# Patient Record
Sex: FEMALE | Race: WHITE | Employment: UNEMPLOYED | URBAN - METROPOLITAN AREA
[De-identification: names, ages, dates, MRNs, and addresses within clinical notes are randomized per-mention and may not be internally consistent; named-entity substitution may affect disease eponyms.]

---

## 2017-01-06 DIAGNOSIS — M54.50 MIDLINE LOW BACK PAIN WITHOUT SCIATICA, UNSPECIFIED CHRONICITY: ICD-10-CM

## 2017-01-06 DIAGNOSIS — I10 ESSENTIAL HYPERTENSION: ICD-10-CM

## 2017-01-06 DIAGNOSIS — M81.0 OSTEOPOROSIS: ICD-10-CM

## 2017-01-06 DIAGNOSIS — L30.9 ECZEMA, UNSPECIFIED TYPE: ICD-10-CM

## 2017-01-06 DIAGNOSIS — F32.89 OTHER DEPRESSION: ICD-10-CM

## 2017-01-06 DIAGNOSIS — M10.09 IDIOPATHIC GOUT OF MULTIPLE SITES, UNSPECIFIED CHRONICITY: ICD-10-CM

## 2017-01-06 DIAGNOSIS — K21.9 GASTROESOPHAGEAL REFLUX DISEASE WITHOUT ESOPHAGITIS: ICD-10-CM

## 2017-01-06 DIAGNOSIS — I25.10 CORONARY ARTERY DISEASE INVOLVING NATIVE CORONARY ARTERY OF NATIVE HEART WITHOUT ANGINA PECTORIS: ICD-10-CM

## 2017-01-06 DIAGNOSIS — E03.4 HYPOTHYROIDISM DUE TO ACQUIRED ATROPHY OF THYROID: ICD-10-CM

## 2017-01-06 RX ORDER — BETAMETHASONE DIPROPIONATE 0.05 %
OINTMENT (GRAM) TOPICAL
Qty: 15 G | Refills: 2 | Status: SHIPPED | OUTPATIENT
Start: 2017-01-06 | End: 2017-05-12 | Stop reason: SDUPTHER

## 2017-01-06 RX ORDER — ISOSORBIDE MONONITRATE 60 MG/1
TABLET, EXTENDED RELEASE ORAL
Qty: 90 TABLET | Refills: 1 | Status: SHIPPED | OUTPATIENT
Start: 2017-01-06 | End: 2017-05-12 | Stop reason: SDUPTHER

## 2017-01-06 RX ORDER — LEVOTHYROXINE SODIUM 0.07 MG/1
TABLET ORAL
Qty: 90 TABLET | Refills: 1 | Status: SHIPPED | OUTPATIENT
Start: 2017-01-06 | End: 2017-05-12 | Stop reason: SDUPTHER

## 2017-01-06 RX ORDER — FUROSEMIDE 40 MG/1
TABLET ORAL
Qty: 90 TABLET | Refills: 1 | Status: SHIPPED | OUTPATIENT
Start: 2017-01-06 | End: 2017-05-12 | Stop reason: SDUPTHER

## 2017-01-06 RX ORDER — ATENOLOL 50 MG/1
TABLET ORAL
Qty: 90 TABLET | Refills: 1 | Status: SHIPPED | OUTPATIENT
Start: 2017-01-06 | End: 2017-05-12 | Stop reason: SDUPTHER

## 2017-01-06 RX ORDER — AMLODIPINE BESYLATE 10 MG/1
TABLET ORAL
Qty: 90 TABLET | Refills: 1 | Status: SHIPPED | OUTPATIENT
Start: 2017-01-06 | End: 2017-05-12 | Stop reason: SDUPTHER

## 2017-01-06 RX ORDER — ALENDRONATE SODIUM 70 MG/1
TABLET ORAL
Qty: 12 TABLET | Refills: 1 | Status: SHIPPED | OUTPATIENT
Start: 2017-01-06 | End: 2017-05-12 | Stop reason: SDUPTHER

## 2017-01-06 RX ORDER — GABAPENTIN 300 MG/1
CAPSULE ORAL
Qty: 30 CAPSULE | Refills: 2 | Status: SHIPPED | OUTPATIENT
Start: 2017-01-06 | End: 2017-01-23 | Stop reason: SDUPTHER

## 2017-01-06 RX ORDER — ALLOPURINOL 100 MG/1
TABLET ORAL
Qty: 30 TABLET | Refills: 2 | Status: SHIPPED | OUTPATIENT
Start: 2017-01-06 | End: 2017-01-23 | Stop reason: SDUPTHER

## 2017-01-06 RX ORDER — DEXLANSOPRAZOLE 60 MG/1
CAPSULE, DELAYED RELEASE ORAL
Qty: 30 CAPSULE | Refills: 4 | Status: SHIPPED | OUTPATIENT
Start: 2017-01-06 | End: 2017-01-23 | Stop reason: SDUPTHER

## 2017-01-19 DIAGNOSIS — I25.10 CORONARY ARTERY DISEASE INVOLVING NATIVE CORONARY ARTERY OF NATIVE HEART WITHOUT ANGINA PECTORIS: ICD-10-CM

## 2017-01-23 DIAGNOSIS — M10.09 IDIOPATHIC GOUT OF MULTIPLE SITES, UNSPECIFIED CHRONICITY: ICD-10-CM

## 2017-01-23 DIAGNOSIS — I25.10 CORONARY ARTERY DISEASE INVOLVING NATIVE CORONARY ARTERY OF NATIVE HEART WITHOUT ANGINA PECTORIS: ICD-10-CM

## 2017-01-23 DIAGNOSIS — K21.9 GASTROESOPHAGEAL REFLUX DISEASE WITHOUT ESOPHAGITIS: ICD-10-CM

## 2017-01-23 DIAGNOSIS — M54.50 MIDLINE LOW BACK PAIN WITHOUT SCIATICA, UNSPECIFIED CHRONICITY: ICD-10-CM

## 2017-01-23 RX ORDER — ALLOPURINOL 100 MG/1
TABLET ORAL
Qty: 90 TABLET | Refills: 1 | Status: SHIPPED | OUTPATIENT
Start: 2017-01-23 | End: 2017-05-12 | Stop reason: SDUPTHER

## 2017-01-23 RX ORDER — DEXLANSOPRAZOLE 60 MG/1
CAPSULE, DELAYED RELEASE ORAL
Qty: 90 CAPSULE | Refills: 1 | Status: SHIPPED | OUTPATIENT
Start: 2017-01-23 | End: 2017-05-12 | Stop reason: SDUPTHER

## 2017-01-23 RX ORDER — GABAPENTIN 300 MG/1
CAPSULE ORAL
Qty: 90 CAPSULE | Refills: 1 | Status: SHIPPED | OUTPATIENT
Start: 2017-01-23 | End: 2017-05-12 | Stop reason: SDUPTHER

## 2017-02-10 ENCOUNTER — OFFICE VISIT (OUTPATIENT)
Dept: INTERNAL MEDICINE CLINIC | Age: 82
End: 2017-02-10

## 2017-02-10 VITALS
RESPIRATION RATE: 12 BRPM | WEIGHT: 138 LBS | BODY MASS INDEX: 26.95 KG/M2 | HEART RATE: 56 BPM | OXYGEN SATURATION: 97 % | SYSTOLIC BLOOD PRESSURE: 118 MMHG | DIASTOLIC BLOOD PRESSURE: 80 MMHG

## 2017-02-10 DIAGNOSIS — E03.4 HYPOTHYROIDISM DUE TO ACQUIRED ATROPHY OF THYROID: ICD-10-CM

## 2017-02-10 DIAGNOSIS — I25.10 CORONARY ARTERY DISEASE INVOLVING NATIVE CORONARY ARTERY OF NATIVE HEART WITHOUT ANGINA PECTORIS: Primary | ICD-10-CM

## 2017-02-10 DIAGNOSIS — I10 ESSENTIAL HYPERTENSION: ICD-10-CM

## 2017-02-10 DIAGNOSIS — E78.2 MIXED HYPERLIPIDEMIA: ICD-10-CM

## 2017-02-10 DIAGNOSIS — N18.30 CHRONIC RENAL INSUFFICIENCY, STAGE 3 (MODERATE) (HCC): ICD-10-CM

## 2017-02-10 PROCEDURE — 99214 OFFICE O/P EST MOD 30 MIN: CPT | Performed by: INTERNAL MEDICINE

## 2017-05-12 ENCOUNTER — OFFICE VISIT (OUTPATIENT)
Dept: INTERNAL MEDICINE CLINIC | Age: 82
End: 2017-05-12

## 2017-05-12 VITALS
HEART RATE: 56 BPM | SYSTOLIC BLOOD PRESSURE: 122 MMHG | WEIGHT: 138 LBS | RESPIRATION RATE: 16 BRPM | DIASTOLIC BLOOD PRESSURE: 64 MMHG | BODY MASS INDEX: 27.09 KG/M2 | HEIGHT: 60 IN | OXYGEN SATURATION: 96 %

## 2017-05-12 DIAGNOSIS — N18.30 CHRONIC RENAL INSUFFICIENCY, STAGE 3 (MODERATE) (HCC): ICD-10-CM

## 2017-05-12 DIAGNOSIS — I25.10 CORONARY ARTERY DISEASE INVOLVING NATIVE CORONARY ARTERY OF NATIVE HEART WITHOUT ANGINA PECTORIS: ICD-10-CM

## 2017-05-12 DIAGNOSIS — E78.2 MIXED HYPERLIPIDEMIA: ICD-10-CM

## 2017-05-12 DIAGNOSIS — L30.9 ECZEMA, UNSPECIFIED TYPE: ICD-10-CM

## 2017-05-12 DIAGNOSIS — M81.0 OSTEOPOROSIS: ICD-10-CM

## 2017-05-12 DIAGNOSIS — K21.9 GASTROESOPHAGEAL REFLUX DISEASE WITHOUT ESOPHAGITIS: ICD-10-CM

## 2017-05-12 DIAGNOSIS — R42 VERTIGO: ICD-10-CM

## 2017-05-12 DIAGNOSIS — E03.4 HYPOTHYROIDISM DUE TO ACQUIRED ATROPHY OF THYROID: ICD-10-CM

## 2017-05-12 DIAGNOSIS — I10 ESSENTIAL HYPERTENSION: ICD-10-CM

## 2017-05-12 DIAGNOSIS — F32.89 OTHER DEPRESSION: ICD-10-CM

## 2017-05-12 DIAGNOSIS — I25.10 CORONARY ARTERY DISEASE INVOLVING NATIVE CORONARY ARTERY OF NATIVE HEART WITHOUT ANGINA PECTORIS: Primary | ICD-10-CM

## 2017-05-12 DIAGNOSIS — M54.50 MIDLINE LOW BACK PAIN WITHOUT SCIATICA, UNSPECIFIED CHRONICITY: ICD-10-CM

## 2017-05-12 DIAGNOSIS — M10.09 IDIOPATHIC GOUT OF MULTIPLE SITES, UNSPECIFIED CHRONICITY: ICD-10-CM

## 2017-05-12 LAB
A/G RATIO: 1.8 (ref 1.1–2.2)
ALBUMIN SERPL-MCNC: 4.4 G/DL (ref 3.4–5)
ALP BLD-CCNC: 74 U/L (ref 40–129)
ALT SERPL-CCNC: 17 U/L (ref 10–40)
ANION GAP SERPL CALCULATED.3IONS-SCNC: 17 MMOL/L (ref 3–16)
AST SERPL-CCNC: 20 U/L (ref 15–37)
BASOPHILS ABSOLUTE: 0.1 K/UL (ref 0–0.2)
BASOPHILS RELATIVE PERCENT: 1 %
BILIRUB SERPL-MCNC: 0.4 MG/DL (ref 0–1)
BUN BLDV-MCNC: 31 MG/DL (ref 7–20)
CALCIUM SERPL-MCNC: 9.5 MG/DL (ref 8.3–10.6)
CHLORIDE BLD-SCNC: 102 MMOL/L (ref 99–110)
CHOLESTEROL, TOTAL: 154 MG/DL (ref 0–199)
CO2: 23 MMOL/L (ref 21–32)
CREAT SERPL-MCNC: 1.4 MG/DL (ref 0.6–1.2)
EOSINOPHILS ABSOLUTE: 0.9 K/UL (ref 0–0.6)
EOSINOPHILS RELATIVE PERCENT: 12 %
GFR AFRICAN AMERICAN: 43
GFR NON-AFRICAN AMERICAN: 35
GLOBULIN: 2.5 G/DL
GLUCOSE BLD-MCNC: 101 MG/DL (ref 70–99)
HCT VFR BLD CALC: 36.6 % (ref 36–48)
HDLC SERPL-MCNC: 49 MG/DL (ref 40–60)
HEMOGLOBIN: 12.1 G/DL (ref 12–16)
LDL CHOLESTEROL CALCULATED: 78 MG/DL
LYMPHOCYTES ABSOLUTE: 1.9 K/UL (ref 1–5.1)
LYMPHOCYTES RELATIVE PERCENT: 26.2 %
MCH RBC QN AUTO: 30.9 PG (ref 26–34)
MCHC RBC AUTO-ENTMCNC: 33 G/DL (ref 31–36)
MCV RBC AUTO: 93.6 FL (ref 80–100)
MONOCYTES ABSOLUTE: 0.8 K/UL (ref 0–1.3)
MONOCYTES RELATIVE PERCENT: 10.7 %
NEUTROPHILS ABSOLUTE: 3.6 K/UL (ref 1.7–7.7)
NEUTROPHILS RELATIVE PERCENT: 50.1 %
PDW BLD-RTO: 13.8 % (ref 12.4–15.4)
PLATELET # BLD: 229 K/UL (ref 135–450)
PMV BLD AUTO: 8.1 FL (ref 5–10.5)
POTASSIUM SERPL-SCNC: 4.5 MMOL/L (ref 3.5–5.1)
RBC # BLD: 3.91 M/UL (ref 4–5.2)
SODIUM BLD-SCNC: 142 MMOL/L (ref 136–145)
T4 FREE: 1.2 NG/DL (ref 0.9–1.8)
TOTAL PROTEIN: 6.9 G/DL (ref 6.4–8.2)
TRIGL SERPL-MCNC: 136 MG/DL (ref 0–150)
TSH SERPL DL<=0.05 MIU/L-ACNC: 0.42 UIU/ML (ref 0.27–4.2)
VLDLC SERPL CALC-MCNC: 27 MG/DL
WBC # BLD: 7.1 K/UL (ref 4–11)

## 2017-05-12 PROCEDURE — 99214 OFFICE O/P EST MOD 30 MIN: CPT | Performed by: INTERNAL MEDICINE

## 2017-05-12 RX ORDER — GABAPENTIN 300 MG/1
CAPSULE ORAL
Qty: 90 CAPSULE | Refills: 1 | Status: SHIPPED | OUTPATIENT
Start: 2017-05-12 | End: 2017-08-14 | Stop reason: SDUPTHER

## 2017-05-12 RX ORDER — BETAMETHASONE DIPROPIONATE 0.05 %
OINTMENT (GRAM) TOPICAL
Qty: 45 G | Refills: 2 | Status: SHIPPED | OUTPATIENT
Start: 2017-05-12 | End: 2017-08-14 | Stop reason: SDUPTHER

## 2017-05-12 RX ORDER — DEXLANSOPRAZOLE 60 MG/1
CAPSULE, DELAYED RELEASE ORAL
Qty: 90 CAPSULE | Refills: 1 | Status: SHIPPED | OUTPATIENT
Start: 2017-05-12 | End: 2017-08-14 | Stop reason: SDUPTHER

## 2017-05-12 RX ORDER — ALENDRONATE SODIUM 70 MG/1
TABLET ORAL
Qty: 12 TABLET | Refills: 1 | Status: SHIPPED | OUTPATIENT
Start: 2017-05-12 | End: 2017-08-14 | Stop reason: SDUPTHER

## 2017-05-12 RX ORDER — ISOSORBIDE MONONITRATE 60 MG/1
TABLET, EXTENDED RELEASE ORAL
Qty: 90 TABLET | Refills: 1 | Status: SHIPPED | OUTPATIENT
Start: 2017-05-12 | End: 2017-08-14 | Stop reason: SDUPTHER

## 2017-05-12 RX ORDER — ALLOPURINOL 100 MG/1
TABLET ORAL
Qty: 90 TABLET | Refills: 1 | Status: SHIPPED | OUTPATIENT
Start: 2017-05-12 | End: 2017-08-14 | Stop reason: SDUPTHER

## 2017-05-12 RX ORDER — ATENOLOL 50 MG/1
TABLET ORAL
Qty: 90 TABLET | Refills: 1 | Status: SHIPPED | OUTPATIENT
Start: 2017-05-12 | End: 2017-08-14 | Stop reason: SDUPTHER

## 2017-05-12 RX ORDER — AMLODIPINE BESYLATE 10 MG/1
TABLET ORAL
Qty: 90 TABLET | Refills: 1 | Status: SHIPPED | OUTPATIENT
Start: 2017-05-12 | End: 2017-08-14 | Stop reason: SDUPTHER

## 2017-05-12 RX ORDER — MECLIZINE HCL 12.5 MG/1
12.5 TABLET ORAL 3 TIMES DAILY PRN
Qty: 30 TABLET | Refills: 1 | Status: SHIPPED | OUTPATIENT
Start: 2017-05-12 | End: 2017-08-14 | Stop reason: SDUPTHER

## 2017-05-12 RX ORDER — LEVOTHYROXINE SODIUM 0.07 MG/1
TABLET ORAL
Qty: 90 TABLET | Refills: 1 | Status: SHIPPED | OUTPATIENT
Start: 2017-05-12 | End: 2017-08-14 | Stop reason: SDUPTHER

## 2017-05-12 RX ORDER — FUROSEMIDE 40 MG/1
TABLET ORAL
Qty: 90 TABLET | Refills: 1 | Status: SHIPPED | OUTPATIENT
Start: 2017-05-12 | End: 2017-08-14 | Stop reason: SDUPTHER

## 2017-05-12 ASSESSMENT — PATIENT HEALTH QUESTIONNAIRE - PHQ9
1. LITTLE INTEREST OR PLEASURE IN DOING THINGS: 0
SUM OF ALL RESPONSES TO PHQ9 QUESTIONS 1 & 2: 0
2. FEELING DOWN, DEPRESSED OR HOPELESS: 0
SUM OF ALL RESPONSES TO PHQ QUESTIONS 1-9: 0

## 2017-05-23 DIAGNOSIS — I25.10 CORONARY ARTERY DISEASE INVOLVING NATIVE CORONARY ARTERY OF NATIVE HEART WITHOUT ANGINA PECTORIS: ICD-10-CM

## 2017-05-23 DIAGNOSIS — E78.2 MIXED HYPERLIPIDEMIA: ICD-10-CM

## 2017-05-23 RX ORDER — SIMVASTATIN 20 MG
TABLET ORAL
Qty: 90 TABLET | Refills: 0 | Status: SHIPPED | OUTPATIENT
Start: 2017-05-23 | End: 2017-08-14 | Stop reason: SDUPTHER

## 2017-08-14 ENCOUNTER — OFFICE VISIT (OUTPATIENT)
Dept: INTERNAL MEDICINE CLINIC | Age: 82
End: 2017-08-14

## 2017-08-14 VITALS — SYSTOLIC BLOOD PRESSURE: 148 MMHG | HEART RATE: 56 BPM | OXYGEN SATURATION: 96 % | DIASTOLIC BLOOD PRESSURE: 62 MMHG

## 2017-08-14 DIAGNOSIS — E03.4 HYPOTHYROIDISM DUE TO ACQUIRED ATROPHY OF THYROID: ICD-10-CM

## 2017-08-14 DIAGNOSIS — M10.09 IDIOPATHIC GOUT OF MULTIPLE SITES, UNSPECIFIED CHRONICITY: ICD-10-CM

## 2017-08-14 DIAGNOSIS — I10 ESSENTIAL HYPERTENSION: Primary | ICD-10-CM

## 2017-08-14 DIAGNOSIS — M54.50 MIDLINE LOW BACK PAIN WITHOUT SCIATICA, UNSPECIFIED CHRONICITY: ICD-10-CM

## 2017-08-14 DIAGNOSIS — L30.9 ECZEMA, UNSPECIFIED TYPE: ICD-10-CM

## 2017-08-14 DIAGNOSIS — Z23 NEED FOR INFLUENZA VACCINATION: ICD-10-CM

## 2017-08-14 DIAGNOSIS — F32.A DEPRESSION, UNSPECIFIED DEPRESSION TYPE: ICD-10-CM

## 2017-08-14 DIAGNOSIS — R42 VERTIGO: ICD-10-CM

## 2017-08-14 DIAGNOSIS — E78.2 MIXED HYPERLIPIDEMIA: ICD-10-CM

## 2017-08-14 DIAGNOSIS — I25.10 CORONARY ARTERY DISEASE INVOLVING NATIVE CORONARY ARTERY OF NATIVE HEART WITHOUT ANGINA PECTORIS: ICD-10-CM

## 2017-08-14 DIAGNOSIS — I10 ESSENTIAL HYPERTENSION: ICD-10-CM

## 2017-08-14 DIAGNOSIS — K21.9 GASTROESOPHAGEAL REFLUX DISEASE WITHOUT ESOPHAGITIS: ICD-10-CM

## 2017-08-14 DIAGNOSIS — M80.00XA AGE-RELATED OSTEOPOROSIS WITH CURRENT PATHOLOGICAL FRACTURE, INITIAL ENCOUNTER: ICD-10-CM

## 2017-08-14 LAB
A/G RATIO: 1.8 (ref 1.1–2.2)
ALBUMIN SERPL-MCNC: 4.8 G/DL (ref 3.4–5)
ALP BLD-CCNC: 68 U/L (ref 40–129)
ALT SERPL-CCNC: 17 U/L (ref 10–40)
ANION GAP SERPL CALCULATED.3IONS-SCNC: 17 MMOL/L (ref 3–16)
AST SERPL-CCNC: 22 U/L (ref 15–37)
BILIRUB SERPL-MCNC: 0.4 MG/DL (ref 0–1)
BUN BLDV-MCNC: 24 MG/DL (ref 7–20)
CALCIUM SERPL-MCNC: 10 MG/DL (ref 8.3–10.6)
CHLORIDE BLD-SCNC: 95 MMOL/L (ref 99–110)
CHOLESTEROL, TOTAL: 152 MG/DL (ref 0–199)
CO2: 25 MMOL/L (ref 21–32)
CREAT SERPL-MCNC: 1.4 MG/DL (ref 0.6–1.2)
GFR AFRICAN AMERICAN: 43
GFR NON-AFRICAN AMERICAN: 35
GLOBULIN: 2.6 G/DL
GLUCOSE BLD-MCNC: 106 MG/DL (ref 70–99)
HCT VFR BLD CALC: 37.6 % (ref 36–48)
HDLC SERPL-MCNC: 54 MG/DL (ref 40–60)
HEMOGLOBIN: 12.8 G/DL (ref 12–16)
LDL CHOLESTEROL CALCULATED: 71 MG/DL
MCH RBC QN AUTO: 31.7 PG (ref 26–34)
MCHC RBC AUTO-ENTMCNC: 34 G/DL (ref 31–36)
MCV RBC AUTO: 93.3 FL (ref 80–100)
PDW BLD-RTO: 13.3 % (ref 12.4–15.4)
PLATELET # BLD: 272 K/UL (ref 135–450)
PMV BLD AUTO: 8.1 FL (ref 5–10.5)
POTASSIUM SERPL-SCNC: 4.8 MMOL/L (ref 3.5–5.1)
RBC # BLD: 4.03 M/UL (ref 4–5.2)
SODIUM BLD-SCNC: 137 MMOL/L (ref 136–145)
T4 FREE: 1.5 NG/DL (ref 0.9–1.8)
TOTAL PROTEIN: 7.4 G/DL (ref 6.4–8.2)
TRIGL SERPL-MCNC: 136 MG/DL (ref 0–150)
TSH SERPL DL<=0.05 MIU/L-ACNC: 0.61 UIU/ML (ref 0.27–4.2)
URIC ACID, SERUM: 5.3 MG/DL (ref 2.6–6)
VLDLC SERPL CALC-MCNC: 27 MG/DL
WBC # BLD: 6.8 K/UL (ref 4–11)

## 2017-08-14 PROCEDURE — G0008 ADMIN INFLUENZA VIRUS VAC: HCPCS | Performed by: INTERNAL MEDICINE

## 2017-08-14 PROCEDURE — 99214 OFFICE O/P EST MOD 30 MIN: CPT | Performed by: INTERNAL MEDICINE

## 2017-08-14 PROCEDURE — 90662 IIV NO PRSV INCREASED AG IM: CPT | Performed by: INTERNAL MEDICINE

## 2017-08-14 RX ORDER — SIMVASTATIN 20 MG
TABLET ORAL
Qty: 90 TABLET | Refills: 0 | Status: SHIPPED | OUTPATIENT
Start: 2017-08-14 | End: 2017-11-16 | Stop reason: SDUPTHER

## 2017-08-14 RX ORDER — ATENOLOL 50 MG/1
TABLET ORAL
Qty: 90 TABLET | Refills: 1 | Status: SHIPPED | OUTPATIENT
Start: 2017-08-14 | End: 2017-11-16 | Stop reason: SDUPTHER

## 2017-08-14 RX ORDER — BETAMETHASONE DIPROPIONATE 0.05 %
OINTMENT (GRAM) TOPICAL
Qty: 45 G | Refills: 2 | Status: ON HOLD | OUTPATIENT
Start: 2017-08-14 | End: 2018-08-23 | Stop reason: HOSPADM

## 2017-08-14 RX ORDER — ALENDRONATE SODIUM 70 MG/1
TABLET ORAL
Qty: 12 TABLET | Refills: 1 | Status: SHIPPED | OUTPATIENT
Start: 2017-08-14 | End: 2017-11-16 | Stop reason: SDUPTHER

## 2017-08-14 RX ORDER — MELATONIN
Qty: 30 TABLET | Refills: 0 | Status: SHIPPED | OUTPATIENT
Start: 2017-08-14 | End: 2017-08-15 | Stop reason: ALTCHOICE

## 2017-08-14 RX ORDER — LEVOTHYROXINE SODIUM 0.07 MG/1
TABLET ORAL
Qty: 90 TABLET | Refills: 1 | Status: SHIPPED | OUTPATIENT
Start: 2017-08-14 | End: 2017-11-16 | Stop reason: SDUPTHER

## 2017-08-14 RX ORDER — ALLOPURINOL 100 MG/1
TABLET ORAL
Qty: 90 TABLET | Refills: 1 | Status: SHIPPED | OUTPATIENT
Start: 2017-08-14 | End: 2017-11-16 | Stop reason: SDUPTHER

## 2017-08-14 RX ORDER — DEXLANSOPRAZOLE 60 MG/1
CAPSULE, DELAYED RELEASE ORAL
Qty: 90 CAPSULE | Refills: 1 | Status: SHIPPED | OUTPATIENT
Start: 2017-08-14 | End: 2017-11-16 | Stop reason: SDUPTHER

## 2017-08-14 RX ORDER — MECLIZINE HCL 12.5 MG/1
12.5 TABLET ORAL 3 TIMES DAILY PRN
Qty: 30 TABLET | Refills: 1 | Status: SHIPPED | OUTPATIENT
Start: 2017-08-14 | End: 2017-08-24

## 2017-08-14 RX ORDER — GABAPENTIN 300 MG/1
CAPSULE ORAL
Qty: 90 CAPSULE | Refills: 1 | Status: SHIPPED | OUTPATIENT
Start: 2017-08-14 | End: 2017-11-16 | Stop reason: SDUPTHER

## 2017-08-14 RX ORDER — TRAMADOL HYDROCHLORIDE 50 MG/1
50 TABLET ORAL EVERY 4 HOURS PRN
Qty: 30 TABLET | Refills: 1 | Status: SHIPPED | OUTPATIENT
Start: 2017-08-14 | End: 2017-11-16 | Stop reason: SDUPTHER

## 2017-08-14 RX ORDER — ISOSORBIDE MONONITRATE 60 MG/1
TABLET, EXTENDED RELEASE ORAL
Qty: 90 TABLET | Refills: 1 | Status: SHIPPED | OUTPATIENT
Start: 2017-08-14 | End: 2017-11-16 | Stop reason: SDUPTHER

## 2017-08-14 RX ORDER — FUROSEMIDE 40 MG/1
TABLET ORAL
Qty: 90 TABLET | Refills: 1 | Status: SHIPPED | OUTPATIENT
Start: 2017-08-14 | End: 2017-11-16 | Stop reason: SDUPTHER

## 2017-08-14 RX ORDER — AMLODIPINE BESYLATE 10 MG/1
TABLET ORAL
Qty: 90 TABLET | Refills: 1 | Status: SHIPPED | OUTPATIENT
Start: 2017-08-14 | End: 2017-11-16 | Stop reason: SDUPTHER

## 2017-08-15 DIAGNOSIS — M80.00XA AGE-RELATED OSTEOPOROSIS WITH CURRENT PATHOLOGICAL FRACTURE, INITIAL ENCOUNTER: ICD-10-CM

## 2017-08-15 RX ORDER — CHOLECALCIFEROL (VITAMIN D3) 125 MCG
2000 CAPSULE ORAL DAILY
Qty: 30 TABLET | Refills: 1
Start: 2017-08-15 | End: 2017-11-16 | Stop reason: SDUPTHER

## 2017-11-16 ENCOUNTER — OFFICE VISIT (OUTPATIENT)
Dept: INTERNAL MEDICINE CLINIC | Age: 82
End: 2017-11-16

## 2017-11-16 VITALS
DIASTOLIC BLOOD PRESSURE: 62 MMHG | OXYGEN SATURATION: 97 % | SYSTOLIC BLOOD PRESSURE: 148 MMHG | WEIGHT: 138 LBS | RESPIRATION RATE: 16 BRPM | HEART RATE: 64 BPM | HEIGHT: 61 IN | BODY MASS INDEX: 26.06 KG/M2

## 2017-11-16 DIAGNOSIS — F32.A DEPRESSION, UNSPECIFIED DEPRESSION TYPE: ICD-10-CM

## 2017-11-16 DIAGNOSIS — M80.00XA AGE-RELATED OSTEOPOROSIS WITH CURRENT PATHOLOGICAL FRACTURE, INITIAL ENCOUNTER: ICD-10-CM

## 2017-11-16 DIAGNOSIS — E78.2 MIXED HYPERLIPIDEMIA: ICD-10-CM

## 2017-11-16 DIAGNOSIS — M10.09 IDIOPATHIC GOUT OF MULTIPLE SITES, UNSPECIFIED CHRONICITY: ICD-10-CM

## 2017-11-16 DIAGNOSIS — E03.4 HYPOTHYROIDISM DUE TO ACQUIRED ATROPHY OF THYROID: ICD-10-CM

## 2017-11-16 DIAGNOSIS — K21.9 GASTROESOPHAGEAL REFLUX DISEASE WITHOUT ESOPHAGITIS: ICD-10-CM

## 2017-11-16 DIAGNOSIS — M54.50 MIDLINE LOW BACK PAIN WITHOUT SCIATICA, UNSPECIFIED CHRONICITY: ICD-10-CM

## 2017-11-16 DIAGNOSIS — I10 ESSENTIAL HYPERTENSION: ICD-10-CM

## 2017-11-16 DIAGNOSIS — I25.10 CORONARY ARTERY DISEASE INVOLVING NATIVE CORONARY ARTERY OF NATIVE HEART WITHOUT ANGINA PECTORIS: ICD-10-CM

## 2017-11-16 DIAGNOSIS — Z00.00 ROUTINE GENERAL MEDICAL EXAMINATION AT A HEALTH CARE FACILITY: Primary | ICD-10-CM

## 2017-11-16 LAB
A/G RATIO: 1.6 (CALC) (ref 0.8–2.6)
ALBUMIN SERPL-MCNC: 4.3 GM/DL (ref 3.5–5.2)
ALP BLD-CCNC: 69 U/L (ref 23–144)
ALT SERPL-CCNC: 21 U/L (ref 0–60)
AST SERPL-CCNC: 20 U/L (ref 0–55)
BASOPHILS ABSOLUTE: 0.1 K/MM3 (ref 0–0.3)
BASOPHILS RELATIVE PERCENT: 0.6 % (ref 0–2)
BILIRUB SERPL-MCNC: 0.4 MG/DL (ref 0–1.2)
BUN / CREAT RATIO: 19 (CALC) (ref 7–25)
BUN BLDV-MCNC: 28 MG/DL (ref 3–29)
CALCIUM SERPL-MCNC: 9.6 MG/DL (ref 8.5–10.5)
CHLORIDE BLD-SCNC: 99 MEQ/L (ref 96–110)
CHOLESTEROL, TOTAL: 190 MG/DL
CO2: 28 MEQ/L (ref 19–32)
COPY(IES) SENT TO:: NORMAL
CREAT SERPL-MCNC: 1.5 MG/DL
EOSINOPHILS ABSOLUTE: 0.4 K/MM3 (ref 0–0.6)
EOSINOPHILS RELATIVE PERCENT: 4.2 % (ref 0–7)
GFR SERPL CREATININE-BSD FRML MDRD: 30 ML/MIN/1.73M2
GLOBULIN: 2.7 GM/DL (CALC) (ref 1.9–3.6)
GLUCOSE BLD-MCNC: 93 MG/DL
HCT VFR BLD CALC: 38.4 % (ref 35–46)
HDLC SERPL-MCNC: 51 MG/DL
HEMOGLOBIN: 13 G/DL (ref 12–15.6)
LDL CHOLESTEROL: 91 MG/DL (CALC)
LEUKOCYTES, UA: 9.1 K/MM3 (ref 3.8–10.8)
LYMPHOCYTES ABSOLUTE: 2.1 K/MM3 (ref 0.9–4.1)
LYMPHOCYTES RELATIVE PERCENT: 23.4 % (ref 14–51)
MCH RBC QN AUTO: 31.6 PG (ref 27–33)
MCHC RBC AUTO-ENTMCNC: 33.9 G/DL (ref 32–36)
MCV RBC AUTO: 93.3 FL (ref 80–100)
MONOCYTES ABSOLUTE: 0.8 K/MM3 (ref 0.2–1.1)
MONOCYTES RELATIVE PERCENT: 8.5 % (ref 0–14)
NEUTROPHILS ABSOLUTE: 5.8 K/MM3 (ref 1.5–7.8)
PDW BLD-RTO: 13.6 % (ref 9–15)
PLATELET # BLD: 310 K/MM3 (ref 130–400)
POTASSIUM SERPL-SCNC: 4.1 MEQ/L (ref 3.4–5.3)
RBC # BLD: 4.12 M/MM3 (ref 3.9–5.2)
SEGMENTED NEUTROPHILS RELATIVE PERCENT: 63.3 % (ref 40–76)
SODIUM BLD-SCNC: 140 MEQ/L (ref 135–148)
T4 FREE: 1.06 NG/DL (ref 0.8–1.8)
TOTAL PROTEIN: 7 GM/DL (ref 6–8.3)
TRIGL SERPL-MCNC: 242 MG/DL
TSH SERPL DL<=0.05 MIU/L-ACNC: 1.88 MICRO IU/ML (ref 0.4–4)
VLDLC SERPL CALC-MCNC: 48 MG/DL (CALC) (ref 4–38)

## 2017-11-16 PROCEDURE — G0439 PPPS, SUBSEQ VISIT: HCPCS | Performed by: INTERNAL MEDICINE

## 2017-11-16 PROCEDURE — G8598 ASA/ANTIPLAT THER USED: HCPCS | Performed by: INTERNAL MEDICINE

## 2017-11-16 RX ORDER — DEXLANSOPRAZOLE 60 MG/1
CAPSULE, DELAYED RELEASE ORAL
Qty: 90 CAPSULE | Refills: 1 | Status: SHIPPED | OUTPATIENT
Start: 2017-11-16 | End: 2018-03-16 | Stop reason: SDUPTHER

## 2017-11-16 RX ORDER — FUROSEMIDE 40 MG/1
TABLET ORAL
Qty: 90 TABLET | Refills: 1 | Status: ON HOLD | OUTPATIENT
Start: 2017-11-16 | End: 2018-01-10 | Stop reason: HOSPADM

## 2017-11-16 RX ORDER — AMLODIPINE BESYLATE 10 MG/1
TABLET ORAL
Qty: 90 TABLET | Refills: 1 | Status: ON HOLD | OUTPATIENT
Start: 2017-11-16 | End: 2018-01-10 | Stop reason: HOSPADM

## 2017-11-16 RX ORDER — ALLOPURINOL 100 MG/1
TABLET ORAL
Qty: 90 TABLET | Refills: 1 | Status: SHIPPED | OUTPATIENT
Start: 2017-11-16 | End: 2018-03-16 | Stop reason: SDUPTHER

## 2017-11-16 RX ORDER — ALENDRONATE SODIUM 70 MG/1
TABLET ORAL
Qty: 12 TABLET | Refills: 1 | Status: SHIPPED | OUTPATIENT
Start: 2017-11-16 | End: 2018-01-10 | Stop reason: SDUPTHER

## 2017-11-16 RX ORDER — ATENOLOL 50 MG/1
TABLET ORAL
Qty: 90 TABLET | Refills: 1 | Status: ON HOLD | OUTPATIENT
Start: 2017-11-16 | End: 2018-01-10 | Stop reason: HOSPADM

## 2017-11-16 RX ORDER — CHOLECALCIFEROL (VITAMIN D3) 125 MCG
2000 CAPSULE ORAL DAILY
Qty: 30 TABLET | Refills: 1 | Status: SHIPPED | OUTPATIENT
Start: 2017-11-16 | End: 2018-01-17 | Stop reason: SDUPTHER

## 2017-11-16 RX ORDER — GABAPENTIN 300 MG/1
CAPSULE ORAL
Qty: 90 CAPSULE | Refills: 1 | Status: ON HOLD | OUTPATIENT
Start: 2017-11-16 | End: 2018-01-10 | Stop reason: HOSPADM

## 2017-11-16 RX ORDER — TRAMADOL HYDROCHLORIDE 50 MG/1
50 TABLET ORAL EVERY 4 HOURS PRN
Qty: 30 TABLET | Refills: 1 | Status: ON HOLD | OUTPATIENT
Start: 2017-11-16 | End: 2018-08-23 | Stop reason: HOSPADM

## 2017-11-16 RX ORDER — LEVOTHYROXINE SODIUM 0.07 MG/1
TABLET ORAL
Qty: 90 TABLET | Refills: 1 | Status: SHIPPED | OUTPATIENT
Start: 2017-11-16 | End: 2018-03-16 | Stop reason: SDUPTHER

## 2017-11-16 RX ORDER — SIMVASTATIN 20 MG
TABLET ORAL
Qty: 90 TABLET | Refills: 0 | Status: SHIPPED | OUTPATIENT
Start: 2017-11-16 | End: 2018-02-24 | Stop reason: SDUPTHER

## 2017-11-16 RX ORDER — ISOSORBIDE MONONITRATE 60 MG/1
TABLET, EXTENDED RELEASE ORAL
Qty: 90 TABLET | Refills: 1 | Status: SHIPPED | OUTPATIENT
Start: 2017-11-16 | End: 2018-03-16

## 2017-11-16 ASSESSMENT — PATIENT HEALTH QUESTIONNAIRE - PHQ9: SUM OF ALL RESPONSES TO PHQ QUESTIONS 1-9: 0

## 2017-11-16 ASSESSMENT — ANXIETY QUESTIONNAIRES: GAD7 TOTAL SCORE: 0

## 2017-11-16 NOTE — PROGRESS NOTES
Internal Medicine  History and Physical      Paulo Jain  YOB: 1927    Date of Service:  11/16/2017        Chief Complaint:   Paulo Jain is a 80 y.o. female who presents for a comprehensive physical exam    HPI:   Medicare questions reviewed. Hypertension: Stable. Denies SOB, cough, visual changes, dizziness, palpitations or HA. CAD- Dr Taqueria Mock following annually. HAS HAD THREE SHORT EPISODES OF CP IN THE PAST WEEK, RELIEVED W NTG. Hypothyroid has been asymptomatic w/o sx of fatigue, constipation, cold intolerance, depression. CRI- seeing Dr Brown Kinds Dec 5 and last creat stable 1.4    Gout, no recent attacks.     gerd- is on PPI    Patient Active Problem List   Diagnosis    Hypertension    Chronic renal insufficiency    GERD (gastroesophageal reflux disease)    Depression    Hyperlipidemia    Generalized osteoarthritis    Osteoporosis    Hypothyroid    CAD (coronary artery disease)    Eczema    Low back pain    Hyperuricemia    Gout    Pruritic condition       Preventive Care:  Health Maintenance   Topic Date Due    DTaP/Tdap/Td vaccine (1 - Tdap) 01/23/1946    Zostavax vaccine  Completed    Flu vaccine  Completed    Pneumococcal low/med risk  Completed        Lipid panel:   Lab Results   Component Value Date    TRIG 136 08/14/2017    HDL 54 08/14/2017    HDL 46 02/03/2012    LDLCALC 71 08/14/2017    LDLDIRECT 78 02/11/2014        Immunization History   Administered Date(s) Administered    Influenza Virus Vaccine 09/19/2012, 09/27/2013    Influenza, High Dose 09/12/2014, 09/28/2015, 10/21/2016, 08/14/2017    Pneumococcal 13-valent Conjugate ([id number]) 10/22/2015    Pneumococcal Polysaccharide ([id number]) 06/07/2011    Zoster 02/12/2014       Allergies   Allergen Reactions    Cephalexin Rash     Current Outpatient Prescriptions   Medication Sig Dispense Refill    Cholecalciferol (VITAMIN D3) 2000 units TABS Take 2,000 Units by mouth daily 30 tablet 1    alendronate (FOSAMAX) 70 MG tablet Weekly on empty stomach 30min before meal 12 tablet 1    allopurinol (ZYLOPRIM) 100 MG tablet TAKE 1 TABLET BY MOUTH ONCE DAILY 90 tablet 1    amLODIPine (NORVASC) 10 MG tablet TAKE ONE (1) TABLET BY MOUTH ONCE DAILY 90 tablet 1    atenolol (TENORMIN) 50 MG tablet TAKE ONE (1) TABLET BY MOUTH ONCE DAILY 90 tablet 1    dexlansoprazole (DEXILANT) 60 MG CPDR delayed release capsule TAKE ONE (1) CAPSULE BY MOUTH ONCE DAILY 90 capsule 1    folic acid-pyridoxine-cyancobalamin (VIRT-RISHABH FORTE) 2.5-25-2 MG TABS TAKE ONE (1) TABLET BY MOUTH ONCE DAILY 30 tablet 3    furosemide (LASIX) 40 MG tablet TAKE ONE (1) TABLET BY MOUTH ONCE DAILY 90 tablet 1    gabapentin (NEURONTIN) 300 MG capsule TAKE 1 CAPSULE BY MOUTH ONCE DAILY 90 capsule 1    isosorbide mononitrate (IMDUR) 60 MG extended release tablet TAKE ONE (1) TABLET BY MOUTH ONCE DAILY 90 tablet 1    levothyroxine (SYNTHROID) 75 MCG tablet TAKE ONE (1) TABLET BY MOUTH ONCE DAILY 90 tablet 1    sertraline (ZOLOFT) 50 MG tablet TAKE ONE (1) TABLET BY MOUTH ONCE DAILY 90 tablet 1    simvastatin (ZOCOR) 20 MG tablet TAKE 1 TABLET BY MOUTH ONCE DAILY WITH SUPPER 90 tablet 0    traMADol (ULTRAM) 50 MG tablet Take 1 tablet by mouth every 4 hours as needed for Pain 30 tablet 1    nitroGLYCERIN (NITROSTAT) 0.4 MG SL tablet Place 1 tablet under the tongue every 5 minutes as needed 30 tablet 3    aspirin EC 81 MG EC tablet Take 81 mg by mouth daily.  betamethasone dipropionate (DIPROLENE) 0.05 % ointment Apply topically daily. 45 g 2     No current facility-administered medications for this visit.         Past Medical History:   Diagnosis Date    CAD (coronary artery disease)     Dr Sky Figueredo/Jose Figueredo    Chronic renal insufficiency     Dr Rin Cabral Depression     Eczema     Elevated LFTs 5/11/2012    Generalized osteoarthritis     GERD (gastroesophageal reflux disease)     Esophagitis    Gout     Hyperlipidemia  Hypertension     Hyperuricemia     Hypothyroid     Hypothyroidism    Low back pain     Osteoporosis     Generalized     Past Surgical History:   Procedure Laterality Date    CATARACT REMOVAL      R Aug. 2006 and L March 2007 Dr. Sarah Espinoza  2004    right foot realignment [place]    HYSTERECTOMY      TONSILLECTOMY       Family History   Problem Relation Age of Onset    Other Mother      CAD    Other Father      CAD     Social History     Social History    Marital status:      Spouse name: N/A    Number of children: N/A    Years of education: N/A     Occupational History    Retired from [employer]      as noted     Social History Main Topics    Smoking status: Former Smoker    Smokeless tobacco: Never Used    Alcohol use No    Drug use: No    Sexual activity: Not on file     Other Topics Concern    Not on file     Social History Narrative    Marital Status:   she lives in an [facility] and  has been in ECF since 2004        Diet:  Low salt        Seat Belts:  Always       Review of Systems:  A comprehensive review of systems was negative except for what was noted in the HPI. Wt Readings from Last 3 Encounters:   11/16/17 138 lb (62.6 kg)   05/12/17 138 lb (62.6 kg)   02/10/17 138 lb (62.6 kg)     BP Readings from Last 3 Encounters:   11/16/17 (!) 148/62   08/14/17 (!) 148/62   05/12/17 122/64       Physical Exam:   Vitals:    11/16/17 1002   BP: (!) 148/62   Pulse: 64   Resp: 16   SpO2: 97%   Weight: 138 lb (62.6 kg)   Height: 5' 1\" (1.549 m)     Body mass index is 26.07 kg/m². Constitutional: She is oriented to person, place, and time. She appears well-developed and well-nourished. No distress. HEENT:  Head: Normocephalic and atraumatic.    Right Ear: Tympanic membrane, external ear and ear canal normal.   Left Ear: Tympanic membrane, external ear and ear canal normal.   Nose: Nose normal.   Mouth/Throat: Oropharynx is clear and pressure stable and will continue current regimen. Will plan periodic monitoring of renal function, electrolytes, lipid profile. -     amLODIPine (NORVASC) 10 MG tablet; TAKE ONE (1) TABLET BY MOUTH ONCE DAILY  -     atenolol (TENORMIN) 50 MG tablet; TAKE ONE (1) TABLET BY MOUTH ONCE DAILY  -     furosemide (LASIX) 40 MG tablet; TAKE ONE (1) TABLET BY MOUTH ONCE DAILY  -     [facility]- Avni Underwood MD ([facility])  -     Lipid Panel  -     Comprehensive Metabolic Panel  -     CBC Auto Differential    Idiopathic gout of multiple sites, unspecified chronicity -  REMAINS STABLE IN REMISSION W CURRENTSUPPRESSIVE THERAPY. -     allopurinol (ZYLOPRIM) 100 MG tablet; TAKE 1 TABLET BY MOUTH ONCE DAILY  -     traMADol (ULTRAM) 50 MG tablet; Take 1 tablet by mouth every 4 hours as needed for Pain . Gastroesophageal reflux disease without esophagitis - GERD controlled on meds and will refill, monitor for any recurrent or worsening sx. SINCE ON PPI REFLUX SX CAUSING HER CP IS UNLIKELY  -     dexlansoprazole (DEXILANT) 60 MG CPDR delayed release capsule; TAKE ONE (1) CAPSULE BY MOUTH ONCE DAILY    Midline low back pain without sciatica, unspecified chronicity - LBP stable on pain regimen, RFs given as noted and will monitor.    -     gabapentin (NEURONTIN) 300 MG capsule; TAKE 1 CAPSULE BY MOUTH ONCE DAILY  -     traMADol (ULTRAM) 50 MG tablet; Take 1 tablet by mouth every 4 hours as needed for Pain . Hypothyroidism due to acquired atrophy of thyroid - Clinically hypothyroidism appears stable and will continue current dosing, also periodic monitoring of TFTs. -     levothyroxine (SYNTHROID) 75 MCG tablet; TAKE ONE (1) TABLET BY MOUTH ONCE DAILY  -     TSH with Reflex  -     T4, Free    Age-related osteoporosis with current pathological fracture, initial encounter - .  CONT RX  -     Cholecalciferol (VITAMIN D3) 2000 units TABS; Take 2,000 Units by mouth daily  -     alendronate (FOSAMAX) 70 MG tablet; Weekly on empty stomach 30min before meal    Depression, unspecified depression type - Mood stable on current regimen w/o any significant manifestations of severe depression or anxiety noted at this time. Cont current meds. -     sertraline (ZOLOFT) 50 MG tablet; TAKE ONE (1) TABLET BY MOUTH ONCE DAILY    Mixed hyperlipidemia - Pt will continue to work on a low fat diet and also exercise, wt loss as appropriate. Will continue periodic monitoring of fasting lipid profile, glucose, liver function.     -     simvastatin (ZOCOR) 20 MG tablet; TAKE 1 TABLET BY MOUTH ONCE DAILY WITH SUPPER      req also lift chair,   durable medical equipment  Script written

## 2017-11-17 NOTE — PROGRESS NOTES
Call pt, labs ok/chol ok, thyroid fxn nl. Sending copy to Dr Nael Tatum too. Give patient the specific chol #s please.

## 2017-12-08 RX ORDER — LOSARTAN POTASSIUM 25 MG/1
25 TABLET ORAL DAILY
Qty: 30 TABLET | Refills: 3 | Status: ON HOLD
Start: 2017-12-08 | End: 2017-12-28 | Stop reason: HOSPADM

## 2017-12-26 PROBLEM — N17.9 AKI (ACUTE KIDNEY INJURY) (HCC): Status: ACTIVE | Noted: 2017-12-26

## 2017-12-26 PROBLEM — N39.0 COMPLICATED UTI (URINARY TRACT INFECTION): Status: ACTIVE | Noted: 2017-12-26

## 2017-12-26 PROBLEM — R53.1 GENERAL WEAKNESS: Status: ACTIVE | Noted: 2017-12-26

## 2017-12-29 ENCOUNTER — TELEPHONE (OUTPATIENT)
Dept: INTERNAL MEDICINE CLINIC | Age: 82
End: 2017-12-29

## 2017-12-29 RX ORDER — SULFAMETHOXAZOLE AND TRIMETHOPRIM 800; 160 MG/1; MG/1
1 TABLET ORAL 2 TIMES DAILY
Qty: 20 TABLET | Refills: 0 | Status: ON HOLD | OUTPATIENT
Start: 2017-12-29 | End: 2018-01-10

## 2018-01-01 PROBLEM — E87.1 HYPONATREMIA: Status: ACTIVE | Noted: 2018-01-01

## 2018-01-03 PROBLEM — G93.41 ACUTE METABOLIC ENCEPHALOPATHY: Status: ACTIVE | Noted: 2018-01-03

## 2018-01-10 ENCOUNTER — TELEPHONE (OUTPATIENT)
Dept: INTERNAL MEDICINE CLINIC | Age: 83
End: 2018-01-10

## 2018-01-10 DIAGNOSIS — M80.00XA AGE-RELATED OSTEOPOROSIS WITH CURRENT PATHOLOGICAL FRACTURE, INITIAL ENCOUNTER: ICD-10-CM

## 2018-01-10 DIAGNOSIS — R53.81 PHYSICAL DECONDITIONING: Primary | ICD-10-CM

## 2018-01-10 RX ORDER — ALENDRONATE SODIUM 70 MG/1
TABLET ORAL
Qty: 12 TABLET | Refills: 1 | Status: SHIPPED | OUTPATIENT
Start: 2018-01-10 | End: 2018-08-29 | Stop reason: SDUPTHER

## 2018-01-10 RX ORDER — RANOLAZINE 500 MG/1
500 TABLET, EXTENDED RELEASE ORAL 2 TIMES DAILY
Qty: 60 TABLET | Refills: 5 | Status: SHIPPED | OUTPATIENT
Start: 2018-01-10 | End: 2018-06-19 | Stop reason: SDUPTHER

## 2018-01-12 ENCOUNTER — TELEPHONE (OUTPATIENT)
Dept: INTERNAL MEDICINE CLINIC | Age: 83
End: 2018-01-12

## 2018-01-12 NOTE — TELEPHONE ENCOUNTER
Check if she can either cut bactrim to 1/2 tab twice daily or full tab just once a day. If still nauseated on med in the next 24 hrs then stop --- we will recheck her urine also at appt in office.

## 2018-01-13 ENCOUNTER — HOSPITAL ENCOUNTER (OUTPATIENT)
Dept: OTHER | Age: 83
Discharge: HOME | End: 2018-01-13
Attending: INTERNAL MEDICINE | Admitting: INTERNAL MEDICINE

## 2018-01-15 ENCOUNTER — TELEPHONE (OUTPATIENT)
Dept: INTERNAL MEDICINE CLINIC | Age: 83
End: 2018-01-15

## 2018-01-15 LAB
BACTERIA: ABNORMAL /HPF
BILIRUBIN URINE: NEGATIVE MG/DL
BLOOD, URINE: NEGATIVE
CLARITY: ABNORMAL
COLOR: YELLOW
CULTURE: NORMAL
GLUCOSE, URINE: NEGATIVE MG/DL
KETONES, URINE: NEGATIVE MG/DL
LEUKOCYTE ESTERASE, URINE: ABNORMAL
NITRITE URINE, QUANTITATIVE: NEGATIVE
PH, URINE: 7 (ref 5–8)
PROTEIN UA: 100 MG/DL
RBC URINE: 6 /HPF (ref 0–6)
REPORT STATUS: NORMAL
REQUEST PROBLEM: NORMAL
SPECIFIC GRAVITY UA: 1.01 (ref 1–1.03)
SPECIMEN: NORMAL
SQUAMOUS EPITHELIAL: 15 /HPF
TOTAL COLONY COUNT: NORMAL
TRICHOMONAS: ABNORMAL /HPF
UROBILINOGEN, URINE: NORMAL MG/DL (ref 0.2–1)
WBC UA: 17 /HPF (ref 0–5)

## 2018-01-17 ENCOUNTER — OFFICE VISIT (OUTPATIENT)
Dept: INTERNAL MEDICINE CLINIC | Age: 83
End: 2018-01-17

## 2018-01-17 VITALS
WEIGHT: 132 LBS | OXYGEN SATURATION: 94 % | BODY MASS INDEX: 25.78 KG/M2 | SYSTOLIC BLOOD PRESSURE: 140 MMHG | HEART RATE: 72 BPM | DIASTOLIC BLOOD PRESSURE: 70 MMHG

## 2018-01-17 DIAGNOSIS — N17.9 AKI (ACUTE KIDNEY INJURY) (HCC): ICD-10-CM

## 2018-01-17 DIAGNOSIS — M80.00XA AGE-RELATED OSTEOPOROSIS WITH CURRENT PATHOLOGICAL FRACTURE, INITIAL ENCOUNTER: ICD-10-CM

## 2018-01-17 DIAGNOSIS — R53.1 GENERAL WEAKNESS: ICD-10-CM

## 2018-01-17 DIAGNOSIS — E87.1 HYPONATREMIA: ICD-10-CM

## 2018-01-17 DIAGNOSIS — G93.41 ACUTE METABOLIC ENCEPHALOPATHY: ICD-10-CM

## 2018-01-17 DIAGNOSIS — N39.0 COMPLICATED UTI (URINARY TRACT INFECTION): Primary | ICD-10-CM

## 2018-01-17 LAB
ANION GAP SERPL CALCULATED.3IONS-SCNC: 13 MMOL/L (ref 3–16)
BACTERIA: ABNORMAL /HPF
BILIRUBIN URINE: NEGATIVE
BLOOD, URINE: NEGATIVE
BUN BLDV-MCNC: 41 MG/DL (ref 7–20)
CALCIUM SERPL-MCNC: 10.3 MG/DL (ref 8.3–10.6)
CHLORIDE BLD-SCNC: 103 MMOL/L (ref 99–110)
CLARITY: ABNORMAL
CO2: 25 MMOL/L (ref 21–32)
COLOR: YELLOW
CREAT SERPL-MCNC: 2 MG/DL (ref 0.6–1.2)
EPITHELIAL CELLS, UA: 9 /HPF (ref 0–5)
GFR AFRICAN AMERICAN: 28
GFR NON-AFRICAN AMERICAN: 23
GLUCOSE BLD-MCNC: 100 MG/DL (ref 70–99)
GLUCOSE URINE: NEGATIVE MG/DL
HYALINE CASTS: 2 /LPF (ref 0–8)
KETONES, URINE: NEGATIVE MG/DL
LEUKOCYTE ESTERASE, URINE: ABNORMAL
MICROSCOPIC EXAMINATION: YES
NITRITE, URINE: NEGATIVE
PH UA: 6
POTASSIUM SERPL-SCNC: 5.2 MMOL/L (ref 3.5–5.1)
PROTEIN UA: 30 MG/DL
RBC UA: 3 /HPF (ref 0–4)
SODIUM BLD-SCNC: 141 MMOL/L (ref 136–145)
SPECIFIC GRAVITY UA: 1.02
URINE TYPE: ABNORMAL
UROBILINOGEN, URINE: 0.2 E.U./DL
WBC UA: 67 /HPF (ref 0–5)

## 2018-01-17 PROCEDURE — 99214 OFFICE O/P EST MOD 30 MIN: CPT | Performed by: INTERNAL MEDICINE

## 2018-01-17 RX ORDER — CHOLECALCIFEROL (VITAMIN D3) 125 MCG
2000 CAPSULE ORAL DAILY
Qty: 90 TABLET | Refills: 1 | Status: SHIPPED | OUTPATIENT
Start: 2018-01-17 | End: 2018-06-19 | Stop reason: SDUPTHER

## 2018-01-17 NOTE — PROGRESS NOTES
Logan Lyle  1/23/1927 01/17/18    SUBJECTIVE:    Pt seen for transitional care visit. Was admitted 1/1/ and discharged 3/89 for complicated UTI, ac metabol encephalopathy,. PAUL/weakness. .  Records reviewed including tests, notes and medications. Care coordinator call was 1/10 and visit was of moderate medical decision complexity, see below. Discharge summary as follows. 79yo WF w hx of HTN and renal insufficiency presented to ED w weakness and confusion. Had recent admit the past week for UTI and metabolic encephalopathy. At home had problems w constipation and decr oral intake, also was pushing water. In ED found to have significant hyponatremia, head CT neg. Admitted for eval.  For her metabol encephalopathy fr hyponatremia, nephrology consulted, was treated w fluid restriction, diuretic held. Had IV fluid hydration as well and sodium later corrected. Confusion also resolved. She had recurring UTI, GYN and urology consulted, outpt f/u recommended and also she will need her pessary changed outpt w Dr Alyssa Almazan. Was treated w IV unasyn but according to cultures and f/u UA, bactrim appears to be the most effective agent so on discharge will have a one week course of this, needing close f/u w GYN and urology as noted. Cardiology consulted as well as PUl as she had episode of CP w ambulation and a rapid response was called. Stress testing benign as well as VQ scan and CP did not recur, thus thought to be fr musculoskeletal strain. For constipation, bowel regimen was ordered and had good movement by discharge, Dr Beaulieu/GI also consulted. Was deconditioned, had PT and OT eval, ARU was denied by insurance. Thus [facility], family refused SNF. She will need durable medical equipment at home for her tub and toilet which have been ordered.     LIFT CHAIR ALSO NEEDED.  - SHE HAD OA GENERALIZED, INCL KNEE. HAD NEUROMUSCULAR CONDITION OF GENERALIZED WEAKNESS.   IS UNABLE TO STAND FR

## 2018-01-19 DIAGNOSIS — N39.0 URINARY TRACT INFECTION WITHOUT HEMATURIA, SITE UNSPECIFIED: Primary | ICD-10-CM

## 2018-01-19 LAB — URINE CULTURE, ROUTINE: NORMAL

## 2018-01-19 RX ORDER — AMOXICILLIN AND CLAVULANATE POTASSIUM 500; 125 MG/1; MG/1
1 TABLET, FILM COATED ORAL 2 TIMES DAILY
Qty: 20 TABLET | Refills: 0 | Status: SHIPPED | OUTPATIENT
Start: 2018-01-19 | End: 2018-01-29

## 2018-01-25 ENCOUNTER — HOSPITAL ENCOUNTER (OUTPATIENT)
Dept: OTHER | Age: 83
Discharge: HOME | End: 2018-01-25
Attending: INTERNAL MEDICINE | Admitting: INTERNAL MEDICINE

## 2018-01-25 DIAGNOSIS — I25.10 CORONARY ARTERY DISEASE INVOLVING NATIVE CORONARY ARTERY OF NATIVE HEART WITHOUT ANGINA PECTORIS: ICD-10-CM

## 2018-01-25 LAB
BACTERIA: NEGATIVE /HPF
BILIRUBIN URINE: NEGATIVE MG/DL
BLOOD, URINE: NEGATIVE
CLARITY: ABNORMAL
COLOR: YELLOW
GLUCOSE, URINE: NEGATIVE MG/DL
HYALINE CASTS: 0 /LPF
KETONES, URINE: NEGATIVE MG/DL
LEUKOCYTE ESTERASE, URINE: ABNORMAL
MUCUS: ABNORMAL HPF
NITRITE URINE, QUANTITATIVE: NEGATIVE
PH, URINE: 5 (ref 5–8)
PROTEIN UA: 30 MG/DL
RBC URINE: 2 /HPF (ref 0–6)
SPECIFIC GRAVITY UA: 1.01 (ref 1–1.03)
SQUAMOUS EPITHELIAL: 17 /HPF
TRICHOMONAS: ABNORMAL /HPF
UROBILINOGEN, URINE: NORMAL MG/DL (ref 0.2–1)
WBC UA: 2 /HPF (ref 0–5)

## 2018-01-26 DIAGNOSIS — I25.10 CORONARY ARTERY DISEASE INVOLVING NATIVE CORONARY ARTERY OF NATIVE HEART WITHOUT ANGINA PECTORIS: ICD-10-CM

## 2018-01-26 LAB
CULTURE: NORMAL
REPORT STATUS: NORMAL
REQUEST PROBLEM: NORMAL
SPECIMEN: NORMAL

## 2018-01-26 RX ORDER — ISOSORBIDE MONONITRATE 60 MG/1
TABLET, EXTENDED RELEASE ORAL
Qty: 90 TABLET | Refills: 0 | Status: SHIPPED | OUTPATIENT
Start: 2018-01-26 | End: 2018-03-16 | Stop reason: SDUPTHER

## 2018-01-26 NOTE — PROGRESS NOTES
Call pt, follow up UA appears clear, the prior bladder infection looks to now be cleared. CHECK W FAMILY IF SHE'S HAD F/U ARRANGED W UROLOGY DR HOOKS?

## 2018-02-02 ENCOUNTER — HOSPITAL ENCOUNTER (OUTPATIENT)
Dept: OTHER | Age: 83
Discharge: HOME | End: 2018-02-02
Attending: INTERNAL MEDICINE | Admitting: INTERNAL MEDICINE

## 2018-02-02 LAB
ALBUMIN SERPL-MCNC: 4.1 GM/DL (ref 3.4–5)
ANION GAP SERPL CALCULATED.3IONS-SCNC: 15 MMOL/L (ref 4–16)
BUN BLDV-MCNC: 35 MG/DL (ref 6–23)
CALCIUM SERPL-MCNC: 9.6 MG/DL (ref 8.3–10.6)
CHLORIDE BLD-SCNC: 103 MMOL/L (ref 99–110)
CO2: 25 MMOL/L (ref 21–32)
CREAT SERPL-MCNC: 1.7 MG/DL (ref 0.6–1.1)
GFR AFRICAN AMERICAN: 34 ML/MIN/1.73M2
GFR NON-AFRICAN AMERICAN: 28 ML/MIN/1.73M2
GLUCOSE BLD-MCNC: 83 MG/DL (ref 70–99)
PHOSPHORUS: 3.6 MG/DL (ref 2.5–4.9)
POTASSIUM SERPL-SCNC: 4.5 MMOL/L (ref 3.5–5.1)
SODIUM BLD-SCNC: 143 MMOL/L (ref 135–145)

## 2018-02-07 ENCOUNTER — TELEPHONE (OUTPATIENT)
Dept: INTERNAL MEDICINE CLINIC | Age: 83
End: 2018-02-07

## 2018-02-07 NOTE — TELEPHONE ENCOUNTER
Call back, we need therapy to send their notes that this is recommended, then can scan to document--- CRITICAL THEN THAT THERAPY SEND TO US ASAP BEFORE I CAN ADD TO CHART.

## 2018-02-07 NOTE — TELEPHONE ENCOUNTER
Requesting a grab bar, more stable for the patient. Spoke with [facility], order and most recent chart note are needed. Fax [phone]. Recommended at therapy.

## 2018-02-08 ENCOUNTER — TELEPHONE (OUTPATIENT)
Dept: INTERNAL MEDICINE CLINIC | Age: 83
End: 2018-02-08

## 2018-02-24 DIAGNOSIS — E78.2 MIXED HYPERLIPIDEMIA: ICD-10-CM

## 2018-02-24 DIAGNOSIS — I25.10 CORONARY ARTERY DISEASE INVOLVING NATIVE CORONARY ARTERY OF NATIVE HEART WITHOUT ANGINA PECTORIS: ICD-10-CM

## 2018-02-26 RX ORDER — SIMVASTATIN 20 MG
TABLET ORAL
Qty: 90 TABLET | Refills: 1 | Status: SHIPPED | OUTPATIENT
Start: 2018-02-26 | End: 2018-08-23 | Stop reason: SDUPTHER

## 2018-03-16 ENCOUNTER — OFFICE VISIT (OUTPATIENT)
Dept: INTERNAL MEDICINE CLINIC | Age: 83
End: 2018-03-16

## 2018-03-16 VITALS
SYSTOLIC BLOOD PRESSURE: 110 MMHG | WEIGHT: 132 LBS | OXYGEN SATURATION: 97 % | RESPIRATION RATE: 18 BRPM | HEART RATE: 65 BPM | DIASTOLIC BLOOD PRESSURE: 70 MMHG | BODY MASS INDEX: 25.78 KG/M2

## 2018-03-16 DIAGNOSIS — I25.10 CORONARY ARTERY DISEASE INVOLVING NATIVE CORONARY ARTERY OF NATIVE HEART WITHOUT ANGINA PECTORIS: ICD-10-CM

## 2018-03-16 DIAGNOSIS — E03.4 HYPOTHYROIDISM DUE TO ACQUIRED ATROPHY OF THYROID: ICD-10-CM

## 2018-03-16 DIAGNOSIS — M10.09 IDIOPATHIC GOUT OF MULTIPLE SITES, UNSPECIFIED CHRONICITY: ICD-10-CM

## 2018-03-16 DIAGNOSIS — K21.9 GASTROESOPHAGEAL REFLUX DISEASE WITHOUT ESOPHAGITIS: ICD-10-CM

## 2018-03-16 DIAGNOSIS — N17.9 AKI (ACUTE KIDNEY INJURY) (HCC): Primary | ICD-10-CM

## 2018-03-16 DIAGNOSIS — I10 ESSENTIAL HYPERTENSION: ICD-10-CM

## 2018-03-16 DIAGNOSIS — N39.0 COMPLICATED UTI (URINARY TRACT INFECTION): ICD-10-CM

## 2018-03-16 DIAGNOSIS — F32.A DEPRESSION, UNSPECIFIED DEPRESSION TYPE: ICD-10-CM

## 2018-03-16 PROBLEM — E87.1 HYPONATREMIA: Status: RESOLVED | Noted: 2018-01-01 | Resolved: 2018-03-16

## 2018-03-16 PROBLEM — R53.1 GENERAL WEAKNESS: Status: RESOLVED | Noted: 2017-12-26 | Resolved: 2018-03-16

## 2018-03-16 PROBLEM — G93.41 ACUTE METABOLIC ENCEPHALOPATHY: Status: RESOLVED | Noted: 2018-01-03 | Resolved: 2018-03-16

## 2018-03-16 PROCEDURE — 99214 OFFICE O/P EST MOD 30 MIN: CPT | Performed by: INTERNAL MEDICINE

## 2018-03-16 PROCEDURE — 4040F PNEUMOC VAC/ADMIN/RCVD: CPT | Performed by: INTERNAL MEDICINE

## 2018-03-16 PROCEDURE — G8482 FLU IMMUNIZE ORDER/ADMIN: HCPCS | Performed by: INTERNAL MEDICINE

## 2018-03-16 PROCEDURE — G8427 DOCREV CUR MEDS BY ELIG CLIN: HCPCS | Performed by: INTERNAL MEDICINE

## 2018-03-16 PROCEDURE — 1036F TOBACCO NON-USER: CPT | Performed by: INTERNAL MEDICINE

## 2018-03-16 PROCEDURE — 1123F ACP DISCUSS/DSCN MKR DOCD: CPT | Performed by: INTERNAL MEDICINE

## 2018-03-16 PROCEDURE — G8419 CALC BMI OUT NRM PARAM NOF/U: HCPCS | Performed by: INTERNAL MEDICINE

## 2018-03-16 PROCEDURE — G8598 ASA/ANTIPLAT THER USED: HCPCS | Performed by: INTERNAL MEDICINE

## 2018-03-16 PROCEDURE — 1090F PRES/ABSN URINE INCON ASSESS: CPT | Performed by: INTERNAL MEDICINE

## 2018-03-16 RX ORDER — ISOSORBIDE MONONITRATE 60 MG/1
TABLET, EXTENDED RELEASE ORAL
Qty: 90 TABLET | Refills: 0 | Status: SHIPPED | OUTPATIENT
Start: 2018-03-16 | End: 2018-08-29 | Stop reason: SDUPTHER

## 2018-03-16 RX ORDER — DEXLANSOPRAZOLE 60 MG/1
CAPSULE, DELAYED RELEASE ORAL
Qty: 90 CAPSULE | Refills: 1 | Status: SHIPPED | OUTPATIENT
Start: 2018-03-16 | End: 2018-08-29 | Stop reason: SDUPTHER

## 2018-03-16 RX ORDER — ALLOPURINOL 100 MG/1
TABLET ORAL
Qty: 90 TABLET | Refills: 1 | Status: SHIPPED | OUTPATIENT
Start: 2018-03-16 | End: 2018-08-29 | Stop reason: SDUPTHER

## 2018-03-16 RX ORDER — ATENOLOL 25 MG/1
25 TABLET ORAL DAILY
Qty: 90 TABLET | Refills: 1 | Status: SHIPPED | OUTPATIENT
Start: 2018-03-16 | End: 2018-08-23 | Stop reason: SDUPTHER

## 2018-03-16 RX ORDER — LEVOTHYROXINE SODIUM 0.07 MG/1
TABLET ORAL
Qty: 90 TABLET | Refills: 1 | Status: SHIPPED | OUTPATIENT
Start: 2018-03-16 | End: 2018-08-29 | Stop reason: SDUPTHER

## 2018-03-28 ENCOUNTER — HOSPITAL ENCOUNTER (OUTPATIENT)
Dept: OTHER | Age: 83
Discharge: HOME | End: 2018-03-28
Attending: INTERNAL MEDICINE | Admitting: INTERNAL MEDICINE

## 2018-03-28 LAB
ALBUMIN SERPL-MCNC: 4.3 GM/DL (ref 3.4–5)
ALP BLD-CCNC: 65 IU/L (ref 40–128)
ALT SERPL-CCNC: 25 U/L (ref 10–40)
ANION GAP SERPL CALCULATED.3IONS-SCNC: 14 MMOL/L (ref 4–16)
AST SERPL-CCNC: 26 IU/L (ref 15–37)
BASOPHILS ABSOLUTE: 0 K/CU MM
BASOPHILS RELATIVE PERCENT: 0.1 % (ref 0–1)
BILIRUB SERPL-MCNC: 0.3 MG/DL (ref 0–1)
BUN BLDV-MCNC: 31 MG/DL (ref 6–23)
CALCIUM SERPL-MCNC: 9.9 MG/DL (ref 8.3–10.6)
CHLORIDE BLD-SCNC: 102 MMOL/L (ref 99–110)
CHOLESTEROL: 179 MG/DL
CO2: 25 MMOL/L (ref 21–32)
CREAT SERPL-MCNC: 1.8 MG/DL (ref 0.6–1.1)
DIFFERENTIAL TYPE: ABNORMAL
EOSINOPHILS ABSOLUTE: 0 K/CU MM
EOSINOPHILS RELATIVE PERCENT: 0.2 % (ref 0–3)
GFR AFRICAN AMERICAN: 32 ML/MIN/1.73M2
GFR NON-AFRICAN AMERICAN: 26 ML/MIN/1.73M2
GLUCOSE BLD-MCNC: 82 MG/DL (ref 70–99)
HCT VFR BLD CALC: 36.6 % (ref 37–47)
HDLC SERPL-MCNC: 46 MG/DL
HEMOGLOBIN: 12.2 GM/DL (ref 12.5–16)
IMMATURE NEUTROPHIL %: 0.4 % (ref 0–0.43)
LDL CHOLESTEROL CALCULATED: 88 MG/DL
LYMPHOCYTES ABSOLUTE: 1.9 K/CU MM
LYMPHOCYTES RELATIVE PERCENT: 21.3 % (ref 24–44)
MCH RBC QN AUTO: 33.2 PG (ref 27–31)
MCHC RBC AUTO-ENTMCNC: 33.3 % (ref 32–36)
MCV RBC AUTO: 99.5 FL (ref 78–100)
MONOCYTES ABSOLUTE: 0.7 K/CU MM
MONOCYTES RELATIVE PERCENT: 7.7 % (ref 0–4)
NUCLEATED RBC %: 0 %
PDW BLD-RTO: 13 % (ref 11.7–14.9)
PLATELET # BLD: 279 K/CU MM (ref 140–440)
PMV BLD AUTO: 9.8 FL (ref 7.5–11.1)
POTASSIUM SERPL-SCNC: 4.8 MMOL/L (ref 3.5–5.1)
RBC # BLD: 3.68 M/CU MM (ref 4.2–5.4)
SEGMENTED NEUTROPHILS ABSOLUTE COUNT: 6.3 K/CU MM
SEGMENTED NEUTROPHILS RELATIVE PERCENT: 70.3 % (ref 36–66)
SODIUM BLD-SCNC: 141 MMOL/L (ref 135–145)
TOTAL IMMATURE NEUTOROPHIL: 0.04 K/CU MM
TOTAL NUCLEATED RBC: 0 K/CU MM
TOTAL PROTEIN: 7.1 GM/DL (ref 6.4–8.2)
TRIGL SERPL-MCNC: 223 MG/DL
TSH HIGH SENSITIVITY: 0.81 UIU/ML (ref 0.27–4.2)
URIC ACID: 4.8 MG/DL (ref 2.6–6)
WBC # BLD: 9 K/CU MM (ref 4–10.5)

## 2018-04-04 ENCOUNTER — HOSPITAL ENCOUNTER (OUTPATIENT)
Dept: OTHER | Age: 83
Discharge: HOME | End: 2018-04-04
Attending: INTERNAL MEDICINE | Admitting: INTERNAL MEDICINE

## 2018-04-04 LAB
BACTERIA: ABNORMAL /HPF
BILIRUBIN URINE: NEGATIVE MG/DL
BLOOD, URINE: NEGATIVE
CLARITY: ABNORMAL
COLOR: YELLOW
GLUCOSE, URINE: NEGATIVE MG/DL
KETONES, URINE: NEGATIVE MG/DL
LEUKOCYTE ESTERASE, URINE: ABNORMAL
NITRITE URINE, QUANTITATIVE: NEGATIVE
PH, URINE: 7 (ref 5–8)
PROTEIN UA: NEGATIVE MG/DL
RBC URINE: 3 /HPF (ref 0–6)
SPECIFIC GRAVITY UA: 1.01 (ref 1–1.03)
SQUAMOUS EPITHELIAL: 28 /HPF
TRANSITIONAL EPITHELIAL: <1 /HPF
TRICHOMONAS: ABNORMAL /HPF
UROBILINOGEN, URINE: NORMAL MG/DL (ref 0.2–1)
WBC CLUMP: ABNORMAL /HPF
WBC UA: 12 /HPF (ref 0–5)

## 2018-04-06 DIAGNOSIS — N39.0 COMPLICATED UTI (URINARY TRACT INFECTION): Primary | ICD-10-CM

## 2018-04-06 LAB
CULTURE: NORMAL
ORGANISM: NORMAL
REPORT STATUS: NORMAL
REQUEST PROBLEM: NORMAL
SPECIMEN: NORMAL
TOTAL COLONY COUNT: NORMAL

## 2018-04-06 RX ORDER — AMOXICILLIN AND CLAVULANATE POTASSIUM 500; 125 MG/1; MG/1
1 TABLET, FILM COATED ORAL 2 TIMES DAILY
Qty: 20 TABLET | Refills: 0 | Status: SHIPPED | OUTPATIENT
Start: 2018-04-06 | End: 2018-04-16

## 2018-04-09 RX ORDER — NITROFURANTOIN 25; 75 MG/1; MG/1
100 CAPSULE ORAL 2 TIMES DAILY
Qty: 20 CAPSULE | Refills: 0 | Status: SHIPPED | OUTPATIENT
Start: 2018-04-09 | End: 2018-04-19

## 2018-04-16 ENCOUNTER — TELEPHONE (OUTPATIENT)
Dept: INTERNAL MEDICINE CLINIC | Age: 83
End: 2018-04-16

## 2018-04-17 RX ORDER — CRANBERRY FRUIT EXTRACT 200 MG
1 CAPSULE ORAL DAILY
Qty: 30 CAPSULE | Refills: 0
Start: 2018-04-17 | End: 2018-10-02

## 2018-04-20 DIAGNOSIS — N39.0 COMPLICATED UTI (URINARY TRACT INFECTION): Primary | ICD-10-CM

## 2018-04-24 ENCOUNTER — TELEPHONE (OUTPATIENT)
Dept: INTERNAL MEDICINE CLINIC | Age: 83
End: 2018-04-24

## 2018-05-08 RX ORDER — AMLODIPINE BESYLATE 2.5 MG/1
2.5 TABLET ORAL 2 TIMES DAILY
Qty: 60 TABLET | Refills: 3 | Status: ON HOLD | OUTPATIENT
Start: 2018-05-08 | End: 2018-08-23 | Stop reason: HOSPADM

## 2018-06-08 RX ORDER — POLYETHYLENE GLYCOL 3350 17 G/17G
17 POWDER, FOR SOLUTION ORAL DAILY
Qty: 527 G | Refills: 1 | Status: SHIPPED | OUTPATIENT
Start: 2018-06-08 | End: 2018-08-29 | Stop reason: SDUPTHER

## 2018-06-19 ENCOUNTER — OFFICE VISIT (OUTPATIENT)
Dept: INTERNAL MEDICINE CLINIC | Age: 83
End: 2018-06-19

## 2018-06-19 VITALS
RESPIRATION RATE: 17 BRPM | WEIGHT: 128 LBS | SYSTOLIC BLOOD PRESSURE: 138 MMHG | DIASTOLIC BLOOD PRESSURE: 76 MMHG | BODY MASS INDEX: 25 KG/M2 | OXYGEN SATURATION: 94 % | HEART RATE: 64 BPM

## 2018-06-19 DIAGNOSIS — K59.01 CONSTIPATION, SLOW TRANSIT: ICD-10-CM

## 2018-06-19 DIAGNOSIS — M54.50 CHRONIC MIDLINE LOW BACK PAIN WITHOUT SCIATICA: ICD-10-CM

## 2018-06-19 DIAGNOSIS — I10 ESSENTIAL HYPERTENSION: Primary | ICD-10-CM

## 2018-06-19 DIAGNOSIS — G89.29 CHRONIC MIDLINE LOW BACK PAIN WITHOUT SCIATICA: ICD-10-CM

## 2018-06-19 DIAGNOSIS — M80.00XA AGE-RELATED OSTEOPOROSIS WITH CURRENT PATHOLOGICAL FRACTURE, INITIAL ENCOUNTER: ICD-10-CM

## 2018-06-19 DIAGNOSIS — I25.10 CORONARY ARTERY DISEASE INVOLVING NATIVE CORONARY ARTERY OF NATIVE HEART WITHOUT ANGINA PECTORIS: ICD-10-CM

## 2018-06-19 DIAGNOSIS — I10 ESSENTIAL HYPERTENSION: ICD-10-CM

## 2018-06-19 LAB
BASOPHILS ABSOLUTE: 0.1 K/UL (ref 0–0.2)
BASOPHILS RELATIVE PERCENT: 0.9 %
EOSINOPHILS ABSOLUTE: 0.2 K/UL (ref 0–0.6)
EOSINOPHILS RELATIVE PERCENT: 3.7 %
HCT VFR BLD CALC: 33.6 % (ref 36–48)
HEMOGLOBIN: 11.5 G/DL (ref 12–16)
LYMPHOCYTES ABSOLUTE: 2 K/UL (ref 1–5.1)
LYMPHOCYTES RELATIVE PERCENT: 31.2 %
MCH RBC QN AUTO: 34.1 PG (ref 26–34)
MCHC RBC AUTO-ENTMCNC: 34.1 G/DL (ref 31–36)
MCV RBC AUTO: 99.9 FL (ref 80–100)
MONOCYTES ABSOLUTE: 0.7 K/UL (ref 0–1.3)
MONOCYTES RELATIVE PERCENT: 11 %
NEUTROPHILS ABSOLUTE: 3.4 K/UL (ref 1.7–7.7)
NEUTROPHILS RELATIVE PERCENT: 53.2 %
PDW BLD-RTO: 13.7 % (ref 12.4–15.4)
PLATELET # BLD: 288 K/UL (ref 135–450)
PMV BLD AUTO: 7.9 FL (ref 5–10.5)
RBC # BLD: 3.37 M/UL (ref 4–5.2)
WBC # BLD: 6.3 K/UL (ref 4–11)

## 2018-06-19 PROCEDURE — 4040F PNEUMOC VAC/ADMIN/RCVD: CPT | Performed by: INTERNAL MEDICINE

## 2018-06-19 PROCEDURE — 1123F ACP DISCUSS/DSCN MKR DOCD: CPT | Performed by: INTERNAL MEDICINE

## 2018-06-19 PROCEDURE — G8427 DOCREV CUR MEDS BY ELIG CLIN: HCPCS | Performed by: INTERNAL MEDICINE

## 2018-06-19 PROCEDURE — 1090F PRES/ABSN URINE INCON ASSESS: CPT | Performed by: INTERNAL MEDICINE

## 2018-06-19 PROCEDURE — G8419 CALC BMI OUT NRM PARAM NOF/U: HCPCS | Performed by: INTERNAL MEDICINE

## 2018-06-19 PROCEDURE — 99214 OFFICE O/P EST MOD 30 MIN: CPT | Performed by: INTERNAL MEDICINE

## 2018-06-19 PROCEDURE — G8598 ASA/ANTIPLAT THER USED: HCPCS | Performed by: INTERNAL MEDICINE

## 2018-06-19 PROCEDURE — 1036F TOBACCO NON-USER: CPT | Performed by: INTERNAL MEDICINE

## 2018-06-19 RX ORDER — CHOLECALCIFEROL (VITAMIN D3) 125 MCG
2000 CAPSULE ORAL DAILY
Qty: 90 TABLET | Refills: 1 | Status: SHIPPED | OUTPATIENT
Start: 2018-06-19 | End: 2019-03-19 | Stop reason: SDUPTHER

## 2018-06-19 RX ORDER — SENNA PLUS 8.6 MG/1
1 TABLET ORAL 2 TIMES DAILY
Qty: 180 TABLET | Refills: 11 | Status: SHIPPED | OUTPATIENT
Start: 2018-06-19 | End: 2019-06-21 | Stop reason: SDUPTHER

## 2018-06-19 RX ORDER — GABAPENTIN 100 MG/1
100 CAPSULE ORAL DAILY
Qty: 30 CAPSULE | Refills: 2 | Status: ON HOLD | OUTPATIENT
Start: 2018-06-19 | End: 2018-08-23 | Stop reason: HOSPADM

## 2018-06-19 RX ORDER — RANOLAZINE 500 MG/1
TABLET, FILM COATED, EXTENDED RELEASE ORAL
Qty: 60 TABLET | Refills: 4 | Status: SHIPPED | OUTPATIENT
Start: 2018-06-19 | End: 2018-11-23 | Stop reason: SDUPTHER

## 2018-06-20 LAB
A/G RATIO: 1.9 (ref 1.1–2.2)
ALBUMIN SERPL-MCNC: 4.4 G/DL (ref 3.4–5)
ALP BLD-CCNC: 73 U/L (ref 40–129)
ALT SERPL-CCNC: 18 U/L (ref 10–40)
ANION GAP SERPL CALCULATED.3IONS-SCNC: 13 MMOL/L (ref 3–16)
AST SERPL-CCNC: 23 U/L (ref 15–37)
BILIRUB SERPL-MCNC: 0.3 MG/DL (ref 0–1)
BUN BLDV-MCNC: 40 MG/DL (ref 7–20)
CALCIUM SERPL-MCNC: 9.8 MG/DL (ref 8.3–10.6)
CHLORIDE BLD-SCNC: 102 MMOL/L (ref 99–110)
CHOLESTEROL, TOTAL: 168 MG/DL (ref 0–199)
CO2: 24 MMOL/L (ref 21–32)
CREAT SERPL-MCNC: 2 MG/DL (ref 0.6–1.2)
GFR AFRICAN AMERICAN: 28
GFR NON-AFRICAN AMERICAN: 23
GLOBULIN: 2.3 G/DL
GLUCOSE BLD-MCNC: 93 MG/DL (ref 70–99)
HDLC SERPL-MCNC: 49 MG/DL (ref 40–60)
LDL CHOLESTEROL CALCULATED: 84 MG/DL
POTASSIUM SERPL-SCNC: 5.1 MMOL/L (ref 3.5–5.1)
SODIUM BLD-SCNC: 139 MMOL/L (ref 136–145)
TOTAL PROTEIN: 6.7 G/DL (ref 6.4–8.2)
TRIGL SERPL-MCNC: 177 MG/DL (ref 0–150)
VLDLC SERPL CALC-MCNC: 35 MG/DL

## 2018-08-18 PROBLEM — S72.001A CLOSED RIGHT HIP FRACTURE, INITIAL ENCOUNTER (HCC): Status: ACTIVE | Noted: 2018-08-18

## 2018-08-18 PROBLEM — S72.001K CLOSED FRACTURE OF RIGHT HIP WITH NONUNION: Status: ACTIVE | Noted: 2018-08-18

## 2018-08-23 PROBLEM — D62 ACUTE BLOOD LOSS ANEMIA: Status: ACTIVE | Noted: 2018-08-23

## 2018-08-27 ENCOUNTER — TELEPHONE (OUTPATIENT)
Dept: INTERNAL MEDICINE CLINIC | Age: 83
End: 2018-08-27

## 2018-08-29 ENCOUNTER — OFFICE VISIT (OUTPATIENT)
Dept: INTERNAL MEDICINE CLINIC | Age: 83
End: 2018-08-29

## 2018-08-29 VITALS
OXYGEN SATURATION: 93 % | BODY MASS INDEX: 24.8 KG/M2 | SYSTOLIC BLOOD PRESSURE: 131 MMHG | DIASTOLIC BLOOD PRESSURE: 60 MMHG | HEART RATE: 68 BPM | RESPIRATION RATE: 17 BRPM | WEIGHT: 127 LBS

## 2018-08-29 DIAGNOSIS — I10 ESSENTIAL HYPERTENSION: ICD-10-CM

## 2018-08-29 DIAGNOSIS — M80.00XA AGE-RELATED OSTEOPOROSIS WITH CURRENT PATHOLOGICAL FRACTURE, INITIAL ENCOUNTER: ICD-10-CM

## 2018-08-29 DIAGNOSIS — M10.09 IDIOPATHIC GOUT OF MULTIPLE SITES, UNSPECIFIED CHRONICITY: ICD-10-CM

## 2018-08-29 DIAGNOSIS — K59.09 CHRONIC CONSTIPATION: ICD-10-CM

## 2018-08-29 DIAGNOSIS — S72.001K CLOSED FRACTURE OF RIGHT HIP WITH NONUNION: Primary | ICD-10-CM

## 2018-08-29 DIAGNOSIS — I25.10 CORONARY ARTERY DISEASE INVOLVING NATIVE CORONARY ARTERY OF NATIVE HEART WITHOUT ANGINA PECTORIS: ICD-10-CM

## 2018-08-29 DIAGNOSIS — H92.01 EARACHE ON RIGHT: ICD-10-CM

## 2018-08-29 DIAGNOSIS — K21.9 GASTROESOPHAGEAL REFLUX DISEASE WITHOUT ESOPHAGITIS: ICD-10-CM

## 2018-08-29 DIAGNOSIS — F32.A DEPRESSION, UNSPECIFIED DEPRESSION TYPE: ICD-10-CM

## 2018-08-29 DIAGNOSIS — E03.4 HYPOTHYROIDISM DUE TO ACQUIRED ATROPHY OF THYROID: ICD-10-CM

## 2018-08-29 LAB
A/G RATIO: 1.4 (ref 1.1–2.2)
ALBUMIN SERPL-MCNC: 3.7 G/DL (ref 3.4–5)
ALP BLD-CCNC: 74 U/L (ref 40–129)
ALT SERPL-CCNC: 20 U/L (ref 10–40)
ANION GAP SERPL CALCULATED.3IONS-SCNC: 13 MMOL/L (ref 3–16)
AST SERPL-CCNC: 27 U/L (ref 15–37)
BASOPHILS ABSOLUTE: 0.1 K/UL (ref 0–0.2)
BASOPHILS RELATIVE PERCENT: 1 %
BILIRUB SERPL-MCNC: 0.3 MG/DL (ref 0–1)
BUN BLDV-MCNC: 27 MG/DL (ref 7–20)
CALCIUM SERPL-MCNC: 9.1 MG/DL (ref 8.3–10.6)
CHLORIDE BLD-SCNC: 102 MMOL/L (ref 99–110)
CO2: 22 MMOL/L (ref 21–32)
CREAT SERPL-MCNC: 1.9 MG/DL (ref 0.6–1.2)
EOSINOPHILS ABSOLUTE: 0.4 K/UL (ref 0–0.6)
EOSINOPHILS RELATIVE PERCENT: 4.5 %
GFR AFRICAN AMERICAN: 30
GFR NON-AFRICAN AMERICAN: 25
GLOBULIN: 2.6 G/DL
GLUCOSE BLD-MCNC: 123 MG/DL (ref 70–99)
HCT VFR BLD CALC: 28.9 % (ref 36–48)
HEMOGLOBIN: 9.8 G/DL (ref 12–16)
LYMPHOCYTES ABSOLUTE: 1.6 K/UL (ref 1–5.1)
LYMPHOCYTES RELATIVE PERCENT: 17.4 %
MCH RBC QN AUTO: 33.7 PG (ref 26–34)
MCHC RBC AUTO-ENTMCNC: 33.8 G/DL (ref 31–36)
MCV RBC AUTO: 99.6 FL (ref 80–100)
MONOCYTES ABSOLUTE: 0.6 K/UL (ref 0–1.3)
MONOCYTES RELATIVE PERCENT: 6.8 %
NEUTROPHILS ABSOLUTE: 6.6 K/UL (ref 1.7–7.7)
NEUTROPHILS RELATIVE PERCENT: 70.3 %
PDW BLD-RTO: 14.1 % (ref 12.4–15.4)
PLATELET # BLD: 544 K/UL (ref 135–450)
PMV BLD AUTO: 7.3 FL (ref 5–10.5)
POTASSIUM SERPL-SCNC: 4.7 MMOL/L (ref 3.5–5.1)
RBC # BLD: 2.9 M/UL (ref 4–5.2)
SODIUM BLD-SCNC: 137 MMOL/L (ref 136–145)
TOTAL PROTEIN: 6.3 G/DL (ref 6.4–8.2)
WBC # BLD: 9.5 K/UL (ref 4–11)

## 2018-08-29 PROCEDURE — 99495 TRANSJ CARE MGMT MOD F2F 14D: CPT | Performed by: INTERNAL MEDICINE

## 2018-08-29 PROCEDURE — 1111F DSCHRG MED/CURRENT MED MERGE: CPT | Performed by: INTERNAL MEDICINE

## 2018-08-29 RX ORDER — PSEUDOEPHEDRINE HCL 30 MG
100 TABLET ORAL DAILY
Qty: 90 CAPSULE | Refills: 1 | Status: SHIPPED | OUTPATIENT
Start: 2018-08-29 | End: 2018-12-11 | Stop reason: SDUPTHER

## 2018-08-29 RX ORDER — LEVOTHYROXINE SODIUM 0.07 MG/1
TABLET ORAL
Qty: 90 TABLET | Refills: 1 | Status: SHIPPED | OUTPATIENT
Start: 2018-08-29 | End: 2019-11-18 | Stop reason: SDUPTHER

## 2018-08-29 RX ORDER — ONDANSETRON 4 MG/1
4 TABLET, FILM COATED ORAL EVERY 8 HOURS PRN
Qty: 20 TABLET | Refills: 0 | Status: SHIPPED | OUTPATIENT
Start: 2018-08-29 | End: 2018-10-09 | Stop reason: SDUPTHER

## 2018-08-29 RX ORDER — NITROGLYCERIN 0.4 MG/1
0.4 TABLET SUBLINGUAL EVERY 5 MIN PRN
Qty: 30 TABLET | Refills: 3 | Status: SHIPPED | OUTPATIENT
Start: 2018-08-29 | End: 2019-11-18 | Stop reason: SDUPTHER

## 2018-08-29 RX ORDER — ALENDRONATE SODIUM 70 MG/1
TABLET ORAL
Qty: 12 TABLET | Refills: 1 | Status: SHIPPED | OUTPATIENT
Start: 2018-08-29 | End: 2019-02-19 | Stop reason: SDUPTHER

## 2018-08-29 RX ORDER — DEXLANSOPRAZOLE 60 MG/1
CAPSULE, DELAYED RELEASE ORAL
Qty: 90 CAPSULE | Refills: 1 | Status: SHIPPED | OUTPATIENT
Start: 2018-08-29 | End: 2019-03-12 | Stop reason: SDUPTHER

## 2018-08-29 RX ORDER — ISOSORBIDE MONONITRATE 60 MG/1
TABLET, EXTENDED RELEASE ORAL
Qty: 90 TABLET | Refills: 1 | Status: SHIPPED | OUTPATIENT
Start: 2018-08-29 | End: 2019-02-19 | Stop reason: SDUPTHER

## 2018-08-29 RX ORDER — POLYETHYLENE GLYCOL 3350 17 G/17G
17 POWDER, FOR SOLUTION ORAL DAILY
Qty: 527 G | Refills: 1 | Status: SHIPPED | OUTPATIENT
Start: 2018-08-29 | End: 2018-09-28

## 2018-08-29 RX ORDER — AMLODIPINE BESYLATE 2.5 MG/1
2.5 TABLET ORAL DAILY
Qty: 90 TABLET | Refills: 1 | Status: SHIPPED | OUTPATIENT
Start: 2018-08-29 | End: 2018-10-02 | Stop reason: SDUPTHER

## 2018-08-29 RX ORDER — ALLOPURINOL 100 MG/1
TABLET ORAL
Qty: 90 TABLET | Refills: 1 | Status: SHIPPED | OUTPATIENT
Start: 2018-08-29 | End: 2019-11-18 | Stop reason: SDUPTHER

## 2018-08-29 NOTE — PROGRESS NOTES
amLODIPine (NORVASC) 5 MG tablet  Take 1 tablet by mouth 2 times daily             aspirin EC 81 MG EC tablet  Take 81 mg by mouth daily. atenolol (TENORMIN) 25 MG tablet  TAKE 1 TABLET BY MOUTH ONCE DAILY             Cholecalciferol (VITAMIN D3) 2000 units TABS  Take 2,000 Units by mouth daily             Cranberry 200 MG CAPS  Take 1 each by mouth daily             cycloSPORINE (RESTASIS) 0.05 % ophthalmic emulsion  Place 1 drop into both eyes 2 times daily             dexlansoprazole (DEXILANT) 60 MG CPDR delayed release capsule  TAKE ONE (1) CAPSULE BY MOUTH ONCE DAILY             diphenhydrAMINE (BENADRYL) 25 MG tablet  Take 50 mg by mouth nightly             docusate sodium (COLACE, DULCOLAX) 100 MG CAPS  Take 100 mg by mouth daily             enoxaparin (LOVENOX) 30 MG/0.3ML injection  Inject 0.3 mLs into the skin daily             folic acid-pyridoxine-cyancobalamin (VIRT-RISHABH FORTE) 2.5-25-2 MG TABS  TAKE ONE (1) TABLET BY MOUTH ONCE DAILY             HYDROcodone-acetaminophen (NORCO) 5-325 MG per tablet  Take 1 tablet by mouth every 4 hours as needed for Pain for up to 7 days. .             isosorbide mononitrate (IMDUR) 60 MG extended release tablet  TAKE 1 TABLET BY MOUTH ONCE DAILY             levothyroxine (SYNTHROID) 75 MCG tablet  TAKE ONE (1) TABLET BY MOUTH ONCE DAILY             magnesium hydroxide (MILK OF MAGNESIA) 400 MG/5ML suspension  Take 30 mLs by mouth daily as needed for Constipation             nitroGLYCERIN (NITROSTAT) 0.4 MG SL tablet  Place 1 tablet under the tongue every 5 minutes as needed             ondansetron (ZOFRAN) 4 MG tablet  Take 1 tablet by mouth every 8 hours as needed for Nausea or Vomiting             RANEXA 500 MG extended release tablet  TAKE ONE (1) TABLET BY MOUTH TWO TIMES DAILY             senna (SENOKOT) 8.6 MG tablet  Take 1 tablet by mouth 2 times daily             sertraline (ZOLOFT) 50 MG tablet  TAKE ONE (1) TABLET BY MOUTH ONCE DAILY 1    docusate sodium (COLACE, DULCOLAX) 100 MG CAPS Take 100 mg by mouth daily 30 capsule 3    magnesium hydroxide (MILK OF MAGNESIA) 400 MG/5ML suspension Take 30 mLs by mouth daily as needed for Constipation      ondansetron (ZOFRAN) 4 MG tablet Take 1 tablet by mouth every 8 hours as needed for Nausea or Vomiting 20 tablet 0    alendronate (FOSAMAX) 70 MG tablet Weekly on empty stomach 30min before meal 12 tablet 1    cycloSPORINE (RESTASIS) 0.05 % ophthalmic emulsion Place 1 drop into both eyes 2 times daily      nitroGLYCERIN (NITROSTAT) 0.4 MG SL tablet Place 1 tablet under the tongue every 5 minutes as needed 30 tablet 3    aspirin EC 81 MG EC tablet Take 81 mg by mouth daily. Medications patient taking as of now reconciled against medications ordered at time of hospital discharge: Yes    Chief Complaint   Patient presents with    Hypertension     3 month    Otalgia     R ear       HPI    Inpatient course: Discharge summary reviewed- see chart. 79yo WF presented to ED s/p fall, transferred to ED and found to have R femoral neck fx. Cleared preop by nephrology and cardiology, later underwent R hip arthroplasty w/o complications. Did have mild anemia postop but overall stable as well as renal fxn, BP sl high but improved w incr norvasc fr 2.5--5mg daily. Family and pt, along w recs fr pt and ot, had preferred ARU but ins denied, thus was otherwise determined to be safe for transition to home w [facility] as pt and family did not wish for transition to SNF. See orders, transitional care f/u my office one week. Interval history/Current status: NOW LIVING W [place] [facility]OT[place]. HAS [facility]. R HIP PAIN IS CONTROLLED AND ESSENCE THERAPY WELL. BP WAS HIGH IN HOSP BUT IMPROVED W NORVASC 5MG, NOW BP LOWER AT HOME SO BACK ON 2.5MG NORVASC. RENAL FXN AND CARDIOVASC STATUS STABLE AT HOME MAINTAINING URINATION AND W/O SX CP OR SOB. HAS F/U W DR You Woody 9/5. IS ALSO ON LOVENOX DAILY FOR 25D.   ON NORCO PRN, USED ONLY 3 SO FAR. Controlled substances monitoring: possible medication side effects, risk of tolerance and/or dependence, and alternative treatments discussed, no signs of potential drug abuse or diversion identified and [place]AR[place] report reviewed today- activity consistent with treatment plan. DOES C/O SOME R EARACHE SINCE HOSP, EXAM TODAY IS NL.  NO DRAINAGE, HEARING OK. Review of Systems    Vitals:    08/29/18 1407   BP: 131/60   Pulse: 68   Resp: 17   SpO2: 93%   Weight: 127 lb (57.6 kg)     Body mass index is 24.8 kg/m². Wt Readings from Last 3 Encounters:   08/29/18 127 lb (57.6 kg)   08/23/18 143 lb 9.6 oz (65.1 kg)   06/19/18 128 lb (58.1 kg)     BP Readings from Last 3 Encounters:   08/29/18 131/60   08/23/18 (!) 113/56   06/19/18 138/76       Physical Exam   Constitutional: She appears well-developed and well-nourished. No distress. HENT:   Head: Normocephalic and atraumatic. Right Ear: External ear normal.   Nose: Nose normal.   Mouth/Throat: Oropharynx is clear and moist. No oropharyngeal exudate. r canal and TM are clear   Eyes: Pupils are equal, round, and reactive to light. Conjunctivae and EOM are normal. Right eye exhibits no discharge. Left eye exhibits no discharge. No scleral icterus. Neck: Neck supple. No tracheal deviation present. Cardiovascular: Normal rate, regular rhythm, normal heart sounds and intact distal pulses. Exam reveals no gallop and no friction rub. No murmur heard. Pulmonary/Chest: Effort normal and breath sounds normal. No respiratory distress. She has no wheezes. She has no rales. Abdominal: Soft. Bowel sounds are normal. She exhibits no mass. There is no tenderness. There is no rebound and no guarding. Musculoskeletal: She exhibits tenderness (mild pain w ROM R hip). She exhibits no edema. Lymphadenopathy:     She has no cervical adenopathy. Neurological: She is alert. Skin: Skin is warm and dry.    Psychiatric: She has a normal mood and affect. Vitals reviewed. Assessment/Plan:  1. 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 Pain controlled, keep f/u w ortho next week    2. Coronary artery disease involving native coronary artery of native heart without angina pectoris  Pt clinically w/o evidence of cardiovascular instability, cont regimen. - nitroGLYCERIN (NITROSTAT) 0.4 MG SL tablet; Place 1 tablet under the tongue every 5 minutes as needed for Chest pain  Dispense: 30 tablet; Refill: 3  - isosorbide mononitrate (IMDUR) 60 MG extended release tablet; TAKE 1 TABLET BY MOUTH ONCE DAILY  Dispense: 90 tablet; Refill: 1  - TN DISCHARGE MEDS RECONCILED W/ CURRENT OUTPATIENT MED LIST  - CBC Auto Differential; Future  - COMPREHENSIVE METABOLIC PANEL; Future    3. Hypothyroidism due to acquired atrophy of thyroid  The current medical regimen is effective;  continue present plan and medications. - levothyroxine (SYNTHROID) 75 MCG tablet; TAKE ONE (1) TABLET BY MOUTH ONCE DAILY  Dispense: 90 tablet; Refill: 1  - TN DISCHARGE MEDS RECONCILED W/ CURRENT OUTPATIENT MED LIST    4. Depression, unspecified depression type  Mood stable on current regimen w/o any significant manifestations of severe depression or anxiety noted at this time. Cont current meds. - sertraline (ZOLOFT) 50 MG tablet; TAKE ONE (1) TABLET BY MOUTH ONCE DAILY  Dispense: 90 tablet; Refill: 1  - TN DISCHARGE MEDS RECONCILED W/ CURRENT OUTPATIENT MED LIST    5. Gastroesophageal reflux disease without esophagitis  GERD controlled on meds and will refill, monitor for any recurrent or worsening sx.    - dexlansoprazole (DEXILANT) 60 MG CPDR delayed release capsule; TAKE ONE (1) CAPSULE BY MOUTH ONCE DAILY  Dispense: 90 capsule; Refill: 1  - ondansetron (ZOFRAN) 4 MG tablet; Take 1 tablet by mouth every 8 hours as needed for Nausea or Vomiting  Dispense: 20 tablet;  Refill: 0  - TN DISCHARGE MEDS RECONCILED W/ CURRENT OUTPATIENT MED LIST    6. Idiopathic gout of multiple sites, unspecified chronicity  No recurring gout at this time and cont allopurinol  - allopurinol (ZYLOPRIM) 100 MG tablet; TAKE 1 TABLET BY MOUTH ONCE DAILY  Dispense: 90 tablet; Refill: 1  - PA DISCHARGE MEDS RECONCILED W/ CURRENT OUTPATIENT MED LIST    7. Age-related osteoporosis with current pathological fracture, initial encounter  rf and cont fosamax  - alendronate (FOSAMAX) 70 MG tablet; Weekly on empty stomach 30min before meal  Dispense: 12 tablet; Refill: 1  - PA DISCHARGE MEDS RECONCILED W/ CURRENT OUTPATIENT MED LIST    8. Chronic constipation  Cont miralax, bms regular  - polyethylene glycol (GLYCOLAX) packet; Take 17 g by mouth daily  Dispense: 527 g; Refill: 1    9. Essential hypertension  bp stable and cont meds  - amLODIPine (NORVASC) 2.5 MG tablet; Take 1 tablet by mouth daily  Dispense: 90 tablet; Refill: 1    10. Earache on right  No findings on exam, empiric rx w cortisporin but if no better in a week consider refer ENT  - neomycin-polymyxin-hydrocortisone (CORTISPORIN) 3.5-96202-0 otic solution; Place 3 drops in ear(s) 3 times daily for 10 days  Dispense: 1 each;  Refill: 0        Medical Decision Making: moderate complexity

## 2018-09-25 ENCOUNTER — OFFICE VISIT (OUTPATIENT)
Dept: INTERNAL MEDICINE CLINIC | Age: 83
End: 2018-09-25
Payer: COMMERCIAL

## 2018-09-25 VITALS
DIASTOLIC BLOOD PRESSURE: 68 MMHG | RESPIRATION RATE: 14 BRPM | OXYGEN SATURATION: 97 % | SYSTOLIC BLOOD PRESSURE: 124 MMHG | HEART RATE: 64 BPM

## 2018-09-25 DIAGNOSIS — E03.4 HYPOTHYROIDISM DUE TO ACQUIRED ATROPHY OF THYROID: ICD-10-CM

## 2018-09-25 DIAGNOSIS — N89.8 VAGINAL ITCHING: ICD-10-CM

## 2018-09-25 DIAGNOSIS — I25.10 CORONARY ARTERY DISEASE INVOLVING NATIVE CORONARY ARTERY OF NATIVE HEART WITHOUT ANGINA PECTORIS: Primary | ICD-10-CM

## 2018-09-25 DIAGNOSIS — Z23 NEED FOR INFLUENZA VACCINATION: ICD-10-CM

## 2018-09-25 PROBLEM — N17.9 AKI (ACUTE KIDNEY INJURY) (HCC): Status: RESOLVED | Noted: 2017-12-26 | Resolved: 2018-09-25

## 2018-09-25 PROBLEM — N39.0 COMPLICATED UTI (URINARY TRACT INFECTION): Status: RESOLVED | Noted: 2017-12-26 | Resolved: 2018-09-25

## 2018-09-25 PROBLEM — S72.001A CLOSED FRACTURE OF RIGHT HIP (HCC): Status: RESOLVED | Noted: 2018-08-18 | Resolved: 2018-09-25

## 2018-09-25 PROCEDURE — 1101F PT FALLS ASSESS-DOCD LE1/YR: CPT | Performed by: INTERNAL MEDICINE

## 2018-09-25 PROCEDURE — 99213 OFFICE O/P EST LOW 20 MIN: CPT | Performed by: INTERNAL MEDICINE

## 2018-09-25 PROCEDURE — G8427 DOCREV CUR MEDS BY ELIG CLIN: HCPCS | Performed by: INTERNAL MEDICINE

## 2018-09-25 PROCEDURE — 4040F PNEUMOC VAC/ADMIN/RCVD: CPT | Performed by: INTERNAL MEDICINE

## 2018-09-25 PROCEDURE — 1123F ACP DISCUSS/DSCN MKR DOCD: CPT | Performed by: INTERNAL MEDICINE

## 2018-09-25 PROCEDURE — G8598 ASA/ANTIPLAT THER USED: HCPCS | Performed by: INTERNAL MEDICINE

## 2018-09-25 PROCEDURE — 1090F PRES/ABSN URINE INCON ASSESS: CPT | Performed by: INTERNAL MEDICINE

## 2018-09-25 PROCEDURE — 90662 IIV NO PRSV INCREASED AG IM: CPT | Performed by: INTERNAL MEDICINE

## 2018-09-25 PROCEDURE — G8420 CALC BMI NORM PARAMETERS: HCPCS | Performed by: INTERNAL MEDICINE

## 2018-09-25 PROCEDURE — G0008 ADMIN INFLUENZA VIRUS VAC: HCPCS | Performed by: INTERNAL MEDICINE

## 2018-09-25 PROCEDURE — 1036F TOBACCO NON-USER: CPT | Performed by: INTERNAL MEDICINE

## 2018-09-25 RX ORDER — NYSTATIN 100000 [USP'U]/G
POWDER TOPICAL
Qty: 60 G | Refills: 1 | Status: SHIPPED | OUTPATIENT
Start: 2018-09-25 | End: 2021-09-20 | Stop reason: SDUPTHER

## 2018-09-30 DIAGNOSIS — R42 VERTIGO: ICD-10-CM

## 2018-09-30 RX ORDER — MECLIZINE HCL 12.5 MG/1
12.5 TABLET ORAL 3 TIMES DAILY PRN
Qty: 30 TABLET | Refills: 1 | Status: SHIPPED | OUTPATIENT
Start: 2018-09-30 | End: 2018-10-10 | Stop reason: SDUPTHER

## 2018-10-02 ENCOUNTER — HOSPITAL ENCOUNTER (OUTPATIENT)
Age: 83
Setting detail: OBSERVATION
Discharge: HOME HEALTH | End: 2018-10-05
Attending: EMERGENCY MEDICINE | Admitting: INTERNAL MEDICINE
Payer: COMMERCIAL

## 2018-10-02 DIAGNOSIS — R42 VERTIGO: ICD-10-CM

## 2018-10-02 DIAGNOSIS — R42 DIZZINESS: ICD-10-CM

## 2018-10-02 DIAGNOSIS — I10 HYPERTENSION, UNSPECIFIED TYPE: ICD-10-CM

## 2018-10-02 DIAGNOSIS — R11.2 NAUSEA AND VOMITING, INTRACTABILITY OF VOMITING NOT SPECIFIED, UNSPECIFIED VOMITING TYPE: Primary | ICD-10-CM

## 2018-10-02 DIAGNOSIS — I10 ESSENTIAL HYPERTENSION: ICD-10-CM

## 2018-10-02 PROCEDURE — 99284 EMERGENCY DEPT VISIT MOD MDM: CPT

## 2018-10-02 RX ORDER — AMLODIPINE BESYLATE 2.5 MG/1
TABLET ORAL
Qty: 90 TABLET | Refills: 1 | Status: ON HOLD
Start: 2018-10-02 | End: 2018-10-05 | Stop reason: HOSPADM

## 2018-10-03 ENCOUNTER — APPOINTMENT (OUTPATIENT)
Dept: MRI IMAGING | Age: 83
End: 2018-10-03
Payer: COMMERCIAL

## 2018-10-03 ENCOUNTER — APPOINTMENT (OUTPATIENT)
Dept: ULTRASOUND IMAGING | Age: 83
End: 2018-10-03
Payer: COMMERCIAL

## 2018-10-03 ENCOUNTER — APPOINTMENT (OUTPATIENT)
Dept: CT IMAGING | Age: 83
End: 2018-10-03
Payer: COMMERCIAL

## 2018-10-03 PROBLEM — I63.00 CEREBROVASCULAR ACCIDENT (CVA) DUE TO THROMBOSIS OF PRECEREBRAL ARTERY (HCC): Status: ACTIVE | Noted: 2018-10-03

## 2018-10-03 PROBLEM — I63.9 CEREBROVASCULAR ACCIDENT (CVA) INVOLVING CEREBELLUM (HCC): Status: ACTIVE | Noted: 2018-10-03

## 2018-10-03 LAB
ALBUMIN SERPL-MCNC: 3.7 GM/DL (ref 3.4–5)
ALP BLD-CCNC: 84 IU/L (ref 40–128)
ALT SERPL-CCNC: 9 U/L (ref 10–40)
ANION GAP SERPL CALCULATED.3IONS-SCNC: 11 MMOL/L (ref 4–16)
AST SERPL-CCNC: 17 IU/L (ref 15–37)
BACTERIA: NEGATIVE /HPF
BACTERIA: NEGATIVE /HPF
BASOPHILS ABSOLUTE: 0.1 K/CU MM
BASOPHILS RELATIVE PERCENT: 0.9 % (ref 0–1)
BILIRUB SERPL-MCNC: 0.2 MG/DL (ref 0–1)
BILIRUBIN URINE: NEGATIVE MG/DL
BILIRUBIN URINE: NEGATIVE MG/DL
BLOOD, URINE: NEGATIVE
BLOOD, URINE: NEGATIVE
BUN BLDV-MCNC: 26 MG/DL (ref 6–23)
CALCIUM SERPL-MCNC: 9.6 MG/DL (ref 8.3–10.6)
CHLORIDE BLD-SCNC: 98 MMOL/L (ref 99–110)
CLARITY: CLEAR
CLARITY: CLEAR
CO2: 25 MMOL/L (ref 21–32)
COLOR: ABNORMAL
COLOR: ABNORMAL
CREAT SERPL-MCNC: 1.6 MG/DL (ref 0.6–1.1)
DIFFERENTIAL TYPE: ABNORMAL
EOSINOPHILS ABSOLUTE: 1 K/CU MM
EOSINOPHILS RELATIVE PERCENT: 13.1 % (ref 0–3)
GFR AFRICAN AMERICAN: 37 ML/MIN/1.73M2
GFR NON-AFRICAN AMERICAN: 30 ML/MIN/1.73M2
GLUCOSE BLD-MCNC: 114 MG/DL (ref 70–99)
GLUCOSE, URINE: NEGATIVE MG/DL
GLUCOSE, URINE: NEGATIVE MG/DL
HCT VFR BLD CALC: 33.1 % (ref 37–47)
HEMOGLOBIN: 10.8 GM/DL (ref 12.5–16)
IMMATURE NEUTROPHIL %: 0.5 % (ref 0–0.43)
KETONES, URINE: NEGATIVE MG/DL
KETONES, URINE: NEGATIVE MG/DL
LEUKOCYTE ESTERASE, URINE: ABNORMAL
LEUKOCYTE ESTERASE, URINE: ABNORMAL
LIPASE: 60 IU/L (ref 13–60)
LV EF: 48 %
LVEF MODALITY: NORMAL
LYMPHOCYTES ABSOLUTE: 1.6 K/CU MM
LYMPHOCYTES RELATIVE PERCENT: 20.2 % (ref 24–44)
MAGNESIUM: 2.3 MG/DL (ref 1.8–2.4)
MCH RBC QN AUTO: 32.1 PG (ref 27–31)
MCHC RBC AUTO-ENTMCNC: 32.6 % (ref 32–36)
MCV RBC AUTO: 98.5 FL (ref 78–100)
MONOCYTES ABSOLUTE: 0.9 K/CU MM
MONOCYTES RELATIVE PERCENT: 11.4 % (ref 0–4)
NITRITE URINE, QUANTITATIVE: NEGATIVE
NITRITE URINE, QUANTITATIVE: NEGATIVE
NUCLEATED RBC %: 0 %
PDW BLD-RTO: 13 % (ref 11.7–14.9)
PH, URINE: 7 (ref 5–8)
PH, URINE: 7 (ref 5–8)
PLATELET # BLD: 347 K/CU MM (ref 140–440)
PMV BLD AUTO: 8.5 FL (ref 7.5–11.1)
POTASSIUM SERPL-SCNC: 4.9 MMOL/L (ref 3.5–5.1)
PROTEIN UA: NEGATIVE MG/DL
PROTEIN UA: NEGATIVE MG/DL
RBC # BLD: 3.36 M/CU MM (ref 4.2–5.4)
RBC URINE: 1 /HPF (ref 0–6)
RBC URINE: 1 /HPF (ref 0–6)
SEGMENTED NEUTROPHILS ABSOLUTE COUNT: 4.2 K/CU MM
SEGMENTED NEUTROPHILS RELATIVE PERCENT: 53.9 % (ref 36–66)
SODIUM BLD-SCNC: 134 MMOL/L (ref 135–145)
SPECIFIC GRAVITY UA: 1 (ref 1–1.03)
SPECIFIC GRAVITY UA: 1.01 (ref 1–1.03)
SQUAMOUS EPITHELIAL: 2 /HPF
SQUAMOUS EPITHELIAL: 2 /HPF
TOTAL IMMATURE NEUTOROPHIL: 0.04 K/CU MM
TOTAL NUCLEATED RBC: 0 K/CU MM
TOTAL PROTEIN: 6.8 GM/DL (ref 6.4–8.2)
TRANSITIONAL EPITHELIAL: <1 /HPF
TRICHOMONAS: ABNORMAL /HPF
TRICHOMONAS: ABNORMAL /HPF
TROPONIN T: <0.01 NG/ML
UROBILINOGEN, URINE: NORMAL MG/DL (ref 0.2–1)
UROBILINOGEN, URINE: NORMAL MG/DL (ref 0.2–1)
WBC # BLD: 7.8 K/CU MM (ref 4–10.5)
WBC UA: 4 /HPF (ref 0–5)
WBC UA: 8 /HPF (ref 0–5)

## 2018-10-03 PROCEDURE — G8979 MOBILITY GOAL STATUS: HCPCS

## 2018-10-03 PROCEDURE — G0378 HOSPITAL OBSERVATION PER HR: HCPCS

## 2018-10-03 PROCEDURE — 6360000002 HC RX W HCPCS: Performed by: INTERNAL MEDICINE

## 2018-10-03 PROCEDURE — 85025 COMPLETE CBC W/AUTO DIFF WBC: CPT

## 2018-10-03 PROCEDURE — 96372 THER/PROPH/DIAG INJ SC/IM: CPT

## 2018-10-03 PROCEDURE — 96374 THER/PROPH/DIAG INJ IV PUSH: CPT

## 2018-10-03 PROCEDURE — 6370000000 HC RX 637 (ALT 250 FOR IP): Performed by: INTERNAL MEDICINE

## 2018-10-03 PROCEDURE — 96376 TX/PRO/DX INJ SAME DRUG ADON: CPT

## 2018-10-03 PROCEDURE — 2580000003 HC RX 258: Performed by: INTERNAL MEDICINE

## 2018-10-03 PROCEDURE — 84484 ASSAY OF TROPONIN QUANT: CPT

## 2018-10-03 PROCEDURE — 80053 COMPREHEN METABOLIC PANEL: CPT

## 2018-10-03 PROCEDURE — 97530 THERAPEUTIC ACTIVITIES: CPT

## 2018-10-03 PROCEDURE — 97162 PT EVAL MOD COMPLEX 30 MIN: CPT

## 2018-10-03 PROCEDURE — 93010 ELECTROCARDIOGRAM REPORT: CPT | Performed by: INTERNAL MEDICINE

## 2018-10-03 PROCEDURE — 70450 CT HEAD/BRAIN W/O DYE: CPT

## 2018-10-03 PROCEDURE — G8989 SELF CARE D/C STATUS: HCPCS

## 2018-10-03 PROCEDURE — 93005 ELECTROCARDIOGRAM TRACING: CPT | Performed by: EMERGENCY MEDICINE

## 2018-10-03 PROCEDURE — 70551 MRI BRAIN STEM W/O DYE: CPT

## 2018-10-03 PROCEDURE — G8987 SELF CARE CURRENT STATUS: HCPCS

## 2018-10-03 PROCEDURE — G8978 MOBILITY CURRENT STATUS: HCPCS

## 2018-10-03 PROCEDURE — 81001 URINALYSIS AUTO W/SCOPE: CPT

## 2018-10-03 PROCEDURE — 97166 OT EVAL MOD COMPLEX 45 MIN: CPT

## 2018-10-03 PROCEDURE — 99220 PR INITIAL OBSERVATION CARE/DAY 70 MINUTES: CPT | Performed by: INTERNAL MEDICINE

## 2018-10-03 PROCEDURE — 83735 ASSAY OF MAGNESIUM: CPT

## 2018-10-03 PROCEDURE — 6360000002 HC RX W HCPCS: Performed by: EMERGENCY MEDICINE

## 2018-10-03 PROCEDURE — 83690 ASSAY OF LIPASE: CPT

## 2018-10-03 PROCEDURE — 93880 EXTRACRANIAL BILAT STUDY: CPT

## 2018-10-03 PROCEDURE — 87086 URINE CULTURE/COLONY COUNT: CPT

## 2018-10-03 PROCEDURE — 96375 TX/PRO/DX INJ NEW DRUG ADDON: CPT

## 2018-10-03 PROCEDURE — G8988 SELF CARE GOAL STATUS: HCPCS

## 2018-10-03 PROCEDURE — 93306 TTE W/DOPPLER COMPLETE: CPT

## 2018-10-03 RX ORDER — MECLIZINE HCL 12.5 MG/1
12.5 TABLET ORAL 3 TIMES DAILY PRN
Status: DISCONTINUED | OUTPATIENT
Start: 2018-10-03 | End: 2018-10-04

## 2018-10-03 RX ORDER — DIPHENHYDRAMINE HCL 25 MG
50 TABLET ORAL NIGHTLY
Status: DISCONTINUED | OUTPATIENT
Start: 2018-10-03 | End: 2018-10-05 | Stop reason: HOSPADM

## 2018-10-03 RX ORDER — ATENOLOL 25 MG/1
25 TABLET ORAL DAILY
Status: DISCONTINUED | OUTPATIENT
Start: 2018-10-03 | End: 2018-10-05 | Stop reason: HOSPADM

## 2018-10-03 RX ORDER — SENNA PLUS 8.6 MG/1
1 TABLET ORAL 2 TIMES DAILY
Status: DISCONTINUED | OUTPATIENT
Start: 2018-10-03 | End: 2018-10-05 | Stop reason: HOSPADM

## 2018-10-03 RX ORDER — ONDANSETRON 4 MG/1
4 TABLET, ORALLY DISINTEGRATING ORAL EVERY 8 HOURS PRN
Status: DISCONTINUED | OUTPATIENT
Start: 2018-10-03 | End: 2018-10-05 | Stop reason: HOSPADM

## 2018-10-03 RX ORDER — ACETAMINOPHEN 325 MG/1
650 TABLET ORAL EVERY 6 HOURS PRN
Status: DISCONTINUED | OUTPATIENT
Start: 2018-10-03 | End: 2018-10-05 | Stop reason: HOSPADM

## 2018-10-03 RX ORDER — LORAZEPAM 1 MG/1
1 TABLET ORAL ONCE
Status: COMPLETED | OUTPATIENT
Start: 2018-10-03 | End: 2018-10-03

## 2018-10-03 RX ORDER — AMLODIPINE BESYLATE 5 MG/1
5 TABLET ORAL 2 TIMES DAILY
Status: DISCONTINUED | OUTPATIENT
Start: 2018-10-03 | End: 2018-10-05 | Stop reason: HOSPADM

## 2018-10-03 RX ORDER — ONDANSETRON 2 MG/ML
4 INJECTION INTRAMUSCULAR; INTRAVENOUS EVERY 6 HOURS PRN
Status: DISCONTINUED | OUTPATIENT
Start: 2018-10-03 | End: 2018-10-05 | Stop reason: HOSPADM

## 2018-10-03 RX ORDER — ASPIRIN 81 MG/1
81 TABLET ORAL DAILY
Status: DISCONTINUED | OUTPATIENT
Start: 2018-10-03 | End: 2018-10-05 | Stop reason: HOSPADM

## 2018-10-03 RX ORDER — RANOLAZINE 500 MG/1
500 TABLET, EXTENDED RELEASE ORAL 2 TIMES DAILY
Status: DISCONTINUED | OUTPATIENT
Start: 2018-10-03 | End: 2018-10-05 | Stop reason: HOSPADM

## 2018-10-03 RX ORDER — NITROGLYCERIN 0.4 MG/1
0.4 TABLET SUBLINGUAL EVERY 5 MIN PRN
Status: DISCONTINUED | OUTPATIENT
Start: 2018-10-03 | End: 2018-10-05 | Stop reason: HOSPADM

## 2018-10-03 RX ORDER — B12/LEVOMEFOLATE CALCIUM/B-6 2-1.13-25
1 TABLET ORAL DAILY
Status: DISCONTINUED | OUTPATIENT
Start: 2018-10-03 | End: 2018-10-05 | Stop reason: HOSPADM

## 2018-10-03 RX ORDER — HYDRALAZINE HYDROCHLORIDE 20 MG/ML
5 INJECTION INTRAMUSCULAR; INTRAVENOUS EVERY 6 HOURS PRN
Status: DISCONTINUED | OUTPATIENT
Start: 2018-10-03 | End: 2018-10-05 | Stop reason: HOSPADM

## 2018-10-03 RX ORDER — ALLOPURINOL 100 MG/1
100 TABLET ORAL DAILY
Status: DISCONTINUED | OUTPATIENT
Start: 2018-10-03 | End: 2018-10-05 | Stop reason: HOSPADM

## 2018-10-03 RX ORDER — DOCUSATE SODIUM 100 MG/1
100 CAPSULE, LIQUID FILLED ORAL DAILY
Status: DISCONTINUED | OUTPATIENT
Start: 2018-10-03 | End: 2018-10-05 | Stop reason: HOSPADM

## 2018-10-03 RX ORDER — AMLODIPINE BESYLATE 5 MG/1
5 TABLET ORAL DAILY
Status: DISCONTINUED | OUTPATIENT
Start: 2018-10-03 | End: 2018-10-03

## 2018-10-03 RX ORDER — SIMVASTATIN 20 MG
20 TABLET ORAL NIGHTLY
Status: DISCONTINUED | OUTPATIENT
Start: 2018-10-03 | End: 2018-10-05 | Stop reason: HOSPADM

## 2018-10-03 RX ORDER — ISOSORBIDE MONONITRATE 60 MG/1
60 TABLET, EXTENDED RELEASE ORAL DAILY
Status: DISCONTINUED | OUTPATIENT
Start: 2018-10-03 | End: 2018-10-05 | Stop reason: HOSPADM

## 2018-10-03 RX ORDER — SODIUM CHLORIDE 0.9 % (FLUSH) 0.9 %
10 SYRINGE (ML) INJECTION PRN
Status: DISCONTINUED | OUTPATIENT
Start: 2018-10-03 | End: 2018-10-05 | Stop reason: HOSPADM

## 2018-10-03 RX ORDER — LEVOTHYROXINE SODIUM 0.07 MG/1
75 TABLET ORAL DAILY
Status: DISCONTINUED | OUTPATIENT
Start: 2018-10-03 | End: 2018-10-05 | Stop reason: HOSPADM

## 2018-10-03 RX ORDER — PROMETHAZINE HYDROCHLORIDE 25 MG/ML
12.5 INJECTION, SOLUTION INTRAMUSCULAR; INTRAVENOUS EVERY 6 HOURS PRN
Status: DISCONTINUED | OUTPATIENT
Start: 2018-10-03 | End: 2018-10-05 | Stop reason: HOSPADM

## 2018-10-03 RX ORDER — CLOPIDOGREL BISULFATE 75 MG/1
75 TABLET ORAL DAILY
Status: DISCONTINUED | OUTPATIENT
Start: 2018-10-03 | End: 2018-10-05 | Stop reason: HOSPADM

## 2018-10-03 RX ORDER — SODIUM CHLORIDE 0.9 % (FLUSH) 0.9 %
10 SYRINGE (ML) INJECTION EVERY 12 HOURS SCHEDULED
Status: DISCONTINUED | OUTPATIENT
Start: 2018-10-03 | End: 2018-10-05 | Stop reason: HOSPADM

## 2018-10-03 RX ORDER — SODIUM CHLORIDE 9 MG/ML
INJECTION, SOLUTION INTRAVENOUS CONTINUOUS
Status: DISCONTINUED | OUTPATIENT
Start: 2018-10-03 | End: 2018-10-04

## 2018-10-03 RX ORDER — PANTOPRAZOLE SODIUM 40 MG/1
40 TABLET, DELAYED RELEASE ORAL
Status: DISCONTINUED | OUTPATIENT
Start: 2018-10-03 | End: 2018-10-05 | Stop reason: HOSPADM

## 2018-10-03 RX ADMIN — ISOSORBIDE MONONITRATE 60 MG: 60 TABLET, EXTENDED RELEASE ORAL at 08:39

## 2018-10-03 RX ADMIN — SERTRALINE HYDROCHLORIDE 50 MG: 50 TABLET ORAL at 08:39

## 2018-10-03 RX ADMIN — ONDANSETRON 4 MG: 2 INJECTION INTRAMUSCULAR; INTRAVENOUS at 03:26

## 2018-10-03 RX ADMIN — ATENOLOL 25 MG: 25 TABLET ORAL at 08:39

## 2018-10-03 RX ADMIN — CLOPIDOGREL BISULFATE 75 MG: 75 TABLET ORAL at 08:38

## 2018-10-03 RX ADMIN — Medication 2000 UNITS: at 08:38

## 2018-10-03 RX ADMIN — SODIUM CHLORIDE, PRESERVATIVE FREE 10 ML: 5 INJECTION INTRAVENOUS at 20:42

## 2018-10-03 RX ADMIN — Medication 1 TABLET: at 08:38

## 2018-10-03 RX ADMIN — PROCHLORPERAZINE EDISYLATE 10 MG: 5 INJECTION INTRAMUSCULAR; INTRAVENOUS at 02:20

## 2018-10-03 RX ADMIN — AMLODIPINE BESYLATE 5 MG: 5 TABLET ORAL at 20:41

## 2018-10-03 RX ADMIN — ASPIRIN 81 MG: 81 TABLET, COATED ORAL at 08:38

## 2018-10-03 RX ADMIN — RANOLAZINE 500 MG: 500 TABLET, FILM COATED, EXTENDED RELEASE ORAL at 08:38

## 2018-10-03 RX ADMIN — DOCUSATE SODIUM 100 MG: 100 CAPSULE, LIQUID FILLED ORAL at 08:39

## 2018-10-03 RX ADMIN — SIMVASTATIN 20 MG: 20 TABLET, FILM COATED ORAL at 20:42

## 2018-10-03 RX ADMIN — SODIUM CHLORIDE: 9 INJECTION, SOLUTION INTRAVENOUS at 03:22

## 2018-10-03 RX ADMIN — RANOLAZINE 500 MG: 500 TABLET, FILM COATED, EXTENDED RELEASE ORAL at 20:42

## 2018-10-03 RX ADMIN — DIPHENHYDRAMINE HCL 50 MG: 25 TABLET ORAL at 20:41

## 2018-10-03 RX ADMIN — SENNOSIDES 8.6 MG: 8.6 TABLET, FILM COATED ORAL at 20:42

## 2018-10-03 RX ADMIN — LORAZEPAM 1 MG: 1 TABLET ORAL at 17:49

## 2018-10-03 RX ADMIN — MECLIZINE 12.5 MG: 12.5 TABLET ORAL at 03:52

## 2018-10-03 RX ADMIN — LEVOTHYROXINE SODIUM 75 MCG: 75 TABLET ORAL at 06:04

## 2018-10-03 RX ADMIN — AMLODIPINE BESYLATE 5 MG: 5 TABLET ORAL at 08:38

## 2018-10-03 RX ADMIN — ENOXAPARIN SODIUM 30 MG: 100 INJECTION SUBCUTANEOUS at 08:40

## 2018-10-03 RX ADMIN — PANTOPRAZOLE SODIUM 40 MG: 40 TABLET, DELAYED RELEASE ORAL at 06:04

## 2018-10-03 RX ADMIN — ALLOPURINOL 100 MG: 100 TABLET ORAL at 08:38

## 2018-10-03 RX ADMIN — SODIUM CHLORIDE, PRESERVATIVE FREE 10 ML: 5 INJECTION INTRAVENOUS at 08:39

## 2018-10-03 RX ADMIN — SENNOSIDES 8.6 MG: 8.6 TABLET, FILM COATED ORAL at 08:39

## 2018-10-03 NOTE — PLAN OF CARE
Problem: Falls - Risk of:  Goal: Will remain free from falls  Will remain free from falls   Outcome: Ongoing    Goal: Absence of physical injury  Absence of physical injury   Outcome: Ongoing      Problem: Risk for Impaired Skin Integrity  Goal: Tissue integrity - skin and mucous membranes  Structural intactness and normal physiological function of skin and  mucous membranes.    Outcome: Ongoing      Problem: Infection:  Goal: Will remain free from infection  Will remain free from infection  Outcome: Ongoing      Problem: Safety:  Goal: Free from accidental physical injury  Free from accidental physical injury  Outcome: Ongoing    Goal: Free from intentional harm  Free from intentional harm  Outcome: Ongoing      Problem: Daily Care:  Goal: Daily care needs are met  Daily care needs are met  Outcome: Ongoing      Problem: Discharge Planning:  Goal: Patients continuum of care needs are met  Patients continuum of care needs are met  Outcome: Ongoing

## 2018-10-03 NOTE — ED PROVIDER NOTES
EMERGENCY DEPARTMENT H&P     Patient Name:  Alina Ivey  :  1927  MRN:  [id number]  Date of Visit:  10/2/2018    Triage Chief Complaint:   Hypertension and Emesis    HPI:  Alina Ivey is a 80 y.o. female who presents  With her daughter who assists in providing history for complaint of today history of dizziness, hypertension and emesis. They sure reports that she has been experiencing an intermittent vertigo sensation. She has been diagnosed with vertigo over the last few years and was recently started on meclizine by her PCP. Patient reports that meclizine helps slightly however dizziness has still occurred. She is noted that occasionally it is positional, worse when laying flat  But also occurs when sitting up. She is been nauseated and vomited earlier today prior to arrival. Emesis is reported as nonbloody and non-bilious. Patient denies any abdominal pain. Daughter notes that her blood pressure has been elevated recently and they have been in contact with patients cardiologist for outpatient management however blood pressure has remained high in the 180s  190s. At the time of history taking and exam, patient denied active vertigo. She complains of only nausea at this time. Denies any pain anywhere. ROS:  ROS  Ten point ROS was performed and is negative except as stated in HPI above.      Past Medical History:   Diagnosis Date    CAD (coronary artery disease)     Dr Rosalie Figueredo/Jose Figueredo    Chronic renal insufficiency     Dr Sridhar Mariscal Elevated LFTs 2012    Generalized osteoarthritis     GERD (gastroesophageal reflux disease)     Esophagitis    Gout     Hyperlipidemia     Hypertension     Hyperuricemia     Hypothyroid     Hypothyroidism    Low back pain     Osteoporosis     Generalized     Past Surgical History:   Procedure Laterality Date    CATARACT REMOVAL      R Aug. 2006 and L 2007 Dr. Gris Verde CARDIOVASCULAR: RRR. Normal s1/s2. No murmur. Peripheral pulses and perfusion intact. No LE edema noted. RESPIRATORY: Lungs clear to auscultation bilaterally. No respiratory distress or accessory muscle usage. ABDOMEN: Soft, no tenderness to palpation. Non-distended, no guarding / rebound. SKIN: Warm. Dry. Intact. No obvious skin abnormalities noted. MUSCULOSKELETAL: No swelling or deformities noted. No visible overt ROM restriction noted. BACK: no paraspinal / mid-line vertebral / flank tenderness noted. NEURO: The patient is awake, alert, interactive. Follows commands and answers questions appropriately. Speech is fluent with intact cognition. Cranial Nerve Exam: SUSI, EOMI without nystagmus. No visual field deficits noted. No facial muscle weakness, asymmetry or sensory deficits bilaterally. Hearing appears intact. SCM, Trapezius and masseter strength are normal. Tongue and uvula are midline. Motor: Strength and tone are normal. Upper extremities:  MS 5/5 with good ROM bilaterally in hand/wrist/elbow/shoulder. Lower extremities:  MS 5/5 with good ROM bilaterally with plantar/dorsiflexion, ankle, knee/hip flexion/extension. No abnormal movements or muscular atrophy. Sensation: Grossly intact sensation to light touch centrally and in all extremities. Coordination/Tone:  Finger/nose and heel/shin testing is normal. No tremor. Normal muscle tone.  No pronator drift,    I have reviewed and interpreted all of the currently available lab results from this visit:  Results for orders placed or performed during the hospital encounter of 10/02/18   CBC Auto Differential   Result Value Ref Range    WBC 7.8 4.0 - 10.5 K/CU MM    RBC 3.36 (L) 4.2 - 5.4 M/CU MM    Hemoglobin 10.8 (L) 12.5 - 16.0 GM/DL    Hematocrit 33.1 (L) 37 - 47 %    MCV 98.5 78 - 100 FL    MCH 32.1 (H) 27 - 31 PG    MCHC 32.6 32.0 - 36.0 %    RDW 13.0 11.7 - 14.9 %    Platelets 740 322 - 689 K/CU MM    MPV 8.5 7.5 - 11.1 FL    Differential Type AUTOMATED DIFFERENTIAL     Segs Relative 53.9 36 - 66 %    Lymphocytes % 20.2 (L) 24 - 44 %    Monocytes % 11.4 (H) 0 - 4 %    Eosinophils % 13.1 (H) 0 - 3 %    Basophils % 0.9 0 - 1 %    Segs Absolute 4.2 K/CU MM    Lymphocytes # 1.6 K/CU MM    Monocytes # 0.9 K/CU MM    Eosinophils # 1.0 K/CU MM    Basophils # 0.1 K/CU MM    Nucleated RBC % 0.0 %    Total Nucleated RBC 0.0 K/CU MM    Total Immature Neutrophil 0.04 K/CU MM    Immature Neutrophil % 0.5 (H) 0 - 0.43 %   Comprehensive Metabolic Panel   Result Value Ref Range    Sodium 134 (L) 135 - 145 MMOL/L    Potassium 4.9 3.5 - 5.1 MMOL/L    Chloride 98 (L) 99 - 110 mMol/L    CO2 25 21 - 32 MMOL/L    BUN 26 (H) 6 - 23 MG/DL    CREATININE 1.6 (H) 0.6 - 1.1 MG/DL    Glucose 114 (H) 70 - 99 MG/DL    Calcium 9.6 8.3 - 10.6 MG/DL    Alb 3.7 3.4 - 5.0 GM/DL    Total Protein 6.8 6.4 - 8.2 GM/DL    Total Bilirubin 0.2 0.0 - 1.0 MG/DL    ALT 9 (L) 10 - 40 U/L    AST 17 15 - 37 IU/L    Alkaline Phosphatase 84 40 - 128 IU/L    GFR Non-African American 30 (L) >60 mL/min/1.73m2    GFR  37 (L) >60 mL/min/1.73m2    Anion Gap 11 4 - 16   Magnesium   Result Value Ref Range    Magnesium 2.3 1.8 - 2.4 mg/dl   Troponin   Result Value Ref Range    Troponin T <0.010 <0.01 NG/ML   Lipase   Result Value Ref Range    Lipase 60 13 - 60 IU/L   Urinalysis Reflex to Culture   Result Value Ref Range    Color, UA STRAW (A) UYELL    Clarity, UA CLEAR CLEAR    Glucose, Urine NEGATIVE NEG MG/DL    Bilirubin Urine NEGATIVE NEG MG/DL    Ketones, Urine NEGATIVE NEG MG/DL    Specific Gravity, UA 1.004 1.001 - 1.035    Blood, Urine NEGATIVE NEG    pH, Urine 7.0 5.0 - 8.0    Protein, UA NEGATIVE NEG MG/DL    Urobilinogen, Urine NORMAL 0.2 - 1.0 MG/DL    Nitrite Urine, Quantitative NEGATIVE NEG    Leukocyte Esterase, Urine MODERATE (A) NEG    RBC, UA 1 0 - 6 /HPF    WBC, UA 8 (H) 0 - 5 /HPF    Bacteria, UA NEGATIVE NEG /HPF    Squam Epithel, UA 2 /HPF    Trans Epithel, UA <1 /HPF Trichomonas, UA NONE SEEN NOSEE /HPF     Radiographs:  Radiologist's Report Reviewed:  CT Head WO Contrast   Final Result   No acute intracranial abnormality. Stable severe chronic microangiopathic ischemic changes of cerebral white   matter. EKG: (All EKG's are interpreted by myself in the absence of a cardiologist)    Rhythm: NSR  Rate: 65  Axis: LAD  Ectopy: None  Conduction: old LBBB  ST Segments: No acute elevations or depressions noted. T Waves: Old TWI in I and aVL. Medications administered:  Medications   prochlorperazine (COMPAZINE) injection 10 mg (10 mg Intravenous Given 10/3/18 0220)       ED COURSE & MDM:  Nursing notes and vital signs were reviewed. The patient's medical record and available pertinent information was also reviewed if available. Pt presented with HTN noted on vital signs. Please see H&P above. Pt denies active vertigo at this time but reports feeling nauseated. No neuro deficits appreciated on exam. Due to age and intermittent nature of dizziness, I am concerned for possible central cause of vertigo. Symptoms have been present for 2 days however, pt would not be a TPA candidate. Workup  Initiated with noncontrast CT of the head, EKG, lab work. Compazine given for nausea and Dizziness sensation. Will monitor blood pressure and it intervene if needed. Patient denies need for pain medication at this time. CT imaging of the head shows chronic  Microvascular ischemic changes. No acute abnormalities noted. Labwork reviewed, no acute abnormalities at this time. Renal function at baseline for troponin negative. Lipase stable. CBC unremarkable for acute abnormalities. Patient did report some improvement of her symptoms with Compazine. Her blood pressure improved significantly without any antihypertensive medications.  I still feel patient will need admission for further workup of possible central vertigo including  possible advanced imaging such as MRI and neurology consultation. Her case was discussed with her PCP Dr. Alejandro Porter who is aware of all details of the patient's case and will admit the patient for further management. Patient left the ED to the floor in stable condition. Clinical Impression:  1. Nausea and vomiting, intractability of vomiting not specified, unspecified vomiting type    2. Hypertension, unspecified type    3. Dizziness      Comment: Please note this report has been produced using speech recognition software and may contain errors related to that system including errors in grammar, punctuation, and spelling, as well as words and phrases that may be inappropriate. If there are any questions or concerns please feel free to contact the dictating provider for clarification.     Electronically Signed:        Carmen Pang DO  10/03/18 5541

## 2018-10-03 NOTE — PROGRESS NOTES
of CAD (coronary artery disease); Chronic renal insufficiency; Depression; Eczema; Elevated LFTs; Generalized osteoarthritis; GERD (gastroesophageal reflux disease); Gout; Hyperlipidemia; Hypertension; Hyperuricemia; Hypothyroid; Low back pain; and Osteoporosis. Pt admitted with dizziness and hypertension and undergoing neuro workup. Pt with recent Rt hip hemiarthroplasty on 8-19. Pt lives at home with her granddaughter and had [place] aides 12 hours/day prior to admission. Pt was requiring assistance with bathing/dressing, but ambulating HH distances mod I with a 4ww and transferring independently. Pt appears to be at recent baseline, and is safe to return to home with continued 24 hour supervision [facility]. Treatment Diagnosis: Dizziness, Hypertension; s/p recent Rt hip hemiarthroplasty  Prognosis: Good  Decision Making: Medium Complexity  Barriers to Learning: None noted  No Skilled OT: At baseline function; Safe to return home  REQUIRES OT FOLLOW UP: No  Activity Tolerance  Activity Tolerance: Patient Tolerated treatment well  Safety Devices  Safety Devices in place: Yes  Type of devices: All fall risk precautions in place; Patient at risk for falls;Nurse notified;Gait belt;Call light within reach; Left in chair          Time In: 1050  Time Out: 1110  Timed Treatment Minutes: 0  Total Treatment Time: 20       Plan   Plan  Times per week: Eval and d/c; no change from recent baseline      G-Code  OT G-codes  Functional Limitation: Self care  Self Care Current Status (): At least 40 percent but less than 60 percent impaired, limited or restricted  Self Care Goal Status (): At least 40 percent but less than 60 percent impaired, limited or restricted  Self Care Discharge Status ():  At least 40 percent but less than 60 percent impaired, limited or restricted            PANKAJ Taylor/L, [place], [id number]

## 2018-10-03 NOTE — CONSULTS
40 mg Oral QAM AC    diphenhydrAMINE  50 mg Oral Nightly    docusate sodium  100 mg Oral Daily    folic acid-pyridoxine-cyancobalamin  1 tablet Oral Daily    isosorbide mononitrate  60 mg Oral Daily    levothyroxine  75 mcg Oral Daily    ranolazine  500 mg Oral BID    senna  1 tablet Oral BID    sertraline  50 mg Oral Daily    simvastatin  20 mg Oral Nightly    sodium chloride flush  10 mL Intravenous 2 times per day    clopidogrel  75 mg Oral Daily    enoxaparin  30 mg Subcutaneous Daily     Continuous Infusions:   sodium chloride 30 mL/hr at 10/03/18 0322     PRN Meds:.acetaminophen, carboxymethylcellulose, meclizine, nitroGLYCERIN, ondansetron, sodium chloride flush, magnesium hydroxide, ondansetron, promethazine, hydrALAZINE    Allergies:  Bactrim [sulfamethoxazole-trimethoprim]; Cephalexin; and Tape [adhesive tape]    Social History:   TOBACCO:   reports that she has quit smoking. She has never used smokeless tobacco.  ETOH:   reports that she does not drink alcohol.   OCCUPATION:      Family History:   Family History   Problem Relation Age of Onset    Other Mother         CAD    Other Father         CAD           Physical Exam:    Vitals: BP (!) 149/78   Pulse 63   Temp 98.2 °F (36.8 °C) (Oral)   Resp 20   Ht 5' (1.524 m)   Wt 121 lb 11.1 oz (55.2 kg)   LMP  (LMP Unknown)   SpO2 96%   BMI 23.77 kg/m²   General appearance: in no acute distress, appears stated age  Skin: Skin color, texture, turgor normal. No rashes or lesions  HEENT: normocephalic, atraumatic  Neck: supple, trachea midline  Lungs: clear to auscultation bilaterally, breathing comfortably  Heart[de-identified] regular rate and rhythm, S1, S2 normal,  Abdomen: soft, non-tender; bowel sounds normal; no masses,   Extremities: extremities normal, atraumatic, no cyanosis or edema  Neurologic: Mental status: alert, oriented, interactive, following commands  Psychiatric: mood and affect appropriate    CBC:   Recent Labs      10/03/18   0012 WBC  7.8   HGB  10.8*   PLT  347     BMP:    Recent Labs      10/03/18   0012   NA  134*   K  4.9   CL  98*   CO2  25   BUN  26*   CREATININE  1.6*   GLUCOSE  114*     Hepatic:   Recent Labs      10/03/18   0012   AST  17   ALT  9*   BILITOT  0.2   ALKPHOS  84     Troponin: No results for input(s): TROPONINI in the last 72 hours. BNP: No results for input(s): BNP in the last 72 hours. Lipids: No results for input(s): CHOL, HDL in the last 72 hours. Invalid input(s): LDLCALCU  ABGs: No results found for: PHART, PO2ART, FFL8UIQ  INR: No results for input(s): INR in the last 72 hours.   -----------------------------------------------------------------      Assessment and Recommendations   - CKD3  - Hyponatremia  - Hypertensive urgency  - nausea    Plan;   Renal function at baseline  Agree with ns at 30ml/hr  Continue amlodipine, atenolol, imdur, uptitrate as needed  Monitor BP  Will follow  Covering for Dr Gabriel Stevenson    Thank you for this consult          Electronically signed by Araceli Goldman DO on 10/3/2018 at 10:56 AM    [facility]  MD Laxmi Rivers DO [address],  [address]  PHONE: [phone]  FAX: [phone]

## 2018-10-03 NOTE — CONSULTS
[address] [place] [address], [address] [facility]                                   CONSULTATION    PATIENT NAME: Rena Cantrell                    :        1927  MED REC NO:   [id number]                          ROOM:       2119  ACCOUNT NO:   [de-identified]                           ADMIT DATE: 10/02/2018  PROVIDER:     Iliana Mendiola MD    CONSULT DATE:  10/03/2018    INDICATION:  Coronary artery disease and hypertension. HISTORY OF PRESENT ILLNESS:  This is a 57-year-old female patient who came  to the hospital last night with having elevated blood pressure and  dizziness present. She denies any chest pain. Her blood pressure is  elevated in the 180s. She has significant dizziness noted. She feels  dizzy lying in the bed right now. No other  or GI complaints present. The patient was in the hospital not too long ago. She had a stress test  done back in 2018. Her stress test in 2018 showed negative ischemia  and LV function was preserved 76%. She had a Holter done back in 2018  also, found to have sinus rhythm with underlying left bundle branch block  present. Minimum heart rate is 57 beats per minute and maximum heart rate  is 85 beats per minute. Elevated PACs and PVCs noted. She had an echo  done in 2018. Echo shows LV function was preserved. The patient has a history of having hypertension and history of hip  fracture done back in 2018. She has coronary artery disease. She had a  heart catheterization in . LAD was 70% stenosis distally, otherwise  she was treated medically. She has hypertension, renal insufficiency. She  sees Dr. Shirley Antunez. Hyperlipidemia, anemia, arthritis, and pulmonary  hypertension present. PAST SURGICAL HISTORY:  Foot surgery, cataract surgery, _____ surgery and  hip surgery. SOCIAL HISTORY:  She does not smoke, does not drink.     ALLERGIES:  BACTRIM, SULFA, and

## 2018-10-03 NOTE — PROGRESS NOTES
Physical Therapy    Facility/Department: [facility] ICU  Initial Assessment    NAME: Ping Webber  : 1927  MRN: [id number]    Date of Service: 10/3/2018    Discharge Recommendations:  24 hour supervision or assist   PT Equipment Recommendations  Equipment Needed: No    Patient Diagnosis(es): The primary encounter diagnosis was Nausea and vomiting, intractability of vomiting not specified, unspecified vomiting type. Diagnoses of Hypertension, unspecified type and Dizziness were also pertinent to this visit. has a past medical history of CAD (coronary artery disease); Chronic renal insufficiency; Depression; Eczema; Elevated LFTs; Generalized osteoarthritis; GERD (gastroesophageal reflux disease); Gout; Hyperlipidemia; Hypertension; Hyperuricemia; Hypothyroid; Low back pain; and Osteoporosis. has a past surgical history that includes Foot surgery (); Cataract removal; Tonsillectomy; Colonoscopy; Hysterectomy; eye surgery; and Hip fracture surgery (Right, 2018).     Restrictions  Restrictions/Precautions  Restrictions/Precautions: General Precautions, Fall Risk, Weight Bearing  Lower Extremity Weight Bearing Restrictions  Right Lower Extremity Weight Bearing: Weight Bearing As Tolerated  Position Activity Restriction  Hip Precautions: Posterior hip precautions  Other position/activity restrictions: Telemetry, Pulse Ox, BP cuff, IV  Vision/Hearing        Subjective  General  Chart Reviewed: Yes  Patient assessed for rehabilitation services?: Yes  Family / Caregiver Present: No  Follows Commands: Within Functional Limits  Pain Screening  Patient Currently in Pain: Denies  Vital Signs  Patient Currently in Pain: Denies       Orientation  Orientation  Overall Orientation Status: Within Normal Limits    Social/Functional History  Social/Functional History  Lives With: Family (Granddaughter and great granddaughter)  Type of Home: House  Home Layout: One level  Home Access: [facility] Shower/Tub: Walk-in shower  Bathroom Toilet: Bedside commode  Bathroom Equipment: Shower chair, Grab bars in shower  Bathroom Accessibility: Accessible  Home Equipment: 4 wheeled walker, Reacher, Sock aid, Long-handled shoehorn, Leg , Alert [employer] Help From: Home health (Pt reports HH care daily for 12 hours/day)  ADL Assistance: Needs assistance (has had assistance with bathing/dressing since hip surgery)  Homemaking Assistance: Needs assistance  Homemaking Responsibilities: No  Ambulation Assistance: Independent (mod I with 4ww)  Transfer Assistance: Independent  Active : No  Occupation: Retired  Objective          AROM RLE (degrees)  RLE AROM: WFL  AROM LLE (degrees)  LLE AROM : WFL  Strength RLE  Strength RLE: WFL  Strength LLE  Strength LLE: WFL     Sensation  Overall Sensation Status: WFL  Bed mobility  Supine to Sit: Contact guard assistance  Comment: Performed slowly and transfer effortful but no physical assist required, patient reporting lightheadedness throughout. Transfers  Sit to Stand: Contact guard assistance  Stand to sit: Contact guard assistance  Comment: Performed with RW, min cues for safety with AD. Ambulation  Ambulation?: Yes  Ambulation 1  Device: Rolling Walker  Assistance: Contact guard assistance  Comments: Patient took 5 steps from bed to chair, moving well but limited by lightheadedness. Balance  Sitting - Static: Good  Sitting - Dynamic: Good  Standing - Static: Fair;+ (with [id number])  Standing - Dynamic: Fair;+ (with [id number])        Assessment   Body structures, Functions, Activity limitations: Decreased functional mobility ; Decreased balance;Decreased endurance  Assessment: Patient is a 81 yo female who presents with HTN, dizziness, and nausea. Patient describes symptoms similar to vertigo upon initial onset but during eval only reporting lightheadedness.   Patient would benefit from skilled PT services and d/c home d/t extensive family and and home health care

## 2018-10-04 LAB
ANION GAP SERPL CALCULATED.3IONS-SCNC: 12 MMOL/L (ref 4–16)
BASOPHILS ABSOLUTE: 0.1 K/CU MM
BASOPHILS RELATIVE PERCENT: 0.8 % (ref 0–1)
BUN BLDV-MCNC: 22 MG/DL (ref 6–23)
CALCIUM SERPL-MCNC: 8.9 MG/DL (ref 8.3–10.6)
CHLORIDE BLD-SCNC: 97 MMOL/L (ref 99–110)
CHOLESTEROL: 143 MG/DL
CO2: 23 MMOL/L (ref 21–32)
CREAT SERPL-MCNC: 1.7 MG/DL (ref 0.6–1.1)
CULTURE: NORMAL
DIFFERENTIAL TYPE: ABNORMAL
EOSINOPHILS ABSOLUTE: 1 K/CU MM
EOSINOPHILS RELATIVE PERCENT: 11.9 % (ref 0–3)
GFR AFRICAN AMERICAN: 34 ML/MIN/1.73M2
GFR NON-AFRICAN AMERICAN: 28 ML/MIN/1.73M2
GLUCOSE BLD-MCNC: 102 MG/DL (ref 70–99)
HCT VFR BLD CALC: 34 % (ref 37–47)
HDLC SERPL-MCNC: 39 MG/DL
HEMOGLOBIN: 11.1 GM/DL (ref 12.5–16)
IMMATURE NEUTROPHIL %: 0.6 % (ref 0–0.43)
LDL CHOLESTEROL DIRECT: 78 MG/DL
LYMPHOCYTES ABSOLUTE: 2 K/CU MM
LYMPHOCYTES RELATIVE PERCENT: 24.5 % (ref 24–44)
Lab: NORMAL
MCH RBC QN AUTO: 32.3 PG (ref 27–31)
MCHC RBC AUTO-ENTMCNC: 32.6 % (ref 32–36)
MCV RBC AUTO: 98.8 FL (ref 78–100)
MONOCYTES ABSOLUTE: 0.8 K/CU MM
MONOCYTES RELATIVE PERCENT: 9.7 % (ref 0–4)
NUCLEATED RBC %: 0 %
PDW BLD-RTO: 13.1 % (ref 11.7–14.9)
PLATELET # BLD: 390 K/CU MM (ref 140–440)
PMV BLD AUTO: 8.8 FL (ref 7.5–11.1)
POTASSIUM SERPL-SCNC: 5.2 MMOL/L (ref 3.5–5.1)
RBC # BLD: 3.44 M/CU MM (ref 4.2–5.4)
REPORT STATUS: NORMAL
SEGMENTED NEUTROPHILS ABSOLUTE COUNT: 4.4 K/CU MM
SEGMENTED NEUTROPHILS RELATIVE PERCENT: 52.5 % (ref 36–66)
SODIUM BLD-SCNC: 132 MMOL/L (ref 135–145)
SPECIMEN: NORMAL
TOTAL COLONY COUNT: NORMAL
TOTAL IMMATURE NEUTOROPHIL: 0.05 K/CU MM
TOTAL NUCLEATED RBC: 0 K/CU MM
TRIGL SERPL-MCNC: 191 MG/DL
WBC # BLD: 8.3 K/CU MM (ref 4–10.5)

## 2018-10-04 PROCEDURE — 6370000000 HC RX 637 (ALT 250 FOR IP): Performed by: INTERNAL MEDICINE

## 2018-10-04 PROCEDURE — G0378 HOSPITAL OBSERVATION PER HR: HCPCS

## 2018-10-04 PROCEDURE — 94761 N-INVAS EAR/PLS OXIMETRY MLT: CPT

## 2018-10-04 PROCEDURE — 36415 COLL VENOUS BLD VENIPUNCTURE: CPT

## 2018-10-04 PROCEDURE — 99225 PR SBSQ OBSERVATION CARE/DAY 25 MINUTES: CPT | Performed by: INTERNAL MEDICINE

## 2018-10-04 PROCEDURE — 6360000002 HC RX W HCPCS: Performed by: INTERNAL MEDICINE

## 2018-10-04 PROCEDURE — 80048 BASIC METABOLIC PNL TOTAL CA: CPT

## 2018-10-04 PROCEDURE — 97530 THERAPEUTIC ACTIVITIES: CPT

## 2018-10-04 PROCEDURE — 96372 THER/PROPH/DIAG INJ SC/IM: CPT

## 2018-10-04 PROCEDURE — 97116 GAIT TRAINING THERAPY: CPT

## 2018-10-04 PROCEDURE — 2580000003 HC RX 258: Performed by: INTERNAL MEDICINE

## 2018-10-04 PROCEDURE — 83721 ASSAY OF BLOOD LIPOPROTEIN: CPT

## 2018-10-04 PROCEDURE — 85025 COMPLETE CBC W/AUTO DIFF WBC: CPT

## 2018-10-04 PROCEDURE — 80061 LIPID PANEL: CPT

## 2018-10-04 RX ORDER — MECLIZINE HCL 12.5 MG/1
12.5 TABLET ORAL EVERY 6 HOURS SCHEDULED
Status: DISCONTINUED | OUTPATIENT
Start: 2018-10-04 | End: 2018-10-05 | Stop reason: HOSPADM

## 2018-10-04 RX ADMIN — PANTOPRAZOLE SODIUM 40 MG: 40 TABLET, DELAYED RELEASE ORAL at 06:25

## 2018-10-04 RX ADMIN — LEVOTHYROXINE SODIUM 75 MCG: 75 TABLET ORAL at 06:25

## 2018-10-04 RX ADMIN — MECLIZINE 12.5 MG: 12.5 TABLET ORAL at 17:14

## 2018-10-04 RX ADMIN — MECLIZINE 12.5 MG: 12.5 TABLET ORAL at 11:41

## 2018-10-04 RX ADMIN — SIMVASTATIN 20 MG: 20 TABLET, FILM COATED ORAL at 20:03

## 2018-10-04 RX ADMIN — Medication 2000 UNITS: at 09:40

## 2018-10-04 RX ADMIN — RANOLAZINE 500 MG: 500 TABLET, FILM COATED, EXTENDED RELEASE ORAL at 20:03

## 2018-10-04 RX ADMIN — ALLOPURINOL 100 MG: 100 TABLET ORAL at 09:41

## 2018-10-04 RX ADMIN — ASPIRIN 81 MG: 81 TABLET, COATED ORAL at 09:41

## 2018-10-04 RX ADMIN — CLOPIDOGREL BISULFATE 75 MG: 75 TABLET ORAL at 09:40

## 2018-10-04 RX ADMIN — ENOXAPARIN SODIUM 30 MG: 100 INJECTION SUBCUTANEOUS at 09:41

## 2018-10-04 RX ADMIN — SODIUM CHLORIDE, PRESERVATIVE FREE 10 ML: 5 INJECTION INTRAVENOUS at 20:03

## 2018-10-04 RX ADMIN — ISOSORBIDE MONONITRATE 60 MG: 60 TABLET, EXTENDED RELEASE ORAL at 09:40

## 2018-10-04 RX ADMIN — RANOLAZINE 500 MG: 500 TABLET, FILM COATED, EXTENDED RELEASE ORAL at 09:39

## 2018-10-04 RX ADMIN — DOCUSATE SODIUM 100 MG: 100 CAPSULE, LIQUID FILLED ORAL at 09:39

## 2018-10-04 RX ADMIN — AMLODIPINE BESYLATE 5 MG: 5 TABLET ORAL at 20:03

## 2018-10-04 RX ADMIN — SERTRALINE HYDROCHLORIDE 50 MG: 50 TABLET ORAL at 09:41

## 2018-10-04 RX ADMIN — SENNOSIDES 8.6 MG: 8.6 TABLET, FILM COATED ORAL at 20:04

## 2018-10-04 RX ADMIN — ATENOLOL 25 MG: 25 TABLET ORAL at 09:40

## 2018-10-04 RX ADMIN — DIPHENHYDRAMINE HCL 50 MG: 25 TABLET ORAL at 20:03

## 2018-10-04 RX ADMIN — Medication 1 TABLET: at 09:39

## 2018-10-04 RX ADMIN — AMLODIPINE BESYLATE 5 MG: 5 TABLET ORAL at 09:40

## 2018-10-04 ASSESSMENT — PAIN SCALES - GENERAL: PAINLEVEL_OUTOF10: 0

## 2018-10-04 NOTE — PROGRESS NOTES
I am a [employer] student taking care of this patient today. Patient is in bed call light in reach, no complaints at this time will continue to monitor.     Electronically signed by Jettie Lombard on 10/4/2018 at 10:25 AM

## 2018-10-04 NOTE — PROGRESS NOTES
24.63 kg/m²   General appearance: alert and cooperative with exam  Lungs: clear to auscultation bilaterally  Heart: regular rate and rhythm, S1, S2 normal, no murmur, click, rub or gallop  Abdomen: soft, non-tender; bowel sounds normal; no masses,  no organomegaly  Extremities: extremities normal, atraumatic, no cyanosis or edema  Neurologic: Mental status: Alert, oriented, thought content appropriate    Assessment and Plan:   1. cva- pending MRI to r/o, if neg then likely peripheral, labyrinthitis vs BPPV. incr activity w PT, schedule antivert. If MRI pos then inpatient and consult neuro. If neg then if mili activity, home in am  2. HTN/renal- Dr Reece Tello monitoring, bp sl high and monitoring  3.  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

## 2018-10-04 NOTE — PROGRESS NOTES
Nephrology Progress Note        [address], [address] [facility], [place] [address]  Phone: [phone]  Office Hours: 8:30AM  4:30PM  Monday - Friday       [facility]  MD Theresa Poseymoise Barbara, [facility] [address],  [place]  [address], [place] [address]  PHONE: [phone]  FAX: [phone]    10/4/2018 8:06 AM  Subjective:   Admit Date: 10/2/2018  PCP: Zuly Saeed MD  Interval History: on room air  bp trend has improved      Diet: DIET CLEAR LIQUID;      Data:   Scheduled Meds:   allopurinol  100 mg Oral Daily    aspirin EC  81 mg Oral Daily    atenolol  25 mg Oral Daily    vitamin D  2,000 Units Oral Daily    pantoprazole  40 mg Oral QAM AC    diphenhydrAMINE  50 mg Oral Nightly    docusate sodium  100 mg Oral Daily    folic acid-pyridoxine-cyancobalamin  1 tablet Oral Daily    isosorbide mononitrate  60 mg Oral Daily    levothyroxine  75 mcg Oral Daily    ranolazine  500 mg Oral BID    senna  1 tablet Oral BID    sertraline  50 mg Oral Daily    simvastatin  20 mg Oral Nightly    sodium chloride flush  10 mL Intravenous 2 times per day    clopidogrel  75 mg Oral Daily    enoxaparin  30 mg Subcutaneous Daily    amLODIPine  5 mg Oral BID     Continuous Infusions:  PRN Meds:acetaminophen, carboxymethylcellulose, meclizine, nitroGLYCERIN, ondansetron, sodium chloride flush, magnesium hydroxide, ondansetron, promethazine, hydrALAZINE  No intake/output data recorded. No intake/output data recorded. No intake or output data in the 24 hours ending 10/04/18 0806    CBC:   Recent Labs      10/03/18   0012   WBC  7.8   HGB  10.8*   PLT  347       BMP:    Recent Labs      10/03/18   0012   NA  134*   K  4.9   CL  98*   CO2  25   BUN  26*   CREATININE  1.6*   GLUCOSE  114*     Hepatic:   Recent Labs      10/03/18   0012   AST  17   ALT  9*   BILITOT  0.2   ALKPHOS  84     Troponin: No results for input(s): TROPONINI in the last 72 hours.   BNP: No

## 2018-10-04 NOTE — PROGRESS NOTES
Daily Progress Note     patient is awake alert feeling better  Still dizzy  Heart rate and BP stable  Increase activity   Check ortho BP      The patient was in the hospital not too long ago. She had a stress test  done back in 01/2018. Her stress test in 01/2018 showed negative ischemia and LV function was preserved 76%. She had a Holter done back in 01/2018 also, found to have sinus rhythm with underlying left bundle branch block present. Minimum heart rate is 57 beats per minute and maximum heart rate is 85 beats per minute. Elevated PACs and PVCs noted. She had an echo done in 01/2018. Echo shows LV function was preserved.     The patient has a history of having hypertension and history of hip  fracture done back in 08/2018. She has coronary artery disease. She had a heart catheterization in 2010. LAD was 70% stenosis distally, otherwise she was treated medically. She has hypertension, renal insufficiency. She sees Dr. Anders Madison. Hyperlipidemia, anemia, arthritis, and pulmonary hypertension present. MRI of head   Impression:        No acute infarct, intracranial hemorrhage, or significant mass effect. Chronic small vessel ischemic white matter disease and diffuse cerebral   volume loss. Carotid US     Impression:        The right internal carotid artery demonstrates 0-50% stenosis. The left internal carotid artery demonstrates 0-50% stenosis. Bilateral vertebral arteries are patent with flow in the normal direction.          Objective:   BP (!) 166/65   Pulse 69   Temp 97.8 °F (36.6 °C) (Oral)   Resp 19   Ht 5' (1.524 m)   Wt 126 lb 1.6 oz (57.2 kg)   LMP  (LMP Unknown)   SpO2 99%   BMI 24.63 kg/m²   No intake or output data in the 24 hours ending 10/04/18 1104    Medications:   Scheduled Meds:   meclizine  12.5 mg Oral 4 times per day    allopurinol  100 mg Oral Daily    aspirin EC  81 mg Oral Daily    atenolol  25 mg Oral Daily    vitamin D  2,000 Units Oral Daily    branch block)      Priority: High    Acute blood loss anemia 08/23/2018     Priority: Medium    Acute on chronic renal insufficiency      Priority: Medium    GERD (gastroesophageal reflux disease)      Priority: Medium    Chronic renal impairment, stage 3 (moderate) (HCC)      Priority: Low    Constipation, slow transit 06/19/2018     Priority: Low    Cystitis      Priority: Low    Physical deconditioning      Priority: Low    Pruritic condition 01/22/2016     Priority: Low    Gout      Priority: Low    Hyperuricemia      Priority: Low    Low back pain      Priority: Low    Eczema      Priority: Low    Coronary artery disease involving native heart without angina pectoris      Priority: Low    Hypertension      Priority: Low    Depression      Priority: Low    Hyperlipidemia      Priority: Low    Generalized osteoarthritis      Priority: Low    Osteoporosis      Priority: Low    Hypothyroid      Priority: Low    Cere[facility]vascular accident (CVA) involving [facility] 10/03/2018       Giovana Mckeon MD 10/4/2018 11:04 AM

## 2018-10-05 ENCOUNTER — TELEPHONE (OUTPATIENT)
Dept: INTERNAL MEDICINE CLINIC | Age: 83
End: 2018-10-05

## 2018-10-05 VITALS
WEIGHT: 122.3 LBS | RESPIRATION RATE: 22 BRPM | OXYGEN SATURATION: 99 % | DIASTOLIC BLOOD PRESSURE: 103 MMHG | HEIGHT: 60 IN | SYSTOLIC BLOOD PRESSURE: 147 MMHG | BODY MASS INDEX: 24.01 KG/M2 | TEMPERATURE: 97.9 F | HEART RATE: 72 BPM

## 2018-10-05 LAB
ANION GAP SERPL CALCULATED.3IONS-SCNC: 10 MMOL/L (ref 4–16)
BUN BLDV-MCNC: 29 MG/DL (ref 6–23)
CALCIUM SERPL-MCNC: 9.4 MG/DL (ref 8.3–10.6)
CHLORIDE BLD-SCNC: 104 MMOL/L (ref 99–110)
CO2: 25 MMOL/L (ref 21–32)
CREAT SERPL-MCNC: 1.8 MG/DL (ref 0.6–1.1)
GFR AFRICAN AMERICAN: 32 ML/MIN/1.73M2
GFR NON-AFRICAN AMERICAN: 26 ML/MIN/1.73M2
GLUCOSE BLD-MCNC: 95 MG/DL (ref 70–99)
POTASSIUM SERPL-SCNC: 4.9 MMOL/L (ref 3.5–5.1)
SODIUM BLD-SCNC: 139 MMOL/L (ref 135–145)

## 2018-10-05 PROCEDURE — 99217 PR OBSERVATION CARE DISCHARGE MANAGEMENT: CPT | Performed by: INTERNAL MEDICINE

## 2018-10-05 PROCEDURE — 94761 N-INVAS EAR/PLS OXIMETRY MLT: CPT

## 2018-10-05 PROCEDURE — 96372 THER/PROPH/DIAG INJ SC/IM: CPT

## 2018-10-05 PROCEDURE — 80048 BASIC METABOLIC PNL TOTAL CA: CPT

## 2018-10-05 PROCEDURE — 6370000000 HC RX 637 (ALT 250 FOR IP): Performed by: INTERNAL MEDICINE

## 2018-10-05 PROCEDURE — 6360000002 HC RX W HCPCS: Performed by: INTERNAL MEDICINE

## 2018-10-05 PROCEDURE — G0378 HOSPITAL OBSERVATION PER HR: HCPCS

## 2018-10-05 PROCEDURE — 36415 COLL VENOUS BLD VENIPUNCTURE: CPT

## 2018-10-05 RX ORDER — AMLODIPINE BESYLATE 5 MG/1
5 TABLET ORAL 2 TIMES DAILY
Qty: 60 TABLET | Refills: 3 | Status: SHIPPED | OUTPATIENT
Start: 2018-10-05 | End: 2019-02-18 | Stop reason: SDUPTHER

## 2018-10-05 RX ADMIN — PANTOPRAZOLE SODIUM 40 MG: 40 TABLET, DELAYED RELEASE ORAL at 06:03

## 2018-10-05 RX ADMIN — ALLOPURINOL 100 MG: 100 TABLET ORAL at 08:15

## 2018-10-05 RX ADMIN — Medication 2000 UNITS: at 08:14

## 2018-10-05 RX ADMIN — SENNOSIDES 8.6 MG: 8.6 TABLET, FILM COATED ORAL at 08:14

## 2018-10-05 RX ADMIN — ASPIRIN 81 MG: 81 TABLET, COATED ORAL at 08:14

## 2018-10-05 RX ADMIN — MECLIZINE 12.5 MG: 12.5 TABLET ORAL at 06:03

## 2018-10-05 RX ADMIN — ATENOLOL 25 MG: 25 TABLET ORAL at 08:15

## 2018-10-05 RX ADMIN — AMLODIPINE BESYLATE 5 MG: 5 TABLET ORAL at 08:15

## 2018-10-05 RX ADMIN — SERTRALINE HYDROCHLORIDE 50 MG: 50 TABLET ORAL at 08:14

## 2018-10-05 RX ADMIN — Medication 1 TABLET: at 08:13

## 2018-10-05 RX ADMIN — LEVOTHYROXINE SODIUM 75 MCG: 75 TABLET ORAL at 06:03

## 2018-10-05 RX ADMIN — MECLIZINE 12.5 MG: 12.5 TABLET ORAL at 00:02

## 2018-10-05 RX ADMIN — ISOSORBIDE MONONITRATE 60 MG: 60 TABLET, EXTENDED RELEASE ORAL at 08:15

## 2018-10-05 RX ADMIN — RANOLAZINE 500 MG: 500 TABLET, FILM COATED, EXTENDED RELEASE ORAL at 08:14

## 2018-10-05 RX ADMIN — DOCUSATE SODIUM 100 MG: 100 CAPSULE, LIQUID FILLED ORAL at 08:14

## 2018-10-05 RX ADMIN — ENOXAPARIN SODIUM 30 MG: 100 INJECTION SUBCUTANEOUS at 08:13

## 2018-10-05 RX ADMIN — CLOPIDOGREL BISULFATE 75 MG: 75 TABLET ORAL at 08:14

## 2018-10-05 ASSESSMENT — PAIN SCALES - GENERAL
PAINLEVEL_OUTOF10: 0
PAINLEVEL_OUTOF10: 0

## 2018-10-05 NOTE — TELEPHONE ENCOUNTER
[facility] Transitions Initial Follow Up Call    Outreach made within 2 business days of discharge: yes    Patient: Naya Palma Patient : 1927   MRN: [id number]  Reason for nausea and vomiting Admission:Discharge Date: 10/5/18       Spoke with:Maggie Rico     Discharge department/facility: [facility]    TCM Interactive Patient Contact:  Was patient able to fill all prescriptions: yes  Was patient instructed to bring all medications to the follow-up visit: yes  Is patient taking all medications as directed in the discharge summary?  yes  Does patient understand their discharge instructions: yes  Does patient have questions or concerns that need addressed prior to 7-14 day follow up office visit: no    Scheduled appointment with PCP within 7-14 days    Follow Up  Future Appointments  Date Time Provider Manjeet Teresa   10/10/2018 2:30 PM Olivia Feliciano MD [facility]   2018 11:30 AM Olivia Feliciano MD [facility]       Obdulia Montemayor

## 2018-10-05 NOTE — PROGRESS NOTES
Daily Progress Note     patient is awake alert feeling ok  Going home today  Still has some dizziness  Work up negative for CVA  HTN improved    The patient was in the hospital not too long ago. Rico Ross had a stress test  done back in 01/2018. Rodger Abdullahi stress test in 01/2018 showed negative ischemia and LV function was preserved 76%.  She had a Holter done back in 01/2018 also, found to have sinus rhythm with underlying left bundle branch block present.  Minimum heart rate is 57 beats per minute and maximum heart rate is 85 beats per minute.  Elevated PACs and PVCs noted.  She had an echo done in 01/2018. Carri Semen shows LV function was preserved.     The patient has a history of having hypertension and history of hip  fracture done back in 08/2018.  She has coronary artery disease.  She had a heart catheterization in 2010.  LAD was 70% stenosis distally, otherwise she was treated medically.  She has hypertension, renal insufficiency.  She sees Dr. Bishop Code, anemia, arthritis, and pulmonary hypertension present.     MRI of head        Impression:         No acute infarct, intracranial hemorrhage, or significant mass effect. Chronic small vessel ischemic white matter disease and diffuse cerebral   volume loss.      Carotid US          Impression:         The right internal carotid artery demonstrates 0-50% stenosis. The left internal carotid artery demonstrates 0-50% stenosis.     Bilateral vertebral arteries are patent with flow in the normal direction.       Objective:   BP (!) 147/103   Pulse 72   Temp 97.9 °F (36.6 °C) (Oral)   Resp 24   Ht 5' (1.524 m)   Wt 122 lb 4.8 oz (55.5 kg)   LMP  (LMP Unknown)   SpO2 98%   BMI 23.89 kg/m²     Intake/Output Summary (Last 24 hours) at 10/05/18 0902  Last data filed at 10/04/18 2003   Gross per 24 hour   Intake              910 ml   Output                0 ml   Net              910 ml       Medications:   Scheduled Meds:   meclizine  12.5 mg Oral 4 times per day    allopurinol  100 mg Oral Daily    aspirin EC  81 mg Oral Daily    atenolol  25 mg Oral Daily    vitamin D  2,000 Units Oral Daily    pantoprazole  40 mg Oral QAM AC    diphenhydrAMINE  50 mg Oral Nightly    docusate sodium  100 mg Oral Daily    folic acid-pyridoxine-cyancobalamin  1 tablet Oral Daily    isosorbide mononitrate  60 mg Oral Daily    levothyroxine  75 mcg Oral Daily    ranolazine  500 mg Oral BID    senna  1 tablet Oral BID    sertraline  50 mg Oral Daily    simvastatin  20 mg Oral Nightly    sodium chloride flush  10 mL Intravenous 2 times per day    clopidogrel  75 mg Oral Daily    enoxaparin  30 mg Subcutaneous Daily    amLODIPine  5 mg Oral BID      Infusions:     PRN Meds:  acetaminophen, carboxymethylcellulose, nitroGLYCERIN, ondansetron, sodium chloride flush, magnesium hydroxide, ondansetron, promethazine, hydrALAZINE       Physical Exam:  Vitals:    10/05/18 0759   BP:    Pulse: 72   Resp: 24   Temp:    SpO2:         General: AAO, NAD  Chest: Nontender  Cardiac: First and Second Heart Sounds are Normal, No Murmurs or Gallops noted  Lungs:Clear to auscultation and percussion. Abdomen: Soft, NT, ND, +BS  Extremities: No clubbing, no edema  Vascular:  Equal 2+ peripheral pulses. Lab Data:  CBC: Recent Labs      10/03/18   0012  10/04/18   1544   WBC  7.8  8.3   HGB  10.8*  11.1*   HCT  33.1*  34.0*   MCV  98.5  98.8   PLT  347  390     BMP: Recent Labs      10/03/18   0012  10/04/18   1544  10/05/18   0437   NA  134*  132*  139   K  4.9  5.2*  4.9   CL  98*  97*  104   CO2  25  23  25   BUN  26*  22  29*   CREATININE  1.6*  1.7*  1.8*     LIVER PROFILE:   Recent Labs      10/03/18   0012   AST  17   ALT  9*   LIPASE  60   BILITOT  0.2   ALKPHOS  84     PT/INR: No results for input(s): PROTIME, INR in the last 72 hours. APTT: No results for input(s): APTT in the last 72 hours.   BNP:  No results for input(s): BNP in the last 72 hours.      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 08/18/2018     Priority: High    Bradycardia      Priority: High    LBBB (left bundle branch block)      Priority: High    Acute blood loss anemia 08/23/2018     Priority: Medium    Acute on chronic renal insufficiency      Priority: Medium    GERD (gastroesophageal reflux disease)      Priority: Medium    Chronic renal impairment, stage 3 (moderate) (HCC)      Priority: Low    Constipation, slow transit 06/19/2018     Priority: Low    Cystitis      Priority: Low    Physical deconditioning      Priority: Low    Pruritic condition 01/22/2016     Priority: Low    Gout      Priority: Low    Hyperuricemia      Priority: Low    Low back pain      Priority: Low    Eczema      Priority: Low    Coronary artery disease involving native heart without angina pectoris      Priority: Low    Hypertension      Priority: Low    Depression      Priority: Low    Hyperlipidemia      Priority: Low    Generalized osteoarthritis      Priority: Low    Osteoporosis      Priority: Low    Hypothyroid      Priority: Low    Dizziness     Labyrinthitis     Cerebrovascular accident (CVA) involving cerebellum ([facility] 10/03/2018       Daniel Bundy MD 10/5/2018 9:02 AM

## 2018-10-05 NOTE — CARE COORDINATION
CM noted dc. Contacted [facility] and spoke with Dolly/stacie to update and faxed dc clinicals at this time. Also called [facility] and updated Trinidad/intake of pt return home today. Granddaughter/Maggie updated, pt is ready for dc from CM standpoint.

## 2018-10-08 LAB
EKG ATRIAL RATE: 65 BPM
EKG DIAGNOSIS: NORMAL
EKG P AXIS: 22 DEGREES
EKG P-R INTERVAL: 206 MS
EKG Q-T INTERVAL: 464 MS
EKG QRS DURATION: 150 MS
EKG QTC CALCULATION (BAZETT): 482 MS
EKG R AXIS: -63 DEGREES
EKG T AXIS: 87 DEGREES
EKG VENTRICULAR RATE: 65 BPM

## 2018-10-09 DIAGNOSIS — K21.9 GASTROESOPHAGEAL REFLUX DISEASE WITHOUT ESOPHAGITIS: ICD-10-CM

## 2018-10-09 RX ORDER — ONDANSETRON 4 MG/1
4 TABLET, FILM COATED ORAL EVERY 8 HOURS PRN
Qty: 20 TABLET | Refills: 0 | Status: SHIPPED | OUTPATIENT
Start: 2018-10-09 | End: 2019-11-18

## 2018-10-10 ENCOUNTER — OFFICE VISIT (OUTPATIENT)
Dept: INTERNAL MEDICINE CLINIC | Age: 83
End: 2018-10-10
Payer: COMMERCIAL

## 2018-10-10 VITALS
SYSTOLIC BLOOD PRESSURE: 146 MMHG | WEIGHT: 124 LBS | OXYGEN SATURATION: 94 % | HEART RATE: 71 BPM | DIASTOLIC BLOOD PRESSURE: 65 MMHG | BODY MASS INDEX: 24.22 KG/M2 | RESPIRATION RATE: 15 BRPM

## 2018-10-10 DIAGNOSIS — M54.50 CHRONIC MIDLINE LOW BACK PAIN WITHOUT SCIATICA: ICD-10-CM

## 2018-10-10 DIAGNOSIS — R42 VERTIGO: Primary | ICD-10-CM

## 2018-10-10 DIAGNOSIS — G89.29 CHRONIC MIDLINE LOW BACK PAIN WITHOUT SCIATICA: ICD-10-CM

## 2018-10-10 DIAGNOSIS — R53.81 PHYSICAL DECONDITIONING: ICD-10-CM

## 2018-10-10 DIAGNOSIS — I63.9 CEREBROVASCULAR ACCIDENT (CVA) INVOLVING CEREBELLUM (HCC): ICD-10-CM

## 2018-10-10 DIAGNOSIS — I10 ESSENTIAL HYPERTENSION: ICD-10-CM

## 2018-10-10 DIAGNOSIS — M15.9 GENERALIZED OSTEOARTHRITIS: ICD-10-CM

## 2018-10-10 PROCEDURE — 99495 TRANSJ CARE MGMT MOD F2F 14D: CPT | Performed by: INTERNAL MEDICINE

## 2018-10-10 PROCEDURE — 1111F DSCHRG MED/CURRENT MED MERGE: CPT | Performed by: INTERNAL MEDICINE

## 2018-10-10 RX ORDER — ATENOLOL 25 MG/1
TABLET ORAL
Qty: 180 TABLET | Refills: 1 | Status: SHIPPED | OUTPATIENT
Start: 2018-10-10 | End: 2019-04-16 | Stop reason: SDUPTHER

## 2018-10-10 RX ORDER — MECLIZINE HCL 12.5 MG/1
12.5 TABLET ORAL 4 TIMES DAILY
Qty: 120 TABLET | Refills: 1 | Status: SHIPPED | OUTPATIENT
Start: 2018-10-10 | End: 2018-11-09

## 2018-10-10 NOTE — PROGRESS NOTES
Post-Discharge Transitional Care Management Services or Hospital Follow Up      Stefania Wild   YOB: 1927    Date of Office Visit:  10/10/2018  Date of Hospital Admission: 10/2/18  Date of Hospital Discharge: 10/5/18  Readmission Risk Score(high >=14%.  Medium >=10%):Readmission Risk Score: 0    Care management risk score Rising risk (score 2-5) and Complex Care (Scores >=6): 5     Non face to face  following discharge, date last encounter closed (first attempt may have been earlier): CALL WAS 10/5  Call initiated 2 business days of discharge:CALLED 10/5    Patient Active Problem List   Diagnosis    Hypertension    Acute on chronic renal insufficiency    GERD (gastroesophageal reflux disease)    Depression    Hyperlipidemia    Generalized osteoarthritis    Osteoporosis    Hypothyroid    Coronary artery disease involving native heart without angina pectoris    Eczema    Low back pain    Hyperuricemia    Gout    Pruritic condition    Cystitis    Physical deconditioning    Bradycardia    LBBB (left bundle branch block)    Constipation, slow transit    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    [facility] stage [facility] ([facility]) [facility]    Acute blood loss anemia    Cerebrovascular accident (CVA) due to thrombosis of precerebral artery ([facility])    Cerebrovascular accident (CVA) involving cerebellum ([facility])    Dizziness    Labyrinthitis       Allergies   Allergen Reactions    Bactrim [Sulfamethoxazole-Trimethoprim] Nausea And Vomiting    Cephalexin Rash    Tape [Adhesive Tape] Itching       Medications listed as ordered at the time of discharge from [facility] Medication Instructions NIRU:    Printed on:10/10/18 1500   Medication Information                      acetaminophen (TYLENOL) 325 MG tablet  Take 2 tablets by mouth every 6 hours as needed for Pain             alendronate (FOSAMAX) 70 MG tablet  Weekly on empty stomach 30min before meal allopurinol (ZYLOPRIM) 100 MG tablet  TAKE 1 TABLET BY MOUTH ONCE DAILY             amLODIPine (NORVASC) 5 MG tablet  Take 1 tablet by mouth 2 times daily             aspirin EC 81 MG EC tablet  Take 81 mg by mouth daily. atenolol (TENORMIN) 25 MG tablet  TAKE 1 TABLET BY MOUTH ONCE DAILY             Cholecalciferol (VITAMIN D3) 2000 units TABS  Take 2,000 Units by mouth daily             cycloSPORINE (RESTASIS) 0.05 % ophthalmic emulsion  Place 1 drop into both eyes 2 times daily             dexlansoprazole (DEXILANT) 60 MG CPDR delayed release capsule  TAKE ONE (1) CAPSULE BY MOUTH ONCE DAILY             diphenhydrAMINE (BENADRYL) 25 MG tablet  Take 50 mg by mouth nightly             docusate (COLACE, DULCOLAX) 100 MG CAPS  Take 100 mg by mouth daily             folic acid-pyridoxine-cyancobalamin (VIRT-RISHABH FORTE) 2.5-25-2 MG TABS  TAKE ONE (1) TABLET BY MOUTH ONCE DAILY             isosorbide mononitrate (IMDUR) 60 MG extended release tablet  TAKE 1 TABLET BY MOUTH ONCE DAILY             levothyroxine (SYNTHROID) 75 MCG tablet  TAKE ONE (1) TABLET BY MOUTH ONCE DAILY             magnesium hydroxide (MILK OF MAGNESIA) 400 MG/5ML suspension  Take 30 mLs by mouth daily as needed for Constipation             meclizine (ANTIVERT) 12.5 MG tablet  Take 1 tablet by mouth 3 times daily as needed for Dizziness             nitroGLYCERIN (NITROSTAT) 0.4 MG SL tablet  Place 1 tablet under the tongue every 5 minutes as needed for Chest pain             nystatin (MYCOSTATIN) 011828 UNIT/GM powder  Apply 3 times daily.              ondansetron (ZOFRAN) 4 MG tablet  Take 1 tablet by mouth every 8 hours as needed for Nausea or Vomiting             RANEXA 500 MG extended release tablet  TAKE ONE (1) TABLET BY MOUTH TWO TIMES DAILY             senna (SENOKOT) 8.6 MG tablet  Take 1 tablet by mouth 2 times daily             sertraline (ZOLOFT) 50 MG tablet  TAKE ONE (1) TABLET BY MOUTH ONCE DAILY

## 2018-10-17 ENCOUNTER — TELEPHONE (OUTPATIENT)
Dept: INTERNAL MEDICINE CLINIC | Age: 83
End: 2018-10-17

## 2018-11-06 ENCOUNTER — TELEPHONE (OUTPATIENT)
Dept: INTERNAL MEDICINE CLINIC | Age: 83
End: 2018-11-06

## 2018-11-16 ENCOUNTER — TELEPHONE (OUTPATIENT)
Dept: INTERNAL MEDICINE CLINIC | Age: 83
End: 2018-11-16

## 2018-12-07 DIAGNOSIS — R42 VERTIGO: Primary | ICD-10-CM

## 2018-12-18 ENCOUNTER — OFFICE VISIT (OUTPATIENT)
Dept: INTERNAL MEDICINE CLINIC | Age: 83
End: 2018-12-18
Payer: COMMERCIAL

## 2018-12-18 VITALS
RESPIRATION RATE: 14 BRPM | DIASTOLIC BLOOD PRESSURE: 64 MMHG | SYSTOLIC BLOOD PRESSURE: 118 MMHG | WEIGHT: 122 LBS | BODY MASS INDEX: 23.83 KG/M2 | OXYGEN SATURATION: 96 % | HEART RATE: 68 BPM

## 2018-12-18 DIAGNOSIS — N18.9 ACUTE ON CHRONIC RENAL INSUFFICIENCY: ICD-10-CM

## 2018-12-18 DIAGNOSIS — I10 ESSENTIAL HYPERTENSION: ICD-10-CM

## 2018-12-18 DIAGNOSIS — N18.30 CHRONIC RENAL IMPAIRMENT, STAGE 3 (MODERATE) (HCC): ICD-10-CM

## 2018-12-18 DIAGNOSIS — N28.9 ACUTE ON CHRONIC RENAL INSUFFICIENCY: ICD-10-CM

## 2018-12-18 DIAGNOSIS — E03.4 HYPOTHYROIDISM DUE TO ACQUIRED ATROPHY OF THYROID: ICD-10-CM

## 2018-12-18 DIAGNOSIS — I10 ESSENTIAL HYPERTENSION: Primary | ICD-10-CM

## 2018-12-18 LAB
ANION GAP SERPL CALCULATED.3IONS-SCNC: 15 MMOL/L (ref 3–16)
BUN BLDV-MCNC: 21 MG/DL (ref 7–20)
CALCIUM SERPL-MCNC: 9.5 MG/DL (ref 8.3–10.6)
CHLORIDE BLD-SCNC: 104 MMOL/L (ref 99–110)
CO2: 24 MMOL/L (ref 21–32)
CREAT SERPL-MCNC: 1.7 MG/DL (ref 0.6–1.2)
GFR AFRICAN AMERICAN: 34
GFR NON-AFRICAN AMERICAN: 28
GLUCOSE BLD-MCNC: 99 MG/DL (ref 70–99)
POTASSIUM SERPL-SCNC: 4.7 MMOL/L (ref 3.5–5.1)
SODIUM BLD-SCNC: 143 MMOL/L (ref 136–145)
T4 FREE: 1.2 NG/DL (ref 0.9–1.8)
TSH REFLEX: 2.32 UIU/ML (ref 0.27–4.2)

## 2018-12-18 PROCEDURE — G8420 CALC BMI NORM PARAMETERS: HCPCS | Performed by: INTERNAL MEDICINE

## 2018-12-18 PROCEDURE — 1036F TOBACCO NON-USER: CPT | Performed by: INTERNAL MEDICINE

## 2018-12-18 PROCEDURE — 99214 OFFICE O/P EST MOD 30 MIN: CPT | Performed by: INTERNAL MEDICINE

## 2018-12-18 PROCEDURE — 4040F PNEUMOC VAC/ADMIN/RCVD: CPT | Performed by: INTERNAL MEDICINE

## 2018-12-18 PROCEDURE — 1101F PT FALLS ASSESS-DOCD LE1/YR: CPT | Performed by: INTERNAL MEDICINE

## 2018-12-18 PROCEDURE — 1123F ACP DISCUSS/DSCN MKR DOCD: CPT | Performed by: INTERNAL MEDICINE

## 2018-12-18 PROCEDURE — G8482 FLU IMMUNIZE ORDER/ADMIN: HCPCS | Performed by: INTERNAL MEDICINE

## 2018-12-18 PROCEDURE — G8598 ASA/ANTIPLAT THER USED: HCPCS | Performed by: INTERNAL MEDICINE

## 2018-12-18 PROCEDURE — G8427 DOCREV CUR MEDS BY ELIG CLIN: HCPCS | Performed by: INTERNAL MEDICINE

## 2018-12-18 PROCEDURE — 1090F PRES/ABSN URINE INCON ASSESS: CPT | Performed by: INTERNAL MEDICINE

## 2018-12-18 NOTE — PATIENT INSTRUCTIONS
Patient Education        Diabetic Renal Diet: Care Instructions  Your Care Instructions    You may already be spreading carbohydrate throughout your daily meals. When you also have kidney disease, you need to avoid foods that make your kidneys worse. Keep your blood sugar and blood pressure as near normal as you can to reduce your chance of kidney failure. Your doctor and dietitian will help you make an eating plan. It will be based on your body weight, size, and medical condition. You may need to limit salt, fluids, and protein. You also may need to limit minerals such as potassium and phosphorus. It takes planning, but there are plenty of tasty, healthy foods you can eat. Always talk with your doctor or dietitian before you make changes in your diet. Follow-up care is a key part of your treatment and safety. Be sure to make and go to all appointments, and call your doctor if you are having problems. It's also a good idea to know your test results and keep a list of the medicines you take. How can you care for yourself at home? · Work with your doctor or dietitian to create a food plan that guides your daily food choices. · Eat regular meals. Do not skip meals or go for many hours without eating. To help control your blood sugar, try to eat several small meals during the day, rather than three large ones. · You can use margarine, mayonnaise, and oil to add calories to your diet for energy. The healthiest oils are olive, canola, and safflower oils. · Talk to your dietitian about eating sweets, including honey and sugar. · Be safe with medicines. Take your medicines exactly as prescribed. Call your doctor if you think you are having a problem with your medicine. You will get more details on the specific medicines your doctor prescribes. · Do not take any other medicine without talking to your doctor first. This includes over-the-counter medicines, vitamins, and herbal products.   · Limit alcohol to no more

## 2019-01-07 RX ORDER — POLYETHYLENE GLYCOL 3350 17 G/17G
POWDER, FOR SOLUTION ORAL
Qty: 119 G | Refills: 0 | Status: SHIPPED | OUTPATIENT
Start: 2019-01-07 | End: 2019-01-29 | Stop reason: SDUPTHER

## 2019-02-18 RX ORDER — AMLODIPINE BESYLATE 5 MG/1
TABLET ORAL
Qty: 60 TABLET | Refills: 2 | Status: SHIPPED | OUTPATIENT
Start: 2019-02-18 | End: 2019-05-16 | Stop reason: SDUPTHER

## 2019-02-19 ENCOUNTER — OFFICE VISIT (OUTPATIENT)
Dept: INTERNAL MEDICINE CLINIC | Age: 84
End: 2019-02-19
Payer: COMMERCIAL

## 2019-02-19 VITALS
RESPIRATION RATE: 15 BRPM | SYSTOLIC BLOOD PRESSURE: 140 MMHG | WEIGHT: 122 LBS | OXYGEN SATURATION: 98 % | DIASTOLIC BLOOD PRESSURE: 70 MMHG | BODY MASS INDEX: 23.83 KG/M2 | HEART RATE: 60 BPM

## 2019-02-19 DIAGNOSIS — N18.30 CHRONIC RENAL IMPAIRMENT, STAGE 3 (MODERATE) (HCC): ICD-10-CM

## 2019-02-19 DIAGNOSIS — R42 VERTIGO: ICD-10-CM

## 2019-02-19 DIAGNOSIS — M81.0 AGE-RELATED OSTEOPOROSIS WITHOUT CURRENT PATHOLOGICAL FRACTURE: ICD-10-CM

## 2019-02-19 DIAGNOSIS — I25.10 CORONARY ARTERY DISEASE INVOLVING NATIVE CORONARY ARTERY OF NATIVE HEART WITHOUT ANGINA PECTORIS: ICD-10-CM

## 2019-02-19 DIAGNOSIS — L29.9 PRURITIC CONDITION: ICD-10-CM

## 2019-02-19 DIAGNOSIS — I25.10 CORONARY ARTERY DISEASE INVOLVING NATIVE CORONARY ARTERY OF NATIVE HEART WITHOUT ANGINA PECTORIS: Primary | ICD-10-CM

## 2019-02-19 LAB
A/G RATIO: 1.8 (ref 1.1–2.2)
ALBUMIN SERPL-MCNC: 4.7 G/DL (ref 3.4–5)
ALP BLD-CCNC: 72 U/L (ref 40–129)
ALT SERPL-CCNC: 9 U/L (ref 10–40)
ANION GAP SERPL CALCULATED.3IONS-SCNC: 13 MMOL/L (ref 3–16)
AST SERPL-CCNC: 16 U/L (ref 15–37)
BASOPHILS ABSOLUTE: 0 K/UL (ref 0–0.2)
BASOPHILS RELATIVE PERCENT: 0.6 %
BILIRUB SERPL-MCNC: 0.3 MG/DL (ref 0–1)
BUN BLDV-MCNC: 27 MG/DL (ref 7–20)
CALCIUM SERPL-MCNC: 10 MG/DL (ref 8.3–10.6)
CHLORIDE BLD-SCNC: 100 MMOL/L (ref 99–110)
CHOLESTEROL, TOTAL: 168 MG/DL (ref 0–199)
CO2: 25 MMOL/L (ref 21–32)
CREAT SERPL-MCNC: 2 MG/DL (ref 0.6–1.2)
EOSINOPHILS ABSOLUTE: 0.5 K/UL (ref 0–0.6)
EOSINOPHILS RELATIVE PERCENT: 8.1 %
GFR AFRICAN AMERICAN: 28
GFR NON-AFRICAN AMERICAN: 23
GLOBULIN: 2.6 G/DL
GLUCOSE BLD-MCNC: 94 MG/DL (ref 70–99)
HCT VFR BLD CALC: 37.1 % (ref 36–48)
HDLC SERPL-MCNC: 43 MG/DL (ref 40–60)
HEMOGLOBIN: 12.4 G/DL (ref 12–16)
LDL CHOLESTEROL CALCULATED: 89 MG/DL
LYMPHOCYTES ABSOLUTE: 1.6 K/UL (ref 1–5.1)
LYMPHOCYTES RELATIVE PERCENT: 28.6 %
MAGNESIUM: 2.7 MG/DL (ref 1.8–2.4)
MCH RBC QN AUTO: 32.3 PG (ref 26–34)
MCHC RBC AUTO-ENTMCNC: 33.3 G/DL (ref 31–36)
MCV RBC AUTO: 97.1 FL (ref 80–100)
MONOCYTES ABSOLUTE: 0.5 K/UL (ref 0–1.3)
MONOCYTES RELATIVE PERCENT: 8.8 %
NEUTROPHILS ABSOLUTE: 3.1 K/UL (ref 1.7–7.7)
NEUTROPHILS RELATIVE PERCENT: 53.9 %
PDW BLD-RTO: 13.9 % (ref 12.4–15.4)
PHOSPHORUS: 4.4 MG/DL (ref 2.5–4.9)
PLATELET # BLD: 302 K/UL (ref 135–450)
PMV BLD AUTO: 8.3 FL (ref 5–10.5)
POTASSIUM SERPL-SCNC: 4.8 MMOL/L (ref 3.5–5.1)
RBC # BLD: 3.83 M/UL (ref 4–5.2)
SODIUM BLD-SCNC: 138 MMOL/L (ref 136–145)
TOTAL PROTEIN: 7.3 G/DL (ref 6.4–8.2)
TRIGL SERPL-MCNC: 181 MG/DL (ref 0–150)
VITAMIN D 25-HYDROXY: 51.8 NG/ML
VLDLC SERPL CALC-MCNC: 36 MG/DL
WBC # BLD: 5.7 K/UL (ref 4–11)

## 2019-02-19 PROCEDURE — G8420 CALC BMI NORM PARAMETERS: HCPCS | Performed by: INTERNAL MEDICINE

## 2019-02-19 PROCEDURE — 1036F TOBACCO NON-USER: CPT | Performed by: INTERNAL MEDICINE

## 2019-02-19 PROCEDURE — 1101F PT FALLS ASSESS-DOCD LE1/YR: CPT | Performed by: INTERNAL MEDICINE

## 2019-02-19 PROCEDURE — 4040F PNEUMOC VAC/ADMIN/RCVD: CPT | Performed by: INTERNAL MEDICINE

## 2019-02-19 PROCEDURE — 1123F ACP DISCUSS/DSCN MKR DOCD: CPT | Performed by: INTERNAL MEDICINE

## 2019-02-19 PROCEDURE — 99214 OFFICE O/P EST MOD 30 MIN: CPT | Performed by: INTERNAL MEDICINE

## 2019-02-19 PROCEDURE — G8598 ASA/ANTIPLAT THER USED: HCPCS | Performed by: INTERNAL MEDICINE

## 2019-02-19 PROCEDURE — 1090F PRES/ABSN URINE INCON ASSESS: CPT | Performed by: INTERNAL MEDICINE

## 2019-02-19 PROCEDURE — G8427 DOCREV CUR MEDS BY ELIG CLIN: HCPCS | Performed by: INTERNAL MEDICINE

## 2019-02-19 PROCEDURE — G8482 FLU IMMUNIZE ORDER/ADMIN: HCPCS | Performed by: INTERNAL MEDICINE

## 2019-02-22 ENCOUNTER — TELEPHONE (OUTPATIENT)
Dept: INTERNAL MEDICINE CLINIC | Age: 84
End: 2019-02-22

## 2019-03-05 ENCOUNTER — TELEPHONE (OUTPATIENT)
Dept: INTERNAL MEDICINE CLINIC | Age: 84
End: 2019-03-05

## 2019-03-05 NOTE — TELEPHONE ENCOUNTER
Patient's POA, Maggie, called stating that Mane Lucero has tried to call our office and request something in writing from Dr. Jesse Fraga stating that Emily Castelan would benefit from having home care in her home everyday/9 hours a day. Gloria and I neither have taken a phone call from Mane Lucero with this request. Per Selina Barrios is only covering for Emily Castelan to get 6 hours of care a day instead of 9 hours. Maggie feels that this would deteriorate Doreen's health. I informed Maggie that, per Dr. Jesse Fraga, he has to have some type of clinical documentation from [facility], stating that doreen would benefit from 9 hours of HC a day instead of 6 hours. Maggie was a little upset at this and stated that she would send over what she has but Emily Castelan cannot decrease her time with [facility]. due to her kidney disease.

## 2019-03-19 DIAGNOSIS — M80.00XA AGE-RELATED OSTEOPOROSIS WITH CURRENT PATHOLOGICAL FRACTURE, INITIAL ENCOUNTER: ICD-10-CM

## 2019-03-19 RX ORDER — CHOLECALCIFEROL (VITAMIN D3) 125 MCG
2000 CAPSULE ORAL DAILY
Qty: 90 TABLET | Refills: 1 | Status: SHIPPED | OUTPATIENT
Start: 2019-03-19 | End: 2019-09-10 | Stop reason: SDUPTHER

## 2019-04-03 RX ORDER — POLYETHYLENE GLYCOL 3350 17 G/17G
POWDER, FOR SOLUTION ORAL
Qty: 238 G | Refills: 2 | Status: SHIPPED | OUTPATIENT
Start: 2019-04-03 | End: 2019-04-03 | Stop reason: SDUPTHER

## 2019-04-03 RX ORDER — POLYETHYLENE GLYCOL 3350 17 G/17G
POWDER, FOR SOLUTION ORAL
Qty: 578 G | Refills: 2 | Status: SHIPPED | OUTPATIENT
Start: 2019-04-03 | End: 2019-11-18 | Stop reason: SDUPTHER

## 2019-04-16 DIAGNOSIS — I10 ESSENTIAL HYPERTENSION: ICD-10-CM

## 2019-04-16 RX ORDER — ATENOLOL 25 MG/1
TABLET ORAL
Qty: 180 TABLET | Refills: 0 | Status: SHIPPED | OUTPATIENT
Start: 2019-04-16 | End: 2019-07-16 | Stop reason: SDUPTHER

## 2019-05-16 DIAGNOSIS — E03.4 HYPOTHYROIDISM DUE TO ACQUIRED ATROPHY OF THYROID: ICD-10-CM

## 2019-05-16 DIAGNOSIS — I25.10 CORONARY ARTERY DISEASE INVOLVING NATIVE CORONARY ARTERY OF NATIVE HEART WITHOUT ANGINA PECTORIS: ICD-10-CM

## 2019-05-16 DIAGNOSIS — F32.A DEPRESSION, UNSPECIFIED DEPRESSION TYPE: ICD-10-CM

## 2019-05-16 DIAGNOSIS — M10.09 IDIOPATHIC GOUT OF MULTIPLE SITES, UNSPECIFIED CHRONICITY: ICD-10-CM

## 2019-05-16 DIAGNOSIS — E78.2 MIXED HYPERLIPIDEMIA: ICD-10-CM

## 2019-05-17 RX ORDER — AMLODIPINE BESYLATE 5 MG/1
TABLET ORAL
Qty: 60 TABLET | Refills: 1 | Status: SHIPPED | OUTPATIENT
Start: 2019-05-17 | End: 2019-06-10 | Stop reason: SDUPTHER

## 2019-05-17 RX ORDER — ALLOPURINOL 100 MG/1
TABLET ORAL
Qty: 90 TABLET | Refills: 0 | Status: SHIPPED | OUTPATIENT
Start: 2019-05-17 | End: 2019-05-21 | Stop reason: CLARIF

## 2019-05-17 RX ORDER — LEVOTHYROXINE SODIUM 0.07 MG/1
TABLET ORAL
Qty: 90 TABLET | Refills: 0 | Status: SHIPPED | OUTPATIENT
Start: 2019-05-17 | End: 2019-09-10 | Stop reason: SDUPTHER

## 2019-05-17 RX ORDER — SIMVASTATIN 20 MG
TABLET ORAL
Qty: 90 TABLET | Refills: 0 | Status: SHIPPED | OUTPATIENT
Start: 2019-05-17 | End: 2019-08-16 | Stop reason: SDUPTHER

## 2019-05-21 ENCOUNTER — OFFICE VISIT (OUTPATIENT)
Dept: INTERNAL MEDICINE CLINIC | Age: 84
End: 2019-05-21
Payer: COMMERCIAL

## 2019-05-21 VITALS
BODY MASS INDEX: 24.03 KG/M2 | DIASTOLIC BLOOD PRESSURE: 62 MMHG | SYSTOLIC BLOOD PRESSURE: 129 MMHG | WEIGHT: 122.4 LBS | OXYGEN SATURATION: 95 % | HEIGHT: 60 IN | HEART RATE: 67 BPM | RESPIRATION RATE: 20 BRPM

## 2019-05-21 DIAGNOSIS — E03.4 HYPOTHYROIDISM DUE TO ACQUIRED ATROPHY OF THYROID: ICD-10-CM

## 2019-05-21 DIAGNOSIS — E78.2 MIXED HYPERLIPIDEMIA: ICD-10-CM

## 2019-05-21 DIAGNOSIS — I10 ESSENTIAL HYPERTENSION: Primary | ICD-10-CM

## 2019-05-21 DIAGNOSIS — N18.30 CHRONIC RENAL IMPAIRMENT, STAGE 3 (MODERATE) (HCC): ICD-10-CM

## 2019-05-21 DIAGNOSIS — I10 ESSENTIAL HYPERTENSION: ICD-10-CM

## 2019-05-21 LAB
A/G RATIO: 1.6 (ref 1.1–2.2)
ALBUMIN SERPL-MCNC: 4.7 G/DL (ref 3.4–5)
ALP BLD-CCNC: 74 U/L (ref 40–129)
ALT SERPL-CCNC: 10 U/L (ref 10–40)
ANION GAP SERPL CALCULATED.3IONS-SCNC: 14 MMOL/L (ref 3–16)
AST SERPL-CCNC: 14 U/L (ref 15–37)
BASOPHILS ABSOLUTE: 0.1 K/UL (ref 0–0.2)
BASOPHILS RELATIVE PERCENT: 0.8 %
BILIRUB SERPL-MCNC: 0.4 MG/DL (ref 0–1)
BUN BLDV-MCNC: 23 MG/DL (ref 7–20)
CALCIUM SERPL-MCNC: 10.1 MG/DL (ref 8.3–10.6)
CHLORIDE BLD-SCNC: 103 MMOL/L (ref 99–110)
CHOLESTEROL, TOTAL: 184 MG/DL (ref 0–199)
CO2: 22 MMOL/L (ref 21–32)
CREAT SERPL-MCNC: 1.5 MG/DL (ref 0.6–1.2)
EOSINOPHILS ABSOLUTE: 0.5 K/UL (ref 0–0.6)
EOSINOPHILS RELATIVE PERCENT: 7.1 %
GFR AFRICAN AMERICAN: 39
GFR NON-AFRICAN AMERICAN: 32
GLOBULIN: 2.9 G/DL
GLUCOSE BLD-MCNC: 99 MG/DL (ref 70–99)
HCT VFR BLD CALC: 36.9 % (ref 36–48)
HDLC SERPL-MCNC: 47 MG/DL (ref 40–60)
HEMOGLOBIN: 12.3 G/DL (ref 12–16)
LDL CHOLESTEROL CALCULATED: 96 MG/DL
LYMPHOCYTES ABSOLUTE: 1.9 K/UL (ref 1–5.1)
LYMPHOCYTES RELATIVE PERCENT: 25.2 %
MCH RBC QN AUTO: 33.3 PG (ref 26–34)
MCHC RBC AUTO-ENTMCNC: 33.5 G/DL (ref 31–36)
MCV RBC AUTO: 99.5 FL (ref 80–100)
MONOCYTES ABSOLUTE: 0.7 K/UL (ref 0–1.3)
MONOCYTES RELATIVE PERCENT: 9.1 %
NEUTROPHILS ABSOLUTE: 4.3 K/UL (ref 1.7–7.7)
NEUTROPHILS RELATIVE PERCENT: 57.8 %
PDW BLD-RTO: 14.2 % (ref 12.4–15.4)
PLATELET # BLD: 304 K/UL (ref 135–450)
PMV BLD AUTO: 7.8 FL (ref 5–10.5)
POTASSIUM SERPL-SCNC: 4.7 MMOL/L (ref 3.5–5.1)
RBC # BLD: 3.71 M/UL (ref 4–5.2)
SODIUM BLD-SCNC: 139 MMOL/L (ref 136–145)
T4 FREE: 1.2 NG/DL (ref 0.9–1.8)
TOTAL PROTEIN: 7.6 G/DL (ref 6.4–8.2)
TRIGL SERPL-MCNC: 206 MG/DL (ref 0–150)
TSH SERPL DL<=0.05 MIU/L-ACNC: 2.98 UIU/ML (ref 0.27–4.2)
VLDLC SERPL CALC-MCNC: 41 MG/DL
WBC # BLD: 7.4 K/UL (ref 4–11)

## 2019-05-21 PROCEDURE — 1036F TOBACCO NON-USER: CPT | Performed by: INTERNAL MEDICINE

## 2019-05-21 PROCEDURE — 1123F ACP DISCUSS/DSCN MKR DOCD: CPT | Performed by: INTERNAL MEDICINE

## 2019-05-21 PROCEDURE — G8420 CALC BMI NORM PARAMETERS: HCPCS | Performed by: INTERNAL MEDICINE

## 2019-05-21 PROCEDURE — 4040F PNEUMOC VAC/ADMIN/RCVD: CPT | Performed by: INTERNAL MEDICINE

## 2019-05-21 PROCEDURE — 99214 OFFICE O/P EST MOD 30 MIN: CPT | Performed by: INTERNAL MEDICINE

## 2019-05-21 PROCEDURE — G8598 ASA/ANTIPLAT THER USED: HCPCS | Performed by: INTERNAL MEDICINE

## 2019-05-21 PROCEDURE — G8427 DOCREV CUR MEDS BY ELIG CLIN: HCPCS | Performed by: INTERNAL MEDICINE

## 2019-05-21 PROCEDURE — 1090F PRES/ABSN URINE INCON ASSESS: CPT | Performed by: INTERNAL MEDICINE

## 2019-05-21 NOTE — PROGRESS NOTES
Luis Aldana  1/23/1927 05/21/19    SUBJECTIVE:    Hypertension: Stable. Denies CP, SOB, cough, visual changes, dizziness, palpitations or SUN. Also seeing Dr Penny Post. Lipids:  Is continuing statin therapy and low fat diet. Tolerating medications w/o myalgias or GI upset. Does c/o some fatigue. Due for f/u TFTs, last time ok. CKD- last creat 2.0. Due for recheck today. OBJECTIVE:    /62   Pulse 67   Resp 20   Ht 5' (1.524 m)   Wt 122 lb 6.4 oz (55.5 kg)   LMP  (LMP Unknown)   SpO2 95%   BMI 23.90 kg/m²     Physical Exam   Constitutional: She appears well-developed and well-nourished. No distress. HENT:   Head: Normocephalic and atraumatic. Nose: Nose normal.   Mouth/Throat: Oropharynx is clear and moist. No oropharyngeal exudate. Eyes: Pupils are equal, round, and reactive to light. Conjunctivae and EOM are normal. Right eye exhibits no discharge. Left eye exhibits no discharge. No scleral icterus. Neck: Neck supple. No tracheal deviation present. Cardiovascular: Normal rate, regular rhythm, normal heart sounds and intact distal pulses. Exam reveals no gallop and no friction rub. No murmur heard. Pulmonary/Chest: Effort normal and breath sounds normal. No respiratory distress. She has no wheezes. She has no rales. Abdominal: Soft. Bowel sounds are normal. She exhibits no mass. There is no tenderness. There is no rebound and no guarding. Musculoskeletal: She exhibits no edema. Lymphadenopathy:     She has no cervical adenopathy. Neurological: She is alert. She has normal reflexes. Skin: Skin is warm and dry. Psychiatric: She has a normal mood and affect. Vitals reviewed. ASSESSMENT:    1. Essential hypertension    2. Hypothyroidism due to acquired atrophy of thyroid    3. Chronic renal impairment, stage 3 (moderate) (HCC)    4. Mixed hyperlipidemia        PLAN:    Lina Davidson was seen today for 3 month follow-up and constipation.     Diagnoses and all orders for this visit:    Essential hypertension - Blood pressure stable and will continue current regimen. Will plan periodic monitoring of renal function, electrolytes, lipid profile. -     Lipid Panel; Future  -     Comprehensive Metabolic Panel; Future  -     CBC Auto Differential; Future    Hypothyroidism due to acquired atrophy of thyroid - Clinically hypothyroidism appears stable and will continue current dosing, also periodic monitoring of TFTs.    -     TSH without Reflex; Future  -     T4, Free; Future    Chronic renal impairment, stage 3 (moderate) (HCC)- CONT F/U DR Tamiko Tom AND F/U RENAL FXN AS ORDERED    Mixed hyperlipidemia - Pt will continue to work on a low fat diet and also exercise, wt loss as appropriate. Will continue periodic monitoring of fasting lipid profile, glucose, liver function.    -     Lipid Panel; Future  -     Comprehensive Metabolic Panel;  Future  -     CBC Auto Differential; Future

## 2019-06-10 RX ORDER — AMLODIPINE BESYLATE 5 MG/1
5 TABLET ORAL DAILY
Qty: 90 TABLET | Refills: 0 | Status: SHIPPED | OUTPATIENT
Start: 2019-06-10 | End: 2019-11-18

## 2019-06-10 RX ORDER — RANOLAZINE 500 MG/1
500 TABLET, EXTENDED RELEASE ORAL 2 TIMES DAILY
Qty: 90 TABLET | Refills: 0 | Status: SHIPPED | OUTPATIENT
Start: 2019-06-10 | End: 2019-11-18

## 2019-06-19 RX ORDER — DOCUSATE SODIUM 100 MG/1
CAPSULE, LIQUID FILLED ORAL
Qty: 90 CAPSULE | Refills: 0 | Status: SHIPPED | OUTPATIENT
Start: 2019-06-19 | End: 2019-11-18 | Stop reason: SDUPTHER

## 2019-06-21 DIAGNOSIS — K59.01 CONSTIPATION, SLOW TRANSIT: ICD-10-CM

## 2019-06-21 RX ORDER — SENNOSIDES 8.6 MG
TABLET ORAL
Qty: 180 TABLET | Refills: 10 | Status: SHIPPED | OUTPATIENT
Start: 2019-06-21 | End: 2019-11-18 | Stop reason: SDUPTHER

## 2019-08-16 DIAGNOSIS — M10.09 IDIOPATHIC GOUT OF MULTIPLE SITES, UNSPECIFIED CHRONICITY: ICD-10-CM

## 2019-08-16 DIAGNOSIS — I25.10 CORONARY ARTERY DISEASE INVOLVING NATIVE CORONARY ARTERY OF NATIVE HEART WITHOUT ANGINA PECTORIS: ICD-10-CM

## 2019-08-16 DIAGNOSIS — M80.00XA AGE-RELATED OSTEOPOROSIS WITH CURRENT PATHOLOGICAL FRACTURE, INITIAL ENCOUNTER: ICD-10-CM

## 2019-08-16 DIAGNOSIS — E78.2 MIXED HYPERLIPIDEMIA: ICD-10-CM

## 2019-08-16 RX ORDER — AMLODIPINE BESYLATE 5 MG/1
TABLET ORAL
Qty: 180 TABLET | Refills: 1 | Status: SHIPPED | OUTPATIENT
Start: 2019-08-16 | End: 2019-11-18 | Stop reason: SDUPTHER

## 2019-08-19 RX ORDER — ALLOPURINOL 100 MG/1
TABLET ORAL
Qty: 90 TABLET | Refills: 0 | Status: SHIPPED | OUTPATIENT
Start: 2019-08-19 | End: 2019-11-18

## 2019-08-19 RX ORDER — ISOSORBIDE MONONITRATE 60 MG/1
TABLET, EXTENDED RELEASE ORAL
Qty: 90 TABLET | Refills: 0 | Status: SHIPPED | OUTPATIENT
Start: 2019-08-19 | End: 2019-11-18 | Stop reason: SDUPTHER

## 2019-08-19 RX ORDER — ALENDRONATE SODIUM 70 MG/1
TABLET ORAL
Qty: 12 TABLET | Refills: 0 | Status: SHIPPED | OUTPATIENT
Start: 2019-08-19 | End: 2019-11-18 | Stop reason: SDUPTHER

## 2019-08-19 RX ORDER — SIMVASTATIN 20 MG
TABLET ORAL
Qty: 90 TABLET | Refills: 0 | Status: SHIPPED | OUTPATIENT
Start: 2019-08-19 | End: 2019-11-18 | Stop reason: SDUPTHER

## 2019-11-18 ENCOUNTER — OFFICE VISIT (OUTPATIENT)
Dept: INTERNAL MEDICINE CLINIC | Age: 84
End: 2019-11-18
Payer: COMMERCIAL

## 2019-11-18 VITALS
RESPIRATION RATE: 16 BRPM | BODY MASS INDEX: 23.95 KG/M2 | HEART RATE: 67 BPM | SYSTOLIC BLOOD PRESSURE: 120 MMHG | HEIGHT: 60 IN | OXYGEN SATURATION: 95 % | DIASTOLIC BLOOD PRESSURE: 68 MMHG | WEIGHT: 122 LBS

## 2019-11-18 DIAGNOSIS — K59.01 CONSTIPATION, SLOW TRANSIT: ICD-10-CM

## 2019-11-18 DIAGNOSIS — E03.4 HYPOTHYROIDISM DUE TO ACQUIRED ATROPHY OF THYROID: ICD-10-CM

## 2019-11-18 DIAGNOSIS — E78.2 MIXED HYPERLIPIDEMIA: ICD-10-CM

## 2019-11-18 DIAGNOSIS — M10.09 IDIOPATHIC GOUT OF MULTIPLE SITES, UNSPECIFIED CHRONICITY: ICD-10-CM

## 2019-11-18 DIAGNOSIS — K21.9 GASTROESOPHAGEAL REFLUX DISEASE WITHOUT ESOPHAGITIS: ICD-10-CM

## 2019-11-18 DIAGNOSIS — F32.A DEPRESSION, UNSPECIFIED DEPRESSION TYPE: ICD-10-CM

## 2019-11-18 DIAGNOSIS — Z23 NEED FOR INFLUENZA VACCINATION: ICD-10-CM

## 2019-11-18 DIAGNOSIS — M15.9 GENERALIZED OSTEOARTHRITIS: ICD-10-CM

## 2019-11-18 DIAGNOSIS — I25.10 CORONARY ARTERY DISEASE INVOLVING NATIVE CORONARY ARTERY OF NATIVE HEART WITHOUT ANGINA PECTORIS: ICD-10-CM

## 2019-11-18 DIAGNOSIS — N18.30 CHRONIC RENAL IMPAIRMENT, STAGE 3 (MODERATE) (HCC): ICD-10-CM

## 2019-11-18 DIAGNOSIS — M80.00XA AGE-RELATED OSTEOPOROSIS WITH CURRENT PATHOLOGICAL FRACTURE, INITIAL ENCOUNTER: ICD-10-CM

## 2019-11-18 DIAGNOSIS — L29.9 PRURITIC CONDITION: ICD-10-CM

## 2019-11-18 DIAGNOSIS — R53.81 PHYSICAL DECONDITIONING: ICD-10-CM

## 2019-11-18 DIAGNOSIS — I10 ESSENTIAL HYPERTENSION: ICD-10-CM
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
## 2019-11-18 DIAGNOSIS — Z00.00 ROUTINE GENERAL MEDICAL EXAMINATION AT A HEALTH CARE FACILITY: Primary | ICD-10-CM

## 2019-11-18 LAB
A/G RATIO: 1.6 (ref 1.1–2.2)
ALBUMIN SERPL-MCNC: 4.2 G/DL (ref 3.4–5)
ALP BLD-CCNC: 69 U/L (ref 40–129)
ALT SERPL-CCNC: 11 U/L (ref 10–40)
ANION GAP SERPL CALCULATED.3IONS-SCNC: 10 MMOL/L (ref 3–16)
AST SERPL-CCNC: 16 U/L (ref 15–37)
BASOPHILS ABSOLUTE: 0.1 K/UL (ref 0–0.2)
BASOPHILS RELATIVE PERCENT: 0.9 %
BILIRUB SERPL-MCNC: 0.3 MG/DL (ref 0–1)
BUN BLDV-MCNC: 22 MG/DL (ref 7–20)
CALCIUM SERPL-MCNC: 9.5 MG/DL (ref 8.3–10.6)
CHLORIDE BLD-SCNC: 107 MMOL/L (ref 99–110)
CHOLESTEROL, TOTAL: 141 MG/DL (ref 0–199)
CO2: 22 MMOL/L (ref 21–32)
CREAT SERPL-MCNC: 1.5 MG/DL (ref 0.6–1.2)
EOSINOPHILS ABSOLUTE: 0.3 K/UL (ref 0–0.6)
EOSINOPHILS RELATIVE PERCENT: 5.3 %
GFR AFRICAN AMERICAN: 39
GFR NON-AFRICAN AMERICAN: 32
GLOBULIN: 2.6 G/DL
GLUCOSE BLD-MCNC: 101 MG/DL (ref 70–99)
HCT VFR BLD CALC: 34.2 % (ref 36–48)
HDLC SERPL-MCNC: 44 MG/DL (ref 40–60)
HEMOGLOBIN: 11.2 G/DL (ref 12–16)
LDL CHOLESTEROL CALCULATED: 71 MG/DL
LYMPHOCYTES ABSOLUTE: 2.2 K/UL (ref 1–5.1)
LYMPHOCYTES RELATIVE PERCENT: 35.6 %
MCH RBC QN AUTO: 33 PG (ref 26–34)
MCHC RBC AUTO-ENTMCNC: 32.8 G/DL (ref 31–36)
MCV RBC AUTO: 100.4 FL (ref 80–100)
MONOCYTES ABSOLUTE: 0.5 K/UL (ref 0–1.3)
MONOCYTES RELATIVE PERCENT: 8.9 %
NEUTROPHILS ABSOLUTE: 3 K/UL (ref 1.7–7.7)
NEUTROPHILS RELATIVE PERCENT: 49.3 %
PDW BLD-RTO: 13.8 % (ref 12.4–15.4)
PLATELET # BLD: 269 K/UL (ref 135–450)
PMV BLD AUTO: 8.3 FL (ref 5–10.5)
POTASSIUM SERPL-SCNC: 5.2 MMOL/L (ref 3.5–5.1)
RBC # BLD: 3.4 M/UL (ref 4–5.2)
SODIUM BLD-SCNC: 139 MMOL/L (ref 136–145)
T4 FREE: 1.3 NG/DL (ref 0.9–1.8)
TOTAL PROTEIN: 6.8 G/DL (ref 6.4–8.2)
TRIGL SERPL-MCNC: 132 MG/DL (ref 0–150)
TSH SERPL DL<=0.05 MIU/L-ACNC: 4.67 UIU/ML (ref 0.27–4.2)
VLDLC SERPL CALC-MCNC: 26 MG/DL
WBC # BLD: 6.1 K/UL (ref 4–11)

## 2019-11-18 PROCEDURE — G8482 FLU IMMUNIZE ORDER/ADMIN: HCPCS | Performed by: INTERNAL MEDICINE

## 2019-11-18 PROCEDURE — 90662 IIV NO PRSV INCREASED AG IM: CPT | Performed by: INTERNAL MEDICINE

## 2019-11-18 PROCEDURE — G0439 PPPS, SUBSEQ VISIT: HCPCS | Performed by: INTERNAL MEDICINE

## 2019-11-18 PROCEDURE — 4040F PNEUMOC VAC/ADMIN/RCVD: CPT | Performed by: INTERNAL MEDICINE

## 2019-11-18 PROCEDURE — 1123F ACP DISCUSS/DSCN MKR DOCD: CPT | Performed by: INTERNAL MEDICINE

## 2019-11-18 PROCEDURE — G0008 ADMIN INFLUENZA VIRUS VAC: HCPCS | Performed by: INTERNAL MEDICINE

## 2019-11-18 PROCEDURE — 36415 COLL VENOUS BLD VENIPUNCTURE: CPT | Performed by: INTERNAL MEDICINE

## 2019-11-18 PROCEDURE — G8598 ASA/ANTIPLAT THER USED: HCPCS | Performed by: INTERNAL MEDICINE

## 2019-11-18 RX ORDER — CHOLECALCIFEROL (VITAMIN D3) 125 MCG
2000 CAPSULE ORAL DAILY
Qty: 90 TABLET | Refills: 1 | Status: SHIPPED | OUTPATIENT
Start: 2019-11-18 | End: 2020-04-13 | Stop reason: SDUPTHER

## 2019-11-18 RX ORDER — FEXOFENADINE HYDROCHLORIDE 60 MG/1
60 TABLET, FILM COATED ORAL DAILY
COMMUNITY
End: 2020-06-29 | Stop reason: SDUPTHER

## 2019-11-18 RX ORDER — DOCUSATE SODIUM 100 MG/1
100 CAPSULE, LIQUID FILLED ORAL DAILY
Qty: 90 CAPSULE | Refills: 1 | Status: SHIPPED | OUTPATIENT
Start: 2019-11-18 | End: 2020-04-13 | Stop reason: SDUPTHER

## 2019-11-18 RX ORDER — ALENDRONATE SODIUM 70 MG/1
70 TABLET ORAL
Qty: 12 TABLET | Refills: 1 | Status: SHIPPED | OUTPATIENT
Start: 2019-11-18 | End: 2020-04-13 | Stop reason: SDUPTHER

## 2019-11-18 RX ORDER — POLYETHYLENE GLYCOL 3350 17 G/17G
POWDER, FOR SOLUTION ORAL
Qty: 850 G | Refills: 3 | Status: SHIPPED | OUTPATIENT
Start: 2019-11-18 | End: 2020-04-13 | Stop reason: SDUPTHER

## 2019-11-18 RX ORDER — VIT B COMPLX C/FOLIC ACID/ZINC 0.8MG-15MG
TABLET ORAL DAILY
Refills: 3 | COMMUNITY
Start: 2019-09-05 | End: 2020-11-23 | Stop reason: SDUPTHER

## 2019-11-18 RX ORDER — RANOLAZINE 500 MG/1
500 TABLET, EXTENDED RELEASE ORAL 2 TIMES DAILY
Qty: 180 TABLET | Refills: 1 | Status: SHIPPED | OUTPATIENT
Start: 2019-11-18 | End: 2020-06-23

## 2019-11-18 RX ORDER — LEVOTHYROXINE SODIUM 0.07 MG/1
TABLET ORAL
Qty: 90 TABLET | Refills: 1 | Status: SHIPPED | OUTPATIENT
Start: 2019-11-18 | End: 2019-11-19

## 2019-11-18 RX ORDER — NITROGLYCERIN 0.4 MG/1
0.4 TABLET SUBLINGUAL EVERY 5 MIN PRN
Qty: 30 TABLET | Refills: 3 | Status: SHIPPED | OUTPATIENT
Start: 2019-11-18 | End: 2021-05-25

## 2019-11-18 RX ORDER — AMLODIPINE BESYLATE 5 MG/1
5 TABLET ORAL DAILY
Qty: 180 TABLET | Refills: 1 | Status: SHIPPED | OUTPATIENT
Start: 2019-11-18 | End: 2019-12-16 | Stop reason: SDUPTHER

## 2019-11-18 RX ORDER — ALLOPURINOL 100 MG/1
TABLET ORAL
Qty: 90 TABLET | Refills: 1 | Status: SHIPPED | OUTPATIENT
Start: 2019-11-18 | End: 2020-04-13 | Stop reason: SDUPTHER

## 2019-11-18 RX ORDER — SIMVASTATIN 20 MG
20 TABLET ORAL NIGHTLY
Qty: 90 TABLET | Refills: 1 | Status: SHIPPED | OUTPATIENT
Start: 2019-11-18 | End: 2020-04-13 | Stop reason: SDUPTHER

## 2019-11-18 RX ORDER — ATENOLOL 25 MG/1
25 TABLET ORAL DAILY
Qty: 180 TABLET | Refills: 1 | Status: SHIPPED | OUTPATIENT
Start: 2019-11-18 | End: 2020-06-29 | Stop reason: SDUPTHER

## 2019-11-18 RX ORDER — DEXLANSOPRAZOLE 60 MG/1
60 CAPSULE, DELAYED RELEASE ORAL DAILY
Qty: 90 CAPSULE | Refills: 1 | Status: SHIPPED | OUTPATIENT
Start: 2019-11-18 | End: 2020-04-13 | Stop reason: SDUPTHER

## 2019-11-18 RX ORDER — SENNA PLUS 8.6 MG/1
1 TABLET ORAL 2 TIMES DAILY
Qty: 180 TABLET | Refills: 1 | Status: SHIPPED | OUTPATIENT
Start: 2019-11-18 | End: 2020-04-13 | Stop reason: SDUPTHER

## 2019-11-18 RX ORDER — ISOSORBIDE MONONITRATE 60 MG/1
60 TABLET, EXTENDED RELEASE ORAL DAILY
Qty: 90 TABLET | Refills: 1 | Status: SHIPPED | OUTPATIENT
Start: 2019-11-18 | End: 2020-04-13 | Stop reason: SDUPTHER

## 2019-11-18 RX ORDER — LIDOCAINE 50 MG/G
1 PATCH TOPICAL DAILY
Qty: 30 PATCH | Refills: 5 | Status: SHIPPED | OUTPATIENT
Start: 2019-11-18 | End: 2019-12-18

## 2019-11-18 ASSESSMENT — PATIENT HEALTH QUESTIONNAIRE - PHQ9
SUM OF ALL RESPONSES TO PHQ QUESTIONS 1-9: 0
SUM OF ALL RESPONSES TO PHQ QUESTIONS 1-9: 0

## 2019-11-18 ASSESSMENT — LIFESTYLE VARIABLES: HOW OFTEN DO YOU HAVE A DRINK CONTAINING ALCOHOL: 0

## 2019-11-19 ENCOUNTER — TELEPHONE (OUTPATIENT)
Dept: INTERNAL MEDICINE CLINIC | Age: 84
End: 2019-11-19

## 2019-11-19 DIAGNOSIS — E03.4 HYPOTHYROIDISM DUE TO ACQUIRED ATROPHY OF THYROID: Primary | ICD-10-CM

## 2019-11-19 RX ORDER — LEVOTHYROXINE SODIUM 88 UG/1
88 TABLET ORAL DAILY
Qty: 90 TABLET | Refills: 1 | Status: SHIPPED | OUTPATIENT
Start: 2019-11-19 | End: 2020-04-13 | Stop reason: SDUPTHER

## 2019-11-23 RX ORDER — AZITHROMYCIN 250 MG/1
250 TABLET, FILM COATED ORAL SEE ADMIN INSTRUCTIONS
Qty: 6 TABLET | Refills: 0 | Status: SHIPPED | OUTPATIENT
Start: 2019-11-23 | End: 2019-11-28

## 2019-11-23 RX ORDER — BENZONATATE 100 MG/1
100 CAPSULE ORAL 3 TIMES DAILY PRN
Qty: 30 CAPSULE | Refills: 0 | Status: SHIPPED | OUTPATIENT
Start: 2019-11-23 | End: 2019-12-03

## 2019-11-27 ENCOUNTER — TELEPHONE (OUTPATIENT)
Dept: INTERNAL MEDICINE CLINIC | Age: 84
End: 2019-11-27

## 2019-12-05 RX ORDER — FEXOFENADINE HYDROCHLORIDE 60 MG/1
60 TABLET, FILM COATED ORAL DAILY
Qty: 30 TABLET | Refills: 5 | Status: SHIPPED | OUTPATIENT
Start: 2019-12-05 | End: 2020-04-14 | Stop reason: SDUPTHER

## 2019-12-16 RX ORDER — AMLODIPINE BESYLATE 5 MG/1
10 TABLET ORAL DAILY
Qty: 180 TABLET | Refills: 1
Start: 2019-12-16 | End: 2020-04-13 | Stop reason: SDUPTHER

## 2020-03-27 ENCOUNTER — TELEPHONE (OUTPATIENT)
Dept: INTERNAL MEDICINE CLINIC | Age: 85
End: 2020-03-27

## 2020-03-27 NOTE — TELEPHONE ENCOUNTER
Cancelled April appt. Left message for Maggie to call back and scheduled OV for early June. Also she is to let us know which home care agency to give lab orders to.

## 2020-03-27 NOTE — TELEPHONE ENCOUNTER
Patients grand daughter, Maggie calls- She has OV 4/20 and doesn't want to come into the office due to [address]. She wants to know if she could push that until late May/Early June? Dr. Pamela Lisa wants her to have labs drawn in a few weeks but Home Health. Is there any labs you want her to have? If so, I can notifiy [facility]. Please advise.

## 2020-03-27 NOTE — TELEPHONE ENCOUNTER
Ok for change appt early June.     rec CMP, CBC, lipids, uric acid, TSH and free T4 -- dx CAD, HTN, lipids and gout

## 2020-04-10 LAB
ALBUMIN SERPL-MCNC: 4.2 G/DL
BUN / CREAT RATIO: 13
BUN BLDV-MCNC: 24 MG/DL
CALCIUM SERPL-MCNC: 9.7 MG/DL
CHLORIDE BLD-SCNC: 107 MMOL/L
CO2: 16 MMOL/L
CREAT SERPL-MCNC: 1.82 MG/DL
GLUCOSE: 103
PHOSPHORUS: 4.4 MG/DL
POTASSIUM SERPL-SCNC: 5.2 MMOL/L
SODIUM BLD-SCNC: 142 MMOL/L

## 2020-04-13 DIAGNOSIS — M10.09 IDIOPATHIC GOUT OF MULTIPLE SITES, UNSPECIFIED CHRONICITY: ICD-10-CM

## 2020-04-13 DIAGNOSIS — E03.4 HYPOTHYROIDISM DUE TO ACQUIRED ATROPHY OF THYROID: ICD-10-CM

## 2020-04-13 DIAGNOSIS — E78.2 MIXED HYPERLIPIDEMIA: ICD-10-CM

## 2020-04-13 DIAGNOSIS — I10 ESSENTIAL HYPERTENSION: ICD-10-CM

## 2020-04-13 LAB
ALBUMIN SERPL-MCNC: 4.3 G/DL
ALP BLD-CCNC: 59 U/L
ALT SERPL-CCNC: 12 U/L
ANION GAP SERPL CALCULATED.3IONS-SCNC: 2 MMOL/L
AST SERPL-CCNC: 20 U/L
BASOPHILS ABSOLUTE: 0.1 /ΜL
BASOPHILS RELATIVE PERCENT: 1 %
BILIRUB SERPL-MCNC: 0.3 MG/DL (ref 0.1–1.4)
BUN BLDV-MCNC: 24 MG/DL
CALCIUM SERPL-MCNC: 9.9 MG/DL
CHLORIDE BLD-SCNC: 107 MMOL/L
CHOLESTEROL, TOTAL: 139 MG/DL
CHOLESTEROL/HDL RATIO: NORMAL
CO2: 21 MMOL/L
CREAT SERPL-MCNC: 1.77 MG/DL
EOSINOPHILS ABSOLUTE: 0.3 /ΜL
EOSINOPHILS RELATIVE PERCENT: 6 %
GFR CALCULATED: 24
GLUCOSE BLD-MCNC: 105 MG/DL
HCT VFR BLD CALC: 31.4 % (ref 36–46)
HDLC SERPL-MCNC: 41 MG/DL (ref 35–70)
HEMOGLOBIN: 10.4 G/DL (ref 12–16)
LDL CHOLESTEROL CALCULATED: 66 MG/DL (ref 0–160)
LYMPHOCYTES ABSOLUTE: 1.9 /ΜL
LYMPHOCYTES RELATIVE PERCENT: 38 %
MCH RBC QN AUTO: 31.9 PG
MCHC RBC AUTO-ENTMCNC: 33.1 G/DL
MCV RBC AUTO: 96 FL
MONOCYTES ABSOLUTE: 0.5 /ΜL
MONOCYTES RELATIVE PERCENT: 9 %
NEUTROPHILS ABSOLUTE: 2.4 /ΜL
NEUTROPHILS RELATIVE PERCENT: 46 %
PDW BLD-RTO: 12.2 %
PLATELET # BLD: 228 K/ΜL
PMV BLD AUTO: ABNORMAL FL
POTASSIUM SERPL-SCNC: 5.2 MMOL/L
RBC # BLD: 3.26 10^6/ΜL
SODIUM BLD-SCNC: 141 MMOL/L
T4 FREE: 1.19
TOTAL PROTEIN: 6.5
TRIGL SERPL-MCNC: 159 MG/DL
TSH SERPL DL<=0.05 MIU/L-ACNC: 0.5 UIU/ML
URIC ACID: 5.4
VLDLC SERPL CALC-MCNC: 32 MG/DL
WBC # BLD: 5.1 10^3/ML

## 2020-04-13 RX ORDER — ALENDRONATE SODIUM 70 MG/1
70 TABLET ORAL
Qty: 12 TABLET | Refills: 1 | Status: SHIPPED | OUTPATIENT
Start: 2020-04-13 | End: 2020-09-22 | Stop reason: SDUPTHER

## 2020-04-13 RX ORDER — DEXLANSOPRAZOLE 60 MG/1
60 CAPSULE, DELAYED RELEASE ORAL DAILY
Qty: 90 CAPSULE | Refills: 1 | Status: SHIPPED | OUTPATIENT
Start: 2020-04-13 | End: 2020-06-29 | Stop reason: SDUPTHER

## 2020-04-13 RX ORDER — CHOLECALCIFEROL (VITAMIN D3) 125 MCG
2000 CAPSULE ORAL DAILY
Qty: 90 TABLET | Refills: 1 | Status: SHIPPED | OUTPATIENT
Start: 2020-04-13 | End: 2020-06-23 | Stop reason: SDUPTHER

## 2020-04-13 RX ORDER — ALLOPURINOL 100 MG/1
TABLET ORAL
Qty: 90 TABLET | Refills: 1 | Status: SHIPPED | OUTPATIENT
Start: 2020-04-13 | End: 2020-06-29 | Stop reason: SDUPTHER

## 2020-04-13 RX ORDER — SENNA PLUS 8.6 MG/1
1 TABLET ORAL 2 TIMES DAILY
Qty: 180 TABLET | Refills: 1 | Status: SHIPPED | OUTPATIENT
Start: 2020-04-13 | End: 2020-06-29 | Stop reason: SDUPTHER

## 2020-04-13 RX ORDER — AMLODIPINE BESYLATE 5 MG/1
10 TABLET ORAL DAILY
Qty: 180 TABLET | Refills: 1 | Status: SHIPPED | OUTPATIENT
Start: 2020-04-13 | End: 2020-09-22 | Stop reason: SDUPTHER

## 2020-04-13 RX ORDER — LEVOTHYROXINE SODIUM 88 UG/1
88 TABLET ORAL DAILY
Qty: 90 TABLET | Refills: 1 | Status: SHIPPED | OUTPATIENT
Start: 2020-04-13 | End: 2020-09-22 | Stop reason: SDUPTHER

## 2020-04-13 RX ORDER — SIMVASTATIN 20 MG
20 TABLET ORAL NIGHTLY
Qty: 90 TABLET | Refills: 1 | Status: SHIPPED | OUTPATIENT
Start: 2020-04-13 | End: 2020-06-29 | Stop reason: SDUPTHER

## 2020-04-13 RX ORDER — POLYETHYLENE GLYCOL 3350 17 G/17G
POWDER, FOR SOLUTION ORAL
Qty: 850 G | Refills: 3 | Status: SHIPPED | OUTPATIENT
Start: 2020-04-13 | End: 2020-06-29 | Stop reason: SDUPTHER

## 2020-04-13 RX ORDER — ISOSORBIDE MONONITRATE 60 MG/1
60 TABLET, EXTENDED RELEASE ORAL DAILY
Qty: 90 TABLET | Refills: 1 | Status: SHIPPED | OUTPATIENT
Start: 2020-04-13 | End: 2020-09-22 | Stop reason: SDUPTHER

## 2020-04-13 RX ORDER — DOCUSATE SODIUM 100 MG/1
100 CAPSULE, LIQUID FILLED ORAL DAILY
Qty: 90 CAPSULE | Refills: 1 | Status: SHIPPED | OUTPATIENT
Start: 2020-04-13 | End: 2020-06-29 | Stop reason: SDUPTHER

## 2020-04-14 RX ORDER — FEXOFENADINE HYDROCHLORIDE 60 MG/1
60 TABLET, FILM COATED ORAL DAILY
Qty: 30 TABLET | Refills: 5 | Status: SHIPPED | OUTPATIENT
Start: 2020-04-14 | End: 2020-09-22

## 2020-05-19 LAB
BASOPHILS ABSOLUTE: 0 /ΜL
BASOPHILS RELATIVE PERCENT: 0 %
EOSINOPHILS ABSOLUTE: 0.3 /ΜL
EOSINOPHILS RELATIVE PERCENT: 4 %
HCT VFR BLD CALC: 31.4 % (ref 36–46)
HEMOGLOBIN: 10.7 G/DL (ref 12–16)
LYMPHOCYTES ABSOLUTE: 2.2 /ΜL
LYMPHOCYTES RELATIVE PERCENT: 37 %
MCH RBC QN AUTO: 32.7 PG
MCHC RBC AUTO-ENTMCNC: 34.1 G/DL
MCV RBC AUTO: 96 FL
MONOCYTES ABSOLUTE: 0.6 /ΜL
MONOCYTES RELATIVE PERCENT: 10 %
NEUTROPHILS ABSOLUTE: 2.8 /ΜL
NEUTROPHILS RELATIVE PERCENT: 49 %
PDW BLD-RTO: 12.4 %
PLATELET # BLD: 262 K/ΜL
PMV BLD AUTO: ABNORMAL FL
RBC # BLD: 3.27 10^6/ΜL
WBC # BLD: 5.9 10^3/ML

## 2020-05-28 ENCOUNTER — TELEPHONE (OUTPATIENT)
Dept: INTERNAL MEDICINE CLINIC | Age: 85
End: 2020-05-28

## 2020-06-08 ENCOUNTER — TELEPHONE (OUTPATIENT)
Dept: INTERNAL MEDICINE CLINIC | Age: 85
End: 2020-06-08

## 2020-06-23 RX ORDER — CHOLECALCIFEROL (VITAMIN D3) 125 MCG
2000 CAPSULE ORAL DAILY
Qty: 90 TABLET | Refills: 1 | Status: SHIPPED | OUTPATIENT
Start: 2020-06-23 | End: 2020-09-22 | Stop reason: SDUPTHER

## 2020-06-24 ENCOUNTER — TELEPHONE (OUTPATIENT)
Dept: INTERNAL MEDICINE CLINIC | Age: 85
End: 2020-06-24

## 2020-06-29 ENCOUNTER — TELEMEDICINE (OUTPATIENT)
Dept: INTERNAL MEDICINE CLINIC | Age: 85
End: 2020-06-29
Payer: COMMERCIAL

## 2020-06-29 PROCEDURE — 99214 OFFICE O/P EST MOD 30 MIN: CPT | Performed by: INTERNAL MEDICINE

## 2020-06-29 PROCEDURE — 1123F ACP DISCUSS/DSCN MKR DOCD: CPT | Performed by: INTERNAL MEDICINE

## 2020-06-29 PROCEDURE — 1090F PRES/ABSN URINE INCON ASSESS: CPT | Performed by: INTERNAL MEDICINE

## 2020-06-29 PROCEDURE — G8427 DOCREV CUR MEDS BY ELIG CLIN: HCPCS | Performed by: INTERNAL MEDICINE

## 2020-06-29 PROCEDURE — 4040F PNEUMOC VAC/ADMIN/RCVD: CPT | Performed by: INTERNAL MEDICINE

## 2020-06-29 RX ORDER — POLYETHYLENE GLYCOL 3350 17 G/17G
POWDER, FOR SOLUTION ORAL
Qty: 850 G | Refills: 3 | Status: SHIPPED | OUTPATIENT
Start: 2020-06-29 | End: 2020-09-22 | Stop reason: SDUPTHER

## 2020-06-29 RX ORDER — ATENOLOL 25 MG/1
25 TABLET ORAL DAILY
Qty: 180 TABLET | Refills: 1 | Status: SHIPPED | OUTPATIENT
Start: 2020-06-29 | End: 2020-09-22 | Stop reason: SDUPTHER

## 2020-06-29 RX ORDER — DEXLANSOPRAZOLE 60 MG/1
60 CAPSULE, DELAYED RELEASE ORAL DAILY
Qty: 90 CAPSULE | Refills: 1 | Status: SHIPPED | OUTPATIENT
Start: 2020-06-29 | End: 2020-09-22 | Stop reason: SDUPTHER

## 2020-06-29 RX ORDER — FEXOFENADINE HYDROCHLORIDE 60 MG/1
60 TABLET, FILM COATED ORAL DAILY
Qty: 90 TABLET | Refills: 1 | Status: SHIPPED | OUTPATIENT
Start: 2020-06-29 | End: 2020-09-22 | Stop reason: SDUPTHER

## 2020-06-29 RX ORDER — DOCUSATE SODIUM 100 MG/1
100 CAPSULE, LIQUID FILLED ORAL DAILY
Qty: 90 CAPSULE | Refills: 1 | Status: SHIPPED | OUTPATIENT
Start: 2020-06-29 | End: 2020-09-22 | Stop reason: SDUPTHER

## 2020-06-29 RX ORDER — SIMVASTATIN 20 MG
20 TABLET ORAL NIGHTLY
Qty: 90 TABLET | Refills: 1 | Status: SHIPPED | OUTPATIENT
Start: 2020-06-29 | End: 2020-09-22 | Stop reason: SDUPTHER

## 2020-06-29 RX ORDER — ALLOPURINOL 100 MG/1
100 TABLET ORAL DAILY
Qty: 90 TABLET | Refills: 1 | Status: SHIPPED | OUTPATIENT
Start: 2020-06-29 | End: 2020-09-22 | Stop reason: SDUPTHER

## 2020-06-29 RX ORDER — SENNA PLUS 8.6 MG/1
1 TABLET ORAL 2 TIMES DAILY
Qty: 180 TABLET | Refills: 1 | Status: SHIPPED | OUTPATIENT
Start: 2020-06-29 | End: 2020-09-22 | Stop reason: SDUPTHER

## 2020-06-29 NOTE — PROGRESS NOTES
daily Yes Historical Provider, MD   aspirin EC 81 MG EC tablet Take 81 mg by mouth daily. Yes Historical Provider, MD       Social History     Tobacco Use    Smoking status: Former Smoker    Smokeless tobacco: Never Used   Substance Use Topics    Alcohol use: No    Drug use: No             PHYSICAL EXAMINATION:  [ INSTRUCTIONS:  \"[x]\" Indicates a positive item  \"[]\" Indicates a negative item  -- DELETE ALL ITEMS NOT EXAMINED]  Vital Signs: (As obtained by patient/caregiver or practitioner observation)    Blood pressure-  Heart rate-    Respiratory rate-    Temperature-  Pulse oximetry-     Constitutional: [x] Appears well-developed and well-nourished [] No apparent distress      [] Abnormal-   Mental status  [x] Alert and awake  [] Oriented to person/place/time []Able to follow commands      Eyes:  EOM    []  Normal  [] Abnormal-  Sclera  []  Normal  [] Abnormal -         Discharge []  None visible  [] Abnormal -    HENT:   [] Normocephalic, atraumatic. [] Abnormal   [] Mouth/Throat: Mucous membranes are moist.     External Ears [] Normal  [] Abnormal-     Neck: [] No visualized mass     Pulmonary/Chest: [x] Respiratory effort normal.  [] No visualized signs of difficulty breathing or respiratory distress        [] Abnormal-      Musculoskeletal:   [] Normal gait with no signs of ataxia         [] Normal range of motion of neck        [] Abnormal-       Neurological:        [] No Facial Asymmetry (Cranial nerve 7 motor function) (limited exam to video visit)          [] No gaze palsy        [] Abnormal-         Skin:        [] No significant exanthematous lesions or discoloration noted on facial skin         [] Abnormal-            Psychiatric:       [x] Normal Affect [] No Hallucinations        [] Abnormal-     Other pertinent observable physical exam findings-     ASSESSMENT/PLAN:  1.  Coronary artery disease involving native coronary artery of native heart without angina pectoris  CONT F/U DR CHELSEA GONZALEZ,

## 2020-09-01 LAB
ALBUMIN SERPL-MCNC: 4.1 G/DL
ALP BLD-CCNC: 63 U/L
ALT SERPL-CCNC: 12 U/L
ANION GAP SERPL CALCULATED.3IONS-SCNC: 1.6 MMOL/L
AST SERPL-CCNC: 19 U/L
BASOPHILS ABSOLUTE: 0 /ΜL
BASOPHILS RELATIVE PERCENT: 1 %
BILIRUB SERPL-MCNC: 0.3 MG/DL (ref 0.1–1.4)
BUN BLDV-MCNC: 21 MG/DL
CALCIUM SERPL-MCNC: 9.4 MG/DL
CHLORIDE BLD-SCNC: 106 MMOL/L
CHOLESTEROL, TOTAL: 128 MG/DL
CHOLESTEROL/HDL RATIO: NORMAL
CO2: 19 MMOL/L
CREAT SERPL-MCNC: 1.83 MG/DL
EOSINOPHILS ABSOLUTE: 0.3 /ΜL
EOSINOPHILS RELATIVE PERCENT: 5 %
GFR CALCULATED: 23
GLUCOSE BLD-MCNC: 102 MG/DL
HCT VFR BLD CALC: 31.4 % (ref 36–46)
HDLC SERPL-MCNC: 41 MG/DL (ref 35–70)
HEMOGLOBIN: 10.4 G/DL (ref 12–16)
LDL CHOLESTEROL CALCULATED: 60 MG/DL (ref 0–160)
LYMPHOCYTES ABSOLUTE: 2.2 /ΜL
LYMPHOCYTES RELATIVE PERCENT: 36 %
MCH RBC QN AUTO: 32.2 PG
MCHC RBC AUTO-ENTMCNC: 33.1 G/DL
MCV RBC AUTO: 97 FL
MONOCYTES ABSOLUTE: 0.5 /ΜL
MONOCYTES RELATIVE PERCENT: 9 %
NEUTROPHILS ABSOLUTE: 3 /ΜL
NEUTROPHILS RELATIVE PERCENT: 49 %
NONHDLC SERPL-MCNC: NORMAL MG/DL
PDW BLD-RTO: 12 %
PLATELET # BLD: 255 K/ΜL
PMV BLD AUTO: ABNORMAL FL
POTASSIUM SERPL-SCNC: 5 MMOL/L
RBC # BLD: 3.23 10^6/ΜL
SODIUM BLD-SCNC: 140 MMOL/L
TOTAL PROTEIN: 6.7
TRIGL SERPL-MCNC: 160 MG/DL
URIC ACID: 5
VLDLC SERPL CALC-MCNC: 27 MG/DL
WBC # BLD: 6.1 10^3/ML

## 2020-09-03 DIAGNOSIS — E03.4 HYPOTHYROIDISM DUE TO ACQUIRED ATROPHY OF THYROID: ICD-10-CM

## 2020-09-03 DIAGNOSIS — M10.09 IDIOPATHIC GOUT OF MULTIPLE SITES, UNSPECIFIED CHRONICITY: ICD-10-CM

## 2020-09-03 DIAGNOSIS — I10 ESSENTIAL HYPERTENSION: ICD-10-CM

## 2020-09-03 DIAGNOSIS — E78.2 MIXED HYPERLIPIDEMIA: ICD-10-CM

## 2020-09-03 DIAGNOSIS — D64.9 ANEMIA, UNSPECIFIED TYPE: ICD-10-CM

## 2020-09-22 ENCOUNTER — OFFICE VISIT (OUTPATIENT)
Dept: INTERNAL MEDICINE CLINIC | Age: 85
End: 2020-09-22
Payer: COMMERCIAL

## 2020-09-22 VITALS
HEART RATE: 57 BPM | WEIGHT: 120 LBS | DIASTOLIC BLOOD PRESSURE: 70 MMHG | BODY MASS INDEX: 23.44 KG/M2 | SYSTOLIC BLOOD PRESSURE: 130 MMHG | OXYGEN SATURATION: 97 % | RESPIRATION RATE: 14 BRPM

## 2020-09-22 PROCEDURE — 4040F PNEUMOC VAC/ADMIN/RCVD: CPT | Performed by: INTERNAL MEDICINE

## 2020-09-22 PROCEDURE — 99214 OFFICE O/P EST MOD 30 MIN: CPT | Performed by: INTERNAL MEDICINE

## 2020-09-22 PROCEDURE — 1090F PRES/ABSN URINE INCON ASSESS: CPT | Performed by: INTERNAL MEDICINE

## 2020-09-22 PROCEDURE — G8427 DOCREV CUR MEDS BY ELIG CLIN: HCPCS | Performed by: INTERNAL MEDICINE

## 2020-09-22 PROCEDURE — 90694 VACC AIIV4 NO PRSRV 0.5ML IM: CPT | Performed by: INTERNAL MEDICINE

## 2020-09-22 PROCEDURE — 1123F ACP DISCUSS/DSCN MKR DOCD: CPT | Performed by: INTERNAL MEDICINE

## 2020-09-22 PROCEDURE — 1036F TOBACCO NON-USER: CPT | Performed by: INTERNAL MEDICINE

## 2020-09-22 PROCEDURE — G0008 ADMIN INFLUENZA VIRUS VAC: HCPCS | Performed by: INTERNAL MEDICINE

## 2020-09-22 PROCEDURE — G8420 CALC BMI NORM PARAMETERS: HCPCS | Performed by: INTERNAL MEDICINE

## 2020-09-22 RX ORDER — CHOLECALCIFEROL (VITAMIN D3) 125 MCG
2000 CAPSULE ORAL DAILY
Qty: 90 TABLET | Refills: 1 | Status: SHIPPED | OUTPATIENT
Start: 2020-09-22 | End: 2021-03-15 | Stop reason: SDUPTHER

## 2020-09-22 RX ORDER — FEXOFENADINE HYDROCHLORIDE 60 MG/1
60 TABLET, FILM COATED ORAL DAILY
Qty: 90 TABLET | Refills: 1 | Status: SHIPPED | OUTPATIENT
Start: 2020-09-22 | End: 2021-03-15 | Stop reason: SDUPTHER

## 2020-09-22 RX ORDER — POLYETHYLENE GLYCOL 3350 17 G/17G
POWDER, FOR SOLUTION ORAL
Qty: 850 G | Refills: 3 | Status: SHIPPED | OUTPATIENT
Start: 2020-09-22 | End: 2021-03-08

## 2020-09-22 RX ORDER — LEVOTHYROXINE SODIUM 88 UG/1
88 TABLET ORAL DAILY
Qty: 90 TABLET | Refills: 1 | Status: SHIPPED | OUTPATIENT
Start: 2020-09-22 | End: 2021-03-15 | Stop reason: SDUPTHER

## 2020-09-22 RX ORDER — DOCUSATE SODIUM 100 MG/1
100 CAPSULE, LIQUID FILLED ORAL DAILY
Qty: 90 CAPSULE | Refills: 1 | Status: SHIPPED | OUTPATIENT
Start: 2020-09-22 | End: 2021-03-15 | Stop reason: SDUPTHER

## 2020-09-22 RX ORDER — AMLODIPINE BESYLATE 5 MG/1
10 TABLET ORAL DAILY
Qty: 180 TABLET | Refills: 1 | Status: SHIPPED | OUTPATIENT
Start: 2020-09-22 | End: 2021-03-15 | Stop reason: SDUPTHER

## 2020-09-22 RX ORDER — ALENDRONATE SODIUM 70 MG/1
70 TABLET ORAL
Qty: 12 TABLET | Refills: 1 | Status: SHIPPED | OUTPATIENT
Start: 2020-09-22 | End: 2021-03-15 | Stop reason: SDUPTHER

## 2020-09-22 RX ORDER — SENNA PLUS 8.6 MG/1
1 TABLET ORAL 2 TIMES DAILY
Qty: 180 TABLET | Refills: 1 | Status: SHIPPED | OUTPATIENT
Start: 2020-09-22 | End: 2021-03-15 | Stop reason: SDUPTHER

## 2020-09-22 RX ORDER — ISOSORBIDE MONONITRATE 60 MG/1
60 TABLET, EXTENDED RELEASE ORAL DAILY
Qty: 90 TABLET | Refills: 1 | Status: SHIPPED | OUTPATIENT
Start: 2020-09-22 | End: 2021-03-15 | Stop reason: SDUPTHER

## 2020-09-22 RX ORDER — METHYLPREDNISOLONE 4 MG/1
TABLET ORAL
Qty: 1 KIT | Refills: 0 | Status: SHIPPED | OUTPATIENT
Start: 2020-09-22 | End: 2020-09-28

## 2020-09-22 RX ORDER — DEXLANSOPRAZOLE 60 MG/1
60 CAPSULE, DELAYED RELEASE ORAL DAILY
Qty: 90 CAPSULE | Refills: 1 | Status: SHIPPED | OUTPATIENT
Start: 2020-09-22 | End: 2021-03-15 | Stop reason: SDUPTHER

## 2020-09-22 RX ORDER — ALLOPURINOL 100 MG/1
100 TABLET ORAL DAILY
Qty: 90 TABLET | Refills: 1 | Status: SHIPPED | OUTPATIENT
Start: 2020-09-22 | End: 2021-03-15 | Stop reason: SDUPTHER

## 2020-09-22 RX ORDER — ATENOLOL 25 MG/1
25 TABLET ORAL DAILY
Qty: 180 TABLET | Refills: 1 | Status: SHIPPED | OUTPATIENT
Start: 2020-09-22 | End: 2021-03-15 | Stop reason: SDUPTHER

## 2020-09-22 RX ORDER — RANOLAZINE 500 MG/1
500 TABLET, EXTENDED RELEASE ORAL 2 TIMES DAILY
Qty: 180 TABLET | Refills: 1 | Status: SHIPPED | OUTPATIENT
Start: 2020-09-22 | End: 2021-03-15 | Stop reason: SDUPTHER

## 2020-09-22 RX ORDER — SIMVASTATIN 20 MG
20 TABLET ORAL NIGHTLY
Qty: 90 TABLET | Refills: 1 | Status: SHIPPED | OUTPATIENT
Start: 2020-09-22 | End: 2021-03-15 | Stop reason: SDUPTHER

## 2020-09-22 NOTE — PROGRESS NOTES
Perico Rogers  1/23/1927 09/22/20    SUBJECTIVE:  The past two weeks w some L sided pleurisy, worse w deep breaths. No fever or chills noted. No worsening cough, fever, chills. CKD- seeing rama Coello recently 1.8. Gout, no recent flares and ur acid level was nl. Coronary artery disease has been asymptomatic w/o any ongoing sx of SOB/RENDON, palpitations, lt headedness, diaphoresis. Is continuing medications regularly. SOME PLEURISY AS NOTED ABOVE. PERIODIC ITCHING, STABLE ON MEDS. -- BETTER OVERALL. Hypothyroid has been asymptomatic w/o sx of fatigue, constipation, cold intolerance, depression. OBJECTIVE:    /70   Pulse 57   Resp 14   Wt 120 lb (54.4 kg)   LMP  (LMP Unknown)   SpO2 97%   BMI 23.44 kg/m²     Physical Exam  Vitals signs reviewed. Constitutional:       Appearance: She is well-developed. HENT:      Head: Normocephalic and atraumatic. Eyes:      General: No scleral icterus. Right eye: No discharge. Left eye: No discharge. Conjunctiva/sclera: Conjunctivae normal.      Pupils: Pupils are equal, round, and reactive to light. Neck:      Musculoskeletal: Neck supple. Thyroid: No thyromegaly. Vascular: No JVD. Trachea: No tracheal deviation. Cardiovascular:      Rate and Rhythm: Normal rate and regular rhythm. Heart sounds: Normal heart sounds. No murmur. No friction rub. No gallop. Pulmonary:      Effort: Pulmonary effort is normal. No respiratory distress. Breath sounds: Normal breath sounds. No wheezing or rales. Abdominal:      General: Bowel sounds are normal. There is no distension. Palpations: Abdomen is soft. There is no mass. Tenderness: There is no abdominal tenderness. There is no guarding or rebound. Hernia: No hernia is present. Lymphadenopathy:      Cervical: No cervical adenopathy. Skin:     General: Skin is warm and dry. Neurological:      Mental Status: She is alert. Psychiatric:         Mood and Affect: Mood normal.         ASSESSMENT:    1. Pleurisy    2. Coronary artery disease involving native coronary artery of native heart without angina pectoris    3. Essential hypertension    4. Pruritic condition    5. Depression, unspecified depression type    6. Idiopathic gout of multiple sites, unspecified chronicity    7. Gastroesophageal reflux disease without esophagitis    8. Mixed hyperlipidemia    9. Constipation, slow transit    10. Age-related osteoporosis with current pathological fracture, initial encounter    11. Hypothyroidism due to acquired atrophy of thyroid    12. Need for influenza vaccination        PLAN:    Mary Kay Martinez was seen today for hypertension and breast pain. Diagnoses and all orders for this visit:    Pleurisy- W/O EVIDENCE OF INFECTION. TRIAL MEDROL PACK AND To call back one week if not improving.      -     methylPREDNISolone (MEDROL DOSEPACK) 4 MG tablet; Take by mouth. Coronary artery disease involving native coronary artery of native heart without angina pectoris - Coronary artery disease stable and asymptomatic, will continue to monitor for any signs of instability. Will periodically monitor lipid profile and liver function, cardiac risk factors. Continue current medical regimen. CONT F/U DR GONZALEZ  -     simvastatin (ZOCOR) 20 MG tablet; Take 1 tablet by mouth nightly  -     ranolazine (RANEXA) 500 MG extended release tablet; Take 1 tablet by mouth 2 times daily  -     isosorbide mononitrate (IMDUR) 60 MG extended release tablet; Take 1 tablet by mouth daily  -     Comprehensive Metabolic Panel; Future  -     CBC Auto Differential; Future  -     Lipid Panel; Future    Essential hypertension  - Blood pressure stable and will continue current regimen. Will plan periodic monitoring of renal function, electrolytes, lipid profile.     -     atenolol (TENORMIN) 25 MG tablet; Take 1 tablet by mouth daily  -     amLODIPine (NORVASC) 5 MG tablet;  Take 2 tablets by mouth daily    Pruritic condition-STABLE ON ANTIHISTAMINE, continue present management. Will monitor.    -     fexofenadine (ALLEGRA) 60 MG tablet; Take 1 tablet by mouth daily    Depression, unspecified depression type  - Mood stable on current regimen w/o any significant manifestations of severe depression or anxiety noted at this time. Cont current meds. -     sertraline (ZOLOFT) 50 MG tablet; Take 1 tablet by mouth daily    Idiopathic gout of multiple sites, unspecified chronicity-  REMAINS STABLE IN REMISSION W CURRENTSUPPRESSIVE THERAPY. WILL MONITOR URIC ACID LEVELS PERIODICALLY. RECENT UR ACID LEVELS NL  -     allopurinol (ZYLOPRIM) 100 MG tablet; Take 1 tablet by mouth daily    Gastroesophageal reflux disease without esophagitis  - GERD controlled on meds and will refill, monitor for any recurrent or worsening sx.    -     dexlansoprazole (DEXILANT) 60 MG CPDR delayed release capsule; Take 1 capsule by mouth daily    Mixed hyperlipidemia  - Pt will continue to work on a low fat diet and also exercise, wt loss as appropriate. Will continue periodic monitoring of fasting lipid profile, glucose, liver function. -     simvastatin (ZOCOR) 20 MG tablet; Take 1 tablet by mouth nightly  -     Comprehensive Metabolic Panel; Future  -     CBC Auto Differential; Future  -     Lipid Panel; Future  -     TSH without Reflex; Future    Constipation, slow transit- The current medical regimen is effective;  continue present plan and medications. -     polyethylene glycol (CLEARLAX) 17 GM/SCOOP powder; MIX 17 GRAMS (1 HEAPING TABLESPOONFUL) OF POWDER IN EIGHT (8) OUNCES OF LIQUID AND DRINK DAILY  -     senna (NATURAL SENNA LAXATIVE) 8.6 MG tablet; Take 1 tablet by mouth 2 times daily  -     docusate sodium (COLACE) 100 MG capsule;  Take 1 capsule by mouth daily    Age-related osteoporosis with current pathological fracture, initial encounter  -     Cholecalciferol (VITAMIN D3) 50 MCG (2000 UT) TABS; Take 2,000 Units by mouth daily  -     alendronate (FOSAMAX) 70 MG tablet; Take 1 tablet by mouth every 7 days  -     Vitamin D 25 Hydroxy; Future    Hypothyroidism due to acquired atrophy of thyroid  - Clinically hypothyroidism appears stable and will continue current dosing, also periodic monitoring of TFTs. -     levothyroxine (SYNTHROID) 88 MCG tablet; Take 1 tablet by mouth Daily  -     TSH without Reflex;  Future  -     T4, Free; Future    Need for influenza vaccination  -     INFLUENZA, QUADV, ADJUVANTED, 65 YRS =, IM, PF, PREFILL SYR, 0.5ML (FLUAD)

## 2020-12-10 DIAGNOSIS — E03.4 HYPOTHYROIDISM DUE TO ACQUIRED ATROPHY OF THYROID: ICD-10-CM

## 2020-12-10 DIAGNOSIS — I25.10 CORONARY ARTERY DISEASE INVOLVING NATIVE CORONARY ARTERY OF NATIVE HEART WITHOUT ANGINA PECTORIS: ICD-10-CM

## 2020-12-10 DIAGNOSIS — M80.00XA AGE-RELATED OSTEOPOROSIS WITH CURRENT PATHOLOGICAL FRACTURE, INITIAL ENCOUNTER: ICD-10-CM

## 2020-12-10 DIAGNOSIS — E78.2 MIXED HYPERLIPIDEMIA: ICD-10-CM

## 2020-12-10 LAB
ALBUMIN SERPL-MCNC: 4.2 G/DL
ALP BLD-CCNC: 75 U/L
ALT SERPL-CCNC: 13 U/L
ANION GAP SERPL CALCULATED.3IONS-SCNC: 1.8 MMOL/L
AST SERPL-CCNC: 23 U/L
BASOPHILS ABSOLUTE: 0.1 /ΜL
BASOPHILS RELATIVE PERCENT: 1 %
BILIRUB SERPL-MCNC: 0.3 MG/DL (ref 0.1–1.4)
BUN BLDV-MCNC: 22 MG/DL
CALCIUM SERPL-MCNC: 9.2 MG/DL
CHLORIDE BLD-SCNC: 105 MMOL/L
CHOLESTEROL, TOTAL: 139 MG/DL
CHOLESTEROL/HDL RATIO: NORMAL
CO2: 19 MMOL/L
CREAT SERPL-MCNC: 1.74 MG/DL
EOSINOPHILS ABSOLUTE: 0.3 /ΜL
EOSINOPHILS RELATIVE PERCENT: 6 %
GFR CALCULATED: 25
GLUCOSE BLD-MCNC: 99 MG/DL
HCT VFR BLD CALC: 34.5 % (ref 36–46)
HDLC SERPL-MCNC: 42 MG/DL (ref 35–70)
HEMOGLOBIN: 11.5 G/DL (ref 12–16)
LDL CHOLESTEROL CALCULATED: 73 MG/DL (ref 0–160)
LYMPHOCYTES ABSOLUTE: 2 /ΜL
LYMPHOCYTES RELATIVE PERCENT: 34 %
MCH RBC QN AUTO: 32 PG
MCHC RBC AUTO-ENTMCNC: 33.3 G/DL
MCV RBC AUTO: 96 FL
MONOCYTES ABSOLUTE: 0.5 /ΜL
MONOCYTES RELATIVE PERCENT: 9 %
NEUTROPHILS ABSOLUTE: 3 /ΜL
NEUTROPHILS RELATIVE PERCENT: 50 %
NONHDLC SERPL-MCNC: NORMAL MG/DL
PDW BLD-RTO: 12.2 %
PLATELET # BLD: 282 K/ΜL
PMV BLD AUTO: ABNORMAL FL
POTASSIUM SERPL-SCNC: 4.8 MMOL/L
RBC # BLD: 3.59 10^6/ΜL
SODIUM BLD-SCNC: 138 MMOL/L
T4 FREE: 1.36
TOTAL PROTEIN: 6.5
TRIGL SERPL-MCNC: 137 MG/DL
TSH SERPL DL<=0.05 MIU/L-ACNC: 0.5 UIU/ML
VITAMIN D 25-HYDROXY: 42.6
VITAMIN D2, 25 HYDROXY: NORMAL
VITAMIN D3,25 HYDROXY: NORMAL
VLDLC SERPL CALC-MCNC: 24 MG/DL
WBC # BLD: 5.9 10^3/ML

## 2020-12-14 ENCOUNTER — VIRTUAL VISIT (OUTPATIENT)
Dept: INTERNAL MEDICINE CLINIC | Age: 85
End: 2020-12-14
Payer: COMMERCIAL

## 2020-12-14 PROCEDURE — 1123F ACP DISCUSS/DSCN MKR DOCD: CPT | Performed by: INTERNAL MEDICINE

## 2020-12-14 PROCEDURE — 99214 OFFICE O/P EST MOD 30 MIN: CPT | Performed by: INTERNAL MEDICINE

## 2020-12-14 PROCEDURE — G8484 FLU IMMUNIZE NO ADMIN: HCPCS | Performed by: INTERNAL MEDICINE

## 2020-12-14 PROCEDURE — 1090F PRES/ABSN URINE INCON ASSESS: CPT | Performed by: INTERNAL MEDICINE

## 2020-12-14 PROCEDURE — 1036F TOBACCO NON-USER: CPT | Performed by: INTERNAL MEDICINE

## 2020-12-14 PROCEDURE — G8420 CALC BMI NORM PARAMETERS: HCPCS | Performed by: INTERNAL MEDICINE

## 2020-12-14 PROCEDURE — G8427 DOCREV CUR MEDS BY ELIG CLIN: HCPCS | Performed by: INTERNAL MEDICINE

## 2020-12-14 PROCEDURE — 4040F PNEUMOC VAC/ADMIN/RCVD: CPT | Performed by: INTERNAL MEDICINE

## 2020-12-14 RX ORDER — PREDNISONE 1 MG/1
TABLET ORAL
Qty: 21 TABLET | Refills: 0 | Status: SHIPPED | OUTPATIENT
Start: 2020-12-14 | End: 2021-03-15

## 2020-12-14 NOTE — PROGRESS NOTES
2020    TELEHEALTH EVALUATION -- Audio/Visual (During XACSG-15 public health emergency)    HPI:    Jean Claude Perez (:  1927) has requested an audio/video evaluation for the following concern(s):    R wrist painful and red, swollen yesterday. Hx of gout noted. No trauma. GERD:  Is without sx of heartburn or dysphagia, abd pain. STATES INS WILL NOT COVER [address] [place] [address] IN [facility]. THEY WILL CALL INS FIRST TO CHECK FOR COVERAGE AND THEN CALL US TO TRY FORMULARY FIRST. Hypertension: Stable. Denies CP, SOB, cough, visual changes, dizziness, palpitations or HA. Coronary artery disease has been asymptomatic w/o any ongoing sx of CP, SOB/RENDON, palpitations, lt headedness, diaphoresis. Is continuing medications regularly. SEEING DR GONZALEZ. CKD, also seeing Dr Norma oliveira has been stable. Review of Systems    Prior to Visit Medications    Medication Sig Taking? Authorizing Provider   mineral oil-hydrophilic petrolatum (AQUAPHOR) ointment Apply topically as needed for Dry Skin Apply topically as needed.  Yes Historical Provider, MD ROMAN Complex-C-Zn-Folic Acid (DIALYVITE 108-KSRZ 15) 0.8 MG TABS Take 1 tablet by mouth daily Yes Sean Stewart MD   fexofenadine (ALLEGRA) 60 MG tablet Take 1 tablet by mouth daily Yes Dalton Rico MD   sertraline (ZOLOFT) 50 MG tablet Take 1 tablet by mouth daily Yes Dalton Rico MD   allopurinol (ZYLOPRIM) 100 MG tablet Take 1 tablet by mouth daily Yes Dalton Rico MD   dexlansoprazole (DEXILANT) 60 MG CPDR delayed release capsule Take 1 capsule by mouth daily Yes Dalton Rico MD   simvastatin (ZOCOR) 20 MG tablet Take 1 tablet by mouth nightly Yes Dalton Rico MD   polyethylene glycol (CLEARLAX) 17 GM/SCOOP powder MIX 17 GRAMS (1 HEAPING TABLESPOONFUL) OF POWDER IN EIGHT (8) OUNCES OF LIQUID AND DRINK DAILY Yes Dalton Rico MD senna (NATURAL SENNA LAXATIVE) 8.6 MG tablet Take 1 tablet by mouth 2 times daily Yes Maverick Rhodes MD   docusate sodium (COLACE) 100 MG capsule Take 1 capsule by mouth daily Yes Maverick Rhodes MD   atenolol (TENORMIN) 25 MG tablet Take 1 tablet by mouth daily Yes Maverick Rhodes MD   ranolazine (RANEXA) 500 MG extended release tablet Take 1 tablet by mouth 2 times daily Yes Maverick Rhodes MD   Cholecalciferol (VITAMIN D3) 50 MCG (2000 UT) TABS Take 2,000 Units by mouth daily Yes Maverick Rhodes MD   levothyroxine (SYNTHROID) 88 MCG tablet Take 1 tablet by mouth Daily Yes Maverick Rhodes MD   amLODIPine (NORVASC) 5 MG tablet Take 2 tablets by mouth daily Yes Maverick Rhodes MD   isosorbide mononitrate (IMDUR) 60 MG extended release tablet Take 1 tablet by mouth daily Yes Maverick Rhodes MD   alendronate (FOSAMAX) 70 MG tablet Take 1 tablet by mouth every 7 days Yes Maverick Rhodes MD   triamcinolone (KENALOG) 0.1 % ointment triamcinolone acetonide 0.1 % topical ointment   apply daily to twice daily x 3 weeks then daily x 3 weeks then every other day to the arms, legs, back, chest , abdomen, face Yes Historical Provider, MD   nitroGLYCERIN (NITROSTAT) 0.4 MG SL tablet Place 1 tablet under the tongue every 5 minutes as needed for Chest pain Yes Maverick Rhodes MD   nystatin (MYCOSTATIN) 437036 UNIT/GM powder Apply 3 times daily. Yes Maverick Rhodes MD   acetaminophen (TYLENOL) 325 MG tablet Take 2 tablets by mouth every 6 hours as needed for Pain Yes Maverick Rhodes MD   diphenhydrAMINE (BENADRYL) 25 MG tablet Take 50 mg by mouth nightly Yes Historical Provider, MD   cycloSPORINE (RESTASIS) 0.05 % ophthalmic emulsion Place 1 drop into both eyes 2 times daily Yes Historical Provider, MD   aspirin EC 81 MG EC tablet Take 81 mg by mouth daily.    Yes Historical Provider, MD       Social History     Tobacco Use    Smoking status: Former Smoker  Smokeless tobacco: Never Used   Substance Use Topics    Alcohol use: No    Drug use: No             PHYSICAL EXAMINATION:  [ INSTRUCTIONS:  \"[x]\" Indicates a positive item  \"[]\" Indicates a negative item  -- DELETE ALL ITEMS NOT EXAMINED]  Vital Signs: (As obtained by patient/caregiver or practitioner observation)    Blood pressure-  Heart rate-    Respiratory rate-    Temperature-  Pulse oximetry-     Constitutional: [x] Appears well-developed and well-nourished [] No apparent distress      [] Abnormal-   Mental status  [x] Alert and awake  [] Oriented to person/place/time []Able to follow commands      Eyes:  EOM    []  Normal  [] Abnormal-  Sclera  []  Normal  [] Abnormal -         Discharge []  None visible  [] Abnormal -    HENT:   [] Normocephalic, atraumatic. [] Abnormal   [] Mouth/Throat: Mucous membranes are moist.     External Ears [] Normal  [] Abnormal-     Neck: [] No visualized mass     Pulmonary/Chest: [x] Respiratory effort normal.  [] No visualized signs of difficulty breathing or respiratory distress        [] Abnormal-      Musculoskeletal:   [] Normal gait with no signs of ataxia         [] Normal range of motion of neck        [] Abnormal-       Neurological:        [] No Facial Asymmetry (Cranial nerve 7 motor function) (limited exam to video visit)          [] No gaze palsy        [] Abnormal-         Skin:        [] No significant exanthematous lesions or discoloration noted on facial skin         [] Abnormal-            Psychiatric:       [x] Normal Affect [] No Hallucinations        [] Abnormal-     Other pertinent observable physical exam findings-     ASSESSMENT/PLAN:  1.  Right wrist pain  DDX MOST LIKELY GOUT FLARE, CONSIDER DJD/OA, ALSO FX.  W NO HX OF TRAUMA WILL TRY EMPIRIC RX FIRST W PRED TAPER BUT IF NO IMPROVEMENT LATER THIS WEEK THEN F/U XRAY - predniSONE (DELTASONE) 5 MG tablet; Take 6 tablets by mouth on day 1, 5 on day 2, 4 on day 3, 3 on day 4, 2 on day 5, 1 on day 6. Dispense: 21 tablet; Refill: 0  - XR WRIST RIGHT LIMITED; Future    2. Cerebrovascular accident (CVA) involving cerebellum ([address]  Remains stable neurologically w/o evidence of acute changes/focal deficits. Cont regimen. - Comprehensive Metabolic Panel; Future  - CBC Auto Differential; Future  - Lipid Panel; Future    3. Other secondary hypertension  Blood pressure stable and will continue current regimen. Will plan periodic monitoring of renal function, electrolytes, lipid profile. FOR CONT F/U DR Isiah Corley TOO  - Comprehensive Metabolic Panel; Future  - CBC Auto Differential; Future  - Lipid Panel; Future    4. Coronary artery disease involving native coronary artery of native heart without angina pectoris  Coronary artery disease stable and asymptomatic, will continue to monitor for any signs of instability. Will periodically monitor lipid profile and liver function, cardiac risk factors. Continue current medical regimen. SEEING DR GONZALEZ  - Comprehensive Metabolic Panel; Future  - CBC Auto Differential; Future  - Lipid Panel; Future    5. Gastroesophageal reflux disease without esophagitis  JE WILL CALL HER INS TO SEE IF [place] HAS PREFERENCE ON FORMULARY AND WE'LL TRY THESE, BUT IF NO RESPONSE MAY THEN NEED TO SEEK PREAUTHORIZATION FOR SWITCH BACK TOD DEXILANT    6. Hypothyroidism due to acquired atrophy of thyroid  Clinically hypothyroidism appears stable and will continue current dosing, also periodic monitoring of TFTs.      - TSH without Reflex; Future  - T4, Free; Future    7. Idiopathic gout of multiple sites, unspecified chronicity  F/U UR ACID LEVELS, CONT ALLOPURINOL  - URIC ACID;  Future      Return in about 3 months (around 3/14/2021) for medicare physical. Ellie Pino is a 80 y.o. female being evaluated by a Virtual Visit (video visit) encounter to address concerns as mentioned above. A caregiver was present when appropriate. Due to this being a TeleHealth encounter (During [address] emergency), evaluation of the following organ systems was limited: Vitals/Constitutional/EENT/Resp/CV/GI//MS/Neuro/Skin/Heme-Lymph-Imm. Pursuant to the emergency declaration under the [address] [place] [address] and the [employer] and [employer] Act, this Virtual Visit was conducted with patient's (and/or legal guardian's) consent, to reduce the patient's risk of exposure to COVID-19 and provide necessary medical care. The patient (and/or legal guardian) has also been advised to contact this office for worsening conditions or problems, and seek emergency medical treatment and/or call 911 if deemed necessary. Patient identification was verified at the start of the visit: Yes    Total time spent on this encounter: Not billed by time    Services were provided through a video synchronous discussion virtually to substitute for in-person clinic visit. Patient and provider were located at their individual homes. --Bard Inga MD on 12/14/2020 at 2:48 PM    An electronic signature was used to authenticate this note.

## 2021-01-19 ENCOUNTER — TELEPHONE (OUTPATIENT)
Dept: INTERNAL MEDICINE CLINIC | Age: 86
End: 2021-01-19

## 2021-03-05 DIAGNOSIS — E03.4 HYPOTHYROIDISM DUE TO ACQUIRED ATROPHY OF THYROID: ICD-10-CM

## 2021-03-05 DIAGNOSIS — I25.10 CORONARY ARTERY DISEASE INVOLVING NATIVE CORONARY ARTERY OF NATIVE HEART WITHOUT ANGINA PECTORIS: ICD-10-CM

## 2021-03-05 DIAGNOSIS — I63.9 CEREBROVASCULAR ACCIDENT (CVA) INVOLVING CEREBELLUM (HCC): ICD-10-CM

## 2021-03-05 DIAGNOSIS — M10.09 IDIOPATHIC GOUT OF MULTIPLE SITES, UNSPECIFIED CHRONICITY: ICD-10-CM

## 2021-03-05 DIAGNOSIS — I15.8 OTHER SECONDARY HYPERTENSION: ICD-10-CM

## 2021-03-05 LAB
ALBUMIN SERPL-MCNC: 3.9 G/DL
ALP BLD-CCNC: 69 U/L
ALT SERPL-CCNC: 11 U/L
ANION GAP SERPL CALCULATED.3IONS-SCNC: 1.5 MMOL/L
AST SERPL-CCNC: 22 U/L
BASOPHILS ABSOLUTE: 0.1 /ΜL
BASOPHILS RELATIVE PERCENT: 1 %
BILIRUB SERPL-MCNC: 0.2 MG/DL (ref 0.1–1.4)
BUN BLDV-MCNC: 22 MG/DL
CALCIUM SERPL-MCNC: 9.5 MG/DL
CHLORIDE BLD-SCNC: 111 MMOL/L
CHOLESTEROL, TOTAL: 140 MG/DL
CHOLESTEROL/HDL RATIO: NORMAL
CO2: 20 MMOL/L
CREAT SERPL-MCNC: 1.98 MG/DL
EOSINOPHILS ABSOLUTE: 0.4 /ΜL
EOSINOPHILS RELATIVE PERCENT: 5 %
GFR CALCULATED: 21
GLUCOSE BLD-MCNC: 92 MG/DL
HCT VFR BLD CALC: 32.8 % (ref 36–46)
HDLC SERPL-MCNC: 40 MG/DL (ref 35–70)
HEMOGLOBIN: 10.6 G/DL (ref 12–16)
LDL CHOLESTEROL CALCULATED: 72 MG/DL (ref 0–160)
LYMPHOCYTES ABSOLUTE: 2.4 /ΜL
LYMPHOCYTES RELATIVE PERCENT: 33 %
MCH RBC QN AUTO: 31.3 PG
MCHC RBC AUTO-ENTMCNC: 32.3 G/DL
MCV RBC AUTO: 97 FL
MONOCYTES ABSOLUTE: 0.7 /ΜL
MONOCYTES RELATIVE PERCENT: 10 %
NEUTROPHILS ABSOLUTE: 3.6 /ΜL
NEUTROPHILS RELATIVE PERCENT: 51 %
NONHDLC SERPL-MCNC: NORMAL MG/DL
PDW BLD-RTO: 12.3 %
PLATELET # BLD: 266 K/ΜL
PMV BLD AUTO: ABNORMAL FL
POTASSIUM SERPL-SCNC: 5.3 MMOL/L
RBC # BLD: 3.39 10^6/ΜL
SODIUM BLD-SCNC: 142 MMOL/L
T4 FREE: 1.09
TOTAL PROTEIN: 6.5
TRIGL SERPL-MCNC: 161 MG/DL
TSH SERPL DL<=0.05 MIU/L-ACNC: 0.63 UIU/ML
URIC ACID: 5.2
VLDLC SERPL CALC-MCNC: 28 MG/DL
WBC # BLD: 7.2 10^3/ML

## 2021-03-07 NOTE — RESULT ENCOUNTER NOTE
Call pt, labs ok/chol ok- thyroid fxn nl as well as uric acid level for her hx of gout. She has a stable mild chronic anemia, which is likely due to her underlying kidney disease for which she follows Dr Radha Castillo. I'll send copy of lab to Dr Radha Castillo, pls ALSO ADD IRON, FERRITIN, TIBC FOR DX ANEMIA.   THX

## 2021-03-15 ENCOUNTER — VIRTUAL VISIT (OUTPATIENT)
Dept: INTERNAL MEDICINE CLINIC | Age: 86
End: 2021-03-15
Payer: COMMERCIAL

## 2021-03-15 DIAGNOSIS — K59.01 CONSTIPATION, SLOW TRANSIT: ICD-10-CM

## 2021-03-15 DIAGNOSIS — E78.2 MIXED HYPERLIPIDEMIA: ICD-10-CM

## 2021-03-15 DIAGNOSIS — Z00.00 ROUTINE GENERAL MEDICAL EXAMINATION AT A HEALTH CARE FACILITY: Primary | ICD-10-CM

## 2021-03-15 DIAGNOSIS — M80.00XA AGE-RELATED OSTEOPOROSIS WITH CURRENT PATHOLOGICAL FRACTURE, INITIAL ENCOUNTER: ICD-10-CM

## 2021-03-15 DIAGNOSIS — I25.10 CORONARY ARTERY DISEASE INVOLVING NATIVE CORONARY ARTERY OF NATIVE HEART WITHOUT ANGINA PECTORIS: ICD-10-CM

## 2021-03-15 DIAGNOSIS — I10 ESSENTIAL HYPERTENSION: ICD-10-CM

## 2021-03-15 DIAGNOSIS — F32.A DEPRESSION, UNSPECIFIED DEPRESSION TYPE: ICD-10-CM

## 2021-03-15 DIAGNOSIS — E03.4 HYPOTHYROIDISM DUE TO ACQUIRED ATROPHY OF THYROID: ICD-10-CM

## 2021-03-15 DIAGNOSIS — N18.32 CHRONIC RENAL IMPAIRMENT, STAGE 3B (HCC): ICD-10-CM

## 2021-03-15 DIAGNOSIS — M10.09 IDIOPATHIC GOUT OF MULTIPLE SITES, UNSPECIFIED CHRONICITY: ICD-10-CM

## 2021-03-15 DIAGNOSIS — L29.9 PRURITIC CONDITION: ICD-10-CM

## 2021-03-15 DIAGNOSIS — K21.9 GASTROESOPHAGEAL REFLUX DISEASE WITHOUT ESOPHAGITIS: ICD-10-CM

## 2021-03-15 PROBLEM — D62 ACUTE BLOOD LOSS ANEMIA: Status: RESOLVED | Noted: 2018-08-23 | Resolved: 2021-03-15

## 2021-03-15 PROBLEM — I63.00 CEREBROVASCULAR ACCIDENT (CVA) DUE TO THROMBOSIS OF PRECEREBRAL ARTERY (HCC): Status: RESOLVED | Noted: 2018-10-03 | Resolved: 2021-03-15

## 2021-03-15 PROBLEM — I63.9 CEREBROVASCULAR ACCIDENT (CVA) INVOLVING CEREBELLUM (HCC): Status: RESOLVED | Noted: 2018-10-03 | Resolved: 2021-03-15

## 2021-03-15 PROCEDURE — 4040F PNEUMOC VAC/ADMIN/RCVD: CPT | Performed by: INTERNAL MEDICINE

## 2021-03-15 PROCEDURE — 1123F ACP DISCUSS/DSCN MKR DOCD: CPT | Performed by: INTERNAL MEDICINE

## 2021-03-15 PROCEDURE — G8484 FLU IMMUNIZE NO ADMIN: HCPCS | Performed by: INTERNAL MEDICINE

## 2021-03-15 PROCEDURE — G0439 PPPS, SUBSEQ VISIT: HCPCS | Performed by: INTERNAL MEDICINE

## 2021-03-15 RX ORDER — AMLODIPINE BESYLATE 5 MG/1
10 TABLET ORAL DAILY
Qty: 180 TABLET | Refills: 1 | Status: SHIPPED | OUTPATIENT
Start: 2021-03-15 | End: 2021-08-04

## 2021-03-15 RX ORDER — SIMVASTATIN 20 MG
20 TABLET ORAL NIGHTLY
Qty: 90 TABLET | Refills: 1 | Status: SHIPPED | OUTPATIENT
Start: 2021-03-15 | End: 2021-09-20 | Stop reason: SDUPTHER

## 2021-03-15 RX ORDER — ISOSORBIDE MONONITRATE 60 MG/1
60 TABLET, EXTENDED RELEASE ORAL DAILY
Qty: 90 TABLET | Refills: 1 | Status: SHIPPED | OUTPATIENT
Start: 2021-03-15 | End: 2021-09-20 | Stop reason: SDUPTHER

## 2021-03-15 RX ORDER — ALENDRONATE SODIUM 70 MG/1
70 TABLET ORAL
Qty: 12 TABLET | Refills: 1 | Status: SHIPPED | OUTPATIENT
Start: 2021-03-15 | End: 2021-09-20 | Stop reason: SDUPTHER

## 2021-03-15 RX ORDER — ALLOPURINOL 100 MG/1
100 TABLET ORAL DAILY
Qty: 90 TABLET | Refills: 1 | Status: SHIPPED | OUTPATIENT
Start: 2021-03-15 | End: 2021-09-20 | Stop reason: SDUPTHER

## 2021-03-15 RX ORDER — CHOLECALCIFEROL (VITAMIN D3) 125 MCG
2000 CAPSULE ORAL DAILY
Qty: 90 TABLET | Refills: 1 | Status: SHIPPED | OUTPATIENT
Start: 2021-03-15 | End: 2021-09-20 | Stop reason: SDUPTHER

## 2021-03-15 RX ORDER — VIT B COMPLX C/FOLIC ACID/ZINC 0.8MG-15MG
1 TABLET ORAL DAILY
Qty: 90 TABLET | Refills: 1 | Status: SHIPPED | OUTPATIENT
Start: 2021-03-15 | End: 2021-04-08 | Stop reason: SDUPTHER

## 2021-03-15 RX ORDER — FEXOFENADINE HYDROCHLORIDE 60 MG/1
60 TABLET, FILM COATED ORAL DAILY
Qty: 90 TABLET | Refills: 1 | Status: SHIPPED | OUTPATIENT
Start: 2021-03-15 | End: 2021-09-20 | Stop reason: SDUPTHER

## 2021-03-15 RX ORDER — LEVOTHYROXINE SODIUM 88 UG/1
88 TABLET ORAL DAILY
Qty: 90 TABLET | Refills: 1 | Status: SHIPPED | OUTPATIENT
Start: 2021-03-15 | End: 2021-09-20 | Stop reason: SDUPTHER

## 2021-03-15 RX ORDER — DEXLANSOPRAZOLE 60 MG/1
60 CAPSULE, DELAYED RELEASE ORAL DAILY
Qty: 90 CAPSULE | Refills: 1 | Status: SHIPPED | OUTPATIENT
Start: 2021-03-15 | End: 2021-09-20 | Stop reason: SDUPTHER

## 2021-03-15 RX ORDER — SENNA PLUS 8.6 MG/1
1 TABLET ORAL 2 TIMES DAILY
Qty: 180 TABLET | Refills: 1 | Status: SHIPPED | OUTPATIENT
Start: 2021-03-15 | End: 2021-09-20 | Stop reason: SDUPTHER

## 2021-03-15 RX ORDER — ATENOLOL 25 MG/1
25 TABLET ORAL DAILY
Qty: 180 TABLET | Refills: 1 | Status: SHIPPED | OUTPATIENT
Start: 2021-03-15 | End: 2021-09-20 | Stop reason: SDUPTHER

## 2021-03-15 RX ORDER — RANOLAZINE 500 MG/1
500 TABLET, EXTENDED RELEASE ORAL 2 TIMES DAILY
Qty: 180 TABLET | Refills: 1 | Status: SHIPPED | OUTPATIENT
Start: 2021-03-15 | End: 2021-09-20 | Stop reason: SDUPTHER

## 2021-03-15 RX ORDER — DOCUSATE SODIUM 100 MG/1
100 CAPSULE, LIQUID FILLED ORAL DAILY
Qty: 90 CAPSULE | Refills: 1 | Status: SHIPPED | OUTPATIENT
Start: 2021-03-15 | End: 2021-09-20 | Stop reason: SDUPTHER

## 2021-03-15 ASSESSMENT — LIFESTYLE VARIABLES
HOW OFTEN DO YOU HAVE A DRINK CONTAINING ALCOHOL: NEVER
AUDIT TOTAL SCORE: INCOMPLETE
HOW OFTEN DO YOU HAVE A DRINK CONTAINING ALCOHOL: 0
AUDIT-C TOTAL SCORE: INCOMPLETE

## 2021-03-15 ASSESSMENT — PATIENT HEALTH QUESTIONNAIRE - PHQ9
2. FEELING DOWN, DEPRESSED OR HOPELESS: 0
SUM OF ALL RESPONSES TO PHQ QUESTIONS 1-9: 0

## 2021-03-15 NOTE — PROGRESS NOTES
by mouth daily  Elnoria Ormond, MD   fexofenadine (ALLEGRA) 60 MG tablet Take 1 tablet by mouth daily  Vianey Bhatia MD   sertraline (ZOLOFT) 50 MG tablet Take 1 tablet by mouth daily  Vianey Bhatia MD   allopurinol (ZYLOPRIM) 100 MG tablet Take 1 tablet by mouth daily  Vianey Bhatia MD   dexlansoprazole (DEXILANT) 60 MG CPDR delayed release capsule Take 1 capsule by mouth daily  Vianey Bhatia MD   simvastatin (ZOCOR) 20 MG tablet Take 1 tablet by mouth nightly  Vianey Bhatia MD   senna (NATURAL SENNA LAXATIVE) 8.6 MG tablet Take 1 tablet by mouth 2 times daily  Vianey Bhatia MD   docusate sodium (COLACE) 100 MG capsule Take 1 capsule by mouth daily  Vianey Bhatia MD   atenolol (TENORMIN) 25 MG tablet Take 1 tablet by mouth daily  Vianey Bhatia MD   ranolazine (RANEXA) 500 MG extended release tablet Take 1 tablet by mouth 2 times daily  Vianey Bhatia MD   Cholecalciferol (VITAMIN D3) 50 MCG (2000 UT) TABS Take 2,000 Units by mouth daily  Vianey Bhatia MD   levothyroxine (SYNTHROID) 88 MCG tablet Take 1 tablet by mouth Daily  Vianey Bhatia MD   amLODIPine (NORVASC) 5 MG tablet Take 2 tablets by mouth daily  Vianey Bhatia MD   isosorbide mononitrate (IMDUR) 60 MG extended release tablet Take 1 tablet by mouth daily  Vianey Bhatia MD   alendronate (FOSAMAX) 70 MG tablet Take 1 tablet by mouth every 7 days  Vianey Bhatia MD   triamcinolone (KENALOG) 0.1 % ointment triamcinolone acetonide 0.1 % topical ointment   apply daily to twice daily x 3 weeks then daily x 3 weeks then every other day to the arms, legs, back, chest , abdomen, face  Historical Provider, MD   nitroGLYCERIN (NITROSTAT) 0.4 MG SL tablet Place 1 tablet under the tongue every 5 minutes as needed for Chest pain  Vianey Bhatia MD   nystatin (MYCOSTATIN) 975194 UNIT/GM powder Apply 3 times daily.   Vianey Bhatia MD   acetaminophen (TYLENOL) 325 MG tablet Take 2 complete Health Risk Assessment and screening values have been reviewed and are found in Flowsheets. The following problems were reviewed today and where indicated follow up appointments were made and/or referrals ordered. Positive Risk Factor Screenings with Interventions:           Health Habits/Nutrition:  Health Habits/Nutrition  Do you exercise for at least 20 minutes 2-3 times per week?: (!) No  Have you lost any weight without trying in the past 3 months?: No  Do you eat only one meal per day?: No  Have you seen the dentist within the past year?: Yes     Health Habits/Nutrition Interventions:  · did encourage more reg exercise. ADL:  ADLs  In the past 7 days, did you need help from others to perform any of the following everyday activities? Eating, dressing, grooming, bathing, toileting, or walking/balance?: None  In the past 7 days, did you need help from others to take care of any of the following? Laundry, housekeeping, banking/finances, shopping, telephone use, food preparation, transportation, or taking medications?: (!) Transportation  ADL Interventions:  · guero assists w transport if needed, she has [facility] available too.     Personalized Preventive Plan   Current Health Maintenance Status  Immunization History   Administered Date(s) Administered    Influenza 09/19/2012, 09/27/2013    Influenza, High Dose (Fluzone 65 yrs and older) 09/12/2014, 09/28/2015, 10/21/2016, 08/14/2017, 09/25/2018, 11/18/2019    Influenza, Quadv, adjuvanted, 65 yrs +, IM, PF (Fluad) 09/22/2020    Pneumococcal Conjugate 13-valent ([id number]) 10/22/2015    Pneumococcal Polysaccharide ([id number]) 06/07/2011    Zoster Live (Zostavax) 02/12/2014        Health Maintenance   Topic Date Due    COVID-19 Vaccine (1) Never done    DTaP/Tdap/Td vaccine (1 - Tdap) Never done    Shingles Vaccine (2 of 3) 04/09/2014    Annual Wellness Visit (AWV)  Never done    Lipid screen  03/02/2022    TSH testing  03/02/2022    Potassium monitoring  03/02/2022    Creatinine monitoring  03/02/2022    Flu vaccine  Completed    Pneumococcal 65+ years Vaccine  Completed    Hepatitis A vaccine  Aged Out    Hepatitis B vaccine  Aged Out    Hib vaccine  Aged Out    Meningococcal (ACWY) vaccine  Aged Out     Recommendations for [employer] Due: see orders and patient instructions/AVS.  . Recommended screening schedule for the next 5-10 years is provided to the patient in written form: see Patient Marianne Nielsen was seen today for medicare awv. Diagnoses and all orders for this visit:    Routine general medical examination at a health care facility - remains independent, functional and active, no indications/needs for other interventions noted at this time- except as noted below and also findings noted on screening medicare questions. She continues also w [facility]    Essential hypertension - Blood pressure stable and will continue current regimen. Will plan periodic monitoring of renal function, electrolytes, lipid profile.     -     atenolol (TENORMIN) 25 MG tablet; Take 1 tablet by mouth daily  -     amLODIPine (NORVASC) 5 MG tablet; Take 2 tablets by mouth daily  -     Basic Metabolic Panel; Future  -     Comprehensive Metabolic Panel; Future  -     CBC Auto Differential; Future  -     Lipid Panel; Future    Hypothyroidism due to acquired atrophy of thyroid   - Clinically hypothyroidism appears stable and will continue current dosing, also periodic monitoring of TFTs. -     levothyroxine (SYNTHROID) 88 MCG tablet; Take 1 tablet by mouth Daily  -     TSH without Reflex; Future  -     T4, Free; Future    Pruritic condition-periodic, stable w allegra prn and cont this  -     fexofenadine (ALLEGRA) 60 MG tablet; Take 1 tablet by mouth daily    Depression, unspecified depression type - Mood stable on current regimen w/o any significant manifestations of severe depression or anxiety noted at this time. Cont current meds.     - mouth every 7 days    Chronic renal impairment, stage 3b- F/U RENAL FXN PRIOR TO APPT W DR Ashley Mccord  -     Basic Metabolic Panel; Future    Other orders  -     B Complex-C-Zn-Folic Acid (DIALYVITE 665-CTUP 15) 0.8 MG TABS; Take 1 tablet by mouth daily               Janine Manning is a 80 y.o. female being evaluated by a Virtual Visit (video and audio) encounter to address concerns as mentioned above. A caregiver was present when appropriate. Due to this being a TeleHealth encounter (During ESMRS-51 public health emergency), evaluation of the following organ systems was limited: Vitals/Constitutional/EENT/Resp/CV/GI//MS/Neuro/Skin/Heme-Lymph-Imm. Pursuant to the emergency declaration under the [address] [place] [address], [address] [place] [address] authority and the [employer] and [employer] Act, this Virtual Visit was conducted with patient's (and/or legal guardian's) consent, to reduce the patient's risk of exposure to COVID-19 and provide necessary medical care. The patient (and/or legal guardian) has also been advised to contact this office for worsening conditions or problems, and seek emergency medical treatment and/or call 911 if deemed necessary. Patient identification was verified at the start of the visit: Yes    Services were provided through a video synchronous discussion virtually to substitute for in-person clinic visit. Patient and provider were located at their individual homes. --Trey De La Paz MD on 3/15/2021 at 2:32 PM    An electronic signature was used to authenticate this note.

## 2021-03-15 NOTE — PROGRESS NOTES
Called [facility] to give lab orders and they asked that I fax them the lab orders. I did faxed them to [facility].

## 2021-04-28 DIAGNOSIS — I10 ESSENTIAL HYPERTENSION: ICD-10-CM

## 2021-04-28 DIAGNOSIS — N18.32 CHRONIC RENAL IMPAIRMENT, STAGE 3B (HCC): ICD-10-CM

## 2021-04-28 LAB
BUN BLDV-MCNC: 22 MG/DL
CALCIUM SERPL-MCNC: 9.8 MG/DL
CHLORIDE BLD-SCNC: 109 MMOL/L
CO2: 20 MMOL/L
CREAT SERPL-MCNC: 1.89 MG/DL
GFR CALCULATED: 22
GLUCOSE BLD-MCNC: 104 MG/DL
POTASSIUM SERPL-SCNC: 5.4 MMOL/L
SODIUM BLD-SCNC: 141 MMOL/L

## 2021-06-08 LAB
ALBUMIN SERPL-MCNC: 4 G/DL
BUN / CREAT RATIO: 14
BUN BLDV-MCNC: 26 MG/DL
CALCIUM SERPL-MCNC: 9.3 MG/DL
CHLORIDE BLD-SCNC: 110 MMOL/L
CO2: 21 MMOL/L
CREAT SERPL-MCNC: 1.81 MG/DL
GLUCOSE: 126
PHOSPHORUS: 3.6 MG/DL
POTASSIUM SERPL-SCNC: 4.9 MMOL/L
SODIUM BLD-SCNC: 142 MMOL/L

## 2021-08-04 DIAGNOSIS — I10 ESSENTIAL HYPERTENSION: ICD-10-CM

## 2021-08-04 RX ORDER — AMLODIPINE BESYLATE 5 MG/1
5 TABLET ORAL 2 TIMES DAILY
Qty: 180 TABLET | Refills: 1
Start: 2021-08-04 | End: 2021-08-25 | Stop reason: SDUPTHER

## 2021-08-23 ENCOUNTER — HOSPITAL ENCOUNTER (EMERGENCY)
Age: 86
Discharge: HOME | End: 2021-08-23
Attending: EMERGENCY MEDICINE
Payer: COMMERCIAL

## 2021-08-23 ENCOUNTER — APPOINTMENT (OUTPATIENT)
Dept: GENERAL RADIOLOGY | Age: 86
End: 2021-08-23
Payer: COMMERCIAL

## 2021-08-23 VITALS
WEIGHT: 117 LBS | HEIGHT: 60 IN | TEMPERATURE: 97.1 F | RESPIRATION RATE: 18 BRPM | OXYGEN SATURATION: 94 % | HEART RATE: 66 BPM | DIASTOLIC BLOOD PRESSURE: 66 MMHG | BODY MASS INDEX: 22.97 KG/M2 | SYSTOLIC BLOOD PRESSURE: 148 MMHG

## 2021-08-23 DIAGNOSIS — S92.302A CLOSED NONDISPLACED FRACTURE OF METATARSAL BONE OF LEFT FOOT, UNSPECIFIED METATARSAL, INITIAL ENCOUNTER: Primary | ICD-10-CM

## 2021-08-23 PROCEDURE — 29515 APPLICATION SHORT LEG SPLINT: CPT

## 2021-08-23 PROCEDURE — 6370000000 HC RX 637 (ALT 250 FOR IP): Performed by: EMERGENCY MEDICINE

## 2021-08-23 PROCEDURE — 73630 X-RAY EXAM OF FOOT: CPT

## 2021-08-23 PROCEDURE — 99284 EMERGENCY DEPT VISIT MOD MDM: CPT

## 2021-08-23 RX ORDER — ACETAMINOPHEN 500 MG
1000 TABLET ORAL ONCE
Status: COMPLETED | OUTPATIENT
Start: 2021-08-23 | End: 2021-08-23

## 2021-08-23 RX ADMIN — ACETAMINOPHEN 1000 MG: 500 TABLET, FILM COATED ORAL at 17:39

## 2021-08-23 ASSESSMENT — PAIN SCALES - GENERAL
PAINLEVEL_OUTOF10: 5
PAINLEVEL_OUTOF10: 10

## 2021-08-23 ASSESSMENT — PAIN DESCRIPTION - FREQUENCY: FREQUENCY: INTERMITTENT

## 2021-08-23 ASSESSMENT — PAIN DESCRIPTION - DESCRIPTORS: DESCRIPTORS: ACHING

## 2021-08-23 ASSESSMENT — PAIN DESCRIPTION - LOCATION: LOCATION: FOOT

## 2021-08-23 ASSESSMENT — PAIN DESCRIPTION - ORIENTATION: ORIENTATION: LEFT

## 2021-08-23 NOTE — ED NOTES
The patient presents to the er today with complaints of left foot pain secondary to a fall last night. She reports that she was already sitting on the floor looking for something and when she tried to get up she fell. She does have some bruising to the left foot. She is sitting in a wheel chair.                    Candida Felix RN  08/23/21 [id number]

## 2021-08-23 NOTE — ED NOTES
The patient was discharged to home in stable condition. All discharge instructions, medications and follow up appointments were reviewed with the patient and or significant other. Any and all concerns if voiced were addressed. The patient was instructed to return for worsening symptoms and any other concerns.       Merly Romero RN  08/23/21 [id number]

## 2021-08-23 NOTE — ED PROVIDER NOTES
eMERGENCY dEPARTMENT eNCOUnter      PCP: Bravo Lambert MD    CHIEF COMPLAINT    Chief Complaint   Patient presents with   Preston Garcia    Foot Pain         HPI    Mago Lovelace is a 80 y.o. female who presents with pain localized in the Left foot. Onset is yesterday. Pain is localized to the dorsum of the left foot. The context was she sitting on the floor getting something out when she tried to get up which caused her to fall. States she fell with the foot underneath her. Denies any other injury at the time. Did not hit head or lose consciousness. States pain in the foot today when trying to walk. The pain severity is moderate. The patient has no associated other injuries. The pain is aggravated by ambulation and direct palpation.       REVIEW OF SYSTEMS    General: No fever or chills  Skin: No lacerations or puncture wounds  Musculoskeletal: No other joint pain    All other review of systems are negative  See HPI and nursing notes for additional information       PAST MEDICAL & SURGICAL HISTORY    Past Medical History:   Diagnosis Date    CAD (coronary artery disease)     Dr Tj Figueredo/Jose Figueredo    Chronic renal insufficiency     Dr Janel Teixeira Depression     Eczema     Elevated LFTs 5/11/2012    Generalized osteoarthritis     GERD (gastroesophageal reflux disease)     Esophagitis    Gout     Hyperlipidemia     Hypertension     Hyperuricemia     Hypothyroid     Hypothyroidism    Low back pain     Osteoporosis     Generalized    Vertigo     Dr Mara Nascimento     Past Surgical History:   Procedure Laterality Date    CATARACT REMOVAL      R Aug. 2006 and L March 2007 Dr. Tali Cardona  2004    right foot realignment [facility]    HIP FRACTURE SURGERY Right 08/19/2018    rt hip [facility]    HYSTERECTOMY      TONSILLECTOMY         CURRENT MEDICATIONS    Current Outpatient Rx   Medication Sig Dispense Refill    amLODIPine (NORVASC) 5 MG tablet Take 1 tablet by mouth 2 times daily 180 tablet 1    sodium bicarbonate 325 MG tablet Take 1 tablet by mouth 2 times daily 180 tablet 3    nitroGLYCERIN (NITROSTAT) 0.4 MG SL tablet NITRO 25 tablet 1    fexofenadine (ALLEGRA) 60 MG tablet Take 1 tablet by mouth daily 90 tablet 1    allopurinol (ZYLOPRIM) 100 MG tablet Take 1 tablet by mouth daily 90 tablet 1    dexlansoprazole (DEXILANT) 60 MG CPDR delayed release capsule Take 1 capsule by mouth daily 90 capsule 1    simvastatin (ZOCOR) 20 MG tablet Take 1 tablet by mouth nightly 90 tablet 1    senna (NATURAL SENNA LAXATIVE) 8.6 MG tablet Take 1 tablet by mouth 2 times daily 180 tablet 1    docusate sodium (COLACE) 100 MG capsule Take 1 capsule by mouth daily 90 capsule 1    atenolol (TENORMIN) 25 MG tablet Take 1 tablet by mouth daily 180 tablet 1    ranolazine (RANEXA) 500 MG extended release tablet Take 1 tablet by mouth 2 times daily 180 tablet 1    Cholecalciferol (VITAMIN D3) 50 MCG (2000 UT) TABS Take 2,000 Units by mouth daily 90 tablet 1    levothyroxine (SYNTHROID) 88 MCG tablet Take 1 tablet by mouth Daily 90 tablet 1    isosorbide mononitrate (IMDUR) 60 MG extended release tablet Take 1 tablet by mouth daily 90 tablet 1    alendronate (FOSAMAX) 70 MG tablet Take 1 tablet by mouth every 7 days 12 tablet 1    polyethylene glycol (GAVILAX) 17 GM/SCOOP powder MIX 17 GRAMS (1 HEAPING TABLESPOONFUL) OF POWDER IN 8 OUNCES OF LIQUID AND DRINK DAILY 850 g 3    mineral oil-hydrophilic petrolatum (AQUAPHOR) ointment Apply topically as needed for Dry Skin Apply topically as needed.  triamcinolone (KENALOG) 0.1 % ointment triamcinolone acetonide 0.1 % topical ointment   apply daily to twice daily x 3 weeks then daily x 3 weeks then every other day to the arms, legs, back, chest , abdomen, face      nystatin (MYCOSTATIN) 086850 UNIT/GM powder Apply 3 times daily.  60 g 1    diphenhydrAMINE (BENADRYL) 25 MG tablet Take 50 mg by mouth nightly      cycloSPORINE (RESTASIS) 0.05 % ophthalmic emulsion Place 1 drop into both eyes 2 times daily      aspirin EC 81 MG EC tablet Take 81 mg by mouth daily.  B Complex-C-Zn-Folic Acid (DIALYVITE 800-XOHR 15) 0.8 MG TABS Take 1 tablet by mouth daily 30 tablet 11    sertraline (ZOLOFT) 50 MG tablet Take 1 tablet by mouth daily 90 tablet 1    acetaminophen (TYLENOL) 325 MG tablet Take 2 tablets by mouth every 6 hours as needed for Pain 120 tablet 3       ALLERGIES    Allergies   Allergen Reactions    Bactrim [Sulfamethoxazole-Trimethoprim] Nausea And Vomiting    Cephalexin Rash    Tape Sharl Freedom Tape] Itching       SOCIAL & FAMILY HISTORY    Social History     Socioeconomic History    Marital status:      Spouse name: None    Number of children: None    Years of education: None    Highest education level: None   Occupational History    Occupation: Retired from [employer]: as noted   Tobacco Use    Smoking status: Former Smoker    Smokeless tobacco: Never Used   Substance and Sexual Activity    Alcohol use: No    Drug use: No    Sexual activity: None   Other Topics Concern    None   Social History Narrative    Marital Status:   she lives in an [facility] and  has been in [facility] since 2004        Diet:  Low salt        Seat Belts:  Always     Social Determinants of Health     Financial Resource Strain:     Difficulty of Paying Living Expenses:    Food Insecurity:     Worried About Running Out of Food in the Last Year:     [address] in the Last Year:    Transportation Needs:     Lack of Transportation (Medical):      Lack of Transportation (Non-Medical):    Physical Activity:     Days of Exercise per Week:     Minutes of Exercise per Session:    Stress:     Feeling of Stress :    Social Connections:     Frequency of Communication with Friends and Family:     Frequency of Social Gatherings with Friends and Family:     Attends [employer] Services:     Active Member of Clubs or Organizations:     Attends Club or Organization Meetings:     Marital Status:    Intimate Partner Violence:     Fear of Current or Ex-Partner:     Emotionally Abused:     Physically Abused:     Sexually Abused:      Family History   Problem Relation Age of Onset    Other Mother         CAD    Other Father         CAD         PHYSICAL EXAM    VITAL SIGNS: BP (!) 148/66   Pulse 66   Temp 97.1 °F (36.2 °C) (Infrared)   Resp 18   Ht 5' (1.524 m)   Wt 117 lb (53.1 kg)   LMP  (LMP Unknown)   SpO2 94%   BMI 22.85 kg/m²   Constitutional:  Well developed, appears comfortable  HENT:  Atraumatic  Respiratory:  No retractions  Musculoskeletal:   Left Lower Extemity:  The Left foot demonstrates swelling and bruising over the dorsum of the left foot. There is associated tenderness to palpation in this region. No palpable defects. Range of motion difficult to assess due to pain - no obvious deficits. The ankle joint is stable. No swelling, discoloration, or tenderness to palpation of the proximal to distal lower leg bones, ankle & toes  Distal capillary refill, sensation, motor intact. Vascular:  DP pulse 2+, capillary refill intact. Integument:  No open wounds of the left ankle   Neurologic:  Awake alert, no slurred speech       RADIOLOGY   Left foot xrays:  XR FOOT LEFT (MIN 3 VIEWS)    Result Date: 8/23/2021  EXAMINATION: THREE XRAY VIEWS OF THE LEFT FOOT 8/23/2021 4:42 pm COMPARISON: None. HISTORY: ORDERING SYSTEM PROVIDED HISTORY: foot pain, fall TECHNOLOGIST PROVIDED HISTORY: Reason for exam:->foot pain, fall FINDINGS: Nondisplaced fractures distal 3rd, 4th and 5th metatarsals.   There appears to be a Lisfranc injury with a possible fracture of the proximal 2nd metatarsal and the lateral displacement of the 2nd metatarsal.     Nondisplaced fractures distal 3rd, 4th and 5th metatarsals Lisfranc injury with possible fracture of the proximal 2nd metatarsal and lateral subluxation of the 2nd metatarsal         ED COURSE & MEDICAL DECISION MAKING       Vital signs and nursing notes reviewed during ED course. I have independently evaluated this patient . All pertinent Lab data and radiographic results reviewed with patient at bedside. The patient and/or the family were informed of the results of any tests/labs/imaging, the treatment plan, and time was allotted to answer questions. This is a 51-year-old female who presented with foot injury after fall. X-ray was performed which shows fractures of distal third fourth and fifth metatarsals with Lisfranc injury. Given this finding we will plan for nonweightbearing. We do not currently have a boot available in the emergency department, thus will place in a short leg splint for immobilization of this. She seen Dr. Kaylie Hernandez in the past.  Does plan to follow-up with him. We will plan for nonweightbearing at this point and outpatient follow-up with orthopedics within 1 week. Clinical  IMPRESSION    Metatarsal fractures, lis franc injury      To followup with orthopedist in 5-7 days. Diagnosis and plan discussed in detail with patient who understands and agrees. Patient agrees to return emergency department if symptoms worsen or any new symptoms develop.     (Please note the MDM and HPI sections of this note were completed with a voice recognition program.  Efforts were made to edit the dictations but occasionally words are mis-transcribed.)      Francisco Ritter, DO  08/23/21 [id number]

## 2021-08-23 NOTE — ED NOTES
The patients L L leg was placed in a short splint and verified by the Physician.      Aviva Mejía RN  08/23/21 [id number]

## 2021-08-25 DIAGNOSIS — I10 ESSENTIAL HYPERTENSION: ICD-10-CM

## 2021-08-25 RX ORDER — AMLODIPINE BESYLATE 5 MG/1
5 TABLET ORAL 2 TIMES DAILY
Qty: 180 TABLET | Refills: 1 | Status: SHIPPED | OUTPATIENT
Start: 2021-08-25 | End: 2021-09-20 | Stop reason: SDUPTHER

## 2021-08-26 ENCOUNTER — OFFICE VISIT (OUTPATIENT)
Dept: ORTHOPEDIC SURGERY | Age: 86
End: 2021-08-26
Payer: COMMERCIAL

## 2021-08-26 VITALS
BODY MASS INDEX: 22.97 KG/M2 | HEART RATE: 77 BPM | RESPIRATION RATE: 14 BRPM | HEIGHT: 60 IN | WEIGHT: 117 LBS | OXYGEN SATURATION: 97 %

## 2021-08-26 DIAGNOSIS — S92.355A CLOSED NONDISPLACED FRACTURE OF FIFTH METATARSAL BONE OF LEFT FOOT, INITIAL ENCOUNTER: ICD-10-CM

## 2021-08-26 DIAGNOSIS — M19.072 PRIMARY OSTEOARTHRITIS OF LEFT FOOT: ICD-10-CM

## 2021-08-26 DIAGNOSIS — S92.345A CLOSED NONDISPLACED FRACTURE OF FOURTH METATARSAL BONE OF LEFT FOOT, INITIAL ENCOUNTER: Primary | ICD-10-CM

## 2021-08-26 DIAGNOSIS — S92.335A CLOSED NONDISPLACED FRACTURE OF THIRD METATARSAL BONE OF LEFT FOOT, INITIAL ENCOUNTER: ICD-10-CM

## 2021-08-26 PROCEDURE — G8427 DOCREV CUR MEDS BY ELIG CLIN: HCPCS | Performed by: ORTHOPAEDIC SURGERY

## 2021-08-26 PROCEDURE — 99203 OFFICE O/P NEW LOW 30 MIN: CPT | Performed by: ORTHOPAEDIC SURGERY

## 2021-08-26 PROCEDURE — 28470 CLTX METATARSAL FX WO MNP EA: CPT | Performed by: ORTHOPAEDIC SURGERY

## 2021-08-26 PROCEDURE — 1090F PRES/ABSN URINE INCON ASSESS: CPT | Performed by: ORTHOPAEDIC SURGERY

## 2021-08-26 PROCEDURE — 1036F TOBACCO NON-USER: CPT | Performed by: ORTHOPAEDIC SURGERY

## 2021-08-26 PROCEDURE — 1123F ACP DISCUSS/DSCN MKR DOCD: CPT | Performed by: ORTHOPAEDIC SURGERY

## 2021-08-26 PROCEDURE — 4040F PNEUMOC VAC/ADMIN/RCVD: CPT | Performed by: ORTHOPAEDIC SURGERY

## 2021-08-26 PROCEDURE — G8420 CALC BMI NORM PARAMETERS: HCPCS | Performed by: ORTHOPAEDIC SURGERY

## 2021-08-26 NOTE — PROGRESS NOTES
Patient presents to the office for an ED follow where she was seen at [facility] following an injury to the left foot when  she sitting on the floor getting something out when she tried to get up which caused her to fall. States she fell with the foot underneath her. She was given Tylenol the day of her initial injury and has not taken any OTC pain medications since. She has remained non-weightbearing since her ED visit and splinted without further injury. No pain is reported in office today. Denies recent injuries, surgeries, or other conservative therapies reported.          XR FOOT LEFT (MIN 3 VIEWS)  Impression   Nondisplaced fractures distal 3rd, 4th and 5th metatarsals       Lisfranc injury with possible fracture of the proximal 2nd metatarsal and   lateral subluxation of the 2nd metatarsal

## 2021-08-26 NOTE — PATIENT INSTRUCTIONS
Splint removed  Placed in post op shoe  Remove shoe for hygiene, rest, and gentle ROM exercises  May take Tylenol or Ibuprofen as needed  Weightbearing and activities as tolerated   Rest, ice, and elevate as needed  Follow up in 2 weeks

## 2021-08-26 NOTE — PROGRESS NOTES
Violence:     Fear of Current or Ex-Partner:     Emotionally Abused:     Physically Abused:     Sexually Abused:      Current Outpatient Medications   Medication Sig Dispense Refill    amLODIPine (NORVASC) 5 MG tablet Take 1 tablet by mouth 2 times daily 180 tablet 1    sodium bicarbonate 325 MG tablet Take 1 tablet by mouth 2 times daily 180 tablet 3    nitroGLYCERIN (NITROSTAT) 0.4 MG SL tablet NITRO 25 tablet 1    fexofenadine (ALLEGRA) 60 MG tablet Take 1 tablet by mouth daily 90 tablet 1    sertraline (ZOLOFT) 50 MG tablet Take 1 tablet by mouth daily 90 tablet 1    allopurinol (ZYLOPRIM) 100 MG tablet Take 1 tablet by mouth daily 90 tablet 1    dexlansoprazole (DEXILANT) 60 MG CPDR delayed release capsule Take 1 capsule by mouth daily 90 capsule 1    simvastatin (ZOCOR) 20 MG tablet Take 1 tablet by mouth nightly 90 tablet 1    senna (NATURAL SENNA LAXATIVE) 8.6 MG tablet Take 1 tablet by mouth 2 times daily 180 tablet 1    docusate sodium (COLACE) 100 MG capsule Take 1 capsule by mouth daily 90 capsule 1    atenolol (TENORMIN) 25 MG tablet Take 1 tablet by mouth daily 180 tablet 1    ranolazine (RANEXA) 500 MG extended release tablet Take 1 tablet by mouth 2 times daily 180 tablet 1    Cholecalciferol (VITAMIN D3) 50 MCG (2000 UT) TABS Take 2,000 Units by mouth daily 90 tablet 1    levothyroxine (SYNTHROID) 88 MCG tablet Take 1 tablet by mouth Daily 90 tablet 1    isosorbide mononitrate (IMDUR) 60 MG extended release tablet Take 1 tablet by mouth daily 90 tablet 1    alendronate (FOSAMAX) 70 MG tablet Take 1 tablet by mouth every 7 days 12 tablet 1    polyethylene glycol (GAVILAX) 17 GM/SCOOP powder MIX 17 GRAMS (1 HEAPING TABLESPOONFUL) OF POWDER IN 8 OUNCES OF LIQUID AND DRINK DAILY 850 g 3    mineral oil-hydrophilic petrolatum (AQUAPHOR) ointment Apply topically as needed for Dry Skin Apply topically as needed.       triamcinolone (KENALOG) 0.1 % ointment triamcinolone acetonide 0.1 % topical ointment   apply daily to twice daily x 3 weeks then daily x 3 weeks then every other day to the arms, legs, back, chest , abdomen, face      nystatin (MYCOSTATIN) 107803 UNIT/GM powder Apply 3 times daily. 60 g 1    acetaminophen (TYLENOL) 325 MG tablet Take 2 tablets by mouth every 6 hours as needed for Pain 120 tablet 3    diphenhydrAMINE (BENADRYL) 25 MG tablet Take 50 mg by mouth nightly      cycloSPORINE (RESTASIS) 0.05 % ophthalmic emulsion Place 1 drop into both eyes 2 times daily      aspirin EC 81 MG EC tablet Take 81 mg by mouth daily.  B Complex-C-Zn-Folic Acid (DIALYVITE 373-CQGF 15) 0.8 MG TABS Take 1 tablet by mouth daily 30 tablet 11     No current facility-administered medications for this visit. Allergies   Allergen Reactions    Bactrim [Sulfamethoxazole-Trimethoprim] Nausea And Vomiting    Cephalexin Rash    Tape Umer Keysha Tape] Itching         Review of Systems   Constitutional: Negative for chills and fever. HENT: Negative for congestion and sneezing. Eyes: Negative for pain and redness. Respiratory: Negative for chest tightness, shortness of breath and wheezing. Cardiovascular: Negative for chest pain and palpitations. Gastrointestinal: Negative for vomiting. Musculoskeletal: Positive for arthralgias and gait problem. Skin: Negative for color change and rash. Neurological: Negative for weakness and numbness. Psychiatric/Behavioral: Negative for agitation. The patient is not nervous/anxious. Examination:  General Exam:  Vitals: Pulse 77   Resp 14   Ht 5' (1.524 m)   Wt 117 lb (53.1 kg)   LMP  (LMP Unknown)   SpO2 97%   BMI 22.85 kg/m²    Physical Exam  Vitals and nursing note reviewed. Constitutional:       Appearance: Normal appearance. HENT:      Head: Normocephalic and atraumatic.    Eyes:      Conjunctiva/sclera: Conjunctivae normal.      Pupils: Pupils are equal, round, and reactive to articulations including the tarsometatarsal joints with associated subchondral sclerosis and cystic changes in mild areas of subluxation through the Lisfranc area consistent with degenerative joint disease     Office Procedures:  No orders of the defined types were placed in this encounter. Assessment and Plan  1. Left foot third, fourth, fifth metatarsal neck nondisplaced fractures    2. Left midfoot primary osteoarthritis    3. History of previous right hip hemiarthroplasty for fracture    I explained to the patient and her family member that the x-rays are consistent with arthritis through the midfoot with no evidence of acute abnormality or fracture at the Lisfranc joint. We discussed the nondisplaced fractures in the forefoot involving the third, fourth, and fifth metatarsal necks. I have recommended we proceed with nonoperative management of her fracture to allow to heal in its current position. I have fit her for a hard soled postoperative shoe today. She can weight-bear as tolerated on the left foot with the shoe in place. She can use an Ace wrap for protection at night. Follow-up in 2 weeks for repeat x-rays of the left foot.     Electronically signed by Shelby Linda MD on 8/26/2021 at 9:48 AM

## 2021-08-30 PROBLEM — S92.355A CLOSED NONDISPLACED FRACTURE OF FIFTH LEFT METATARSAL BONE: Status: ACTIVE | Noted: 2021-08-30

## 2021-08-30 PROBLEM — S92.335A CLOSED NONDISPLACED FRACTURE OF THIRD METATARSAL BONE OF LEFT FOOT: Status: ACTIVE | Noted: 2021-08-30

## 2021-08-30 PROBLEM — S92.345A CLOSED NONDISPLACED FRACTURE OF FOURTH METATARSAL BONE OF LEFT FOOT: Status: ACTIVE | Noted: 2021-08-30

## 2021-08-30 PROBLEM — M19.072 PRIMARY OSTEOARTHRITIS OF LEFT FOOT: Status: ACTIVE | Noted: 2021-08-30

## 2021-08-30 ASSESSMENT — ENCOUNTER SYMPTOMS
EYE REDNESS: 0
VOMITING: 0
EYE PAIN: 0
CHEST TIGHTNESS: 0
COLOR CHANGE: 0
WHEEZING: 0
SHORTNESS OF BREATH: 0

## 2021-09-07 ENCOUNTER — OFFICE VISIT (OUTPATIENT)
Dept: OBGYN | Age: 86
End: 2021-09-07
Payer: COMMERCIAL

## 2021-09-07 VITALS
HEART RATE: 73 BPM | SYSTOLIC BLOOD PRESSURE: 157 MMHG | BODY MASS INDEX: 22.97 KG/M2 | WEIGHT: 117 LBS | DIASTOLIC BLOOD PRESSURE: 50 MMHG | HEIGHT: 60 IN

## 2021-09-07 DIAGNOSIS — N81.10 VAGINAL PROLAPSE: Primary | ICD-10-CM

## 2021-09-07 PROCEDURE — 99213 OFFICE O/P EST LOW 20 MIN: CPT | Performed by: OBSTETRICS & GYNECOLOGY

## 2021-09-07 SDOH — ECONOMIC STABILITY: FOOD INSECURITY: WITHIN THE PAST 12 MONTHS, THE FOOD YOU BOUGHT JUST DIDN'T LAST AND YOU DIDN'T HAVE MONEY TO GET MORE.: NEVER TRUE

## 2021-09-07 SDOH — ECONOMIC STABILITY: FOOD INSECURITY: WITHIN THE PAST 12 MONTHS, YOU WORRIED THAT YOUR FOOD WOULD RUN OUT BEFORE YOU GOT MONEY TO BUY MORE.: NEVER TRUE

## 2021-09-07 ASSESSMENT — SOCIAL DETERMINANTS OF HEALTH (SDOH): HOW HARD IS IT FOR YOU TO PAY FOR THE VERY BASICS LIKE FOOD, HOUSING, MEDICAL CARE, AND HEATING?: NOT HARD AT ALL

## 2021-09-07 NOTE — PROGRESS NOTES
Sandra Jones  2021  10:37 AM          Sandra Jones is a 80 y.o. female       The patient was seen. She has no chief complaints today. She has been fitted for a # #2; cube type pessary. She states all of her symptoms that she had prior to the pessary have been relieved with its use. She admits to rare light spotting. She last had her pessary cleaned 12 weeks ago. She denies to any vaginal discharge or odor. Her bowels are regular and her voiding pattern is normal.     Blood pressure (!) 157/50, pulse 73, height 5' (1.524 m), weight 117 lb (53.1 kg), not currently breastfeeding. Chaperone for Intimate Exam   Chaperone was mark bui          Abdomen: Soft and non-tender; good bowel sounds; no guarding, rebound or rigidity; no CVA tenderness bilaterally. Extremities: No calf tenderness bilaterally. DTR 2/4 bilaterally. No edema. Perineum/Speculum: There is not any signs of infection; The vaginal vault is without any signs of erythema or erosion. There is no vaginal discharge or odor appreciated. The pessary was cleansed and replaced without any problems and the patient tolerated the procedure well. T.O.S. Ointment was placed with the pessary to decrease discharge/odor. Assessment:   Diagnosis Orders   1. Vaginal prolapse       Chief Complaint   Patient presents with    Other     pt here for pessary check, #2 cube for vaginal prolapse.  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 2018     Priority: High    Bradycardia      Priority: High    LBBB (left bundle branch block)      Priority: High    GERD (gastroesophageal reflux disease)      Priority: Medium     Esophagitis      Hypertension      Priority: Low    Hypothyroid      Priority: Low     Hypothyroidism      Vaginal prolapse 2021    Primary osteoarthritis of left foot 2021    Closed nondisplaced fracture of fourth metatarsal bone of left foot 08/30/2021    Closed nondisplaced fracture of third metatarsal bone of left foot 08/30/2021    Closed nondisplaced fracture of fifth left metatarsal bone 08/30/2021    Vertigo      Dr Allen Tai Dizziness     Labyrinthitis     Chronic renal impairment, stage 3 (moderate) (HCC)     Constipation, slow transit 06/19/2018    Cystitis     Physical deconditioning     Pruritic condition 01/22/2016    Gout     Hyperuricemia     Low back pain     Eczema     Coronary artery disease involving native heart without angina pectoris      Dr Valaria Rinne Ahmed/Jose Figueredo      Depression     Hyperlipidemia     Generalized osteoarthritis     Osteoporosis      Generalized           Plan:  1. Return to the office 10-12 weeks  2. Report any vaginal bleeding or discharge  3. Abstinence  4. Annual Follow-up reviewed with patient. They will schedule appointment         Diagnosis Orders   1. Vaginal prolapse          The patient had her preventative health maintenance recommendations and follow-up reviewed with her at the completion of her visit.

## 2021-09-20 ENCOUNTER — OFFICE VISIT (OUTPATIENT)
Dept: INTERNAL MEDICINE CLINIC | Age: 86
End: 2021-09-20
Payer: COMMERCIAL

## 2021-09-20 VITALS
OXYGEN SATURATION: 98 % | BODY MASS INDEX: 23.64 KG/M2 | DIASTOLIC BLOOD PRESSURE: 64 MMHG | WEIGHT: 120.4 LBS | HEIGHT: 60 IN | SYSTOLIC BLOOD PRESSURE: 132 MMHG | HEART RATE: 66 BPM

## 2021-09-20 DIAGNOSIS — E78.2 MIXED HYPERLIPIDEMIA: ICD-10-CM

## 2021-09-20 DIAGNOSIS — F32.A DEPRESSION, UNSPECIFIED DEPRESSION TYPE: ICD-10-CM

## 2021-09-20 DIAGNOSIS — M80.00XA AGE-RELATED OSTEOPOROSIS WITH CURRENT PATHOLOGICAL FRACTURE, INITIAL ENCOUNTER: ICD-10-CM

## 2021-09-20 DIAGNOSIS — K59.01 CONSTIPATION, SLOW TRANSIT: ICD-10-CM

## 2021-09-20 DIAGNOSIS — N89.8 VAGINAL ITCHING: ICD-10-CM

## 2021-09-20 DIAGNOSIS — L29.9 PRURITIC CONDITION: ICD-10-CM

## 2021-09-20 DIAGNOSIS — M10.09 IDIOPATHIC GOUT OF MULTIPLE SITES, UNSPECIFIED CHRONICITY: ICD-10-CM

## 2021-09-20 DIAGNOSIS — K21.9 GASTROESOPHAGEAL REFLUX DISEASE WITHOUT ESOPHAGITIS: ICD-10-CM

## 2021-09-20 DIAGNOSIS — E03.4 HYPOTHYROIDISM DUE TO ACQUIRED ATROPHY OF THYROID: ICD-10-CM

## 2021-09-20 DIAGNOSIS — I10 ESSENTIAL HYPERTENSION: ICD-10-CM

## 2021-09-20 DIAGNOSIS — Z23 NEED FOR IMMUNIZATION AGAINST INFLUENZA: ICD-10-CM

## 2021-09-20 DIAGNOSIS — I25.10 CORONARY ARTERY DISEASE INVOLVING NATIVE CORONARY ARTERY OF NATIVE HEART WITHOUT ANGINA PECTORIS: ICD-10-CM

## 2021-09-20 DIAGNOSIS — N18.32 CHRONIC RENAL IMPAIRMENT, STAGE 3B (HCC): Primary | ICD-10-CM

## 2021-09-20 DIAGNOSIS — I87.8 CHRONIC VENOUS STASIS: ICD-10-CM

## 2021-09-20 PROCEDURE — 1090F PRES/ABSN URINE INCON ASSESS: CPT | Performed by: INTERNAL MEDICINE

## 2021-09-20 PROCEDURE — 4040F PNEUMOC VAC/ADMIN/RCVD: CPT | Performed by: INTERNAL MEDICINE

## 2021-09-20 PROCEDURE — 90694 VACC AIIV4 NO PRSRV 0.5ML IM: CPT | Performed by: INTERNAL MEDICINE

## 2021-09-20 PROCEDURE — G8427 DOCREV CUR MEDS BY ELIG CLIN: HCPCS | Performed by: INTERNAL MEDICINE

## 2021-09-20 PROCEDURE — G8420 CALC BMI NORM PARAMETERS: HCPCS | Performed by: INTERNAL MEDICINE

## 2021-09-20 PROCEDURE — 99214 OFFICE O/P EST MOD 30 MIN: CPT | Performed by: INTERNAL MEDICINE

## 2021-09-20 PROCEDURE — 1036F TOBACCO NON-USER: CPT | Performed by: INTERNAL MEDICINE

## 2021-09-20 PROCEDURE — G0008 ADMIN INFLUENZA VIRUS VAC: HCPCS | Performed by: INTERNAL MEDICINE

## 2021-09-20 PROCEDURE — 1123F ACP DISCUSS/DSCN MKR DOCD: CPT | Performed by: INTERNAL MEDICINE

## 2021-09-20 RX ORDER — DOCUSATE SODIUM 100 MG/1
100 CAPSULE, LIQUID FILLED ORAL DAILY
Qty: 90 CAPSULE | Refills: 1 | Status: SHIPPED | OUTPATIENT
Start: 2021-09-20 | End: 2022-03-23 | Stop reason: SDUPTHER

## 2021-09-20 RX ORDER — ATENOLOL 25 MG/1
25 TABLET ORAL DAILY
Qty: 180 TABLET | Refills: 1 | Status: SHIPPED | OUTPATIENT
Start: 2021-09-20 | End: 2022-03-23 | Stop reason: SDUPTHER

## 2021-09-20 RX ORDER — POLYETHYLENE GLYCOL 3350 17 G/17G
POWDER, FOR SOLUTION ORAL
Qty: 850 G | Refills: 3 | Status: SHIPPED | OUTPATIENT
Start: 2021-09-20 | End: 2022-03-23 | Stop reason: SDUPTHER

## 2021-09-20 RX ORDER — ALLOPURINOL 100 MG/1
100 TABLET ORAL DAILY
Qty: 90 TABLET | Refills: 1 | Status: SHIPPED | OUTPATIENT
Start: 2021-09-20 | End: 2022-03-23 | Stop reason: SDUPTHER

## 2021-09-20 RX ORDER — FEXOFENADINE HYDROCHLORIDE 60 MG/1
60 TABLET, FILM COATED ORAL DAILY
Qty: 90 TABLET | Refills: 1 | Status: SHIPPED | OUTPATIENT
Start: 2021-09-20 | End: 2022-03-23 | Stop reason: SDUPTHER

## 2021-09-20 RX ORDER — DEXLANSOPRAZOLE 60 MG/1
60 CAPSULE, DELAYED RELEASE ORAL DAILY
Qty: 90 CAPSULE | Refills: 1 | Status: SHIPPED | OUTPATIENT
Start: 2021-09-20 | End: 2022-03-23 | Stop reason: SDUPTHER

## 2021-09-20 RX ORDER — RANOLAZINE 500 MG/1
500 TABLET, EXTENDED RELEASE ORAL 2 TIMES DAILY
Qty: 180 TABLET | Refills: 1 | Status: SHIPPED | OUTPATIENT
Start: 2021-09-20 | End: 2022-03-23 | Stop reason: SDUPTHER

## 2021-09-20 RX ORDER — ISOSORBIDE MONONITRATE 60 MG/1
60 TABLET, EXTENDED RELEASE ORAL DAILY
Qty: 90 TABLET | Refills: 1 | Status: SHIPPED | OUTPATIENT
Start: 2021-09-20 | End: 2022-03-23 | Stop reason: SDUPTHER

## 2021-09-20 RX ORDER — NYSTATIN 100000 [USP'U]/G
POWDER TOPICAL
Qty: 60 G | Refills: 1 | Status: SHIPPED | OUTPATIENT
Start: 2021-09-20 | End: 2022-03-23 | Stop reason: SDUPTHER

## 2021-09-20 RX ORDER — AMLODIPINE BESYLATE 5 MG/1
5 TABLET ORAL 2 TIMES DAILY
Qty: 180 TABLET | Refills: 1 | Status: SHIPPED | OUTPATIENT
Start: 2021-09-20 | End: 2022-03-23 | Stop reason: SDUPTHER

## 2021-09-20 RX ORDER — CHOLECALCIFEROL (VITAMIN D3) 125 MCG
2000 CAPSULE ORAL DAILY
Qty: 90 TABLET | Refills: 1 | Status: SHIPPED | OUTPATIENT
Start: 2021-09-20 | End: 2022-01-25

## 2021-09-20 RX ORDER — NITROGLYCERIN 0.4 MG/1
TABLET SUBLINGUAL
Qty: 25 TABLET | Refills: 1 | Status: SHIPPED | OUTPATIENT
Start: 2021-09-20 | End: 2022-03-23

## 2021-09-20 RX ORDER — SENNA PLUS 8.6 MG/1
1 TABLET ORAL 2 TIMES DAILY
Qty: 180 TABLET | Refills: 1 | Status: SHIPPED | OUTPATIENT
Start: 2021-09-20 | End: 2022-03-23 | Stop reason: SDUPTHER

## 2021-09-20 RX ORDER — ALENDRONATE SODIUM 70 MG/1
70 TABLET ORAL
Qty: 12 TABLET | Refills: 1 | Status: SHIPPED | OUTPATIENT
Start: 2021-09-20 | End: 2022-03-23 | Stop reason: SDUPTHER

## 2021-09-20 RX ORDER — SIMVASTATIN 20 MG
20 TABLET ORAL NIGHTLY
Qty: 90 TABLET | Refills: 1 | Status: SHIPPED | OUTPATIENT
Start: 2021-09-20 | End: 2022-03-23 | Stop reason: SDUPTHER

## 2021-09-20 RX ORDER — LEVOTHYROXINE SODIUM 88 UG/1
88 TABLET ORAL DAILY
Qty: 90 TABLET | Refills: 1 | Status: SHIPPED | OUTPATIENT
Start: 2021-09-20 | End: 2022-03-23 | Stop reason: SDUPTHER

## 2021-09-20 NOTE — PATIENT INSTRUCTIONS
w any recurring dizziness on standing, pls ask the [facility] nurse to check sitting and standing bp, [employer]atics

## 2021-09-20 NOTE — PROGRESS NOTES
rebound. Hernia: No hernia is present. Musculoskeletal:         General: Tenderness (l foot boot in place) present. Cervical back: Neck supple. Right lower leg: Edema present. Left lower leg: Edema present. Lymphadenopathy:      Cervical: No cervical adenopathy. Skin:     General: Skin is warm and dry. Neurological:      General: No focal deficit present. Mental Status: She is alert. Psychiatric:         Mood and Affect: Mood normal.         ASSESSMENT:    1. Chronic renal impairment, stage 3b ([address]    2. Essential hypertension    3. Age-related osteoporosis with current pathological fracture, initial encounter    4. Idiopathic gout of multiple sites, unspecified chronicity    5. Gastroesophageal reflux disease without esophagitis    6. Constipation, slow transit    7. Pruritic condition    8. Coronary artery disease involving native coronary artery of native heart without angina pectoris    9. Hypothyroidism due to acquired atrophy of thyroid    10. Mixed hyperlipidemia    11. Depression, unspecified depression type    12. Vaginal itching    13. Chronic venous stasis    14. Need for immunization against influenza        PLAN:    Maryana Arias was seen today for 6 month follow-up. Diagnoses and all orders for this visit:    Chronic renal impairment, stage 3b ([address]- for continued [facility] and home aide, f/u nephrology and we'll recheck lab today  -     Comprehensive Metabolic Panel; Future  -     CBC Auto Differential; Future  -     PHOSPHORUS; Future  -     Magnesium; Future    Essential hypertension- cont rx, bp stable  F/u lab  -     amLODIPine (NORVASC) 5 MG tablet; Take 1 tablet by mouth 2 times daily  -     atenolol (TENORMIN) 25 MG tablet; Take 1 tablet by mouth daily  -     Comprehensive Metabolic Panel; Future  -     CBC Auto Differential; Future  -     Lipid Panel;  Future    Age-related osteoporosis with current pathological fracture, initial encounter - rf and cont med  - alendronate (FOSAMAX) 70 MG tablet; Take 1 tablet by mouth every 7 days  -     Cholecalciferol (VITAMIN D3) 50 MCG (2000 UT) TABS; Take 2,000 Units by mouth daily    Idiopathic gout of multiple sites, unspecified chronicity -  REMAINS STABLE IN REMISSION W CURRENTSUPPRESSIVE THERAPY. WILL MONITOR URIC ACID LEVELS PERIODICALLY. -     allopurinol (ZYLOPRIM) 100 MG tablet; Take 1 tablet by mouth daily    Gastroesophageal reflux disease without esophagitis - GERD:  Is without sx of heartburn or dysphagia, abd pain. -     dexlansoprazole (DEXILANT) 60 MG CPDR delayed release capsule; Take 1 capsule by mouth daily    Constipation, slow transit- continue present management. Will monitor.    -     docusate sodium (COLACE) 100 MG capsule; Take 1 capsule by mouth daily  -     senna (NATURAL SENNA LAXATIVE) 8.6 MG tablet; Take 1 tablet by mouth 2 times daily  -     polyethylene glycol (GAVILAX) 17 GM/SCOOP powder; MIX 17 GRAMS (1 HEAPING TABLESPOONFUL) OF POWDER IN 8 OUNCES OF LIQUID AND DRINK DAILY    Pruritic condition- rf and cont meds  -     fexofenadine (ALLEGRA) 60 MG tablet; Take 1 tablet by mouth daily  -     Comprehensive Metabolic Panel; Future  -     CBC Auto Differential; Future  -     Lipid Panel; Future    Coronary artery disease involving native coronary artery of native heart without angina pectoris- seeing Dr Jayme Mendieta, cont meds and check lab incl lipids,    -     isosorbide mononitrate (IMDUR) 60 MG extended release tablet; Take 1 tablet by mouth daily  -     simvastatin (ZOCOR) 20 MG tablet; Take 1 tablet by mouth nightly  -     ranolazine (RANEXA) 500 MG extended release tablet; Take 1 tablet by mouth 2 times daily  -     nitroGLYCERIN (NITROSTAT) 0.4 MG SL tablet; NITRO    Hypothyroidism due to acquired atrophy of thyroid - Clinically hypothyroidism appears stable and will continue current dosing, also periodic monitoring of TFTs. -     levothyroxine (SYNTHROID) 88 MCG tablet;  Take 1 tablet by mouth Daily  -     TSH without Reflex; Future  -     T4, Free; Future    Mixed hyperlipidemia  - Pt will continue to work on a low fat diet and also exercise, wt loss as appropriate. Will continue periodic monitoring of fasting lipid profile, glucose, liver function. -     simvastatin (ZOCOR) 20 MG tablet; Take 1 tablet by mouth nightly    Depression, unspecified depression type - Mood stable on current regimen w/o any significant manifestations of severe depression or anxiety noted at this time. Cont current meds. -     sertraline (ZOLOFT) 50 MG tablet; Take 1 tablet by mouth daily    Vaginal itching - cont rx prn  -     nystatin (MYCOSTATIN) 482102 UNIT/GM powder; Apply 3 times daily.     Chronic venous stasis- sending script for compr stockings    Need for immunization against influenza  -     INFLUENZA, QUADV, ADJUVANTED, 65 YRS =, IM, PF, PREFILL SYR, 0.5ML (FLUAD)

## 2021-09-25 ENCOUNTER — HOSPITAL ENCOUNTER (EMERGENCY)
Age: 86
Discharge: HOME | End: 2021-09-25
Attending: STUDENT IN AN ORGANIZED HEALTH CARE EDUCATION/TRAINING PROGRAM
Payer: COMMERCIAL

## 2021-09-25 ENCOUNTER — APPOINTMENT (OUTPATIENT)
Dept: GENERAL RADIOLOGY | Age: 86
End: 2021-09-25
Payer: COMMERCIAL

## 2021-09-25 ENCOUNTER — APPOINTMENT (OUTPATIENT)
Dept: CT IMAGING | Age: 86
End: 2021-09-25
Payer: COMMERCIAL

## 2021-09-25 VITALS
HEART RATE: 68 BPM | SYSTOLIC BLOOD PRESSURE: 175 MMHG | BODY MASS INDEX: 23.56 KG/M2 | TEMPERATURE: 98.2 F | HEIGHT: 60 IN | OXYGEN SATURATION: 99 % | DIASTOLIC BLOOD PRESSURE: 65 MMHG | RESPIRATION RATE: 18 BRPM | WEIGHT: 120 LBS

## 2021-09-25 DIAGNOSIS — M47.892 OTHER OSTEOARTHRITIS OF SPINE, CERVICAL REGION: Primary | ICD-10-CM

## 2021-09-25 DIAGNOSIS — M79.18 MUSCULOSKELETAL PAIN: ICD-10-CM

## 2021-09-25 PROCEDURE — 73030 X-RAY EXAM OF SHOULDER: CPT

## 2021-09-25 PROCEDURE — 70450 CT HEAD/BRAIN W/O DYE: CPT

## 2021-09-25 PROCEDURE — 6370000000 HC RX 637 (ALT 250 FOR IP): Performed by: STUDENT IN AN ORGANIZED HEALTH CARE EDUCATION/TRAINING PROGRAM

## 2021-09-25 PROCEDURE — 72125 CT NECK SPINE W/O DYE: CPT

## 2021-09-25 PROCEDURE — 99285 EMERGENCY DEPT VISIT HI MDM: CPT

## 2021-09-25 PROCEDURE — 93005 ELECTROCARDIOGRAM TRACING: CPT | Performed by: STUDENT IN AN ORGANIZED HEALTH CARE EDUCATION/TRAINING PROGRAM

## 2021-09-25 RX ORDER — LIDOCAINE 4 G/G
1 PATCH TOPICAL DAILY
Status: DISCONTINUED | OUTPATIENT
Start: 2021-09-25 | End: 2021-09-25 | Stop reason: HOSPADM

## 2021-09-25 RX ORDER — LIDOCAINE 4 G/G
1 PATCH TOPICAL DAILY
Qty: 30 PATCH | Refills: 0 | Status: SHIPPED | OUTPATIENT
Start: 2021-09-25 | End: 2021-10-25

## 2021-09-25 RX ORDER — ACETAMINOPHEN 325 MG/1
650 TABLET ORAL ONCE
Status: COMPLETED | OUTPATIENT
Start: 2021-09-25 | End: 2021-09-25

## 2021-09-25 RX ORDER — ACETAMINOPHEN 325 MG/1
650 TABLET ORAL EVERY 8 HOURS PRN
Qty: 120 TABLET | Refills: 3 | Status: SHIPPED | OUTPATIENT
Start: 2021-09-25

## 2021-09-25 RX ADMIN — ACETAMINOPHEN 650 MG: 325 TABLET ORAL at 14:45

## 2021-09-25 ASSESSMENT — PAIN DESCRIPTION - ORIENTATION: ORIENTATION: LEFT

## 2021-09-25 ASSESSMENT — PAIN DESCRIPTION - LOCATION: LOCATION: NECK;SHOULDER

## 2021-09-25 ASSESSMENT — PAIN SCALES - GENERAL
PAINLEVEL_OUTOF10: 5
PAINLEVEL_OUTOF10: 10

## 2021-09-25 ASSESSMENT — PAIN DESCRIPTION - PAIN TYPE: TYPE: ACUTE PAIN

## 2021-09-25 ASSESSMENT — PAIN DESCRIPTION - FREQUENCY: FREQUENCY: CONTINUOUS

## 2021-09-25 NOTE — ED TRIAGE NOTES
Patient arrives via EMS for complaints of neck pain and left shoulder pain for the past 3 weeks. Son reports patient had a fall about 1 month ago but did not have her shoulder assessed at the time of the injury. Patient was seen after the fall but for an ankle injury.

## 2021-09-25 NOTE — ED PROVIDER NOTES
Twelve-lead EKG obtained at 1251 on 25 September 2021 and interpreted by me in the absence of a cardiologist.  There is no criteria ST elevation or reciprocal change. There are no hyperacute T wave changes. There is no sign of acute ischemia or infarction. There is a significant interventricular conduction delay with negative forces across the precordium consistent with a left bundle branch block. There is appropriate discordance. This tracing shows a normal sinus rhythm. Rate and intervals are 68 beats per minute, VA interval 196 milliseconds, QRS duration 142 milliseconds, QTc interval 465 milliseconds, and R axis left shifted at -65 degrees. There is no acute change compared with the most recent EKG dated October 3, 2018 including similar interventricular conduction delay.         Shalini Gifford MD  09/25/21 [id number]

## 2021-09-26 LAB
EKG ATRIAL RATE: 68 BPM
EKG DIAGNOSIS: NORMAL
EKG P AXIS: 31 DEGREES
EKG P-R INTERVAL: 196 MS
EKG Q-T INTERVAL: 438 MS
EKG QRS DURATION: 142 MS
EKG QTC CALCULATION (BAZETT): 465 MS
EKG R AXIS: -65 DEGREES
EKG T AXIS: 87 DEGREES
EKG VENTRICULAR RATE: 68 BPM

## 2021-09-26 PROCEDURE — 93010 ELECTROCARDIOGRAM REPORT: CPT | Performed by: INTERNAL MEDICINE

## 2021-09-26 NOTE — ED PROVIDER NOTES
Emergency Department Encounter    Patient: Phoebe Gordon  MRN: [id number]  : 1927  Date of Evaluation: 2021  ED Provider:  Oniel Villeda MD    Triage Chief Complaint:   Neck Pain (left shoulder pain that has persisted since having a fall about 1 month ago)    [place]:  Phoebe Gordon is a 80 y.o. female presenting with complaint of neck and shoulder pain after a fall about a month ago. Patient states she did have a fall about a month ago. States initially she was not having pain in this region with for the past several weeks she has pain along the right paraspinous region in the cervical spine and to the right trapezius and posterior shoulder. States palpation of this area recreates the pain. States at rest she has no pain but when she moves her shoulder she has pain. States the pain at rest is 0 out of 10. With movement the pain is moderate, stabbing. Denies chest pain, shortness of breath and states she is sure that this is not cardiac in nature and this is musculoskeletal.  Denies cough, sputum production, fevers chills. Denies blurred vision, focal neuro deficits, motor or sensory changes. Denies lightheadedness. States she does have intermittent vertigo over the past 2 weeks but this is similar to prior episodes of vertigo for her and clearly positional and denies any current vertigo. Patient states she has significant help at home and everything is good with her work-up she feels that she is safe at home and does not need take, to the hospital for PT OT or rehab/nursing facility placement. Patient denies any other symptoms including abdominal pain, T or L-spine pain, change in urination, change in bowel habits, motor or sensory changes.     ROS - see HPI, below listed is current ROS at time of my eval:  At least 10 point review of system reviewed, negative other than HPI    Past Medical History:   Diagnosis Date    CAD (coronary artery disease)     Dr Angle Montero Chronic renal insufficiency     Dr Kyung Kaufman    Depression     Eczema     Elevated LFTs 5/11/2012    Generalized osteoarthritis     GERD (gastroesophageal reflux disease)     Esophagitis    Gout     Hyperlipidemia     Hypertension     Hyperuricemia     Hypothyroid     Hypothyroidism    Low back pain     Osteoporosis     Generalized    Vertigo     Dr Rose Hammans     Past Surgical History:   Procedure Laterality Date    CATARACT REMOVAL      R Aug. 2006 and L March 2007 Dr. Garcia Born  2004    right foot realignment [id number] Right 08/19/2018    rt hip Zheng    HYSTERECTOMY      TONSILLECTOMY       Family History   Problem Relation Age of Onset    Other Mother         CAD    Other Father         CAD     Social History     Socioeconomic History    Marital status:      Spouse name: Not on file    Number of children: Not on file    Years of education: Not on file    Highest education level: Not on file   Occupational History    Occupation: Retired from [employer]: as noted   Tobacco Use    Smoking status: Former Smoker    Smokeless tobacco: Never Used   Vaping Use    Vaping Use: Never used   Substance and Sexual Activity    Alcohol use: No    Drug use: No    Sexual activity: Not on file   Other Topics Concern    Not on file   Social History Narrative    Marital Status:   she lives in an apt and  has been in ECF since 2004        Diet:  Low salt        Seat Belts:  Always     Social Determinants of Health     Financial Resource Strain: Low Risk     Difficulty of Paying Living Expenses: Not hard at all   Food Insecurity: No Food Insecurity    Worried About [address] in the Last Year: Never true    [facility] in the Last Year: Never true   Transportation Needs:     Lack of Transportation (Medical):      Lack of Transportation (Non-Medical):    Physical Activity:     Days of Exercise per Week:     Minutes of Exercise per Session:    Stress:     Feeling of Stress :    Social Connections:     Frequency of Communication with Friends and Family:     Frequency of Social Gatherings with Friends and Family:     Attends [employer] Services:     Active Member of Clubs or Organizations:     Attends Club or Organization Meetings:     Marital Status:    Intimate Partner Violence:     Fear of Current or Ex-Partner:     Emotionally Abused:     Physically Abused:     Sexually Abused:      No current facility-administered medications for this encounter.      Current Outpatient Medications   Medication Sig Dispense Refill    acetaminophen (AMINOFEN) 325 MG tablet Take 2 tablets by mouth every 8 hours as needed for Pain 120 tablet 3    lidocaine 4 % external patch Place 1 patch onto the skin daily 30 patch 0    alendronate (FOSAMAX) 70 MG tablet Take 1 tablet by mouth every 7 days 12 tablet 1    allopurinol (ZYLOPRIM) 100 MG tablet Take 1 tablet by mouth daily 90 tablet 1    amLODIPine (NORVASC) 5 MG tablet Take 1 tablet by mouth 2 times daily 180 tablet 1    atenolol (TENORMIN) 25 MG tablet Take 1 tablet by mouth daily 180 tablet 1    Cholecalciferol (VITAMIN D3) 50 MCG (2000 UT) TABS Take 2,000 Units by mouth daily 90 tablet 1    dexlansoprazole (DEXILANT) 60 MG CPDR delayed release capsule Take 1 capsule by mouth daily 90 capsule 1    docusate sodium (COLACE) 100 MG capsule Take 1 capsule by mouth daily 90 capsule 1    fexofenadine (ALLEGRA) 60 MG tablet Take 1 tablet by mouth daily 90 tablet 1    isosorbide mononitrate (IMDUR) 60 MG extended release tablet Take 1 tablet by mouth daily 90 tablet 1    levothyroxine (SYNTHROID) 88 MCG tablet Take 1 tablet by mouth Daily 90 tablet 1    simvastatin (ZOCOR) 20 MG tablet Take 1 tablet by mouth nightly 90 tablet 1    sertraline (ZOLOFT) 50 MG tablet Take 1 tablet by mouth daily 90 tablet 1    senna (NATURAL SENNA LAXATIVE) 8.6 MG tablet Take 1 tablet by mouth 2 times daily 180 tablet 1    ranolazine (RANEXA) 500 MG extended release tablet Take 1 tablet by mouth 2 times daily 180 tablet 1    polyethylene glycol (GAVILAX) 17 GM/SCOOP powder MIX 17 GRAMS (1 HEAPING TABLESPOONFUL) OF POWDER IN 8 OUNCES OF LIQUID AND DRINK DAILY 850 g 3    nystatin (MYCOSTATIN) 238607 UNIT/GM powder Apply 3 times daily. 60 g 1    nitroGLYCERIN (NITROSTAT) 0.4 MG SL tablet NITRO 25 tablet 1    sodium bicarbonate 325 MG tablet Take 1 tablet by mouth 2 times daily 180 tablet 3    B Complex-C-Zn-Folic Acid (DIALYVITE 654-EACO 15) 0.8 MG TABS Take 1 tablet by mouth daily 30 tablet 11    mineral oil-hydrophilic petrolatum (AQUAPHOR) ointment Apply topically as needed for Dry Skin Apply topically as needed.  triamcinolone (KENALOG) 0.1 % ointment triamcinolone acetonide 0.1 % topical ointment   apply daily to twice daily x 3 weeks then daily x 3 weeks then every other day to the arms, legs, back, chest , abdomen, face      diphenhydrAMINE (BENADRYL) 25 MG tablet Take 50 mg by mouth nightly      cycloSPORINE (RESTASIS) 0.05 % ophthalmic emulsion Place 1 drop into both eyes 2 times daily      aspirin EC 81 MG EC tablet Take 81 mg by mouth daily. Allergies   Allergen Reactions    Keflex [Cephalexin]     Bactrim [Sulfamethoxazole-Trimethoprim] Nausea And Vomiting    Cephalexin Rash    Tape [Adhesive Tape] Itching       Nursing Notes Reviewed    Physical Exam:  Triage VS:    ED Triage Vitals [09/25/21 1243]   Enc Vitals Group      BP (!) 175/65      Pulse 68      Resp 18      Temp 98.2 °F (36.8 °C)      Temp Source Oral      SpO2 99 %      Weight 120 lb (54.4 kg)      Height 5' (1.524 m)      Head Circumference       Peak Flow       Pain Score       Pain Loc       Pain Edu? Excl. in [address]? My pulse ox interpretation is - normal    General appearance:  No acute distress. Skin:  Warm. Dry. Eye:  Extraocular movements intact.      Ears, nose, mouth and throat:  Oral mucosa moist   Neck:  Trachea midline. There is no central tenderness to palpation along the C-spine. There is tenderness palpation along the paraspinous region on the right along the C-spine that extends up into insertion of the trapezius onto the occiput down into the upper trapezius, there is no actual tenderness to palpation along the shoulder but mostly over the trapezius which patient states recreates the pain  Extremity:  No swelling. Normal ROM     Heart:  Regular rate and rhythm, normal S1 & S2, no extra heart sounds. Perfusion:  intact  Respiratory:  Lungs clear to auscultation bilaterally. Respirations nonlabored. Abdominal:  Normal bowel sounds. Soft. Nontender. Non distended. Back:  No CVA tenderness to palpation, no tenderness palpation along the T or L-spine, no tenderness palpation along the paraspinous region or the musculature of the lower back     Neurological:  Alert and oriented times 3. No focal neuro deficits.   NIH 0          Psychiatric:  Appropriate    I have reviewed and interpreted all of the currently available lab results from this visit (if applicable):  Results for orders placed or performed during the hospital encounter of 09/25/21   EKG 12 Lead   Result Value Ref Range    Ventricular Rate 68 BPM    Atrial Rate 68 BPM    P-R Interval 196 ms    QRS Duration 142 ms    Q-T Interval 438 ms    QTc Calculation (Bazett) 465 ms    P Axis 31 degrees    R Axis -65 degrees    T Axis 87 degrees    Diagnosis       Normal sinus rhythm  Left axis deviation  Left bundle branch block  Abnormal ECG  When compared with ECG of 03-OCT-2018 00:18,  No significant change was found  Confirmed by [facility] CHAVEZ ([id number]) on 9/26/2021 5:10:07 AM        Radiographs (if obtained):  Radiologist's Report Reviewed:  XR SHOULDER RIGHT (MIN 2 VIEWS)    Result Date: 9/25/2021  EXAMINATION: TWO XRAY VIEWS OF THE RIGHT SHOULDER 9/25/2021 3:48 pm COMPARISON: Chest x-ray August 18, 2018 HISTORY: ORDERING SYSTEM PROVIDED HISTORY: pain TECHNOLOGIST PROVIDED HISTORY: Reason for exam:->pain Reason for Exam: pain Acuity: Acute Type of Exam: Initial Additional signs and symptoms: Patient arrives via EMS for complaints of neck pain and left shoulder pain for the past 3 weeks. FINDINGS: Lungs are hypoventilatory. Chronic appearing interstitial changes of the lungs bilaterally is noted on exam from 2018. Severe atherosclerotic changes of the imaged aorta. No acute fracture of the right shoulder identified. Anatomic alignment of the right shoulder. Mild osteoarthritic changes of the right shoulder. 1. No acute fracture of the right shoulder identified. 2. Mild osteoarthritic changes of the right shoulder. CT HEAD WO CONTRAST    Result Date: 9/25/2021  EXAMINATION: CT OF THE CERVICAL SPINE WITHOUT CONTRAST; CT OF THE HEAD WITHOUT CONTRAST 9/25/2021 12:19 pm; 9/25/2021 12:18 pm TECHNIQUE: CT of the cervical spine was performed without the administration of intravenous contrast. Multiplanar reformatted images are provided for review. Dose modulation, iterative reconstruction, and/or weight based adjustment of the mA/kV was utilized to reduce the radiation dose to as low as reasonably achievable.; CT of the head was performed without the administration of intravenous contrast. Dose modulation, iterative reconstruction, and/or weight based adjustment of the mA/kV was utilized to reduce the radiation dose to as low as reasonably achievable.  COMPARISON: Head CT, 10/03/2018 HISTORY: ORDERING SYSTEM PROVIDED HISTORY: pain TECHNOLOGIST PROVIDED HISTORY: Reason for exam:->pain Decision Support Exception - unselect if not a suspected or confirmed emergency medical condition->Emergency Medical Condition (MA) Reason for Exam: RIGHT NECK PAIN TO SHOULDER, NO TRAUMA; ORDERING SYSTEM PROVIDED HISTORY: pain TECHNOLOGIST PROVIDED HISTORY: Reason for exam:->pain Has a \"code stroke\" or \"stroke alert\" been called? ->No Decision Support Exception - unselect if not a suspected or confirmed emergency medical condition->Emergency Medical Condition (MA) Reason for Exam: RIGHT SIDED HEAD AND NECK PAIN, NO TRAUMA FINDINGS: HEAD CT: BRAIN/VENTRICLES:  No acute loss of the gray-white matter differentiation is identified to suggest acute or subacute infarct. No masses or hemorrhages within the brain parenchyma are found. No evidence of midline shift. There is moderate to severe periventricular low-attenuation, compatible with chronic small vessel ischemic disease. The intracranial vasculature, including the dural venous sinuses, is within normal limits. ORBITS: No acute orbital abnormalities are identified. SINUSES: The visualized paranasal sinuses and mastoid air cells are clear. SOFT TISSUES/SKULL: The calvarium is intact. Extracranial soft tissues are unremarkable. CERVICAL SPINE CT: BONES/ALIGNMENT: No acute fracture or traumatic malalignment is identified. DEGENERATIVE CHANGES: Evaluation of degenerative changes is limited with CT technique. Having said that, the following observations are made: C1-C2: Degenerative hypertrophy, without significant central canal stenosis. C2-C3: Mild circumferential disc bulge/osteophyte complex. No central or foraminal stenosis. C3-C4: Bilateral uncovertebral joint hypertrophy, greater on the right. Moderate to severe right facet hypertrophy. Mild left facet hypertrophy. Changes result in moderate right foraminal stenosis. No left foraminal stenosis. No central canal stenosis. C4-C5: Severe disc height loss. Small circumferential disc bulge/osteophyte complex. Mild bilateral facet hypertrophy. No central canal stenosis. Moderate bilateral foraminal stenosis. C5-C6: Mild bilateral uncovertebral joint hypertrophy. Mild bilateral facet hypertrophy. Mild bilateral foraminal stenosis. No central canal stenosis. C6-C7: Mild bilateral uncovertebral joint hypertrophy.   Mild bilateral facet hypertrophy. Mild bilateral foraminal stenosis. No central canal stenosis. C7-T1: Unremarkable. SOFT TISSUES: The prevertebral soft tissues are unremarkable for age. The visualized lungs are clear. Head CT: No acute intracranial abnormality detected. Cervical spine CT: No acute fracture or traumatic malalignment. Multilevel degenerative disc and facet disease, greatest C3-C4 and C4-C5 as described above. CT CERVICAL SPINE WO CONTRAST    Result Date: 9/25/2021  EXAMINATION: CT OF THE CERVICAL SPINE WITHOUT CONTRAST; CT OF THE HEAD WITHOUT CONTRAST 9/25/2021 12:19 pm; 9/25/2021 12:18 pm TECHNIQUE: CT of the cervical spine was performed without the administration of intravenous contrast. Multiplanar reformatted images are provided for review. Dose modulation, iterative reconstruction, and/or weight based adjustment of the mA/kV was utilized to reduce the radiation dose to as low as reasonably achievable.; CT of the head was performed without the administration of intravenous contrast. Dose modulation, iterative reconstruction, and/or weight based adjustment of the mA/kV was utilized to reduce the radiation dose to as low as reasonably achievable. COMPARISON: Head CT, 10/03/2018 HISTORY: ORDERING SYSTEM PROVIDED HISTORY: pain TECHNOLOGIST PROVIDED HISTORY: Reason for exam:->pain Decision Support Exception - unselect if not a suspected or confirmed emergency medical condition->Emergency Medical Condition (MA) Reason for Exam: RIGHT NECK PAIN TO SHOULDER, NO TRAUMA; ORDERING SYSTEM PROVIDED HISTORY: pain TECHNOLOGIST PROVIDED HISTORY: Reason for exam:->pain Has a \"code stroke\" or \"stroke alert\" been called? ->No Decision Support Exception - unselect if not a suspected or confirmed emergency medical condition->Emergency Medical Condition (MA) Reason for Exam: RIGHT SIDED HEAD AND NECK PAIN, NO TRAUMA FINDINGS: HEAD CT: BRAIN/VENTRICLES:  No acute loss of the gray-white matter differentiation is identified to suggest acute or subacute infarct. No masses or hemorrhages within the brain parenchyma are found. No evidence of midline shift. There is moderate to severe periventricular low-attenuation, compatible with chronic small vessel ischemic disease. The intracranial vasculature, including the dural venous sinuses, is within normal limits. ORBITS: No acute orbital abnormalities are identified. SINUSES: The visualized paranasal sinuses and mastoid air cells are clear. SOFT TISSUES/SKULL: The calvarium is intact. Extracranial soft tissues are unremarkable. CERVICAL SPINE CT: BONES/ALIGNMENT: No acute fracture or traumatic malalignment is identified. DEGENERATIVE CHANGES: Evaluation of degenerative changes is limited with CT technique. Having said that, the following observations are made: C1-C2: Degenerative hypertrophy, without significant central canal stenosis. C2-C3: Mild circumferential disc bulge/osteophyte complex. No central or foraminal stenosis. C3-C4: Bilateral uncovertebral joint hypertrophy, greater on the right. Moderate to severe right facet hypertrophy. Mild left facet hypertrophy. Changes result in moderate right foraminal stenosis. No left foraminal stenosis. No central canal stenosis. C4-C5: Severe disc height loss. Small circumferential disc bulge/osteophyte complex. Mild bilateral facet hypertrophy. No central canal stenosis. Moderate bilateral foraminal stenosis. C5-C6: Mild bilateral uncovertebral joint hypertrophy. Mild bilateral facet hypertrophy. Mild bilateral foraminal stenosis. No central canal stenosis. C6-C7: Mild bilateral uncovertebral joint hypertrophy. Mild bilateral facet hypertrophy. Mild bilateral foraminal stenosis. No central canal stenosis. C7-T1: Unremarkable. SOFT TISSUES: The prevertebral soft tissues are unremarkable for age. The visualized lungs are clear. Head CT: No acute intracranial abnormality detected.  Cervical spine CT: No acute fracture or traumatic malalignment. Multilevel degenerative disc and facet disease, greatest C3-C4 and C4-C5 as described above. EKG (if obtained): (All EKG's are interpreted by myself in the absence of a cardiologist)  Normal sinus rhythm, left bundle branch block, ventricular rate 68, AL interval 196, QRS duration 142, QTc 465, does not meet  scarbosa criteria, no significant change from baseline    MDM:    80year-old female presenting with neck and right shoulder pain for the past several weeks. History and be seen above. Vitals on presentation reassuring patient afebrile satting well on room air. Physical exam shows reproducible tenderness to palpation along the trapezius on the right as well as paraspinous region along cervical spine. There is no central tenderness palpation, no tenderness palpation along the shoulder and no erythema or effusions. Patient is neurovascular intact. NIH is 0. Patient states multiple times that this is not her heart and with reproducible tenderness palpation and baseline EKG do not believe further evaluation is necessary this time. CT head and C-spine were obtained showing no acute changes. There are degenerative changes along the cervical spine which was discussed with patient and family. There are also mild osteoarthritis of the right shoulder. Patient was given Tylenol and Lidoderm patches in the ED. Patient has a history of chronic kidney disease and do not want and initiate NSAIDs at this time. We will continue with Tylenol and Lidoderm patches as I feel these are safest initial treatments for patient. Discussed follow-up with her primary care physician early next week for reevaluation of pain and possible addition of muscle relaxers or further pain control. Patient agreeable to this plan and family agreeable this plan as well. I discussed strict return precautions and patient discharged home. Clinical Impression:  1.  Other osteoarthritis of spine, cervical region    2. Musculoskeletal pain      Disposition referral (if applicable):  Jose A Vicente MD  [id number] [facility]  [phone]    In 2 days      [facility] Emergency Department  [place] [id number] [phone]    If symptoms worsen    Disposition medications (if applicable):  Discharge Medication List as of 9/25/2021  4:51 PM      START taking these medications    Details   lidocaine 4 % external patch Place 1 patch onto the skin daily, TransDERmal, DAILY Starting Sat 9/25/2021, Until Mon 10/25/2021, For 30 days, Disp-30 patch, R-0, Normal           ED Provider Disposition Time  DISPOSITION Decision To Discharge 09/25/2021 04:27:43 PM      Comment: Please note this report has been produced using speech recognition software and may contain errors related to that system including errors in grammar, punctuation, and spelling, as well as words and phrases that may be inappropriate. Efforts were made to edit the dictations.        Rere Gusman MD  09/26/21 9720

## 2021-09-30 ENCOUNTER — OFFICE VISIT (OUTPATIENT)
Dept: ORTHOPEDIC SURGERY | Age: 86
End: 2021-09-30

## 2021-09-30 VITALS
BODY MASS INDEX: 22.97 KG/M2 | OXYGEN SATURATION: 96 % | HEIGHT: 60 IN | HEART RATE: 53 BPM | RESPIRATION RATE: 16 BRPM | WEIGHT: 117 LBS

## 2021-09-30 DIAGNOSIS — S92.355A CLOSED NONDISPLACED FRACTURE OF FIFTH METATARSAL BONE OF LEFT FOOT, INITIAL ENCOUNTER: ICD-10-CM

## 2021-09-30 DIAGNOSIS — S92.345A CLOSED NONDISPLACED FRACTURE OF FOURTH METATARSAL BONE OF LEFT FOOT, INITIAL ENCOUNTER: Primary | ICD-10-CM

## 2021-09-30 DIAGNOSIS — S92.335A CLOSED NONDISPLACED FRACTURE OF THIRD METATARSAL BONE OF LEFT FOOT, INITIAL ENCOUNTER: ICD-10-CM

## 2021-09-30 PROCEDURE — 99024 POSTOP FOLLOW-UP VISIT: CPT | Performed by: ORTHOPAEDIC SURGERY

## 2021-09-30 NOTE — PATIENT INSTRUCTIONS
May transition into regular shoe attire  Weightbearing and activities as tolerated  May take Ibuprofen or Motrin as needed  Rest, ice, and elevate as needed  Work on ROM and strengthening exercises as discussed  Follow up with gait clinic on November 1, 2021 as scheduled  Follow up with Dr. Juliette Darling as discussed to address issues related to the neck  Follow up as needed

## 2021-10-04 NOTE — PROGRESS NOTES
9/30/2021   Chief Complaint   Patient presents with    Fracture     left foot 3rd, 4th, 5th metatarsal fx         History of Present Illness: Jasson Webb is a 80 y.o. female returns today for follow-up of her left foot fractures. She has done well ambulating in her protective shoe. She does have some mild ongoing back pain issues and trouble with balance and her gait mechanics. She denies any instability or pain or swelling at the foot today. Medical History  Patient's medications, allergies, past medical, surgical, social and family histories were reviewed and updated as appropriate. Review of Systems                                            Examination:  General Exam:  Vitals: Pulse 53   Resp 16   Ht 5' (1.524 m)   Wt 117 lb (53.1 kg)   LMP  (LMP Unknown)   SpO2 96%   BMI 22.85 kg/m²    Physical Exam   Left lower extremity:  No tenderness to palpation of the forefoot. No soft tissue swelling present. Stable flatfoot deformity present throughout the foot. Good active range of motion of the toes and ankle with no pain. Excellent stability across the forefoot. Sensation and motor function is intact at baseline      Diagnostic testing:  X-rays reviewed in office, I independently reviewed the films in the office today:   Narrative   3 views of the left foot show excellent healing with consolidation and    callus formation across the metatarsal necks of the second, third, fourth,    fifth metatarsals. Laurel Clement is a crossover hammertoe deformity of the second    toe with overlap of the hallux.  There is a flatfoot deformity with    multifocal degenerative changes present throughout the midfoot.               Office Procedures:  No orders of the defined types were placed in this encounter. Assessment and Plan  1. Left foot second, third, fourth, fifth metatarsal neck nondisplaced fractures     2. Left midfoot primary osteoarthritis     3.   History of previous right hip hemiarthroplasty for fracture    We reviewed the x-rays and they demonstrate excellent healing at her nondisplaced fracture sites throughout the metatarsal necks of the lesser toes. I have advised her to begin weightbearing as tolerated and transition to regular shoes as pain allows. We discussed issues related to her chronic low back pain and she has seen Dr. Patrick Schneider this in the past.    I have given her a referral to go back to see her spine doctor in order to have a repeat epidural injection performed. Continue with therapy to build up gait mechanics and balance.     Follow-up as needed    Electronically signed by Alena Valdivia MD on 10/4/2021 at 11:29 AM

## 2021-10-07 DIAGNOSIS — M54.50 BILATERAL LOW BACK PAIN, UNSPECIFIED CHRONICITY, UNSPECIFIED WHETHER SCIATICA PRESENT: Primary | ICD-10-CM

## 2021-10-08 ENCOUNTER — TELEPHONE (OUTPATIENT)
Dept: INTERNAL MEDICINE CLINIC | Age: 86
End: 2021-10-08

## 2021-10-08 DIAGNOSIS — M50.30 DDD (DEGENERATIVE DISC DISEASE), CERVICAL: Primary | ICD-10-CM

## 2021-10-08 RX ORDER — TRAMADOL HYDROCHLORIDE 50 MG/1
50 TABLET ORAL 2 TIMES DAILY
Qty: 14 TABLET | Refills: 0 | Status: SHIPPED | OUTPATIENT
Start: 2021-10-08 | End: 2021-10-14 | Stop reason: SDUPTHER

## 2021-10-08 NOTE — TELEPHONE ENCOUNTER
This is considered a low dose narcotic so I'll need an appt to safely prescribe but since we do not have appts available today I sent script for tramadol bid prn for one week #14 tabs and pls sched for a VV sometime next week.

## 2021-10-08 NOTE — TELEPHONE ENCOUNTER
Patients grand daughter calls- patient was seeing Dr. Mary Kay Hoyos for foot fracture which is healing. Patient is c/o neck and shoulder pain which a CT was done and showed arthritis. Dr. Mary Kay Hoyos has referred Katlyn Ruiz to Dr. Madhuri Lehman for possible injections. They are waiting on insurance approval. Wanting to know what you recommend for pain. Tylenol does not help. Would a muscle relaxer help? Please advise.

## 2021-10-08 NOTE — TELEPHONE ENCOUNTER
Maggie notified. Will call us back next to make appt, they are hoping to get into [facility] for consult soon.

## 2021-10-08 NOTE — TELEPHONE ENCOUNTER
Maggie said she contacted Dr. Kaitlin Barrera office to and Dr. Maria L Serrano mentioned Tramadol would be safe for patient to take but it would have to be prescribed by Dr. Derrell Wooten.

## 2021-10-14 ENCOUNTER — TELEPHONE (OUTPATIENT)
Dept: INTERNAL MEDICINE CLINIC | Age: 86
End: 2021-10-14

## 2021-10-14 DIAGNOSIS — M50.30 DDD (DEGENERATIVE DISC DISEASE), CERVICAL: Primary | ICD-10-CM

## 2021-10-14 RX ORDER — TRAMADOL HYDROCHLORIDE 50 MG/1
50 TABLET ORAL 2 TIMES DAILY
Qty: 60 TABLET | Refills: 1 | Status: SHIPPED | OUTPATIENT
Start: 2021-10-14 | End: 2021-11-13

## 2021-10-14 NOTE — TELEPHONE ENCOUNTER
Maggie called stating the tramadol has helped a lot. She is wanting to see if you could give her refills until she see's Dr. Harish Dela Cruz on 11/2. Please advise.

## 2021-10-14 NOTE — TELEPHONE ENCOUNTER
Pls call Maggie back. I sent script for #60 w 1rf to [address] [place] [address].   Since this is a controlled substance, I'd also like to sched a f/u appt w her for mid Dec. Either in office or VV ok

## 2021-11-03 ENCOUNTER — TELEPHONE (OUTPATIENT)
Dept: INTERNAL MEDICINE CLINIC | Age: 86
End: 2021-11-03

## 2021-11-03 DIAGNOSIS — R30.0 DYSURIA: Primary | ICD-10-CM

## 2021-11-03 NOTE — TELEPHONE ENCOUNTER
[facility] calls- wanted to know if you would like to check urine? Family noticed dizziness at night and unsteady on feet. Wanted to rule out UTI. Please advise? Also wanted to increased SN visits from every 2 weeks to every week.

## 2022-01-11 ENCOUNTER — OFFICE VISIT (OUTPATIENT)
Dept: OBGYN | Age: 87
End: 2022-01-11
Payer: COMMERCIAL

## 2022-01-11 VITALS
SYSTOLIC BLOOD PRESSURE: 179 MMHG | DIASTOLIC BLOOD PRESSURE: 77 MMHG | BODY MASS INDEX: 23.95 KG/M2 | HEIGHT: 60 IN | WEIGHT: 122 LBS

## 2022-01-11 DIAGNOSIS — N95.2 ATROPHIC VAGINITIS: ICD-10-CM

## 2022-01-11 DIAGNOSIS — N81.10 VAGINAL PROLAPSE: Primary | ICD-10-CM

## 2022-01-11 PROCEDURE — 99214 OFFICE O/P EST MOD 30 MIN: CPT | Performed by: OBSTETRICS & GYNECOLOGY

## 2022-01-11 PROCEDURE — G8427 DOCREV CUR MEDS BY ELIG CLIN: HCPCS | Performed by: OBSTETRICS & GYNECOLOGY

## 2022-01-11 PROCEDURE — 4040F PNEUMOC VAC/ADMIN/RCVD: CPT | Performed by: OBSTETRICS & GYNECOLOGY

## 2022-01-11 PROCEDURE — 1090F PRES/ABSN URINE INCON ASSESS: CPT | Performed by: OBSTETRICS & GYNECOLOGY

## 2022-01-11 PROCEDURE — G8484 FLU IMMUNIZE NO ADMIN: HCPCS | Performed by: OBSTETRICS & GYNECOLOGY

## 2022-01-11 PROCEDURE — 1123F ACP DISCUSS/DSCN MKR DOCD: CPT | Performed by: OBSTETRICS & GYNECOLOGY

## 2022-01-11 PROCEDURE — 1036F TOBACCO NON-USER: CPT | Performed by: OBSTETRICS & GYNECOLOGY

## 2022-01-11 PROCEDURE — G8420 CALC BMI NORM PARAMETERS: HCPCS | Performed by: OBSTETRICS & GYNECOLOGY

## 2022-01-11 RX ORDER — ESTRADIOL 0.1 MG/G
1 CREAM VAGINAL DAILY
Qty: 1 EACH | Refills: 5 | Status: SHIPPED | OUTPATIENT
Start: 2022-01-11

## 2022-01-12 ENCOUNTER — TELEPHONE (OUTPATIENT)
Dept: OBGYN | Age: 87
End: 2022-01-12

## 2022-01-12 NOTE — TELEPHONE ENCOUNTER
Maggie called about Claudia Suarez. They picked up the estrace cream, but she wasn't sure if there was supposed to be a steroid cream prescribed also. Also wanted you to know her pads are hypoallergenic.

## 2022-01-24 DIAGNOSIS — M80.00XA AGE-RELATED OSTEOPOROSIS WITH CURRENT PATHOLOGICAL FRACTURE, INITIAL ENCOUNTER: ICD-10-CM

## 2022-01-25 RX ORDER — CHOLECALCIFEROL (VITAMIN D3) 125 MCG
CAPSULE ORAL
Qty: 90 TABLET | Refills: 1 | Status: SHIPPED | OUTPATIENT
Start: 2022-01-25 | End: 2022-03-23 | Stop reason: SDUPTHER

## 2022-02-02 ENCOUNTER — TELEPHONE (OUTPATIENT)
Dept: OBGYN | Age: 87
End: 2022-02-02

## 2022-02-02 NOTE — TELEPHONE ENCOUNTER
Pt was last seen 1/11/22 for pessary, pt is requesting triamcinolone for redness and irritation. Pt was last prescribed this in 2017 by [facility]. Pt states this rx helped last time she had these issues. What do you recommend?

## 2022-02-03 NOTE — TELEPHONE ENCOUNTER
E Rx sent for triamcinolone 0.1% ointment. Instructed patient to call for follow-up office visit if symptoms were not relieved.

## 2022-03-04 DIAGNOSIS — I25.10 CORONARY ARTERY DISEASE INVOLVING NATIVE CORONARY ARTERY OF NATIVE HEART WITHOUT ANGINA PECTORIS: ICD-10-CM

## 2022-03-04 RX ORDER — NITROGLYCERIN 0.4 MG/1
TABLET SUBLINGUAL
Qty: 25 TABLET | Refills: 1 | OUTPATIENT
Start: 2022-03-04

## 2022-03-11 ENCOUNTER — HOSPITAL ENCOUNTER (OUTPATIENT)
Age: 87
Setting detail: SPECIMEN
Discharge: HOME | End: 2022-03-11
Payer: COMMERCIAL

## 2022-03-11 LAB
ALBUMIN SERPL-MCNC: 4.4 GM/DL (ref 3.4–5)
ANION GAP SERPL CALCULATED.3IONS-SCNC: 12 MMOL/L (ref 4–16)
BUN BLDV-MCNC: 27 MG/DL (ref 6–23)
CALCIUM SERPL-MCNC: 10 MG/DL (ref 8.3–10.6)
CHLORIDE BLD-SCNC: 104 MMOL/L (ref 99–110)
CO2: 22 MMOL/L (ref 21–32)
CREAT SERPL-MCNC: 2 MG/DL (ref 0.6–1.1)
GFR AFRICAN AMERICAN: 28 ML/MIN/1.73M2
GFR NON-AFRICAN AMERICAN: 23 ML/MIN/1.73M2
GLUCOSE BLD-MCNC: 102 MG/DL (ref 70–99)
PHOSPHORUS: 3.7 MG/DL (ref 2.5–4.9)
POTASSIUM SERPL-SCNC: 4.6 MMOL/L (ref 3.5–5.1)
SODIUM BLD-SCNC: 138 MMOL/L (ref 135–145)

## 2022-03-11 PROCEDURE — 80069 RENAL FUNCTION PANEL: CPT

## 2022-03-22 DIAGNOSIS — M80.00XA AGE-RELATED OSTEOPOROSIS WITH CURRENT PATHOLOGICAL FRACTURE, INITIAL ENCOUNTER: ICD-10-CM

## 2022-03-22 DIAGNOSIS — I10 ESSENTIAL HYPERTENSION: ICD-10-CM

## 2022-03-22 DIAGNOSIS — E78.2 MIXED HYPERLIPIDEMIA: ICD-10-CM

## 2022-03-22 DIAGNOSIS — L29.9 PRURITIC CONDITION: ICD-10-CM

## 2022-03-22 DIAGNOSIS — I25.10 CORONARY ARTERY DISEASE INVOLVING NATIVE CORONARY ARTERY OF NATIVE HEART WITHOUT ANGINA PECTORIS: ICD-10-CM

## 2022-03-22 DIAGNOSIS — E03.4 HYPOTHYROIDISM DUE TO ACQUIRED ATROPHY OF THYROID: ICD-10-CM

## 2022-03-22 DIAGNOSIS — M10.09 IDIOPATHIC GOUT OF MULTIPLE SITES, UNSPECIFIED CHRONICITY: ICD-10-CM

## 2022-03-23 ENCOUNTER — OFFICE VISIT (OUTPATIENT)
Dept: INTERNAL MEDICINE CLINIC | Age: 87
End: 2022-03-23
Payer: COMMERCIAL

## 2022-03-23 VITALS
HEART RATE: 57 BPM | OXYGEN SATURATION: 95 % | BODY MASS INDEX: 26.31 KG/M2 | HEIGHT: 60 IN | WEIGHT: 134 LBS | SYSTOLIC BLOOD PRESSURE: 122 MMHG | DIASTOLIC BLOOD PRESSURE: 70 MMHG | RESPIRATION RATE: 14 BRPM

## 2022-03-23 DIAGNOSIS — M10.09 IDIOPATHIC GOUT OF MULTIPLE SITES, UNSPECIFIED CHRONICITY: ICD-10-CM

## 2022-03-23 DIAGNOSIS — I25.10 CORONARY ARTERY DISEASE INVOLVING NATIVE CORONARY ARTERY OF NATIVE HEART WITHOUT ANGINA PECTORIS: ICD-10-CM

## 2022-03-23 DIAGNOSIS — R09.81 SINUS CONGESTION: ICD-10-CM

## 2022-03-23 DIAGNOSIS — Z00.00 MEDICARE ANNUAL WELLNESS VISIT, SUBSEQUENT: ICD-10-CM

## 2022-03-23 DIAGNOSIS — K21.9 GASTROESOPHAGEAL REFLUX DISEASE WITHOUT ESOPHAGITIS: ICD-10-CM

## 2022-03-23 DIAGNOSIS — E03.4 HYPOTHYROIDISM DUE TO ACQUIRED ATROPHY OF THYROID: ICD-10-CM

## 2022-03-23 DIAGNOSIS — Z00.00 ROUTINE GENERAL MEDICAL EXAMINATION AT A HEALTH CARE FACILITY: Primary | ICD-10-CM

## 2022-03-23 DIAGNOSIS — E78.2 MIXED HYPERLIPIDEMIA: ICD-10-CM

## 2022-03-23 DIAGNOSIS — M80.00XA AGE-RELATED OSTEOPOROSIS WITH CURRENT PATHOLOGICAL FRACTURE, INITIAL ENCOUNTER: ICD-10-CM

## 2022-03-23 DIAGNOSIS — K59.01 CONSTIPATION, SLOW TRANSIT: ICD-10-CM

## 2022-03-23 DIAGNOSIS — N89.8 VAGINAL ITCHING: ICD-10-CM

## 2022-03-23 DIAGNOSIS — N18.32 CHRONIC RENAL IMPAIRMENT, STAGE 3B (HCC): ICD-10-CM

## 2022-03-23 DIAGNOSIS — L29.9 PRURITIC CONDITION: ICD-10-CM

## 2022-03-23 DIAGNOSIS — I10 ESSENTIAL HYPERTENSION: ICD-10-CM

## 2022-03-23 DIAGNOSIS — F32.A DEPRESSION, UNSPECIFIED DEPRESSION TYPE: ICD-10-CM

## 2022-03-23 LAB
A/G RATIO: 1.8 (ref 1.1–2.2)
ALBUMIN SERPL-MCNC: 4.6 G/DL (ref 3.4–5)
ALP BLD-CCNC: 70 U/L (ref 40–129)
ALT SERPL-CCNC: 12 U/L (ref 10–40)
ANION GAP SERPL CALCULATED.3IONS-SCNC: 11 MMOL/L (ref 3–16)
AST SERPL-CCNC: 20 U/L (ref 15–37)
BASOPHILS ABSOLUTE: 0 K/UL (ref 0–0.2)
BASOPHILS RELATIVE PERCENT: 0.7 %
BILIRUB SERPL-MCNC: 0.3 MG/DL (ref 0–1)
BUN BLDV-MCNC: 28 MG/DL (ref 7–20)
CALCIUM SERPL-MCNC: 9.6 MG/DL (ref 8.3–10.6)
CHLORIDE BLD-SCNC: 104 MMOL/L (ref 99–110)
CHOLESTEROL, TOTAL: 151 MG/DL (ref 0–199)
CO2: 22 MMOL/L (ref 21–32)
CREAT SERPL-MCNC: 1.7 MG/DL (ref 0.6–1.2)
EOSINOPHILS ABSOLUTE: 0.3 K/UL (ref 0–0.6)
EOSINOPHILS RELATIVE PERCENT: 5 %
GFR AFRICAN AMERICAN: 34
GFR NON-AFRICAN AMERICAN: 28
GLUCOSE BLD-MCNC: 102 MG/DL (ref 70–99)
HCT VFR BLD CALC: 33.6 % (ref 36–48)
HDLC SERPL-MCNC: 49 MG/DL (ref 40–60)
HEMOGLOBIN: 11.5 G/DL (ref 12–16)
LDL CHOLESTEROL CALCULATED: 76 MG/DL
LYMPHOCYTES ABSOLUTE: 2 K/UL (ref 1–5.1)
LYMPHOCYTES RELATIVE PERCENT: 36.2 %
MCH RBC QN AUTO: 33.4 PG (ref 26–34)
MCHC RBC AUTO-ENTMCNC: 34.2 G/DL (ref 31–36)
MCV RBC AUTO: 97.8 FL (ref 80–100)
MONOCYTES ABSOLUTE: 0.5 K/UL (ref 0–1.3)
MONOCYTES RELATIVE PERCENT: 8.2 %
NEUTROPHILS ABSOLUTE: 2.8 K/UL (ref 1.7–7.7)
NEUTROPHILS RELATIVE PERCENT: 49.9 %
PDW BLD-RTO: 13.7 % (ref 12.4–15.4)
PLATELET # BLD: 237 K/UL (ref 135–450)
PMV BLD AUTO: 7.9 FL (ref 5–10.5)
POTASSIUM SERPL-SCNC: 5.1 MMOL/L (ref 3.5–5.1)
RBC # BLD: 3.43 M/UL (ref 4–5.2)
SODIUM BLD-SCNC: 137 MMOL/L (ref 136–145)
T4 FREE: 1.1 NG/DL (ref 0.9–1.8)
TOTAL PROTEIN: 7.1 G/DL (ref 6.4–8.2)
TRIGL SERPL-MCNC: 128 MG/DL (ref 0–150)
TSH SERPL DL<=0.05 MIU/L-ACNC: 1.66 UIU/ML (ref 0.27–4.2)
URIC ACID, SERUM: 5.2 MG/DL (ref 2.6–6)
VITAMIN D 25-HYDROXY: 54 NG/ML
VLDLC SERPL CALC-MCNC: 26 MG/DL
WBC # BLD: 5.6 K/UL (ref 4–11)

## 2022-03-23 PROCEDURE — 1036F TOBACCO NON-USER: CPT | Performed by: INTERNAL MEDICINE

## 2022-03-23 PROCEDURE — 4040F PNEUMOC VAC/ADMIN/RCVD: CPT | Performed by: INTERNAL MEDICINE

## 2022-03-23 PROCEDURE — G0439 PPPS, SUBSEQ VISIT: HCPCS | Performed by: INTERNAL MEDICINE

## 2022-03-23 PROCEDURE — G8484 FLU IMMUNIZE NO ADMIN: HCPCS | Performed by: INTERNAL MEDICINE

## 2022-03-23 PROCEDURE — 99214 OFFICE O/P EST MOD 30 MIN: CPT | Performed by: INTERNAL MEDICINE

## 2022-03-23 PROCEDURE — 1090F PRES/ABSN URINE INCON ASSESS: CPT | Performed by: INTERNAL MEDICINE

## 2022-03-23 PROCEDURE — G8417 CALC BMI ABV UP PARAM F/U: HCPCS | Performed by: INTERNAL MEDICINE

## 2022-03-23 PROCEDURE — 1123F ACP DISCUSS/DSCN MKR DOCD: CPT | Performed by: INTERNAL MEDICINE

## 2022-03-23 PROCEDURE — 36415 COLL VENOUS BLD VENIPUNCTURE: CPT | Performed by: INTERNAL MEDICINE

## 2022-03-23 PROCEDURE — G8427 DOCREV CUR MEDS BY ELIG CLIN: HCPCS | Performed by: INTERNAL MEDICINE

## 2022-03-23 RX ORDER — ALLOPURINOL 100 MG/1
TABLET ORAL
Qty: 90 TABLET | Refills: 1 | OUTPATIENT
Start: 2022-03-23

## 2022-03-23 RX ORDER — ALENDRONATE SODIUM 70 MG/1
70 TABLET ORAL
Qty: 12 TABLET | Refills: 1 | Status: SHIPPED | OUTPATIENT
Start: 2022-03-23 | End: 2022-09-14

## 2022-03-23 RX ORDER — RANOLAZINE 500 MG/1
500 TABLET, EXTENDED RELEASE ORAL 2 TIMES DAILY
Qty: 180 TABLET | Refills: 1 | Status: SHIPPED | OUTPATIENT
Start: 2022-03-23 | End: 2022-03-23 | Stop reason: SDUPTHER

## 2022-03-23 RX ORDER — ALLOPURINOL 100 MG/1
100 TABLET ORAL DAILY
Qty: 90 TABLET | Refills: 1 | Status: SHIPPED | OUTPATIENT
Start: 2022-03-23 | End: 2022-03-23 | Stop reason: SDUPTHER

## 2022-03-23 RX ORDER — CHOLECALCIFEROL (VITAMIN D3) 125 MCG
2000 CAPSULE ORAL DAILY
Qty: 90 TABLET | Refills: 1 | Status: SHIPPED | OUTPATIENT
Start: 2022-03-23 | End: 2022-09-26 | Stop reason: SDUPTHER

## 2022-03-23 RX ORDER — AZELASTINE 1 MG/ML
1 SPRAY, METERED NASAL 2 TIMES DAILY
Qty: 30 ML | Refills: 5 | Status: SHIPPED
Start: 2022-03-23 | End: 2022-06-23 | Stop reason: ALTCHOICE

## 2022-03-23 RX ORDER — FEXOFENADINE HYDROCHLORIDE 60 MG/1
60 TABLET, FILM COATED ORAL DAILY
Qty: 90 TABLET | Refills: 1 | Status: SHIPPED
Start: 2022-03-23 | End: 2022-06-23 | Stop reason: ALTCHOICE

## 2022-03-23 RX ORDER — SIMVASTATIN 20 MG
TABLET ORAL
Qty: 90 TABLET | Refills: 1 | OUTPATIENT
Start: 2022-03-23

## 2022-03-23 RX ORDER — AZELASTINE 1 MG/ML
1 SPRAY, METERED NASAL 2 TIMES DAILY
Qty: 30 ML | Refills: 5 | Status: SHIPPED | OUTPATIENT
Start: 2022-03-23 | End: 2022-03-23 | Stop reason: SDUPTHER

## 2022-03-23 RX ORDER — DEXLANSOPRAZOLE 60 MG/1
60 CAPSULE, DELAYED RELEASE ORAL DAILY
Qty: 90 CAPSULE | Refills: 1 | Status: SHIPPED | OUTPATIENT
Start: 2022-03-23 | End: 2022-09-26 | Stop reason: SDUPTHER

## 2022-03-23 RX ORDER — LEVOTHYROXINE SODIUM 88 UG/1
TABLET ORAL
Qty: 90 TABLET | Refills: 1 | OUTPATIENT
Start: 2022-03-23

## 2022-03-23 RX ORDER — NITROGLYCERIN 0.4 MG/1
TABLET SUBLINGUAL
Qty: 25 TABLET | Refills: 1 | Status: SHIPPED | OUTPATIENT
Start: 2022-03-23 | End: 2022-03-23 | Stop reason: SDUPTHER

## 2022-03-23 RX ORDER — LEVOTHYROXINE SODIUM 88 UG/1
88 TABLET ORAL DAILY
Qty: 90 TABLET | Refills: 1 | Status: SHIPPED | OUTPATIENT
Start: 2022-03-23 | End: 2022-09-14

## 2022-03-23 RX ORDER — FEXOFENADINE HYDROCHLORIDE 60 MG/1
TABLET, FILM COATED ORAL
Qty: 30 TABLET | OUTPATIENT
Start: 2022-03-23

## 2022-03-23 RX ORDER — ALENDRONATE SODIUM 70 MG/1
70 TABLET ORAL
Qty: 12 TABLET | Refills: 1 | Status: SHIPPED | OUTPATIENT
Start: 2022-03-23 | End: 2022-03-23 | Stop reason: SDUPTHER

## 2022-03-23 RX ORDER — AMLODIPINE BESYLATE 5 MG/1
TABLET ORAL
Qty: 180 TABLET | Refills: 1 | OUTPATIENT
Start: 2022-03-23

## 2022-03-23 RX ORDER — ATENOLOL 25 MG/1
25 TABLET ORAL DAILY
Qty: 180 TABLET | Refills: 1 | Status: SHIPPED | OUTPATIENT
Start: 2022-03-23 | End: 2022-09-26 | Stop reason: SDUPTHER

## 2022-03-23 RX ORDER — LEVOTHYROXINE SODIUM 88 UG/1
88 TABLET ORAL DAILY
Qty: 90 TABLET | Refills: 1 | Status: SHIPPED | OUTPATIENT
Start: 2022-03-23 | End: 2022-03-23 | Stop reason: SDUPTHER

## 2022-03-23 RX ORDER — AMLODIPINE BESYLATE 5 MG/1
5 TABLET ORAL 2 TIMES DAILY
Qty: 180 TABLET | Refills: 1 | Status: SHIPPED | OUTPATIENT
Start: 2022-03-23 | End: 2022-09-14

## 2022-03-23 RX ORDER — RANOLAZINE 500 MG/1
500 TABLET, EXTENDED RELEASE ORAL 2 TIMES DAILY
Qty: 180 TABLET | Refills: 1 | Status: SHIPPED | OUTPATIENT
Start: 2022-03-23 | End: 2022-09-14

## 2022-03-23 RX ORDER — SIMVASTATIN 20 MG
20 TABLET ORAL NIGHTLY
Qty: 90 TABLET | Refills: 1 | Status: SHIPPED | OUTPATIENT
Start: 2022-03-23 | End: 2022-03-23 | Stop reason: SDUPTHER

## 2022-03-23 RX ORDER — NYSTATIN 100000 [USP'U]/G
POWDER TOPICAL
Qty: 60 G | Refills: 1 | Status: SHIPPED | OUTPATIENT
Start: 2022-03-23 | End: 2022-09-26 | Stop reason: SDUPTHER

## 2022-03-23 RX ORDER — SIMVASTATIN 20 MG
20 TABLET ORAL NIGHTLY
Qty: 90 TABLET | Refills: 1 | Status: SHIPPED | OUTPATIENT
Start: 2022-03-23 | End: 2022-09-14

## 2022-03-23 RX ORDER — ALENDRONATE SODIUM 70 MG/1
TABLET ORAL
Qty: 12 TABLET | Refills: 1 | OUTPATIENT
Start: 2022-03-23

## 2022-03-23 RX ORDER — AMLODIPINE BESYLATE 5 MG/1
5 TABLET ORAL 2 TIMES DAILY
Qty: 180 TABLET | Refills: 1 | Status: SHIPPED | OUTPATIENT
Start: 2022-03-23 | End: 2022-03-23 | Stop reason: SDUPTHER

## 2022-03-23 RX ORDER — ALLOPURINOL 100 MG/1
100 TABLET ORAL DAILY
Qty: 90 TABLET | Refills: 1 | Status: SHIPPED | OUTPATIENT
Start: 2022-03-23 | End: 2022-09-14

## 2022-03-23 RX ORDER — DOCUSATE SODIUM 100 MG/1
100 CAPSULE, LIQUID FILLED ORAL DAILY
Qty: 90 CAPSULE | Refills: 1 | Status: SHIPPED | OUTPATIENT
Start: 2022-03-23 | End: 2022-09-26 | Stop reason: SDUPTHER

## 2022-03-23 RX ORDER — NITROGLYCERIN 0.4 MG/1
TABLET SUBLINGUAL
Qty: 25 TABLET | Refills: 1 | Status: SHIPPED | OUTPATIENT
Start: 2022-03-23 | End: 2022-09-26 | Stop reason: SDUPTHER

## 2022-03-23 RX ORDER — SENNA PLUS 8.6 MG/1
1 TABLET ORAL 2 TIMES DAILY
Qty: 180 TABLET | Refills: 1 | Status: SHIPPED | OUTPATIENT
Start: 2022-03-23 | End: 2022-09-26 | Stop reason: SDUPTHER

## 2022-03-23 RX ORDER — RANOLAZINE 500 MG/1
TABLET, EXTENDED RELEASE ORAL
Qty: 180 TABLET | Refills: 1 | OUTPATIENT
Start: 2022-03-23

## 2022-03-23 RX ORDER — ISOSORBIDE MONONITRATE 60 MG/1
60 TABLET, EXTENDED RELEASE ORAL DAILY
Qty: 90 TABLET | Refills: 1 | Status: SHIPPED | OUTPATIENT
Start: 2022-03-23 | End: 2022-09-26 | Stop reason: SDUPTHER

## 2022-03-23 RX ORDER — POLYETHYLENE GLYCOL 3350 17 G/17G
POWDER, FOR SOLUTION ORAL
Qty: 850 G | Refills: 3 | Status: SHIPPED | OUTPATIENT
Start: 2022-03-23 | End: 2022-09-26 | Stop reason: SDUPTHER

## 2022-03-23 ASSESSMENT — LIFESTYLE VARIABLES
HOW MANY STANDARD DRINKS CONTAINING ALCOHOL DO YOU HAVE ON A TYPICAL DAY: 1 OR 2
HOW MANY STANDARD DRINKS CONTAINING ALCOHOL DO YOU HAVE ON A TYPICAL DAY: 1 OR 2
HOW OFTEN DO YOU HAVE A DRINK CONTAINING ALCOHOL: NEVER
HOW OFTEN DO YOU HAVE A DRINK CONTAINING ALCOHOL: NEVER

## 2022-03-23 ASSESSMENT — PATIENT HEALTH QUESTIONNAIRE - PHQ9
SUM OF ALL RESPONSES TO PHQ9 QUESTIONS 1 & 2: 0
SUM OF ALL RESPONSES TO PHQ QUESTIONS 1-9: 0
DEPRESSION UNABLE TO ASSESS: FUNCTIONAL CAPACITY MOTIVATION LIMITS ACCURACY
SUM OF ALL RESPONSES TO PHQ QUESTIONS 1-9: 0
SUM OF ALL RESPONSES TO PHQ QUESTIONS 1-9: 0
1. LITTLE INTEREST OR PLEASURE IN DOING THINGS: 0
SUM OF ALL RESPONSES TO PHQ QUESTIONS 1-9: 0
2. FEELING DOWN, DEPRESSED OR HOPELESS: 0

## 2022-03-23 NOTE — PATIENT INSTRUCTIONS
Personalized Preventive Plan for Janine Case - 3/23/2022  Medicare offers a range of preventive health benefits. Some of the tests and screenings are paid in full while other may be subject to a deductible, co-insurance, and/or copay. Some of these benefits include a comprehensive review of your medical history including lifestyle, illnesses that may run in your family, and various assessments and screenings as appropriate. After reviewing your medical record and screening and assessments performed today your provider may have ordered immunizations, labs, imaging, and/or referrals for you. A list of these orders (if applicable) as well as your Preventive Care list are included within your After Visit Summary for your review. Other Preventive Recommendations:    · A preventive eye exam performed by an eye specialist is recommended every 1-2 years to screen for glaucoma; cataracts, macular degeneration, and other eye disorders. · A preventive dental visit is recommended every 6 months. · Try to get at least 150 minutes of exercise per week or 10,000 steps per day on a pedometer . · Order or download the FREE \"Exercise & Physical Activity: Your Everyday Guide\" from [employer]. Call 4-221.386.8684 or search [employer] online. · You need 9369-4762 mg of calcium and 8889-9200 IU of vitamin D per day. It is possible to meet your calcium requirement with diet alone, but a vitamin D supplement is usually necessary to meet this goal.  · When exposed to the sun, use a sunscreen that protects against both UVA and UVB radiation with an SPF of 30 or greater. Reapply every 2 to 3 hours or after sweating, drying off with a towel, or swimming. · Always wear a seat belt when traveling in a car. Always wear a helmet when riding a bicycle or motorcycle.

## 2022-03-23 NOTE — PROGRESS NOTES
Checking lab today prior to [facility]  Medicare Annual Wellness Visit    Clyde Singletary is here for Medicare AWV and 6 Month Follow-Up    Assessment & Plan   Routine general medical examination at a health care facility - remains independent, functional and active, no indications/needs for other interventions noted at this time- except as noted below and also findings noted on screening medicare questions. Coronary artery disease involving native coronary artery of native heart without angina pectoris - Coronary artery disease stable and asymptomatic, will continue to monitor for any signs of instability. Will periodically monitor lipid profile and liver function, cardiac risk factors. Continue current medical regimen. FOR CONT F/U DR GONZALEZ  -     Comprehensive Metabolic Panel; Future  -     CBC with Auto Differential; Future  -     Lipid Panel; Future  -     simvastatin (ZOCOR) 20 MG tablet; Take 1 tablet by mouth nightly, Disp-90 tablet, R-1Normal  -     ranolazine (RANEXA) 500 MG extended release tablet; Take 1 tablet by mouth 2 times daily, Disp-180 tablet, R-1Normal  -     nitroGLYCERIN (NITROSTAT) 0.4 MG SL tablet; NITRO, Disp-25 tablet, R-1Normal  -     Comprehensive Metabolic Panel; Future  -     CBC with Auto Differential; Future  -     Lipid Panel; Future  Sinus congestion - TRIAL NASAL SPRAY BELOW  -     azelastine (ASTELIN) 0.1 % nasal spray; 1 spray by Nasal route 2 times daily Use in each nostril as directed, Disp-30 mL, R-5Normal  Chronic renal impairment, stage 3b (HCC) - RECHECK LAB NOW AND AGAIN 6MO, CONT F/U DR Ashleigh Travis  -     Comprehensive Metabolic Panel; Future  -     CBC with Auto Differential; Future  -     Comprehensive Metabolic Panel; Future  -     CBC with Auto Differential; Future  -     Urinalysis with Microscopic; Future  Hypothyroidism due to acquired atrophy of thyroid - Clinically hypothyroidism appears stable and will continue current dosing, also periodic monitoring of TFTs.     - Comprehensive Metabolic Panel; Future  -     CBC with Auto Differential; Future  -     TSH; Future  -     T4, Free; Future  -     levothyroxine (SYNTHROID) 88 MCG tablet; Take 1 tablet by mouth Daily, Disp-90 tablet, R-1Normal  -     TSH; Future  -     T4, Free; Future  Mixed hyperlipidemia  -     Comprehensive Metabolic Panel; Future  -     CBC with Auto Differential; Future  -     Lipid Panel; Future  -     simvastatin (ZOCOR) 20 MG tablet; Take 1 tablet by mouth nightly, Disp-90 tablet, R-1Normal  Idiopathic gout of multiple sites, unspecified chronicity -  REMAINS STABLE IN REMISSION W CURRENTSUPPRESSIVE THERAPY. WILL MONITOR URIC ACID LEVELS PERIODICALLY. -     Comprehensive Metabolic Panel; Future  -     CBC with Auto Differential; Future  -     Uric Acid; Future  -     allopurinol (ZYLOPRIM) 100 MG tablet; Take 1 tablet by mouth daily, Disp-90 tablet, R-1Normal  -     Uric Acid; Future  Age-related osteoporosis with current pathological fracture, initial encounter- CONT FOSAMAX AND ALSO CHECK VIT D, LABS  -     Comprehensive Metabolic Panel; Future  -     CBC with Auto Differential; Future  -     Vitamin D 25 Hydroxy; Future  -     alendronate (FOSAMAX) 70 MG tablet; Take 1 tablet by mouth every 7 days, Disp-12 tablet, R-1Normal  Essential hypertension - Blood pressure stable and will continue current regimen. Will plan periodic monitoring of renal function, electrolytes, lipid profile. -     amLODIPine (NORVASC) 5 MG tablet; Take 1 tablet by mouth 2 times daily, Disp-180 tablet, R-1Normal  -     Urinalysis with Microscopic; Future  Medicare annual wellness visit, subsequent      Recommendations for Preventive Services Due: see orders and patient instructions/AVS.  Recommended screening schedule for the next 5-10 years is provided to the patient in written form: see Patient Instructions/AVS.     Return in about 6 months (around 9/23/2022) for routine.      Subjective   The following acute and/or chronic problems were also addressed today:  SOME ISSUES W CHR SINUS DRAINAGE, PNDRIP CAUSES MILD COUGH. SX OCCUR YR ROUND BUT MORE FREQ W [facility]. Coronary artery disease has been asymptomatic w/o any ongoing sx of CP, SOB/RENDON, palpitations, lt headedness, diaphoresis. Is continuing medications regularly. SEEING DR DARREN GONZALEZ ALSO    CKD- RENAL FXN STABLE AND CONT F/U DR Eather Baumgarten. Hypothyroid has been asymptomatic w/o sx of fatigue, constipation, cold intolerance, depression. GOUT, NO RECENT FLARES AND WE ARE CHECKING UR ACID LEVEL TODAY. Hypertension: Stable. Denies CP, SOB, cough, visual changes, dizziness, palpitations or HA. Lipids:  Is continuing statin therapy and low fat diet. Tolerating medications w/o myalgias or GI upset. Patient's complete Health Risk Assessment and screening values have been reviewed and are found in Flowsheets. The following problems were reviewed today and where indicated follow up appointments were made and/or referrals ordered. Positive Risk Factor Screenings with Interventions:    Fall Risk:  Do you feel unsteady or are you worried about falling? : (!) yes  2 or more falls in past year?: no  Fall with injury in past year?: no     Fall Risk Interventions:    · no falls and using walker.      Depression:  Depression Unable to Assess: Functional capacity motivation limits accuracy  PHQ-2 Score: 0  PHQ-9 Total Score: 0    Severity:1-4 = minimal depression, 5-9 = mild depression, 10-14 = moderate depression, 15-19 = moderately severe depression, 20-27 = severe depression          General Health and ACP:  General  In general, how would you say your health is?: Good  In the past 7 days, have you experienced any of the following: New or Increased Pain, New or Increased Fatigue, Loneliness, Social Isolation, Stress or Anger?: No  Do you get the social and emotional support that you need?: Yes  Do you have a Living Will?: (!) No (POA paperwork is completed)    Advance Directives     Power of  Living Will ACP-Advance Directive ACP-Power of     Not on File Filed on 04/23/20 Filed Not on File      General Health Risk Interventions:  · Maggie CHIRINOS    Health Habits/Nutrition:     Physical Activity: Sufficiently Active    Days of Exercise per Week: 5 days    Minutes of Exercise per Session: 30 min     Have you lost any weight without trying in the past 3 months?: No  Body mass index: (!) 26.17  Have you seen the dentist within the past year?: (!) No    Health Habits/Nutrition Interventions:  · edentulous, wt is fine for age and health, would not rec further aggressive wt loss      ADLs:  In the past 7 days, did you need help from others to perform any of the following everyday activities: Eating, dressing, grooming, bathing, toileting, or walking/balance?: (!) Yes  Select all that apply: (!) Walking/Balance,Toileting  In the past 7 days, did you need help from others to take care of any of the following: Laundry, housekeeping, banking/finances, shopping, telephone use, food preparation, transportation, or taking medications?: (!) Yes  Select all that apply: (!) Taking [place] Preparation,Housekeeping,Banking/Finances,Laundry    ADL Interventions:  · has plenty of assistance gerald Serrano  **HAS C AND HOME AIDE**        Objective   Vitals:    03/23/22 1041   BP: 122/70   Pulse: 57   Resp: 14   SpO2: 95%   Weight: 134 lb (60.8 kg)   Height: 5' (1.524 m)      Body mass index is 26.17 kg/m².         General Appearance: alert and oriented to person, place and time, well developed and well- nourished, in no acute distress  Skin: warm and dry, no rash or erythema  Head: normocephalic and atraumatic  Eyes: pupils equal, round, and reactive to light, extraocular eye movements intact, conjunctivae normal  Neck: supple and non-tender without mass, no thyromegaly or thyroid nodules, no cervical lymphadenopathy  Pulmonary/Chest: clear to auscultation bilaterally- no wheezes, rales or rhonchi, normal air movement, no respiratory distress  Cardiovascular: normal rate, regular rhythm, normal S1 and S2, no murmurs, rubs, clicks, or gallops, distal pulses intact, no carotid bruits  Abdomen: soft, non-tender, non-distended, normal bowel sounds, no masses or organomegaly  Extremities: no cyanosis, clubbing or edema  Musculoskeletal: normal range of motion, no joint swelling, deformity or tenderness  Neurologic:  no cranial nerve deficit, gait, coordination and speech normal       Allergies   Allergen Reactions    Keflex [Cephalexin]     Bactrim [Sulfamethoxazole-Trimethoprim] Nausea And Vomiting    Cephalexin Rash    Tape [Adhesive Tape] Itching     Prior to Visit Medications    Medication Sig Taking?  Authorizing Provider   triamcinolone (KENALOG) 0.1 % ointment Apply topically 2 times daily Yes Molly Rodriguez MD   Cholecalciferol (VITAMIN D3) 50 MCG (2000 UT) TABS TAKE ONE (1) TABLET BY MOUTH ONCE DAILY Yes Cherise Stoddard MD   estradiol (ESTRACE VAGINAL) 0.1 MG/GM vaginal cream Place 1 g vaginally daily Daily for 2 weeks and then 2-3 times per week Yes Molly Rodriguez MD   acetaminophen (AMINOFEN) 325 MG tablet Take 2 tablets by mouth every 8 hours as needed for Pain Yes Moris oGnzalez MD   alendronate (FOSAMAX) 70 MG tablet Take 1 tablet by mouth every 7 days Yes Cherise Stoddard MD   allopurinol (ZYLOPRIM) 100 MG tablet Take 1 tablet by mouth daily Yes Cherise Stoddard MD   amLODIPine (NORVASC) 5 MG tablet Take 1 tablet by mouth 2 times daily Yes Cherise Stoddard MD   atenolol (TENORMIN) 25 MG tablet Take 1 tablet by mouth daily Yes Cherise Stoddard MD   dexlansoprazole (DEXILANT) 60 MG CPDR delayed release capsule Take 1 capsule by mouth daily Yes Cherise Stoddard MD   docusate sodium (COLACE) 100 MG capsule Take 1 capsule by mouth daily Yes Cherise Stoddard MD   fexofenadine (ALLEGRA) 60 MG tablet Take 1 tablet by mouth daily Yes Jessica Gonzales MD   isosorbide mononitrate (IMDUR) 60 MG extended release tablet Take 1 tablet by mouth daily Yes Jessica Gonzales MD   levothyroxine (SYNTHROID) 88 MCG tablet Take 1 tablet by mouth Daily Yes Jessica Gonzales MD   simvastatin (ZOCOR) 20 MG tablet Take 1 tablet by mouth nightly Yes Jessica Gonzales MD   sertraline (ZOLOFT) 50 MG tablet Take 1 tablet by mouth daily Yes Jessica Gonzales MD   senna (NATURAL SENNA LAXATIVE) 8.6 MG tablet Take 1 tablet by mouth 2 times daily Yes Jessica Gonzales MD   ranolazine (RANEXA) 500 MG extended release tablet Take 1 tablet by mouth 2 times daily Yes Jessica Gonzales MD   polyethylene glycol (GAVILAX) 17 GM/SCOOP powder MIX 17 GRAMS (1 HEAPING TABLESPOONFUL) OF POWDER IN 8 OUNCES OF LIQUID AND DRINK DAILY Yes Jessica Gonzales MD   nystatin (MYCOSTATIN) 761548 UNIT/GM powder Apply 3 times daily. Yes Jessica Gonzales MD   nitroGLYCERIN (NITROSTAT) 0.4 MG SL tablet NITRO Yes Jessica Gonzales MD   sodium bicarbonate 325 MG tablet Take 1 tablet by mouth 2 times daily Yes Louise Nevarez MD   B Complex-C-Zn-Folic Acid (DIALYVITE [id number] 15) 0.8 MG TABS Take 1 tablet by mouth daily Yes Louise Nevarez MD   mineral oil-hydrophilic petrolatum (AQUAPHOR) ointment Apply topically as needed for Dry Skin Apply topically as needed. Yes Historical Provider, MD   diphenhydrAMINE (BENADRYL) 25 MG tablet Take 50 mg by mouth nightly Yes Historical Provider, MD   cycloSPORINE (RESTASIS) 0.05 % ophthalmic emulsion Place 1 drop into both eyes 2 times daily Yes Historical Provider, MD   aspirin EC 81 MG EC tablet Take 81 mg by mouth daily.    Yes Historical Provider, MD Tamayo (Including outside providers/suppliers regularly involved in providing care):   Patient Care Team:  Jessica Gonzales MD as PCP - Kwabena Nicholas MD as PCP - [facility] Empaneled Provider  Cinthya Ahumada MD as Consulting Physician (Obstetrics & Gynecology)    Reviewed and updated this visit:  Tobacco  Allergies  Meds  Med Hx  Surg Hx  Soc Hx  Fam Hx

## 2022-03-24 NOTE — RESULT ENCOUNTER NOTE
Call pt, labs ok/chol ok and also w normal uric acid level, thyroid fxn, vit d level. Lastly, her kidney fxn is sl improved w creatinine dropping from 2.0--1.7.   I'll also forward a copy of lab to Dr Jose J Hammonds

## 2022-04-12 ENCOUNTER — OFFICE VISIT (OUTPATIENT)
Dept: OBGYN | Age: 87
End: 2022-04-12
Payer: COMMERCIAL

## 2022-04-12 VITALS
DIASTOLIC BLOOD PRESSURE: 83 MMHG | BODY MASS INDEX: 26.31 KG/M2 | HEIGHT: 60 IN | SYSTOLIC BLOOD PRESSURE: 150 MMHG | WEIGHT: 134 LBS

## 2022-04-12 DIAGNOSIS — N81.10 VAGINAL PROLAPSE: Primary | ICD-10-CM

## 2022-04-12 PROCEDURE — 1123F ACP DISCUSS/DSCN MKR DOCD: CPT | Performed by: OBSTETRICS & GYNECOLOGY

## 2022-04-12 PROCEDURE — G8427 DOCREV CUR MEDS BY ELIG CLIN: HCPCS | Performed by: OBSTETRICS & GYNECOLOGY

## 2022-04-12 PROCEDURE — 99213 OFFICE O/P EST LOW 20 MIN: CPT | Performed by: OBSTETRICS & GYNECOLOGY

## 2022-04-12 PROCEDURE — 1036F TOBACCO NON-USER: CPT | Performed by: OBSTETRICS & GYNECOLOGY

## 2022-04-12 PROCEDURE — 4040F PNEUMOC VAC/ADMIN/RCVD: CPT | Performed by: OBSTETRICS & GYNECOLOGY

## 2022-04-12 PROCEDURE — G8417 CALC BMI ABV UP PARAM F/U: HCPCS | Performed by: OBSTETRICS & GYNECOLOGY

## 2022-04-12 PROCEDURE — 1090F PRES/ABSN URINE INCON ASSESS: CPT | Performed by: OBSTETRICS & GYNECOLOGY

## 2022-04-12 NOTE — PROGRESS NOTES
Deep Dixon  2022  12:22 PM          Deep Dixon is a 80 y.o. female       The patient was seen. She has no chief complaints today. She has been fitted for a # 2; cube type pessary. She states all of her symptoms that she had prior to the pessary have been relieved with its use. She denies any vaginal bleeding (had spotting one week prior resolved). She last had her pessary cleaned 12 weeks ago. She denies to any vaginal discharge or odor. Her bowels are regular and her voiding pattern is normal.     Blood pressure (!) 150/83, height 5' (1.524 m), weight 134 lb (60.8 kg), not currently breastfeeding. Chaperone for Intimate Exam   Chaperone was offered and accepted as part of the rooming process. Abdomen: Soft and non-tender; good bowel sounds; no guarding, rebound or rigidity; no CVA tenderness bilaterally. Extremities: No calf tenderness bilaterally. DTR 2/4 bilaterally. No edema. Perineum/Speculum: There is not any signs of infection; The vaginal vault is without any signs of erythema or erosion. There is no vaginal discharge or odor appreciated. The pessary was cleansed and replaced without any problems and the patient tolerated the procedure well. T.O.S. Ointment was placed with the pessary to decrease discharge/odor. Assessment:   Diagnosis Orders   1. Vaginal prolapse       Chief Complaint   Patient presents with    Follow-up     Pt for pessary f/u has # 2 cube, Pt c/o pessary dropping shortly after last visit, still has pessary in and having some vaginal bleeding.       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 2018     Priority: High    Bradycardia      Priority: High    LBBB (left bundle branch block)      Priority: High    GERD (gastroesophageal reflux disease)      Priority: Medium     Esophagitis      Hypertension      Priority: Low    Hypothyroid      Priority: Low     Hypothyroidism  Sinus congestion 03/23/2022     CHRONIC W POST NASAL DRIP      Vaginal prolapse 09/07/2021    Primary osteoarthritis of left foot 08/30/2021    Closed nondisplaced fracture of fourth metatarsal bone of left foot 08/30/2021    Closed nondisplaced fracture of third metatarsal bone of left foot 08/30/2021    Closed nondisplaced fracture of fifth left metatarsal bone 08/30/2021    Vertigo      Dr Wally Albrecht Dizziness     Labyrinthitis     Chronic renal impairment, stage 3 (moderate) (HCC)     Constipation, slow transit 06/19/2018    Cystitis     Physical deconditioning     Pruritic condition 01/22/2016    Gout     Hyperuricemia     Low back pain     Eczema     Coronary artery disease involving native heart without angina pectoris      Dr Norm Figueredo/Jose Figueredo      Depression     Hyperlipidemia     Generalized osteoarthritis     Osteoporosis      Generalized           Plan:  1. Return to the office 10-12 weeks  2. Report any vaginal bleeding or discharge  3. Abstinence  4. Annual Follow-up reviewed with patient. They will schedule appointment     Diagnosis Orders   1. Vaginal prolapse        follow up sooner if bleeding returns  The patient had her preventative health maintenance recommendations and follow-up reviewed with her at the completion of her visit.

## 2022-06-23 ENCOUNTER — OFFICE VISIT (OUTPATIENT)
Dept: INTERNAL MEDICINE CLINIC | Age: 87
End: 2022-06-23
Payer: COMMERCIAL

## 2022-06-23 VITALS
WEIGHT: 120 LBS | HEIGHT: 60 IN | OXYGEN SATURATION: 96 % | HEART RATE: 63 BPM | DIASTOLIC BLOOD PRESSURE: 69 MMHG | RESPIRATION RATE: 16 BRPM | SYSTOLIC BLOOD PRESSURE: 142 MMHG | BODY MASS INDEX: 23.56 KG/M2

## 2022-06-23 DIAGNOSIS — H92.01 OTALGIA OF RIGHT EAR: Primary | ICD-10-CM

## 2022-06-23 PROCEDURE — G8427 DOCREV CUR MEDS BY ELIG CLIN: HCPCS | Performed by: NURSE PRACTITIONER

## 2022-06-23 PROCEDURE — 1036F TOBACCO NON-USER: CPT | Performed by: NURSE PRACTITIONER

## 2022-06-23 PROCEDURE — 1090F PRES/ABSN URINE INCON ASSESS: CPT | Performed by: NURSE PRACTITIONER

## 2022-06-23 PROCEDURE — 99213 OFFICE O/P EST LOW 20 MIN: CPT | Performed by: NURSE PRACTITIONER

## 2022-06-23 PROCEDURE — G8420 CALC BMI NORM PARAMETERS: HCPCS | Performed by: NURSE PRACTITIONER

## 2022-06-23 PROCEDURE — 1123F ACP DISCUSS/DSCN MKR DOCD: CPT | Performed by: NURSE PRACTITIONER

## 2022-06-23 RX ORDER — FLUTICASONE PROPIONATE 50 MCG
1 SPRAY, SUSPENSION (ML) NASAL DAILY
Qty: 32 G | Refills: 1 | Status: SHIPPED
Start: 2022-06-23 | End: 2022-07-25 | Stop reason: ALTCHOICE

## 2022-06-23 RX ORDER — PREDNISONE 10 MG/1
TABLET ORAL
Qty: 20 TABLET | Refills: 0 | Status: SHIPPED | OUTPATIENT
Start: 2022-06-23 | End: 2022-08-19 | Stop reason: ALTCHOICE

## 2022-06-23 RX ORDER — CETIRIZINE HYDROCHLORIDE 10 MG/1
10 TABLET ORAL DAILY
Qty: 90 TABLET | Refills: 1 | Status: SHIPPED
Start: 2022-06-23 | End: 2022-07-25 | Stop reason: ALTCHOICE

## 2022-06-23 ASSESSMENT — ENCOUNTER SYMPTOMS
COUGH: 0
SHORTNESS OF BREATH: 0
CHEST TIGHTNESS: 0
SINUS PAIN: 0
COLOR CHANGE: 0
ABDOMINAL PAIN: 0
NAUSEA: 0
APNEA: 0
SINUS PRESSURE: 0
DIARRHEA: 0
VOMITING: 0

## 2022-06-23 NOTE — PROGRESS NOTES
Juan Ramon Roblero  1/23/1927 06/23/22    Chief Complaint   Patient presents with    Otalgia     right ear pain sx started 2 weeks ago        SUBJECTIVE:      Mrs Lennox Moros is a current patient of Dr Cherelle Jeter who presents to the office this morning with family for c/o intermittent right ear pain which has been ongoing off an on over the last 2 weeks. The pain is not constant. No recent fevers or chills. Denies any dizziness or other neuro changes. Review of Systems   Constitutional: Negative for activity change, appetite change, fatigue and fever. HENT: Positive for ear pain. Negative for congestion, nosebleeds, sinus pressure and sinus pain. Respiratory: Negative for apnea, cough, chest tightness and shortness of breath. Cardiovascular: Negative for chest pain and palpitations. Gastrointestinal: Negative for abdominal pain, diarrhea, nausea and vomiting. Genitourinary: Negative for difficulty urinating, flank pain and hematuria. Musculoskeletal: Negative for arthralgias, joint swelling and myalgias. Skin: Negative for color change and rash. Neurological: Negative for dizziness, light-headedness and headaches. Psychiatric/Behavioral: Negative. Negative for behavioral problems. OBJECTIVE:    BP (!) 142/69   Pulse 63   Resp 16   Ht 5' (1.524 m)   Wt 120 lb (54.4 kg)   LMP  (LMP Unknown)   SpO2 96%   BMI 23.44 kg/m²     Physical Exam  Constitutional:       General: She is not in acute distress. Appearance: She is well-developed. HENT:      Head: Normocephalic and atraumatic. Right Ear: Tympanic membrane and ear canal normal. There is no impacted cerumen. Left Ear: Tympanic membrane, ear canal and external ear normal. There is no impacted cerumen. Nose: Nose normal.      Mouth/Throat:      Pharynx: No oropharyngeal exudate. Cardiovascular:      Rate and Rhythm: Normal rate and regular rhythm. Heart sounds: Normal heart sounds.    Pulmonary:      Effort: Pulmonary effort is normal. No respiratory distress. Breath sounds: Normal breath sounds. Abdominal:      General: Bowel sounds are normal.      Palpations: Abdomen is soft. Musculoskeletal:         General: Normal range of motion. Cervical back: Normal range of motion and neck supple. No edema or erythema. No muscular tenderness. Skin:     General: Skin is warm and dry. Neurological:      Mental Status: She is alert and oriented to person, place, and time. Cranial Nerves: No cranial nerve deficit. Coordination: Coordination normal.   Psychiatric:         Thought Content: Thought content normal.         ASSESSMENT:    1. Otalgia of right ear        PLAN:    Niraj Morelos was seen today for otalgia. Diagnoses and all orders for this visit:    Otalgia of right ear- ENT exam overall is unremarkable. There is no acute concerns or findings suggestive of AOM or AOE. I do feel this is likely a eustachian tube dysfunction. We will start patient on a daily Zyrtec and Flonase regiment and also short prednisone taper. Patient to call or return in 1 week if symptoms have not significantly improved and we can consider ENT consult at that time. -     cetirizine (ZYRTEC) 10 MG tablet; Take 1 tablet by mouth daily  -     fluticasone (FLONASE) 50 MCG/ACT nasal spray; 1 spray by Each Nostril route daily  -     predniSONE (DELTASONE) 10 MG tablet; Take 4 tablets daily for 2 days, then 3 tablets for 2 days, 2 tablets for 2 days, then 1 tablet for 2 days        RX Monitoring 8/14/2017   Attestation The Prescription Monitoring Report for this patient was reviewed today. Periodic Controlled Substance Monitoring Possible medication side effects, risk of tolerance and/or dependence, and alternative treatments discussed; No signs of potential drug abuse or diversion identified. Return if symptoms worsen or fail to improve.     Gatito note this reports has been produced speech recognition software and may contain errors related to that system including errors in grammar, punctuation, and spelling, as well as words and phrases that may be appropriate. If there are any questions or concerns please feel free to contact the dictating provider for clarification.

## 2022-07-06 ENCOUNTER — HOSPITAL ENCOUNTER (OUTPATIENT)
Age: 87
Setting detail: SPECIMEN
Discharge: HOME | End: 2022-07-06
Payer: COMMERCIAL

## 2022-07-06 LAB
ALBUMIN SERPL-MCNC: 4 GM/DL (ref 3.4–5)
ANION GAP SERPL CALCULATED.3IONS-SCNC: 10 MMOL/L (ref 4–16)
BUN BLDV-MCNC: 21 MG/DL (ref 6–23)
CALCIUM SERPL-MCNC: 9.2 MG/DL (ref 8.3–10.6)
CHLORIDE BLD-SCNC: 97 MMOL/L (ref 99–110)
CO2: 24 MMOL/L (ref 21–32)
CREAT SERPL-MCNC: 1.7 MG/DL (ref 0.6–1.1)
GFR AFRICAN AMERICAN: 34 ML/MIN/1.73M2
GFR NON-AFRICAN AMERICAN: 28 ML/MIN/1.73M2
GLUCOSE BLD-MCNC: 95 MG/DL (ref 70–99)
PHOSPHORUS: 3.8 MG/DL (ref 2.5–4.9)
POTASSIUM SERPL-SCNC: 5 MMOL/L (ref 3.5–5.1)
SODIUM BLD-SCNC: 131 MMOL/L (ref 135–145)

## 2022-07-06 PROCEDURE — 80069 RENAL FUNCTION PANEL: CPT

## 2022-07-11 PROBLEM — R60.0 LOCALIZED EDEMA: Status: ACTIVE | Noted: 2022-07-11

## 2022-07-19 ENCOUNTER — OFFICE VISIT (OUTPATIENT)
Dept: OBGYN | Age: 87
End: 2022-07-19
Payer: COMMERCIAL

## 2022-07-19 VITALS
WEIGHT: 121 LBS | SYSTOLIC BLOOD PRESSURE: 155 MMHG | BODY MASS INDEX: 23.75 KG/M2 | HEIGHT: 60 IN | DIASTOLIC BLOOD PRESSURE: 62 MMHG

## 2022-07-19 DIAGNOSIS — N81.10 VAGINAL PROLAPSE: Primary | ICD-10-CM

## 2022-07-19 DIAGNOSIS — N90.89 VULVAR IRRITATION: ICD-10-CM

## 2022-07-19 PROCEDURE — 1036F TOBACCO NON-USER: CPT | Performed by: OBSTETRICS & GYNECOLOGY

## 2022-07-19 PROCEDURE — 1123F ACP DISCUSS/DSCN MKR DOCD: CPT | Performed by: OBSTETRICS & GYNECOLOGY

## 2022-07-19 PROCEDURE — G8420 CALC BMI NORM PARAMETERS: HCPCS | Performed by: OBSTETRICS & GYNECOLOGY

## 2022-07-19 PROCEDURE — 1090F PRES/ABSN URINE INCON ASSESS: CPT | Performed by: OBSTETRICS & GYNECOLOGY

## 2022-07-19 PROCEDURE — 99213 OFFICE O/P EST LOW 20 MIN: CPT | Performed by: OBSTETRICS & GYNECOLOGY

## 2022-07-19 PROCEDURE — G8427 DOCREV CUR MEDS BY ELIG CLIN: HCPCS | Performed by: OBSTETRICS & GYNECOLOGY

## 2022-07-19 NOTE — PROGRESS NOTES
Terra Crain  2022  1:51 PM          Terra Crain is a 80 y.o. female       The patient was seen. She has no chief complaints today. She has been fitted for a # 2; ring type pessary. She states all of her symptoms that she had prior to the pessary have been relieved with its use. She denies any vaginal bleeding. She last had her pessary cleaned 12 weeks ago. She denies to any vaginal discharge or odor. Her bowels are regular and her voiding pattern is normal.     Blood pressure (!) 155/62, height 5' (1.524 m), weight 121 lb (54.9 kg), not currently breastfeeding. Chaperone for Intimate Exam  Chaperone was present        Abdomen: Soft and non-tender; good bowel sounds; no guarding, rebound or rigidity; no CVA tenderness bilaterally. Extremities: No calf tenderness bilaterally. DTR 2/4 bilaterally. No edema. Perineum/Speculum: There is not any signs of infection; The vaginal vault is without any signs of erythema or erosion. There is no vaginal discharge or odor appreciated. The pessary was cleansed and replaced without any problems and the patient tolerated the procedure well. T.O.S. Ointment was placed with the pessary to decrease discharge/odor. Assessment:   Diagnosis Orders   1. Vaginal prolapse        2. Vulvar irritation  triamcinolone (KENALOG) 0.1 % ointment        Chief Complaint   Patient presents with    Other     Pt here for pessary check, # 2 cube type pessary. Denies having any bleeding or discharge. Requesting refill on Hydrocortisone Cream 2.5 %.       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 2018     Priority: High    Bradycardia      Priority: High    LBBB (left bundle branch block)      Priority: High    Localized edema 2022     Priority: Medium    GERD (gastroesophageal reflux disease)      Priority: Medium     Esophagitis      Hypertension      Priority: Low    Hypothyroid      Priority: Low     Hypothyroidism      Sinus congestion 03/23/2022     CHRONIC W POST NASAL DRIP      Vaginal prolapse 09/07/2021    Primary osteoarthritis of left foot 08/30/2021    Closed nondisplaced fracture of fourth metatarsal bone of left foot 08/30/2021    Closed nondisplaced fracture of third metatarsal bone of left foot 08/30/2021    Closed nondisplaced fracture of fifth left metatarsal bone 08/30/2021    Vertigo       [facility]      Dizziness     Labyrinthitis     Chronic renal impairment, stage 3 (moderate) ([facility])     Constipation, slow transit 06/19/2018    Cystitis     Physical deconditioning     Pruritic condition 01/22/2016    Gout     Hyperuricemia     Low back pain     Eczema     Coronary artery disease involving native heart without angina pectoris      Dr Charlene Figueredo/Jose Figueredo      Depression     Hyperlipidemia     Generalized osteoarthritis     Osteoporosis      Generalized           Plan:  1. Return to the office 10-12 weeks  2. Report any vaginal bleeding or discharge  3. Abstinence  4. Annual Follow-up reviewed with patient. They will schedule appointment       Diagnosis Orders   1. Vaginal prolapse        2. Vulvar irritation  triamcinolone (KENALOG) 0.1 % ointment           The patient had her preventative health maintenance recommendations and follow-up reviewed with her at the completion of her visit.

## 2022-07-22 DIAGNOSIS — I10 ESSENTIAL HYPERTENSION: Primary | ICD-10-CM

## 2022-07-22 RX ORDER — LOSARTAN POTASSIUM 25 MG/1
25 TABLET ORAL DAILY
Qty: 90 TABLET | Refills: 1
Start: 2022-07-22

## 2022-07-25 ENCOUNTER — TELEPHONE (OUTPATIENT)
Dept: INTERNAL MEDICINE CLINIC | Age: 87
End: 2022-07-25

## 2022-07-25 DIAGNOSIS — L29.9 PRURITIC CONDITION: ICD-10-CM

## 2022-07-25 DIAGNOSIS — R09.81 SINUS CONGESTION: ICD-10-CM

## 2022-07-25 RX ORDER — FEXOFENADINE HYDROCHLORIDE 60 MG/1
60 TABLET, FILM COATED ORAL DAILY
Qty: 90 TABLET | Refills: 1 | Status: SHIPPED | OUTPATIENT
Start: 2022-07-25

## 2022-07-25 RX ORDER — FEXOFENADINE HYDROCHLORIDE 60 MG/1
60 TABLET, FILM COATED ORAL DAILY
Qty: 90 TABLET | Refills: 1 | Status: CANCELLED | OUTPATIENT
Start: 2022-07-25

## 2022-07-25 RX ORDER — AZELASTINE 1 MG/ML
1 SPRAY, METERED NASAL 2 TIMES DAILY
Qty: 30 ML | Refills: 5 | Status: SHIPPED | OUTPATIENT
Start: 2022-07-25 | End: 2022-08-19 | Stop reason: SDUPTHER

## 2022-07-25 RX ORDER — AZELASTINE 1 MG/ML
1 SPRAY, METERED NASAL 2 TIMES DAILY
Qty: 30 ML | Refills: 5 | Status: CANCELLED | OUTPATIENT
Start: 2022-07-25

## 2022-07-25 NOTE — TELEPHONE ENCOUNTER
Maggie called and stated Shyam Allen wants to switch back to Allegra and Astelin spray rather than Zyrtec and Flonase. Please send in a new RX for those 2 medications.

## 2022-08-19 ENCOUNTER — OFFICE VISIT (OUTPATIENT)
Dept: INTERNAL MEDICINE CLINIC | Age: 87
End: 2022-08-19
Payer: COMMERCIAL

## 2022-08-19 VITALS
DIASTOLIC BLOOD PRESSURE: 78 MMHG | SYSTOLIC BLOOD PRESSURE: 142 MMHG | HEART RATE: 84 BPM | OXYGEN SATURATION: 91 % | RESPIRATION RATE: 14 BRPM

## 2022-08-19 DIAGNOSIS — R30.0 DYSURIA: Primary | ICD-10-CM

## 2022-08-19 DIAGNOSIS — N18.32 CHRONIC RENAL IMPAIRMENT, STAGE 3B (HCC): ICD-10-CM

## 2022-08-19 DIAGNOSIS — R09.81 SINUS CONGESTION: ICD-10-CM

## 2022-08-19 DIAGNOSIS — I25.10 CORONARY ARTERY DISEASE INVOLVING NATIVE CORONARY ARTERY OF NATIVE HEART WITHOUT ANGINA PECTORIS: ICD-10-CM

## 2022-08-19 DIAGNOSIS — E03.4 HYPOTHYROIDISM DUE TO ACQUIRED ATROPHY OF THYROID: ICD-10-CM

## 2022-08-19 DIAGNOSIS — I10 ESSENTIAL HYPERTENSION: ICD-10-CM

## 2022-08-19 DIAGNOSIS — M10.09 IDIOPATHIC GOUT OF MULTIPLE SITES, UNSPECIFIED CHRONICITY: ICD-10-CM

## 2022-08-19 DIAGNOSIS — R30.0 DYSURIA: ICD-10-CM

## 2022-08-19 PROCEDURE — G8420 CALC BMI NORM PARAMETERS: HCPCS | Performed by: INTERNAL MEDICINE

## 2022-08-19 PROCEDURE — 1090F PRES/ABSN URINE INCON ASSESS: CPT | Performed by: INTERNAL MEDICINE

## 2022-08-19 PROCEDURE — 1036F TOBACCO NON-USER: CPT | Performed by: INTERNAL MEDICINE

## 2022-08-19 PROCEDURE — 99214 OFFICE O/P EST MOD 30 MIN: CPT | Performed by: INTERNAL MEDICINE

## 2022-08-19 PROCEDURE — 36415 COLL VENOUS BLD VENIPUNCTURE: CPT | Performed by: INTERNAL MEDICINE

## 2022-08-19 PROCEDURE — G8427 DOCREV CUR MEDS BY ELIG CLIN: HCPCS | Performed by: INTERNAL MEDICINE

## 2022-08-19 PROCEDURE — 1123F ACP DISCUSS/DSCN MKR DOCD: CPT | Performed by: INTERNAL MEDICINE

## 2022-08-19 RX ORDER — HYDROCHLOROTHIAZIDE 12.5 MG/1
12.5 TABLET ORAL DAILY
COMMUNITY
Start: 2022-07-25

## 2022-08-19 RX ORDER — AZELASTINE 1 MG/ML
1 SPRAY, METERED NASAL 2 TIMES DAILY
Qty: 30 ML | Refills: 5 | Status: SHIPPED | OUTPATIENT
Start: 2022-08-19

## 2022-08-19 NOTE — PROGRESS NOTES
Brigitte Youngblood  1/23/1927 08/19/22    SUBJECTIVE:    Had edema last mo and incr wt, Dr Shaun Wallis added demedex and wt is down 5#. However urine is darker. Not had f/u renal fxn/lytes after medication started. Hypertension: Stable. Denies CP, SOB, cough, visual changes, dizziness, palpitations or HA.      CKD- also seeing DR Shaun Wallis. Hypothyroid has been asymptomatic w/o sx of fatigue, constipation, cold intolerance, depression. Coronary artery disease has been asymptomatic w/o any ongoing sx of CP, SOB/RENDON, palpitations, lt headedness, diaphoresis. Is continuing medications regularly. Hx gout, stable on meds. OBJECTIVE:    BP (!) 142/78   Pulse 84   Resp 14   LMP  (LMP Unknown)   SpO2 91%     Physical Exam  Constitutional:       General: She is not in acute distress. Appearance: She is well-developed. HENT:      Head: Normocephalic and atraumatic. Right Ear: Tympanic membrane and ear canal normal. There is no impacted cerumen. Left Ear: Tympanic membrane, ear canal and external ear normal. There is no impacted cerumen. Nose: Nose normal.      Mouth/Throat:      Pharynx: No oropharyngeal exudate. Cardiovascular:      Rate and Rhythm: Normal rate and regular rhythm. Heart sounds: Normal heart sounds. Pulmonary:      Effort: Pulmonary effort is normal. No respiratory distress. Breath sounds: Normal breath sounds. Abdominal:      General: Bowel sounds are normal.      Palpations: Abdomen is soft. Musculoskeletal:         General: Normal range of motion. Cervical back: Normal range of motion and neck supple. No edema or erythema. No muscular tenderness. Skin:     General: Skin is warm and dry. Neurological:      Mental Status: She is alert and oriented to person, place, and time. Cranial Nerves: No cranial nerve deficit. Coordination: Coordination normal.   Psychiatric:         Thought Content: Thought content normal.       ASSESSMENT:    1. Dysuria    2. Essential hypertension    3. Chronic renal impairment, stage 3b ([address]    4. Coronary artery disease involving native coronary artery of native heart without angina pectoris    5. Hypothyroidism due to acquired atrophy of thyroid    6. Idiopathic gout of multiple sites, unspecified chronicity    7. Sinus congestion        PLAN:    [facility] was seen today for dehydration and other. Diagnoses and all orders for this visit:    Dysuria- WE'LL CHECK UA AND RENAL FXN  -     Urinalysis with Microscopic; Future  -     Comprehensive Metabolic Panel; Future    Essential hypertension- BP IMPROVED AND VOLUME STATUS BETTER, EDEMA RESOLVED. CHECK LAB  -     Urinalysis with Microscopic; Future  -     Comprehensive Metabolic Panel; Future  -     CBC with Auto Differential; Future    Chronic renal impairment, stage 3b ([address]- CONT F/U DR Larry Lisa, MONITORING LAB  -     Comprehensive Metabolic Panel; Future  -     Magnesium; Future    Coronary artery disease involving native coronary artery of native heart without angina pectoris - Coronary artery disease stable and asymptomatic, will continue to monitor for any signs of instability. Will periodically monitor lipid profile and liver function, cardiac risk factors. Continue current medical regimen.    -     Lipid Panel; Future    Hypothyroidism due to acquired atrophy of thyroid - Clinically hypothyroidism appears stable and will continue current dosing, also periodic monitoring of TFTs.    -     TSH; Future  -     T4, Free; Future    Idiopathic gout of multiple sites, unspecified chronicity -  REMAINS STABLE IN REMISSION W CURRENTSUPPRESSIVE THERAPY. WILL MONITOR URIC ACID LEVELS PERIODICALLY. -     Uric Acid;  Future    Sinus congestion- SHE ASKED FOR RF CONT PRN  -     azelastine (ASTELIN) 0.1 % nasal spray; 1 spray by Nasal route 2 times daily Use in each nostril as directed

## 2022-08-20 DIAGNOSIS — R30.0 DYSURIA: Primary | ICD-10-CM

## 2022-08-20 LAB
A/G RATIO: 1.6 (ref 1.1–2.2)
ALBUMIN SERPL-MCNC: 4.5 G/DL (ref 3.4–5)
ALP BLD-CCNC: 75 U/L (ref 40–129)
ALT SERPL-CCNC: 12 U/L (ref 10–40)
ANION GAP SERPL CALCULATED.3IONS-SCNC: 12 MMOL/L (ref 3–16)
AST SERPL-CCNC: 21 U/L (ref 15–37)
BACTERIA: ABNORMAL /HPF
BASOPHILS ABSOLUTE: 0 K/UL (ref 0–0.2)
BASOPHILS RELATIVE PERCENT: 0.4 %
BILIRUB SERPL-MCNC: 0.3 MG/DL (ref 0–1)
BILIRUBIN URINE: NEGATIVE
BLOOD, URINE: ABNORMAL
BUN BLDV-MCNC: 30 MG/DL (ref 7–20)
CALCIUM SERPL-MCNC: 9.8 MG/DL (ref 8.3–10.6)
CHLORIDE BLD-SCNC: 100 MMOL/L (ref 99–110)
CHOLESTEROL, TOTAL: 185 MG/DL (ref 0–199)
CLARITY: CLEAR
CO2: 25 MMOL/L (ref 21–32)
COLOR: YELLOW
CREAT SERPL-MCNC: 1.9 MG/DL (ref 0.6–1.2)
EOSINOPHILS ABSOLUTE: 0.3 K/UL (ref 0–0.6)
EOSINOPHILS RELATIVE PERCENT: 4.7 %
EPITHELIAL CELLS, UA: 4 /HPF (ref 0–5)
GFR AFRICAN AMERICAN: 30
GFR NON-AFRICAN AMERICAN: 25
GLUCOSE BLD-MCNC: 107 MG/DL (ref 70–99)
GLUCOSE URINE: NEGATIVE MG/DL
HCT VFR BLD CALC: 34.1 % (ref 36–48)
HDLC SERPL-MCNC: 49 MG/DL (ref 40–60)
HEMOGLOBIN: 11.4 G/DL (ref 12–16)
HYALINE CASTS: 0 /LPF (ref 0–8)
KETONES, URINE: NEGATIVE MG/DL
LDL CHOLESTEROL CALCULATED: 101 MG/DL
LEUKOCYTE ESTERASE, URINE: ABNORMAL
LYMPHOCYTES ABSOLUTE: 2.8 K/UL (ref 1–5.1)
LYMPHOCYTES RELATIVE PERCENT: 37.4 %
MAGNESIUM: 2.2 MG/DL (ref 1.8–2.4)
MCH RBC QN AUTO: 33.3 PG (ref 26–34)
MCHC RBC AUTO-ENTMCNC: 33.4 G/DL (ref 31–36)
MCV RBC AUTO: 99.7 FL (ref 80–100)
MICROSCOPIC EXAMINATION: YES
MONOCYTES ABSOLUTE: 0.8 K/UL (ref 0–1.3)
MONOCYTES RELATIVE PERCENT: 10.4 %
NEUTROPHILS ABSOLUTE: 3.5 K/UL (ref 1.7–7.7)
NEUTROPHILS RELATIVE PERCENT: 47.1 %
NITRITE, URINE: NEGATIVE
PDW BLD-RTO: 13.7 % (ref 12.4–15.4)
PH UA: 6.5 (ref 5–8)
PLATELET # BLD: 308 K/UL (ref 135–450)
PMV BLD AUTO: 7.4 FL (ref 5–10.5)
POTASSIUM SERPL-SCNC: 5.3 MMOL/L (ref 3.5–5.1)
PROTEIN UA: 30 MG/DL
RBC # BLD: 3.42 M/UL (ref 4–5.2)
RBC UA: 3 /HPF (ref 0–4)
SODIUM BLD-SCNC: 137 MMOL/L (ref 136–145)
SPECIFIC GRAVITY UA: 1.01 (ref 1–1.03)
T4 FREE: 1.5 NG/DL (ref 0.9–1.8)
TOTAL PROTEIN: 7.3 G/DL (ref 6.4–8.2)
TRIGL SERPL-MCNC: 176 MG/DL (ref 0–150)
TSH SERPL DL<=0.05 MIU/L-ACNC: 0.62 UIU/ML (ref 0.27–4.2)
URIC ACID, SERUM: 5.5 MG/DL (ref 2.6–6)
URINE TYPE: ABNORMAL
UROBILINOGEN, URINE: 0.2 E.U./DL
VLDLC SERPL CALC-MCNC: 35 MG/DL
WBC # BLD: 7.5 K/UL (ref 4–11)
WBC UA: 45 /HPF (ref 0–5)

## 2022-08-20 RX ORDER — CIPROFLOXACIN 250 MG/1
250 TABLET, FILM COATED ORAL 2 TIMES DAILY
Qty: 14 TABLET | Refills: 0 | Status: SHIPPED | OUTPATIENT
Start: 2022-08-20 | End: 2022-08-27

## 2022-08-22 DIAGNOSIS — R30.0 DYSURIA: Primary | ICD-10-CM

## 2022-08-31 ENCOUNTER — TELEPHONE (OUTPATIENT)
Dept: INTERNAL MEDICINE CLINIC | Age: 87
End: 2022-08-31

## 2022-08-31 DIAGNOSIS — I10 ESSENTIAL HYPERTENSION: Primary | ICD-10-CM

## 2022-08-31 RX ORDER — HYDRALAZINE HYDROCHLORIDE 10 MG/1
10 TABLET, FILM COATED ORAL 2 TIMES DAILY
Qty: 180 TABLET | Refills: 1 | Status: SHIPPED | OUTPATIENT
Start: 2022-08-31 | End: 2022-11-29

## 2022-08-31 NOTE — TELEPHONE ENCOUNTER
Maggie called to report Juanito Gr takes 2 Norvasc in the AM, HTZ at noon and Atenolol in the PM. Maggie states that between the evening and AM Lilo's BP is running around 160/90. Please advise.

## 2022-09-02 DIAGNOSIS — R30.0 DYSURIA: ICD-10-CM

## 2022-09-02 DIAGNOSIS — N89.8 VAGINAL ITCHING: ICD-10-CM

## 2022-09-02 DIAGNOSIS — N30.00 ACUTE CYSTITIS WITHOUT HEMATURIA: Primary | ICD-10-CM

## 2022-09-02 LAB
BACTERIA: ABNORMAL /HPF
BILIRUBIN URINE: NEGATIVE
BLOOD, URINE: ABNORMAL
CLARITY: ABNORMAL
COLOR: YELLOW
EPITHELIAL CELLS, UA: 5 /HPF (ref 0–5)
GLUCOSE URINE: NEGATIVE MG/DL
HYALINE CASTS: 0 /LPF (ref 0–8)
KETONES, URINE: NEGATIVE MG/DL
LEUKOCYTE ESTERASE, URINE: ABNORMAL
MICROSCOPIC EXAMINATION: YES
NITRITE, URINE: NEGATIVE
PH UA: 6.5 (ref 5–8)
PROTEIN UA: 30 MG/DL
RBC UA: 6 /HPF (ref 0–4)
SPECIFIC GRAVITY UA: 1.01 (ref 1–1.03)
URINE TYPE: ABNORMAL
UROBILINOGEN, URINE: 0.2 E.U./DL
WBC UA: 92 /HPF (ref 0–5)

## 2022-09-02 RX ORDER — NITROFURANTOIN 25; 75 MG/1; MG/1
100 CAPSULE ORAL 2 TIMES DAILY
Qty: 20 CAPSULE | Refills: 0 | Status: SHIPPED | OUTPATIENT
Start: 2022-09-02 | End: 2022-09-12

## 2022-09-06 DIAGNOSIS — N30.00 ACUTE CYSTITIS WITHOUT HEMATURIA: Primary | ICD-10-CM

## 2022-09-14 DIAGNOSIS — M10.09 IDIOPATHIC GOUT OF MULTIPLE SITES, UNSPECIFIED CHRONICITY: ICD-10-CM

## 2022-09-14 DIAGNOSIS — E78.2 MIXED HYPERLIPIDEMIA: ICD-10-CM

## 2022-09-14 DIAGNOSIS — M80.00XA AGE-RELATED OSTEOPOROSIS WITH CURRENT PATHOLOGICAL FRACTURE, INITIAL ENCOUNTER: ICD-10-CM

## 2022-09-14 DIAGNOSIS — E03.4 HYPOTHYROIDISM DUE TO ACQUIRED ATROPHY OF THYROID: ICD-10-CM

## 2022-09-14 DIAGNOSIS — I10 ESSENTIAL HYPERTENSION: ICD-10-CM

## 2022-09-14 DIAGNOSIS — I25.10 CORONARY ARTERY DISEASE INVOLVING NATIVE CORONARY ARTERY OF NATIVE HEART WITHOUT ANGINA PECTORIS: ICD-10-CM

## 2022-09-14 RX ORDER — ALENDRONATE SODIUM 70 MG/1
TABLET ORAL
Qty: 12 TABLET | Refills: 1 | Status: SHIPPED | OUTPATIENT
Start: 2022-09-14

## 2022-09-14 RX ORDER — AMLODIPINE BESYLATE 5 MG/1
TABLET ORAL
Qty: 180 TABLET | Refills: 1 | Status: SHIPPED | OUTPATIENT
Start: 2022-09-14

## 2022-09-14 RX ORDER — LEVOTHYROXINE SODIUM 88 UG/1
TABLET ORAL
Qty: 90 TABLET | Refills: 1 | Status: SHIPPED | OUTPATIENT
Start: 2022-09-14

## 2022-09-14 RX ORDER — ALLOPURINOL 100 MG/1
TABLET ORAL
Qty: 90 TABLET | Refills: 1 | Status: SHIPPED | OUTPATIENT
Start: 2022-09-14

## 2022-09-14 RX ORDER — RANOLAZINE 500 MG/1
TABLET, EXTENDED RELEASE ORAL
Qty: 180 TABLET | Refills: 1 | Status: SHIPPED | OUTPATIENT
Start: 2022-09-14

## 2022-09-14 RX ORDER — SIMVASTATIN 20 MG
TABLET ORAL
Qty: 90 TABLET | Refills: 1 | Status: SHIPPED | OUTPATIENT
Start: 2022-09-14

## 2022-09-26 ENCOUNTER — OFFICE VISIT (OUTPATIENT)
Dept: INTERNAL MEDICINE CLINIC | Age: 87
End: 2022-09-26
Payer: COMMERCIAL

## 2022-09-26 VITALS
BODY MASS INDEX: 24.22 KG/M2 | SYSTOLIC BLOOD PRESSURE: 142 MMHG | DIASTOLIC BLOOD PRESSURE: 62 MMHG | WEIGHT: 124 LBS | RESPIRATION RATE: 16 BRPM | OXYGEN SATURATION: 94 % | HEART RATE: 61 BPM

## 2022-09-26 DIAGNOSIS — Z23 NEED FOR INFLUENZA VACCINATION: ICD-10-CM

## 2022-09-26 DIAGNOSIS — N30.00 ACUTE CYSTITIS WITHOUT HEMATURIA: ICD-10-CM

## 2022-09-26 DIAGNOSIS — F32.A DEPRESSION, UNSPECIFIED DEPRESSION TYPE: ICD-10-CM

## 2022-09-26 DIAGNOSIS — N89.8 VAGINAL ITCHING: ICD-10-CM

## 2022-09-26 DIAGNOSIS — K59.01 CONSTIPATION, SLOW TRANSIT: ICD-10-CM

## 2022-09-26 DIAGNOSIS — I25.10 CORONARY ARTERY DISEASE INVOLVING NATIVE CORONARY ARTERY OF NATIVE HEART WITHOUT ANGINA PECTORIS: ICD-10-CM

## 2022-09-26 DIAGNOSIS — K21.9 GASTROESOPHAGEAL REFLUX DISEASE WITHOUT ESOPHAGITIS: ICD-10-CM

## 2022-09-26 DIAGNOSIS — M80.00XA AGE-RELATED OSTEOPOROSIS WITH CURRENT PATHOLOGICAL FRACTURE, INITIAL ENCOUNTER: ICD-10-CM

## 2022-09-26 DIAGNOSIS — I10 ESSENTIAL HYPERTENSION: ICD-10-CM

## 2022-09-26 DIAGNOSIS — I10 ESSENTIAL HYPERTENSION: Primary | ICD-10-CM

## 2022-09-26 LAB
ANION GAP SERPL CALCULATED.3IONS-SCNC: 15 MMOL/L (ref 3–16)
BUN BLDV-MCNC: 27 MG/DL (ref 7–20)
CALCIUM SERPL-MCNC: 9.6 MG/DL (ref 8.3–10.6)
CHLORIDE BLD-SCNC: 95 MMOL/L (ref 99–110)
CO2: 21 MMOL/L (ref 21–32)
CREAT SERPL-MCNC: 1.8 MG/DL (ref 0.6–1.2)
GFR AFRICAN AMERICAN: 32
GFR NON-AFRICAN AMERICAN: 26
GLUCOSE BLD-MCNC: 105 MG/DL (ref 70–99)
POTASSIUM SERPL-SCNC: 4.5 MMOL/L (ref 3.5–5.1)
SODIUM BLD-SCNC: 131 MMOL/L (ref 136–145)

## 2022-09-26 PROCEDURE — 1036F TOBACCO NON-USER: CPT | Performed by: INTERNAL MEDICINE

## 2022-09-26 PROCEDURE — G0008 ADMIN INFLUENZA VIRUS VAC: HCPCS | Performed by: INTERNAL MEDICINE

## 2022-09-26 PROCEDURE — 1123F ACP DISCUSS/DSCN MKR DOCD: CPT | Performed by: INTERNAL MEDICINE

## 2022-09-26 PROCEDURE — 90694 VACC AIIV4 NO PRSRV 0.5ML IM: CPT | Performed by: INTERNAL MEDICINE

## 2022-09-26 PROCEDURE — 99214 OFFICE O/P EST MOD 30 MIN: CPT | Performed by: INTERNAL MEDICINE

## 2022-09-26 PROCEDURE — 1090F PRES/ABSN URINE INCON ASSESS: CPT | Performed by: INTERNAL MEDICINE

## 2022-09-26 PROCEDURE — G8427 DOCREV CUR MEDS BY ELIG CLIN: HCPCS | Performed by: INTERNAL MEDICINE

## 2022-09-26 PROCEDURE — 36415 COLL VENOUS BLD VENIPUNCTURE: CPT | Performed by: INTERNAL MEDICINE

## 2022-09-26 PROCEDURE — G8420 CALC BMI NORM PARAMETERS: HCPCS | Performed by: INTERNAL MEDICINE

## 2022-09-26 RX ORDER — ATENOLOL 25 MG/1
25 TABLET ORAL DAILY
Qty: 180 TABLET | Refills: 1 | Status: SHIPPED | OUTPATIENT
Start: 2022-09-26

## 2022-09-26 RX ORDER — DOCUSATE SODIUM 100 MG/1
100 CAPSULE, LIQUID FILLED ORAL DAILY
Qty: 90 CAPSULE | Refills: 1 | Status: SHIPPED | OUTPATIENT
Start: 2022-09-26

## 2022-09-26 RX ORDER — NYSTATIN 100000 [USP'U]/G
POWDER TOPICAL
Qty: 60 G | Refills: 1 | Status: SHIPPED | OUTPATIENT
Start: 2022-09-26

## 2022-09-26 RX ORDER — POLYETHYLENE GLYCOL 3350 17 G/17G
POWDER, FOR SOLUTION ORAL
Qty: 850 G | Refills: 3 | Status: SHIPPED | OUTPATIENT
Start: 2022-09-26

## 2022-09-26 RX ORDER — CHOLECALCIFEROL (VITAMIN D3) 125 MCG
2000 CAPSULE ORAL DAILY
Qty: 90 TABLET | Refills: 1 | Status: SHIPPED | OUTPATIENT
Start: 2022-09-26

## 2022-09-26 RX ORDER — ISOSORBIDE MONONITRATE 60 MG/1
60 TABLET, EXTENDED RELEASE ORAL DAILY
Qty: 90 TABLET | Refills: 1 | Status: SHIPPED | OUTPATIENT
Start: 2022-09-26

## 2022-09-26 RX ORDER — SENNA PLUS 8.6 MG/1
1 TABLET ORAL 2 TIMES DAILY
Qty: 180 TABLET | Refills: 1 | Status: SHIPPED | OUTPATIENT
Start: 2022-09-26

## 2022-09-26 RX ORDER — DEXLANSOPRAZOLE 60 MG/1
60 CAPSULE, DELAYED RELEASE ORAL DAILY
Qty: 90 CAPSULE | Refills: 1 | Status: SHIPPED | OUTPATIENT
Start: 2022-09-26

## 2022-09-26 RX ORDER — NITROGLYCERIN 0.4 MG/1
TABLET SUBLINGUAL
Qty: 25 TABLET | Refills: 1 | Status: SHIPPED | OUTPATIENT
Start: 2022-09-26

## 2022-09-26 NOTE — PROGRESS NOTES
Timothy Miller  1/23/1927 09/26/22    SUBJECTIVE:  HAD RESISTANT UTI ON CIPRO SO 9/2 SWITCHED TO MACROBID AND PRIOR FEVER, DYSURIA RESOLVED. Hypertension: Stable. Denies CP, SOB, cough, visual changes, dizziness, palpitations or HA. Coronary artery disease has been asymptomatic w/o any ongoing sx of CP, SOB/RENDON, palpitations, lt headedness, diaphoresis. Is continuing medications regularly. GERD:  Is without sx of heartburn or dysphagia, abd pain. Mood stable on ZOLOFT w/o current anxiety, depression or panic attacks. OBJECTIVE:    BP (!) 142/62   Pulse 61   Resp 16   Wt 124 lb (56.2 kg)   LMP  (LMP Unknown)   SpO2 94%   BMI 24.22 kg/m²     Physical Exam  Constitutional:       General: She is not in acute distress. Appearance: She is well-developed. HENT:      Head: Normocephalic and atraumatic. Nose: Nose normal.      Mouth/Throat:      Pharynx: No oropharyngeal exudate. Cardiovascular:      Rate and Rhythm: Normal rate and regular rhythm. Heart sounds: Normal heart sounds. Pulmonary:      Effort: Pulmonary effort is normal. No respiratory distress. Breath sounds: Normal breath sounds. Abdominal:      General: Bowel sounds are normal.      Palpations: Abdomen is soft. Musculoskeletal:         General: Normal range of motion. Cervical back: Normal range of motion and neck supple. No edema or erythema. No muscular tenderness. Skin:     General: Skin is warm and dry. Neurological:      General: No focal deficit present. Mental Status: She is alert and oriented to person, place, and time. Cranial Nerves: No cranial nerve deficit. Coordination: Coordination normal.   Psychiatric:         Thought Content: Thought content normal.       ASSESSMENT:    1. Essential hypertension    2. Acute cystitis without hematuria    3. Gastroesophageal reflux disease without esophagitis    4. Constipation, slow transit    5.  Depression, unspecified depression type    6. Coronary artery disease involving native coronary artery of native heart without angina pectoris    7. Vaginal itching    8. Age-related osteoporosis with current pathological fracture, initial encounter    9. Need for influenza vaccination        PLAN:    Brett Ramirez was seen today for follow-up. Diagnoses and all orders for this visit:    Essential hypertension - Blood pressure stable and will continue current regimen. Will plan periodic monitoring of renal function, electrolytes, lipid profile. WE'LL CONT TO MONITOR RENAL FXN TOO AND SHE'LL MAINTAIN F/U W DR Kelsi Waters  -     atenolol (TENORMIN) 25 MG tablet; Take 1 tablet by mouth daily  -     Basic Metabolic Panel; Future  -     CBC with Auto Differential; Future  -     Urinalysis with Microscopic; Future  -     Magnesium; Future    Acute cystitis without hematuria- SX IMPROVED, FEVER RESOLVED, RECHECK UA TO BE SURE INFECTION HAS CLEARED  -     Basic Metabolic Panel; Future  -     Urinalysis with Microscopic; Future  -     CBC with Auto Differential; Future  -     Urinalysis with Microscopic; Future    Gastroesophageal reflux disease without esophagitis - GERD controlled on meds and will refill, monitor for any recurrent or worsening sx.    -     dexlansoprazole (DEXILANT) 60 MG CPDR delayed release capsule; Take 1 capsule by mouth daily  -     CBC with Auto Differential; Future    Constipation, slow transit  -     docusate sodium (COLACE) 100 MG capsule; Take 1 capsule by mouth daily  -     senna (NATURAL SENNA LAXATIVE) 8.6 MG tablet; Take 1 tablet by mouth 2 times daily  -     polyethylene glycol (GAVILAX) 17 GM/SCOOP powder; MIX 17 GRAMS (1 HEAPING TABLESPOONFUL) OF POWDER IN 8 OUNCES OF LIQUID AND DRINK DAILY  -     TSH; Future    Depression, unspecified depression type - Mood stable on current regimen w/o any significant manifestations of severe depression or anxiety noted at this time. Cont current meds.     -     sertraline (ZOLOFT) 50 MG tablet; Take 1 tablet by mouth daily    Coronary artery disease involving native coronary artery of native heart without angina pectoris - Coronary artery disease stable and asymptomatic, will continue to monitor for any signs of instability. Will periodically monitor lipid profile and liver function, cardiac risk factors. Continue current medical regimen. SEEING DR LARRY GONZALEZ  -     isosorbide mononitrate (IMDUR) 60 MG extended release tablet; Take 1 tablet by mouth daily  -     nitroGLYCERIN (NITROSTAT) 0.4 MG SL tablet; NITRO  -     Comprehensive Metabolic Panel; Future  -     Lipid Panel; Future    Vaginal itching- USING RX PRN  -     nystatin (MYCOSTATIN) 038326 UNIT/GM powder; Apply 3 times daily. Age-related osteoporosis with current pathological fracture, initial encounter- F;U  VIT D LEVELS  -     Cholecalciferol (VITAMIN D3) 50 MCG (2000 UT) TABS; Take 1 tablet by mouth daily  -     Vitamin D 25 Hydroxy;  Future    Need for influenza vaccination  -     Influenza, FLUAD, (age 72 y+), IM, Preservative Free, 0.5 mL

## 2022-09-27 DIAGNOSIS — N30.00 ACUTE CYSTITIS WITHOUT HEMATURIA: Primary | ICD-10-CM

## 2022-09-27 DIAGNOSIS — N30.00 ACUTE CYSTITIS WITHOUT HEMATURIA: ICD-10-CM

## 2022-09-27 LAB
BACTERIA: ABNORMAL /HPF
BILIRUBIN URINE: NEGATIVE
BLOOD, URINE: ABNORMAL
CLARITY: CLEAR
COLOR: YELLOW
EPITHELIAL CELLS, UA: 3 /HPF (ref 0–5)
GLUCOSE URINE: NEGATIVE MG/DL
HYALINE CASTS: 1 /LPF (ref 0–8)
KETONES, URINE: NEGATIVE MG/DL
LEUKOCYTE ESTERASE, URINE: ABNORMAL
MICROSCOPIC EXAMINATION: YES
NITRITE, URINE: NEGATIVE
PH UA: 6 (ref 5–8)
PROTEIN UA: 30 MG/DL
RBC UA: 20 /HPF (ref 0–4)
SPECIFIC GRAVITY UA: 1.01 (ref 1–1.03)
URINE TYPE: ABNORMAL
UROBILINOGEN, URINE: 0.2 E.U./DL
WBC UA: 71 /HPF (ref 0–5)

## 2022-09-27 NOTE — RESULT ENCOUNTER NOTE
Please call pt, kidney fxn is stable, sodium remains sl low so she may want to back off water intake by 1 glass/day,  potassium level is nl.   I'll forward copy also to Dr Edilberto Gonzales

## 2022-09-28 DIAGNOSIS — N30.00 ACUTE CYSTITIS WITHOUT HEMATURIA: Primary | ICD-10-CM

## 2022-09-28 DIAGNOSIS — N30.00 ACUTE CYSTITIS WITHOUT HEMATURIA: ICD-10-CM

## 2022-09-28 RX ORDER — DOXYCYCLINE HYCLATE 100 MG
100 TABLET ORAL 2 TIMES DAILY
Qty: 28 TABLET | Refills: 0 | Status: SHIPPED | OUTPATIENT
Start: 2022-09-28 | End: 2022-10-21 | Stop reason: SDUPTHER

## 2022-09-28 NOTE — RESULT ENCOUNTER NOTE
Please call pt OR GRANDDTR JE. DANA STILL HAS A BLADDER INFECTION. PLS CONFIRM WE HAVE A URINE CULTURE PENDING OR IF NOT ADD THIS. NOT RESPONDED TO CIPRO OR KAITYBID, WE'LL TRY TWO WEEKS OF DOXYCYLINE-- WHILE AWAITING CULTURE RESULTS- THEN ALSO RECHECK UA AND UCX IN TWO WEEKS.

## 2022-09-29 DIAGNOSIS — N30.00 ACUTE CYSTITIS WITHOUT HEMATURIA: Primary | ICD-10-CM

## 2022-09-29 LAB — URINE CULTURE, ROUTINE: NORMAL

## 2022-09-29 NOTE — RESULT ENCOUNTER NOTE
Please call pt, or jerald Serrano. Brett Ramirez has persistent white cells in urine but her culture has come back negative. There may be something else going on rather than infection as the inflammation is not clearing despite multiple abx. If ok w her we should refer to urology for evaluation.   Dx dysuria

## 2022-10-03 DIAGNOSIS — R30.0 DYSURIA: Primary | ICD-10-CM

## 2022-10-21 ENCOUNTER — TELEPHONE (OUTPATIENT)
Dept: INTERNAL MEDICINE CLINIC | Age: 87
End: 2022-10-21

## 2022-10-21 DIAGNOSIS — N30.00 ACUTE CYSTITIS WITHOUT HEMATURIA: ICD-10-CM

## 2022-10-21 DIAGNOSIS — N39.0 COMPLICATED UTI (URINARY TRACT INFECTION): Primary | ICD-10-CM

## 2022-10-21 RX ORDER — DOXYCYCLINE HYCLATE 100 MG
100 TABLET ORAL 2 TIMES DAILY
Qty: 28 TABLET | Refills: 0 | Status: SHIPPED | OUTPATIENT
Start: 2022-10-21 | End: 2022-11-04

## 2022-10-22 NOTE — PROGRESS NOTES
Maggie rodriges and Evelyne Paulson has recurring dysuria. Infection appears prolonged despite multiple abx. ?may need pessary out per Dr Nagi Richardson? Also need urology eval so we'll send consult request to Dr Nasir Felton.

## 2022-10-24 DIAGNOSIS — N39.0 COMPLICATED UTI (URINARY TRACT INFECTION): Primary | ICD-10-CM

## 2022-10-24 DIAGNOSIS — N30.00 ACUTE CYSTITIS WITHOUT HEMATURIA: ICD-10-CM

## 2022-10-24 DIAGNOSIS — R30.0 DYSURIA: ICD-10-CM

## 2022-10-27 ENCOUNTER — OFFICE VISIT (OUTPATIENT)
Dept: OBGYN | Age: 87
End: 2022-10-27
Payer: COMMERCIAL

## 2022-10-27 VITALS
WEIGHT: 120 LBS | SYSTOLIC BLOOD PRESSURE: 183 MMHG | HEIGHT: 60 IN | DIASTOLIC BLOOD PRESSURE: 78 MMHG | BODY MASS INDEX: 23.56 KG/M2

## 2022-10-27 DIAGNOSIS — N81.10 VAGINAL PROLAPSE: ICD-10-CM

## 2022-10-27 DIAGNOSIS — N39.0 RECURRENT UTI: Primary | ICD-10-CM

## 2022-10-27 PROCEDURE — G8484 FLU IMMUNIZE NO ADMIN: HCPCS | Performed by: OBSTETRICS & GYNECOLOGY

## 2022-10-27 PROCEDURE — 1090F PRES/ABSN URINE INCON ASSESS: CPT | Performed by: OBSTETRICS & GYNECOLOGY

## 2022-10-27 PROCEDURE — 1123F ACP DISCUSS/DSCN MKR DOCD: CPT | Performed by: OBSTETRICS & GYNECOLOGY

## 2022-10-27 PROCEDURE — 1036F TOBACCO NON-USER: CPT | Performed by: OBSTETRICS & GYNECOLOGY

## 2022-10-27 PROCEDURE — 99213 OFFICE O/P EST LOW 20 MIN: CPT | Performed by: OBSTETRICS & GYNECOLOGY

## 2022-10-27 PROCEDURE — G8420 CALC BMI NORM PARAMETERS: HCPCS | Performed by: OBSTETRICS & GYNECOLOGY

## 2022-10-27 PROCEDURE — G8427 DOCREV CUR MEDS BY ELIG CLIN: HCPCS | Performed by: OBSTETRICS & GYNECOLOGY

## 2022-10-27 NOTE — PROGRESS NOTES
10/27/22    Francene Kayser  1/23/1927    Chief Complaint   Patient presents with    Other     Pt here for pessary removal per Dr. Aggie Beach d/t frequent uti's. Francene Kayser is a 80 y.o. female who presents today for evaluation of recurrent UTIs.     Past Medical History:   Diagnosis Date    CAD (coronary artery disease)     Dr Everett Farrar    Chronic renal insufficiency     Dr Micky Akins    Depression     Dry eye syndrome     Dr Claudette Purple     Elevated LFTs 05/11/2012    Generalized osteoarthritis     GERD (gastroesophageal reflux disease)     Esophagitis    Gout     Hyperlipidemia     Hypertension     Hyperuricemia     Hypothyroid     Hypothyroidism    Low back pain     Osteoporosis     Generalized    Sinus congestion     CHRONIC W POST NASAL DRIP    UTI (urinary tract infection)     Vertigo     Dr Alesha Sidhu       Past Surgical History:   Procedure Laterality Date    CATARACT REMOVAL      R Aug. 2006 and L March 2007 Dr. Willian Singh  2004    right foot realignment [address] Right 08/19/2018    rt hip Zheng    HYSTERECTOMY (CERVIX STATUS UNKNOWN)      TONSILLECTOMY         Social History     Tobacco Use    Smoking status: Former    Smokeless tobacco: Never   Vaping Use    Vaping Use: Never used   Substance Use Topics    Alcohol use: No    Drug use: No       Family History   Problem Relation Age of Onset    Other Mother         CAD    Other Father         CAD       Current Outpatient Medications   Medication Sig Dispense Refill    doxycycline hyclate (VIBRA-TABS) 100 MG tablet Take 1 tablet by mouth 2 times daily for 14 days 28 tablet 0    atenolol (TENORMIN) 25 MG tablet Take 1 tablet by mouth daily 180 tablet 1    dexlansoprazole (DEXILANT) 60 MG CPDR delayed release capsule Take 1 capsule by mouth daily 90 capsule 1    docusate sodium (COLACE) 100 MG capsule Take 1 capsule by mouth daily 90 capsule 1    senna (NATURAL SENNA LAXATIVE) 8.6 MG tablet Take 1 tablet by mouth 2 times daily 180 tablet 1    sertraline (ZOLOFT) 50 MG tablet Take 1 tablet by mouth daily 90 tablet 1    isosorbide mononitrate (IMDUR) 60 MG extended release tablet Take 1 tablet by mouth daily 90 tablet 1    polyethylene glycol (GAVILAX) 17 GM/SCOOP powder MIX 17 GRAMS (1 HEAPING TABLESPOONFUL) OF POWDER IN 8 OUNCES OF LIQUID AND DRINK DAILY 850 g 3    nystatin (MYCOSTATIN) 103161 UNIT/GM powder Apply 3 times daily. 60 g 1    Cholecalciferol (VITAMIN D3) 50 MCG (2000 UT) TABS Take 1 tablet by mouth daily 90 tablet 1    nitroGLYCERIN (NITROSTAT) 0.4 MG SL tablet NITRO 25 tablet 1    allopurinol (ZYLOPRIM) 100 MG tablet TAKE ONE (1) TABLET BY MOUTH DAILY 90 tablet 1    amLODIPine (NORVASC) 5 MG tablet TAKE ONE (1) TABLET BY MOUTH TWO (2) TIMES DAILY 180 tablet 1    ranolazine (RANEXA) 500 MG extended release tablet TAKE ONE (1) TABLET BY MOUTH TWO (2) TIMES DAILY 180 tablet 1    alendronate (FOSAMAX) 70 MG tablet FOSA 12 tablet 1    levothyroxine (SYNTHROID) 88 MCG tablet TAKE ONE (1) TABLET BY MOUTH DAILY 90 tablet 1    simvastatin (ZOCOR) 20 MG tablet TAKE 1 TABLET BY MOUTH EVERY NIGHT 90 tablet 1    hydrALAZINE (APRESOLINE) 10 MG tablet Take 1 tablet by mouth in the morning and 1 tablet in the evening. 180 tablet 1    hydroCHLOROthiazide (HYDRODIURIL) 12.5 MG tablet Take 12.5 mg by mouth daily      azelastine (ASTELIN) 0.1 % nasal spray 1 spray by Nasal route 2 times daily Use in each nostril as directed 30 mL 5    fexofenadine (ALLEGRA) 60 MG tablet Take 1 tablet by mouth in the morning. 90 tablet 1    losartan (COZAAR) 25 MG tablet Take 1 tablet by mouth in the morning. 90 tablet 1    hydrocortisone 2.5 % cream Apply topically 2 times daily Apply topically 2 times daily.       triamcinolone (KENALOG) 0.1 % ointment Apply topically 2 times daily 30 g 3    estradiol (ESTRACE VAGINAL) 0.1 MG/GM vaginal cream Place 1 g vaginally daily Daily for 2 weeks and then 2-3 times per week 1 each 5    acetaminophen (AMINOFEN) 325 MG tablet Take 2 tablets by mouth every 8 hours as needed for Pain 120 tablet 3    B Complex-C-Zn-Folic Acid (DIALYVITE 413-MZOH 15) 0.8 MG TABS Take 1 tablet by mouth daily 30 tablet 11    mineral oil-hydrophilic petrolatum (AQUAPHOR) ointment Apply topically as needed for Dry Skin Apply topically as needed. diphenhydrAMINE (BENADRYL) 25 MG tablet Take 50 mg by mouth nightly      cycloSPORINE (RESTASIS) 0.05 % ophthalmic emulsion Place 1 drop into both eyes 2 times daily      aspirin EC 81 MG EC tablet Take 81 mg by mouth daily. No current facility-administered medications for this visit. Allergies   Allergen Reactions    Bactrim [Sulfamethoxazole-Trimethoprim] Nausea And Vomiting    Cephalexin Rash    Tape Leveda Elrosa Tape] Itching           Immunization History   Administered Date(s) Administered    COVID-19, PFIZER PURPLE top, DILUTE for use, (age 15 y+), 30mcg/0.3mL 2021, 2021, 10/11/2021    Influenza 2012, 2013    Influenza, FLUAD, (age 72 y+), Adjuvanted, 0.5mL 2020, 2021, 2022    Influenza, High Dose (Fluzone 65 yrs and older) 2014, 2015, 10/21/2016, 2017, 2018, 2019    Pneumococcal Conjugate 13-valent (Cyrwevp37) 10/22/2015    Pneumococcal Polysaccharide (Krcxzyhxy58) 2011    Zoster Live (Zostavax) 2014       Review of Systems    BP (!) 183/78   Ht 5' (1.524 m)   Wt 120 lb (54.4 kg)   LMP  (LMP Unknown)   BMI 23.44 kg/m²     Physical Exam  Exam conducted with a chaperone present. Constitutional:       Appearance: Normal appearance. HENT:      Head: Normocephalic and atraumatic. Nose: Nose normal.   Cardiovascular:      Rate and Rhythm: Normal rate. Pulses: Normal pulses. Pulmonary:      Effort: Pulmonary effort is normal.   Abdominal:      General: Abdomen is flat. There is no distension. Palpations: Abdomen is soft. There is no mass. Tenderness: There is no abdominal tenderness. Hernia: No hernia is present. There is no hernia in the left inguinal area or right inguinal area. Genitourinary:     Exam position: Lithotomy position. Pubic Area: No rash. Labia:         Right: No rash, tenderness or lesion. Left: No rash, tenderness or lesion. Urethra: No prolapse, urethral pain, urethral swelling or urethral lesion. Vagina: Prolapsed vaginal walls present. No vaginal discharge, erythema, tenderness, bleeding or lesions. Uterus: Absent. Adnexa: Right adnexa normal and left adnexa normal.        Right: No mass, tenderness or fullness. Left: No mass, tenderness or fullness. Rectum: No mass or anal fissure. Normal anal tone. Comments: Pessary removed  Musculoskeletal:      Cervical back: Normal range of motion and neck supple. Lymphadenopathy:      Lower Body: No right inguinal adenopathy. No left inguinal adenopathy. Skin:     General: Skin is warm and dry. Neurological:      General: No focal deficit present. Mental Status: She is alert and oriented to person, place, and time. Psychiatric:         Mood and Affect: Mood normal.         Behavior: Behavior normal.         Thought Content: Thought content normal.         Judgment: Judgment normal.       No results found for this visit on 10/27/22.  09/27/2022 1536 09/29/2022 0735 Culture, Urine [[id number]]    Urine, clean catch    Component Value   Urine Culture, Routine <10,000 CFU/ml mixed skin/urogenital mela.  No further workup          09/27/2022 1536 09/27/2022 2127 Urinalysis with Microscopic [[id number]]   (Abnormal)   Urine, clean catch    Component Value Units   Color, UA Yellow    Clarity, UA Clear    Glucose, Ur Negative mg/dL   Bilirubin Urine Negative    Ketones, Urine Negative mg/dL   Specific Gravity, UA 1.013    Blood, Urine SMALL Abnormal     pH, UA 6.0    Protein, UA 30 Abnormal mg/dL   Urobilinogen, Urine 0.2 E.U./dL   Nitrite, Urine Negative    Leukocyte Esterase, Urine LARGE Abnormal     Microscopic Examination YES    Urine Type Cleancatch    Bacteria, UA 1+ Abnormal  /HPF   Hyaline Casts, UA 1 /LPF   WBC, UA 71 High  /HPF   RBC, UA 20 High  /HPF   Epithelial Cells, UA 3  /HPF          09/02/2022 1052 09/02/2022 1748 Urinalysis with Microscopic [[id number]]   (Abnormal)   Urine, clean catch    Component Value Units   Color, UA Yellow    Clarity, UA CLOUDY Abnormal     Glucose, Ur Negative mg/dL   Bilirubin Urine Negative    Ketones, Urine Negative mg/dL   Specific Gravity, UA 1.012    Blood, Urine SMALL Abnormal     pH, UA 6.5    Protein, UA 30 Abnormal  mg/dL   Urobilinogen, Urine 0.2 E.U./dL   Nitrite, Urine Negative    Leukocyte Esterase, Urine LARGE Abnormal     Microscopic Examination YES    Urine Type Cleancatch     Bacteria, UA Rare Abnormal  /HPF   Hyaline Casts, UA 0 /LPF   WBC, UA 92 High  /HPF   RBC, UA 6 High  /HPF   Epithelial Cells, UA 5  /HPF          08/19/2022 1415 08/20/2022 0101 Urinalysis with Microscopic [[id number]]   (Abnormal)   Urine, clean catch    Component Value Units   Color, UA Yellow    Clarity, UA Clear    Glucose, Ur Negative mg/dL   Bilirubin Urine Negative    Ketones, Urine Negative mg/dL   Specific Gravity, UA 1.009    Blood, Urine TRACE Abnormal     pH, UA 6.5    Protein, UA 30 Abnormal  mg/dL   Urobilinogen, Urine 0.2 E.U./dL   Nitrite, Urine Negative    Leukocyte Esterase, Urine LARGE Abnormal     Microscopic Examination YES    Urine Type Cleancatch    Bacteria, UA Rare Abnormal  /HPF   Hyaline Casts, UA 0 /LPF   WBC, UA 45 High  /HPF   RBC, UA 3 /HPF   Epithelial Cells, UA 4  /HPF            ASSESSMENT AND PLAN   Diagnosis Orders   1. Recurrent UTI        2.  Vaginal prolapse          -pessary removed  -monitor symptoms for improvement in UTI's  -monitor symptoms of prolapse  -if no change in UTIs, consider replacement of pessary if prolapse is overly symptomatic  -if UTIs resolve and prolapse is overly symptomatic, consider surgical repair (possible colpocleisis) versus pessary with prophylactic antibiotics  Return if symptoms worsen or fail to improve.     Tiana Crigler, MD

## 2022-11-14 ENCOUNTER — HOSPITAL ENCOUNTER (INPATIENT)
Age: 87
LOS: 22 days | Discharge: HOME HEALTH | DRG: 871 | End: 2022-12-06
Attending: EMERGENCY MEDICINE | Admitting: INTERNAL MEDICINE
Payer: COMMERCIAL

## 2022-11-14 ENCOUNTER — APPOINTMENT (OUTPATIENT)
Dept: CT IMAGING | Age: 87
DRG: 871 | End: 2022-11-14
Payer: COMMERCIAL

## 2022-11-14 ENCOUNTER — APPOINTMENT (OUTPATIENT)
Dept: GENERAL RADIOLOGY | Age: 87
DRG: 871 | End: 2022-11-14
Payer: COMMERCIAL

## 2022-11-14 DIAGNOSIS — R77.8 TROPONIN I ABOVE REFERENCE RANGE: ICD-10-CM

## 2022-11-14 DIAGNOSIS — N18.9 CHRONIC KIDNEY DISEASE, UNSPECIFIED CKD STAGE: ICD-10-CM

## 2022-11-14 DIAGNOSIS — J18.9 PNEUMONIA DUE TO INFECTIOUS ORGANISM, UNSPECIFIED LATERALITY, UNSPECIFIED PART OF LUNG: ICD-10-CM

## 2022-11-14 DIAGNOSIS — N18.30 STAGE 3 CHRONIC KIDNEY DISEASE, UNSPECIFIED WHETHER STAGE 3A OR 3B CKD (HCC): ICD-10-CM

## 2022-11-14 DIAGNOSIS — R06.00 DYSPNEA AND RESPIRATORY ABNORMALITIES: Primary | ICD-10-CM

## 2022-11-14 DIAGNOSIS — R06.89 DYSPNEA AND RESPIRATORY ABNORMALITIES: Primary | ICD-10-CM

## 2022-11-14 DIAGNOSIS — J44.9 CHRONIC OBSTRUCTIVE PULMONARY DISEASE, UNSPECIFIED COPD TYPE (HCC): ICD-10-CM

## 2022-11-14 PROBLEM — A41.9 SEPSIS (HCC): Status: ACTIVE | Noted: 2022-01-01

## 2022-11-14 LAB
ADENOVIRUS DETECTION BY PCR: NOT DETECTED
ALBUMIN SERPL-MCNC: 3.7 GM/DL (ref 3.4–5)
ALP BLD-CCNC: 67 IU/L (ref 40–129)
ALT SERPL-CCNC: 17 U/L (ref 10–40)
ANION GAP SERPL CALCULATED.3IONS-SCNC: 12 MMOL/L (ref 4–16)
APTT: 16.6 SECONDS (ref 25.1–37.1)
AST SERPL-CCNC: 26 IU/L (ref 15–37)
BACTERIA: NEGATIVE /HPF
BASE EXCESS: 7 (ref 0–2.4)
BASOPHILS ABSOLUTE: 0.1 K/CU MM
BASOPHILS RELATIVE PERCENT: 0.5 % (ref 0–1)
BILIRUB SERPL-MCNC: 0.4 MG/DL (ref 0–1)
BILIRUBIN URINE: NEGATIVE MG/DL
BLOOD, URINE: NEGATIVE
BORDETELLA PARAPERTUSSIS BY PCR: NOT DETECTED
BORDETELLA PERTUSSIS PCR: NOT DETECTED
BUN BLDV-MCNC: 41 MG/DL (ref 6–23)
CALCIUM SERPL-MCNC: 10.2 MG/DL (ref 8.3–10.6)
CHLAMYDOPHILA PNEUMONIA PCR: NOT DETECTED
CHLORIDE BLD-SCNC: 105 MMOL/L (ref 99–110)
CLARITY: CLEAR
CO2: 18 MMOL/L (ref 21–32)
COLOR: YELLOW
COMMENT: ABNORMAL
CORONAVIRUS 229E PCR: NOT DETECTED
CORONAVIRUS HKU1 PCR: NOT DETECTED
CORONAVIRUS NL63 PCR: NOT DETECTED
CORONAVIRUS OC43 PCR: NOT DETECTED
CREAT SERPL-MCNC: 2 MG/DL (ref 0.6–1.1)
DIFFERENTIAL TYPE: ABNORMAL
EOSINOPHILS ABSOLUTE: 0.2 K/CU MM
EOSINOPHILS RELATIVE PERCENT: 1.5 % (ref 0–3)
GFR SERPL CREATININE-BSD FRML MDRD: 23 ML/MIN/1.73M2
GLUCOSE BLD-MCNC: 104 MG/DL (ref 70–99)
GLUCOSE, URINE: NEGATIVE MG/DL
HCO3 VENOUS: 17.2 MMOL/L (ref 19–25)
HCT VFR BLD CALC: 32.4 % (ref 37–47)
HEMOGLOBIN: 10.8 GM/DL (ref 12.5–16)
HUMAN METAPNEUMOVIRUS PCR: NOT DETECTED
IMMATURE NEUTROPHIL %: 1.9 % (ref 0–0.43)
INFLUENZA A BY PCR: NOT DETECTED
INFLUENZA A H1 (2009) PCR: NOT DETECTED
INFLUENZA A H1 PANDEMIC PCR: NOT DETECTED
INFLUENZA A H3 PCR: NOT DETECTED
INFLUENZA B BY PCR: NOT DETECTED
INR BLD: 0.86 INDEX
KETONES, URINE: NEGATIVE MG/DL
LACTIC ACID, SEPSIS: 0.9 MMOL/L (ref 0.5–1.9)
LEUKOCYTE ESTERASE, URINE: ABNORMAL
LYMPHOCYTES ABSOLUTE: 2.3 K/CU MM
LYMPHOCYTES RELATIVE PERCENT: 15.5 % (ref 24–44)
MAGNESIUM: 1.7 MG/DL (ref 1.8–2.4)
MCH RBC QN AUTO: 31.9 PG (ref 27–31)
MCHC RBC AUTO-ENTMCNC: 33.3 % (ref 32–36)
MCV RBC AUTO: 95.6 FL (ref 78–100)
MONOCYTES ABSOLUTE: 0.9 K/CU MM
MONOCYTES RELATIVE PERCENT: 5.9 % (ref 0–4)
MYCOPLASMA PNEUMONIAE PCR: NOT DETECTED
NITRITE URINE, QUANTITATIVE: NEGATIVE
O2 SAT, VEN: 88.3 % (ref 50–70)
PARAINFLUENZA 1 PCR: NOT DETECTED
PARAINFLUENZA 2 PCR: NOT DETECTED
PARAINFLUENZA 3 PCR: NOT DETECTED
PARAINFLUENZA 4 PCR: NOT DETECTED
PCO2, VEN: 29 MMHG (ref 38–52)
PDW BLD-RTO: 13.8 % (ref 11.7–14.9)
PH VENOUS: 7.38 (ref 7.32–7.42)
PH, URINE: 5.5 (ref 5–8)
PLATELET # BLD: 349 K/CU MM (ref 140–440)
PMV BLD AUTO: 9.3 FL (ref 7.5–11.1)
PO2, VEN: 163 MMHG (ref 28–48)
POTASSIUM SERPL-SCNC: 4.3 MMOL/L (ref 3.5–5.1)
PRO-BNP: 6417 PG/ML
PROTEIN UA: 30 MG/DL
PROTHROMBIN TIME: 11.1 SECONDS (ref 11.7–14.5)
RBC # BLD: 3.39 M/CU MM (ref 4.2–5.4)
RBC URINE: ABNORMAL /HPF (ref 0–6)
REASON FOR REJECTION: NORMAL
REJECTED TEST: NORMAL
RHINOVIRUS ENTEROVIRUS PCR: NOT DETECTED
RSV PCR: NOT DETECTED
SARS-COV-2: NOT DETECTED
SEGMENTED NEUTROPHILS ABSOLUTE COUNT: 11.3 K/CU MM
SEGMENTED NEUTROPHILS RELATIVE PERCENT: 74.7 % (ref 36–66)
SODIUM BLD-SCNC: 135 MMOL/L (ref 135–145)
SPECIFIC GRAVITY UA: 1.01 (ref 1–1.03)
SQUAMOUS EPITHELIAL: 3 /HPF
TOTAL IMMATURE NEUTOROPHIL: 0.29 K/CU MM
TOTAL PROTEIN: 6.7 GM/DL (ref 6.4–8.2)
TRICHOMONAS: ABNORMAL /HPF
TROPONIN T: 0.04 NG/ML
UROBILINOGEN, URINE: 0.2 MG/DL (ref 0.2–1)
WBC # BLD: 15.1 K/CU MM (ref 4–10.5)
WBC UA: 9 /HPF (ref 0–5)

## 2022-11-14 PROCEDURE — 6360000002 HC RX W HCPCS: Performed by: PHYSICIAN ASSISTANT

## 2022-11-14 PROCEDURE — 87077 CULTURE AEROBIC IDENTIFY: CPT

## 2022-11-14 PROCEDURE — 6360000002 HC RX W HCPCS

## 2022-11-14 PROCEDURE — 82803 BLOOD GASES ANY COMBINATION: CPT

## 2022-11-14 PROCEDURE — 80053 COMPREHEN METABOLIC PANEL: CPT

## 2022-11-14 PROCEDURE — 87040 BLOOD CULTURE FOR BACTERIA: CPT

## 2022-11-14 PROCEDURE — 6370000000 HC RX 637 (ALT 250 FOR IP)

## 2022-11-14 PROCEDURE — 84484 ASSAY OF TROPONIN QUANT: CPT

## 2022-11-14 PROCEDURE — 6370000000 HC RX 637 (ALT 250 FOR IP): Performed by: PHYSICIAN ASSISTANT

## 2022-11-14 PROCEDURE — 0202U NFCT DS 22 TRGT SARS-COV-2: CPT

## 2022-11-14 PROCEDURE — 85025 COMPLETE CBC W/AUTO DIFF WBC: CPT

## 2022-11-14 PROCEDURE — 82805 BLOOD GASES W/O2 SATURATION: CPT

## 2022-11-14 PROCEDURE — 96374 THER/PROPH/DIAG INJ IV PUSH: CPT

## 2022-11-14 PROCEDURE — 81001 URINALYSIS AUTO W/SCOPE: CPT

## 2022-11-14 PROCEDURE — 6360000002 HC RX W HCPCS: Performed by: INTERNAL MEDICINE

## 2022-11-14 PROCEDURE — 71045 X-RAY EXAM CHEST 1 VIEW: CPT

## 2022-11-14 PROCEDURE — 83605 ASSAY OF LACTIC ACID: CPT

## 2022-11-14 PROCEDURE — 85730 THROMBOPLASTIN TIME PARTIAL: CPT

## 2022-11-14 PROCEDURE — 85610 PROTHROMBIN TIME: CPT

## 2022-11-14 PROCEDURE — 87086 URINE CULTURE/COLONY COUNT: CPT

## 2022-11-14 PROCEDURE — 83735 ASSAY OF MAGNESIUM: CPT

## 2022-11-14 PROCEDURE — 71250 CT THORAX DX C-: CPT

## 2022-11-14 PROCEDURE — 2580000003 HC RX 258: Performed by: PHYSICIAN ASSISTANT

## 2022-11-14 PROCEDURE — 2140000000 HC CCU INTERMEDIATE R&B

## 2022-11-14 PROCEDURE — 83880 ASSAY OF NATRIURETIC PEPTIDE: CPT

## 2022-11-14 PROCEDURE — 99285 EMERGENCY DEPT VISIT HI MDM: CPT

## 2022-11-14 PROCEDURE — 87804 INFLUENZA ASSAY W/OPTIC: CPT

## 2022-11-14 PROCEDURE — 87186 SC STD MICRODIL/AGAR DIL: CPT

## 2022-11-14 PROCEDURE — 94640 AIRWAY INHALATION TREATMENT: CPT

## 2022-11-14 PROCEDURE — 93005 ELECTROCARDIOGRAM TRACING: CPT | Performed by: EMERGENCY MEDICINE

## 2022-11-14 RX ORDER — SODIUM CHLORIDE 0.9 % (FLUSH) 0.9 %
5-40 SYRINGE (ML) INJECTION EVERY 12 HOURS SCHEDULED
Status: DISCONTINUED | OUTPATIENT
Start: 2022-11-14 | End: 2022-12-06 | Stop reason: HOSPADM

## 2022-11-14 RX ORDER — LEVOTHYROXINE SODIUM 88 UG/1
88 TABLET ORAL DAILY
Status: DISCONTINUED | OUTPATIENT
Start: 2022-11-15 | End: 2022-12-06 | Stop reason: HOSPADM

## 2022-11-14 RX ORDER — ACETAMINOPHEN 650 MG/1
650 SUPPOSITORY RECTAL EVERY 6 HOURS PRN
Status: DISCONTINUED | OUTPATIENT
Start: 2022-11-14 | End: 2022-12-06 | Stop reason: HOSPADM

## 2022-11-14 RX ORDER — ACETAMINOPHEN 325 MG/1
650 TABLET ORAL EVERY 6 HOURS PRN
Status: DISCONTINUED | OUTPATIENT
Start: 2022-11-14 | End: 2022-12-06 | Stop reason: HOSPADM

## 2022-11-14 RX ORDER — MAGNESIUM SULFATE IN WATER 40 MG/ML
2000 INJECTION, SOLUTION INTRAVENOUS ONCE
Status: COMPLETED | OUTPATIENT
Start: 2022-11-14 | End: 2022-11-15

## 2022-11-14 RX ORDER — GUAIFENESIN/DEXTROMETHORPHAN 100-10MG/5
5 SYRUP ORAL ONCE
Status: COMPLETED | OUTPATIENT
Start: 2022-11-14 | End: 2022-11-14

## 2022-11-14 RX ORDER — POLYETHYLENE GLYCOL 3350 17 G/17G
17 POWDER, FOR SOLUTION ORAL DAILY PRN
Status: DISCONTINUED | OUTPATIENT
Start: 2022-11-14 | End: 2022-12-06 | Stop reason: HOSPADM

## 2022-11-14 RX ORDER — SODIUM CHLORIDE 0.9 % (FLUSH) 0.9 %
5-40 SYRINGE (ML) INJECTION PRN
Status: DISCONTINUED | OUTPATIENT
Start: 2022-11-14 | End: 2022-12-06 | Stop reason: HOSPADM

## 2022-11-14 RX ORDER — ATENOLOL 25 MG/1
25 TABLET ORAL
Status: DISCONTINUED | OUTPATIENT
Start: 2022-11-14 | End: 2022-11-15

## 2022-11-14 RX ORDER — FUROSEMIDE 10 MG/ML
20 INJECTION INTRAMUSCULAR; INTRAVENOUS ONCE
Status: COMPLETED | OUTPATIENT
Start: 2022-11-14 | End: 2022-11-14

## 2022-11-14 RX ORDER — PANTOPRAZOLE SODIUM 40 MG/1
40 TABLET, DELAYED RELEASE ORAL
Status: DISCONTINUED | OUTPATIENT
Start: 2022-11-15 | End: 2022-12-06 | Stop reason: HOSPADM

## 2022-11-14 RX ORDER — ONDANSETRON 2 MG/ML
4 INJECTION INTRAMUSCULAR; INTRAVENOUS EVERY 6 HOURS PRN
Status: DISCONTINUED | OUTPATIENT
Start: 2022-11-14 | End: 2022-12-06 | Stop reason: HOSPADM

## 2022-11-14 RX ORDER — LEVOFLOXACIN 5 MG/ML
750 INJECTION, SOLUTION INTRAVENOUS ONCE
Status: COMPLETED | OUTPATIENT
Start: 2022-11-14 | End: 2022-11-15

## 2022-11-14 RX ORDER — LOSARTAN POTASSIUM 25 MG/1
12.5 TABLET ORAL DAILY
Status: DISCONTINUED | OUTPATIENT
Start: 2022-11-15 | End: 2022-12-06

## 2022-11-14 RX ORDER — FLUOROMETHOLONE 0.1 %
1 SUSPENSION, DROPS(FINAL DOSAGE FORM)(ML) OPHTHALMIC (EYE) 2 TIMES DAILY
Status: DISCONTINUED | OUTPATIENT
Start: 2022-11-14 | End: 2022-12-06 | Stop reason: HOSPADM

## 2022-11-14 RX ORDER — SIMVASTATIN 20 MG
20 TABLET ORAL NIGHTLY
Status: DISCONTINUED | OUTPATIENT
Start: 2022-11-14 | End: 2022-11-14 | Stop reason: CLARIF

## 2022-11-14 RX ORDER — LEVOFLOXACIN 5 MG/ML
750 INJECTION, SOLUTION INTRAVENOUS EVERY 24 HOURS
Status: DISCONTINUED | OUTPATIENT
Start: 2022-11-14 | End: 2022-11-14

## 2022-11-14 RX ORDER — HEPARIN SODIUM 5000 [USP'U]/ML
5000 INJECTION, SOLUTION INTRAVENOUS; SUBCUTANEOUS EVERY 8 HOURS SCHEDULED
Status: DISCONTINUED | OUTPATIENT
Start: 2022-11-14 | End: 2022-12-06 | Stop reason: HOSPADM

## 2022-11-14 RX ORDER — 0.9 % SODIUM CHLORIDE 0.9 %
500 INTRAVENOUS SOLUTION INTRAVENOUS ONCE
Status: COMPLETED | OUTPATIENT
Start: 2022-11-14 | End: 2022-11-15

## 2022-11-14 RX ORDER — SODIUM CHLORIDE 9 MG/ML
INJECTION, SOLUTION INTRAVENOUS PRN
Status: DISCONTINUED | OUTPATIENT
Start: 2022-11-14 | End: 2022-12-06 | Stop reason: HOSPADM

## 2022-11-14 RX ORDER — ISOSORBIDE MONONITRATE 60 MG/1
60 TABLET, EXTENDED RELEASE ORAL DAILY
Status: DISCONTINUED | OUTPATIENT
Start: 2022-11-15 | End: 2022-12-06 | Stop reason: HOSPADM

## 2022-11-14 RX ORDER — ATORVASTATIN CALCIUM 10 MG/1
20 TABLET, FILM COATED ORAL NIGHTLY
Status: DISCONTINUED | OUTPATIENT
Start: 2022-11-14 | End: 2022-12-06 | Stop reason: HOSPADM

## 2022-11-14 RX ORDER — ALENDRONATE SODIUM 70 MG/1
70 TABLET ORAL
Status: DISCONTINUED | OUTPATIENT
Start: 2022-11-16 | End: 2022-11-14 | Stop reason: CLARIF

## 2022-11-14 RX ORDER — LEVOFLOXACIN 5 MG/ML
500 INJECTION, SOLUTION INTRAVENOUS
Status: DISCONTINUED | OUTPATIENT
Start: 2022-11-16 | End: 2022-11-16

## 2022-11-14 RX ORDER — FLUOROMETHOLONE 0.1 %
1 SUSPENSION, DROPS(FINAL DOSAGE FORM)(ML) OPHTHALMIC (EYE) 2 TIMES DAILY
COMMUNITY
Start: 2022-10-20

## 2022-11-14 RX ORDER — OLMESARTAN MEDOXOMIL 5 MG/1
5 TABLET ORAL EVERY MORNING
Status: DISCONTINUED | OUTPATIENT
Start: 2022-11-15 | End: 2022-11-14 | Stop reason: CLARIF

## 2022-11-14 RX ORDER — SODIUM BICARBONATE 325 MG/1
1 TABLET ORAL 2 TIMES DAILY
COMMUNITY
Start: 2022-10-17

## 2022-11-14 RX ORDER — VITAMIN B COMPLEX
2000 TABLET ORAL DAILY
Status: DISCONTINUED | OUTPATIENT
Start: 2022-11-15 | End: 2022-12-06 | Stop reason: HOSPADM

## 2022-11-14 RX ORDER — ONDANSETRON 4 MG/1
4 TABLET, ORALLY DISINTEGRATING ORAL EVERY 8 HOURS PRN
Status: DISCONTINUED | OUTPATIENT
Start: 2022-11-14 | End: 2022-12-06 | Stop reason: HOSPADM

## 2022-11-14 RX ORDER — ALBUTEROL SULFATE 2.5 MG/3ML
2.5 SOLUTION RESPIRATORY (INHALATION)
Status: DISCONTINUED | OUTPATIENT
Start: 2022-11-14 | End: 2022-11-15

## 2022-11-14 RX ORDER — RANOLAZINE 500 MG/1
500 TABLET, EXTENDED RELEASE ORAL 2 TIMES DAILY
Status: DISCONTINUED | OUTPATIENT
Start: 2022-11-14 | End: 2022-12-06 | Stop reason: HOSPADM

## 2022-11-14 RX ORDER — LIFITEGRAST 50 MG/ML
1 SOLUTION/ DROPS OPHTHALMIC 2 TIMES DAILY
COMMUNITY
Start: 2022-09-19

## 2022-11-14 RX ADMIN — MAGNESIUM SULFATE HEPTAHYDRATE 2000 MG: 2 INJECTION, SOLUTION INTRAVENOUS at 23:16

## 2022-11-14 RX ADMIN — RANOLAZINE 500 MG: 500 TABLET, FILM COATED, EXTENDED RELEASE ORAL at 23:18

## 2022-11-14 RX ADMIN — GUAIFENESIN AND DEXTROMETHORPHAN 5 ML: 100; 10 SYRUP ORAL at 16:20

## 2022-11-14 RX ADMIN — SODIUM CHLORIDE 500 ML: 9 INJECTION, SOLUTION INTRAVENOUS at 16:20

## 2022-11-14 RX ADMIN — HEPARIN SODIUM 5000 UNITS: 5000 INJECTION INTRAVENOUS; SUBCUTANEOUS at 23:13

## 2022-11-14 RX ADMIN — ALBUTEROL SULFATE 2.5 MG: 2.5 SOLUTION RESPIRATORY (INHALATION) at 21:36

## 2022-11-14 RX ADMIN — LEVOFLOXACIN 750 MG: 5 INJECTION, SOLUTION INTRAVENOUS at 17:15

## 2022-11-14 RX ADMIN — ATENOLOL 25 MG: 25 TABLET ORAL at 23:00

## 2022-11-14 RX ADMIN — FUROSEMIDE 20 MG: 10 INJECTION, SOLUTION INTRAVENOUS at 23:00

## 2022-11-14 RX ADMIN — ACETAMINOPHEN 650 MG: 325 TABLET ORAL at 22:59

## 2022-11-14 RX ADMIN — ATORVASTATIN CALCIUM 20 MG: 10 TABLET, FILM COATED ORAL at 23:00

## 2022-11-14 NOTE — ED NOTES
ED TO INPATIENT SBAR HANDOFF    Patient Name: Terrence Aguirre   :  1927  89 y.o. MRN:  [id number]  Preferred Name  Piero Mccollum  ED Room #:  TR01/01TR-01  Family/Caregiver Present yes   Restraints no   Sitter no   Sepsis Risk Score Sepsis Risk Score: 4.25    Situation  Code Status: Prior Limited Code details: Intubation/Re-intubation No Comment; Defibrillation/Cardioversion No Comment; Chest Compressions No Comment; Resuscitative Medications No Comment; Other No Comment. Allergies: Bactrim [sulfamethoxazole-trimethoprim], Cephalexin, and Tape [adhesive tape]  Weight: No data found. Arrived from: home  Chief Complaint:   Chief Complaint   Patient presents with    Shortness of [place] Problem/Diagnosis:  Principal Problem:    Sepsis ([facility] [address]  Resolved Problems:    * No resolved hospital problems.  *    Imaging:   XR CHEST PORTABLE   Final Result   Cardiomegaly      Bilateral ill-defined pulmonary opacities could represent multifocal   pneumonia or edema         CT CHEST WO CONTRAST    (Results Pending)     Abnormal labs:   Abnormal Labs Reviewed   CBC WITH AUTO DIFFERENTIAL - Abnormal; Notable for the following components:       Result Value    WBC 15.1 (*)     RBC 3.39 (*)     Hemoglobin 10.8 (*)     Hematocrit 32.4 (*)     MCH 31.9 (*)     Immature Neutrophil % 1.9 (*)     Segs Relative 74.7 (*)     Lymphocytes % 15.5 (*)     Monocytes % 5.9 (*)     All other components within normal limits   COMPREHENSIVE METABOLIC PANEL - Abnormal; Notable for the following components:    CO2 18 (*)     BUN 41 (*)     Creatinine 2.0 (*)     Est, Glom Filt Rate 23 (*)     Glucose 104 (*)     All other components within normal limits   TROPONIN - Abnormal; Notable for the following components:    Troponin T 0.037 (*)     All other components within normal limits   BRAIN NATRIURETIC PEPTIDE - Abnormal; Notable for the following components:    Pro-BNP 6,417 (*)     All other components within normal limits   BLOOD GAS, VENOUS - Abnormal; Notable for the following components:    pCO2, Denis 29 (*)     pO2, Denis 163 (*)     Base Excess 7 (*)     HCO3, Venous 17.2 (*)     O2 Sat, Denis 88.3 (*)     All other components within normal limits   MAGNESIUM - Abnormal; Notable for the following components:    Magnesium 1.7 (*)     All other components within normal limits   PROTIME/INR & PTT - Abnormal; Notable for the following components:    Protime 11.1 (*)     aPTT 16.6 (*)     All other components within normal limits     Critical values: no     Abnormal Assessment Findings:   Crackles to left lung sounds and muffled heart tones.     Background  History:   Past Medical History:   Diagnosis Date    CAD (coronary artery disease)     Dr Rudi Figueredo/Jose Figueredo    Chronic renal insufficiency     Dr Giovanny Curiel    Depression     Dry eye syndrome     Dr Sharlyne Buerger     Elevated LFTs 05/11/2012    Generalized osteoarthritis     GERD (gastroesophageal reflux disease)     Esophagitis    Gout     Hyperlipidemia     Hypertension     Hyperuricemia     Hypothyroid     Hypothyroidism    Low back pain     Osteoporosis     Generalized    Sinus congestion     CHRONIC W POST NASAL DRIP    UTI (urinary tract infection)     Vertigo     Dr Ele Valverde    Vitals/MEWS: MEWS Score: 3  Level of Consciousness: Alert (0)   Vitals:    11/14/22 1732 11/14/22 1803 11/14/22 1816 11/14/22 1832   BP: (!) 172/61 (!) 162/129 (!) 127/108 (!) 146/80   Pulse: 76 79 78 76   Resp: 27 27 21 23   Temp:       TempSrc:       SpO2: 92% 91% (!) 89% 95%     FiO2 (%): nasal cannula for respiratory distress  O2 Flow Rate:  3    Cardiac Rhythm:    Pain Assessment: 0   [x] Verbal [] Poornima Median Scale  Pain Scale: Pain Assessment  Pain Assessment: None - Denies Pain  Last documented pain score (0-10 scale)    Last documented pain medication administered: None  Mental Status: oriented, alert, thought processes intact, and able to concentrate and follow conversation  NIH Score:    C-SSRS: Risk of Suicide: No Risk  Bedside swallow:    [place] Coma Scale (GCS): [place]ia Coma Scale  Eye Opening: Spontaneous  Best Verbal Response: Oriented  Best Motor Response: Obeys commands  O[place]ia Coma Scale Score: 15  Active LDA's:   Peripheral IV 11/14/22 Left Antecubital (Active)     PO Status: Regular  Pertinent or High Risk Medications/Drips: no   If Yes, please provide details: NA  Pending Blood Product Administration: no     You may also review the ED PT Care Timeline found under the Summary Nursing Index tab. Recommendation    Pending orders : Mag sulfate 2000mg/50mL IVPB  Plan for Discharge (if known): Additional Comments:   Patient comes from home with c/o SOB for the past few days. Pt is alert and oriented. Currently on 3L O2 but does not normally wear oxygen at home. Pt's SBP has been elevated, BNP elevated to 6,417, troponin elevated. Pt is being treated for pneumonia with levaquin. Pt has CKD and is on fluid restriction. Dr. Irvin Wallace has come down and evaluated patient and updated family on care plan. -- Pt's granddaughter is POA. She will be coming in tomorrow morning with DNR paperwork to place on file.     If any further questions, please call Sending RN at [phone]    Electronically signed by: Electronically signed by Sergio Khalil RN on 11/14/2022 at 6:49 PM       Sergio Khalil RN  11/14/22 2591 Medical [facility], RN  11/14/22 14 [facility], RN  11/14/22 0860

## 2022-11-14 NOTE — ED TRIAGE NOTES
Pt presents to ED from home by EMS for SOB x a couple days. Pt was 85% on room air per EMS.  Pt also is currently being treated for a UTI

## 2022-11-14 NOTE — ED PROVIDER NOTES
I independently examined and evaluated John Pinon. I personally saw the patient and performed a substantive portion of the visit including all aspects of the medical decision making. In brief their history revealed patient has been struggling with recurrent urinary tract infections over the last several weeks requiring several rounds of antibiotics. For the past day and a half patient has developed increasing cough and shortness of breath. Daughter is concerned regarding possibility of pneumonia. No fevers at home. No chest pain. Patient does not have any known pulmonary issues. No home oxygen use. Patient of note is a DNR [facility]. History is supplemented by patient's daughter who is at bedside. Their focused exam revealed the patient is afebrile, mild tachypnea with hypoxemia in the 80s correcting to the low 90s on 3 L nasal cannula. The patient appears age appropriate, appears well-hydrated, well-nourished. Appears well for age. . Mucous membranes are moist. Speech is clear. Breathing is with coarse breath sounds bilateral bases. Dry tight coughing present. Mild tachypnea. No respiratory distress. Skin is dry. Mental status is normal. The patient moves all extremities and is without facial droop. 12 lead EKG per my interpretation:  Normal Sinus Rhythm at 68  [place] is   Left axis deviation  QTc is  within an acceptable range  There is no specific T wave changes appreciated. There is no specific ST wave changes appreciated. LBBB  No STEMI    Prior EKG to compare with was available and stable left bundle branch block when compared with prior. ED course: Pt presents as above. Emergent conditions considered. Presentation prompted initial labs, EKG and imaging. EKG with normal sinus rhythm as above. Stable left bundle branch block. Patient is found to have likely multifocal pneumonia as well as elevated BNP.   Patient's cardiologist, Dr. Joselin Lujan is consulted and did evaluate patient in the emergency department. Patient's daughter is requesting nephrology consultation as well and consult is placed for Dr. Rocco Meigs. Given patient's allergies will place patient on Levaquin for commune acquired pneumonia coverage. Small IV fluid bolus of 500 cc normal saline is given given patient's elevated BNP concern for possible pulmonary edema. Cardiology is requesting CT of chest which does demonstrate bilateral infilatres and pleural effusions. Patient is with new O2 requirement. Patient requires admit. Patient discussed with hospitalist team, by my PA and patient admitted to medicine. Questions sought and answered with the daughter and patient. They voice understanding and agree with plan. Is this patient to be included in the SEP-1 Core Measure due to severe sepsis or septic shock? No   Exclusion criteria - the patient is NOT to be included for SEP-1 Core Measure due to:  2+ SIRS criteria are not met    CRITICAL CARE NOTE:  There was a high probability of clinically significant life-threatening deterioration of the patient's condition requiring my urgent intervention due to respiratory failure with hypoxemia secondary to pneumonia. IVF resus, IV abx admin, tele monitoring, supplemental O2 administration, patient and daughter counseling was performed to address this. Total critical care time is AT LEAST 15 minutes (in addition to PA time). This includes vital sign monitoring, pulse oximetry monitoring, telemetry monitoring, clinical response to the IV medications, reviewing the nursing notes, consultation time, dictation/documentation time, and interpretation of the lab work. This time excludes time spent performing procedures and separately billable procedures and family discussion time.         All decisions regarding differential diagnosis, lab/radiology/EKG interpretation, risk of significant illness, specific reasons for performing tests, management/treatment, response to management/treatment, disposition, reasons for consults, results of consults, etc. were made by myself in conjunction with the Advanced Practice Provider. For all further details of the patient's emergency department visit, please see the Advanced Practice Provider's documentation.        Abdulkadir Bedolla MD  11/15/22 1850

## 2022-11-14 NOTE — CONSULTS
Dictated --[id number]  Hx of CAD cath 2010 -distal LAD dx, medical treatment  Hx of EF 50% range in 2018-repeat echo  Hx of renal insuffiencey  Hx of HTN-meds adjusted recently  Hx of UTI had been on antibiotics recently  Appears she has pneumonia-had bee on multiple antibiotics -check ct chest  Check echo  Conservative treatment  Will follow    Electronically signed by Luiza Davis MD on 11/14/22 at 3:41 PM EST

## 2022-11-14 NOTE — ED NOTES
Medication History  [facility]    Patient Name: Julien Salamanca 1/23/1927     Medication history has been completed by: Marguerite Jay CPhT    Source(s) of information: patient's family and insurance claims    Primary Care Physician: Kristofer Caban MD     Pharmacy: [address] as of 11/14/2022 - Fully Reviewed 11/14/2022   Allergen Reaction Noted    Bactrim [sulfamethoxazole-trimethoprim] Nausea And Vomiting 01/17/2018    Cephalexin Rash 01/31/2012    Tape [adhesive tape] Itching 10/03/2018        Prior to Admission medications    Medication Sig Start Date End Date Taking? Authorizing Provider   ondansetron (ZOFRAN) 4 MG tablet Take 1 tablet by mouth every 12 hours as needed for Nausea 11/14/22   Wild Mock MD   sodium bicarbonate 325 MG tablet Take 1 tablet by mouth in the morning and 1 tablet in the evening.  10/17/22   Historical Provider, MD Gianfranco Gray 5 % SOLN Place 1 drop into both eyes 2 times daily 9/19/22   Historical Provider, MD   fluorometholone (FML) 0.1 % ophthalmic suspension Place 1 drop into both eyes 2 times daily 10/20/22   Historical Provider, MD   nitrofurantoin, macrocrystal-monohydrate, (MACROBID) 100 MG capsule Take by mouth 2 times daily    Historical Provider, MD   amLODIPine (NORVASC) 10 MG tablet Take 1 tablet by mouth nightly 11/7/22 12/7/22  Wild Mock MD   olmesartan (BENICAR) 5 MG tablet Take 1 tablet by mouth every morning 11/7/22   Wild Mock MD   atenolol (TENORMIN) 25 MG tablet Take 1 tablet by mouth daily  Patient taking differently: Take 25 mg by mouth Daily with supper 9/26/22   Kristofer Caban MD   dexlansoprazole [facility]) 60 MG CPDR delayed release capsule Take 1 capsule by mouth daily 9/26/22   Kristofer Caban MD   docusate sodium (COLACE) 100 MG capsule Take 1 capsule by mouth daily 9/26/22   Kristofer Caban MD   senna (NATURAL SENNA LAXATIVE) 8.6 MG tablet Take 1 tablet by mouth 2 times daily 9/26/22   Stephanie Espitia MD Faith   sertraline (ZOLOFT) 50 MG tablet Take 1 tablet by mouth daily 9/26/22   Drew Montana MD   isosorbide mononitrate (IMDUR) 60 MG extended release tablet Take 1 tablet by mouth daily 9/26/22   Drew Montana MD   polyethylene glycol (GAVILAX) 17 GM/SCOOP powder MIX 17 GRAMS (1 HEAPING TABLESPOONFUL) OF POWDER IN 8 OUNCES OF LIQUID AND DRINK DAILY 9/26/22   Drew Montana MD   nystatin (MYCOSTATIN) 244916 UNIT/GM powder Apply 3 times daily. 9/26/22   Drew Montana MD   Cholecalciferol (VITAMIN D3) 50 MCG (2000 UT) TABS Take 1 tablet by mouth daily 9/26/22   Drew Montana MD   nitroGLYCERIN (NITROSTAT) 0.4 MG SL tablet NITRO 9/26/22   Drew Montana MD   allopurinol (ZYLOPRIM) 100 MG tablet TAKE ONE (1) TABLET BY MOUTH DAILY 9/14/22   Drew Montana MD   ranolazine (RANEXA) 500 MG extended release tablet TAKE ONE (1) TABLET BY MOUTH TWO (2) TIMES DAILY 9/14/22   Drew Montana MD   alendronate (FOSAMAX) 70 MG tablet FOSA  Patient taking differently: Take 70 mg by mouth every 7 days Takes on Wednesday 9/14/22   Drew Montana MD   levothyroxine (SYNTHROID) 88 MCG tablet TAKE ONE (1) TABLET BY MOUTH DAILY 9/14/22   Drew Montana MD   simvastatin (ZOCOR) 20 MG tablet TAKE 1 TABLET BY MOUTH EVERY NIGHT  Patient taking differently: Take 20 mg by mouth nightly 9/14/22   Drew Montana MD   azelastine (ASTELIN) 0.1 % nasal spray 1 spray by Nasal route 2 times daily Use in each nostril as directed 8/19/22   Drew Montana MD   fexofenadine (ALLEGRA) 60 MG tablet Take 1 tablet by mouth in the morning. 7/25/22   Drew Montana MD   hydrocortisone 2.5 % cream Apply topically 2 times daily Apply topically 2 times daily.     Historical Provider, MD   triamcinolone (KENALOG) 0.1 % ointment Apply topically 2 times daily 7/19/22   Milagros Ballard MD   estradiol (ESTRACE VAGINAL) 0.1 MG/GM vaginal cream Place 1 g vaginally daily Daily for 2 weeks and then 2-3 times per week 1/11/22   Rosa Conti MD   acetaminophen (AMINOFEN) 325 MG tablet Take 2 tablets by mouth every 8 hours as needed for Pain 9/25/21   Vanessa Ulrich MD   B Complex-C-Zn-Folic Acid (DIALYVITE 975-UEHB 15) 0.8 MG TABS Take 1 tablet by mouth daily 4/8/21   Josee Chan MD   mineral oil-hydrophilic petrolatum (AQUAPHOR) ointment Apply topically as needed for Dry Skin Apply topically as needed. Historical Provider, MD   diphenhydrAMINE (BENADRYL) 25 MG tablet Take 50 mg by mouth nightly    Historical Provider, MD   aspirin EC 81 MG EC tablet Take 81 mg by mouth daily. Historical Provider, MD     Medications added or changed (ex. new medication, dosage change, interval change, formulation change):  Restatis changed to [employer] eye soln (added)  Fluorometholone eye soln (added)    Comments:  Unable to assess patient  Medication list reviewed with family and insurance claims verified. Claims for Hctz discontinued per Dr. John Puente for hydralazine discontinued per Dr. Charly Mccrary changed to olmesartan  [facility] patient has taken first dose of medications today  [facility] patient has 1 dose of Macrobid left for today.     To my knowledge the above medication history is accurate as of 11/14/2022 5:46 PM.   Minesh Rodriguez CPhT   11/14/2022 5:46 PM

## 2022-11-14 NOTE — H&P
V2.0  History and Physical      Name:  Katie Patricia /Age/Sex: 1927  (80 y.o. female)   MRN & CSN:  [id number] & [id number] Encounter Date/Time: 2022 5:55 PM EST   Location:   PCP: Clary Batres MD       Hospital Day: 1    Assessment and Plan: Katie Patricia is a 80 y.o. female with a pmh of CAd, GERD, UTI, Hypothyroidism, HTN, Hyperlipedmia, CKD who presents with Sepsis [facility]    Hospital Problems             Last Modified POA    * (Principal) Sepsis ([facility] [address] 2022 Yes       Community Acquired Pneumonia (CAP)   Pulmonary Edema  Acute respiratory distress with hypoxia  Sepsis  Sepsis criteria  RR> 20; LA 0.9; Source lung/urine; WBC > 10  - presents with increasing cough, shortness of breath. No fevers at home, no chest pain, RA at baseline, on 2L currently. O2 sat 94%  --Received Levaquin in ED; will continue  --Received 500 bolus;conservative fluids likely given 2/2 volume status and renal insuffiencey. --Blood cx x2 pending  --UA/Urine culture pending  --CXR Shows,  Cardiomegaly; Bilateral ill-defined pulmonary opacities could represent multifocal pneumonia or edema  --Respiratory panel neg  --VBG shows normal pH, HCO3 17.2; PCO2 29; PO2 163;O2 Sat 88. 3- will repeat  --Influenza A/B ordered  --CTA Chest ordered w/o contrast 2/2 renal insuffiencey       Recurrent UTI  --Treated with several rounds of ATB x 1 month  --WBC 15.1    Hypomagnesia - 1.7  --Repleted in ED    CAD  --follows with Dr. Prudence Napoles in ED  --Most recent heart cath   --most recent echo -; EF 50%  --PBNP 6,417  --EKG shows LBBB, NSR; No ST elevation  --Troponin 0.037, will trend  --on ,atenolol, norvasc, olmesartan, Imdur, statin, ASA    Hypertension  --on Imdur,atenolol, Norvasc, olmesartan    Mild PAUL with Hx of Renal insuffiencey   --Follows with Dr. Alyse Cody in ED  --creatinine 2.0 appears close to baseline; BUN 41  --avoid nephrotoxins    Anemia  --trend appears chronic  --stable; Hgb 10.8    GERD   --continue PPI    Disposition:   Current Living situation: Home  Expected Disposition: Home  Estimated D/C: 2-3 days    Diet No diet orders on file   DVT Prophylaxis [] Lovenox, [x]  Heparin, [] SCDs, [] Ambulation,  [] Eliquis, [] Xarelto   Code Status Prior   Surrogate Decision Maker/ POA Self     History from:     patient, electronic medical record    History of Present Illness:     Chief Complaint:Cough, Shortness of breath; CAP; Sepsis  Angle Francis is a 80 y.o. female with a pmh of CAd, GERD, UTI, Hypothyroidism, HTN, Hyperlipedmia, CKD who presents with Sepsis ([facility] [address] Patient seen at bedside, in ED, not in distress, reports feeling tired, and \"not too good\". Patient tells me that she has been treating an UTI for a month, and has since started coughing and feeling short of breathe. Patient is on RA at baseline, however is requiring 2L O2/ NC at this time. No CP, N, V,diarrhea. No urinary urgency, or frequency. Patient follows with Sandeep Augustine for renal insuffiencey, and Dr. Abner Benitez for CAD. Both were consulted in ED. Patient was given 500 cc bolus in ED, CXR shows pneumonia vs pulmonary edema. CTA w/o chest pending. Review of Systems: Need 10 Elements   Review of Systems   All other systems reviewed and are negative. Objective:   No intake or output data in the 24 hours ending 11/14/22 1921   Vitals:   Vitals:    11/14/22 1816 11/14/22 1832 11/14/22 1859 11/14/22 1902   BP: (!) 127/108 (!) 146/80 (!) 153/57 (!) 147/60   Pulse: 78 76 77 82   Resp: 21 23 23 28   Temp:       TempSrc:       SpO2: (!) 89% 95%         Medications Prior to Admission     Prior to Admission medications    Medication Sig Start Date End Date Taking? Authorizing Provider   ondansetron (ZOFRAN) 4 MG tablet Take 1 tablet by mouth every 12 hours as needed for Nausea 11/14/22   Akin Lucas MD   sodium bicarbonate 325 MG tablet Take 1 tablet by mouth in the morning and 1 tablet in the evening. 10/17/22   Historical Provider, MD Surya Duckworth 5 % SOLN Place 1 drop into both eyes 2 times daily 9/19/22   Historical Provider, MD   fluorometholone (FML) 0.1 % ophthalmic suspension Place 1 drop into both eyes 2 times daily 10/20/22   Historical Provider, MD   nitrofurantoin, macrocrystal-monohydrate, (MACROBID) 100 MG capsule Take by mouth 2 times daily    Historical Provider, MD   amLODIPine (NORVASC) 10 MG tablet Take 1 tablet by mouth nightly 11/7/22 12/7/22  Bonny Campuzano MD   olmesartan (BENICAR) 5 MG tablet Take 1 tablet by mouth every morning 11/7/22   Bonny Campuzano MD   atenolol (TENORMIN) 25 MG tablet Take 1 tablet by mouth daily  Patient taking differently: Take 25 mg by mouth Daily with supper 9/26/22   Austin Pradhan MD   dexlansoprazole [facility]) 60 MG CPDR delayed release capsule Take 1 capsule by mouth daily 9/26/22   Austin Pradhan MD   docusate sodium (COLACE) 100 MG capsule Take 1 capsule by mouth daily 9/26/22   Austin Pradhan MD   senna (NATURAL SENNA LAXATIVE) 8.6 MG tablet Take 1 tablet by mouth 2 times daily 9/26/22   Austin Pradhan MD   sertraline (ZOLOFT) 50 MG tablet Take 1 tablet by mouth daily 9/26/22   Austin Pradhan MD   isosorbide mononitrate (IMDUR) 60 MG extended release tablet Take 1 tablet by mouth daily 9/26/22   Austin Pradhan MD   polyethylene glycol (GAVILAX) 17 GM/SCOOP powder MIX 17 GRAMS (1 HEAPING TABLESPOONFUL) OF POWDER IN 8 OUNCES OF LIQUID AND DRINK DAILY 9/26/22   Austin Pradhan MD   nystatin (MYCOSTATIN) 241831 UNIT/GM powder Apply 3 times daily.  9/26/22   Austin Pradhan MD   Cholecalciferol (VITAMIN D3) 50 MCG (2000 UT) TABS Take 1 tablet by mouth daily 9/26/22   Austin Pradhan MD   nitroGLYCERIN (NITROSTAT) 0.4 MG SL tablet NITRO 9/26/22   Austin Pradhan MD   allopurinol (ZYLOPRIM) 100 MG tablet TAKE ONE (1) TABLET BY MOUTH DAILY  Patient taking differently: Take 100 mg by mouth daily 9/14/22   Austin Pradhan MD ranolazine (RANEXA) 500 MG extended release tablet TAKE ONE (1) TABLET BY MOUTH TWO (2) TIMES DAILY 9/14/22   Alysia Norwood MD   alendronate (FOSAMAX) 70 MG tablet FOSA  Patient taking differently: Take 70 mg by mouth every 7 days Takes on Wednesday 9/14/22   Alysia Norwood MD   levothyroxine (SYNTHROID) 88 MCG tablet TAKE ONE (1) TABLET BY MOUTH DAILY 9/14/22   Alysia Norwood MD   simvastatin (ZOCOR) 20 MG tablet TAKE 1 TABLET BY MOUTH EVERY NIGHT  Patient taking differently: Take 20 mg by mouth nightly 9/14/22   Alysia Norwood MD   azelastine (ASTELIN) 0.1 % nasal spray 1 spray by Nasal route 2 times daily Use in each nostril as directed 8/19/22   Alysia Norwood MD   fexofenadine (ALLEGRA) 60 MG tablet Take 1 tablet by mouth in the morning. 7/25/22   Alysia Norwood MD   hydrocortisone 2.5 % cream Apply topically 2 times daily Apply topically 2 times daily. Historical Provider, MD   triamcinolone (KENALOG) 0.1 % ointment Apply topically 2 times daily 7/19/22   Ambrose Bran MD   estradiol (ESTRACE VAGINAL) 0.1 MG/GM vaginal cream Place 1 g vaginally daily Daily for 2 weeks and then 2-3 times per week 1/11/22   Ambrose Bran MD   acetaminophen (AMINOFEN) 325 MG tablet Take 2 tablets by mouth every 8 hours as needed for Pain 9/25/21   Frankie Herrera MD   B Complex-C-Zn-Folic Acid (DIALYVITE 769-ZRLK 15) 0.8 MG TABS Take 1 tablet by mouth daily 4/8/21 11/14/22  Laron Marsh MD   mineral oil-hydrophilic petrolatum (AQUAPHOR) ointment Apply topically as needed for Dry Skin Apply topically as needed. Historical Provider, MD   diphenhydrAMINE (BENADRYL) 25 MG tablet Take 50 mg by mouth nightly    Historical Provider, MD   aspirin EC 81 MG EC tablet Take 81 mg by mouth daily. Historical Provider, MD       Physical Exam: Need 8 Elements   Physical Exam  Vitals reviewed. Constitutional:       General: She is not in acute distress.   HENT:      Head: Normocephalic. Mouth/Throat:      Mouth: Mucous membranes are moist.   Eyes:      Extraocular Movements: Extraocular movements intact. Conjunctiva/sclera: Conjunctivae normal.   Cardiovascular:      Rate and Rhythm: Normal rate and regular rhythm. Comments: 1+ pitting edema to BL/LE  Pulmonary:      Breath sounds: Wheezing and rhonchi present. Abdominal:      General: Bowel sounds are normal.      Palpations: Abdomen is soft. Musculoskeletal:         General: Normal range of motion. Cervical back: Normal range of motion. Skin:     General: Skin is warm and dry. Coloration: Skin is pale. Neurological:      General: No focal deficit present. Mental Status: She is alert and oriented to person, place, and time. Mental status is at baseline. Past Medical History:   PMHx   Past Medical History:   Diagnosis Date    CAD (coronary artery disease)     Dr Ino Figueredo/Jose Figueredo    Chronic renal insufficiency     Dr Uzma Castillo    Depression     Dry eye syndrome     Dr Fady Arechiga     Elevated LFTs 05/11/2012    Generalized osteoarthritis     GERD (gastroesophageal reflux disease)     Esophagitis    Gout     Hyperlipidemia     Hypertension     Hyperuricemia     Hypothyroid     Hypothyroidism    Low back pain     Osteoporosis     Generalized    Sinus congestion     CHRONIC W POST NASAL DRIP    UTI (urinary tract infection)     Vertigo     Dr Kwabena Lima:  has a past surgical history that includes Foot surgery (2004); Cataract removal; Tonsillectomy; Colonoscopy; Hysterectomy; eye surgery; and Hip fracture surgery (Right, 08/19/2018). Allergies: Allergies   Allergen Reactions    Bactrim [Sulfamethoxazole-Trimethoprim] Nausea And Vomiting    Cephalexin Rash    Tape Eladia Areli Tape] Itching     Fam HX: family history includes Other in her father and mother. Soc HX:   Social History     Socioeconomic History    Marital status:     Occupational History    Occupation: Retired from [address] [place] [address] St: as noted   Tobacco Use    Smoking status: Former    Smokeless tobacco: Never   Vaping Use    Vaping Use: Never used   Substance and Sexual Activity    Alcohol use: No    Drug use: No   Social History Narrative    Marital Status:   she lives in an apt and  has been in ECF since 2004        Diet:  Low salt        Seat Belts:  Always     Social Determinants of Health     Physical Activity: Sufficiently Active    Days of Exercise per Week: 5 days    Minutes of Exercise per Session: 30 min       Medications:   Medications:    magnesium sulfate  2,000 mg IntraVENous Once      Infusions:   PRN Meds:      Labs      CBC:   Recent Labs     11/14/22  1526   WBC 15.1*   HGB 10.8*        BMP:    Recent Labs     11/14/22  1526      K 4.3      CO2 18*   BUN 41*   CREATININE 2.0*   GLUCOSE 104*     Hepatic:   Recent Labs     11/14/22  1526   AST 26   ALT 17   BILITOT 0.4   ALKPHOS 67     Lipids:   Lab Results   Component Value Date/Time    CHOL 185 08/19/2022 11:20 AM    HDL 49 08/19/2022 11:20 AM    HDL 46 02/03/2012 11:09 PM    TRIG 176 08/19/2022 11:20 AM     Hemoglobin A1C: No results found for: LABA1C  TSH:   Lab Results   Component Value Date/Time    TSH 0.62 08/19/2022 11:20 AM     Troponin:   Lab Results   Component Value Date/Time    TROPONINT 0.037 11/14/2022 03:26 PM    TROPONINT <0.010 10/03/2018 12:12 AM    TROPONINT <0.010 01/05/2018 07:13 AM     Lactic Acid: No results for input(s): LACTA in the last 72 hours.   BNP:   Recent Labs     11/14/22  1526   PROBNP 6,417*     UA:  Lab Results   Component Value Date/Time    NITRU Negative 09/27/2022 03:36 PM    COLORU Yellow 09/27/2022 03:36 PM    PHUR 6.0 09/27/2022 03:36 PM    WBCUA 71 09/27/2022 03:36 PM    RBCUA 20 09/27/2022 03:36 PM    RBCUA 1 10/03/2018 08:59 AM    RBCUA 1 10/03/2018 08:59 AM    MUCUS RARE 01/25/2018 01:15 PM    TRICHOMONAS NONE SEEN 10/03/2018 08:59 AM    TRICHOMONAS NONE SEEN 10/03/2018 08:59 AM    BACTERIA 1+ 09/27/2022 03:36 PM    CLARITYU Clear 09/27/2022 03:36 PM    SPECGRAV 1.013 09/27/2022 03:36 PM    LEUKOCYTESUR LARGE 09/27/2022 03:36 PM    UROBILINOGEN 0.2 09/27/2022 03:36 PM    BILIRUBINUR Negative 09/27/2022 03:36 PM    BLOODU SMALL 09/27/2022 03:36 PM    GLUCOSEU Negative 09/27/2022 03:36 PM    KETUA Negative 09/27/2022 03:36 PM     Urine Cultures:   Lab Results   Component Value Date/Time    LABURIN  09/27/2022 03:36 PM     <10,000 CFU/ml mixed skin/urogenital mela. No further workup     Blood Cultures: No results found for: BC  No results found for: BLOODCULT2  Organism:   Lab Results   Component Value Date/Time    ORG ENC 04/04/2018 09:00 AM       Imaging/Diagnostics Last 24 Hours   XR CHEST PORTABLE    Result Date: 11/14/2022  EXAMINATION: ONE XRAY VIEW OF THE CHEST 11/14/2022 3:17 pm COMPARISON: 08/18/2018 HISTORY: ORDERING SYSTEM PROVIDED HISTORY: sob TECHNOLOGIST PROVIDED HISTORY: Reason for exam:->sob Reason for Exam: sob FINDINGS: Cardiomegaly. Ill-defined pulmonary opacities. Bibasilar hypoaeration. No pneumothorax. Cardiomegaly Bilateral ill-defined pulmonary opacities could represent multifocal pneumonia or edema       Personally reviewed Lab Studies, Imaging, and discussed case with Dr. Salinas Urias, whom is in agreement with above plan of care.      Electronically signed by CLEMENT Cui CNP on 11/14/2022 at 7:21 PM

## 2022-11-15 PROBLEM — R06.89 DYSPNEA AND RESPIRATORY ABNORMALITIES: Status: ACTIVE | Noted: 2022-11-15

## 2022-11-15 PROBLEM — R06.00 DYSPNEA AND RESPIRATORY ABNORMALITIES: Status: ACTIVE | Noted: 2022-01-01

## 2022-11-15 LAB
ALBUMIN SERPL-MCNC: 3.1 GM/DL (ref 3.4–5)
ALP BLD-CCNC: 60 IU/L (ref 40–128)
ALT SERPL-CCNC: 13 U/L (ref 10–40)
ANION GAP SERPL CALCULATED.3IONS-SCNC: 13 MMOL/L (ref 4–16)
AST SERPL-CCNC: 26 IU/L (ref 15–37)
BASE EXCESS: 4 (ref 0–2.4)
BASE EXCESS: 5 (ref 0–2.4)
BILIRUB SERPL-MCNC: 0.4 MG/DL (ref 0–1)
BUN BLDV-MCNC: 38 MG/DL (ref 6–23)
CALCIUM SERPL-MCNC: 9.3 MG/DL (ref 8.3–10.6)
CARBON MONOXIDE, BLOOD: 1.2 % (ref 0–5)
CHLORIDE BLD-SCNC: 106 MMOL/L (ref 99–110)
CO2 CONTENT: 19.3 MMOL/L (ref 19–24)
CO2: 19 MMOL/L (ref 21–32)
COMMENT: ABNORMAL
CREAT SERPL-MCNC: 2 MG/DL (ref 0.6–1.1)
EKG ATRIAL RATE: 68 BPM
EKG DIAGNOSIS: NORMAL
EKG P AXIS: 33 DEGREES
EKG P-R INTERVAL: 170 MS
EKG Q-T INTERVAL: 442 MS
EKG QRS DURATION: 154 MS
EKG QTC CALCULATION (BAZETT): 469 MS
EKG R AXIS: -59 DEGREES
EKG T AXIS: 92 DEGREES
EKG VENTRICULAR RATE: 68 BPM
GFR SERPL CREATININE-BSD FRML MDRD: 23 ML/MIN/1.73M2
GLUCOSE BLD-MCNC: 103 MG/DL (ref 70–99)
HCO3 ARTERIAL: 18.4 MMOL/L (ref 18–23)
HCO3 VENOUS: 20.9 MMOL/L (ref 19–25)
LACTIC ACID, SEPSIS: 0.7 MMOL/L (ref 0.5–1.9)
LV EF: 58 %
LVEF MODALITY: NORMAL
METHEMOGLOBIN ARTERIAL: 0.6 %
O2 SAT, VEN: 80.7 % (ref 50–70)
O2 SATURATION: 94.8 % (ref 96–97)
PCO2 ARTERIAL: 29 MMHG (ref 32–45)
PCO2, VEN: 37 MMHG (ref 38–52)
PH BLOOD: 7.41 (ref 7.34–7.45)
PH VENOUS: 7.36 (ref 7.32–7.42)
PO2 ARTERIAL: 83 MMHG (ref 75–100)
PO2, VEN: 50 MMHG (ref 28–48)
POTASSIUM SERPL-SCNC: 4.4 MMOL/L (ref 3.5–5.1)
PROCALCITONIN: 0.21
SODIUM BLD-SCNC: 138 MMOL/L (ref 135–145)
TOTAL PROTEIN: 5.7 GM/DL (ref 6.4–8.2)
TROPONIN T: 0.03 NG/ML

## 2022-11-15 PROCEDURE — 6370000000 HC RX 637 (ALT 250 FOR IP)

## 2022-11-15 PROCEDURE — 94640 AIRWAY INHALATION TREATMENT: CPT

## 2022-11-15 PROCEDURE — 6370000000 HC RX 637 (ALT 250 FOR IP): Performed by: NURSE PRACTITIONER

## 2022-11-15 PROCEDURE — 6370000000 HC RX 637 (ALT 250 FOR IP): Performed by: STUDENT IN AN ORGANIZED HEALTH CARE EDUCATION/TRAINING PROGRAM

## 2022-11-15 PROCEDURE — 6360000002 HC RX W HCPCS

## 2022-11-15 PROCEDURE — 36415 COLL VENOUS BLD VENIPUNCTURE: CPT

## 2022-11-15 PROCEDURE — 80053 COMPREHEN METABOLIC PANEL: CPT

## 2022-11-15 PROCEDURE — 94761 N-INVAS EAR/PLS OXIMETRY MLT: CPT

## 2022-11-15 PROCEDURE — 2140000000 HC CCU INTERMEDIATE R&B

## 2022-11-15 PROCEDURE — 6370000000 HC RX 637 (ALT 250 FOR IP): Performed by: INTERNAL MEDICINE

## 2022-11-15 PROCEDURE — 83605 ASSAY OF LACTIC ACID: CPT

## 2022-11-15 PROCEDURE — 82805 BLOOD GASES W/O2 SATURATION: CPT

## 2022-11-15 PROCEDURE — 6360000002 HC RX W HCPCS: Performed by: INTERNAL MEDICINE

## 2022-11-15 PROCEDURE — 84484 ASSAY OF TROPONIN QUANT: CPT

## 2022-11-15 PROCEDURE — 93308 TTE F-UP OR LMTD: CPT

## 2022-11-15 PROCEDURE — 2700000000 HC OXYGEN THERAPY PER DAY

## 2022-11-15 PROCEDURE — 94667 MNPJ CHEST WALL 1ST: CPT

## 2022-11-15 PROCEDURE — 87040 BLOOD CULTURE FOR BACTERIA: CPT

## 2022-11-15 PROCEDURE — 2580000003 HC RX 258

## 2022-11-15 PROCEDURE — 36600 WITHDRAWAL OF ARTERIAL BLOOD: CPT

## 2022-11-15 PROCEDURE — 93010 ELECTROCARDIOGRAM REPORT: CPT | Performed by: INTERNAL MEDICINE

## 2022-11-15 PROCEDURE — 93306 TTE W/DOPPLER COMPLETE: CPT

## 2022-11-15 PROCEDURE — 99222 1ST HOSP IP/OBS MODERATE 55: CPT | Performed by: INTERNAL MEDICINE

## 2022-11-15 PROCEDURE — 84145 PROCALCITONIN (PCT): CPT

## 2022-11-15 RX ORDER — SODIUM BICARBONATE 650 MG/1
325 TABLET ORAL 2 TIMES DAILY
Status: DISCONTINUED | OUTPATIENT
Start: 2022-11-15 | End: 2022-12-06 | Stop reason: HOSPADM

## 2022-11-15 RX ORDER — AMLODIPINE BESYLATE 10 MG/1
10 TABLET ORAL NIGHTLY
Status: DISCONTINUED | OUTPATIENT
Start: 2022-11-15 | End: 2022-12-06 | Stop reason: HOSPADM

## 2022-11-15 RX ORDER — IPRATROPIUM BROMIDE AND ALBUTEROL SULFATE 2.5; .5 MG/3ML; MG/3ML
1 SOLUTION RESPIRATORY (INHALATION)
Status: DISCONTINUED | OUTPATIENT
Start: 2022-11-15 | End: 2022-11-16

## 2022-11-15 RX ORDER — LANOLIN ALCOHOL/MO/W.PET/CERES
3 CREAM (GRAM) TOPICAL ONCE
Status: COMPLETED | OUTPATIENT
Start: 2022-11-15 | End: 2022-11-15

## 2022-11-15 RX ORDER — FUROSEMIDE 10 MG/ML
40 INJECTION INTRAMUSCULAR; INTRAVENOUS ONCE
Status: COMPLETED | OUTPATIENT
Start: 2022-11-15 | End: 2022-11-15

## 2022-11-15 RX ORDER — HYDRALAZINE HYDROCHLORIDE 10 MG/1
10 TABLET, FILM COATED ORAL EVERY 8 HOURS SCHEDULED
Status: DISCONTINUED | OUTPATIENT
Start: 2022-11-15 | End: 2022-11-16

## 2022-11-15 RX ORDER — CLOPIDOGREL BISULFATE 75 MG/1
75 TABLET ORAL DAILY
Status: DISCONTINUED | OUTPATIENT
Start: 2022-11-15 | End: 2022-12-06 | Stop reason: HOSPADM

## 2022-11-15 RX ORDER — ATENOLOL 25 MG/1
25 TABLET ORAL DAILY
Status: DISCONTINUED | OUTPATIENT
Start: 2022-11-15 | End: 2022-12-06 | Stop reason: HOSPADM

## 2022-11-15 RX ADMIN — LEVOTHYROXINE SODIUM 88 MCG: 0.09 TABLET ORAL at 06:53

## 2022-11-15 RX ADMIN — Medication 2000 UNITS: at 09:53

## 2022-11-15 RX ADMIN — ONDANSETRON 4 MG: 2 INJECTION INTRAMUSCULAR; INTRAVENOUS at 11:47

## 2022-11-15 RX ADMIN — HYDRALAZINE HYDROCHLORIDE 10 MG: 10 TABLET, FILM COATED ORAL at 21:09

## 2022-11-15 RX ADMIN — RANOLAZINE 500 MG: 500 TABLET, FILM COATED, EXTENDED RELEASE ORAL at 09:53

## 2022-11-15 RX ADMIN — PANTOPRAZOLE SODIUM 40 MG: 40 TABLET, DELAYED RELEASE ORAL at 06:53

## 2022-11-15 RX ADMIN — IPRATROPIUM BROMIDE AND ALBUTEROL SULFATE 1 AMPULE: .5; 2.5 SOLUTION RESPIRATORY (INHALATION) at 11:26

## 2022-11-15 RX ADMIN — HYDRALAZINE HYDROCHLORIDE 10 MG: 10 TABLET, FILM COATED ORAL at 09:57

## 2022-11-15 RX ADMIN — Medication 3 MG: at 01:25

## 2022-11-15 RX ADMIN — RANOLAZINE 500 MG: 500 TABLET, FILM COATED, EXTENDED RELEASE ORAL at 21:08

## 2022-11-15 RX ADMIN — ALBUTEROL SULFATE 2.5 MG: 2.5 SOLUTION RESPIRATORY (INHALATION) at 07:31

## 2022-11-15 RX ADMIN — SODIUM BICARBONATE 325 MG: 650 TABLET ORAL at 21:08

## 2022-11-15 RX ADMIN — IPRATROPIUM BROMIDE AND ALBUTEROL SULFATE 1 AMPULE: .5; 2.5 SOLUTION RESPIRATORY (INHALATION) at 15:16

## 2022-11-15 RX ADMIN — HEPARIN SODIUM 5000 UNITS: 5000 INJECTION INTRAVENOUS; SUBCUTANEOUS at 06:53

## 2022-11-15 RX ADMIN — SODIUM CHLORIDE, PRESERVATIVE FREE 10 ML: 5 INJECTION INTRAVENOUS at 09:54

## 2022-11-15 RX ADMIN — ISOSORBIDE MONONITRATE 60 MG: 60 TABLET, EXTENDED RELEASE ORAL at 09:53

## 2022-11-15 RX ADMIN — ATENOLOL 25 MG: 25 TABLET ORAL at 18:02

## 2022-11-15 RX ADMIN — IPRATROPIUM BROMIDE AND ALBUTEROL SULFATE 1 AMPULE: .5; 2.5 SOLUTION RESPIRATORY (INHALATION) at 20:57

## 2022-11-15 RX ADMIN — HEPARIN SODIUM 5000 UNITS: 5000 INJECTION INTRAVENOUS; SUBCUTANEOUS at 21:08

## 2022-11-15 RX ADMIN — HEPARIN SODIUM 5000 UNITS: 5000 INJECTION INTRAVENOUS; SUBCUTANEOUS at 14:49

## 2022-11-15 RX ADMIN — FUROSEMIDE 40 MG: 10 INJECTION, SOLUTION INTRAMUSCULAR; INTRAVENOUS at 09:54

## 2022-11-15 RX ADMIN — ATORVASTATIN CALCIUM 20 MG: 10 TABLET, FILM COATED ORAL at 21:09

## 2022-11-15 RX ADMIN — SERTRALINE HYDROCHLORIDE 50 MG: 50 TABLET ORAL at 09:54

## 2022-11-15 RX ADMIN — SODIUM CHLORIDE, PRESERVATIVE FREE 10 ML: 5 INJECTION INTRAVENOUS at 21:10

## 2022-11-15 RX ADMIN — HYDRALAZINE HYDROCHLORIDE 10 MG: 10 TABLET, FILM COATED ORAL at 14:49

## 2022-11-15 RX ADMIN — SODIUM BICARBONATE 325 MG: 650 TABLET ORAL at 09:53

## 2022-11-15 RX ADMIN — AMLODIPINE BESYLATE 10 MG: 10 TABLET ORAL at 21:09

## 2022-11-15 RX ADMIN — CLOPIDOGREL BISULFATE 75 MG: 75 TABLET ORAL at 09:53

## 2022-11-15 RX ADMIN — FUROSEMIDE 40 MG: 10 INJECTION, SOLUTION INTRAMUSCULAR; INTRAVENOUS at 18:02

## 2022-11-15 NOTE — CONSULTS
Nephrology Service Consultation      [address], [address] [address]  Phone: [phone]  Office Hours: 8:30AM - 4:30PM  Monday - Friday        MEDICAL DECISION MAKING and Recommendations     -PAUL: cr 2 on admission from baseline 1.5//ddx: cardiorenal vs volume depletion  -CKD3  -HTN  -Metabolic acidosis  -Elevated probnp  -Acute hypoxic resp failure  -Bacterial Pneumonia   -Recent recurrent UTIs    Suggest:  -ARB on hold due to PAUL, may substitute it with hydralazine 10mg po TID for now  -Gentle diuresis preferred in this 89yo pt, give 40mg iv lasix intermittently  -Resume home antihypertensives per her recent outpt visit with Dr Micky Akins  -Please avoid nephrotoxins  -Will follow  -Continue oral fluid restriction of 1500ml per day, which she does at home  -Resume sodium bicarb    Thank you          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 08/18/2018    Bradycardia     LBBB (left bundle branch block)     Sepsis ([facility] [address] 11/14/2022    Localized edema 07/11/2022    GERD (gastroesophageal reflux disease)     Hypertension     Hypothyroid     Sinus congestion 03/23/2022    Vaginal prolapse 09/07/2021    Primary osteoarthritis of left foot 08/30/2021    Closed nondisplaced fracture of fourth metatarsal bone of left foot 08/30/2021    Closed nondisplaced fracture of third metatarsal bone of left foot 08/30/2021    Closed nondisplaced fracture of fifth left metatarsal bone 08/30/2021    Vertigo     Dizziness     Labyrinthitis     Chronic renal impairment, stage 3 (moderate) (HCC)     Constipation, slow transit 06/19/2018    Cystitis     Physical deconditioning     Hyponatremia 01/01/2018    Pruritic condition 01/22/2016    Gout     Hyperuricemia     Low back pain     Eczema     Coronary artery disease involving native heart without angina pectoris     Depression     Hyperlipidemia     Generalized osteoarthritis     Osteoporosis Patient: Lazaro Monaco  MRN: [id number]  Consulting physician:  Maria Teresa Wolf, *  Reason for Consult:   PCP: Sanjay Green MD    HISTORY OF PRESENT ILLNESS:   The patient is a 80 y.o. female with HTN, CKD3, presented with shortness of breath  Renal consult as she is a pt of Dr Hinson All is 2 today up from her baseline if 1.5.  she reports diarrhea lately due to having been on many abx  She Is hypertensive and she is on NC currently  Probnp was elevated in the 6000s. CT chest showed bilateral pulm infiltrates and pleural effusions    REVIEW OF SYSTEMS:  14 point ROS is Negative. See positive ROS per HPI    Past Medical History:        Diagnosis Date    CAD (coronary artery disease)     Dr Yanci Figueredo/Jose Figueredo    Chronic renal insufficiency     Dr Yuli Mckinney     Dry eye syndrome     Dr Luis Alberto Austin     Elevated LFTs 05/11/2012    Generalized osteoarthritis     GERD (gastroesophageal reflux disease)     Esophagitis    Gout     Hyperlipidemia     Hypertension     Hyperuricemia     Hypothyroid     Hypothyroidism    Low back pain     Osteoporosis     Generalized    Sinus congestion     CHRONIC W POST NASAL DRIP    UTI (urinary tract infection)     Vertigo     Dr Juany Ríos       Past Surgical History:        Procedure Laterality Date    CATARACT REMOVAL      R Aug. 2006 and L March 2007 Dr. Jose Adkins  2004    right foot realignment [address] Right 08/19/2018    rt hip Zheng    HYSTERECTOMY (CERVIX STATUS UNKNOWN)      TONSILLECTOMY         Medications:   Prior to Admission medications    Medication Sig Start Date End Date Taking? Authorizing Provider   ondansetron (ZOFRAN) 4 MG tablet Take 1 tablet by mouth every 12 hours as needed for Nausea 11/14/22   Mellissa Matos MD   sodium bicarbonate 325 MG tablet Take 1 tablet by mouth in the morning and 1 tablet in the evening.  10/17/22   Historical Provider, MD Kely Penaloza 5 % SOLN Place 1 drop into both eyes 2 times daily 9/19/22   Historical Provider, MD   fluorometholone (FML) 0.1 % ophthalmic suspension Place 1 drop into both eyes 2 times daily 10/20/22   Historical Provider, MD   nitrofurantoin, macrocrystal-monohydrate, (MACROBID) 100 MG capsule Take by mouth 2 times daily    Historical Provider, MD   amLODIPine (NORVASC) 10 MG tablet Take 1 tablet by mouth nightly 11/7/22 12/7/22  Jorge Jj MD   olmesartan (BENICAR) 5 MG tablet Take 1 tablet by mouth every morning 11/7/22   Jorge Jj MD   atenolol (TENORMIN) 25 MG tablet Take 1 tablet by mouth daily  Patient taking differently: Take 25 mg by mouth Daily with supper 9/26/22   Robert Victoria MD   dexlansoprazole [facility]) 60 MG CPDR delayed release capsule Take 1 capsule by mouth daily 9/26/22   Robert Victoria MD   docusate sodium (COLACE) 100 MG capsule Take 1 capsule by mouth daily 9/26/22   Robert Victoria MD   senna (NATURAL SENNA LAXATIVE) 8.6 MG tablet Take 1 tablet by mouth 2 times daily 9/26/22   Robert Victoria MD   sertraline (ZOLOFT) 50 MG tablet Take 1 tablet by mouth daily 9/26/22   Robert Victoria MD   isosorbide mononitrate (IMDUR) 60 MG extended release tablet Take 1 tablet by mouth daily 9/26/22   Robert Victoria MD   polyethylene glycol (GAVILAX) 17 GM/SCOOP powder MIX 17 GRAMS (1 HEAPING TABLESPOONFUL) OF POWDER IN 8 OUNCES OF LIQUID AND DRINK DAILY 9/26/22   Robert Victoria MD   nystatin (MYCOSTATIN) 083326 UNIT/GM powder Apply 3 times daily.  9/26/22   Robert Victoria MD   Cholecalciferol (VITAMIN D3) 50 MCG (2000 UT) TABS Take 1 tablet by mouth daily 9/26/22   Robert Victoria MD   nitroGLYCERIN (NITROSTAT) 0.4 MG SL tablet NITRO 9/26/22   Robert Victoria MD   allopurinol (ZYLOPRIM) 100 MG tablet TAKE ONE (1) TABLET BY MOUTH DAILY  Patient taking differently: Take 100 mg by mouth daily 9/14/22   Robert Victoria MD   ranolazine (RANEXA) 500 MG extended release tablet TAKE ONE (1) TABLET BY MOUTH TWO (2) TIMES DAILY 9/14/22   Kristofer Caban MD   alendronate (FOSAMAX) 70 MG tablet FOSA  Patient taking differently: Take 70 mg by mouth every 7 days Takes on Wednesday 9/14/22   Kristofer Caban MD   levothyroxine (SYNTHROID) 88 MCG tablet TAKE ONE (1) TABLET BY MOUTH DAILY 9/14/22   Kristofer Caban MD   simvastatin (ZOCOR) 20 MG tablet TAKE 1 TABLET BY MOUTH EVERY NIGHT  Patient taking differently: Take 20 mg by mouth nightly 9/14/22   Kristofer Caban MD   azelastine (ASTELIN) 0.1 % nasal spray 1 spray by Nasal route 2 times daily Use in each nostril as directed 8/19/22   Kristofer Caban MD   fexofenadine (ALLEGRA) 60 MG tablet Take 1 tablet by mouth in the morning. 7/25/22   Kristofer Caban MD   hydrocortisone 2.5 % cream Apply topically 2 times daily Apply topically 2 times daily. Historical Provider, MD   triamcinolone (KENALOG) 0.1 % ointment Apply topically 2 times daily 7/19/22   Shraif Serrano MD   estradiol (ESTRACE VAGINAL) 0.1 MG/GM vaginal cream Place 1 g vaginally daily Daily for 2 weeks and then 2-3 times per week 1/11/22   Sharif Serrano MD   acetaminophen (AMINOFEN) 325 MG tablet Take 2 tablets by mouth every 8 hours as needed for Pain 9/25/21   Juvenal Phelan MD   B Complex-C-Zn-Folic Acid (DIALYVITE 507-KGXG 15) 0.8 MG TABS Take 1 tablet by mouth daily 4/8/21 11/14/22  Wild Mock MD   mineral oil-hydrophilic petrolatum (AQUAPHOR) ointment Apply topically as needed for Dry Skin Apply topically as needed. Historical Provider, MD   diphenhydrAMINE (BENADRYL) 25 MG tablet Take 50 mg by mouth nightly    Historical Provider, MD   aspirin EC 81 MG EC tablet Take 81 mg by mouth daily. Historical Provider, MD        Allergies:  Bactrim [sulfamethoxazole-trimethoprim], Cephalexin, and Tape [adhesive tape]    Social History:   TOBACCO:   reports that she has quit smoking.  She has never used smokeless tobacco.  ETOH:   reports no history of alcohol use.   OCCUPATION:      Family History:       Problem Relation Age of Onset    Other Mother         CAD    Other Father         CAD     Physical Exam:    Vitals: /74   Pulse 71   Temp 98.2 °F (36.8 °C) (Oral)   Resp 17   Ht 5' (1.524 m)   Wt 120 lb (54.4 kg)   LMP  (LMP Unknown)   SpO2 94%   BMI 23.44 kg/m²   General appearance: in no acute distress, appears stated age  Skin: Skin color, texture, turgor normal. No rashes or lesions  HEENT: normocephalic, atraumatic  Neck: supple, trachea midline  Lungs: clear to auscultation bilaterally, breathing comfortably  Heart[de-identified] regular rate and rhythm, S1, S2 normal,  Abdomen: soft, non-tender; bowel sounds normal; no masses,   Extremities: extremities normal, atraumatic, no cyanosis or edema  Neurologic: Mental status: alert, oriented, interactive, following commands  Psychiatric: mood and affect appropriate     CBC:   Recent Labs     11/14/22  1526   WBC 15.1*   HGB 10.8*        BMP:    Recent Labs     11/14/22  1526 11/15/22  0428    138   K 4.3 4.4    106   CO2 18* 19*   BUN 41* 38*   CREATININE 2.0* 2.0*   GLUCOSE 104* 103*     Hepatic:   Recent Labs     11/14/22  1526 11/15/22  0428   AST 26 26   ALT 17 13   BILITOT 0.4 0.4   ALKPHOS 67 60              Electronically signed by Crissy Haines DO on 11/15/2022 at 7:58 AM    [facility]  MD Andrei Vasques DO [address]  [address], [place] [address]  PHONE: [phone]  FAX: [phone]

## 2022-11-15 NOTE — PROGRESS NOTES
V2.0  [facility]      Name:  Smooth Johnson /Age/Sex: 1927  (80 y.o. female)   MRN & CSN:  [id number] & [id number] Encounter Date/Time: 11/15/2022 6:37 PM EST    Location:  [address] [place] [address]- PCP: Cherri Livingston MD       Hospital Day: 2    Assessment and Plan: Smooth Johnson is a 80 y.o. female with pmh of CAd, GERD, UTI, Hypothyroidism, HTN, Hyperlipedmia, CKD  who presents with Sepsis ([address]      Plan:    Community Acquired Pneumonia (CAP)   Pulmonary Edema  Acute respiratory distress with hypoxia  Sepsis  Sepsis criteria  RR> 20; LA 0.9; Source lung/urine; WBC > 10  - presents with increasing cough, shortness of breath. No fevers at home, no chest pain, RA at baseline, on 10 L currently. O2 sat 94%  --Blood cx x2 pending. Pro-Cody 0.2  --CXR Shows,  Cardiomegaly; Bilateral ill-defined pulmonary opacities could represent multifocal pneumonia or edema  --Respiratory panel neg  --VBG shows normal pH, HCO3 17.2; PCO2 29; PO2 163;O2 Sat 88. 3- will repeat  --Influenza A/B ordered  --CT Chest showed bilateral infiltrate. Continue Levaquin. Pulmonology following. Repeat chest x-ray in a.m. Recurrent UTI  --Treated with several rounds of ATB x 1 month  --WBC 15.1. Dirty UA. Urine culture is pending. Patient does not complain of any dysuria. On Levaquin for pneumonia which would also cover for UTI     Hypomagnesia - 1.7  --Repleted in ED    chronic systolic CHF -does not look acutely decompensated. For now IV Lasix intermittently. Strict I's/O. Daily weight     CAD - Most recent heart cath . most recent echo -2018; EF 50%. PBNP 6,417. EKG shows LBBB, NSR; No ST elevation. Elevated troponin likely from CKD. Repeat troponin showed downtrend. On atenolol, norvasc, olmesartan, Imdur, statin, ASA.   Cardiology following     Hypertension  Continue home Imdur,atenolol, Norvasc, olmesartan     Mild PAUL with Hx of Renal insuffiencey   --Follows with Dr. Doug Kee in ED  --creatinine 2.0 appears close to baseline; BUN 41  --avoid nephrotoxins     Anemia  --trend appears chronic  --stable; Hgb 10.8     GERD   --continue PPI    F/U PT/OT recommendation      Diet ADULT DIET; Regular; 1500 ml   DVT Prophylaxis [] Lovenox, []  Heparin, [] SCDs, [] Ambulation,  [] Eliquis, [] Xarelto  [] Coumadin   Code Status Limited   Disposition From: Home  Expected Disposition: Home  Estimated Date of Discharge: 2 Days  Patient requires continued admission due to CAP   Surrogate Decision Maker/ POA      Subjective:     Chief Complaint: Shortness of Breath       Jaison Jernigan is a 80 y.o. female who presents with SOB with productive cough      Patient complains of excessive fatigue and SOB associated with productive cough. Denies any fever, Chest pain, headache, dizziness. Bowel and bladder habits normal. Currently denies any dysuria. Was treated with multiple Abx in the past for recurrent UTI         Review of Systems:    Review of Systems    All other systems reviewed and are negative. Objective: Intake/Output Summary (Last 24 hours) at 11/15/2022 1837  Last data filed at 11/15/2022 1805  Gross per 24 hour   Intake 540 ml   Output 1650 ml   Net -1110 ml        Vitals:   Vitals:    11/15/22 1802   BP: (!) 127/57   Pulse: 77   Resp:    Temp:    SpO2:        Physical Exam:   Looks comfortable but on 6 L supplement oxygen  General: NAD  Eyes: EOMI  ENT: neck supple  Cardiovascular: Regular rate. Respiratory: Coarse crackles  Gastrointestinal: Soft, non tender  Genitourinary: no suprapubic tenderness  Musculoskeletal: No edema  Skin: warm, dry  Neuro: Alert. Psych: Mood appropriate.      Medications:   Medications:    amLODIPine  10 mg Oral Nightly    atenolol  25 mg Oral Daily    sodium bicarbonate  325 mg Oral BID    hydrALAZINE  10 mg Oral 3 times per day    clopidogrel  75 mg Oral Daily    ipratropium-albuterol  1 ampule Inhalation Q4H WA    sodium chloride flush  5-40 mL IntraVENous 2 times per day    sodium chloride flush  5-40 mL IntraVENous 2 times per day    heparin (porcine)  5,000 Units SubCUTAneous 3 times per day    Vitamin D  2,000 Units Oral Daily    pantoprazole  40 mg Oral QAM AC    fluorometholone  1 drop Both Eyes BID    isosorbide mononitrate  60 mg Oral Daily    levothyroxine  88 mcg Oral Daily    ranolazine  500 mg Oral BID    sertraline  50 mg Oral Daily    [Held by provider] losartan  12.5 mg Oral Daily    [START ON 11/16/2022] levofloxacin  500 mg IntraVENous Q48H    atorvastatin  20 mg Oral Nightly      Infusions:    sodium chloride      sodium chloride       PRN Meds: sodium chloride flush, 5-40 mL, PRN  sodium chloride, , PRN  ondansetron, 4 mg, Q8H PRN   Or  ondansetron, 4 mg, Q6H PRN  polyethylene glycol, 17 g, Daily PRN  acetaminophen, 650 mg, Q6H PRN   Or  acetaminophen, 650 mg, Q6H PRN  sodium chloride flush, 5-40 mL, PRN  sodium chloride, , PRN        Labs      Recent Results (from the past 24 hour(s))   Blood gas, arterial    Collection Time: 11/14/22 11:30 PM   Result Value Ref Range    pH, Bld 7.41 7.34 - 7.45    pCO2, Arterial 29.0 (L) 32 - 45 MMHG    pO2, Arterial 83 75 - 100 MMHG    Base Excess 5 (H) 0 - 2.4    HCO3, Arterial 18.4 18 - 23 MMOL/L    CO2 Content 19.3 19 - 24 MMOL/L    O2 Sat 94.8 (L) 96 - 97 %    Carbon Monoxide, Blood 1.2 0 - 5 %    Methemoglobin, Arterial 0.6 <1.5 %    Comment 12LPM NRB MASK    Lactate, Sepsis    Collection Time: 11/15/22  4:28 AM   Result Value Ref Range    Lactic Acid, Sepsis 0.7 0.5 - 1.9 mMOL/L   Comprehensive Metabolic Panel w/ Reflex to MG    Collection Time: 11/15/22  4:28 AM   Result Value Ref Range    Sodium 138 135 - 145 MMOL/L    Potassium 4.4 3.5 - 5.1 MMOL/L    Chloride 106 99 - 110 mMol/L    CO2 19 (L) 21 - 32 MMOL/L    BUN 38 (H) 6 - 23 MG/DL    Creatinine 2.0 (H) 0.6 - 1.1 MG/DL    Est, Glom Filt Rate 23 (L) >60 mL/min/1.73m2    Glucose 103 (H) 70 - 99 MG/DL    Calcium 9.3 8.3 - 10.6 MG/DL    Albumin 3.1 (L) 3.4 - 5.0 GM/DL    Total Protein 5.7 (L) 6.4 - 8.2 GM/DL    Total Bilirubin 0.4 0.0 - 1.0 MG/DL    ALT 13 10 - 40 U/L    AST 26 15 - 37 IU/L    Alkaline Phosphatase 60 40 - 128 IU/L    Anion Gap 13 4 - 16   Procalcitonin    Collection Time: 11/15/22  4:28 AM   Result Value Ref Range    Procalcitonin 0.214    Blood gas, venous    Collection Time: 11/15/22  4:28 AM   Result Value Ref Range    pH, Denis 7.36 7.32 - 7.42    pCO2, Denis 37 (L) 38 - 52 mmHG    pO2, Denis 50 (H) 28 - 48 mmHG    Base Excess 4 (H) 0 - 2.4    HCO3, Venous 20.9 19 - 25 MMOL/L    O2 Sat, Denis 80.7 (H) 50 - 70 %   Troponin    Collection Time: 11/15/22  4:28 AM   Result Value Ref Range    Troponin T 0.030 (H) <0.01 NG/ML        Imaging/Diagnostics Last 24 Hours   CT CHEST WO CONTRAST    Result Date: 11/14/2022  EXAMINATION: CT OF THE CHEST WITHOUT CONTRAST 11/14/2022 7:33 pm TECHNIQUE: CT of the chest was performed without the administration of intravenous contrast. Multiplanar reformatted images are provided for review. Automated exposure control, iterative reconstruction, and/or weight based adjustment of the mA/kV was utilized to reduce the radiation dose to as low as reasonably achievable. COMPARISON: 01/06/2018 HISTORY: ORDERING SYSTEM PROVIDED HISTORY: sob TECHNOLOGIST PROVIDED HISTORY: Reason for exam:->sob Decision Support Exception - unselect if not a suspected or confirmed emergency medical condition->Emergency Medical Condition (MA) Reason for Exam: sob FINDINGS: Mediastinum: Calcific atherosclerotic disease in the aorta. The heart size is within normal limits. The esophagus is grossly unremarkable without hiatal hernia. There are mediastinal lymph nodes measuring up to 1.6 cm in short axis diameter likely reactive in nature. There are bilateral pleural effusions layering up to 3 cm on the right and 1 cm on the left. There is bilateral patchy pulmonary infiltrates.   The previously seen pulmonary nodule in the right middle lobe appears slightly larger, however there is a central calcification and this most likely represents a granuloma. Lungs/pleura: As above Upper Abdomen: There is a cyst in the upper pole the left kidney and the remainder of the visualized upper abdominal organs are unremarkable Soft Tissues/Bones: Degenerative change     Bilateral pulmonary infiltrates and bilateral pleural effusions with probable reactive mediastinal adenopathy Probable granuloma in the right middle lobe. No follow-up imaging recommended. XR CHEST PORTABLE    Result Date: 11/14/2022  EXAMINATION: ONE XRAY VIEW OF THE CHEST 11/14/2022 3:17 pm COMPARISON: 08/18/2018 HISTORY: ORDERING SYSTEM PROVIDED HISTORY: sob TECHNOLOGIST PROVIDED HISTORY: Reason for exam:->sob Reason for Exam: sob FINDINGS: Cardiomegaly. Ill-defined pulmonary opacities. Bibasilar hypoaeration. No pneumothorax.      Cardiomegaly Bilateral ill-defined pulmonary opacities could represent multifocal pneumonia or edema       Electronically signed by Rey Krause MD on 11/15/2022 at 6:37 PM

## 2022-11-15 NOTE — PROGRESS NOTES
RENAL DOSE ADJUSTMENT MADE PER P/T PROTOCOL    PREVIOUS ORDER:  Levofloxacin 750 mg q24h    Estimated Creatinine Clearance: 12 mL/min (A) (based on SCr of 2 mg/dL (H)).   Recent Labs     11/14/22  1526   BUN 41*   CREATININE 2.0*      INR 0.86     NEW RENALLY ADJUSTED ORDER:  Levofloxacin 750 mg x 1, followed by 500 mg MARIANELA Loving [facility]  11/14/2022 10:09 PM

## 2022-11-15 NOTE — PROGRESS NOTES
Daily Progress Note  Subjective:  Awake and alert;  Denies any CP, SOB is still present  BP and HR stable, NSR on tele  Echo for today    Attending Note:    Feeling ok  Still has some dyspnea but improved  Seen by renal --renal to manage BP meds and diuretics  Will review echo   Hx  of CAD treated medically  Hx of HTN and renal insuffiencey  Conservative treatment   Elevated trop no ACS-due to renal issues     Impression and Plan:   Sepsis    CAP, noted on CT chest    Respiratory panel negative    Blood cultures pending    WBC count elevated    ABX per primary    HFpEF    BNP on admission was 6,417    Last echo showed EF 45-50%    Will obtain echo today    Diuretics per renal at this time; Accurate I's and O's    1500 Fluid restriction     Could look at adding Jardiance as it is renal protective and would help with HFpEF; will discuss with renal    CKD    Renal following; Last EF is 2.0    Avoid nephrotoxic medications    Most Recent Echo  10/3/2018  Summary   Left ventricular systolic function is mildly abnormal.   Ejection fraction is visually estimated at 45-50%. Mild left ventricular hypertrophy. Sclerotic, but non-stenotic aortic valve. No evidence of any pericardial effusion. Radiology  CT chest 11/14/2022  Impression   Bilateral pulmonary infiltrates and bilateral pleural effusions with probable   reactive mediastinal adenopathy       Probable granuloma in the right middle lobe. No follow-up imaging   recommended. PAST MEDICAL HISTORY:  The patient has a history of hypertension, renal  insufficiency present, and she has been treated medically. She is known  to have coronary artery disease also present. She has a chronic left  ventricular block present. She had a heart catheterization done in the  past and LAD had 70% stenosis present and she was treated medically at  that time. Anemia, hyperlipidemia, and arthritis present.      PAST SURGICAL HISTORY:  She has a history of having hip surgery done. Moderate coronary artery disease, heart catheterization done in 2018,  LAD with 70% stenosis distally. She was treated medically for that. She had cataract surgery and hysterectomy done. Objective:   /74   Pulse 71   Temp 98.2 °F (36.8 °C) (Oral)   Resp 17   Ht 5' (1.524 m)   Wt 120 lb (54.4 kg)   LMP  (LMP Unknown)   SpO2 94%   BMI 23.44 kg/m²   No intake or output data in the 24 hours ending 11/15/22 0843    Medications:   Scheduled Meds:   furosemide  40 mg IntraVENous Once    amLODIPine  10 mg Oral Nightly    atenolol  25 mg Oral Daily    sodium bicarbonate  325 mg Oral BID    hydrALAZINE  10 mg Oral 3 times per day    sodium chloride flush  5-40 mL IntraVENous 2 times per day    sodium chloride flush  5-40 mL IntraVENous 2 times per day    albuterol  2.5 mg Nebulization Q4H WA    heparin (porcine)  5,000 Units SubCUTAneous 3 times per day    Vitamin D  2,000 Units Oral Daily    pantoprazole  40 mg Oral QAM AC    fluorometholone  1 drop Both Eyes BID    isosorbide mononitrate  60 mg Oral Daily    levothyroxine  88 mcg Oral Daily    ranolazine  500 mg Oral BID    sertraline  50 mg Oral Daily    [Held by provider] losartan  12.5 mg Oral Daily    [START ON 11/16/2022] levofloxacin  500 mg IntraVENous Q48H    atorvastatin  20 mg Oral Nightly      Infusions:   sodium chloride      sodium chloride        PRN Meds:  sodium chloride flush, sodium chloride, ondansetron **OR** ondansetron, polyethylene glycol, acetaminophen **OR** acetaminophen, sodium chloride flush, sodium chloride     Physical Exam:  Vitals:    11/15/22 0740   BP:    Pulse: 71   Resp: 17   Temp:    SpO2: 94%        General: AAO, NAD  Chest: Nontender  Cardiac: First and Second Heart Sounds are Normal, 2/6 holosystolic murmur noted  Lungs: Diminished breath sounds bilateral Lower lung fields   Abdomen: Soft, NT, ND, +BS  Extremities: No clubbing, np edema  Vascular:  Equal 2+ peripheral pulses.     Lab Data:  CBC: Recent Labs     11/14/22  1526   WBC 15.1*   HGB 10.8*   HCT 32.4*   MCV 95.6        BMP:   Recent Labs     11/14/22  1526 11/15/22  0428    138   K 4.3 4.4    106   CO2 18* 19*   BUN 41* 38*   CREATININE 2.0* 2.0*     LIVER PROFILE:   Recent Labs     11/14/22  1526 11/15/22  0428   AST 26 26   ALT 17 13   BILITOT 0.4 0.4   ALKPHOS 67 60     PT/INR:   Recent Labs     11/14/22  1526   PROTIME 11.1*   INR 0.86     APTT:   Recent Labs     11/14/22  1526   APTT 16.6*     BNP:  No results for input(s): BNP in the last 72 hours.       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 08/18/2018    Bradycardia     LBBB (left bundle branch block)     Sepsis ([address] 11/14/2022    Localized edema 07/11/2022    GERD (gastroesophageal reflux disease)     Hypertension     Hypothyroid     Sinus congestion 03/23/2022    Vaginal prolapse 09/07/2021    Primary osteoarthritis of left foot 08/30/2021    Closed nondisplaced fracture of fourth metatarsal bone of left foot 08/30/2021    Closed nondisplaced fracture of third metatarsal bone of left foot 08/30/2021    Closed nondisplaced fracture of fifth left metatarsal bone 08/30/2021    Vertigo     Dizziness     Labyrinthitis     Chronic renal impairment, stage 3 (moderate) (HCC)     Constipation, slow transit 06/19/2018    Cystitis     Physical deconditioning     Hyponatremia 01/01/2018    Pruritic condition 01/22/2016    Gout     Hyperuricemia     Low back pain     Eczema     Coronary artery disease involving native heart without angina pectoris     Depression     Hyperlipidemia     Generalized osteoarthritis     Osteoporosis        Electronically signed by CLEMENT Cuellar CNP on 11/15/2022 at 8:43 AM      Electronically signed by Markos Barboza MD on 11/15/22 at 9:28 AM EST

## 2022-11-15 NOTE — PROGRESS NOTES
PLEASE NOTE:  Per P/T policy, the order for Alendronate will be discontinued while the patient is in the hospital and may be resumed upon discharge pending physician approval.  Thanks!   Haley Aguirre [facility]  11/14/2022 10:02 PM

## 2022-11-15 NOTE — CARE COORDINATION
.CM met with pt and granddaughter/POA/Maggie for d/c planning. Introduced self and updated white board. Pt lives alone and has assistance with ADL's. Family provides her transportation. she has a PCP, has insurance, and is able to afford her medication. Pt has a rollator walker, BSC, grab bars, a lift chair, MOW's and a life line. Pt has an aide for 8 hrs a day X 7 days a week provided by [facility] through [employer]. She has a nurse visit once a week from [facility]. Pt has a lot of family support. Pt and POA deny any new d/c needs at this time. D/c plan is home alone with [facility] and [address] [place] [address] from [facility]. Notify CM if any new d/c needs arise. TE      At discharge please notify [facility] at [phone] and fax the H&P, AVS, and [facility] orders to [phone] and notify [facility] at [phone].   TE

## 2022-11-15 NOTE — CONSULTS
Pulmonary Consult Note      Reason for Consult: Fanny Travis MD   Requesting Physician: Fanny Travis MD    Subjective:   CHIEF COMPLAINT :SOB    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 08/18/2018     Priority: High    Bradycardia      Priority: High    LBBB (left bundle branch block)      Priority: High    Sepsis ([address] 11/14/2022     Priority: Medium    Localized edema 07/11/2022     Priority: Medium    GERD (gastroesophageal reflux disease)      Priority: Medium     Overview Note:     Esophagitis      Hypertension      Priority: Low    Hypothyroid      Priority: Low     Overview Note:     Hypothyroidism      Sinus congestion 03/23/2022     Overview Note:     CHRONIC W POST NASAL DRIP      Vaginal prolapse 09/07/2021    Primary osteoarthritis of left foot 08/30/2021    Closed nondisplaced fracture of fourth metatarsal bone of left foot 08/30/2021    Closed nondisplaced fracture of third metatarsal bone of left foot 08/30/2021    Closed nondisplaced fracture of fifth left metatarsal bone 08/30/2021    Vertigo      Overview Note:      [facility]      Dizziness     Labyrinthitis     Chronic renal impairment, stage 3 (moderate) (HCC)     Constipation, slow transit 06/19/2018    Cystitis     Physical deconditioning     Hyponatremia 01/01/2018    Pruritic condition 01/22/2016    Gout     Hyperuricemia     Low back pain     Eczema     Coronary artery disease involving native heart without angina pectoris      Overview Note:     Dr Anna Figueredo/Jose Figueredo      Depression     Hyperlipidemia     Generalized osteoarthritis     Osteoporosis      Overview Note:     Generalized          HPI:                The patient is a 80 y.o. female with significant past medical history of CAD, GERD, UTI, Hypothyroidism, HTN, Hyperlipedmia, CKD   presents with complaints of Tiredness. She has cough with clear phlegm, SOBOE.  She has no fever, no sick exposure, no headaches, no nv. Her PBNPT is elevated at 6471 and she has severe AS. Her CT chest showed Pulmonary edema with small bilateral pleural effusions. She was given lasix. She has PAUL on CKD. She is being treated empirically for suspected pneumonia. She is on Abx. Her resp PCR is negative. At this she is lying in the bed. She is in mild resp distress    Past Medical History:      Diagnosis Date    CAD (coronary artery disease)     Dr Claudine Zabala    Chronic renal insufficiency     Dr Nataliia Farooq    Depression     Dry eye syndrome     Dr Garry Sicard     Elevated LFTs 05/11/2012    Generalized osteoarthritis     GERD (gastroesophageal reflux disease)     Esophagitis    Gout     Hyperlipidemia     Hypertension     Hyperuricemia     Hypothyroid     Hypothyroidism    Low back pain     Osteoporosis     Generalized    Sinus congestion     CHRONIC W POST NASAL DRIP    UTI (urinary tract infection)     Vertigo     Dr Yoanna Cotto      Past Surgical History:        Procedure Laterality Date    CATARACT REMOVAL      R Aug. 2006 and L March 2007 Dr. Maame Jacques  2004    right foot realignment [place]    HIP FRACTURE SURGERY Right 08/19/2018    rt hip [place]    HYSTERECTOMY (CERVIX STATUS UNKNOWN)      TONSILLECTOMY       Current Medications:     amLODIPine  10 mg Oral Nightly    atenolol  25 mg Oral Daily    sodium bicarbonate  325 mg Oral BID    hydrALAZINE  10 mg Oral 3 times per day    clopidogrel  75 mg Oral Daily    ipratropium-albuterol  1 ampule Inhalation Q4H WA    sodium chloride flush  5-40 mL IntraVENous 2 times per day    sodium chloride flush  5-40 mL IntraVENous 2 times per day    heparin (porcine)  5,000 Units SubCUTAneous 3 times per day    Vitamin D  2,000 Units Oral Daily    pantoprazole  40 mg Oral QAM AC    fluorometholone  1 drop Both Eyes BID    isosorbide mononitrate  60 mg Oral Daily    levothyroxine  88 mcg Oral Daily    ranolazine  500 mg Oral BID sertraline  50 mg Oral Daily    [Held by provider] losartan  12.5 mg Oral Daily    [START ON 11/16/2022] levofloxacin  500 mg IntraVENous Q48H    atorvastatin  20 mg Oral Nightly     Allergies:    Social History:    TOBACCO:   reports that she has quit smoking. She has never used smokeless tobacco.  ETOH:   reports no history of alcohol use. Patient currently lives independently    Family History:       Problem Relation Age of Onset    Other Mother         CAD    Other Father         CAD       REVIEW OF SYSTEMS:    CONSTITUTIONAL:  negative for fevers, chills, diaphoresis, activity change, appetite change, fatigue, night sweats and unexpected weight change. EYES:  negative for blurred vision, eye discharge, visual disturbance and icterus  HEENT:  negative for hearing loss, tinnitus, ear drainage, sinus pressure, nasal congestion, epistaxis and snoring  RESPIRATORY:  See HPI  CARDIOVASCULAR:  negative for chest pain, palpitations, exertional chest pressure/discomfort, edema, syncope  GASTROINTESTINAL:  negative for nausea, vomiting, diarrhea, constipation, blood in stool and abdominal pain  GENITOURINARY:  negative for frequency, dysuria, urinary incontinence, decreased urine volume, and hematuria  HEMATOLOGIC/LYMPHATIC:  negative for easy bruising, bleeding and lymphadenopathy  ALLERGIC/IMMUNOLOGIC:  negative for recurrent infections, angioedema, anaphylaxis and drug reactions  ENDOCRINE:  negative for weight changes and diabetic symptoms including polyuria, polydipsia and polyphagia  MUSCULOSKELETAL:  negative for  pain, joint swelling, decreased range of motion and muscle weakness  NEUROLOGICAL:  negative for headaches, slurred speech, unilateral weakness  PSYCHIATRIC/BEHAVIORAL: negative for hallucinations, behavioral problems, confusion and agitation.      Objective:   PHYSICAL EXAM:      VITALS:  BP (!) 175/75   Pulse 76   Temp 98.2 °F (36.8 °C) (Oral)   Resp 19   Ht 5' (1.524 m)   Wt 120 lb (54.4 kg) LMP  (LMP Unknown)   SpO2 94%   BMI 23.44 kg/m²   24HR INTAKE/OUTPUT:    Intake/Output Summary (Last 24 hours) at 11/15/2022 1239  Last data filed at 11/15/2022 1117  Gross per 24 hour   Intake --   Output 1000 ml   Net -1000 ml     CURRENT PULSE OXIMETRY:  SpO2: 94 %  24HR PULSE OXIMETRY RANGE:  SpO2  Av.7 %  Min: 87 %  Max: 96 %    CONSTITUTIONAL:  awake, alert, cooperative, no apparent distress, and appears stated age  NECK:  Supple, symmetrical, trachea midline, no adenopathy, thyroid symmetric, not enlarged and no tenderness, skin normal  LUNGS:  Basal crackles  CARDIOVASCULAR:  normal S1 and S2, no edema and no JVD  ABDOMEN:  normal bowel sounds, non-distended and no masses palpated, and no tenderness to palpation. No hepatospleenomegaly  LYMPHADENOPATHY:  no axillary or supraclavicular adenopathy. No cervical adnenopathy  PSYCHIATRIC: Oriented to person place and time. No obvious depression or anxiety. MUSCULOSKELETAL: No obvious misalignment or effusion of the joints. No clubbing, cyanosis of the digits. SKIN:  normal skin color, texture, turgor and no redness, warmth, or swelling.  No palpable nodules    DATA:    Old records have been reviewed  CBC with Differential:    Lab Results   Component Value Date/Time    WBC 15.1 2022 03:26 PM    RBC 3.39 2022 03:26 PM    HGB 10.8 2022 03:26 PM    HCT 32.4 2022 03:26 PM     2022 03:26 PM    MCV 95.6 2022 03:26 PM    MCH 31.9 2022 03:26 PM    MCHC 33.3 2022 03:26 PM    RDW 13.8 2022 03:26 PM    SEGSPCT 74.7 2022 03:26 PM    LYMPHOPCT 15.5 2022 03:26 PM    MONOPCT 5.9 2022 03:26 PM    EOSPCT 4 10/06/2021 12:00 AM    BASOPCT 0.5 2022 03:26 PM    MONOSABS 0.9 2022 03:26 PM    LYMPHSABS 2.3 2022 03:26 PM    EOSABS 0.2 2022 03:26 PM    BASOSABS 0.1 2022 03:26 PM    DIFFTYPE AUTOMATED DIFFERENTIAL 2022 03:26 PM     BMP:    Lab Results   Component Value Date/Time     11/15/2022 04:28 AM    K 4.4 11/15/2022 04:28 AM     11/15/2022 04:28 AM    CO2 19 11/15/2022 04:28 AM    BUN 38 11/15/2022 04:28 AM    CREATININE 2.0 11/15/2022 04:28 AM    CALCIUM 9.3 11/15/2022 04:28 AM    GFRAA 32 09/26/2022 03:03 PM    GFRAA 37 03/19/2013 02:04 PM    LABGLOM 23 11/15/2022 04:28 AM    GLUCOSE 103 11/15/2022 04:28 AM    GLUCOSE 126 06/07/2021 12:00 AM     Hepatic Function Panel:    Lab Results   Component Value Date/Time    ALKPHOS 60 11/15/2022 04:28 AM    ALT 13 11/15/2022 04:28 AM    AST 26 11/15/2022 04:28 AM    PROT 5.7 11/15/2022 04:28 AM    PROT 7.2 03/19/2013 02:04 PM    BILITOT 0.4 11/15/2022 04:28 AM     ABG:    Lab Results   Component Value Date/Time    NMJ2UKW 18.4 11/14/2022 11:30 PM    BNW8ZVH 29.0 11/14/2022 11:30 PM    PO2ART 83 11/14/2022 11:30 PM       Cultures:   Pending      Radiology Review:    Cardiomegaly       Bilateral ill-defined pulmonary opacities could represent multifocal   pneumonia or edema     Bilateral pulmonary infiltrates and bilateral pleural effusions with probable   reactive mediastinal adenopathy       Probable granuloma in the right middle lobe.               Assessment/Plan       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 08/18/2018     Priority: High    Bradycardia      Priority: High    LBBB (left bundle branch block)      Priority: High    Sepsis ([address] 11/14/2022     Priority: Medium    Localized edema 07/11/2022     Priority: Medium    GERD (gastroesophageal reflux disease)      Priority: Medium     Overview Note:     Esophagitis      Hypertension      Priority: Low    Hypothyroid      Priority: Low     Overview Note:     Hypothyroidism      Sinus congestion 03/23/2022     Overview Note:     CHRONIC W POST NASAL DRIP      Vaginal prolapse 09/07/2021    Primary osteoarthritis of left foot 08/30/2021    Closed nondisplaced fracture of fourth metatarsal bone of left foot 08/30/2021    Closed nondisplaced fracture of third metatarsal bone of left foot 08/30/2021    Closed nondisplaced fracture of fifth left metatarsal bone 08/30/2021    Vertigo      Overview Note:      [facility]      Dizziness     Labyrinthitis     Chronic renal impairment, stage 3 (moderate) (HCC)     Constipation, slow transit 06/19/2018    Cystitis     Physical deconditioning     Hyponatremia 01/01/2018    Pruritic condition 01/22/2016    Gout     Hyperuricemia     Low back pain     Eczema     Coronary artery disease involving native heart without angina pectoris      Overview Note:     Dr Rudi Figueredo/Jose Figueredo      Depression     Hyperlipidemia     Generalized osteoarthritis     Osteoporosis      Overview Note:     Generalized     Acute Hypoxic resp failure  Pulmonary edema  Small bilateral Pleural effusions  PAUL on CKD  Leukocytosis- Lymphocytic predominant  Severe AS  Mild to Moderate Pulmonary HTN  CAD  H/o Recurrent UTI  GERD  HLD  ? Atypical pneumonia    PLAN  1. Diuresis  2. Severe AS mgmt per Cardiology  3. Inhalers  4. Empiric Abx  5. F/u C&S  6. CXR in am  7. CBC, CMP in am  8. DVT and GI Prophylaxis  9.  C/w present management            Electronically signed by Mallory Molina MD on 11/15/2022 at 12:39 PM

## 2022-11-15 NOTE — ED PROVIDER NOTES
EMERGENCY DEPARTMENT ENCOUNTER        Pt Name: Lexus Montejo  MRN: [id number]  [place] 1/23/1927  Date of evaluation: 11/14/2022  Provider: NICK River  PCP: Robert Victoria MD     I have seen and evaluated this patient with my supervising physician Dr Ronal Varela       Chief Complaint   Patient presents with    Shortness of Breath       HISTORY OF PRESENT ILLNESS      Chief Complaint: Shortness of breath, new oxygen requirement, weakness    Lexus Montejo is a 80 y.o. female who presents to the emergency department today via EMS with daughter at bedside with the power of  with a chief complaint of continued weakness, shortness of breath, new oxygen requirement. This is developed over the last 2 or 3 days. Daughter does state that she has been around family members that have been slightly ill. The patient does not require oxygen at baseline, is on 2 L. She is in no obvious distress, describing no chest pain. Has had a mild cough and congestion. Has also been recently treated with several antibiotics for a complicated urinary tract infection. Finishing a course of Macrobid. Denying any active dysuria, abdominal pain, flank pain, no obvious altered mental status or confusion. Patient does have history of underlying heart disease, chronic kidney disease. Nursing Notes were all reviewed and agreed with or any disagreements were addressed in the HPI. REVIEW OF SYSTEMS     Constitutional: See HPI  HENT:   Denies sore throat or ear pain   Cardiovascular:   Denies chest pain, palpitations   Respiratory:  Denies cough or shortness of breath    GI:   Denies abdominal pain, nausea, vomiting, or diarrhea  : See HPI.    Musculoskeletal:   Denies back pain  Skin:   Denies rash  Neurologic:   Denies headache, focal weakness or sensory changes   Endocrine:  Denies polyuria or polydypsia   Lymphatic:  Denies swollen glands     PAST MEDICAL HISTORY     Past Medical History:   Diagnosis Date    CAD (coronary artery disease)     Dr Zoltan Neil    Chronic renal insufficiency     Dr Jimi Pinzon    Depression     Dry eye syndrome     Dr Meri Hoskins     Elevated LFTs 05/11/2012    Generalized osteoarthritis     GERD (gastroesophageal reflux disease)     Esophagitis    Gout     Hyperlipidemia     Hypertension     Hyperuricemia     Hypothyroid     Hypothyroidism    Low back pain     Osteoporosis     Generalized    Sinus congestion     CHRONIC W POST NASAL DRIP    UTI (urinary tract infection)     Vertigo     Dr Lexie Bernheim     Past Surgical History:   Procedure Laterality Date    CATARACT REMOVAL      R Aug. 2006 and L March 2007 Dr. Vilma Hurtado  2004    right foot realignment [address] Right 08/19/2018    rt hip Zheng    HYSTERECTOMY (CERVIX STATUS UNKNOWN)      TONSILLECTOMY         CURRENTMEDICATIONS       Current Discharge Medication List        CONTINUE these medications which have NOT CHANGED    Details   ondansetron (ZOFRAN) 4 MG tablet Take 1 tablet by mouth every 12 hours as needed for Nausea  Qty: 20 tablet, Refills: 0      sodium bicarbonate 325 MG tablet Take 1 tablet by mouth in the morning and 1 tablet in the evening.       XIIDRA 5 % SOLN Place 1 drop into both eyes 2 times daily      fluorometholone (FML) 0.1 % ophthalmic suspension Place 1 drop into both eyes 2 times daily      nitrofurantoin, macrocrystal-monohydrate, (MACROBID) 100 MG capsule Take by mouth 2 times daily      amLODIPine (NORVASC) 10 MG tablet Take 1 tablet by mouth nightly  Qty: 30 tablet, Refills: 4    Associated Diagnoses: Essential hypertension      olmesartan (BENICAR) 5 MG tablet Take 1 tablet by mouth every morning  Qty: 30 tablet, Refills: 3      atenolol (TENORMIN) 25 MG tablet Take 1 tablet by mouth daily  Qty: 180 tablet, Refills: 1    Associated Diagnoses: Essential hypertension dexlansoprazole (DEXILANT) 60 MG CPDR delayed release capsule Take 1 capsule by mouth daily  Qty: 90 capsule, Refills: 1    Associated Diagnoses: Gastroesophageal reflux disease without esophagitis      docusate sodium (COLACE) 100 MG capsule Take 1 capsule by mouth daily  Qty: 90 capsule, Refills: 1    Associated Diagnoses: Constipation, slow transit      senna (NATURAL SENNA LAXATIVE) 8.6 MG tablet Take 1 tablet by mouth 2 times daily  Qty: 180 tablet, Refills: 1    Associated Diagnoses: Constipation, slow transit      sertraline (ZOLOFT) 50 MG tablet Take 1 tablet by mouth daily  Qty: 90 tablet, Refills: 1    Associated Diagnoses: Depression, unspecified depression type      isosorbide mononitrate (IMDUR) 60 MG extended release tablet Take 1 tablet by mouth daily  Qty: 90 tablet, Refills: 1    Associated Diagnoses: Coronary artery disease involving native coronary artery of native heart without angina pectoris      polyethylene glycol (GAVILAX) 17 GM/SCOOP powder MIX 17 GRAMS (1 HEAPING TABLESPOONFUL) OF POWDER IN 8 OUNCES OF LIQUID AND DRINK DAILY  Qty: 850 g, Refills: 3    Associated Diagnoses: Constipation, slow transit      nystatin (MYCOSTATIN) 127812 UNIT/GM powder Apply 3 times daily.   Qty: 60 g, Refills: 1    Associated Diagnoses: Vaginal itching      Cholecalciferol (VITAMIN D3) 50 MCG (2000 UT) TABS Take 1 tablet by mouth daily  Qty: 90 tablet, Refills: 1    Associated Diagnoses: Age-related osteoporosis with current pathological fracture, initial encounter      nitroGLYCERIN (NITROSTAT) 0.4 MG SL tablet NITRO  Qty: 25 tablet, Refills: 1    Associated Diagnoses: Coronary artery disease involving native coronary artery of native heart without angina pectoris      allopurinol (ZYLOPRIM) 100 MG tablet TAKE ONE (1) TABLET BY MOUTH DAILY  Qty: 90 tablet, Refills: 1    Associated Diagnoses: Idiopathic gout of multiple sites, unspecified chronicity      ranolazine (RANEXA) 500 MG extended release tablet TAKE ONE (1) TABLET BY MOUTH TWO (2) TIMES DAILY  Qty: 180 tablet, Refills: 1    Associated Diagnoses: Coronary artery disease involving native coronary artery of native heart without angina pectoris      alendronate (FOSAMAX) 70 MG tablet FOSA  Qty: 12 tablet, Refills: 1    Associated Diagnoses: Age-related osteoporosis with current pathological fracture, initial encounter      levothyroxine (SYNTHROID) 88 MCG tablet TAKE ONE (1) TABLET BY MOUTH DAILY  Qty: 90 tablet, Refills: 1    Associated Diagnoses: Hypothyroidism due to acquired atrophy of thyroid      simvastatin (ZOCOR) 20 MG tablet TAKE 1 TABLET BY MOUTH EVERY NIGHT  Qty: 90 tablet, Refills: 1    Associated Diagnoses: Coronary artery disease involving native coronary artery of native heart without angina pectoris; Mixed hyperlipidemia      azelastine (ASTELIN) 0.1 % nasal spray 1 spray by Nasal route 2 times daily Use in each nostril as directed  Qty: 30 mL, Refills: 5    Associated Diagnoses: Sinus congestion      fexofenadine (ALLEGRA) 60 MG tablet Take 1 tablet by mouth in the morning. Qty: 90 tablet, Refills: 1    Associated Diagnoses: Pruritic condition      hydrocortisone 2.5 % cream Apply topically 2 times daily Apply topically 2 times daily.       triamcinolone (KENALOG) 0.1 % ointment Apply topically 2 times daily  Qty: 30 g, Refills: 3    Associated Diagnoses: Vulvar irritation      estradiol (ESTRACE VAGINAL) 0.1 MG/GM vaginal cream Place 1 g vaginally daily Daily for 2 weeks and then 2-3 times per week  Qty: 1 each, Refills: 5    Associated Diagnoses: Atrophic vaginitis      acetaminophen (AMINOFEN) 325 MG tablet Take 2 tablets by mouth every 8 hours as needed for Pain  Qty: 120 tablet, Refills: 3      B Complex-C-Zn-Folic Acid (DIALYVITE 931-MUVT 15) 0.8 MG TABS Take 1 tablet by mouth daily  Qty: 30 tablet, Refills: 11    Comments: CALL REQUEST FROM SANDRA @ [facility]      mineral oil-hydrophilic petrolatum (AQUAPHOR) ointment Apply topically as needed for Dry Skin Apply topically as needed. diphenhydrAMINE (BENADRYL) 25 MG tablet Take 50 mg by mouth nightly      aspirin EC 81 MG EC tablet Take 81 mg by mouth daily. ALLERGIES     Bactrim [sulfamethoxazole-trimethoprim], Cephalexin, and Tape [adhesive tape]    FAMILYHISTORY       Family History   Problem Relation Age of Onset    Other Mother         CAD    Other Father         CAD        SOCIAL HISTORY       Social History     Socioeconomic History    Marital status:    Occupational History    Occupation: Retired from [employer] as noted   Tobacco Use    Smoking status: Former    Smokeless tobacco: Never   Vaping Use    Vaping Use: Never used   Substance and Sexual Activity    Alcohol use: No    Drug use: No   Social History Narrative    Marital Status:   she lives in an [facility] and  has been in [facility] since [facility]        Diet:  Low salt        Seat Belts:  Always     Social Determinants of Health     Physical Activity: Sufficiently Active    Days of Exercise per Week: 5 days    Minutes of Exercise per Session: 30 min       SCREENINGS    [place] Coma Scale  Eye Opening: Spontaneous  Best Verbal Response: Oriented  Best Motor Response: Obeys commands  [place] Coma Scale Score: 15      PHYSICAL EXAM       ED Triage Vitals   BP Temp Temp Source Heart Rate Resp SpO2 Height Weight   11/14/22 1415 11/14/22 1415 11/14/22 1415 11/14/22 1415 11/14/22 1415 11/14/22 1415 11/14/22 2100 11/14/22 2100   137/89 98.4 °F (36.9 °C) Oral 72 15 91 % 5' (1.524 m) 120 lb (54.4 kg)      Constitutional:  Well developed, Well nourished. No distress  HENT:  Normocephalic, Atraumatic, PERRL. EOMI. Sclera clear. Conjunctiva normal, No discharge. Neck/Lymphatics: supple, no JVD, no swollen nodes  Cardiovascular:   RRR,  no murmurs/rubs/gallops. Respiratory:   Nonlabored breathing. Adventitious lung sounds, rhonchi noticed on auscultation, no significant wheezing  Abdomen:   Bowel sounds normal, Soft, No tenderness, no masses. Musculoskeletal:    There is no edema, asymmetry, or calf / thigh tenderness bilaterally. No cyanosis. No cool or pale-appearing limb. Distal cap refill and pulses intact bilateral upper and lower extremities  Bilateral upper and lower extremity ROM intact without pain or obvious deficit  Integument:   Warm, Dry  Neurologic:  Alert & oriented , No focal deficits noted. Cranial nerves II through XII grossly intact. Normal gross motor coordination & motor strength bilateral upper and lower extremities  Sensation intact.   Psychiatric:  Affect normal, Mood normal.     DIAGNOSTIC RESULTS   LABS:    Labs Reviewed   CBC WITH AUTO DIFFERENTIAL - Abnormal; Notable for the following components:       Result Value    WBC 15.1 (*)     RBC 3.39 (*)     Hemoglobin 10.8 (*)     Hematocrit 32.4 (*)     MCH 31.9 (*)     Immature Neutrophil % 1.9 (*)     Segs Relative 74.7 (*)     Lymphocytes % 15.5 (*)     Monocytes % 5.9 (*)     All other components within normal limits   COMPREHENSIVE METABOLIC PANEL - Abnormal; Notable for the following components:    CO2 18 (*)     BUN 41 (*)     Creatinine 2.0 (*)     Est, Glom Filt Rate 23 (*)     Glucose 104 (*)     All other components within normal limits   TROPONIN - Abnormal; Notable for the following components:    Troponin T 0.037 (*)     All other components within normal limits   BRAIN NATRIURETIC PEPTIDE - Abnormal; Notable for the following components:    Pro-BNP 6,417 (*)     All other components within normal limits   BLOOD GAS, VENOUS - Abnormal; Notable for the following components:    pCO2, Denis 29 (*)     pO2, Denis 163 (*)     Base Excess 7 (*)     HCO3, Venous 17.2 (*)     O2 Sat, Denis 88.3 (*)     All other components within normal limits   MAGNESIUM - Abnormal; Notable for the following components:    Magnesium 1.7 (*)     All other components within normal limits   PROTIME/INR & PTT - Abnormal; Notable for the following components:    Protime 11.1 (*)     aPTT 16.6 (*)     All other components within normal limits   URINALYSIS - Abnormal; Notable for the following components:    Protein, UA 30 (*)     Leukocyte Esterase, Urine TRACE (*)     All other components within normal limits   MICROSCOPIC URINALYSIS - Abnormal; Notable for the following components:    WBC, UA 9 (*)     All other components within normal limits   COMPREHENSIVE METABOLIC PANEL W/ REFLEX TO MG FOR LOW K - Abnormal; Notable for the following components:    CO2 19 (*)     BUN 38 (*)     Creatinine 2.0 (*)     Est, Glom Filt Rate 23 (*)     Glucose 103 (*)     Albumin 3.1 (*)     Total Protein 5.7 (*)     All other components within normal limits   BLOOD GAS, VENOUS - Abnormal; Notable for the following components:    pCO2, Ednis 37 (*)     pO2, Denis 50 (*)     Base Excess 4 (*)     O2 Sat, Denis 80.7 (*)     All other components within normal limits   TROPONIN - Abnormal; Notable for the following components:    Troponin T 0.030 (*)     All other components within normal limits   BLOOD GAS, ARTERIAL - Abnormal; Notable for the following components:    pCO2, Arterial 29.0 (*)     Base Excess 5 (*)     O2 Sat 94.8 (*)     All other components within normal limits   RESPIRATORY PANEL, MOLECULAR, WITH COVID-19   CULTURE, BLOOD 1   CULTURE, BLOOD 2   CULTURE, URINE   CULTURE, BLOOD 1   RAPID INFLUENZA A/B ANTIGENS   SPECIMEN REJECTION   LACTATE, SEPSIS   LACTATE, SEPSIS   PROCALCITONIN   LACTATE, SEPSIS       When ordered, only abnormal lab results are displayed. All other labs were within normal range or not returned as of this dictation. EKG: When ordered, EKG's are interpreted by the Emergency Department Physician in the absence of a cardiologist.  Please see their note for interpretation of EKG.     RADIOLOGY:   Non-plain film images such as CT, Ultrasound and MRI are read by the radiologist. Plain radiographic images are visualized and preliminarily interpreted by the  ED Provider with the below findings:    Interpretation [facility] Radiologist below, if available at the time of this note:    CT CHEST WO CONTRAST   Final Result   Bilateral pulmonary infiltrates and bilateral pleural effusions with probable   reactive mediastinal adenopathy      Probable granuloma in the right middle lobe. No follow-up imaging   recommended. XR CHEST PORTABLE   Final Result   Cardiomegaly      Bilateral ill-defined pulmonary opacities could represent multifocal   pneumonia or edema           CT CHEST WO CONTRAST    Result Date: 11/14/2022  EXAMINATION: CT OF THE CHEST WITHOUT CONTRAST 11/14/2022 7:33 pm TECHNIQUE: CT of the chest was performed without the administration of intravenous contrast. Multiplanar reformatted images are provided for review. Automated exposure control, iterative reconstruction, and/or weight based adjustment of the mA/kV was utilized to reduce the radiation dose to as low as reasonably achievable. COMPARISON: 01/06/2018 HISTORY: ORDERING SYSTEM PROVIDED HISTORY: sob TECHNOLOGIST PROVIDED HISTORY: Reason for exam:->sob Decision Support Exception - unselect if not a suspected or confirmed emergency medical condition->Emergency Medical Condition (MA) Reason for Exam: sob FINDINGS: Mediastinum: Calcific atherosclerotic disease in the aorta. The heart size is within normal limits. The esophagus is grossly unremarkable without hiatal hernia. There are mediastinal lymph nodes measuring up to 1.6 cm in short axis diameter likely reactive in nature. There are bilateral pleural effusions layering up to 3 cm on the right and 1 cm on the left. There is bilateral patchy pulmonary infiltrates. The previously seen pulmonary nodule in the right middle lobe appears slightly larger, however there is a central calcification and this most likely represents a granuloma. Lungs/pleura: As above Upper Abdomen:  There is a cyst in the upper pole the left kidney and the remainder of the visualized upper abdominal organs are unremarkable Soft Tissues/Bones: Degenerative change     Bilateral pulmonary infiltrates and bilateral pleural effusions with probable reactive mediastinal adenopathy Probable granuloma in the right middle lobe. No follow-up imaging recommended. XR CHEST PORTABLE    Result Date: 11/14/2022  EXAMINATION: ONE XRAY VIEW OF THE CHEST 11/14/2022 3:17 pm COMPARISON: 08/18/2018 HISTORY: ORDERING SYSTEM PROVIDED HISTORY: sob TECHNOLOGIST PROVIDED HISTORY: Reason for exam:->sob Reason for Exam: sob FINDINGS: Cardiomegaly. Ill-defined pulmonary opacities. Bibasilar hypoaeration. No pneumothorax.      Cardiomegaly Bilateral ill-defined pulmonary opacities could represent multifocal pneumonia or edema         PROCEDURES   Unless otherwise noted below, none    CRITICAL CARE   CRITICAL CARE NOTE:   N/A    CONSULTS:  IP CONSULT TO CARDIOLOGY  IP CONSULT TO NEPHROLOGY      EMERGENCY DEPARTMENT COURSE and MDM:   Vitals:    Vitals:    11/14/22 2100 11/14/22 2315 11/14/22 2327 11/15/22 0125   BP:    104/74   Pulse:    71   Resp:    22   Temp:    98.2 °F (36.8 °C)   TempSrc:    Oral   SpO2:  (!) 87% 93% 94%   Weight: 120 lb (54.4 kg)      Height: 5' (1.524 m)          Patient was given thefollowing medications:  Medications   sodium chloride flush 0.9 % injection 5-40 mL (5 mLs IntraVENous Not Given 11/14/22 2319)   sodium chloride flush 0.9 % injection 5-40 mL (has no administration in time range)   0.9 % sodium chloride infusion (has no administration in time range)   ondansetron (ZOFRAN-ODT) disintegrating tablet 4 mg (has no administration in time range)     Or   ondansetron (ZOFRAN) injection 4 mg (has no administration in time range)   polyethylene glycol (GLYCOLAX) packet 17 g (has no administration in time range)   acetaminophen (TYLENOL) tablet 650 mg (650 mg Oral Given 11/14/22 2259)     Or   acetaminophen (TYLENOL) suppository 650 mg ( Rectal See Alternative 11/14/22 2259) sodium chloride flush 0.9 % injection 5-40 mL (5 mLs IntraVENous Not Given 11/14/22 2318)   sodium chloride flush 0.9 % injection 5-40 mL (has no administration in time range)   0.9 % sodium chloride infusion (has no administration in time range)   albuterol (PROVENTIL) nebulizer solution 2.5 mg (2.5 mg Nebulization Given 11/14/22 2136)   heparin (porcine) injection 5,000 Units (5,000 Units SubCUTAneous Given 11/15/22 0653)   atenolol (TENORMIN) tablet 25 mg (25 mg Oral Given 11/14/22 2300)   Vitamin D (CHOLECALCIFEROL) tablet 2,000 Units (has no administration in time range)   pantoprazole (PROTONIX) tablet 40 mg (40 mg Oral Given 11/15/22 0653)   fluorometholone (FML) 0.1 % ophthalmic suspension 1 drop (Patient Supplied) (1 drop Both Eyes Not Given 11/14/22 2319)   isosorbide mononitrate (IMDUR) extended release tablet 60 mg (has no administration in time range)   levothyroxine (SYNTHROID) tablet 88 mcg (88 mcg Oral Given 11/15/22 0653)   ranolazine (RANEXA) extended release tablet 500 mg (500 mg Oral Given 11/14/22 2318)   sertraline (ZOLOFT) tablet 50 mg (has no administration in time range)   losartan (COZAAR) tablet 12.5 mg (has no administration in time range)   levoFLOXacin (LEVAQUIN) 500 MG/100ML infusion 500 mg (has no administration in time range)   atorvastatin (LIPITOR) tablet 20 mg (20 mg Oral Given 11/14/22 2300)   0.9 % sodium chloride bolus (0 mLs IntraVENous Stopped 11/15/22 0047)   guaiFENesin-dextromethorphan (ROBITUSSIN DM) 100-10 MG/5ML syrup 5 mL (5 mLs Oral Given 11/14/22 1620)   levoFLOXacin (LEVAQUIN) 750 MG/150ML infusion 750 mg (0 mg IntraVENous Stopped 11/15/22 0046)   magnesium sulfate 2000 mg in 50 mL IVPB premix (0 mg IntraVENous Stopped 11/15/22 0110)   furosemide (LASIX) injection 20 mg (20 mg IntraVENous Given 11/14/22 2300)   melatonin tablet 3 mg (3 mg Oral Given 11/15/22 0125)         Is this patient to be included in the SEP-1 Core Measure due to severe sepsis or septic shock? No   Exclusion criteria - the patient is NOT to be included for SEP-1 Core Measure due to:  May have criteria for sepsis, but does not meet criteria for severe sepsis or septic shock    MDM:  Patient presents as above. Emergent etiologies considered. Patient seen and examined. Work-up initiated secondary to presentation, physical exam findings, vital signs and medical chart review. In brief, 77-year-old female presenting to the emergency department today with shortness of breath, weakness, cough, new oxygen requirement over the last 2 or 3 days. Is on 2 L and in no obvious distress. Vital signs are otherwise stable. Has been on several biotics for urinary tract infection, finishing an oral course of Macrobid. Having no significant abdominal pain nausea vomiting diarrhea decreased oral intake but just feels generally unwell. Has history of underlying heart disease chronic kidney disease and other comorbidities. Broad work-up was initiated    Findings are most consistent with a developing pneumonia on chest x-ray. Did have an elevated white blood cell count. Kidney function around baseline. We will initiate light fluids, empiric antibiotics. Will consult cardiology, nephrology. Will admit to medicine. Attending physician involved in case. Patient otherwise remained stable in the ED. Will be admitted for underlying pneumonia sepsis detectable troponin and her chronic kidney disease. CLINICAL IMPRESSION      1. Dyspnea and respiratory abnormalities    2. Pneumonia due to infectious organism, unspecified laterality, unspecified part of lung    3. Troponin I above reference range    4. Chronic kidney disease, unspecified CKD stage          DISPOSITION/PLAN   DISPOSITION Admitted 11/14/2022 05:54:46 PM    (Please note that portions ofthis note were completed with a voice recognition program.  Efforts were made to edit the dictations but occasionally words are mis-transcribed. )    PAULINE NICK Devi (electronically signed)            NICK Mahmood  11/15/22 [id number]

## 2022-11-16 ENCOUNTER — APPOINTMENT (OUTPATIENT)
Dept: GENERAL RADIOLOGY | Age: 87
DRG: 871 | End: 2022-11-16
Payer: COMMERCIAL

## 2022-11-16 PROBLEM — J18.9 PNEUMONIA DUE TO INFECTIOUS ORGANISM: Status: ACTIVE | Noted: 2022-01-01

## 2022-11-16 PROBLEM — N18.9 CHRONIC KIDNEY DISEASE: Status: ACTIVE | Noted: 2022-11-16

## 2022-11-16 PROBLEM — N12 PYELONEPHRITIS: Status: ACTIVE | Noted: 2022-01-01

## 2022-11-16 LAB
ANION GAP SERPL CALCULATED.3IONS-SCNC: 11 MMOL/L (ref 4–16)
BASOPHILS ABSOLUTE: 0.1 K/CU MM
BASOPHILS RELATIVE PERCENT: 0.3 % (ref 0–1)
BUN BLDV-MCNC: 42 MG/DL (ref 6–23)
CALCIUM SERPL-MCNC: 9 MG/DL (ref 8.3–10.6)
CHLORIDE BLD-SCNC: 100 MMOL/L (ref 99–110)
CO2: 23 MMOL/L (ref 21–32)
CREAT SERPL-MCNC: 2.2 MG/DL (ref 0.6–1.1)
CULTURE: ABNORMAL
CULTURE: ABNORMAL
DIFFERENTIAL TYPE: ABNORMAL
EOSINOPHILS ABSOLUTE: 0.3 K/CU MM
EOSINOPHILS RELATIVE PERCENT: 1.6 % (ref 0–3)
GFR SERPL CREATININE-BSD FRML MDRD: 20 ML/MIN/1.73M2
GLUCOSE BLD-MCNC: 114 MG/DL (ref 70–99)
HCT VFR BLD CALC: 28.1 % (ref 37–47)
HEMOGLOBIN: 9.5 GM/DL (ref 12.5–16)
IMMATURE NEUTROPHIL %: 1 % (ref 0–0.43)
LYMPHOCYTES ABSOLUTE: 2 K/CU MM
LYMPHOCYTES RELATIVE PERCENT: 12.1 % (ref 24–44)
Lab: ABNORMAL
MAGNESIUM: 1.8 MG/DL (ref 1.8–2.4)
MCH RBC QN AUTO: 32.1 PG (ref 27–31)
MCHC RBC AUTO-ENTMCNC: 33.8 % (ref 32–36)
MCV RBC AUTO: 94.9 FL (ref 78–100)
MONOCYTES ABSOLUTE: 1.4 K/CU MM
MONOCYTES RELATIVE PERCENT: 8.2 % (ref 0–4)
NUCLEATED RBC %: 0 %
PDW BLD-RTO: 13.8 % (ref 11.7–14.9)
PLATELET # BLD: 382 K/CU MM (ref 140–440)
PMV BLD AUTO: 9.1 FL (ref 7.5–11.1)
POTASSIUM SERPL-SCNC: 4.6 MMOL/L (ref 3.5–5.1)
RBC # BLD: 2.96 M/CU MM (ref 4.2–5.4)
SEGMENTED NEUTROPHILS ABSOLUTE COUNT: 12.8 K/CU MM
SEGMENTED NEUTROPHILS RELATIVE PERCENT: 76.8 % (ref 36–66)
SODIUM BLD-SCNC: 134 MMOL/L (ref 135–145)
SPECIMEN: ABNORMAL
TOTAL IMMATURE NEUTOROPHIL: 0.16 K/CU MM
TOTAL NUCLEATED RBC: 0 K/CU MM
WBC # BLD: 16.7 K/CU MM (ref 4–10.5)

## 2022-11-16 PROCEDURE — 99223 1ST HOSP IP/OBS HIGH 75: CPT | Performed by: INTERNAL MEDICINE

## 2022-11-16 PROCEDURE — 2580000003 HC RX 258: Performed by: NURSE PRACTITIONER

## 2022-11-16 PROCEDURE — 94668 MNPJ CHEST WALL SBSQ: CPT

## 2022-11-16 PROCEDURE — 86141 C-REACTIVE PROTEIN HS: CPT

## 2022-11-16 PROCEDURE — 94761 N-INVAS EAR/PLS OXIMETRY MLT: CPT

## 2022-11-16 PROCEDURE — 6370000000 HC RX 637 (ALT 250 FOR IP): Performed by: INTERNAL MEDICINE

## 2022-11-16 PROCEDURE — 2580000003 HC RX 258

## 2022-11-16 PROCEDURE — 6370000000 HC RX 637 (ALT 250 FOR IP)

## 2022-11-16 PROCEDURE — 2140000000 HC CCU INTERMEDIATE R&B

## 2022-11-16 PROCEDURE — 97530 THERAPEUTIC ACTIVITIES: CPT

## 2022-11-16 PROCEDURE — 6370000000 HC RX 637 (ALT 250 FOR IP): Performed by: STUDENT IN AN ORGANIZED HEALTH CARE EDUCATION/TRAINING PROGRAM

## 2022-11-16 PROCEDURE — 85025 COMPLETE CBC W/AUTO DIFF WBC: CPT

## 2022-11-16 PROCEDURE — 2700000000 HC OXYGEN THERAPY PER DAY

## 2022-11-16 PROCEDURE — 80048 BASIC METABOLIC PNL TOTAL CA: CPT

## 2022-11-16 PROCEDURE — 2500000003 HC RX 250 WO HCPCS: Performed by: INTERNAL MEDICINE

## 2022-11-16 PROCEDURE — 87081 CULTURE SCREEN ONLY: CPT

## 2022-11-16 PROCEDURE — APPSS60 APP SPLIT SHARED TIME 46-60 MINUTES: Performed by: NURSE PRACTITIONER

## 2022-11-16 PROCEDURE — 87070 CULTURE OTHR SPECIMN AEROBIC: CPT

## 2022-11-16 PROCEDURE — 36415 COLL VENOUS BLD VENIPUNCTURE: CPT

## 2022-11-16 PROCEDURE — 71045 X-RAY EXAM CHEST 1 VIEW: CPT

## 2022-11-16 PROCEDURE — 6360000002 HC RX W HCPCS

## 2022-11-16 PROCEDURE — 99232 SBSQ HOSP IP/OBS MODERATE 35: CPT | Performed by: INTERNAL MEDICINE

## 2022-11-16 PROCEDURE — 97116 GAIT TRAINING THERAPY: CPT

## 2022-11-16 PROCEDURE — 97166 OT EVAL MOD COMPLEX 45 MIN: CPT

## 2022-11-16 PROCEDURE — 94640 AIRWAY INHALATION TREATMENT: CPT

## 2022-11-16 PROCEDURE — 83735 ASSAY OF MAGNESIUM: CPT

## 2022-11-16 PROCEDURE — 97162 PT EVAL MOD COMPLEX 30 MIN: CPT

## 2022-11-16 PROCEDURE — 6360000002 HC RX W HCPCS: Performed by: NURSE PRACTITIONER

## 2022-11-16 RX ORDER — LACTOBACILLUS RHAMNOSUS GG 10B CELL
1 CAPSULE ORAL
Status: DISCONTINUED | OUTPATIENT
Start: 2022-11-16 | End: 2022-12-06 | Stop reason: HOSPADM

## 2022-11-16 RX ORDER — IPRATROPIUM BROMIDE AND ALBUTEROL SULFATE 2.5; .5 MG/3ML; MG/3ML
1 SOLUTION RESPIRATORY (INHALATION) 2 TIMES DAILY
Status: DISCONTINUED | OUTPATIENT
Start: 2022-11-16 | End: 2022-11-22

## 2022-11-16 RX ORDER — LANOLIN ALCOHOL/MO/W.PET/CERES
400 CREAM (GRAM) TOPICAL 2 TIMES DAILY
Status: COMPLETED | OUTPATIENT
Start: 2022-11-16 | End: 2022-11-20

## 2022-11-16 RX ORDER — IPRATROPIUM BROMIDE AND ALBUTEROL SULFATE 2.5; .5 MG/3ML; MG/3ML
1 SOLUTION RESPIRATORY (INHALATION) EVERY 4 HOURS PRN
Status: DISCONTINUED | OUTPATIENT
Start: 2022-11-16 | End: 2022-11-28 | Stop reason: SDUPTHER

## 2022-11-16 RX ORDER — BUMETANIDE 0.25 MG/ML
2 INJECTION, SOLUTION INTRAMUSCULAR; INTRAVENOUS ONCE
Status: COMPLETED | OUTPATIENT
Start: 2022-11-16 | End: 2022-11-16

## 2022-11-16 RX ADMIN — SODIUM BICARBONATE 325 MG: 650 TABLET ORAL at 09:17

## 2022-11-16 RX ADMIN — IPRATROPIUM BROMIDE AND ALBUTEROL SULFATE 1 AMPULE: 2.5; .5 SOLUTION RESPIRATORY (INHALATION) at 11:13

## 2022-11-16 RX ADMIN — IPRATROPIUM BROMIDE AND ALBUTEROL SULFATE 1 AMPULE: 2.5; .5 SOLUTION RESPIRATORY (INHALATION) at 15:51

## 2022-11-16 RX ADMIN — HYDRALAZINE HYDROCHLORIDE 10 MG: 10 TABLET, FILM COATED ORAL at 05:13

## 2022-11-16 RX ADMIN — MAGNESIUM OXIDE TAB 400 MG (241.3 MG ELEMENTAL MG) 400 MG: 400 (241.3 MG) TAB at 21:08

## 2022-11-16 RX ADMIN — BUMETANIDE 2 MG: 0.25 INJECTION, SOLUTION INTRAMUSCULAR; INTRAVENOUS at 16:02

## 2022-11-16 RX ADMIN — ACETAMINOPHEN 650 MG: 325 TABLET ORAL at 21:07

## 2022-11-16 RX ADMIN — HEPARIN SODIUM 5000 UNITS: 5000 INJECTION INTRAVENOUS; SUBCUTANEOUS at 21:08

## 2022-11-16 RX ADMIN — SODIUM CHLORIDE, PRESERVATIVE FREE 10 ML: 5 INJECTION INTRAVENOUS at 09:00

## 2022-11-16 RX ADMIN — HEPARIN SODIUM 5000 UNITS: 5000 INJECTION INTRAVENOUS; SUBCUTANEOUS at 05:13

## 2022-11-16 RX ADMIN — MEROPENEM 500 MG: 500 INJECTION, POWDER, FOR SOLUTION INTRAVENOUS at 17:45

## 2022-11-16 RX ADMIN — RANOLAZINE 500 MG: 500 TABLET, FILM COATED, EXTENDED RELEASE ORAL at 09:17

## 2022-11-16 RX ADMIN — ONDANSETRON 4 MG: 2 INJECTION INTRAMUSCULAR; INTRAVENOUS at 09:14

## 2022-11-16 RX ADMIN — LEVOTHYROXINE SODIUM 88 MCG: 0.09 TABLET ORAL at 06:19

## 2022-11-16 RX ADMIN — IPRATROPIUM BROMIDE AND ALBUTEROL SULFATE 1 AMPULE: .5; 2.5 SOLUTION RESPIRATORY (INHALATION) at 08:29

## 2022-11-16 RX ADMIN — Medication 2000 UNITS: at 09:16

## 2022-11-16 RX ADMIN — SERTRALINE HYDROCHLORIDE 50 MG: 50 TABLET ORAL at 09:16

## 2022-11-16 RX ADMIN — ATORVASTATIN CALCIUM 20 MG: 10 TABLET, FILM COATED ORAL at 21:07

## 2022-11-16 RX ADMIN — SODIUM CHLORIDE, PRESERVATIVE FREE 10 ML: 5 INJECTION INTRAVENOUS at 09:17

## 2022-11-16 RX ADMIN — SODIUM BICARBONATE 325 MG: 650 TABLET ORAL at 21:07

## 2022-11-16 RX ADMIN — MAGNESIUM OXIDE TAB 400 MG (241.3 MG ELEMENTAL MG) 400 MG: 400 (241.3 MG) TAB at 16:03

## 2022-11-16 RX ADMIN — CLOPIDOGREL BISULFATE 75 MG: 75 TABLET ORAL at 09:19

## 2022-11-16 RX ADMIN — AMLODIPINE BESYLATE 10 MG: 10 TABLET ORAL at 21:08

## 2022-11-16 RX ADMIN — HEPARIN SODIUM 5000 UNITS: 5000 INJECTION INTRAVENOUS; SUBCUTANEOUS at 13:15

## 2022-11-16 RX ADMIN — RANOLAZINE 500 MG: 500 TABLET, FILM COATED, EXTENDED RELEASE ORAL at 21:08

## 2022-11-16 RX ADMIN — IPRATROPIUM BROMIDE AND ALBUTEROL SULFATE 1 AMPULE: .5; 2.5 SOLUTION RESPIRATORY (INHALATION) at 20:16

## 2022-11-16 RX ADMIN — SODIUM CHLORIDE, PRESERVATIVE FREE 10 ML: 5 INJECTION INTRAVENOUS at 21:09

## 2022-11-16 RX ADMIN — ISOSORBIDE MONONITRATE 60 MG: 60 TABLET, EXTENDED RELEASE ORAL at 13:16

## 2022-11-16 RX ADMIN — ATENOLOL 25 MG: 25 TABLET ORAL at 16:03

## 2022-11-16 RX ADMIN — PANTOPRAZOLE SODIUM 40 MG: 40 TABLET, DELAYED RELEASE ORAL at 05:13

## 2022-11-16 ASSESSMENT — PAIN DESCRIPTION - ONSET: ONSET: ON-GOING

## 2022-11-16 ASSESSMENT — PAIN DESCRIPTION - ORIENTATION: ORIENTATION: RIGHT;LEFT;LOWER

## 2022-11-16 ASSESSMENT — PAIN SCALES - GENERAL: PAINLEVEL_OUTOF10: 2

## 2022-11-16 ASSESSMENT — PAIN DESCRIPTION - LOCATION
LOCATION: CHEST
LOCATION: ABDOMEN

## 2022-11-16 ASSESSMENT — PAIN SCALES - WONG BAKER: WONGBAKER_NUMERICALRESPONSE: 10

## 2022-11-16 ASSESSMENT — PAIN DESCRIPTION - FREQUENCY: FREQUENCY: CONTINUOUS

## 2022-11-16 NOTE — CONSULTS
[address], 1/23/1927, 3106/3106-A, 11/16/2022    History  [place]:  The primary encounter diagnosis was Dyspnea and respiratory abnormalities. Diagnoses of Pneumonia due to infectious organism, unspecified laterality, unspecified part of lung, Troponin I above reference range, and Chronic kidney disease, unspecified CKD stage were also pertinent to this visit. Patient  has a past medical history of CAD (coronary artery disease), Chronic renal insufficiency, Depression, Dry eye syndrome, Eczema, Elevated LFTs, Generalized osteoarthritis, GERD (gastroesophageal reflux disease), Gout, Hyperlipidemia, Hypertension, Hyperuricemia, Hypothyroid, Low back pain, Osteoporosis, Sinus congestion, UTI (urinary tract infection), and Vertigo. Patient  has a past surgical history that includes Foot surgery (2004); Cataract removal; Tonsillectomy; Colonoscopy; Hysterectomy; eye surgery; and Hip fracture surgery (Right, 08/19/2018). Subjective:  Patient states:  \"I walk all the halls at home! \". Pain:  No c/o.     Communication with other providers:  Handoff to RN, co-eval with OTYennifer for CM note  Restrictions: Fall risk, 7L 02, tele, BP, general    Home Setup/Prior level of function  Social/Functional History  Lives With: Alone (pt has home health aide daily for 8 hours/day, also supportive granddaughter)  Type of Home: Apartment  Home Layout: One level  Home Access: Level entry  Bathroom Shower/Tub: Walk-in shower  Bathroom Toilet: Handicap height  Bathroom Equipment: Built-in shower seat, Grab bars in shower, Commode  Bathroom Accessibility: Accessible  Home Equipment: Duffy Sames, 4 wheeled, Lift chair  ADL Assistance: Independent (aide supervises with bathing but pt able to manage per granddaughter, pt able to dress and toilet independently)  [address]: Needs assistance  Homemaking Responsibilities: No (home health aide manages)  Ambulation Assistance: Independent (mod I with 4ww household distances)  Transfer Assistance: Independent  Active : No  Occupation: Retired    Examination of body systems (includes body structures/functions, activity/participation limitations):  Observation:  Pt is awake up in recliner upon arrival, granddaughter present  Vision:  Glasses, WFL  Hearing:  readeo [facility]  Cardiopulmonary: On 7L 02 beginning of session, 96-97%, increased to 8L in gait- Sp02 87-90% in gait. BP ~147/82 (75) end of session. Cognition: WFL, see OT/SLP note for further evaluation. Musculoskeletal  ROM R/L:  WFL BLE. Strength R/L:  Generally 4-/5, decreased in function and endurance. Neuro:  intact LE sensation, fair- balance    Gait pattern: Pt demonstrates step-through pattern with decreased nael and foot clearance, increased kyphotic posture, min deviation of gait path without LOB. Mobility:  Transfers: Min-CGA  Sitting balance:  SBA. Standing balance:  CGA. Gait: Min-CGA    AMPA 6 Clicks Inpatient Mobility:  AM-PAC Inpatient Mobility Raw Score : 17    Treatment:    Sitting balance: Seated EOB pt demonstrates fair- balance,  UE support required for maintaining upright during WS and scooting tasks to front of chair. Pt with adequate strength to perform without assist, unilateral hip scooting SBA. Sit<>Stand: Pt performed STS from recliner to personal rollator with Min A for support to upright due to decreased LE power, v/c for proper sequencing. Pt demonstrates increased effort/ time to upright, BUE support for push-off from recliner. Standing balance: Initial stance at recliner PT encouraged marching in place x10 reps to assess balance, tolerance to change in position, pt performs with CGA. Pt endorses mild lightheadedness, improves with time in standing.      Gait: Pt AMB x25+/x25 ft in hallway with rollator, chair follow, step-through pattern with decreased nael and foot clearance, increased kyphotic posture, min deviation of gait path without LOB. X1 standing rest break for deep breathing to assess Sp02, pt stable. Overall pt tolerates gait well, requires assist and cues to prevent unsafe excursion of RW. Return to seated at recliner pt demonstrates fair-, v/c for safe sequencing, pt without need for cues to reach back to chair, CGA for descent. Education: Discussed PT role, POC, safety precautions, d/c planning    End of session pt left in recliner with LE elevated, on 7L 02, with lines managed, call light, phone, exit alarm, tray, all needs, RN aware. Assessment:    Pt is a 81 y/o female admitted 11/14 with c/o  sepsis. Patient with significant h/o  CAD (coronary artery disease), Chronic renal insufficiency, Depression, Dry eye syndrome, Eczema, Elevated LFTs, Generalized osteoarthritis, GERD (gastroesophageal reflux disease), Gout, Hyperlipidemia, Hypertension, Hyperuricemia, Hypothyroid, Low back pain, Osteoporosis, Sinus congestion, UTI (urinary tract infection), and Vertigo, see chart. Per pt report pt has been performing ADLs/IADLs with assist via [employer], use of rollator for mobility, see above. At this time pt appears to be functioning below baseline. Pt is now presenting with impairments in respiratory endurance, LE strength, functional endurance, safety awareness, balance, gait and transfers. Pt would benefit from skilled PT services in order to address impairments and promote return to PLOF. PT to recommend d/c to ARU for rehabilitation given pt high PLOF and rehab potential.    Complexity: Moderate  Prognosis: Good, no significant barriers to participation at this time.    Plan General Plan: 3-5 times per week/week, 1 week,   Discharge Recommendations: IP Rehab  Equipment: Defer to next LOC    Goals:  Short Term Goals  Time Frame for Short Term Goals: 1 week  Short Term Goal 1: Pt will perform all bed mobility tasks with SBA, min cues  Short Term Goal 2: Pt will manage all compenents of rollator for STS with SBA.   Short Term Goal 3: Pt will AMB x45 ft with RW, stable 02, CGA-SBA for safety       Treatment plan:  Bed mobility, transfers, balance, gait, TA, TX    Recommendations for NURSING mobility: AMB to/from BR with RW Timmy    Time:   Time in: 10:27  Time out: 10:50  Timed treatment minutes: 13  Total time: 23    Electronically signed by:    Joshua Cage, PT  53/06/2092, 53:30 PM  PT Lic #: [id number]

## 2022-11-16 NOTE — CARE COORDINATION
PT/OT ordered.   Requested PT/OT to see pt this am via WB to help determine a safe d/c plan per daughter's request.  TE

## 2022-11-16 NOTE — CARE COORDINATION
Received referral for ARU. Patients primary insurance is [employer], pre-cert needed. Pre-cert initiated and pending at this time. Will follow for determination.

## 2022-11-16 NOTE — CARE COORDINATION
Leigh Ann/ELIANA informed CM that she will start the pre-cert. She will notify CM when pre-cert received. Updated POA/Maggie.   TE

## 2022-11-16 NOTE — PROGRESS NOTES
Occupational Therapy    [facility] ACUTE CARE OCCUPATIONAL THERAPY EVALUATION    Joseph Camejo, 1/23/1927, 3106/3106-A, 11/16/2022    Discharge Recommendation: Inpatient Rehabilitation      History:  [place]:  The primary encounter diagnosis was Dyspnea and respiratory abnormalities. Diagnoses of Pneumonia due to infectious organism, unspecified laterality, unspecified part of lung, Troponin I above reference range, and Chronic kidney disease, unspecified CKD stage were also pertinent to this visit. Subjective:  Patient states: \"I feel weaker than a cat! \"   Pain: Pt denied pain this date  Communication with other providers: PT ELISEO Ozuna CM  Restrictions: General Precautions, Fall Risk, 7-8L o2, Telemetry, Pulse Ox, BP cuff, Bed/chair alarm    Home Setup/Prior level of function:  Social/Functional History  Lives With: Alone (pt has home health aide daily for 8 hours/day, also supportive granddaughter)  Type of Home: Apartment  Home Layout: One level  Home Access: Level entry  Bathroom Shower/Tub: Walk-in shower  Bathroom Toilet: Handicap height  Bathroom Equipment: Built-in shower seat, Grab bars in shower, Commode  Bathroom Accessibility: Accessible  Home Equipment: Walker, 4 wheeled, Lift chair  ADL Assistance: Independent (aide supervises with bathing but pt able to manage per granddaughter, pt able to dress and toilet independently)  [address]: Needs assistance  Homemaking Responsibilities: No (home health aide manages)  Ambulation Assistance: Independent (mod I with 4ww household distances)  Transfer Assistance: Independent  Active : No  Occupation: Retired    Examination:  Observation: Sitting in chair upon OT arrival. Pleasant and agreeable to evaluation. Granddaughter present and supportive.    Vision: WFL (Glasses PRN)  Hearing: WFL  Vitals: o2 sats ranged from 87-92% with functional mobility on 7-8L o2    Body Systems and functions:  ROM: WNL all joints in BL UEs  Strength: 4/5 MMT all major muscle groups BL UEs  Sensation: WFL (denies numbness/tingling)  Tone: Normal  Coordination: WFL for ADLs  Perception: WNL    Activities of Daily Living (ADLs):  Feeding: Independent   Grooming: CGA (able to complete in standing at sink)  UB bathing: SBA   LB bathing: Min A (dynamic standing balance with reaching bottom)  UB dressing: SBA (donning clean robe seated at chair level)  LB dressing: Min A (dynamic standing balance with clothing mgmt to hips, able to don BL socks seated at chair level SBA by crossing legs into \"figure 4 position\")  Toileting: Min A (dynamic standing balance with clothing mgmt both directions)    Cognitive and Psychosocial Functioning:  Overall cognitive status: WFL  Affect: Normal     Balance:   Sitting: SBA at edge of chair  Standing: CGA static standing with RW, Min A for dynamic standing tasks    Functional Mobility:  Bed Mobility: Not observed. Pt received sitting in chair upon arrival.  Transfers: Min A to/from recliner (min cues for safe hand placement each direction)  Ambulation: CGA to Min A with RW ~75 ft; intermittent unsteadiness, fair to poor mgmt of 4ww, frequently allowing device to get too far in front of her, o2 sats dropped to 87% on 8L o2      AM-PAC 6 click short form for inpatient daily activity:   How much help from another person does the patient currently need. .. Unable  Dep A Lot  Max A A Lot   Mod A A Little  Min A A Little   CGA  SBA None   Mod I  Indep  Sup   1. Putting on and taking off regular lower body clothing? [] 1    [] 2   [] 2   [x] 3   [] 3   [] 4      2. Bathing (including washing, rinsing, drying)? [] 1   [] 2   [] 2 [x] 3 [] 3 [] 4   3. Toileting, which includes using toilet, bedpan, or urinal? [] 1    [] 2   [] 2   [x] 3   [] 3   [] 4     4. Putting on and taking off regular upper body clothing? [] 1   [] 2   [] 2   [] 3   [x] 3    [] 4      5. Taking care of personal grooming such as brushing teeth?  [] 1   [] 2 [] 2 [] 3    [x] 3   [] 4      6. Eating meals? [] 1   [] 2   [] 2   [] 3   [] 3   [x] 4      Raw Score:  19   [24=0% impaired(CH), 23=1-19%(CI), 20-22=20-39%(CJ), 15-19=40-59%(CK), 10-14=60-79%(CL), 7-9=80-99%(CM), 6=100%(CN)]     Treatment:  Therapeutic Activity Training:   Therapeutic activity training was instructed today. Cues were given for safety, sequence, UE/LE placement, awareness, and balance. Activities performed today included UB/LB dressing tasks, transfer training, functional mobility with 4ww, education on role of OT, POC, importance of EOB/OOB activity, pursed lip breathing, d/c planning and recommendations    Safety Measures: Gait belt used, Left in Chair, Alarm in place    Assessment:  Pt is a 80year old female with a past medical history of CAD (coronary artery disease), Chronic renal insufficiency, Depression, Dry eye syndrome, Eczema, Elevated LFTs, Generalized osteoarthritis, GERD (gastroesophageal reflux disease), Gout, Hyperlipidemia, Hypertension, Hyperuricemia, Hypothyroid, Low back pain, Osteoporosis, Sinus congestion, UTI (urinary tract infection), and Vertigo. Pt admitted with pneumonia, sepsis, and recurrent UTI. Pt lives at home alone but has [facility] aide support daily. Pt's granddaughter reports pt completes ADLs independently (supervision with bathing) and ambulates mod I with a 4ww. Pt currently presents with the above impairments, and is well below baseline. Recommend continued OT services in inpatient rehab setting at discharge. Complexity: Moderate  Prognosis: Good  Plan: 3x/week      Goals:  1. Pt will complete all aspects of bed mobility for EOB/OOB ADLs min A with HOB flat  2. Pt will complete UB/LB bathing CGA with setup  3. Pt will complete all aspects of LB dressing CGA with setup  4. Pt will complete all functional transfers to and from bed, chair, toilet, shower chair CGA/good safety awareness  5.  Pt will ambulate HH distance to bathroom for toileting CGA with 4ww  6. Pt will complete all aspects of toileting task CGA  7. Pt will complete oral hygiene/grooming routine in standing at sink SBA with setup/no seated rest breaks  8.  Pt will complete ther ex/ther act with focus on UE strengthening and functional activity tolerance in standing >8 minutes    Time:   Time in: 1027  Time out: 1051  Timed treatment minutes: 9  Total time: 24    Electronically signed by:    PANKAJ Downey/L, [address] [place] [id number]

## 2022-11-16 NOTE — PROGRESS NOTES
Nephrology Progress Note        [address], [address] [address]  Phone: [phone]  Office Hours: 8:30AM - 4:30PM  Monday - Friday 11/16/2022 7:36 AM  Subjective:   Admit Date: 11/14/2022  PCP: Delores Conner MD  Interval History: good uop yesterday  Feeling a bit better    Diet: ADULT DIET; Regular; 1500 ml      Data:   Scheduled Meds:   amLODIPine  10 mg Oral Nightly    atenolol  25 mg Oral Daily    sodium bicarbonate  325 mg Oral BID    clopidogrel  75 mg Oral Daily    ipratropium-albuterol  1 ampule Inhalation Q4H WA    sodium chloride flush  5-40 mL IntraVENous 2 times per day    sodium chloride flush  5-40 mL IntraVENous 2 times per day    heparin (porcine)  5,000 Units SubCUTAneous 3 times per day    Vitamin D  2,000 Units Oral Daily    pantoprazole  40 mg Oral QAM AC    fluorometholone  1 drop Both Eyes BID    isosorbide mononitrate  60 mg Oral Daily    levothyroxine  88 mcg Oral Daily    ranolazine  500 mg Oral BID    sertraline  50 mg Oral Daily    [Held by provider] losartan  12.5 mg Oral Daily    levofloxacin  500 mg IntraVENous Q48H    atorvastatin  20 mg Oral Nightly     Continuous Infusions:   sodium chloride      sodium chloride       PRN Meds:sodium chloride flush, sodium chloride, ondansetron **OR** ondansetron, polyethylene glycol, acetaminophen **OR** acetaminophen, sodium chloride flush, sodium chloride  I/O last 3 completed shifts: In: 540 [P.O.:540]  Out: 2550 [Urine:2550]  No intake/output data recorded.     Intake/Output Summary (Last 24 hours) at 11/16/2022 0736  Last data filed at 11/16/2022 0641  Gross per 24 hour   Intake 540 ml   Output 2550 ml   Net -2010 ml       CBC:   Recent Labs     11/14/22  1526 11/16/22  0550   WBC 15.1* 16.7*   HGB 10.8* 9.5*    382       BMP:    Recent Labs     11/14/22  1526 11/15/22  0428 11/16/22  0550    138 134*   K 4.3 4.4 4.6    106 100   CO2 18* 19* 23   BUN 41* 38* 42*   CREATININE 2.0* 2. 0* 2.2*   GLUCOSE 104* 103* 114*     Hepatic:   Recent Labs     11/14/22  1526 11/15/22  0428   AST 26 26   ALT 17 13   BILITOT 0.4 0.4   ALKPHOS 67 60     Troponin: No results for input(s): TROPONINI in the last 72 hours. BNP: No results for input(s): BNP in the last 72 hours. Lipids: No results for input(s): CHOL, HDL in the last 72 hours.     Invalid input(s): LDLCALCU  ABGs:   Lab Results   Component Value Date/Time    PO2ART 83 11/14/2022 11:30 PM    RJA3STW 29.0 11/14/2022 11:30 PM     INR:   Recent Labs     11/14/22  1526   INR 0.86       Objective:   Vitals: /78   Pulse 76   Temp 97.8 °F (36.6 °C) (Oral)   Resp 30   Ht 5' (1.524 m)   Wt 120 lb (54.4 kg)   LMP  (LMP Unknown)   SpO2 90%   BMI 23.44 kg/m²   General appearance: alert and cooperative with exam, in no acute distress  HEENT: normocephalic, atraumatic,   Neck: supple, trachea midline  Lungs: breathing comfortably on nc  Heart[de-identified] regular rate and rhythm, S1, S2 normal,  Abdomen: soft, non-tender; non distended,   Extremities: extremities atraumatic, no cyanosis or edema  Neurologic: alert, oriented, follows commands, interactive    MEDICAL DECISION MAKING       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 08/18/2018    Bradycardia     LBBB (left bundle branch block)     Dyspnea and respiratory abnormalities 11/15/2022    Sepsis ([address] 11/14/2022    Localized edema 07/11/2022    GERD (gastroesophageal reflux disease)     Hypertension     Hypothyroid     Sinus congestion 03/23/2022    Vaginal prolapse 09/07/2021    Primary osteoarthritis of left foot 08/30/2021    Closed nondisplaced fracture of fourth metatarsal bone of left foot 08/30/2021    Closed nondisplaced fracture of third metatarsal bone of left foot 08/30/2021    Closed nondisplaced fracture of fifth left metatarsal bone 08/30/2021    Vertigo     Dizziness     Labyrinthitis     Chronic renal impairment, stage 3 (moderate) ([facility] [address]     Constipation, slow transit 06/19/2018    Cystitis     Physical deconditioning     Hyponatremia 01/01/2018    Pruritic condition 01/22/2016    Gout     Hyperuricemia     Low back pain     Eczema     Coronary artery disease involving native heart without angina pectoris     Depression     Hyperlipidemia     Generalized osteoarthritis     Osteoporosis      -Stopping hydralazine today, BP is better, continue her home  regimen for now except for the ARB as she is in [place]    -Cr 2.2//Would like to allow fluid reequilibration today and hold diuretics for now, CXR will help with determination    -Discussed plan with Maggie, granddaughter at bedside and with [facility]    -Avoid nephrotoxins please                Electronically signed by Annabelle El DO on 11/16/2022 at 7:36 AM    [facility]  MD Yo Mallory DO [address],  [address] [facility], [facility] [address]  PHONE: [phone]  FAX: [phone]

## 2022-11-16 NOTE — PROGRESS NOTES
work up for anemia. GERD:   continue PPI    PT/OT recommended ARU      Diet ADULT DIET; Regular; 1500 ml   DVT Prophylaxis [] Lovenox, []  Heparin, [] SCDs, [] Ambulation,  [] Eliquis, [] Xarelto  [] Coumadin   Code Status Limited   Disposition From: Home  Expected Disposition: Home  Estimated Date of Discharge: 2 Days  Patient requires continued admission due to CAP   Surrogate Decision Maker/ POA      Subjective:     Chief Complaint: Shortness of Breath       Lexus Montejo is a 80 y.o. female who presents with SOB with productive cough      Patient complains of excessive fatigue and SOB associated with productive cough. Denies any fever, Chest pain, headache, dizziness. Bowel and bladder habits normal. Currently denies any dysuria. Was treated with multiple Abx in the past for recurrent UTI         Review of Systems:    Review of Systems    All other systems reviewed and are negative. Objective: Intake/Output Summary (Last 24 hours) at 11/16/2022 1415  Last data filed at 11/16/2022 0641  Gross per 24 hour   Intake 180 ml   Output 1550 ml   Net -1370 ml          Vitals:   Vitals:    11/16/22 1100   BP: 106/60   Pulse: 73   Resp: 27   Temp: 97.4 °F (36.3 °C)   SpO2: 97%       Physical Exam:   Looks comfortable but on 6 L supplement oxygen  General: NAD  Eyes: EOMI  ENT: neck supple  Cardiovascular: Regular rate. Respiratory: Coarse crackles  Gastrointestinal: Soft, non tender  Genitourinary: no suprapubic tenderness  Musculoskeletal: No edema  Skin: warm, dry  Neuro: Alert. Psych: Mood appropriate.      Medications:   Medications:    ipratropium-albuterol  1 ampule Inhalation BID    lactobacillus  1 capsule Oral Daily with breakfast    bumetanide  2 mg IntraVENous Once    amLODIPine  10 mg Oral Nightly    atenolol  25 mg Oral Daily    sodium bicarbonate  325 mg Oral BID    clopidogrel  75 mg Oral Daily    sodium chloride flush  5-40 mL IntraVENous 2 times per day    sodium chloride flush  5-40 mL IntraVENous 2 times per day    heparin (porcine)  5,000 Units SubCUTAneous 3 times per day    Vitamin D  2,000 Units Oral Daily    pantoprazole  40 mg Oral QAM AC    fluorometholone  1 drop Both Eyes BID    isosorbide mononitrate  60 mg Oral Daily    levothyroxine  88 mcg Oral Daily    ranolazine  500 mg Oral BID    sertraline  50 mg Oral Daily    [Held by provider] losartan  12.5 mg Oral Daily    levofloxacin  500 mg IntraVENous Q48H    atorvastatin  20 mg Oral Nightly      Infusions:    sodium chloride      sodium chloride       PRN Meds: ipratropium-albuterol, 1 ampule, Q4H PRN  sodium chloride flush, 5-40 mL, PRN  sodium chloride, , PRN  ondansetron, 4 mg, Q8H PRN   Or  ondansetron, 4 mg, Q6H PRN  polyethylene glycol, 17 g, Daily PRN  acetaminophen, 650 mg, Q6H PRN   Or  acetaminophen, 650 mg, Q6H PRN  sodium chloride flush, 5-40 mL, PRN  sodium chloride, , PRN      Labs      Recent Results (from the past 24 hour(s))   Basic Metabolic Panel    Collection Time: 11/16/22  5:50 AM   Result Value Ref Range    Sodium 134 (L) 135 - 145 MMOL/L    Potassium 4.6 3.5 - 5.1 MMOL/L    Chloride 100 99 - 110 mMol/L    CO2 23 21 - 32 MMOL/L    Anion Gap 11 4 - 16    BUN 42 (H) 6 - 23 MG/DL    Creatinine 2.2 (H) 0.6 - 1.1 MG/DL    Est, Glom Filt Rate 20 (L) >60 mL/min/1.73m2    Glucose 114 (H) 70 - 99 MG/DL    Calcium 9.0 8.3 - 10.6 MG/DL   Magnesium    Collection Time: 11/16/22  5:50 AM   Result Value Ref Range    Magnesium 1.8 1.8 - 2.4 mg/dl   CBC with Auto Differential    Collection Time: 11/16/22  5:50 AM   Result Value Ref Range    WBC 16.7 (H) 4.0 - 10.5 K/CU MM    RBC 2.96 (L) 4.2 - 5.4 M/CU MM    Hemoglobin 9.5 (L) 12.5 - 16.0 GM/DL    Hematocrit 28.1 (L) 37 - 47 %    MCV 94.9 78 - 100 FL    MCH 32.1 (H) 27 - 31 PG    MCHC 33.8 32.0 - 36.0 %    RDW 13.8 11.7 - 14.9 %    Platelets 680 262 - 763 K/CU MM    MPV 9.1 7.5 - 11.1 FL    Differential Type AUTOMATED DIFFERENTIAL     Segs Relative 76.8 (H) 36 - 66 % Lymphocytes % 12.1 (L) 24 - 44 %    Monocytes % 8.2 (H) 0 - 4 %    Eosinophils % 1.6 0 - 3 %    Basophils % 0.3 0 - 1 %    Segs Absolute 12.8 K/CU MM    Lymphocytes Absolute 2.0 K/CU MM    Monocytes Absolute 1.4 K/CU MM    Eosinophils Absolute 0.3 K/CU MM    Basophils Absolute 0.1 K/CU MM    Nucleated RBC % 0.0 %    Total Nucleated RBC 0.0 K/CU MM    Total Immature Neutrophil 0.16 K/CU MM    Immature Neutrophil % 1.0 (H) 0 - 0.43 %        Imaging/Diagnostics Last 24 Hours   CT CHEST WO CONTRAST    Result Date: 11/14/2022  EXAMINATION: CT OF THE CHEST WITHOUT CONTRAST 11/14/2022 7:33 pm TECHNIQUE: CT of the chest was performed without the administration of intravenous contrast. Multiplanar reformatted images are provided for review. Automated exposure control, iterative reconstruction, and/or weight based adjustment of the mA/kV was utilized to reduce the radiation dose to as low as reasonably achievable. COMPARISON: 01/06/2018 HISTORY: ORDERING SYSTEM PROVIDED HISTORY: sob TECHNOLOGIST PROVIDED HISTORY: Reason for exam:->sob Decision Support Exception - unselect if not a suspected or confirmed emergency medical condition->Emergency Medical Condition (MA) Reason for Exam: sob FINDINGS: Mediastinum: Calcific atherosclerotic disease in the aorta. The heart size is within normal limits. The esophagus is grossly unremarkable without hiatal hernia. There are mediastinal lymph nodes measuring up to 1.6 cm in short axis diameter likely reactive in nature. There are bilateral pleural effusions layering up to 3 cm on the right and 1 cm on the left. There is bilateral patchy pulmonary infiltrates. The previously seen pulmonary nodule in the right middle lobe appears slightly larger, however there is a central calcification and this most likely represents a granuloma. Lungs/pleura: As above Upper Abdomen:  There is a cyst in the upper pole the left kidney and the remainder of the visualized upper abdominal organs are unremarkable Soft Tissues/Bones: Degenerative change     Bilateral pulmonary infiltrates and bilateral pleural effusions with probable reactive mediastinal adenopathy Probable granuloma in the right middle lobe. No follow-up imaging recommended. XR CHEST PORTABLE    Result Date: 11/14/2022  EXAMINATION: ONE XRAY VIEW OF THE CHEST 11/14/2022 3:17 pm COMPARISON: 08/18/2018 HISTORY: ORDERING SYSTEM PROVIDED HISTORY: sob TECHNOLOGIST PROVIDED HISTORY: Reason for exam:->sob Reason for Exam: sob FINDINGS: Cardiomegaly. Ill-defined pulmonary opacities. Bibasilar hypoaeration. No pneumothorax.      Cardiomegaly Bilateral ill-defined pulmonary opacities could represent multifocal pneumonia or edema       Electronically signed by Abby Falk MD on 11/16/2022 at 2:15 PM

## 2022-11-16 NOTE — CONSULTS
Infectious Disease Consult Note  2022   Patient Name: Honore Hodgkin : 1927   Impression  Enterococcus faecalis Complicated UTI:  Multifocal Pneumonia and Pulmonary Edema Complicated by Acute Hypoxic Respiratory Failure:  Afebrile  Leukocytosis 15.1, 16.7  Pct 0.214  -UA WBC 9, RBC none, Urine Culture: Enterococcus faecalis 50,000  -CT Chest WO Contrast: Bilateral pulmonary infiltrates and bilateral pleural effusions with probable   reactive mediastinal adenopathy   Probable granuloma in the right middle lobe. No follow-up imaging   recommended. Oxygen needs have increased from  of 5 L/min/nc to 11/15 of 10/L HFNC  Dr. Sandra Arciniega onboard for pulmonology, imp of possible atypical pneumonia  PAUL on CKD3:  Dr. Torsten Hebert onboard  CAD/ HTN/ HLD/ Mod to Severe AS/ Mild to mod PHTN:  Dr. David Killian onboard  11/15-Limited Echo: EF 55-60%, Mod to severe AS, mild to mod AR, mild to mod TR.   OA/ Eczema/ Gout:  Hypothyroidism:  Allergy to cephalexin (rash)  Multi-morbidity: per PMHx:  s/p hyster, colonoscopy  Plan:  DC IV levofloxacin   Start IV meropenem 500 mg q12h (renal dosing)   Trend CRP and Pct, ordered  Ordered CT A&P WO contrast to evaluate for hydronephrosis and/or stones  Await Urine culture sensi  Await influenza antigens  Ordered MRSA screen   Ordered Resp culture     Thank you for allowing me to consult in the care of this patient.  ------------------------  REASON FOR CONSULT: Infective syndrome \"Urine growing enterecoccos fecalis. Recently treated with multiple Abx as OP\"  Requested by: Dr. Angela Ferro. Jon Spain  HPI:Patient is a 80 y.o.  female with a history of CAD, CKD3, Depression, eczema, OA, GERD, gout, HLD, HTN, hypothyroidism, and allergy to cephalexin who was admitted 2022 for further evaluation and management of shortness of breath and cough for about a day prior to admission.   She presented with mild tachypnea and hypoxia in the 80s which elevated to the 90s after given oxygen at 3 L/nasal cannula. She underwent a CT chest WO contrast which revealed bilateral pulmonary infiltrates and bilateral pleural effusions with probable reactive mediastinal adenopathy. Probable granuloma in the RML. She was started on IV ABX therapy of levofloxacin on 11/14/22. Her respiratory panel and BC 0/2 were negative. Her urine culture grew Enterococcus faecalis 50,000 CFU/ml with a pending sensitivity. The patient has, reported per grand-daughter, been struggling over the last several months, with recurrent UTIs and has been treated with several rounds of ABXs. She was managed per Dr. Keaton Cade in August, 2022 with empiric Cipro with no improvement. She then returned and was treated with Macrobid after a urine culture grew skin mela. She continued to worsen, and was then treated with 2 rounds of 14 days each of po doxycycline. She was then referred to a urology, Dr. Trenton Sloan, and then her urine culture grew E.coli. She had a pessary which had been intact for several years and \"fell out\" a few months ago also. She has followed [facility] in the past with Dr. Birdie Olsen. Infectious diseases service was consulted to evaluate the pt, and recommend further investigative and therapeutic measures. ROS: Other systems reviewed Including eyes, ENT, respiratory, cardiovascular, GI, , dermatologic, neurologic, psych, hem/lymphatic, musculoskeletal and endocrine were negative other than what is mentioned above.      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 08/18/2018    Bradycardia     LBBB (left bundle branch block)     Dyspnea and respiratory abnormalities 11/15/2022    Sepsis ([address] 11/14/2022    Localized edema 07/11/2022    GERD (gastroesophageal reflux disease)     Hypertension     Hypothyroid     Sinus congestion 03/23/2022    Vaginal prolapse 09/07/2021    Primary osteoarthritis of left foot 08/30/2021    Closed nondisplaced fracture of fourth metatarsal bone of left foot 08/30/2021    Closed nondisplaced fracture of third metatarsal bone of left foot 08/30/2021    Closed nondisplaced fracture of fifth left metatarsal bone 08/30/2021    Vertigo     Dizziness     Labyrinthitis     Chronic renal impairment, stage 3 (moderate) (HCC)     Constipation, slow transit 06/19/2018    Cystitis     Physical deconditioning     Hyponatremia 01/01/2018    Pruritic condition 01/22/2016    Gout     Hyperuricemia     Low back pain     Eczema     Coronary artery disease involving native heart without angina pectoris     Depression     Hyperlipidemia     Generalized osteoarthritis     Osteoporosis      Past Medical History:   Diagnosis Date    CAD (coronary artery disease)     Dr Tesfaye Figueredo/Jose Figueredo    Chronic renal insufficiency     Dr Yair Mcwilliams    Depression     Dry eye syndrome     Dr Kuldeep Stern     Elevated LFTs 05/11/2012    Generalized osteoarthritis     GERD (gastroesophageal reflux disease)     Esophagitis    Gout     Hyperlipidemia     Hypertension     Hyperuricemia     Hypothyroid     Hypothyroidism    Low back pain     Osteoporosis     Generalized    Sinus congestion     CHRONIC W POST NASAL DRIP    UTI (urinary tract infection)     Vertigo     Dr Juli Toscano      Past Surgical History:   Procedure Laterality Date    CATARACT REMOVAL      R Aug. 2006 and L March 2007 Dr. Karoline Whittaker  2004    right foot realignment [address] Right 08/19/2018    rt hip Zheng    HYSTERECTOMY (CERVIX STATUS UNKNOWN)      TONSILLECTOMY        Family History   Problem Relation Age of Onset    Other Mother         CAD    Other Father         CAD      Infectious disease related family history - not contibutory. SOCIAL HISTORY  Social History     Tobacco Use    Smoking status: Former    Smokeless tobacco: Never   Substance Use Topics    Alcohol use:  No [address], [place]  Lives: [place], [place] alone   Occupation: Retired since age 76  No recent travel of significance. No recent unusual exposures. Pet: one cat  ? ALLERGIES  Allergies   Allergen Reactions    Bactrim [Sulfamethoxazole-Trimethoprim] Nausea And Vomiting    Cephalexin Rash    Tape [Adhesive Tape] Itching      MEDICATIONS  Reviewed and are per the chart/EMR. ? Antibiotics:   Present:  Meropenem 11/16-  Past:  Levofloxacin 11/14-16?  -------------------------------------------------------------------------------------------------------------------    Vital Signs:  Vitals:    11/16/22 0915   BP: (!) 131/58   Pulse: 77   Resp: 24   Temp:    SpO2: 99%         Exam:    VS: noted; wt 120 lb (54.4 kg) Height 5'  Gen: alert and oriented X3, no distress  Skin: no stigmata of endocarditis  Wounds: C/D/I  HEMT: AT/NC Oropharynx pink, moist, and without lesions or exudates; full dentures intact  Eyes: PERRLA, EOMI, conjunctiva pink, sclera anicteric. Neck: Supple. Trachea midline. No LAD. Chest: no distress and CTA. Posterior breath sounds with bibasilar fine crackles. Oxygen per HFNC  Heart: NSR and no MRG. Abd: soft, non-distended, no tenderness, no hepatomegaly. Normoactive bowel sounds. Bilateral CVA tenderness present. Ext: no clubbing, cyanosis, or edema  Neuro: Mental status intact. CN 2-12 intact and no focal sensory or motor deficits    ? Diagnostic Studies: reviewed  11/14/22 XR Chest Portable:  Impression   Cardiomegaly       Bilateral ill-defined pulmonary opacities could represent multifocal   pneumonia or edema     11/14/22 CT Chest WO Contrast:  Impression   Bilateral pulmonary infiltrates and bilateral pleural effusions with probable   reactive mediastinal adenopathy       Probable granuloma in the right middle lobe. No follow-up imaging   recommended.          11/14/22 CT Chest WO Contrast:  Impression   Bilateral pulmonary infiltrates and bilateral pleural effusions with

## 2022-11-16 NOTE — PROGRESS NOTES
Hypothyroidism      Sinus congestion 03/23/2022     Overview Note:     CHRONIC W POST NASAL DRIP      Vaginal prolapse 09/07/2021    Primary osteoarthritis of left foot 08/30/2021    Closed nondisplaced fracture of fourth metatarsal bone of left foot 08/30/2021    Closed nondisplaced fracture of third metatarsal bone of left foot 08/30/2021    Closed nondisplaced fracture of fifth left metatarsal bone 08/30/2021    Vertigo      Overview Note:      [facility]      Dizziness     Labyrinthitis     Chronic renal impairment, stage 3 (moderate) (HCC)     Constipation, slow transit 06/19/2018    Cystitis     Physical deconditioning     Hyponatremia 01/01/2018    Pruritic condition 01/22/2016    Gout     Hyperuricemia     Low back pain     Eczema     Coronary artery disease involving native heart without angina pectoris      Overview Note:     Dr Duy Medel med/Jose Figueredo      Depression     Hyperlipidemia     Generalized osteoarthritis     Osteoporosis      Overview Note:     Generalized       Acute Hypoxic resp failure  Pulmonary edema  Small bilateral Pleural effusions  PAUL on CKD  Leukocytosis- Lymphocytic predominant  Severe AS  Mild to Moderate Pulmonary HTN  CAD  H/o Recurrent UTI  GERD  HLD  ? Atypical pneumonia     Abx  F/u C&S  ?  Low Dose Lasix drip  I/O chart  Keep sats > 92%  CXR in am  ICS  OOB  C/w present management    Electronically signed by Jessie Hernadez MD on 11/16/2022 at 9:56 AM

## 2022-11-16 NOTE — PROGRESS NOTES
Daily Progress Note  Subjective:  Awake and alert; up in chair  Denies any CP; SOB is stable  On 10L NC; wean as able  BP and HR stable  Discussed with granddaughter at bedside    Attending Note:    Patient is awake alert   No chest pain   Still having dyspnea  Moderate to severe AS-she does not want any aggressive treatment  Diastolic heart failure   HTN and renal insuffiencey renal on case  UTI per ID  Wean off oxygen to keep sat 92% range  She may benefit with home oxygen    Impression and Plan:   Sepsis    CAP, noted on CT chest    Respiratory panel negative    Blood cultures pending    WBC count elevated    ABX per primary     HFpEF    BNP on admission was 6,417    Last echo showed EF 55-60%    Moderate to severe Aortic stenosis    Diuretics per renal at this time; Accurate I's and O's    1500 Fluid restriction     Could look at adding Jardiance as it is renal protective and would help with HFpEF; will discuss with renal     CKD    Renal following; Last cr is 2.2    Avoid nephrotoxic medications     Most Recent Echo  11/15/2022   Left ventricular systolic function is normal.   Ejection fraction is visually estimated at 55-60%. Moderately dilated left atrium. Moderate to severe aortic stenosis is present; Mean PG 40mmHg, YANIRA 0.99 cm2. Mitral annular calcification is present. Mild to moderate mitral regurgitation. Mild to moderate tricuspid regurgitation; RVSP: 44 mmHg. No evidence of any pericardial effusion. Radiology  CT chest 11/14/2022  Impression   Bilateral pulmonary infiltrates and bilateral pleural effusions with probable   reactive mediastinal adenopathy       Probable granuloma in the right middle lobe. No follow-up imaging   recommended. PAST MEDICAL HISTORY:  The patient has a history of hypertension, renal  insufficiency present, and she has been treated medically. She is known  to have coronary artery disease also present.   She has a chronic left  ventricular block present. She had a heart catheterization done in the  past and LAD had 70% stenosis present and she was treated medically at  that time. Anemia, hyperlipidemia, and arthritis present. PAST SURGICAL HISTORY:  She has a history of having hip surgery done. Moderate coronary artery disease, heart catheterization done in 2018,  LAD with 70% stenosis distally. She was treated medically for that. She had cataract surgery and hysterectomy done.     Objective:   /78   Pulse 76   Temp 97.8 °F (36.6 °C) (Oral)   Resp 30   Ht 5' (1.524 m)   Wt 120 lb (54.4 kg)   LMP  (LMP Unknown)   SpO2 90%   BMI 23.44 kg/m²     Intake/Output Summary (Last 24 hours) at 11/16/2022 0918  Last data filed at 11/16/2022 0641  Gross per 24 hour   Intake 300 ml   Output 2550 ml   Net -2250 ml       Medications:   Scheduled Meds:   amLODIPine  10 mg Oral Nightly    atenolol  25 mg Oral Daily    sodium bicarbonate  325 mg Oral BID    clopidogrel  75 mg Oral Daily    ipratropium-albuterol  1 ampule Inhalation Q4H WA    sodium chloride flush  5-40 mL IntraVENous 2 times per day    sodium chloride flush  5-40 mL IntraVENous 2 times per day    heparin (porcine)  5,000 Units SubCUTAneous 3 times per day    Vitamin D  2,000 Units Oral Daily    pantoprazole  40 mg Oral QAM AC    fluorometholone  1 drop Both Eyes BID    isosorbide mononitrate  60 mg Oral Daily    levothyroxine  88 mcg Oral Daily    ranolazine  500 mg Oral BID    sertraline  50 mg Oral Daily    [Held by provider] losartan  12.5 mg Oral Daily    levofloxacin  500 mg IntraVENous Q48H    atorvastatin  20 mg Oral Nightly      Infusions:   sodium chloride      sodium chloride        PRN Meds:  sodium chloride flush, sodium chloride, ondansetron **OR** ondansetron, polyethylene glycol, acetaminophen **OR** acetaminophen, sodium chloride flush, sodium chloride     Physical Exam:  Vitals:    11/16/22 0511   BP: 127/78   Pulse: 76   Resp: 30   Temp: 97.8 °F (36.6 °C)   SpO2: 90% General: AAO, NAD  Chest: Nontender  Cardiac: First and Second Heart Sounds are Normal, No Murmurs or Gallops noted  Lungs:Clear to auscultation and percussion. Abdomen: Soft, NT, ND, +BS  Extremities: No clubbing, no edema  Vascular:  Equal 2+ peripheral pulses. Lab Data:  CBC:   Recent Labs     11/14/22  1526 11/16/22  0550   WBC 15.1* 16.7*   HGB 10.8* 9.5*   HCT 32.4* 28.1*   MCV 95.6 94.9    382     BMP:   Recent Labs     11/14/22  1526 11/15/22  0428 11/16/22  0550    138 134*   K 4.3 4.4 4.6    106 100   CO2 18* 19* 23   BUN 41* 38* 42*   CREATININE 2.0* 2.0* 2.2*     LIVER PROFILE:   Recent Labs     11/14/22  1526 11/15/22  0428   AST 26 26   ALT 17 13   BILITOT 0.4 0.4   ALKPHOS 67 60     PT/INR:   Recent Labs     11/14/22  1526   PROTIME 11.1*   INR 0.86     APTT:   Recent Labs     11/14/22  1526   APTT 16.6*     BNP:  No results for input(s): BNP in the last 72 hours.       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 08/18/2018    Bradycardia     LBBB (left bundle branch block)     Dyspnea and respiratory abnormalities 11/15/2022    Sepsis ([address] 11/14/2022    Localized edema 07/11/2022    GERD (gastroesophageal reflux disease)     Hypertension     Hypothyroid     Sinus congestion 03/23/2022    Vaginal prolapse 09/07/2021    Primary osteoarthritis of left foot 08/30/2021    Closed nondisplaced fracture of fourth metatarsal bone of left foot 08/30/2021    Closed nondisplaced fracture of third metatarsal bone of left foot 08/30/2021    Closed nondisplaced fracture of fifth left metatarsal bone 08/30/2021    Vertigo     Dizziness     Labyrinthitis     Chronic renal impairment, stage 3 (moderate) (HCC)     Constipation, slow transit 06/19/2018    Cystitis     Physical deconditioning     Hyponatremia 01/01/2018    Pruritic condition 01/22/2016    Gout     Hyperuricemia     Low back pain     Eczema     Coronary artery disease involving native heart without angina pectoris     Depression     Hyperlipidemia     Generalized osteoarthritis     Osteoporosis        Electronically signed by CLEMENT Barber CNP on 11/16/2022 at 9:18 AM    Electronically signed by Derek Rivas MD on 11/16/22 at 2:55 PM EST

## 2022-11-16 NOTE — CARE COORDINATION
PT/OT notified CM that they are recommending ARU for pt and that granddaughter and pt are agreeable for referral to be made to ARU. Referral made to [facility] via confidential VM. Will await decision from ARU to accept or not. Pt will require a pre-cert.   TE

## 2022-11-16 NOTE — RT PROTOCOL NOTE
RT Inhaler-Nebulizer Bronchodilator Protocol Note    There is a bronchodilator order in the chart from a provider indicating to follow the RT Bronchodilator Protocol and there is an Initiate RT Inhaler-Nebulizer Bronchodilator Protocol order as well (see protocol at bottom of note). CXR Findings:  XR CHEST PORTABLE    Result Date: 11/16/2022  Extensive bilateral airspace opacities. Suspected small bilateral pleural effusions. XR CHEST PORTABLE    Result Date: 11/14/2022  Cardiomegaly Bilateral ill-defined pulmonary opacities could represent multifocal pneumonia or edema       The findings from the last RT Protocol Assessment were as follows:   History Pulmonary Disease: None or smoker <15 pack years  Respiratory Pattern: Dyspnea on exertion or RR 21-25 bpm  Breath Sounds: Slightly diminished and/or crackles  Cough: Strong, productive  Indication for Bronchodilator Therapy:    Bronchodilator Assessment Score: 5    Aerosolized bronchodilator medication orders have been revised according to the RT Inhaler-Nebulizer Bronchodilator Protocol below. Respiratory Therapist to perform RT Therapy Protocol Assessment initially then follow the protocol. Repeat RT Therapy Protocol Assessment PRN for score 0-3 or on second treatment, BID, and PRN for scores above 3. No Indications - adjust the frequency to every 6 hours PRN wheezing or bronchospasm, if no treatments needed after 48 hours then discontinue using Per Protocol order mode. If indication present, adjust the RT bronchodilator orders based on the Bronchodilator Assessment Score as indicated below. Use Inhaler orders unless patient has one or more of the following: on home nebulizer, not able to hold breath for 10 seconds, is not alert and oriented, cannot activate and use MDI correctly, or respiratory rate 25 breaths per minute or more, then use the equivalent nebulizer order(s) with same Frequency and PRN reasons based on the score.   If a patient is on this medication at home then do not decrease Frequency below that used at home. 0-3 - enter or revise RT bronchodilator order(s) to equivalent RT Bronchodilator order with Frequency of every 4 hours PRN for wheezing or increased work of breathing using Per Protocol order mode. 4-6 - enter or revise RT Bronchodilator order(s) to two equivalent RT bronchodilator orders with one order with BID Frequency and one order with Frequency of every 4 hours PRN wheezing or increased work of breathing using Per Protocol order mode. 7-10 - enter or revise RT Bronchodilator order(s) to two equivalent RT bronchodilator orders with one order with TID Frequency and one order with Frequency of every 4 hours PRN wheezing or increased work of breathing using Per Protocol order mode. 11-13 - enter or revise RT Bronchodilator order(s) to one equivalent RT bronchodilator order with QID Frequency and an Albuterol order with Frequency of every 4 hours PRN wheezing or increased work of breathing using Per Protocol order mode. Greater than 13 - enter or revise RT Bronchodilator order(s) to one equivalent RT bronchodilator order with every 4 hours Frequency and an Albuterol order with Frequency of every 2 hours PRN wheezing or increased work of breathing using Per Protocol order mode. RT to enter RT Home Evaluation for COPD & MDI Assessment order using Per Protocol order mode.     Electronically signed by Meera Sandoval RCP on 11/16/2022 at 10:15 AM

## 2022-11-16 NOTE — PLAN OF CARE
Problem: Discharge Planning  Goal: Discharge to home or other facility with appropriate resources  Outcome: Progressing     Problem: Hematologic - Adult  Goal: Maintains hematologic stability  Outcome: Progressing

## 2022-11-16 NOTE — CARE COORDINATION
Spoke with Seb. She states that she will review clinicals and PT/OT notes and will notify CM of decision to accept or not.  TE

## 2022-11-17 ENCOUNTER — APPOINTMENT (OUTPATIENT)
Dept: GENERAL RADIOLOGY | Age: 87
DRG: 871 | End: 2022-11-17
Payer: COMMERCIAL

## 2022-11-17 ENCOUNTER — APPOINTMENT (OUTPATIENT)
Dept: CT IMAGING | Age: 87
DRG: 871 | End: 2022-11-17
Payer: COMMERCIAL

## 2022-11-17 LAB
ANION GAP SERPL CALCULATED.3IONS-SCNC: 10 MMOL/L (ref 4–16)
BASOPHILS ABSOLUTE: 0.1 K/CU MM
BASOPHILS RELATIVE PERCENT: 0.4 % (ref 0–1)
BUN BLDV-MCNC: 56 MG/DL (ref 6–23)
C-REACTIVE PROTEIN, HIGH SENSITIVITY: 166.3 MG/L
CALCIUM SERPL-MCNC: 8.8 MG/DL (ref 8.3–10.6)
CHLORIDE BLD-SCNC: 99 MMOL/L (ref 99–110)
CO2: 23 MMOL/L (ref 21–32)
CREAT SERPL-MCNC: 2.7 MG/DL (ref 0.6–1.1)
DIFFERENTIAL TYPE: ABNORMAL
EOSINOPHILS ABSOLUTE: 0.5 K/CU MM
EOSINOPHILS RELATIVE PERCENT: 3.2 % (ref 0–3)
GFR SERPL CREATININE-BSD FRML MDRD: 16 ML/MIN/1.73M2
GLUCOSE BLD-MCNC: 90 MG/DL (ref 70–99)
HCT VFR BLD CALC: 26.2 % (ref 37–47)
HEMOGLOBIN: 8.7 GM/DL (ref 12.5–16)
IMMATURE NEUTROPHIL %: 0.9 % (ref 0–0.43)
LYMPHOCYTES ABSOLUTE: 2.3 K/CU MM
LYMPHOCYTES RELATIVE PERCENT: 15.2 % (ref 24–44)
MCH RBC QN AUTO: 31.4 PG (ref 27–31)
MCHC RBC AUTO-ENTMCNC: 33.2 % (ref 32–36)
MCV RBC AUTO: 94.6 FL (ref 78–100)
MONOCYTES ABSOLUTE: 1.1 K/CU MM
MONOCYTES RELATIVE PERCENT: 7.4 % (ref 0–4)
NUCLEATED RBC %: 0 %
PDW BLD-RTO: 13.6 % (ref 11.7–14.9)
PLATELET # BLD: 385 K/CU MM (ref 140–440)
PMV BLD AUTO: 9.2 FL (ref 7.5–11.1)
POTASSIUM SERPL-SCNC: 4.4 MMOL/L (ref 3.5–5.1)
PROCALCITONIN: 0.28
RBC # BLD: 2.77 M/CU MM (ref 4.2–5.4)
SEGMENTED NEUTROPHILS ABSOLUTE COUNT: 10.8 K/CU MM
SEGMENTED NEUTROPHILS RELATIVE PERCENT: 72.9 % (ref 36–66)
SODIUM BLD-SCNC: 132 MMOL/L (ref 135–145)
TOTAL IMMATURE NEUTOROPHIL: 0.14 K/CU MM
TOTAL NUCLEATED RBC: 0 K/CU MM
WBC # BLD: 14.8 K/CU MM (ref 4–10.5)

## 2022-11-17 PROCEDURE — 74176 CT ABD & PELVIS W/O CONTRAST: CPT

## 2022-11-17 PROCEDURE — 2140000000 HC CCU INTERMEDIATE R&B

## 2022-11-17 PROCEDURE — 6370000000 HC RX 637 (ALT 250 FOR IP): Performed by: INTERNAL MEDICINE

## 2022-11-17 PROCEDURE — 6370000000 HC RX 637 (ALT 250 FOR IP)

## 2022-11-17 PROCEDURE — 71045 X-RAY EXAM CHEST 1 VIEW: CPT

## 2022-11-17 PROCEDURE — 2700000000 HC OXYGEN THERAPY PER DAY

## 2022-11-17 PROCEDURE — 94668 MNPJ CHEST WALL SBSQ: CPT

## 2022-11-17 PROCEDURE — 6360000002 HC RX W HCPCS: Performed by: NURSE PRACTITIONER

## 2022-11-17 PROCEDURE — 85025 COMPLETE CBC W/AUTO DIFF WBC: CPT

## 2022-11-17 PROCEDURE — 36415 COLL VENOUS BLD VENIPUNCTURE: CPT

## 2022-11-17 PROCEDURE — 80048 BASIC METABOLIC PNL TOTAL CA: CPT

## 2022-11-17 PROCEDURE — 94640 AIRWAY INHALATION TREATMENT: CPT

## 2022-11-17 PROCEDURE — 2580000003 HC RX 258

## 2022-11-17 PROCEDURE — 86140 C-REACTIVE PROTEIN: CPT

## 2022-11-17 PROCEDURE — 84145 PROCALCITONIN (PCT): CPT

## 2022-11-17 PROCEDURE — 94761 N-INVAS EAR/PLS OXIMETRY MLT: CPT

## 2022-11-17 PROCEDURE — 99232 SBSQ HOSP IP/OBS MODERATE 35: CPT | Performed by: NURSE PRACTITIONER

## 2022-11-17 PROCEDURE — 97530 THERAPEUTIC ACTIVITIES: CPT

## 2022-11-17 PROCEDURE — 2580000003 HC RX 258: Performed by: NURSE PRACTITIONER

## 2022-11-17 PROCEDURE — 6370000000 HC RX 637 (ALT 250 FOR IP): Performed by: STUDENT IN AN ORGANIZED HEALTH CARE EDUCATION/TRAINING PROGRAM

## 2022-11-17 PROCEDURE — 6360000002 HC RX W HCPCS

## 2022-11-17 PROCEDURE — 99232 SBSQ HOSP IP/OBS MODERATE 35: CPT | Performed by: INTERNAL MEDICINE

## 2022-11-17 RX ORDER — DIPHENHYDRAMINE HCL 25 MG
12.5 TABLET ORAL ONCE
Status: COMPLETED | OUTPATIENT
Start: 2022-11-17 | End: 2022-11-17

## 2022-11-17 RX ADMIN — AMLODIPINE BESYLATE 10 MG: 10 TABLET ORAL at 21:56

## 2022-11-17 RX ADMIN — ISOSORBIDE MONONITRATE 60 MG: 60 TABLET, EXTENDED RELEASE ORAL at 08:42

## 2022-11-17 RX ADMIN — Medication 2000 UNITS: at 08:42

## 2022-11-17 RX ADMIN — SODIUM BICARBONATE 325 MG: 650 TABLET ORAL at 08:41

## 2022-11-17 RX ADMIN — SERTRALINE HYDROCHLORIDE 50 MG: 50 TABLET ORAL at 08:42

## 2022-11-17 RX ADMIN — MEROPENEM 500 MG: 500 INJECTION, POWDER, FOR SOLUTION INTRAVENOUS at 04:04

## 2022-11-17 RX ADMIN — ATORVASTATIN CALCIUM 20 MG: 10 TABLET, FILM COATED ORAL at 21:56

## 2022-11-17 RX ADMIN — MAGNESIUM OXIDE TAB 400 MG (241.3 MG ELEMENTAL MG) 400 MG: 400 (241.3 MG) TAB at 21:56

## 2022-11-17 RX ADMIN — HEPARIN SODIUM 5000 UNITS: 5000 INJECTION INTRAVENOUS; SUBCUTANEOUS at 21:56

## 2022-11-17 RX ADMIN — MEROPENEM 500 MG: 500 INJECTION, POWDER, FOR SOLUTION INTRAVENOUS at 16:16

## 2022-11-17 RX ADMIN — HEPARIN SODIUM 5000 UNITS: 5000 INJECTION INTRAVENOUS; SUBCUTANEOUS at 16:15

## 2022-11-17 RX ADMIN — PANTOPRAZOLE SODIUM 40 MG: 40 TABLET, DELAYED RELEASE ORAL at 06:31

## 2022-11-17 RX ADMIN — HEPARIN SODIUM 5000 UNITS: 5000 INJECTION INTRAVENOUS; SUBCUTANEOUS at 06:31

## 2022-11-17 RX ADMIN — ATENOLOL 25 MG: 25 TABLET ORAL at 16:15

## 2022-11-17 RX ADMIN — RANOLAZINE 500 MG: 500 TABLET, FILM COATED, EXTENDED RELEASE ORAL at 08:41

## 2022-11-17 RX ADMIN — MAGNESIUM OXIDE TAB 400 MG (241.3 MG ELEMENTAL MG) 400 MG: 400 (241.3 MG) TAB at 08:41

## 2022-11-17 RX ADMIN — SODIUM CHLORIDE, PRESERVATIVE FREE 10 ML: 5 INJECTION INTRAVENOUS at 08:42

## 2022-11-17 RX ADMIN — ACETAMINOPHEN 650 MG: 325 TABLET ORAL at 08:41

## 2022-11-17 RX ADMIN — DIPHENHYDRAMINE HYDROCHLORIDE 12.5 MG: 25 TABLET ORAL at 21:59

## 2022-11-17 RX ADMIN — CLOPIDOGREL BISULFATE 75 MG: 75 TABLET ORAL at 08:42

## 2022-11-17 RX ADMIN — IPRATROPIUM BROMIDE AND ALBUTEROL SULFATE 1 AMPULE: .5; 2.5 SOLUTION RESPIRATORY (INHALATION) at 09:01

## 2022-11-17 RX ADMIN — LEVOTHYROXINE SODIUM 88 MCG: 0.09 TABLET ORAL at 06:31

## 2022-11-17 RX ADMIN — RANOLAZINE 500 MG: 500 TABLET, FILM COATED, EXTENDED RELEASE ORAL at 21:56

## 2022-11-17 RX ADMIN — Medication 1 CAPSULE: at 08:41

## 2022-11-17 RX ADMIN — SODIUM BICARBONATE 325 MG: 650 TABLET ORAL at 21:56

## 2022-11-17 RX ADMIN — IPRATROPIUM BROMIDE AND ALBUTEROL SULFATE 1 AMPULE: .5; 2.5 SOLUTION RESPIRATORY (INHALATION) at 20:29

## 2022-11-17 RX ADMIN — SODIUM CHLORIDE, PRESERVATIVE FREE 10 ML: 5 INJECTION INTRAVENOUS at 21:57

## 2022-11-17 ASSESSMENT — PAIN SCALES - WONG BAKER
WONGBAKER_NUMERICALRESPONSE: 10

## 2022-11-17 ASSESSMENT — PAIN SCALES - GENERAL: PAINLEVEL_OUTOF10: 2

## 2022-11-17 NOTE — PROGRESS NOTES
Occupational Therapy    Occupational Therapy Treatment Note  Name: Sana Lechuga MRN: [id number] :   1927   Date:  2022   Admission Date: 2022 Room:  [address] [place] [address]   Restrictions/Precautions:    General Precautions, Fall Risk, 7-8L o2, Telemetry, Pulse Ox, BP cuff, Bed/chair alarm    Communication with other providers:   Cleared for treatment by Bhumi Kwan RN. Subjective:  Patient states:  \"let's go for a walk\"  Pain:   Location, Type, Intensity (0/10 to 10/10):  no pain noted    Objective:    Observation:  pt alert and oriented up in recliner    Treatment, including education:  Transfers    Sit to stand :CGA  Stand to sit :CGA  SPT:CGA        Therapeutic Activity Training: Pt stood x 3 min with CGA  with 4WW to facilitate increased endurance/strength for ADL tasks and transfers. Pt completed functional mobility with 4WW x 50' and 36' with CGA. Pt required extra time to untangle lines to get up out of chair. Safety Measures: Gait belt used, up in chair Pull/Bed Alarm activated and call light left in reach, left with family        Assessment / Impression:        Patient's tolerance of treatment: Good   Adverse Reaction: None  Significant change in status and impact:  None  Barriers to improvement:  Dec strength and endurance    Plan for Next Session:    Continue with POC.     Time in:  1110  Time out:  1133  Total treatment time:  23  Billed Units: 2 TA  Electronically signed by:    Misael Lopez, [address]    2022, 11:46 AM    Previously filed values:

## 2022-11-17 NOTE — PROGRESS NOTES
Infectious Disease Progress Note  2022   Patient Name: Julien Salamanca : 1927   Impression  Pyelonephritis secondary to Enterococcus faecalis Complicated UTI:  Multifocal Pneumonia and Pulmonary Edema Complicated by Acute Hypoxic Respiratory Failure:  Afebrile  Leukocytosis on DWT  Pct 0.214  -UA WBC 9, RBC none, Urine Culture: Enterococcus faecalis 50,000  -CT Chest WO Contrast: Bilateral pulmonary infiltrates and bilateral pleural effusions with probable   reactive mediastinal adenopathy   Probable granuloma in the right middle lobe. No follow-up imaging   recommended. Oxygen needs have increased from  of 5 L/min/nc to 11/15 of 10/L HFNC  Dr. Kathrin Jackson onboard for pulmonology, imp of possible atypical pneumonia  PAUL on CKD3:  Dr. Gaby Villeda onboard  CAD/ HTN/ HLD/ Mod to Severe AS/ Mild to mod PHTN:  Dr. Latoya Sanders onboard  11/15-Limited Echo: EF 55-60%, Mod to severe AS, mild to mod AR, mild to mod TR.   OA/ Eczema/ Gout:  Hypothyroidism:  Allergy to cephalexin (rash)  Multi-morbidity: per PMHx:  s/p hyster, colonoscopy  Plan:  Continue IV meropenem 500 mg q12h (renal dosing)   Trend CRP and Pct, ordered  Await CT A&P WO contrast to evaluate for hydronephrosis and/or stones  Await influenza antigens  Await MRSA screen   Ordered Resp culture , may be unable to produce    Ongoing Antimicrobial Therapy  Meropenem ? Completed Antimicrobial Therapy  Levofloxacin -?? History:? Interval history noted. Chief complaint: pyelonephritis. Multifocal pneumonia with acute hypoxic respiratory failure. Denies n/v/d/f or untoward effects of antibiotics. States is starting to feel improved, continues to have bilateral CVA tenderness but is starting to improve. Grand-daughter at bedside.    Physical Exam:  Vital Signs: BP (!) 138/47   Pulse 68   Temp 98.1 °F (36.7 °C) (Oral)   Resp 19   Ht 5' (1.524 m)   Wt 121 lb 4.1 oz (55 kg)   LMP  (LMP Unknown)   SpO2 97%   BMI 23.68 kg/m² Gen: A&O x 4, up in the chair. HEMT: AT/NC Oropharynx pink, moist, and without lesions or exudates; full dentures intact  Eyes: PERRLA, EOMI, conjunctiva pink, sclera anicteric. Neck: Supple. Trachea midline. No LAD. Chest: no distress and CTA. Posterior breath sounds with bibasilar fine crackles. Oxygen per HFNC  Heart: NSR and no MRG. Abd: soft, non-distended, no tenderness, no hepatomegaly. Normoactive bowel sounds. Bilateral CVA tenderness present. Ext: no clubbing, cyanosis, or edema  Neuro: Mental status intact. CN 2-12 intact and no focal sensory or motor deficits     Radiologic / Imaging / TESTING  11/14/22 XR Chest Portable:  Impression   Cardiomegaly       Bilateral ill-defined pulmonary opacities could represent multifocal   pneumonia or edema      11/14/22 CT Chest WO Contrast:  Impression   Bilateral pulmonary infiltrates and bilateral pleural effusions with probable   reactive mediastinal adenopathy       Probable granuloma in the right middle lobe. No follow-up imaging   recommended. 11/14/22 CT Chest WO Contrast:  Impression   Bilateral pulmonary infiltrates and bilateral pleural effusions with probable   reactive mediastinal adenopathy       Probable granuloma in the right middle lobe. No follow-up imaging   recommended. 11/15/22 Limited Echo:   Summary   Left ventricular systolic function is normal.   Ejection fraction is visually estimated at 55-60%. Moderately dilated left atrium. Moderate to severe aortic stenosis is present; Mean PG 40mmHg, YANIRA 0.99 cm2   Mitral annular calcification is present. Mild to moderate mitral regurgitation. Mild to moderate tricuspid regurgitation; RVSP: 44 mmHg. No evidence of any pericardial effusion. 11/16/22 XR Chest Portable:  Impression   Extensive bilateral airspace opacities. Suspected small bilateral pleural effusions.      11/17/22 CT Abdomen Pelvis WO Contrast:     Labs:    Recent Results (from the past 24 hour(s))   Basic Metabolic Panel    Collection Time: 11/17/22  5:41 AM   Result Value Ref Range    Sodium 132 (L) 135 - 145 MMOL/L    Potassium 4.4 3.5 - 5.1 MMOL/L    Chloride 99 99 - 110 mMol/L    CO2 23 21 - 32 MMOL/L    Anion Gap 10 4 - 16    BUN 56 (H) 6 - 23 MG/DL    Creatinine 2.7 (H) 0.6 - 1.1 MG/DL    Est, Glom Filt Rate 16 (L) >60 mL/min/1.73m2    Glucose 90 70 - 99 MG/DL    Calcium 8.8 8.3 - 10.6 MG/DL   CBC with Auto Differential    Collection Time: 11/17/22  5:41 AM   Result Value Ref Range    WBC 14.8 (H) 4.0 - 10.5 K/CU MM    RBC 2.77 (L) 4.2 - 5.4 M/CU MM    Hemoglobin 8.7 (L) 12.5 - 16.0 GM/DL    Hematocrit 26.2 (L) 37 - 47 %    MCV 94.6 78 - 100 FL    MCH 31.4 (H) 27 - 31 PG    MCHC 33.2 32.0 - 36.0 %    RDW 13.6 11.7 - 14.9 %    Platelets 664 235 - 274 K/CU MM    MPV 9.2 7.5 - 11.1 FL    Differential Type AUTOMATED DIFFERENTIAL     Segs Relative 72.9 (H) 36 - 66 %    Lymphocytes % 15.2 (L) 24 - 44 %    Monocytes % 7.4 (H) 0 - 4 %    Eosinophils % 3.2 (H) 0 - 3 %    Basophils % 0.4 0 - 1 %    Segs Absolute 10.8 K/CU MM    Lymphocytes Absolute 2.3 K/CU MM    Monocytes Absolute 1.1 K/CU MM    Eosinophils Absolute 0.5 K/CU MM    Basophils Absolute 0.1 K/CU MM    Nucleated RBC % 0.0 %    Total Nucleated RBC 0.0 K/CU MM    Total Immature Neutrophil 0.14 K/CU MM    Immature Neutrophil % 0.9 (H) 0 - 0.43 %   C-Reactive Protein    Collection Time: 11/17/22  5:41 AM   Result Value Ref Range    CRP High Sensitivity 166.3 (H) <5.0 mg/L   Procalcitonin    Collection Time: 11/17/22  5:41 AM   Result Value Ref Range    Procalcitonin 0.277      CULTURE results: Invalid input(s): BLOOD CULTURE,  URINE CULTURE, SURGICAL CULTURE    Diagnosis:  Patient Active Problem List   Diagnosis    Hypertension    GERD (gastroesophageal reflux disease)    Depression    Hyperlipidemia    Generalized osteoarthritis    Osteoporosis    Hypothyroid    Coronary artery disease involving native heart without angina pectoris    Eczema Low back pain    Hyperuricemia    Gout    Pruritic condition    PAUL (acute kidney injury) ([address]    Hyponatremia    Cystitis    Physical deconditioning    Bradycardia    LBBB (left bundle branch block)    Constipation, slow transit    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    Chronic renal impairment, stage 3 (moderate) ([facility])    Dizziness    Labyrinthitis    Vertigo    Primary osteoarthritis of left foot    Closed nondisplaced fracture of fourth metatarsal bone of left foot    Closed nondisplaced fracture of third metatarsal bone of left foot    Closed nondisplaced fracture of fifth left metatarsal bone    Vaginal prolapse    Sinus congestion    Localized edema    Sepsis (HCC)    Dyspnea and respiratory abnormalities    Pneumonia due to infectious organism    Pyelonephritis    Chronic kidney disease       Active Problems  Principal Problem:    Sepsis ([address]  Active Problems:    PAUL (acute kidney injury) ([address]    Dyspnea and respiratory abnormalities    Pneumonia due to infectious organism    Pyelonephritis    Chronic kidney disease  Resolved Problems:    * No resolved hospital problems. *    Electronically signed by: Electronically signed by Lan Machado.  CLEMENT Corley CNP on 11/17/2022 at 11:21 AM

## 2022-11-17 NOTE — PROGRESS NOTES
Pulmonary and Critical Care  Progress Note      VITALS:  BP (!) 138/47   Pulse 68   Temp 98.1 °F (36.7 °C) (Oral)   Resp 19   Ht 5' (1.524 m)   Wt 121 lb 4.1 oz (55 kg)   LMP  (LMP Unknown)   SpO2 97%   BMI 23.68 kg/m²     Subjective:   CHIEF COMPLAINT :SOB     HPI:                The patient is a 80 y.o. female is sitting in the bed. She is not in acute resp distress    Objective:   PHYSICAL EXAM:    LUNGS:Basal crackles  Abd-soft, BS+,NT  Ext- no pedal edema  CVS-s1s2, no murmurs      DATA:    CBC:  Recent Labs     11/14/22  1526 11/16/22  0550 11/17/22  0541   WBC 15.1* 16.7* 14.8*   RBC 3.39* 2.96* 2.77*   HGB 10.8* 9.5* 8.7*   HCT 32.4* 28.1* 26.2*    382 385   MCV 95.6 94.9 94.6   MCH 31.9* 32.1* 31.4*   MCHC 33.3 33.8 33.2   RDW 13.8 13.8 13.6   SEGSPCT 74.7* 76.8* 72.9*      BMP:  Recent Labs     11/15/22  0428 11/16/22  0550 11/17/22  0541    134* 132*   K 4.4 4.6 4.4    100 99   CO2 19* 23 23   BUN 38* 42* 56*   CREATININE 2.0* 2.2* 2.7*   CALCIUM 9.3 9.0 8.8   GLUCOSE 103* 114* 90      ABG:  Recent Labs     11/14/22  2330   PH 7.41   PO2ART 83   TBJ2HQZ 29.0*   O2SAT 94.8*     BNP  Lab Results   Component Value Date     (H) 05/09/2012      D-Dimer:  Lab Results   Component Value Date    DDIMER 412 (H) 01/07/2018      Radiology:   1. No significant change in heterogeneous bilateral airspace opacities and   underlying diffuse interstitial opacities potentially due to pneumonia   (typical or atypical etiologies) and/or edema (cardiogenic or noncardiogenic). 2. At least trace bilateral pleural effusions with underlying bibasilar   passive atelectasis. Superimposed pneumonia and aspiration not excluded   especially on the left. 3. Mild cardiomegaly.          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 08/18/2018     Priority: High    Bradycardia      Priority: High    LBBB (left bundle branch block) Priority: High    PAUL (acute kidney injury) ([facility] 12/26/2017     Priority: High    Pneumonia due to infectious organism 11/16/2022     Priority: Medium    Pyelonephritis 11/16/2022     Priority: Medium    Chronic kidney disease 11/16/2022     Priority: Medium    Dyspnea and respiratory abnormalities 11/15/2022     Priority: Medium    Sepsis ([facility] 11/14/2022     Priority: Medium    Localized edema 07/11/2022     Priority: Medium    GERD (gastroesophageal reflux disease)      Priority: Medium     Overview Note:     Esophagitis      Hypertension      Priority: Low    Hypothyroid      Priority: Low     Overview Note:     Hypothyroidism      Sinus congestion 03/23/2022     Overview Note:     CHRONIC W POST NASAL DRIP      Vaginal prolapse 09/07/2021    Primary osteoarthritis of left foot 08/30/2021    Closed nondisplaced fracture of fourth metatarsal bone of left foot 08/30/2021    Closed nondisplaced fracture of third metatarsal bone of left foot 08/30/2021    Closed nondisplaced fracture of fifth left metatarsal bone 08/30/2021    Vertigo      Overview Note:      [facility]      Dizziness     Labyrinthitis     Chronic renal impairment, stage 3 (moderate) ([facility])     Constipation, slow transit 06/19/2018    Cystitis     Physical deconditioning     Hyponatremia 01/01/2018    Pruritic condition 01/22/2016    Gout     Hyperuricemia     Low back pain     Eczema     Coronary artery disease involving native heart without angina pectoris      Overview Note:     Dr Dannie Figueredo/Jose Figueredo      Depression     Hyperlipidemia     Generalized osteoarthritis     Osteoporosis      Overview Note:     Generalized     Acute Hypoxic resp failure sec to Cardiogenic and Non Cardiogenic Pulmonary edema  Small bilateral Pleural effusions  PAUL on CKD  Leukocytosis- Lymphocytic predominant  Severe AS  Mild to Moderate Pulmonary HTN  CAD  H/o Recurrent UTI  GERD  HLD  ?  Atypical pneumonia  PAUL       Abx  F/u C&S  Supportive Care for the ARDS  I/O chart  BMP in am  ICS  OOB  Keep sats > 88%  Prognosis guarded  C/w present management    Electronically signed by Emily Sarmiento MD on 11/17/2022 at 11:34 AM

## 2022-11-17 NOTE — PLAN OF CARE
Problem: Discharge Planning  Goal: Discharge to home or other facility with appropriate resources  Outcome: Progressing     Problem: Hematologic - Adult  Goal: Maintains hematologic stability  Outcome: Progressing     Problem: Pain  Goal: Verbalizes/displays adequate comfort level or baseline comfort level  Outcome: Progressing

## 2022-11-17 NOTE — PROGRESS NOTES
Nephrology Progress Note        [address], [address] [address]  Phone: [phone]  Office Hours: 8:30AM - 4:30PM  Monday - Friday 11/17/2022 7:52 AM  Subjective:   Admit Date: 11/14/2022  PCP: Sabrina Robertson MD  Interval History: on nc  Hopefully she made more than 400ml urine yesterday s/p the bumex    Diet: ADULT DIET; Regular; 1500 ml      Data:   Scheduled Meds:   ipratropium-albuterol  1 ampule Inhalation BID    lactobacillus  1 capsule Oral Daily with breakfast    magnesium oxide  400 mg Oral BID    meropenem  500 mg IntraVENous Q12H    amLODIPine  10 mg Oral Nightly    atenolol  25 mg Oral Daily    sodium bicarbonate  325 mg Oral BID    clopidogrel  75 mg Oral Daily    sodium chloride flush  5-40 mL IntraVENous 2 times per day    sodium chloride flush  5-40 mL IntraVENous 2 times per day    heparin (porcine)  5,000 Units SubCUTAneous 3 times per day    Vitamin D  2,000 Units Oral Daily    pantoprazole  40 mg Oral QAM AC    fluorometholone  1 drop Both Eyes BID    isosorbide mononitrate  60 mg Oral Daily    levothyroxine  88 mcg Oral Daily    ranolazine  500 mg Oral BID    sertraline  50 mg Oral Daily    [Held by provider] losartan  12.5 mg Oral Daily    atorvastatin  20 mg Oral Nightly     Continuous Infusions:   sodium chloride      sodium chloride       PRN Meds:ipratropium-albuterol, sodium chloride flush, sodium chloride, ondansetron **OR** ondansetron, polyethylene glycol, acetaminophen **OR** acetaminophen, sodium chloride flush, sodium chloride  I/O last 3 completed shifts:  In: -   Out: 1300 [Urine:1300]  No intake/output data recorded.     Intake/Output Summary (Last 24 hours) at 11/17/2022 0752  Last data filed at 11/17/2022 0526  Gross per 24 hour   Intake --   Output 400 ml   Net -400 ml       CBC:   Recent Labs     11/14/22  1526 11/16/22  0550 11/17/22  0541   WBC 15.1* 16.7* 14.8*   HGB 10.8* 9.5* 8.7*    382 385       BMP:    Recent Labs 11/15/22  0428 11/16/22  0550 11/17/22  0541    134* 132*   K 4.4 4.6 4.4    100 99   CO2 19* 23 23   BUN 38* 42* 56*   CREATININE 2.0* 2.2* 2.7*   GLUCOSE 103* 114* 90     Hepatic:   Recent Labs     11/14/22  1526 11/15/22  0428   AST 26 26   ALT 17 13   BILITOT 0.4 0.4   ALKPHOS 67 60     Troponin: No results for input(s): TROPONINI in the last 72 hours. BNP: No results for input(s): BNP in the last 72 hours. Lipids: No results for input(s): CHOL, HDL in the last 72 hours.     Invalid input(s): LDLCALCU  ABGs:   Lab Results   Component Value Date/Time    PO2ART 83 11/14/2022 11:30 PM    TIU6EWT 29.0 11/14/2022 11:30 PM     INR:   Recent Labs     11/14/22 1526   INR 0.86       Objective:   Vitals: BP (!) 120/51   Pulse 61   Temp 99.8 °F (37.7 °C) (Oral)   Resp 21   Ht 5' (1.524 m)   Wt 121 lb 4.1 oz (55 kg)   LMP  (LMP Unknown)   SpO2 92%   BMI 23.68 kg/m²   General appearance: alert and cooperative with exam, in no acute distress  HEENT: normocephalic, atraumatic,   Neck: supple, trachea midline  Lungs:  breathing comfortably on nc  Heart[de-identified] regular rate and rhythm, S1, S2 normal,  Abdomen: soft, non-tender; non distended,   Extremities: extremities atraumatic, no cyanosis or edema  Neurologic: alert, oriented, follows commands, interactive    MEDICAL DECISION MAKING     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 08/18/2018    Bradycardia     LBBB (left bundle branch block)     PAUL (acute kidney injury) ([facility] 12/26/2017    Pneumonia due to infectious organism 11/16/2022    Pyelonephritis 11/16/2022    Chronic kidney disease 11/16/2022    Dyspnea and respiratory abnormalities 11/15/2022    Sepsis ([facility] 11/14/2022    Localized edema 07/11/2022    GERD (gastroesophageal reflux disease)     Hypertension     Hypothyroid     Sinus congestion 03/23/2022    Vaginal prolapse 09/07/2021    Primary osteoarthritis of left foot 08/30/2021    Closed nondisplaced fracture of fourth metatarsal bone of left foot 08/30/2021    Closed nondisplaced fracture of third metatarsal bone of left foot 08/30/2021    Closed nondisplaced fracture of fifth left metatarsal bone 08/30/2021    Vertigo     Dizziness     Labyrinthitis     Chronic renal impairment, stage 3 (moderate) (HCC)     Constipation, slow transit 06/19/2018    Cystitis     Physical deconditioning     Hyponatremia 01/01/2018    Pruritic condition 01/22/2016    Gout     Hyperuricemia     Low back pain     Eczema     Coronary artery disease involving native heart without angina pectoris     Depression     Hyperlipidemia     Generalized osteoarthritis     Osteoporosis      -Cr 2.7 today, not planning any iv diuretics today  -BP at goal  -Holding benicar until PAUL resolves  -Will continue to follow  -Avoid nephrotoxins                  Electronically signed by Norberto Harman DO on 11/17/2022 at 7:52 AM    MD Haylie Becerril DO [address],  [facility] [address]  PHONE: [phone]  FAX: [phone]

## 2022-11-17 NOTE — PROGRESS NOTES
Daily Progress Note  Subjective:  Awake and alert; Bp and HR stable, NSR on tele  Cr. Is increasing     Attending Note:  Patient is more dyspnea this am  Has cough not sure if she is aspiration  Remains on high flow oxygen  Severe AS noted  Conservative treatment   Cardio-renal syndrome  Renal on case adjusting diuretics and BP meds  Possible pneumonia     Impression and Plan:   Sepsis    CAP, noted on CT chest    Respiratory panel negative    Blood cultures pending    WBC count elevated    ABX per primary    Using 8L NC now     HFpEF    BNP on admission was 6,417    Last echo showed EF 55-60%    Moderate to severe Aortic stenosis    Diuretics per renal at this time; Accurate I's and O's    1500 Fluid restriction     Could look at adding Jardiance as it is renal protective and would help with HFpEF; will discuss with renal     CKD    Renal following; Last cr is 2.7    Avoid nephrotoxic medications     Most Recent Echo  11/15/2022   Left ventricular systolic function is normal.   Ejection fraction is visually estimated at 55-60%. Moderately dilated left atrium. Moderate to severe aortic stenosis is present; Mean PG 40mmHg, YANIRA 0.99 cm2. Mitral annular calcification is present. Mild to moderate mitral regurgitation. Mild to moderate tricuspid regurgitation; RVSP: 44 mmHg. No evidence of any pericardial effusion. Radiology  CT chest 11/14/2022  Impression   Bilateral pulmonary infiltrates and bilateral pleural effusions with probable   reactive mediastinal adenopathy       Probable granuloma in the right middle lobe. No follow-up imaging   recommended. PAST MEDICAL HISTORY:  The patient has a history of hypertension, renal  insufficiency present, and she has been treated medically. She is known  to have coronary artery disease also present. She has a chronic left  ventricular block present.   She had a heart catheterization done in the  past and LAD had 70% stenosis present and she was treated medically at  that time. Anemia, hyperlipidemia, and arthritis present. PAST SURGICAL HISTORY:  She has a history of having hip surgery done. Moderate coronary artery disease, heart catheterization done in 2018,  LAD with 70% stenosis distally. She was treated medically for that. She had cataract surgery and hysterectomy done.     Objective:   BP (!) 138/47   Pulse 68   Temp 98.1 °F (36.7 °C) (Oral)   Resp 19   Ht 5' (1.524 m)   Wt 121 lb 4.1 oz (55 kg)   LMP  (LMP Unknown)   SpO2 97%   BMI 23.68 kg/m²     Intake/Output Summary (Last 24 hours) at 11/17/2022 1010  Last data filed at 11/17/2022 0526  Gross per 24 hour   Intake --   Output 400 ml   Net -400 ml       Medications:   Scheduled Meds:   ipratropium-albuterol  1 ampule Inhalation BID    lactobacillus  1 capsule Oral Daily with breakfast    magnesium oxide  400 mg Oral BID    meropenem  500 mg IntraVENous Q12H    amLODIPine  10 mg Oral Nightly    atenolol  25 mg Oral Daily    sodium bicarbonate  325 mg Oral BID    clopidogrel  75 mg Oral Daily    sodium chloride flush  5-40 mL IntraVENous 2 times per day    sodium chloride flush  5-40 mL IntraVENous 2 times per day    heparin (porcine)  5,000 Units SubCUTAneous 3 times per day    Vitamin D  2,000 Units Oral Daily    pantoprazole  40 mg Oral QAM AC    fluorometholone  1 drop Both Eyes BID    isosorbide mononitrate  60 mg Oral Daily    levothyroxine  88 mcg Oral Daily    ranolazine  500 mg Oral BID    sertraline  50 mg Oral Daily    [Held by provider] losartan  12.5 mg Oral Daily    atorvastatin  20 mg Oral Nightly      Infusions:   sodium chloride      sodium chloride        PRN Meds:  ipratropium-albuterol, sodium chloride flush, sodium chloride, ondansetron **OR** ondansetron, polyethylene glycol, acetaminophen **OR** acetaminophen, sodium chloride flush, sodium chloride     Physical Exam:  Vitals:    11/17/22 0901   BP:    Pulse: 68   Resp: 19   Temp:    SpO2: 97%        General: AAO, NAD  Chest: Nontender  Cardiac: First and Second Heart Sounds are Normal, No Murmurs or Gallops noted  Lungs:Clear to auscultation and percussion. Abdomen: Soft, NT, ND, +BS  Extremities: No clubbing, no edema  Vascular:  Equal 2+ peripheral pulses. Lab Data:  CBC:   Recent Labs     11/14/22  1526 11/16/22  0550 11/17/22  0541   WBC 15.1* 16.7* 14.8*   HGB 10.8* 9.5* 8.7*   HCT 32.4* 28.1* 26.2*   MCV 95.6 94.9 94.6    382 385     BMP:   Recent Labs     11/15/22  0428 11/16/22  0550 11/17/22  0541    134* 132*   K 4.4 4.6 4.4    100 99   CO2 19* 23 23   BUN 38* 42* 56*   CREATININE 2.0* 2.2* 2.7*     LIVER PROFILE:   Recent Labs     11/14/22  1526 11/15/22  0428   AST 26 26   ALT 17 13   BILITOT 0.4 0.4   ALKPHOS 67 60     PT/INR:   Recent Labs     11/14/22  1526   PROTIME 11.1*   INR 0.86     APTT:   Recent Labs     11/14/22  1526   APTT 16.6*     BNP:  No results for input(s): BNP in the last 72 hours.       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 08/18/2018    Bradycardia     LBBB (left bundle branch block)     PAUL (acute kidney injury) ([facility] [address] 12/26/2017    Pneumonia due to infectious organism 11/16/2022    Pyelonephritis 11/16/2022    Chronic kidney disease 11/16/2022    Dyspnea and respiratory abnormalities 11/15/2022    Sepsis ([address] 11/14/2022    Localized edema 07/11/2022    GERD (gastroesophageal reflux disease)     Hypertension     Hypothyroid     Sinus congestion 03/23/2022    Vaginal prolapse 09/07/2021    Primary osteoarthritis of left foot 08/30/2021    Closed nondisplaced fracture of fourth metatarsal bone of left foot 08/30/2021    Closed nondisplaced fracture of third metatarsal bone of left foot 08/30/2021    Closed nondisplaced fracture of fifth left metatarsal bone 08/30/2021    Vertigo     Dizziness     Labyrinthitis     Chronic renal impairment, stage 3 (moderate) ([facility])     Constipation, slow transit 06/19/2018    Cystitis     Physical deconditioning     Hyponatremia 01/01/2018    Pruritic condition 01/22/2016    Gout     Hyperuricemia     Low back pain     Eczema     Coronary artery disease involving native heart without angina pectoris     Depression     Hyperlipidemia     Generalized osteoarthritis     Osteoporosis        Electronically signed by CLEMENT Reyes CNP on 11/17/2022 at 10:10 AM      Electronically signed by Nika Cedeño MD on 11/17/22 at 2:51 PM EST

## 2022-11-18 LAB
ANION GAP SERPL CALCULATED.3IONS-SCNC: 8 MMOL/L (ref 4–16)
BASOPHILS ABSOLUTE: 0.1 K/CU MM
BASOPHILS RELATIVE PERCENT: 0.5 % (ref 0–1)
BUN BLDV-MCNC: 57 MG/DL (ref 6–23)
C-REACTIVE PROTEIN, HIGH SENSITIVITY: 122.9 MG/L
CALCIUM SERPL-MCNC: 8.9 MG/DL (ref 8.3–10.6)
CHLORIDE BLD-SCNC: 101 MMOL/L (ref 99–110)
CO2: 24 MMOL/L (ref 21–32)
CREAT SERPL-MCNC: 2.5 MG/DL (ref 0.6–1.1)
DIFFERENTIAL TYPE: ABNORMAL
EOSINOPHILS ABSOLUTE: 0.6 K/CU MM
EOSINOPHILS RELATIVE PERCENT: 4.8 % (ref 0–3)
GFR SERPL CREATININE-BSD FRML MDRD: 17 ML/MIN/1.73M2
GLUCOSE BLD-MCNC: 92 MG/DL (ref 70–99)
HCT VFR BLD CALC: 26.7 % (ref 37–47)
HEMOGLOBIN: 8.9 GM/DL (ref 12.5–16)
HIGH SENSITIVE C-REACTIVE PROTEIN: 180.2 MG/L (ref 0–5)
IMMATURE NEUTROPHIL %: 0.8 % (ref 0–0.43)
LYMPHOCYTES ABSOLUTE: 1.9 K/CU MM
LYMPHOCYTES RELATIVE PERCENT: 16.5 % (ref 24–44)
MCH RBC QN AUTO: 31.6 PG (ref 27–31)
MCHC RBC AUTO-ENTMCNC: 33.3 % (ref 32–36)
MCV RBC AUTO: 94.7 FL (ref 78–100)
MONOCYTES ABSOLUTE: 0.8 K/CU MM
MONOCYTES RELATIVE PERCENT: 6.9 % (ref 0–4)
NUCLEATED RBC %: 0 %
PDW BLD-RTO: 13.3 % (ref 11.7–14.9)
PLATELET # BLD: 422 K/CU MM (ref 140–440)
PMV BLD AUTO: 8.9 FL (ref 7.5–11.1)
POTASSIUM SERPL-SCNC: 4.9 MMOL/L (ref 3.5–5.1)
RBC # BLD: 2.82 M/CU MM (ref 4.2–5.4)
SEGMENTED NEUTROPHILS ABSOLUTE COUNT: 8.1 K/CU MM
SEGMENTED NEUTROPHILS RELATIVE PERCENT: 70.5 % (ref 36–66)
SODIUM BLD-SCNC: 133 MMOL/L (ref 135–145)
TOTAL IMMATURE NEUTOROPHIL: 0.09 K/CU MM
TOTAL NUCLEATED RBC: 0 K/CU MM
WBC # BLD: 11.5 K/CU MM (ref 4–10.5)

## 2022-11-18 PROCEDURE — 92610 EVALUATE SWALLOWING FUNCTION: CPT

## 2022-11-18 PROCEDURE — 87070 CULTURE OTHR SPECIMN AEROBIC: CPT

## 2022-11-18 PROCEDURE — 94668 MNPJ CHEST WALL SBSQ: CPT

## 2022-11-18 PROCEDURE — 94761 N-INVAS EAR/PLS OXIMETRY MLT: CPT

## 2022-11-18 PROCEDURE — 97116 GAIT TRAINING THERAPY: CPT

## 2022-11-18 PROCEDURE — 94640 AIRWAY INHALATION TREATMENT: CPT

## 2022-11-18 PROCEDURE — 94664 DEMO&/EVAL PT USE INHALER: CPT

## 2022-11-18 PROCEDURE — 99232 SBSQ HOSP IP/OBS MODERATE 35: CPT | Performed by: NURSE PRACTITIONER

## 2022-11-18 PROCEDURE — 6370000000 HC RX 637 (ALT 250 FOR IP): Performed by: STUDENT IN AN ORGANIZED HEALTH CARE EDUCATION/TRAINING PROGRAM

## 2022-11-18 PROCEDURE — 6370000000 HC RX 637 (ALT 250 FOR IP): Performed by: INTERNAL MEDICINE

## 2022-11-18 PROCEDURE — 6360000002 HC RX W HCPCS: Performed by: NURSE PRACTITIONER

## 2022-11-18 PROCEDURE — 97530 THERAPEUTIC ACTIVITIES: CPT

## 2022-11-18 PROCEDURE — 36415 COLL VENOUS BLD VENIPUNCTURE: CPT

## 2022-11-18 PROCEDURE — 86140 C-REACTIVE PROTEIN: CPT

## 2022-11-18 PROCEDURE — 2700000000 HC OXYGEN THERAPY PER DAY

## 2022-11-18 PROCEDURE — 6360000002 HC RX W HCPCS

## 2022-11-18 PROCEDURE — 80048 BASIC METABOLIC PNL TOTAL CA: CPT

## 2022-11-18 PROCEDURE — 99232 SBSQ HOSP IP/OBS MODERATE 35: CPT | Performed by: INTERNAL MEDICINE

## 2022-11-18 PROCEDURE — 6370000000 HC RX 637 (ALT 250 FOR IP)

## 2022-11-18 PROCEDURE — 2140000000 HC CCU INTERMEDIATE R&B

## 2022-11-18 PROCEDURE — 2580000003 HC RX 258: Performed by: NURSE PRACTITIONER

## 2022-11-18 PROCEDURE — 2580000003 HC RX 258

## 2022-11-18 PROCEDURE — 85025 COMPLETE CBC W/AUTO DIFF WBC: CPT

## 2022-11-18 PROCEDURE — 87205 SMEAR GRAM STAIN: CPT

## 2022-11-18 RX ORDER — HYDROXYZINE HYDROCHLORIDE 10 MG/1
10 TABLET, FILM COATED ORAL ONCE
Status: COMPLETED | OUTPATIENT
Start: 2022-11-18 | End: 2022-11-18

## 2022-11-18 RX ORDER — POLYETHYLENE GLYCOL 3350 17 G/17G
17 POWDER, FOR SOLUTION ORAL DAILY
Status: DISCONTINUED | OUTPATIENT
Start: 2022-11-18 | End: 2022-12-06 | Stop reason: HOSPADM

## 2022-11-18 RX ORDER — DOCUSATE SODIUM 100 MG/1
100 CAPSULE, LIQUID FILLED ORAL DAILY
Status: DISCONTINUED | OUTPATIENT
Start: 2022-11-18 | End: 2022-12-06 | Stop reason: HOSPADM

## 2022-11-18 RX ORDER — SENNA PLUS 8.6 MG/1
1 TABLET ORAL 2 TIMES DAILY
Status: DISCONTINUED | OUTPATIENT
Start: 2022-11-18 | End: 2022-11-22

## 2022-11-18 RX ADMIN — HEPARIN SODIUM 5000 UNITS: 5000 INJECTION INTRAVENOUS; SUBCUTANEOUS at 21:03

## 2022-11-18 RX ADMIN — RANOLAZINE 500 MG: 500 TABLET, FILM COATED, EXTENDED RELEASE ORAL at 09:56

## 2022-11-18 RX ADMIN — ATORVASTATIN CALCIUM 20 MG: 10 TABLET, FILM COATED ORAL at 21:02

## 2022-11-18 RX ADMIN — Medication 2000 UNITS: at 09:56

## 2022-11-18 RX ADMIN — ATENOLOL 25 MG: 25 TABLET ORAL at 16:34

## 2022-11-18 RX ADMIN — SODIUM CHLORIDE, PRESERVATIVE FREE 10 ML: 5 INJECTION INTRAVENOUS at 20:40

## 2022-11-18 RX ADMIN — SODIUM CHLORIDE, PRESERVATIVE FREE 10 ML: 5 INJECTION INTRAVENOUS at 10:06

## 2022-11-18 RX ADMIN — SENNOSIDES 8.6 MG: 8.6 TABLET, FILM COATED ORAL at 10:05

## 2022-11-18 RX ADMIN — Medication 1 CAPSULE: at 09:56

## 2022-11-18 RX ADMIN — IPRATROPIUM BROMIDE AND ALBUTEROL SULFATE 1 AMPULE: .5; 2.5 SOLUTION RESPIRATORY (INHALATION) at 07:44

## 2022-11-18 RX ADMIN — DOCUSATE SODIUM 100 MG: 100 CAPSULE, LIQUID FILLED ORAL at 10:05

## 2022-11-18 RX ADMIN — PANTOPRAZOLE SODIUM 40 MG: 40 TABLET, DELAYED RELEASE ORAL at 06:46

## 2022-11-18 RX ADMIN — IPRATROPIUM BROMIDE AND ALBUTEROL SULFATE 1 AMPULE: .5; 2.5 SOLUTION RESPIRATORY (INHALATION) at 20:58

## 2022-11-18 RX ADMIN — LEVOTHYROXINE SODIUM 88 MCG: 0.09 TABLET ORAL at 06:46

## 2022-11-18 RX ADMIN — HEPARIN SODIUM 5000 UNITS: 5000 INJECTION INTRAVENOUS; SUBCUTANEOUS at 14:00

## 2022-11-18 RX ADMIN — MEROPENEM 500 MG: 500 INJECTION, POWDER, FOR SOLUTION INTRAVENOUS at 03:53

## 2022-11-18 RX ADMIN — SENNOSIDES 8.6 MG: 8.6 TABLET, FILM COATED ORAL at 21:03

## 2022-11-18 RX ADMIN — HYDROXYZINE HYDROCHLORIDE 10 MG: 10 TABLET ORAL at 23:02

## 2022-11-18 RX ADMIN — SODIUM BICARBONATE 325 MG: 650 TABLET ORAL at 21:02

## 2022-11-18 RX ADMIN — CLOPIDOGREL BISULFATE 75 MG: 75 TABLET ORAL at 09:56

## 2022-11-18 RX ADMIN — HEPARIN SODIUM 5000 UNITS: 5000 INJECTION INTRAVENOUS; SUBCUTANEOUS at 06:46

## 2022-11-18 RX ADMIN — SODIUM BICARBONATE 325 MG: 650 TABLET ORAL at 09:55

## 2022-11-18 RX ADMIN — RANOLAZINE 500 MG: 500 TABLET, FILM COATED, EXTENDED RELEASE ORAL at 21:02

## 2022-11-18 RX ADMIN — AMLODIPINE BESYLATE 10 MG: 10 TABLET ORAL at 21:03

## 2022-11-18 RX ADMIN — MEROPENEM 500 MG: 500 INJECTION, POWDER, FOR SOLUTION INTRAVENOUS at 16:35

## 2022-11-18 RX ADMIN — SERTRALINE HYDROCHLORIDE 50 MG: 50 TABLET ORAL at 09:56

## 2022-11-18 RX ADMIN — MAGNESIUM OXIDE TAB 400 MG (241.3 MG ELEMENTAL MG) 400 MG: 400 (241.3 MG) TAB at 09:56

## 2022-11-18 RX ADMIN — ISOSORBIDE MONONITRATE 60 MG: 60 TABLET, EXTENDED RELEASE ORAL at 09:56

## 2022-11-18 RX ADMIN — POLYETHYLENE GLYCOL (3350) 17 G: 17 POWDER, FOR SOLUTION ORAL at 10:05

## 2022-11-18 RX ADMIN — MAGNESIUM OXIDE TAB 400 MG (241.3 MG ELEMENTAL MG) 400 MG: 400 (241.3 MG) TAB at 21:02

## 2022-11-18 ASSESSMENT — PAIN SCALES - WONG BAKER: WONGBAKER_NUMERICALRESPONSE: 10

## 2022-11-18 ASSESSMENT — PAIN SCALES - GENERAL: PAINLEVEL_OUTOF10: 0

## 2022-11-18 NOTE — PROGRESS NOTES
V2.0  [facility] [place]      Name:  Stewart Phillips /Age/Sex: 1927  (80 y.o. female)   MRN & CSN:  [id number] & [id number] Encounter Date/Time: 2022 6:37 PM EST    Location:  [address] [place] [address] PCP: Aleks Castelan MD       Hospital Day: 4    Assessment and Plan: Stewart Phillips is a 80 y.o. female with pmh of CAd, GERD, UTI, Hypothyroidism, HTN, Hyperlipedmia, CKD  who presents with Sepsis ([facility] [address]      Plan:    Acute hypoxic respiratory failure from CAP and Pulmonary edema: Sepsis resolving. Unable to taper down O2 demand. No o2 at home. Blood cx negative. Pro-Cody 0.2. CXR Shows -Cardiomegaly with pulmonary edema and concern for pneumonia. Echo: EF 55-60. RVSP 44. Respiratory panel neg. CT Chest showed bilateral infiltrate. Continue Levaquin. Pulmonology following. Repeat chest x-ray in a.m. Continue Meropenem. Duo-neb. Diuresis on hold. PAUL with Hx of Renal insuffiencey:  Likely in the setting of diuresis. Base line around 2. Plan to hold diuresis and monitor. Dr. Greg Mercedes following. BMP daily. Strict I/O     Pyelonephritis - H/O Recurrent UTI: Treated with several rounds of ATB x 1 month (Including Cipro/ doxy/ Macrobid ). Leucocytosis still present. Dirty UA. Patient does not complain of any dysuria. Urine culture growing Enterococcus fecalis - sensitivity pending. ID consulted (Off note - patient is not allergic penicillin). Levaquin switched to Meropenem. CT abdomen pelvis to evaluated for hydronephrosis / stones. Hypomagnesia:    Resolved    Acute on chronic systolic CHF:   For now IV Lasix intermittently. Strict I's/O. Daily weight     CAD - Most recent heart cath . most recent echo -2018; EF 50%. PBNP 6,417. EKG shows LBBB, NSR; No ST elevation. Elevated troponin likely from CKD. Repeat troponin showed downtrend. On atenolol, norvasc, olmesartan, Imdur, statin, ASA.   Cardiology following     Hypertension -- Continue home Imdur,atenolol, Norvasc, olmesartan    Anemia - Hb Stable. Needs OP work up for anemia. GERD:   continue PPI    PT/OT recommended ARU      Diet ADULT DIET; Regular; 1500 ml   DVT Prophylaxis [] Lovenox, []  Heparin, [] SCDs, [] Ambulation,  [] Eliquis, [] Xarelto  [] Coumadin   Code Status Limited   Disposition From: Home  Expected Disposition: Home  Estimated Date of Discharge: 2 Days  Patient requires continued admission due to CAP   Surrogate Decision Maker/ POA      Subjective:     Chief Complaint: Shortness of Breath       Angie Farooq is a 80 y.o. female who presents with SOB with productive cough      Patient complains of excessive fatigue and SOB associated with productive cough. Denies any fever, Chest pain, headache, dizziness. Bowel and bladder habits normal. Currently denies any dysuria. Was treated with multiple Abx in the past for recurrent UTI         Review of Systems:    Review of Systems    All other systems reviewed and are negative. Objective: Intake/Output Summary (Last 24 hours) at 11/17/2022 2029  Last data filed at 11/17/2022 0526  Gross per 24 hour   Intake --   Output 400 ml   Net -400 ml          Vitals:   Vitals:    11/17/22 1615   BP: (!) 117/54   Pulse: 67   Resp:    Temp:    SpO2:        Physical Exam:   Looks comfortable but on 6 L supplement oxygen  General: NAD  Eyes: EOMI  ENT: neck supple  Cardiovascular: Regular rate. Respiratory: Coarse crackles  Gastrointestinal: Soft, non tender  Genitourinary: no suprapubic tenderness  Musculoskeletal: No edema  Skin: warm, dry  Neuro: Alert. Psych: Mood appropriate.      Medications:   Medications:    diphenhydrAMINE  12.5 mg Oral Once    ipratropium-albuterol  1 ampule Inhalation BID    lactobacillus  1 capsule Oral Daily with breakfast    magnesium oxide  400 mg Oral BID    meropenem  500 mg IntraVENous Q12H    amLODIPine  10 mg Oral Nightly    atenolol  25 mg Oral Daily    sodium bicarbonate  325 mg Oral BID    clopidogrel  75 mg Oral Daily sodium chloride flush  5-40 mL IntraVENous 2 times per day    sodium chloride flush  5-40 mL IntraVENous 2 times per day    heparin (porcine)  5,000 Units SubCUTAneous 3 times per day    Vitamin D  2,000 Units Oral Daily    pantoprazole  40 mg Oral QAM AC    fluorometholone  1 drop Both Eyes BID    isosorbide mononitrate  60 mg Oral Daily    levothyroxine  88 mcg Oral Daily    ranolazine  500 mg Oral BID    sertraline  50 mg Oral Daily    [Held by provider] losartan  12.5 mg Oral Daily    atorvastatin  20 mg Oral Nightly      Infusions:    sodium chloride      sodium chloride       PRN Meds: ipratropium-albuterol, 1 ampule, Q4H PRN  sodium chloride flush, 5-40 mL, PRN  sodium chloride, , PRN  ondansetron, 4 mg, Q8H PRN   Or  ondansetron, 4 mg, Q6H PRN  polyethylene glycol, 17 g, Daily PRN  acetaminophen, 650 mg, Q6H PRN   Or  acetaminophen, 650 mg, Q6H PRN  sodium chloride flush, 5-40 mL, PRN  sodium chloride, , PRN      Labs      Recent Results (from the past 24 hour(s))   Basic Metabolic Panel    Collection Time: 11/17/22  5:41 AM   Result Value Ref Range    Sodium 132 (L) 135 - 145 MMOL/L    Potassium 4.4 3.5 - 5.1 MMOL/L    Chloride 99 99 - 110 mMol/L    CO2 23 21 - 32 MMOL/L    Anion Gap 10 4 - 16    BUN 56 (H) 6 - 23 MG/DL    Creatinine 2.7 (H) 0.6 - 1.1 MG/DL    Est, Glom Filt Rate 16 (L) >60 mL/min/1.73m2    Glucose 90 70 - 99 MG/DL    Calcium 8.8 8.3 - 10.6 MG/DL   CBC with Auto Differential    Collection Time: 11/17/22  5:41 AM   Result Value Ref Range    WBC 14.8 (H) 4.0 - 10.5 K/CU MM    RBC 2.77 (L) 4.2 - 5.4 M/CU MM    Hemoglobin 8.7 (L) 12.5 - 16.0 GM/DL    Hematocrit 26.2 (L) 37 - 47 %    MCV 94.6 78 - 100 FL    MCH 31.4 (H) 27 - 31 PG    MCHC 33.2 32.0 - 36.0 %    RDW 13.6 11.7 - 14.9 %    Platelets 339 140 - 667 K/CU MM    MPV 9.2 7.5 - 11.1 FL    Differential Type AUTOMATED DIFFERENTIAL     Segs Relative 72.9 (H) 36 - 66 %    Lymphocytes % 15.2 (L) 24 - 44 %    Monocytes % 7.4 (H) 0 - 4 % Eosinophils % 3.2 (H) 0 - 3 %    Basophils % 0.4 0 - 1 %    Segs Absolute 10.8 K/CU MM    Lymphocytes Absolute 2.3 K/CU MM    Monocytes Absolute 1.1 K/CU MM    Eosinophils Absolute 0.5 K/CU MM    Basophils Absolute 0.1 K/CU MM    Nucleated RBC % 0.0 %    Total Nucleated RBC 0.0 K/CU MM    Total Immature Neutrophil 0.14 K/CU MM    Immature Neutrophil % 0.9 (H) 0 - 0.43 %   C-Reactive Protein    Collection Time: 11/17/22  5:41 AM   Result Value Ref Range    CRP High Sensitivity 166.3 (H) <5.0 mg/L   Procalcitonin    Collection Time: 11/17/22  5:41 AM   Result Value Ref Range    Procalcitonin 0.277         Imaging/Diagnostics Last 24 Hours   CT CHEST WO CONTRAST    Result Date: 11/14/2022  EXAMINATION: CT OF THE CHEST WITHOUT CONTRAST 11/14/2022 7:33 pm TECHNIQUE: CT of the chest was performed without the administration of intravenous contrast. Multiplanar reformatted images are provided for review. Automated exposure control, iterative reconstruction, and/or weight based adjustment of the mA/kV was utilized to reduce the radiation dose to as low as reasonably achievable. COMPARISON: 01/06/2018 HISTORY: ORDERING SYSTEM PROVIDED HISTORY: sob TECHNOLOGIST PROVIDED HISTORY: Reason for exam:->sob Decision Support Exception - unselect if not a suspected or confirmed emergency medical condition->Emergency Medical Condition (MA) Reason for Exam: sob FINDINGS: Mediastinum: Calcific atherosclerotic disease in the aorta. The heart size is within normal limits. The esophagus is grossly unremarkable without hiatal hernia. There are mediastinal lymph nodes measuring up to 1.6 cm in short axis diameter likely reactive in nature. There are bilateral pleural effusions layering up to 3 cm on the right and 1 cm on the left. There is bilateral patchy pulmonary infiltrates.   The previously seen pulmonary nodule in the right middle lobe appears slightly larger, however there is a central calcification and this most likely represents a granuloma. Lungs/pleura: As above Upper Abdomen: There is a cyst in the upper pole the left kidney and the remainder of the visualized upper abdominal organs are unremarkable Soft Tissues/Bones: Degenerative change     Bilateral pulmonary infiltrates and bilateral pleural effusions with probable reactive mediastinal adenopathy Probable granuloma in the right middle lobe. No follow-up imaging recommended. XR CHEST PORTABLE    Result Date: 11/14/2022  EXAMINATION: ONE XRAY VIEW OF THE CHEST 11/14/2022 3:17 pm COMPARISON: 08/18/2018 HISTORY: ORDERING SYSTEM PROVIDED HISTORY: sob TECHNOLOGIST PROVIDED HISTORY: Reason for exam:->sob Reason for Exam: sob FINDINGS: Cardiomegaly. Ill-defined pulmonary opacities. Bibasilar hypoaeration. No pneumothorax.      Cardiomegaly Bilateral ill-defined pulmonary opacities could represent multifocal pneumonia or edema       Electronically signed by Gillian Zuniga MD on 11/17/2022 at 8:29 PM

## 2022-11-18 NOTE — PROGRESS NOTES
Nephrology Progress Note        [address], [address] [address]  Phone: [phone]  Office Hours: 8:30AM - 4:30PM  Monday - Friday 11/18/2022 8:38 AM  Subjective:   Admit Date: 11/14/2022  PCP: Delores Conner MD  Interval History:   Good bp  Resting on high flow oxygen 8L    Diet: ADULT DIET; Regular; 1500 ml      Data:   Scheduled Meds:   ipratropium-albuterol  1 ampule Inhalation BID    lactobacillus  1 capsule Oral Daily with breakfast    magnesium oxide  400 mg Oral BID    meropenem  500 mg IntraVENous Q12H    amLODIPine  10 mg Oral Nightly    atenolol  25 mg Oral Daily    sodium bicarbonate  325 mg Oral BID    clopidogrel  75 mg Oral Daily    sodium chloride flush  5-40 mL IntraVENous 2 times per day    sodium chloride flush  5-40 mL IntraVENous 2 times per day    heparin (porcine)  5,000 Units SubCUTAneous 3 times per day    Vitamin D  2,000 Units Oral Daily    pantoprazole  40 mg Oral QAM AC    fluorometholone  1 drop Both Eyes BID    isosorbide mononitrate  60 mg Oral Daily    levothyroxine  88 mcg Oral Daily    ranolazine  500 mg Oral BID    sertraline  50 mg Oral Daily    [Held by provider] losartan  12.5 mg Oral Daily    atorvastatin  20 mg Oral Nightly     Continuous Infusions:   sodium chloride      sodium chloride       PRN Meds:ipratropium-albuterol, sodium chloride flush, sodium chloride, ondansetron **OR** ondansetron, polyethylene glycol, acetaminophen **OR** acetaminophen, sodium chloride flush, sodium chloride  I/O last 3 completed shifts:  In: -   Out: 400 [Urine:400]  No intake/output data recorded.   No intake or output data in the 24 hours ending 11/18/22 0838    CBC:   Recent Labs     11/16/22  0550 11/17/22  0541   WBC 16.7* 14.8*   HGB 9.5* 8.7*    385       BMP:    Recent Labs     11/16/22  0550 11/17/22  0541   * 132*   K 4.6 4.4    99   CO2 23 23   BUN 42* 56*   CREATININE 2.2* 2.7*   GLUCOSE 114* 90     Hepatic: No results for input(s): AST, ALT, ALB, BILITOT, ALKPHOS in the last 72 hours. Troponin: No results for input(s): TROPONINI in the last 72 hours. BNP: No results for input(s): BNP in the last 72 hours. Lipids: No results for input(s): CHOL, HDL in the last 72 hours. Invalid input(s): LDLCALCU  ABGs:   Lab Results   Component Value Date/Time    PO2ART 83 11/14/2022 11:30 PM    BAQ2RAO 29.0 11/14/2022 11:30 PM     INR: No results for input(s): INR in the last 72 hours.     Objective:   Vitals: BP (!) 135/56   Pulse 65   Temp 100 °F (37.8 °C) (Oral)   Resp 20   Ht 5' (1.524 m)   Wt 121 lb 4.1 oz (55 kg)   LMP  (LMP Unknown)   SpO2 95%   BMI 23.68 kg/m²   General appearance:  in no acute distress  HEENT: normocephalic, atraumatic,   Neck: supple, trachea midline  Lungs: breathing comfortably on nc  Heart[de-identified] regular rate and rhythm,   Extremities: extremities atraumatic, no cyanosis or edema      MEDICAL DECISION MAKING     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 08/18/2018    Bradycardia     LBBB (left bundle branch block)     PAUL (acute kidney injury) ([address] 12/26/2017    Pneumonia due to infectious organism 11/16/2022    Pyelonephritis 11/16/2022    Chronic kidney disease 11/16/2022    Dyspnea and respiratory abnormalities 11/15/2022    Sepsis ([facility] [address] 11/14/2022    Localized edema 07/11/2022    GERD (gastroesophageal reflux disease)     Hypertension     Hypothyroid     Sinus congestion 03/23/2022    Vaginal prolapse 09/07/2021    Primary osteoarthritis of left foot 08/30/2021    Closed nondisplaced fracture of fourth metatarsal bone of left foot 08/30/2021    Closed nondisplaced fracture of third metatarsal bone of left foot 08/30/2021    Closed nondisplaced fracture of fifth left metatarsal bone 08/30/2021    Vertigo     Dizziness     Labyrinthitis     Chronic renal impairment, stage 3 (moderate) ([facility])     Constipation, slow transit 06/19/2018    Cystitis Physical deconditioning     Hyponatremia 01/01/2018    Pruritic condition 01/22/2016    Gout     Hyperuricemia     Low back pain     Eczema     Coronary artery disease involving native heart without angina pectoris     Depression     Hyperlipidemia     Generalized osteoarthritis     Osteoporosis      -PAUL from ATN  -CKD3: baseline cr 1.8  -HTN: controlled  -Acute hypoxic resp failure from bilateral lower lobe pneumonia  -Fluid overload: this component has already been taken care with diuresis for 3 days  -Moderate to severe aortic stenosis: not candidate for TAVR at this point    Suggest:  -Obtain a BMP today please//cr 2.7 yesterday//spoke with Maggie (granddaughter) yesterday and no dialysis desired in this 89yo patient, which is very appropriate  -No diuretics planned  -Continue pneumonia treatment,   -Avoid nephrotoxins  -BP at goal, continue current meds  -Spoke with Maggie, granddaughter, and I resumed her home stool regimen    Thank you                  Electronically signed by Vick Meyer DO on 11/18/2022 at 8:38 MD Yaima Bedolla DO [address]  [place], [place] [address]  PHONE: [phone]  FAX: [phone]

## 2022-11-18 NOTE — PROGRESS NOTES
Will have her follow up at [facility] for TAVR-balloon valvuloplasty   Discussed with patient and family  Will do it as outpatient

## 2022-11-18 NOTE — PLAN OF CARE
Problem: Discharge Planning  Goal: Discharge to home or other facility with appropriate resources  11/17/2022 2326 by Any Tiwari RN  Outcome: Progressing  11/17/2022 1530 by Yana Dick  Outcome: Progressing     Problem: Hematologic - Adult  Goal: Maintains hematologic stability  11/17/2022 2326 by Any Tiwari RN  Outcome: Progressing  11/17/2022 1530 by Yana Dick  Outcome: Progressing     Problem: Pain  Goal: Verbalizes/displays adequate comfort level or baseline comfort level  11/17/2022 2326 by Any Tiwari RN  Outcome: Progressing  11/17/2022 1530 by Yana Dick  Outcome: Progressing

## 2022-11-18 NOTE — PROGRESS NOTES
Daily Progress Note  Subjective:    Pt. Awake and alert; Bp and HR stable, NSR on tele  No CP, SOB stable per pt. Attending Note:  More awake alert this am  Remain in sinus   Slept better with head at 30 degree  No chest pain, dyspnea stable  Speech to see her for swallow as she was coughing on her food  Severe AS -not sure if she is a TAVR candidate or balloon valuloplasty --moderate risk for JITENDRA with contrast  Renal on the case ---adjusting diuretics and BP meds  She had a cath In 2010 -mild CAD      Impression and Plan:     Sepsis    CAP, noted on CT chest    Respiratory panel negative    Blood cultures pending    WBC count elevated    ABX per primary    Using 8L NC now     Acute on Chronic HFpEF    BNP on admission was 6,417    Last echo showed EF 55-60%    Moderate to severe Aortic stenosis    Diuretics per renal at this time; Accurate I's and O's    1500 Fluid restriction     Cannot maria isabel Jardiance at this point d/t elevating cr. CKD    Renal following; Last cr is 2.7    Avoid nephrotoxic medications    Will follow  Med. Tx. Most Recent Echo  11/15/2022   Left ventricular systolic function is normal.   Ejection fraction is visually estimated at 55-60%. Moderately dilated left atrium. Moderate to severe aortic stenosis is present; Mean PG 40mmHg, YANIRA 0.99 cm2. Mitral annular calcification is present. Mild to moderate mitral regurgitation. Mild to moderate tricuspid regurgitation; RVSP: 44 mmHg. No evidence of any pericardial effusion. Radiology  CT chest 11/14/2022  Impression   Bilateral pulmonary infiltrates and bilateral pleural effusions with probable   reactive mediastinal adenopathy       Probable granuloma in the right middle lobe. No follow-up imaging   recommended. PAST MEDICAL HISTORY:  The patient has a history of hypertension, renal  insufficiency present, and she has been treated medically. She is known  to have coronary artery disease also present.   She has a chronic left  ventricular block present. She had a heart catheterization done in the  past and LAD had 70% stenosis present and she was treated medically at  that time. Anemia, hyperlipidemia, and arthritis present. PAST SURGICAL HISTORY:  She has a history of having hip surgery done. Moderate coronary artery disease, heart catheterization done in 2018,  LAD with 70% stenosis distally. She was treated medically for that. She had cataract surgery and hysterectomy done.       Objective:   BP (!) 135/56   Pulse 65   Temp 100 °F (37.8 °C) (Oral)   Resp 20   Ht 5' (1.524 m)   Wt 121 lb 4.1 oz (55 kg)   LMP  (LMP Unknown)   SpO2 95%   BMI 23.68 kg/m²   No intake or output data in the 24 hours ending 11/18/22 0946    Medications:   Scheduled Meds:   polyethylene glycol  17 g Oral Daily    senna  1 tablet Oral BID    docusate sodium  100 mg Oral Daily    ipratropium-albuterol  1 ampule Inhalation BID    lactobacillus  1 capsule Oral Daily with breakfast    magnesium oxide  400 mg Oral BID    meropenem  500 mg IntraVENous Q12H    amLODIPine  10 mg Oral Nightly    atenolol  25 mg Oral Daily    sodium bicarbonate  325 mg Oral BID    clopidogrel  75 mg Oral Daily    sodium chloride flush  5-40 mL IntraVENous 2 times per day    sodium chloride flush  5-40 mL IntraVENous 2 times per day    heparin (porcine)  5,000 Units SubCUTAneous 3 times per day    Vitamin D  2,000 Units Oral Daily    pantoprazole  40 mg Oral QAM AC    fluorometholone  1 drop Both Eyes BID    isosorbide mononitrate  60 mg Oral Daily    levothyroxine  88 mcg Oral Daily    ranolazine  500 mg Oral BID    sertraline  50 mg Oral Daily    [Held by provider] losartan  12.5 mg Oral Daily    atorvastatin  20 mg Oral Nightly      Infusions:   sodium chloride      sodium chloride        PRN Meds:  ipratropium-albuterol, sodium chloride flush, sodium chloride, ondansetron **OR** ondansetron, polyethylene glycol, acetaminophen **OR** acetaminophen, sodium chloride flush, sodium chloride     Physical Exam:  Vitals:    11/18/22 0830   BP: (!) 135/56   Pulse: 65   Resp: 20   Temp: 100 °F (37.8 °C)   SpO2: 95%        General: AAO, NAD  Chest: Nontender  Cardiac: First and Second Heart Sounds are Normal, No Murmurs or Gallops noted  Lungs:Clear to auscultation and percussion. Abdomen: Soft, NT, ND, +BS  Extremities: No clubbing, no edema  Vascular:  Equal 2+ peripheral pulses. Lab Data:  CBC:   Recent Labs     11/16/22  0550 11/17/22  0541   WBC 16.7* 14.8*   HGB 9.5* 8.7*   HCT 28.1* 26.2*   MCV 94.9 94.6    385     BMP:   Recent Labs     11/16/22  0550 11/17/22  0541   * 132*   K 4.6 4.4    99   CO2 23 23   BUN 42* 56*   CREATININE 2.2* 2.7*     LIVER PROFILE: No results for input(s): AST, ALT, LIPASE, BILIDIR, BILITOT, ALKPHOS in the last 72 hours. Invalid input(s): AMYLASE,  ALB  PT/INR: No results for input(s): PROTIME, INR in the last 72 hours. APTT: No results for input(s): APTT in the last 72 hours. BNP:  No results for input(s): BNP in the last 72 hours.       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 08/18/2018    Bradycardia     LBBB (left bundle branch block)     PAUL (acute kidney injury) ([facility] [address] 12/26/2017    Pneumonia due to infectious organism 11/16/2022    Pyelonephritis 11/16/2022    Chronic kidney disease 11/16/2022    Dyspnea and respiratory abnormalities 11/15/2022    Sepsis ([address] 11/14/2022    Localized edema 07/11/2022    GERD (gastroesophageal reflux disease)     Hypertension     Hypothyroid     Sinus congestion 03/23/2022    Vaginal prolapse 09/07/2021    Primary osteoarthritis of left foot 08/30/2021    Closed nondisplaced fracture of fourth metatarsal bone of left foot 08/30/2021    Closed nondisplaced fracture of third metatarsal bone of left foot 08/30/2021    Closed nondisplaced fracture of fifth left metatarsal bone 08/30/2021    Vertigo Dizziness     Labyrinthitis     Chronic renal impairment, stage 3 (moderate) ([facility])     Constipation, slow transit 06/19/2018    Cystitis     Physical deconditioning     Hyponatremia 01/01/2018    Pruritic condition 01/22/2016    Gout     Hyperuricemia     Low back pain     Eczema     Coronary artery disease involving native heart without angina pectoris     Depression     Hyperlipidemia     Generalized osteoarthritis     Osteoporosis        Electronically signed by Jhony Hernández PA-C on 11/18/2022 at 9:46 AM      Electronically signed by Abdoul eJsus MD on 11/18/22 at 10:14 AM EST

## 2022-11-18 NOTE — PROGRESS NOTES
Infectious Disease Progress Note  2022   Patient Name: Kayli Bahena : 1927   Impression  Pyelonephritis secondary to Enterococcus faecalis Complicated UTI:  Multifocal Pneumonia and Pulmonary Edema Complicated by Acute Hypoxic Respiratory Failure:  Afebrile  Leukocytosis on DWT  CRP on DWT, patient appears to be improving as oxygen needs have decreased  -UA WBC 9, RBC none, Urine Culture: Enterococcus faecalis 50,000  -CT Chest WO Contrast: Bilateral pulmonary infiltrates and bilateral pleural effusions with probable   reactive mediastinal adenopathy   Probable granuloma in the right middle lobe. No follow-up imaging   recommended. Oxygen needs have increased from  of 5 L/min/nc to 11/15 of 10/L HFNC  Dr. Alexandra Parker onboard for pulmonology, imp of possible atypical pneumonia  PAUL on CKD3:  Dr. Shin Dasilva onboard  CAD/ HTN/ HLD/ Mod to Severe AS/ Mild to mod PHTN:  Dr. Ileana Gonzalez onboard  11/15-Limited Echo: EF 55-60%, Mod to severe AS, mild to mod AR, mild to mod TR.   OA/ Eczema/ Gout:  Hypothyroidism:  Allergy to cephalexin (rash)  Multi-morbidity: per PMHx:  s/p hyster, colonoscopy  Plan:  Continue IV meropenem 500 mg q12h (renal dosing), plan to treat x 14 days total of ABX therapy  Trend CRP and Pct, ordered  Await influenza antigens  Await MRSA screen   Await Resp culture     Ongoing Antimicrobial Therapy  Meropenem ? Completed Antimicrobial Therapy  Levofloxacin -?? History:? Interval history noted. Chief complaint: pyelonephritis. Multifocal pneumonia with acute hypoxic respiratory failure. Denies n/v/d/f or untoward effects of antibiotics. Physical Exam:  Vital Signs: BP (!) 135/56   Pulse 65   Temp 100 °F (37.8 °C) (Oral)   Resp 20   Ht 5' (1.524 m)   Wt 121 lb 4.1 oz (55 kg)   LMP  (LMP Unknown)   SpO2 95%   BMI 23.68 kg/m²     Gen: somnolent in the chair, no distress. Chest: no distress and CTA.  Posterior breath sounds with bibasilar fine crackles. Oxygen per HFNC  Heart: NSR and no MRG. Abd: soft, non-distended, no tenderness, no hepatomegaly. Normoactive bowel sounds. Bilateral CVA tenderness present. Ext: no clubbing, cyanosis, or edema  Neuro: Mental status intact. CN 2-12 intact and no focal sensory or motor deficits     Radiologic / Imaging / TESTING  11/14/22 XR Chest Portable:  Impression   Cardiomegaly       Bilateral ill-defined pulmonary opacities could represent multifocal   pneumonia or edema      11/14/22 CT Chest WO Contrast:  Impression   Bilateral pulmonary infiltrates and bilateral pleural effusions with probable   reactive mediastinal adenopathy       Probable granuloma in the right middle lobe. No follow-up imaging   recommended. 11/14/22 CT Chest WO Contrast:  Impression   Bilateral pulmonary infiltrates and bilateral pleural effusions with probable   reactive mediastinal adenopathy       Probable granuloma in the right middle lobe. No follow-up imaging   recommended. 11/15/22 Limited Echo:   Summary   Left ventricular systolic function is normal.   Ejection fraction is visually estimated at 55-60%. Moderately dilated left atrium. Moderate to severe aortic stenosis is present; Mean PG 40mmHg, YANIRA 0.99 cm2   Mitral annular calcification is present. Mild to moderate mitral regurgitation. Mild to moderate tricuspid regurgitation; RVSP: 44 mmHg. No evidence of any pericardial effusion. 11/16/22 XR Chest Portable:  Impression   Extensive bilateral airspace opacities. Suspected small bilateral pleural effusions. 11/17/22 CT Abdomen Pelvis WO Contrast:  Impression   1. Negative for urinary tract stones. 2. Consolidation in the lower lobes and bilateral pleural effusions.      11/19/22 XR Chest Portable:    Labs:    Recent Results (from the past 24 hour(s))   Basic Metabolic Panel    Collection Time: 11/18/22  9:28 AM   Result Value Ref Range    Sodium 133 (L) 135 - 145 MMOL/L Potassium 4.9 3.5 - 5.1 MMOL/L    Chloride 101 99 - 110 mMol/L    CO2 24 21 - 32 MMOL/L    Anion Gap 8 4 - 16    BUN 57 (H) 6 - 23 MG/DL    Creatinine 2.5 (H) 0.6 - 1.1 MG/DL    Est, Glom Filt Rate 17 (L) >60 mL/min/1.73m2    Glucose 92 70 - 99 MG/DL    Calcium 8.9 8.3 - 10.6 MG/DL   CBC with Auto Differential    Collection Time: 11/18/22  9:28 AM   Result Value Ref Range    WBC 11.5 (H) 4.0 - 10.5 K/CU MM    RBC 2.82 (L) 4.2 - 5.4 M/CU MM    Hemoglobin 8.9 (L) 12.5 - 16.0 GM/DL    Hematocrit 26.7 (L) 37 - 47 %    MCV 94.7 78 - 100 FL    MCH 31.6 (H) 27 - 31 PG    MCHC 33.3 32.0 - 36.0 %    RDW 13.3 11.7 - 14.9 %    Platelets 265 510 - 763 K/CU MM    MPV 8.9 7.5 - 11.1 FL    Differential Type AUTOMATED DIFFERENTIAL     Segs Relative 70.5 (H) 36 - 66 %    Lymphocytes % 16.5 (L) 24 - 44 %    Monocytes % 6.9 (H) 0 - 4 %    Eosinophils % 4.8 (H) 0 - 3 %    Basophils % 0.5 0 - 1 %    Segs Absolute 8.1 K/CU MM    Lymphocytes Absolute 1.9 K/CU MM    Monocytes Absolute 0.8 K/CU MM    Eosinophils Absolute 0.6 K/CU MM    Basophils Absolute 0.1 K/CU MM    Nucleated RBC % 0.0 %    Total Nucleated RBC 0.0 K/CU MM    Total Immature Neutrophil 0.09 K/CU MM    Immature Neutrophil % 0.8 (H) 0 - 0.43 %   C-Reactive Protein    Collection Time: 11/18/22  9:28 AM   Result Value Ref Range    CRP High Sensitivity 122.9 (H) <5.0 mg/L     CULTURE results: Invalid input(s): BLOOD CULTURE,  URINE CULTURE, SURGICAL CULTURE    Diagnosis:  Patient Active Problem List   Diagnosis    Hypertension    GERD (gastroesophageal reflux disease)    Depression    Hyperlipidemia    Generalized osteoarthritis    Osteoporosis    Hypothyroid    Coronary artery disease involving native heart without angina pectoris    Eczema    Low back pain    Hyperuricemia    Gout    Pruritic condition    PAUL (acute kidney injury) (HCC)    Hyponatremia    Cystitis    Physical deconditioning    Bradycardia    LBBB (left bundle branch block)    Constipation, slow transit 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    Chronic renal impairment, stage 3 (moderate) (HCC)    Dizziness    Labyrinthitis    Vertigo    Primary osteoarthritis of left foot    Closed nondisplaced fracture of fourth metatarsal bone of left foot    Closed nondisplaced fracture of third metatarsal bone of left foot    Closed nondisplaced fracture of fifth left metatarsal bone    Vaginal prolapse    Sinus congestion    Localized edema    Sepsis (HCC)    Dyspnea and respiratory abnormalities    Pneumonia due to infectious organism    Pyelonephritis    Chronic kidney disease       Active Problems  Principal Problem:    Sepsis ([facility] [address]  Active Problems:    PAUL (acute kidney injury) ([address]    Dyspnea and respiratory abnormalities    Pneumonia due to infectious organism    Pyelonephritis    Chronic kidney disease  Resolved Problems:    * No resolved hospital problems. *    Electronically signed by: Electronically signed by Tatum Manriquez.  CLEMENT Corley CNP on 11/18/2022 at 2:00 PM

## 2022-11-18 NOTE — PROGRESS NOTES
Pulmonary and Critical Care  Progress Note      VITALS:  BP (!) 135/56   Pulse 65   Temp 100 °F (37.8 °C) (Oral)   Resp 20   Ht 5' (1.524 m)   Wt 121 lb 4.1 oz (55 kg)   LMP  (LMP Unknown)   SpO2 95%   BMI 23.68 kg/m²     Subjective:   CHIEF COMPLAINT :SOB     HPI:                The patient is a 80 y.o. female is sitting in the chair. She is in mild resp distress. She is not being considered for the Dialysis. She has good urine output now. Objective:   PHYSICAL EXAM:    LUNGS:Basal crackles  Abd-soft, BS+,NT  Ext- no pedal edema  CVS-s1s2, no murmurs      DATA:    CBC:  Recent Labs     11/16/22  0550 11/17/22  0541 11/18/22 0928   WBC 16.7* 14.8* 11.5*   RBC 2.96* 2.77* 2.82*   HGB 9.5* 8.7* 8.9*   HCT 28.1* 26.2* 26.7*    385 422   MCV 94.9 94.6 94.7   MCH 32.1* 31.4* 31.6*   MCHC 33.8 33.2 33.3   RDW 13.8 13.6 13.3   SEGSPCT 76.8* 72.9* 70.5*      BMP:  Recent Labs     11/16/22  0550 11/17/22 0541 11/18/22 0928   * 132* 133*   K 4.6 4.4 4.9    99 101   CO2 23 23 24   BUN 42* 56* 57*   CREATININE 2.2* 2.7* 2.5*   CALCIUM 9.0 8.8 8.9   GLUCOSE 114* 90 92      ABG:  No results for input(s): PH, PO2ART, RAD3TCO, HCO3, BEART, O2SAT in the last 72 hours.   BNP  Lab Results   Component Value Date     (H) 05/09/2012      D-Dimer:  Lab Results   Component Value Date    LIIAPK 571 (H) 01/07/2018      Radiology: None      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 08/18/2018     Priority: High    Bradycardia      Priority: High    LBBB (left bundle branch block)      Priority: High    PAUL (acute kidney injury) ([address] 12/26/2017     Priority: High    Pneumonia due to infectious organism 11/16/2022     Priority: Medium    Pyelonephritis 11/16/2022     Priority: Medium    Chronic kidney disease 11/16/2022     Priority: Medium    Dyspnea and respiratory abnormalities 11/15/2022     Priority: Medium    Sepsis ([facility] 11/14/2022     Priority: Medium    Localized edema 07/11/2022     Priority: Medium    GERD (gastroesophageal reflux disease)      Priority: Medium     Overview Note:     Esophagitis      Hypertension      Priority: Low    Hypothyroid      Priority: Low     Overview Note:     Hypothyroidism      Sinus congestion 03/23/2022     Overview Note:     CHRONIC W POST NASAL DRIP      Vaginal prolapse 09/07/2021    Primary osteoarthritis of left foot 08/30/2021    Closed nondisplaced fracture of fourth metatarsal bone of left foot 08/30/2021    Closed nondisplaced fracture of third metatarsal bone of left foot 08/30/2021    Closed nondisplaced fracture of fifth left metatarsal bone 08/30/2021    Vertigo      Overview Note:      [facility]      Dizziness     Labyrinthitis     Chronic renal impairment, stage 3 (moderate) ([facility])     Constipation, slow transit 06/19/2018    Cystitis     Physical deconditioning     Hyponatremia 01/01/2018    Pruritic condition 01/22/2016    Gout     Hyperuricemia     Low back pain     Eczema     Coronary artery disease involving native heart without angina pectoris      Overview Note:     Dr Rudi Figueredo/Jose Figueredo      Depression     Hyperlipidemia     Generalized osteoarthritis     Osteoporosis      Overview Note:     Generalized     Acute Hypoxic resp failure sec to Cardiogenic and Non Cardiogenic Pulmonary edema  Small bilateral Pleural effusions  PAUL on CKD  Leukocytosis- Lymphocytic predominant  Severe AS  Mild to Moderate Pulmonary HTN  CAD  H/o Recurrent UTI  GERD  HLD  ?  Atypical pneumonia  PAUL-slight improvement       Abx  F/u C&S  No diuretics for now  No HD per family  ICS  OOB  Keep sats > 92%  BMP in am  CXR in am  C.w present management    Electronically signed by Mallory Molina MD on 11/18/2022 at 12:11 PM

## 2022-11-18 NOTE — PROGRESS NOTES
Speech Language Pathology  Facility/Department: [facility]   CLINICAL BEDSIDE SWALLOW EVALUATION    NAME: Orlin Ocampo  : 1927  MRN: [id number]    ADMISSION DATE: 2022  ADMITTING DIAGNOSIS: has Hypertension; GERD (gastroesophageal reflux disease); Depression; Hyperlipidemia; Generalized osteoarthritis; Osteoporosis; Hypothyroid; Coronary artery disease involving native heart without angina pectoris; Eczema; Low back pain; Hyperuricemia; Gout; Pruritic condition; PAUL (acute kidney injury) ([address] Hyponatremia; Cystitis; Physical deconditioning; Bradycardia; LBBB (left bundle branch block); Constipation, slow transit; 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; Chronic renal impairment, stage 3 (moderate) ([address] Dizziness; Labyrinthitis; Vertigo; Primary osteoarthritis of left foot; Closed nondisplaced fracture of fourth metatarsal bone of left foot; Closed nondisplaced fracture of third metatarsal bone of left foot; Closed nondisplaced fracture of fifth left metatarsal bone; Vaginal prolapse; Sinus congestion; Localized edema; Sepsis ([address] Dyspnea and respiratory abnormalities; Pneumonia due to infectious organism; Pyelonephritis; and Chronic kidney disease on their problem list.      Impressions: Orlin Ocampo was seen for a bedside swallowing evaluation after being [facility] to [facility] with [facility]. Pt was alert and cooperative throughout assessment. Relevant medical hx includes GERD, hypertension, CAD and hypothyroidism. Pt reports occasional difficulty swallowing. Pt was positioned upright in bed and presented with a clear vocal quality and strong volitional cough. Oral mechanism examination was WLF with no focal orofacial weakness. PO trials of puree, soft/regular solids and thin liquids by cup/straw sips were given. Mild oral dysphagia was observed characterized by prolonged mastication with trials of regular solids.   Suspect mild pharyngeal dysphagia characterized by intermittently delayed swallow initiation with adequate laryngeal elevation. Pt demonstrated an immediate cough with thin liquids by cup sips. Clear vocal quality and 0 overt s/s of aspiration were observed with trials of thin liquids by straw sips, puree and soft/regular solids. Recommend soft and bite sized solids/thin liquids by straw sips with strict aspiration precautions. SLP will continue to follow. ONSET DATE: this admission   Date of Eval: 11/18/2022  Evaluating Therapist: CHILO Troy    Current Diet level:  Current Diet : Regular      Primary Complaint  Patient Complaint: weakness    Pain:  Pain Assessment  Pain Assessment: 0-10  Pain Level: 0  Joe-Combs Pain Rating: Hurts worst  Patient's Stated Pain Goal: 0 - No pain  Pain Location: Chest  Pain Orientation: Right, Left, Lower  Pain Frequency: Continuous  Pain Onset: On-going  Multiple Pain Sites: Yes    Reason for Referral  Leonie Nguyen was referred for a bedside swallow evaluation to assess the efficiency of her swallow function, identify signs and symptoms of aspiration and make recommendations regarding safe dietary consistencies, effective compensatory strategies, and safe eating environment. Impression  Dysphagia Diagnosis: Mild oral stage dysphagia;Mild pharyngeal stage dysphagia  Dysphagia Outcome Severity Scale: Level 6: Within functional limits/Modified independence     Treatment Plan  Requires SLP Intervention: Yes  Duration of Treatment: 1-2 xs weekly  D/C Recommendations: To be determined       Recommended Diet and Intervention        Recommended Form of Meds: PO     Therapeutic Interventions: Diet tolerance monitoring    Compensatory Swallowing Strategies  Compensatory Swallowing Strategies : Upright as possible for all oral intake;Eat/Feed slowly; Small bites/sips    Treatment/Goals  Short-term Goals  Timeframe for Short-term Goals: LOS or until goals are met  Goal 1: Pt will tolerate soft and bite sized solids/thin liquids by straw

## 2022-11-18 NOTE — PROGRESS NOTES
Physical Therapy    Physical Therapy Treatment Note  Name: John Pinon MRN: [id number] :   1927   Date:  2022   Admission Date: 2022 Room:  [address] [place] [address]   Restrictions/Precautions:          fall risk; general precautions    Subjective:  Patient states:  \"I need to go to the bathroom\"  Pain:   Location, Type, Intensity (0/10 to 10/10):  denies; 0/10    Objective:    Observation:  pt sitting upright in chair upon entry      Treatment, including education/measures:  Pt agreeable to participating in therapy at this time. Therapeutic Activity Training:   Therapeutic activity training was instructed today. Cues were given for safety, sequence, UE/LE placement, awareness, and balance. Activities performed today included bed mobility training, sup-sit, sit-stand. Pt completed sit to stand from chair with modAx1 with verbal cues to push through chair and avoid pulling on walker. Pt completed stand to sit onto toilet with modAx1 with verbal cues to feel toilet against back of legs, reach back, and sit slowly. Pt completed sit to stand from toilet with modAx1 with verbal cues to push through grab bar and avoid pulling on walker. Pt completed stand to sit onto chair with modAx1 with verbal cues to feel chair against back of legs, reach back, and sit slowly. Increased time required for all task completion; patient required assistance with doffing/donning brief between toilet transfers. Gait Training:  Cues were given for safety, sequence, device management, balance, posture, awareness, path. Pt ambulated 10 feet + 10 feet + 30 feet + 30 feet with minAx1 with a 4 wheeled walker with a decreased gait speed, a decreased step length bilaterally, and an unsteady gait. Pt provided with verbal and tactile cues for BLE placement, walker placement, and sequence throughout ambulation.   Pt provided with verbal and tactile cues to maintain upright posture in order to avoid COM shifting outside of ANGEL.  Pt provided with verbal cues to practice pursed lip breathing throughout ambulation. Pt provided with verbal cues for directions in order to successfully navigate hallway and return to correct room. Safety  Patient left safely in the chair, with call light/phone in reach with alarm applied. Gait belt was used for transfers and gait. Assessment / Impression:    Patient's tolerance of treatment:  good; patient tolerated ambulation in room/bathroom/hallway today  Adverse Reaction: none  Significant change in status and impact:  none  Barriers to improvement: none    Plan for Next Session:    Continue progressing toward goals per plan of care. Progress independence with transfers and ambulation as tolerated and appropriate. Progress ambulation distance as tolerated and appropriate.       Time in:  1223  Time out:  1317  Timed treatment minutes:  54  Total treatment time:  47    Previously filed items:  Social/Functional History  Lives With: Alone (pt has home health aide daily for 8 hours/day, also supportive granddaughter)  Type of Home: Apartment  Home Layout: One level  Home Access: Level entry  Bathroom Shower/Tub: Walk-in shower  Bathroom Toilet: Handicap height  Bathroom Equipment: Built-in shower seat, Grab bars in shower, Commode  Bathroom Accessibility: Accessible  Home Equipment: Walker, 4 wheeled, Lift chair  ADL Assistance: Independent (aide supervises with bathing but pt able to manage per granddaughter, pt able to dress and toilet independently)  [address]: Needs assistance  Homemaking Responsibilities: No (home health aide manages)  Ambulation Assistance: Independent (mod I with 4ww household distances)  Transfer Assistance: Independent  Active : No  Occupation: Retired        Short Term Goals  Time Frame for Short Term Goals: 1 week  Short Term Goal 1: Pt will perform all bed mobility tasks with SBA, min cues  Short Term Goal 2: Pt will manage all compenents of rollator for STS with SBA.   Short Term Goal 3: Pt will AMB x45 ft with RW, stable 02, CGA-SBA for safety    Electronically signed by:      Bree Bran PT, DPT  License #: [id number]

## 2022-11-18 NOTE — PROGRESS NOTES
V2.0  [facility] [place]      Name:  Escobar Jean /Age/Sex: 1927  (80 y.o. female)   MRN & CSN:  [id number] & [id number] Encounter Date/Time: 2022 6:37 PM EST    Location:  [facility]/3106- PCP: Cheryle Burton MD       Hospital Day: 5    Assessment and Plan: Escobar Jean is a 80 y.o. female with pmh of CAd, GERD, UTI, Hypothyroidism, HTN, Hyperlipedmia, CKD  who presents with Sepsis ([facility] [address]      Plan:    Acute hypoxic respiratory failure from CAP and Pulmonary edema: Sepsis resolving. Unable to taper down O2 demand. No o2 at home. Blood cx negative. Pro-Cody 0.2. CXR Shows -Cardiomegaly with pulmonary edema and concern for pneumonia. Echo: EF 55-60. RVSP 44. Respiratory panel neg. CT Chest showed bilateral infiltrate. Continue Levaquin. Pulmonology following. Repeat chest x-ray in a.m. Continue Meropenem. Duo-neb. Diuresis on hold. PAUL with Hx of Renal insuffiencey:  Likely in the setting of diuresis. Base line around 2. Plan to hold diuresis and monitor. Dr. Jun Samuel following. Cr better today BMP daily. Strict I/O. Family denied HD. Pyelonephritis - H/O Recurrent UTI: Treated with several rounds of ATB x 1 month (Including Cipro/ doxy/ Macrobid ). Leucocytosis still present. Dirty UA. Patient does not complain of any dysuria. Urine culture growing Enterococcus fecalis - sensitivity pending. ID consulted (Off note - patient is not allergic penicillin). Levaquin switched to Meropenem. CT abdomen pelvis negative for stones. Meropenem for total of 14 days     Hypomagnesia:    Resolved    Acute on chronic systolic CHF:   For now IV Lasix intermittently. Strict I's/O. Daily weight     CAD - Most recent heart cath . most recent echo -2018; EF 50%. PBNP 6,417. EKG shows LBBB, NSR; No ST elevation. Elevated troponin likely from CKD. Repeat troponin showed downtrend. On atenolol, norvasc, olmesartan, Imdur, statin, ASA. Cardiology following.       Severe AS: follow up at [place] for TAVR-balloon valvuloplasty      Hypertension -- Continue home Imdur,atenolol, Norvasc, olmesartan    Anemia - Hb Stable. Needs OP work up for anemia. GERD:   continue PPI    PT/OT recommended ARU      Diet ADULT DIET; Dysphagia - Soft and Bite Sized; 1500 ml   DVT Prophylaxis [] Lovenox, []  Heparin, [] SCDs, [] Ambulation,  [] Eliquis, [] Xarelto  [] Coumadin   Code Status Limited   Disposition From: Home  Expected Disposition: Home  Estimated Date of Discharge: 2 Days  Patient requires continued admission due to CAP   Surrogate Decision Maker/ POA      Subjective:     Chief Complaint: Shortness of Breath       Sana Lechuga is a 80 y.o. female who presents with SOB with productive cough      Patient complains of excessive fatigue and SOB associated with productive cough. Denies any fever, Chest pain, headache, dizziness. Bowel and bladder habits normal. Currently denies any dysuria. Was treated with multiple Abx in the past for recurrent UTI         Review of Systems:    Review of Systems    All other systems reviewed and are negative. Objective:   No intake or output data in the 24 hours ending 11/18/22 1805       Vitals:   Vitals:    11/18/22 1556   BP: (!) 142/67   Pulse:    Resp:    Temp: 98.2 °F (36.8 °C)   SpO2: 93%       Physical Exam:   Looks comfortable but on 6 L supplement oxygen  General: NAD  Eyes: EOMI  ENT: neck supple  Cardiovascular: Regular rate. Respiratory: Coarse crackles  Gastrointestinal: Soft, non tender  Genitourinary: no suprapubic tenderness  Musculoskeletal: No edema  Skin: warm, dry  Neuro: Alert. Psych: Mood appropriate.      Medications:   Medications:    polyethylene glycol  17 g Oral Daily    senna  1 tablet Oral BID    docusate sodium  100 mg Oral Daily    ipratropium-albuterol  1 ampule Inhalation BID    lactobacillus  1 capsule Oral Daily with breakfast    magnesium oxide  400 mg Oral BID    meropenem  500 mg IntraVENous Q12H    amLODIPine  10 mg Oral Segs Relative 70.5 (H) 36 - 66 %    Lymphocytes % 16.5 (L) 24 - 44 %    Monocytes % 6.9 (H) 0 - 4 %    Eosinophils % 4.8 (H) 0 - 3 %    Basophils % 0.5 0 - 1 %    Segs Absolute 8.1 K/CU MM    Lymphocytes Absolute 1.9 K/CU MM    Monocytes Absolute 0.8 K/CU MM    Eosinophils Absolute 0.6 K/CU MM    Basophils Absolute 0.1 K/CU MM    Nucleated RBC % 0.0 %    Total Nucleated RBC 0.0 K/CU MM    Total Immature Neutrophil 0.09 K/CU MM    Immature Neutrophil % 0.8 (H) 0 - 0.43 %   C-Reactive Protein    Collection Time: 11/18/22  9:28 AM   Result Value Ref Range    CRP High Sensitivity 122.9 (H) <5.0 mg/L        Imaging/Diagnostics Last 24 Hours   CT CHEST WO CONTRAST    Result Date: 11/14/2022  EXAMINATION: CT OF THE CHEST WITHOUT CONTRAST 11/14/2022 7:33 pm TECHNIQUE: CT of the chest was performed without the administration of intravenous contrast. Multiplanar reformatted images are provided for review. Automated exposure control, iterative reconstruction, and/or weight based adjustment of the mA/kV was utilized to reduce the radiation dose to as low as reasonably achievable. COMPARISON: 01/06/2018 HISTORY: ORDERING SYSTEM PROVIDED HISTORY: sob TECHNOLOGIST PROVIDED HISTORY: Reason for exam:->sob Decision Support Exception - unselect if not a suspected or confirmed emergency medical condition->Emergency Medical Condition (MA) Reason for Exam: sob FINDINGS: Mediastinum: Calcific atherosclerotic disease in the aorta. The heart size is within normal limits. The esophagus is grossly unremarkable without hiatal hernia. There are mediastinal lymph nodes measuring up to 1.6 cm in short axis diameter likely reactive in nature. There are bilateral pleural effusions layering up to 3 cm on the right and 1 cm on the left. There is bilateral patchy pulmonary infiltrates.   The previously seen pulmonary nodule in the right middle lobe appears slightly larger, however there is a central calcification and this most likely represents a granuloma. Lungs/pleura: As above Upper Abdomen: There is a cyst in the upper pole the left kidney and the remainder of the visualized upper abdominal organs are unremarkable Soft Tissues/Bones: Degenerative change     Bilateral pulmonary infiltrates and bilateral pleural effusions with probable reactive mediastinal adenopathy Probable granuloma in the right middle lobe. No follow-up imaging recommended. XR CHEST PORTABLE    Result Date: 11/14/2022  EXAMINATION: ONE XRAY VIEW OF THE CHEST 11/14/2022 3:17 pm COMPARISON: 08/18/2018 HISTORY: ORDERING SYSTEM PROVIDED HISTORY: sob TECHNOLOGIST PROVIDED HISTORY: Reason for exam:->sob Reason for Exam: sob FINDINGS: Cardiomegaly. Ill-defined pulmonary opacities. Bibasilar hypoaeration. No pneumothorax.      Cardiomegaly Bilateral ill-defined pulmonary opacities could represent multifocal pneumonia or edema       Electronically signed by Gennaro Pacheco MD on 11/18/2022 at 6:05 PM

## 2022-11-19 ENCOUNTER — APPOINTMENT (OUTPATIENT)
Dept: GENERAL RADIOLOGY | Age: 87
DRG: 871 | End: 2022-11-19
Payer: COMMERCIAL

## 2022-11-19 LAB
ANION GAP SERPL CALCULATED.3IONS-SCNC: 9 MMOL/L (ref 4–16)
BUN BLDV-MCNC: 55 MG/DL (ref 6–23)
C-REACTIVE PROTEIN, HIGH SENSITIVITY: 85.1 MG/L
CALCIUM SERPL-MCNC: 9.2 MG/DL (ref 8.3–10.6)
CHLORIDE BLD-SCNC: 100 MMOL/L (ref 99–110)
CO2: 25 MMOL/L (ref 21–32)
CREAT SERPL-MCNC: 2.2 MG/DL (ref 0.6–1.1)
CULTURE: NORMAL
GFR SERPL CREATININE-BSD FRML MDRD: 20 ML/MIN/1.73M2
GLUCOSE BLD-MCNC: 91 MG/DL (ref 70–99)
Lab: NORMAL
POTASSIUM SERPL-SCNC: 5.4 MMOL/L (ref 3.5–5.1)
SODIUM BLD-SCNC: 134 MMOL/L (ref 135–145)
SPECIMEN: NORMAL

## 2022-11-19 PROCEDURE — 86140 C-REACTIVE PROTEIN: CPT

## 2022-11-19 PROCEDURE — 92526 ORAL FUNCTION THERAPY: CPT

## 2022-11-19 PROCEDURE — 6370000000 HC RX 637 (ALT 250 FOR IP)

## 2022-11-19 PROCEDURE — 6360000002 HC RX W HCPCS

## 2022-11-19 PROCEDURE — 6360000002 HC RX W HCPCS: Performed by: NURSE PRACTITIONER

## 2022-11-19 PROCEDURE — 94761 N-INVAS EAR/PLS OXIMETRY MLT: CPT

## 2022-11-19 PROCEDURE — 2140000000 HC CCU INTERMEDIATE R&B

## 2022-11-19 PROCEDURE — 6370000000 HC RX 637 (ALT 250 FOR IP): Performed by: INTERNAL MEDICINE

## 2022-11-19 PROCEDURE — 2580000003 HC RX 258: Performed by: NURSE PRACTITIONER

## 2022-11-19 PROCEDURE — 2580000003 HC RX 258

## 2022-11-19 PROCEDURE — 36415 COLL VENOUS BLD VENIPUNCTURE: CPT

## 2022-11-19 PROCEDURE — 94640 AIRWAY INHALATION TREATMENT: CPT

## 2022-11-19 PROCEDURE — 71045 X-RAY EXAM CHEST 1 VIEW: CPT

## 2022-11-19 PROCEDURE — 2700000000 HC OXYGEN THERAPY PER DAY

## 2022-11-19 PROCEDURE — 80048 BASIC METABOLIC PNL TOTAL CA: CPT

## 2022-11-19 PROCEDURE — 6370000000 HC RX 637 (ALT 250 FOR IP): Performed by: STUDENT IN AN ORGANIZED HEALTH CARE EDUCATION/TRAINING PROGRAM

## 2022-11-19 PROCEDURE — 99232 SBSQ HOSP IP/OBS MODERATE 35: CPT | Performed by: INTERNAL MEDICINE

## 2022-11-19 PROCEDURE — 94150 VITAL CAPACITY TEST: CPT

## 2022-11-19 PROCEDURE — 84145 PROCALCITONIN (PCT): CPT

## 2022-11-19 RX ORDER — GUAIFENESIN 600 MG/1
600 TABLET, EXTENDED RELEASE ORAL 2 TIMES DAILY
Status: COMPLETED | OUTPATIENT
Start: 2022-11-19 | End: 2022-11-20

## 2022-11-19 RX ADMIN — DOCUSATE SODIUM 100 MG: 100 CAPSULE, LIQUID FILLED ORAL at 09:15

## 2022-11-19 RX ADMIN — IPRATROPIUM BROMIDE AND ALBUTEROL SULFATE 1 AMPULE: .5; 2.5 SOLUTION RESPIRATORY (INHALATION) at 07:33

## 2022-11-19 RX ADMIN — Medication 1 DROP: at 22:13

## 2022-11-19 RX ADMIN — MAGNESIUM OXIDE TAB 400 MG (241.3 MG ELEMENTAL MG) 400 MG: 400 (241.3 MG) TAB at 22:11

## 2022-11-19 RX ADMIN — SODIUM ZIRCONIUM CYCLOSILICATE 10 G: 10 POWDER, FOR SUSPENSION ORAL at 16:34

## 2022-11-19 RX ADMIN — SODIUM CHLORIDE, PRESERVATIVE FREE 10 ML: 5 INJECTION INTRAVENOUS at 09:31

## 2022-11-19 RX ADMIN — AMLODIPINE BESYLATE 10 MG: 10 TABLET ORAL at 22:13

## 2022-11-19 RX ADMIN — GUAIFENESIN 600 MG: 600 TABLET, EXTENDED RELEASE ORAL at 22:12

## 2022-11-19 RX ADMIN — RANOLAZINE 500 MG: 500 TABLET, FILM COATED, EXTENDED RELEASE ORAL at 22:12

## 2022-11-19 RX ADMIN — SODIUM BICARBONATE 325 MG: 650 TABLET ORAL at 22:12

## 2022-11-19 RX ADMIN — Medication 2000 UNITS: at 09:12

## 2022-11-19 RX ADMIN — RANOLAZINE 500 MG: 500 TABLET, FILM COATED, EXTENDED RELEASE ORAL at 09:09

## 2022-11-19 RX ADMIN — PANTOPRAZOLE SODIUM 40 MG: 40 TABLET, DELAYED RELEASE ORAL at 09:06

## 2022-11-19 RX ADMIN — SERTRALINE HYDROCHLORIDE 50 MG: 50 TABLET ORAL at 09:14

## 2022-11-19 RX ADMIN — HEPARIN SODIUM 5000 UNITS: 5000 INJECTION INTRAVENOUS; SUBCUTANEOUS at 09:06

## 2022-11-19 RX ADMIN — Medication 1 DROP: at 16:30

## 2022-11-19 RX ADMIN — ISOSORBIDE MONONITRATE 60 MG: 60 TABLET, EXTENDED RELEASE ORAL at 09:08

## 2022-11-19 RX ADMIN — ATENOLOL 25 MG: 25 TABLET ORAL at 16:38

## 2022-11-19 RX ADMIN — POLYETHYLENE GLYCOL (3350) 17 G: 17 POWDER, FOR SOLUTION ORAL at 09:16

## 2022-11-19 RX ADMIN — SENNOSIDES 8.6 MG: 8.6 TABLET, FILM COATED ORAL at 22:11

## 2022-11-19 RX ADMIN — HEPARIN SODIUM 5000 UNITS: 5000 INJECTION INTRAVENOUS; SUBCUTANEOUS at 16:37

## 2022-11-19 RX ADMIN — SODIUM BICARBONATE 325 MG: 650 TABLET ORAL at 09:10

## 2022-11-19 RX ADMIN — Medication 1 CAPSULE: at 09:10

## 2022-11-19 RX ADMIN — ATORVASTATIN CALCIUM 20 MG: 10 TABLET, FILM COATED ORAL at 22:12

## 2022-11-19 RX ADMIN — MEROPENEM 500 MG: 500 INJECTION, POWDER, FOR SOLUTION INTRAVENOUS at 11:55

## 2022-11-19 RX ADMIN — CLOPIDOGREL BISULFATE 75 MG: 75 TABLET ORAL at 09:15

## 2022-11-19 RX ADMIN — GUAIFENESIN 600 MG: 600 TABLET, EXTENDED RELEASE ORAL at 09:51

## 2022-11-19 RX ADMIN — SODIUM ZIRCONIUM CYCLOSILICATE 10 G: 10 POWDER, FOR SUSPENSION ORAL at 09:50

## 2022-11-19 RX ADMIN — SODIUM CHLORIDE, PRESERVATIVE FREE 10 ML: 5 INJECTION INTRAVENOUS at 22:13

## 2022-11-19 RX ADMIN — HEPARIN SODIUM 5000 UNITS: 5000 INJECTION INTRAVENOUS; SUBCUTANEOUS at 22:12

## 2022-11-19 RX ADMIN — SENNOSIDES 8.6 MG: 8.6 TABLET, FILM COATED ORAL at 09:14

## 2022-11-19 RX ADMIN — SODIUM ZIRCONIUM CYCLOSILICATE 10 G: 10 POWDER, FOR SUSPENSION ORAL at 22:11

## 2022-11-19 RX ADMIN — LEVOTHYROXINE SODIUM 88 MCG: 0.09 TABLET ORAL at 09:06

## 2022-11-19 RX ADMIN — MAGNESIUM OXIDE TAB 400 MG (241.3 MG ELEMENTAL MG) 400 MG: 400 (241.3 MG) TAB at 09:12

## 2022-11-19 ASSESSMENT — PAIN SCALES - GENERAL
PAINLEVEL_OUTOF10: 0
PAINLEVEL_OUTOF10: 0

## 2022-11-19 ASSESSMENT — PAIN SCALES - WONG BAKER: WONGBAKER_NUMERICALRESPONSE: 10

## 2022-11-19 NOTE — PROGRESS NOTES
Cardiology Progress Note       Marlene Back is a 80 y.o. female   1/23/1927     SUBJECTIVE:   Patient seen and examined says she feels better no chest pain  OBJECTIVE:    Review of Systems:  General appearance: alert, appears stated age and cooperative  Skin: Skin color, texture, normal. No rashes or lesions  HEENT: No nose bleed, headache, vision problems  CV: C/O chest pain, tightness, pressure,   Respiratory: C/o no SOB, RENDON, Orthopnea, PND  GI: No abdominal pain, black stool, bloating  Limbs: No c/o edema, pain, swelling, intermittent claudication, joint pains  Neuro: No dizziness, lightheadedness, syncope, gait problems, memory problems  Psych: grossly normal. No SI/depression. Vitals:   Blood pressure 121/63, pulse 72, temperature 97.2 °F (36.2 °C), temperature source Oral, resp. rate 22, height 5' (1.524 m), weight 121 lb 4.1 oz (55 kg), SpO2 98 %, not currently breastfeeding.     HEENT: AT, NC, PERRLA  Neck: No JVD  Heart: S1 S2 audible, no murmur   Lungs: CTA   Abdomen: Nontender   Limbs: No edema   CNS: no focal deficit      Past Medical History:   Diagnosis Date    CAD (coronary artery disease)     Dr Edgardo Figueredo/Jose Figueredo    Chronic renal insufficiency     Dr Maday Sosa    Depression     Dry eye syndrome     Dr Rogelio Bates     Elevated LFTs 05/11/2012    Generalized osteoarthritis     GERD (gastroesophageal reflux disease)     Esophagitis    Gout     Hyperlipidemia     Hypertension     Hyperuricemia     Hypothyroid     Hypothyroidism    Low back pain     Osteoporosis     Generalized    Pneumonia due to infectious organism 11/16/2022    Sinus congestion     CHRONIC W POST NASAL DRIP    UTI (urinary tract infection)     Vertigo     Dr Pearletha Gilford        Patient Active Problem List   Diagnosis    Hypertension    GERD (gastroesophageal reflux disease)    Depression    Hyperlipidemia    Generalized osteoarthritis    Osteoporosis    Hypothyroid    Coronary artery disease involving native heart without angina pectoris    Eczema    Low back pain    Hyperuricemia    Gout    Pruritic condition    PAUL (acute kidney injury) ([facility] [address]    Hyponatremia    Cystitis    Physical deconditioning    Bradycardia    LBBB (left bundle branch block)    Constipation, slow transit    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    Chronic renal impairment, stage 3 (moderate) ([facility])    Dizziness    Labyrinthitis    Vertigo    Primary osteoarthritis of left foot    Closed nondisplaced fracture of fourth metatarsal bone of left foot    Closed nondisplaced fracture of third metatarsal bone of left foot    Closed nondisplaced fracture of fifth left metatarsal bone    Vaginal prolapse    Sinus congestion    Localized edema    Sepsis ([facility])    Dyspnea and respiratory abnormalities    Pneumonia due to infectious organism    Pyelonephritis    Chronic kidney disease        Allergies   Allergen Reactions    Bactrim [Sulfamethoxazole-Trimethoprim] Nausea And Vomiting    Cephalexin Rash    Tape Elwanda Busing Tape] Itching        Current Inpatient Medications:    Current Facility-Administered Medications   Medication Dose Route Frequency Provider Last Rate Last Admin    sodium zirconium cyclosilicate (LOKELMA) oral suspension 10 g  10 g Oral TID [facility] [place], DO   10 g at 11/19/22 0950    guaiFENesin (MUCINEX) extended release tablet 600 mg  600 mg Oral BID [facility], [facility]   600 mg at 11/19/22 0951    polyethylene glycol (GLYCOLAX) packet 17 g  17 g Oral Daily [facility], DO   17 g at 11/19/22 0916    senna (SENOKOT) tablet 8.6 mg  1 tablet Oral BID [facility], DO   8.6 mg at 11/19/22 0914    docusate sodium (COLACE) capsule 100 mg  100 mg Oral Daily [facility] [place], DO   100 mg at 11/19/22 0915    ipratropium-albuterol (DUONEB) nebulizer solution 1 ampule  1 ampule Inhalation BID Maria G Salas MD   1 ampule at 11/19/22 0733    ipratropium-albuterol (DUONEB) nebulizer solution 1 ampule  1 ampule Inhalation Q4H AVIS Mondragon MD   1 ampule at 11/16/22 1551    lactobacillus (CULTURELLE) capsule 1 capsule  1 capsule Oral Daily with breakfast Devonte Conklin MD   1 capsule at 11/19/22 0910    magnesium oxide (MAG-OX) tablet 400 mg  400 mg Oral BID Devonte Conklin MD   400 mg at 11/19/22 0912    meropenem (MERREM) 500 mg IVPB (mini-bag)  500 mg IntraVENous Q12H Lynn Corley APRN - CNP   Stopped at 11/18/22 1958    amLODIPine (NORVASC) tablet 10 mg  10 mg Oral Nightly Ademeg Main Naco, DO   10 mg at 11/18/22 2103    atenolol (TENORMIN) tablet 25 mg  25 mg Oral Daily Ademeg Main [place], DO   25 mg at 11/18/22 1634    sodium bicarbonate tablet 325 mg  325 mg Oral BID Hiren Main [place], DO   325 mg at 11/19/22 0910    clopidogrel (PLAVIX) tablet 75 mg  75 mg Oral Daily Dileep Shelley MD   75 mg at 11/19/22 0915    sodium chloride flush 0.9 % injection 5-40 mL  5-40 mL IntraVENous 2 times per day Martha D Onyedumekwu, APRN - CNP   10 mL at 11/18/22 2040    sodium chloride flush 0.9 % injection 5-40 mL  5-40 mL IntraVENous PRN Martha D Onyedumekwu, APRN - CNP        0.9 % sodium chloride infusion   IntraVENous PRN Martha D Onyedumekwu, APRN - CNP        ondansetron (ZOFRAN-ODT) disintegrating tablet 4 mg  4 mg Oral Q8H PRN Martha D Onyedumekwu, APRN - CNP        Or    ondansetron (ZOFRAN) injection 4 mg  4 mg IntraVENous Q6H PRN Martha D Onyedumekwu, APRN - CNP   4 mg at 11/16/22 0914    polyethylene glycol (GLYCOLAX) packet 17 g  17 g Oral Daily PRN Martha D Onyedumekwu, APRN - CNP        acetaminophen (TYLENOL) tablet 650 mg  650 mg Oral Q6H PRN Martha D Onyedumekwu, APRN - CNP   650 mg at 11/17/22 0841    Or    acetaminophen (TYLENOL) suppository 650 mg  650 mg Rectal Q6H PRN Martha D Onyedumekwu, APRN - CNP        sodium chloride flush 0.9 % injection 5-40 mL  5-40 mL IntraVENous 2 times per day Martha D Onyedumekwu, APRN - CNP   10 mL at 11/19/22 0931    sodium chloride flush 0.9 % injection 5-40 mL  5-40 mL IntraVENous PRN Martha D Onyedumekwu, APRN - CNP        0.9 % sodium chloride infusion   IntraVENous PRN Martha D Onyedumekwu, APRN - CNP        heparin (porcine) injection 5,000 Units  5,000 Units SubCUTAneous 3 times per day Charlie Swan APRN - CNP   5,000 Units at 11/19/22 0906    Vitamin D (CHOLECALCIFEROL) tablet 2,000 Units  2,000 Units Oral Daily Martha D Onyedumekwu, APRN - CNP   2,000 Units at 11/19/22 0912    pantoprazole (PROTONIX) tablet 40 mg  40 mg Oral QAM AC Martha D Onyedumekwu, APRN - CNP   40 mg at 11/19/22 0906    fluorometholone (FML) 0.1 % ophthalmic suspension 1 drop (Patient Supplied)  1 drop Both Eyes BID Martha D Onyedumekwu, APRN - CNP        isosorbide mononitrate (IMDUR) extended release tablet 60 mg  60 mg Oral Daily Martha D Onyedumekwu, APRN - CNP   60 mg at 11/19/22 0908    levothyroxine (SYNTHROID) tablet 88 mcg  88 mcg Oral Daily Martha D Onyedumekwu, APRN - CNP   88 mcg at 11/19/22 0906    ranolazine (RANEXA) extended release tablet 500 mg  500 mg Oral BID Charlie Swan APRN - CNP   500 mg at 11/19/22 0909    sertraline (ZOLOFT) tablet 50 mg  50 mg Oral Daily Martha D Onyedumekwu, APRN - CNP   50 mg at 11/19/22 0914    [Held by provider] losartan (COZAAR) tablet 12.5 mg  12.5 mg Oral Daily Martha D Onyedumekwu, APRN - CNP        atorvastatin (LIPITOR) tablet 20 mg  20 mg Oral Nightly Donis Bathija, RPH   20 mg at 11/18/22 2102           Labs:  CBC with Differential:    Lab Results   Component Value Date/Time    WBC 11.5 11/18/2022 09:28 AM    RBC 2.82 11/18/2022 09:28 AM    HGB 8.9 11/18/2022 09:28 AM    HCT 26.7 11/18/2022 09:28 AM     11/18/2022 09:28 AM    MCV 94.7 11/18/2022 09:28 AM    MCH 31.6 11/18/2022 09:28 AM    MCHC 33.3 11/18/2022 09:28 AM    RDW 13.3 11/18/2022 09:28 AM    SEGSPCT 70.5 11/18/2022 09:28 AM    LYMPHOPCT 16.5 11/18/2022 09:28 AM    MONOPCT 6.9 11/18/2022 09:28 AM    EOSPCT 4 10/06/2021 12:00 AM    BASOPCT 0.5 11/18/2022 09:28 AM    MONOSABS 0.8 11/18/2022 09:28 AM    LYMPHSABS 1.9 11/18/2022 09:28 AM    EOSABS 0.6 11/18/2022 09:28 AM    BASOSABS 0.1 11/18/2022 09:28 AM    DIFFTYPE AUTOMATED DIFFERENTIAL 11/18/2022 09:28 AM     CMP:    Lab Results   Component Value Date/Time     11/19/2022 06:55 AM    K 5.4 11/19/2022 06:55 AM     11/19/2022 06:55 AM    CO2 25 11/19/2022 06:55 AM    BUN 55 11/19/2022 06:55 AM    CREATININE 2.2 11/19/2022 06:55 AM    GFRAA 32 09/26/2022 03:03 PM    GFRAA 37 03/19/2013 02:04 PM    AGRATIO 1.6 08/19/2022 11:20 AM    LABGLOM 20 11/19/2022 06:55 AM    GLUCOSE 91 11/19/2022 06:55 AM    GLUCOSE 126 06/07/2021 12:00 AM    PROT 5.7 11/15/2022 04:28 AM    PROT 7.2 03/19/2013 02:04 PM    LABALBU 3.1 11/15/2022 04:28 AM    CALCIUM 9.2 11/19/2022 06:55 AM    BILITOT 0.4 11/15/2022 04:28 AM    ALKPHOS 60 11/15/2022 04:28 AM    AST 26 11/15/2022 04:28 AM    ALT 13 11/15/2022 04:28 AM     Hepatic Function Panel:    Lab Results   Component Value Date/Time    ALKPHOS 60 11/15/2022 04:28 AM    ALT 13 11/15/2022 04:28 AM    AST 26 11/15/2022 04:28 AM    PROT 5.7 11/15/2022 04:28 AM    PROT 7.2 03/19/2013 02:04 PM    BILITOT 0.4 11/15/2022 04:28 AM    LABALBU 3.1 11/15/2022 04:28 AM     Magnesium:    Lab Results   Component Value Date/Time    MG 1.8 11/16/2022 05:50 AM     PT/INR:    Lab Results   Component Value Date/Time    PROTIME 11.1 11/14/2022 03:26 PM    PROTIME 10.0 05/09/2012 11:30 PM    INR 0.86 11/14/2022 03:26 PM     Last 3 Troponin:    Lab Results   Component Value Date/Time    TROPONINI 0.025 05/10/2012 02:20 PM    TROPONINI 0.023 05/10/2012 05:12 AM    TROPONINI 0.019 05/09/2012 11:30 PM     U/A:    Lab Results   Component Value Date/Time    COLORU YELLOW 11/14/2022 04:02 PM    PHUR 6.0 09/27/2022 03:36 PM    WBCUA 9 11/14/2022 04:02 PM    RBCUA NONE SEEN 11/14/2022 04:02 PM    MUCUS RARE 01/25/2018 01:15 PM    TRICHOMONAS NONE SEEN 11/14/2022 04:02 PM    BACTERIA NEGATIVE 11/14/2022 04:02 PM    CLARITYU CLEAR 11/14/2022 04:02 PM    SPECGRAV 1.015 11/14/2022 04:02 PM    LEUKOCYTESUR TRACE 11/14/2022 04:02 PM    UROBILINOGEN 0.2 11/14/2022 04:02 PM    BILIRUBINUR NEGATIVE 11/14/2022 04:02 PM    BLOODU NEGATIVE 11/14/2022 04:02 PM    GLUCOSEU Negative 09/27/2022 03:36 PM     ABG:    Lab Results   Component Value Date/Time    YVD3GOX 29.0 11/14/2022 11:30 PM    PO2ART 83 11/14/2022 11:30 PM    UMO4MEW 18.4 11/14/2022 11:30 PM     FLP:    Lab Results   Component Value Date/Time    TRIG 176 08/19/2022 11:20 AM    HDL 49 08/19/2022 11:20 AM    HDL 46 02/03/2012 11:09 PM    LDLCALC 101 08/19/2022 11:20 AM    LDLDIRECT 78 10/04/2018 03:44 PM    LABVLDL 35 08/19/2022 11:20 AM     TSH:    Lab Results   Component Value Date/Time    TSH 0.62 08/19/2022 11:20 AM      DATA:   ECG: Sinus Rhythm       ASSESSMENT:   1 severe aortic stenosis symptom follow-up at [facility] for TAVR evaluation  2 acute respiratory failure due to pneumonia and congestive heart failure  3 acute on chronic kidney injury nephrology following  4 acute on chronic systolic heart failure complicated by severe aortic stenosis nephrology managing diuresis  5 sepsis due to pyelonephritis and currently UTI  PLAN   Stable from cardiac standpoint  Patient to follow-up at [facility] upon discharge for TAVR evaluation  Electronically signed by Lissa Santillan MD on 11/19/2022 at 11:55 AM

## 2022-11-19 NOTE — CARE COORDINATION
[facility] dual plan denied patient authorization for ARU. Denial reference #[id number]  The [facility] MD, Hannah Carey, doesn't think the patient can tolerate 3 hours of therapy  5 days a week. Melvina served Dr Herndon Back regarding wishes for an expedited appeal.    Physician states it's up to the family if they'd prefer ARU appeal or SNF.     Expedited can be filed 11/20/22

## 2022-11-19 NOTE — PROGRESS NOTES
Nephrology Progress Note        [address], [address] [address]  Phone: [phone]  Office Hours: 8:30AM - 4:30PM  Monday - Friday 11/19/2022 10:04 AM  Subjective:   Admit Date: 11/14/2022  PCP: Cherri Livingston MD  Interval History: on 5L NC now, down from 8L yesterday  Maggie at bedside    Diet: ADULT DIET; Dysphagia - Soft and Bite Sized; 1500 ml      Data:   Scheduled Meds:   sodium zirconium cyclosilicate  10 g Oral TID    guaiFENesin  600 mg Oral BID    polyethylene glycol  17 g Oral Daily    senna  1 tablet Oral BID    docusate sodium  100 mg Oral Daily    ipratropium-albuterol  1 ampule Inhalation BID    lactobacillus  1 capsule Oral Daily with breakfast    magnesium oxide  400 mg Oral BID    meropenem  500 mg IntraVENous Q12H    amLODIPine  10 mg Oral Nightly    atenolol  25 mg Oral Daily    sodium bicarbonate  325 mg Oral BID    clopidogrel  75 mg Oral Daily    sodium chloride flush  5-40 mL IntraVENous 2 times per day    sodium chloride flush  5-40 mL IntraVENous 2 times per day    heparin (porcine)  5,000 Units SubCUTAneous 3 times per day    Vitamin D  2,000 Units Oral Daily    pantoprazole  40 mg Oral QAM AC    fluorometholone  1 drop Both Eyes BID    isosorbide mononitrate  60 mg Oral Daily    levothyroxine  88 mcg Oral Daily    ranolazine  500 mg Oral BID    sertraline  50 mg Oral Daily    [Held by provider] losartan  12.5 mg Oral Daily    atorvastatin  20 mg Oral Nightly     Continuous Infusions:   sodium chloride      sodium chloride       PRN Meds:ipratropium-albuterol, sodium chloride flush, sodium chloride, ondansetron **OR** ondansetron, polyethylene glycol, acetaminophen **OR** acetaminophen, sodium chloride flush, sodium chloride  No intake/output data recorded. I/O this shift:   In: 490 [P.O.:480; I.V.:10]  Out: -     Intake/Output Summary (Last 24 hours) at 11/19/2022 1004  Last data filed at 11/19/2022 0931  Gross per 24 hour   Intake 490 ml   Output

## 2022-11-19 NOTE — PLAN OF CARE
Problem: Discharge Planning  Goal: Discharge to home or other facility with appropriate resources  Outcome: Progressing     Problem: Hematologic - Adult  Goal: Maintains hematologic stability  Outcome: Progressing     Problem: Pain  Goal: Verbalizes/displays adequate comfort level or baseline comfort level  Outcome: Progressing     Problem: Safety - Adult  Goal: Free from fall injury  Outcome: Progressing     Problem: ABCDS Injury Assessment  Goal: Absence of physical injury  Outcome: Progressing

## 2022-11-19 NOTE — PROGRESS NOTES
Pulmonary and Critical Care  Progress Note      VITALS:  /63   Pulse 72   Temp 97.2 °F (36.2 °C) (Oral)   Resp 22   Ht 5' (1.524 m)   Wt 121 lb 4.1 oz (55 kg)   LMP  (LMP Unknown)   SpO2 98%   BMI 23.68 kg/m²     Subjective:   CHIEF COMPLAINT :SOB     HPI:                The patient is a 80 y.o. female is sleepy but arousable. She is not in acute resp distress. Her Cr is slowly improving    Objective:   PHYSICAL EXAM:    LUNGS:Basal crackles  Abd-soft, BS+,NT  Ext- no pedal edema  CVS-s1s2, no murmurs      DATA:    CBC:  Recent Labs     11/17/22  0541 11/18/22  0928   WBC 14.8* 11.5*   RBC 2.77* 2.82*   HGB 8.7* 8.9*   HCT 26.2* 26.7*    422   MCV 94.6 94.7   MCH 31.4* 31.6*   MCHC 33.2 33.3   RDW 13.6 13.3   SEGSPCT 72.9* 70.5*      BMP:  Recent Labs     11/17/22  0541 11/18/22  0928 11/19/22  0655   * 133* 134*   K 4.4 4.9 5.4*   CL 99 101 100   CO2 23 24 25   BUN 56* 57* 55*   CREATININE 2.7* 2.5* 2.2*   CALCIUM 8.8 8.9 9.2   GLUCOSE 90 92 91      ABG:  No results for input(s): PH, PO2ART, YKA0CCQ, HCO3, BEART, O2SAT in the last 72 hours. BNP  Lab Results   Component Value Date     (H) 05/09/2012      D-Dimer:  Lab Results   Component Value Date    DDIMER 412 (H) 01/07/2018      Radiology:   Multifocal airspace opacities are redemonstrated and not significantly   changed. Considerations would still be multifocal pneumonia or edema. Small amount of left pleural effusion and basilar atelectasis may also be   present.          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 08/18/2018     Priority: High    Bradycardia      Priority: High    LBBB (left bundle branch block)      Priority: High    PAUL (acute kidney injury) ([facility] [address] 12/26/2017     Priority: High    Pneumonia due to infectious organism 11/16/2022     Priority: Medium    Pyelonephritis 11/16/2022     Priority: Medium    Chronic kidney disease 11/16/2022     Priority: Medium    Dyspnea and respiratory abnormalities 11/15/2022     Priority: Medium    Sepsis ([address] 11/14/2022     Priority: Medium    Localized edema 07/11/2022     Priority: Medium    GERD (gastroesophageal reflux disease)      Priority: Medium     Overview Note:     Esophagitis      Hypertension      Priority: Low    Hypothyroid      Priority: Low     Overview Note:     Hypothyroidism      Sinus congestion 03/23/2022     Overview Note:     CHRONIC W POST NASAL DRIP      Vaginal prolapse 09/07/2021    Primary osteoarthritis of left foot 08/30/2021    Closed nondisplaced fracture of fourth metatarsal bone of left foot 08/30/2021    Closed nondisplaced fracture of third metatarsal bone of left foot 08/30/2021    Closed nondisplaced fracture of fifth left metatarsal bone 08/30/2021    Vertigo      Overview Note:     Dr Odonnell [facility]      Dizziness     Labyrinthitis     Chronic renal impairment, stage 3 (moderate) ([facility])     Constipation, slow transit 06/19/2018    Cystitis     Physical deconditioning     Hyponatremia 01/01/2018    Pruritic condition 01/22/2016    Gout     Hyperuricemia     Low back pain     Eczema     Coronary artery disease involving native heart without angina pectoris      Overview Note:     Dr Rosendo Figueredo/Jose Figueredo      Depression     Hyperlipidemia     Generalized osteoarthritis     Osteoporosis      Overview Note:     Generalized       Acute Hypoxic resp failure sec to Cardiogenic and Non Cardiogenic Pulmonary edema  Small bilateral Pleural effusions  PAUL on CKD  Leukocytosis- Lymphocytic predominant  Severe AS  Mild to Moderate Pulmonary HTN  CAD  H/o Recurrent UTI  GERD  HLD  ?  Atypical pneumonia  PAUL-slight improvement     Abx  F/u C&S  Inhalers  ICS  OOB  Keep sats > 92%  BMP in am  C.w present management    Electronically signed by Rimma Gillespie MD on 11/19/2022 at 11:34 AM

## 2022-11-19 NOTE — PROGRESS NOTES
[id number] [facility] SPEECH/LANGUAGE PATHOLOGY  DAILY PROGRESS NOTE  Whitley Mendoza  11/19/2022  [id number]  Dyspnea and respiratory abnormalities [R06.00, R06.89]  Troponin I above reference range [R77.8]  Sepsis ([address] [A41.9]  Pneumonia due to infectious organism, unspecified laterality, unspecified part of lung [J18.9]  Chronic kidney disease, unspecified CKD stage [N18.9]  Allergies   Allergen Reactions    Bactrim [Sulfamethoxazole-Trimethoprim] Nausea And Vomiting    Cephalexin Rash    Tape [Adhesive Tape] Itching         Pt was seen this date for dysphagia treatment. IMPRESSION AND RECOMMENDATIONS:   Whitley Mendoza was seen for a bedside swallowing treatment and diet tolerance monitoring. Pt was alert and cooperative throughout assessment. She was positioned upright in the chair and presented with a clear vocal quality and strong volitional cough. PO trials of regular solids and thin liquids by straw sips were given. Prolonged mastication with timely A-P transit and oral clearance was observed with trials of regular solids. Pharyngeal swallow appeared timely with adequate laryngeal elevation. Clear vocal quality and 0 overt s/s of aspiration were observed with all PO trials given. Recommend upgrade to easy to chew solids/thin liquids by straw sips with aspiration precautions. No further acute SLP needs were identified at this time.      GOALS (current status in bold):  Short-term Goals  Timeframe for Short-term Goals: LOS or until goals are met  Goal 1: Pt will tolerate soft and bite sized solids/thin liquids by straw sips without clinical evidence of aspiration 100% Goal met- diet level upgraded   Goal 2: Pt/caregivers will demonstrate comprehension of recommendations/POC Goal met           EDUCATION: recommendations/POC    PAIN RATING (0-10 Scale): denies   Time in/Time out: SLP Individual Minutes  Time In: 0950  Time Out: 1020  Minutes: 20    Visit number: [address], [address], [facility], 11/19/2022

## 2022-11-20 LAB
ANION GAP SERPL CALCULATED.3IONS-SCNC: 9 MMOL/L (ref 4–16)
BASOPHILS ABSOLUTE: 0.1 K/CU MM
BASOPHILS RELATIVE PERCENT: 0.6 % (ref 0–1)
BUN BLDV-MCNC: 48 MG/DL (ref 6–23)
C-REACTIVE PROTEIN, HIGH SENSITIVITY: 61.9 MG/L
CALCIUM SERPL-MCNC: 9.3 MG/DL (ref 8.3–10.6)
CHLORIDE BLD-SCNC: 99 MMOL/L (ref 99–110)
CO2: 27 MMOL/L (ref 21–32)
CREAT SERPL-MCNC: 2 MG/DL (ref 0.6–1.1)
CULTURE: NORMAL
DIFFERENTIAL TYPE: ABNORMAL
EOSINOPHILS ABSOLUTE: 0.6 K/CU MM
EOSINOPHILS RELATIVE PERCENT: 5.8 % (ref 0–3)
GFR SERPL CREATININE-BSD FRML MDRD: 23 ML/MIN/1.73M2
GLUCOSE BLD-MCNC: 94 MG/DL (ref 70–99)
HCT VFR BLD CALC: 26.1 % (ref 37–47)
HEMOGLOBIN: 8.7 GM/DL (ref 12.5–16)
IMMATURE NEUTROPHIL %: 0.7 % (ref 0–0.43)
LYMPHOCYTES ABSOLUTE: 2.3 K/CU MM
LYMPHOCYTES RELATIVE PERCENT: 22.5 % (ref 24–44)
Lab: NORMAL
MCH RBC QN AUTO: 31.6 PG (ref 27–31)
MCHC RBC AUTO-ENTMCNC: 33.3 % (ref 32–36)
MCV RBC AUTO: 94.9 FL (ref 78–100)
MONOCYTES ABSOLUTE: 0.8 K/CU MM
MONOCYTES RELATIVE PERCENT: 7.8 % (ref 0–4)
NUCLEATED RBC %: 0 %
PDW BLD-RTO: 13.3 % (ref 11.7–14.9)
PLATELET # BLD: 433 K/CU MM (ref 140–440)
PMV BLD AUTO: 8.8 FL (ref 7.5–11.1)
POTASSIUM SERPL-SCNC: 4.8 MMOL/L (ref 3.5–5.1)
PROCALCITONIN: 0.17
RBC # BLD: 2.75 M/CU MM (ref 4.2–5.4)
SEGMENTED NEUTROPHILS ABSOLUTE COUNT: 6.3 K/CU MM
SEGMENTED NEUTROPHILS RELATIVE PERCENT: 62.6 % (ref 36–66)
SODIUM BLD-SCNC: 135 MMOL/L (ref 135–145)
SPECIMEN: NORMAL
TOTAL IMMATURE NEUTOROPHIL: 0.07 K/CU MM
TOTAL NUCLEATED RBC: 0 K/CU MM
WBC # BLD: 10.1 K/CU MM (ref 4–10.5)

## 2022-11-20 PROCEDURE — 85025 COMPLETE CBC W/AUTO DIFF WBC: CPT

## 2022-11-20 PROCEDURE — 6370000000 HC RX 637 (ALT 250 FOR IP)

## 2022-11-20 PROCEDURE — 2580000003 HC RX 258: Performed by: NURSE PRACTITIONER

## 2022-11-20 PROCEDURE — 6360000002 HC RX W HCPCS: Performed by: NURSE PRACTITIONER

## 2022-11-20 PROCEDURE — 6370000000 HC RX 637 (ALT 250 FOR IP): Performed by: INTERNAL MEDICINE

## 2022-11-20 PROCEDURE — 94668 MNPJ CHEST WALL SBSQ: CPT

## 2022-11-20 PROCEDURE — 99232 SBSQ HOSP IP/OBS MODERATE 35: CPT | Performed by: INTERNAL MEDICINE

## 2022-11-20 PROCEDURE — 6360000002 HC RX W HCPCS

## 2022-11-20 PROCEDURE — 36415 COLL VENOUS BLD VENIPUNCTURE: CPT

## 2022-11-20 PROCEDURE — 6370000000 HC RX 637 (ALT 250 FOR IP): Performed by: STUDENT IN AN ORGANIZED HEALTH CARE EDUCATION/TRAINING PROGRAM

## 2022-11-20 PROCEDURE — 2580000003 HC RX 258

## 2022-11-20 PROCEDURE — 94761 N-INVAS EAR/PLS OXIMETRY MLT: CPT

## 2022-11-20 PROCEDURE — 1200000000 HC SEMI PRIVATE

## 2022-11-20 PROCEDURE — 86140 C-REACTIVE PROTEIN: CPT

## 2022-11-20 PROCEDURE — 80048 BASIC METABOLIC PNL TOTAL CA: CPT

## 2022-11-20 PROCEDURE — 94640 AIRWAY INHALATION TREATMENT: CPT

## 2022-11-20 RX ORDER — LANOLIN ALCOHOL/MO/W.PET/CERES
3 CREAM (GRAM) TOPICAL NIGHTLY PRN
Status: DISCONTINUED | OUTPATIENT
Start: 2022-11-20 | End: 2022-12-06 | Stop reason: HOSPADM

## 2022-11-20 RX ADMIN — MAGNESIUM OXIDE TAB 400 MG (241.3 MG ELEMENTAL MG) 400 MG: 400 (241.3 MG) TAB at 21:25

## 2022-11-20 RX ADMIN — MEROPENEM 500 MG: 500 INJECTION, POWDER, FOR SOLUTION INTRAVENOUS at 23:33

## 2022-11-20 RX ADMIN — IPRATROPIUM BROMIDE AND ALBUTEROL SULFATE 1 AMPULE: .5; 2.5 SOLUTION RESPIRATORY (INHALATION) at 20:40

## 2022-11-20 RX ADMIN — Medication 2000 UNITS: at 09:22

## 2022-11-20 RX ADMIN — Medication 1 CAPSULE: at 09:19

## 2022-11-20 RX ADMIN — LEVOTHYROXINE SODIUM 88 MCG: 0.09 TABLET ORAL at 06:26

## 2022-11-20 RX ADMIN — MAGNESIUM OXIDE TAB 400 MG (241.3 MG ELEMENTAL MG) 400 MG: 400 (241.3 MG) TAB at 09:20

## 2022-11-20 RX ADMIN — MEROPENEM 500 MG: 500 INJECTION, POWDER, FOR SOLUTION INTRAVENOUS at 11:59

## 2022-11-20 RX ADMIN — ISOSORBIDE MONONITRATE 60 MG: 60 TABLET, EXTENDED RELEASE ORAL at 09:19

## 2022-11-20 RX ADMIN — SERTRALINE HYDROCHLORIDE 50 MG: 50 TABLET ORAL at 09:19

## 2022-11-20 RX ADMIN — MEROPENEM 500 MG: 500 INJECTION, POWDER, FOR SOLUTION INTRAVENOUS at 02:52

## 2022-11-20 RX ADMIN — AMLODIPINE BESYLATE 10 MG: 10 TABLET ORAL at 21:24

## 2022-11-20 RX ADMIN — Medication 3 MG: at 21:43

## 2022-11-20 RX ADMIN — RANOLAZINE 500 MG: 500 TABLET, FILM COATED, EXTENDED RELEASE ORAL at 21:25

## 2022-11-20 RX ADMIN — RANOLAZINE 500 MG: 500 TABLET, FILM COATED, EXTENDED RELEASE ORAL at 09:20

## 2022-11-20 RX ADMIN — PANTOPRAZOLE SODIUM 40 MG: 40 TABLET, DELAYED RELEASE ORAL at 09:20

## 2022-11-20 RX ADMIN — ATORVASTATIN CALCIUM 20 MG: 10 TABLET, FILM COATED ORAL at 21:25

## 2022-11-20 RX ADMIN — HEPARIN SODIUM 5000 UNITS: 5000 INJECTION INTRAVENOUS; SUBCUTANEOUS at 15:29

## 2022-11-20 RX ADMIN — SODIUM BICARBONATE 325 MG: 650 TABLET ORAL at 09:20

## 2022-11-20 RX ADMIN — HEPARIN SODIUM 5000 UNITS: 5000 INJECTION INTRAVENOUS; SUBCUTANEOUS at 21:25

## 2022-11-20 RX ADMIN — IPRATROPIUM BROMIDE AND ALBUTEROL SULFATE 1 AMPULE: .5; 2.5 SOLUTION RESPIRATORY (INHALATION) at 07:29

## 2022-11-20 RX ADMIN — HEPARIN SODIUM 5000 UNITS: 5000 INJECTION INTRAVENOUS; SUBCUTANEOUS at 06:26

## 2022-11-20 RX ADMIN — SODIUM CHLORIDE: 9 INJECTION, SOLUTION INTRAVENOUS at 23:33

## 2022-11-20 RX ADMIN — Medication 1 DROP: at 09:21

## 2022-11-20 RX ADMIN — GUAIFENESIN 600 MG: 600 TABLET, EXTENDED RELEASE ORAL at 09:19

## 2022-11-20 RX ADMIN — SODIUM CHLORIDE, PRESERVATIVE FREE 5 ML: 5 INJECTION INTRAVENOUS at 23:34

## 2022-11-20 RX ADMIN — CLOPIDOGREL BISULFATE 75 MG: 75 TABLET ORAL at 09:20

## 2022-11-20 RX ADMIN — SODIUM CHLORIDE, PRESERVATIVE FREE 10 ML: 5 INJECTION INTRAVENOUS at 09:20

## 2022-11-20 RX ADMIN — Medication 1 DROP: at 21:36

## 2022-11-20 RX ADMIN — ATENOLOL 25 MG: 25 TABLET ORAL at 18:01

## 2022-11-20 RX ADMIN — SODIUM BICARBONATE 325 MG: 650 TABLET ORAL at 21:24

## 2022-11-20 RX ADMIN — GUAIFENESIN 600 MG: 600 TABLET, EXTENDED RELEASE ORAL at 21:27

## 2022-11-20 ASSESSMENT — PAIN SCALES - GENERAL
PAINLEVEL_OUTOF10: 0

## 2022-11-20 ASSESSMENT — PAIN SCALES - WONG BAKER: WONGBAKER_NUMERICALRESPONSE: 10

## 2022-11-20 NOTE — PROGRESS NOTES
(gastroesophageal reflux disease)      Priority: Medium     Overview Note:     Esophagitis      Hypertension      Priority: Low    Hypothyroid      Priority: Low     Overview Note:     Hypothyroidism      Sinus congestion 03/23/2022     Overview Note:     CHRONIC W POST NASAL DRIP      Vaginal prolapse 09/07/2021    Primary osteoarthritis of left foot 08/30/2021    Closed nondisplaced fracture of fourth metatarsal bone of left foot 08/30/2021    Closed nondisplaced fracture of third metatarsal bone of left foot 08/30/2021    Closed nondisplaced fracture of fifth left metatarsal bone 08/30/2021    Vertigo      Overview Note:     Dr Mistry [facility]      Dizziness     Labyrinthitis     Chronic renal impairment, stage 3 (moderate) ([facility])     Constipation, slow transit 06/19/2018    Cystitis     Physical deconditioning     Hyponatremia 01/01/2018    Pruritic condition 01/22/2016    Gout     Hyperuricemia     Low back pain     Eczema     Coronary artery disease involving native heart without angina pectoris      Overview Note:     Dr Ino Figueredo/Jose Figueredo      Depression     Hyperlipidemia     Generalized osteoarthritis     Osteoporosis      Overview Note:     Generalized     Acute Hypoxic resp failure sec to Cardiogenic and Non Cardiogenic Pulmonary edema  Small bilateral Pleural effusions  PAUL on CKD  Leukocytosis- Lymphocytic predominant  Severe AS  Mild to Moderate Pulmonary HTN  CAD  H/o Recurrent UTI  GERD  HLD  ?  Atypical pneumonia  PAUL-slight improvement          Supportive care  Abx  F/u C&S  ICS  OOB  Inhalers  Keep sats > 92%  BMP in am  C/w present management    Electronically signed by Kingsley Phelan MD on 11/20/2022 at 11:27 AM

## 2022-11-20 NOTE — PROGRESS NOTES
V2.0  [facility] Hospitalist Progress Note      Name:  Yassine Oliva /Age/Sex: 1927  (80 y.o. female)   MRN & CSN:  [id number] & [id number] Encounter Date/Time: 2022 6:37 PM EST    Location:  [address] [place], [place] [address] PCP: Cesar Barragan MD       Hospital Day: 7    Assessment and Plan: Yassine Oliva is a 80 y.o. female with pmh of CAd, GERD, UTI, Hypothyroidism, HTN, Hyperlipedmia, CKD  who presents with Sepsis ([facility] [address]      Plan:    Acute hypoxic respiratory failure from CAP and Pulmonary edema: Sepsis resolving. . No o2 at home. Blood cx negative. Pro-Cody 0.2. CXR Shows -Cardiomegaly with pulmonary edema and concern for pneumonia. Echo: EF 55-60. RVSP 44. Respiratory panel neg. CT Chest showed bilateral infiltrate. Pulmonology following. Repeat chest x-ray stable. Continue Meropenem. Duo-neb. Diuresis on hold. PAUL with Hx of Renal insuffiencey:Likely in the setting of diuresis. Base line around 2. Plan to hold diuresis and monitor. Dr. Becky Singh following. Cr better today BMP daily. Strict I/O. Family denied HD  Hyperkalemia - S/P Lokelma. Resolved. Nephrology following. BMP daily  Pyelonephritis - H/O Recurrent UTI: Treated with several rounds of ATB x 1 month (Including Cipro/ doxy/ Macrobid ). Dirty UA. Urine culture growing Enterococcus fecalis - sensitive to ampicillin. ID consulted (Off note - patient is not allergic penicillin). CT abdomen pelvis negative for stones. Meropenem for total of 14 days  Acute on chronic systolic CHF:   For now IV Lasix intermittently. Strict I's/O. Daily weight  CAD - Most recent heart cath . most recent echo -2018; EF 50%. PBNP 6,417. EKG shows LBBB. Elevated troponin likely from CKD. Repeat troponin showed downtrend. On atenolol, norvasc, olmesartan, Imdur, statin, ASA. Cardiology following. Severe AS: follow up at [facility] for TAVR-balloon valvuloplasty   Hypertension -- Continue home Imdur,atenolol, Norvasc, olmesartan  Anemia - Hb Stable.  Needs OP work up for anemia. GERD -- continue PPI    PT/OT recommended ARU. ARU was denied. Will process for Expedited appeal.       Diet ADULT DIET; Easy to Chew; 1500 ml   DVT Prophylaxis [] Lovenox, []  Heparin, [] SCDs, [] Ambulation,  [] Eliquis, [] Xarelto  [] Coumadin   Code Status Limited   Disposition From: Home  Expected Disposition: Home  Estimated Date of Discharge: 2 Days  Patient requires continued admission due to CAP   Surrogate Decision Maker/ POA      Subjective:     Chief Complaint: Shortness of Breath       Ofilia Poor is a 80 y.o. female who presents with SOB with productive cough      Some improvement in SOB. Oxygen demand down to 4 L today. Denies any fever, Chest pain, headache, dizziness. Bowel and bladder habits normal. Currently denies any dysuria. Was treated with multiple Abx in the past for recurrent UTI         Review of Systems:    Review of Systems    All other systems reviewed and are negative. Objective: Intake/Output Summary (Last 24 hours) at 11/20/2022 1516  Last data filed at 11/20/2022 0636  Gross per 24 hour   Intake 700 ml   Output 20 ml   Net 680 ml          Vitals:   Vitals:    11/20/22 1100   BP: 135/61   Pulse: 67   Resp: 23   Temp: 97.4 °F (36.3 °C)   SpO2: 94%       Physical Exam:   Looks comfortable but on 6 L supplement oxygen  General: NAD  Eyes: EOMI  ENT: neck supple  Cardiovascular: Regular rate. Respiratory: Coarse crackles  Gastrointestinal: Soft, non tender  Genitourinary: no suprapubic tenderness  Musculoskeletal: No edema  Skin: warm, dry  Neuro: Alert. Psych: Mood appropriate.      Medications:   Medications:    guaiFENesin  600 mg Oral BID    polyethylene glycol  17 g Oral Daily    senna  1 tablet Oral BID    docusate sodium  100 mg Oral Daily    ipratropium-albuterol  1 ampule Inhalation BID    lactobacillus  1 capsule Oral Daily with breakfast    magnesium oxide  400 mg Oral BID    meropenem  500 mg IntraVENous Q12H    amLODIPine  10 mg Oral Nightly 14.9 %    Platelets 708 447 - 953 K/CU MM    MPV 8.8 7.5 - 11.1 FL    Differential Type AUTOMATED DIFFERENTIAL     Segs Relative 62.6 36 - 66 %    Lymphocytes % 22.5 (L) 24 - 44 %    Monocytes % 7.8 (H) 0 - 4 %    Eosinophils % 5.8 (H) 0 - 3 %    Basophils % 0.6 0 - 1 %    Segs Absolute 6.3 K/CU MM    Lymphocytes Absolute 2.3 K/CU MM    Monocytes Absolute 0.8 K/CU MM    Eosinophils Absolute 0.6 K/CU MM    Basophils Absolute 0.1 K/CU MM    Nucleated RBC % 0.0 %    Total Nucleated RBC 0.0 K/CU MM    Total Immature Neutrophil 0.07 K/CU MM    Immature Neutrophil % 0.7 (H) 0 - 0.43 %        Imaging/Diagnostics Last 24 Hours   CT CHEST WO CONTRAST    Result Date: 11/14/2022  EXAMINATION: CT OF THE CHEST WITHOUT CONTRAST 11/14/2022 7:33 pm TECHNIQUE: CT of the chest was performed without the administration of intravenous contrast. Multiplanar reformatted images are provided for review. Automated exposure control, iterative reconstruction, and/or weight based adjustment of the mA/kV was utilized to reduce the radiation dose to as low as reasonably achievable. COMPARISON: 01/06/2018 HISTORY: ORDERING SYSTEM PROVIDED HISTORY: sob TECHNOLOGIST PROVIDED HISTORY: Reason for exam:->sob Decision Support Exception - unselect if not a suspected or confirmed emergency medical condition->Emergency Medical Condition (MA) Reason for Exam: sob FINDINGS: Mediastinum: Calcific atherosclerotic disease in the aorta. The heart size is within normal limits. The esophagus is grossly unremarkable without hiatal hernia. There are mediastinal lymph nodes measuring up to 1.6 cm in short axis diameter likely reactive in nature. There are bilateral pleural effusions layering up to 3 cm on the right and 1 cm on the left. There is bilateral patchy pulmonary infiltrates. The previously seen pulmonary nodule in the right middle lobe appears slightly larger, however there is a central calcification and this most likely represents a granuloma. Lungs/pleura: As above Upper Abdomen: There is a cyst in the upper pole the left kidney and the remainder of the visualized upper abdominal organs are unremarkable Soft Tissues/Bones: Degenerative change     Bilateral pulmonary infiltrates and bilateral pleural effusions with probable reactive mediastinal adenopathy Probable granuloma in the right middle lobe. No follow-up imaging recommended. XR CHEST PORTABLE    Result Date: 11/14/2022  EXAMINATION: ONE XRAY VIEW OF THE CHEST 11/14/2022 3:17 pm COMPARISON: 08/18/2018 HISTORY: ORDERING SYSTEM PROVIDED HISTORY: sob TECHNOLOGIST PROVIDED HISTORY: Reason for exam:->sob Reason for Exam: sob FINDINGS: Cardiomegaly. Ill-defined pulmonary opacities. Bibasilar hypoaeration. No pneumothorax.      Cardiomegaly Bilateral ill-defined pulmonary opacities could represent multifocal pneumonia or edema       Electronically signed by Tarah Coto MD on 11/20/2022 at 3:16 PM

## 2022-11-20 NOTE — PROGRESS NOTES
Nephrology Progress Note        [address], [address] [address]  Phone: [phone]  Office Hours: 8:30AM - 4:30PM  Monday - Friday 11/20/2022 7:42 AM  Subjective:   Admit Date: 11/14/2022  PCP: Naima Vaca MD  Interval History:   Resting  On NC  Good bp    Diet: ADULT DIET; Easy to Chew; 1500 ml      Data:   Scheduled Meds:   guaiFENesin  600 mg Oral BID    polyethylene glycol  17 g Oral Daily    senna  1 tablet Oral BID    docusate sodium  100 mg Oral Daily    ipratropium-albuterol  1 ampule Inhalation BID    lactobacillus  1 capsule Oral Daily with breakfast    magnesium oxide  400 mg Oral BID    meropenem  500 mg IntraVENous Q12H    amLODIPine  10 mg Oral Nightly    atenolol  25 mg Oral Daily    sodium bicarbonate  325 mg Oral BID    clopidogrel  75 mg Oral Daily    sodium chloride flush  5-40 mL IntraVENous 2 times per day    sodium chloride flush  5-40 mL IntraVENous 2 times per day    heparin (porcine)  5,000 Units SubCUTAneous 3 times per day    Vitamin D  2,000 Units Oral Daily    pantoprazole  40 mg Oral QAM AC    fluorometholone  1 drop Both Eyes BID    isosorbide mononitrate  60 mg Oral Daily    levothyroxine  88 mcg Oral Daily    ranolazine  500 mg Oral BID    sertraline  50 mg Oral Daily    [Held by provider] losartan  12.5 mg Oral Daily    atorvastatin  20 mg Oral Nightly     Continuous Infusions:   sodium chloride      sodium chloride       PRN Meds:ipratropium-albuterol, sodium chloride flush, sodium chloride, ondansetron **OR** ondansetron, polyethylene glycol, acetaminophen **OR** acetaminophen, sodium chloride flush, sodium chloride  I/O last 3 completed shifts: In: 2257 [P.O.:1180; I.V.:10]  Out: 140 [Urine:140]  No intake/output data recorded.     Intake/Output Summary (Last 24 hours) at 11/20/2022 0742  Last data filed at 11/20/2022 0636  Gross per 24 hour   Intake 1190 ml   Output 140 ml   Net 1050 ml       CBC:   Recent Labs     11/18/22  4663 11/20/22  0638   WBC 11.5* 10.1   HGB 8.9* 8.7*    433       BMP:    Recent Labs     11/18/22  0928 11/19/22  0655 11/20/22  0638   * 134* 135   K 4.9 5.4* 4.8    100 99   CO2 24 25 27   BUN 57* 55* 48*   CREATININE 2.5* 2.2* 2.0*   GLUCOSE 92 91 94     Hepatic: No results for input(s): AST, ALT, ALB, BILITOT, ALKPHOS in the last 72 hours. Troponin: No results for input(s): TROPONINI in the last 72 hours. BNP: No results for input(s): BNP in the last 72 hours. Lipids: No results for input(s): CHOL, HDL in the last 72 hours. Invalid input(s): LDLCALCU  ABGs:   Lab Results   Component Value Date/Time    PO2ART 83 11/14/2022 11:30 PM    IGG9BNM 29.0 11/14/2022 11:30 PM     INR: No results for input(s): INR in the last 72 hours.     Objective:   Vitals: BP (!) 147/60   Pulse 68   Temp 98.2 °F (36.8 °C) (Oral)   Resp 14   Ht 5' (1.524 m)   Wt 125 lb 10.6 oz (57 kg)   LMP  (LMP Unknown)   SpO2 95%   BMI 24.54 kg/m²   General appearance: in no acute distress  HEENT: normocephalic, atraumatic,   Neck: supple, trachea midline  Lungs: breathing comfortably on NC  Heart[de-identified] regular rate and rhythm,   Extremities: extremities atraumatic, no cyanosis or edema  Neurologic: [address] [facility]     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 08/18/2018    Bradycardia     LBBB (left bundle branch block)     PAUL (acute kidney injury) ([facility] [address] 12/26/2017    Pneumonia due to infectious organism 11/16/2022    Pyelonephritis 11/16/2022    Chronic kidney disease 11/16/2022    Dyspnea and respiratory abnormalities 11/15/2022    Sepsis ([facility] [address] 11/14/2022    Localized edema 07/11/2022    GERD (gastroesophageal reflux disease)     Hypertension     Hypothyroid     Sinus congestion 03/23/2022    Vaginal prolapse 09/07/2021    Primary osteoarthritis of left foot 08/30/2021    Closed nondisplaced fracture of fourth metatarsal bone of left foot 08/30/2021 Closed nondisplaced fracture of third metatarsal bone of left foot 08/30/2021    Closed nondisplaced fracture of fifth left metatarsal bone 08/30/2021    Vertigo     Dizziness     Labyrinthitis     Chronic renal impairment, stage 3 (moderate) ([facility])     Constipation, slow transit 06/19/2018    Cystitis     Physical deconditioning     Hyponatremia 01/01/2018    Pruritic condition 01/22/2016    Gout     Hyperuricemia     Low back pain     Eczema     Coronary artery disease involving native heart without angina pectoris     Depression     Hyperlipidemia     Generalized osteoarthritis     Osteoporosis      Cr better at 2 tooday  Continue supportive mgmt per primary team    Thank you                    Electronically signed by Clotilde Castillo DO on 11/20/2022 at 7:42 AM    [address],  [place] [facility], [place] [address]  PHONE: [phone]  FAX: [phone]

## 2022-11-20 NOTE — PROGRESS NOTES
Daily Progress Note  Subjective:    Pt. Awake and alert and feeling better  Bp and HR stable, NSR on tele  No CP, SOB stable per pt. -on 4 L NC    Attending Note:      Impression and Plan:     Sepsis    CAP, noted on CT chest    Respiratory panel negative    Blood cultures pending    WBC count elevated    ABX per primary    Using 4L NC now-weaning slowly     Acute on Chronic HFpEF    BNP on admission was 6,417    Last echo showed EF 55-60%    Severe Aortic stenosis    Diuretics per renal at this time    Accurate I's and O's    1500 Fluid restriction     Cannot use Jardiance at this point d/t cr. Could consider once cleared by renal    Plan to have her f/u with [facility] OP for potential valvuloplasty      CKD    Renal following; Last cr is 2.0    Avoid nephrotoxic medications     Will follow  Med. Tx. Most Recent Echo  11/15/2022   Left ventricular systolic function is normal.   Ejection fraction is visually estimated at 55-60%. Moderately dilated left atrium. Moderate to severe aortic stenosis is present; Mean PG 40mmHg, YANIRA 0.99 cm2. Mitral annular calcification is present. Mild to moderate mitral regurgitation. Mild to moderate tricuspid regurgitation; RVSP: 44 mmHg. No evidence of any pericardial effusion. Radiology  CT chest 11/14/2022  Impression   Bilateral pulmonary infiltrates and bilateral pleural effusions with probable   reactive mediastinal adenopathy       Probable granuloma in the right middle lobe. No follow-up imaging   recommended. PAST MEDICAL HISTORY:  The patient has a history of hypertension, renal  insufficiency present, and she has been treated medically. She is known  to have coronary artery disease also present. She has a chronic left  ventricular block present. She had a heart catheterization done in the  past and LAD had 70% stenosis present and she was treated medically at  that time. Anemia, hyperlipidemia, and arthritis present.      PAST SURGICAL HISTORY: She has a history of having hip surgery done. Moderate coronary artery disease, heart catheterization done in 2018,  LAD with 70% stenosis distally. She was treated medically for that. She had cataract surgery and hysterectomy done.       Objective:   BP (!) 140/55   Pulse 65   Temp 97.1 °F (36.2 °C) (Oral)   Resp 21   Ht 5' (1.524 m)   Wt 125 lb 10.6 oz (57 kg)   LMP  (LMP Unknown)   SpO2 96%   BMI 24.54 kg/m²     Intake/Output Summary (Last 24 hours) at 11/20/2022 1028  Last data filed at 11/20/2022 0636  Gross per 24 hour   Intake 700 ml   Output 140 ml   Net 560 ml       Medications:   Scheduled Meds:   guaiFENesin  600 mg Oral BID    polyethylene glycol  17 g Oral Daily    senna  1 tablet Oral BID    docusate sodium  100 mg Oral Daily    ipratropium-albuterol  1 ampule Inhalation BID    lactobacillus  1 capsule Oral Daily with breakfast    magnesium oxide  400 mg Oral BID    meropenem  500 mg IntraVENous Q12H    amLODIPine  10 mg Oral Nightly    atenolol  25 mg Oral Daily    sodium bicarbonate  325 mg Oral BID    clopidogrel  75 mg Oral Daily    sodium chloride flush  5-40 mL IntraVENous 2 times per day    sodium chloride flush  5-40 mL IntraVENous 2 times per day    heparin (porcine)  5,000 Units SubCUTAneous 3 times per day    Vitamin D  2,000 Units Oral Daily    pantoprazole  40 mg Oral QAM AC    fluorometholone  1 drop Both Eyes BID    isosorbide mononitrate  60 mg Oral Daily    levothyroxine  88 mcg Oral Daily    ranolazine  500 mg Oral BID    sertraline  50 mg Oral Daily    [Held by provider] losartan  12.5 mg Oral Daily    atorvastatin  20 mg Oral Nightly      Infusions:   sodium chloride      sodium chloride        PRN Meds:  ipratropium-albuterol, sodium chloride flush, sodium chloride, ondansetron **OR** ondansetron, polyethylene glycol, acetaminophen **OR** acetaminophen, sodium chloride flush, sodium chloride     Physical Exam:  Vitals:    11/20/22 0912   BP: (!) 140/55   Pulse: 65 Resp: 21   Temp: 97.1 °F (36.2 °C)   SpO2: 96%        General: AAO, NAD  Chest: Nontender  Cardiac: First and Second Heart Sounds are Normal, No Murmurs or Gallops noted  Lungs:Clear to auscultation and percussion. Abdomen: Soft, NT, ND, +BS  Extremities: No clubbing, no edema  Vascular:  Equal 2+ peripheral pulses. Lab Data:  CBC:   Recent Labs     11/18/22  0928 11/20/22  0638   WBC 11.5* 10.1   HGB 8.9* 8.7*   HCT 26.7* 26.1*   MCV 94.7 94.9    433     BMP:   Recent Labs     11/18/22 0928 11/19/22  0655 11/20/22  0638   * 134* 135   K 4.9 5.4* 4.8    100 99   CO2 24 25 27   BUN 57* 55* 48*   CREATININE 2.5* 2.2* 2.0*     LIVER PROFILE: No results for input(s): AST, ALT, LIPASE, BILIDIR, BILITOT, ALKPHOS in the last 72 hours. Invalid input(s): AMYLASE,  ALB  PT/INR: No results for input(s): PROTIME, INR in the last 72 hours. APTT: No results for input(s): APTT in the last 72 hours. BNP:  No results for input(s): BNP in the last 72 hours.       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 08/18/2018    Bradycardia     LBBB (left bundle branch block)     PAUL (acute kidney injury) ([facility] [address] 12/26/2017    Pneumonia due to infectious organism 11/16/2022    Pyelonephritis 11/16/2022    Chronic kidney disease 11/16/2022    Dyspnea and respiratory abnormalities 11/15/2022    Sepsis ([facility] [address] 11/14/2022    Localized edema 07/11/2022    GERD (gastroesophageal reflux disease)     Hypertension     Hypothyroid     Sinus congestion 03/23/2022    Vaginal prolapse 09/07/2021    Primary osteoarthritis of left foot 08/30/2021    Closed nondisplaced fracture of fourth metatarsal bone of left foot 08/30/2021    Closed nondisplaced fracture of third metatarsal bone of left foot 08/30/2021    Closed nondisplaced fracture of fifth left metatarsal bone 08/30/2021    Vertigo     Dizziness     Labyrinthitis     Chronic renal impairment, stage 3 (moderate) (HCC)     Constipation, slow transit 06/19/2018    Cystitis     Physical deconditioning     Hyponatremia 01/01/2018    Pruritic condition 01/22/2016    Gout     Hyperuricemia     Low back pain     Eczema     Coronary artery disease involving native heart without angina pectoris     Depression     Hyperlipidemia     Generalized osteoarthritis     Osteoporosis        Electronically signed by Kaye Mckeon PA-C on 11/20/2022 at 10:28 AM  Patient seen and examined agree with above assessment and plan patient clinically doing much better Electronically signed by Ni Thompson MD on 11/20/2022 at 1:16 PM

## 2022-11-21 ENCOUNTER — APPOINTMENT (OUTPATIENT)
Dept: GENERAL RADIOLOGY | Age: 87
DRG: 871 | End: 2022-11-21
Payer: COMMERCIAL

## 2022-11-21 LAB
ANION GAP SERPL CALCULATED.3IONS-SCNC: 7 MMOL/L (ref 4–16)
BUN BLDV-MCNC: 42 MG/DL (ref 6–23)
C-REACTIVE PROTEIN, HIGH SENSITIVITY: 48 MG/L
CALCIUM SERPL-MCNC: 9 MG/DL (ref 8.3–10.6)
CHLORIDE BLD-SCNC: 101 MMOL/L (ref 99–110)
CO2: 28 MMOL/L (ref 21–32)
CREAT SERPL-MCNC: 1.9 MG/DL (ref 0.6–1.1)
GFR SERPL CREATININE-BSD FRML MDRD: 24 ML/MIN/1.73M2
GLUCOSE BLD-MCNC: 94 MG/DL (ref 70–99)
POTASSIUM SERPL-SCNC: 4.8 MMOL/L (ref 3.5–5.1)
PROCALCITONIN: 0.1
SODIUM BLD-SCNC: 136 MMOL/L (ref 135–145)

## 2022-11-21 PROCEDURE — 6360000002 HC RX W HCPCS: Performed by: NURSE PRACTITIONER

## 2022-11-21 PROCEDURE — 92610 EVALUATE SWALLOWING FUNCTION: CPT

## 2022-11-21 PROCEDURE — 6370000000 HC RX 637 (ALT 250 FOR IP)

## 2022-11-21 PROCEDURE — 2580000003 HC RX 258

## 2022-11-21 PROCEDURE — 2700000000 HC OXYGEN THERAPY PER DAY

## 2022-11-21 PROCEDURE — 1200000000 HC SEMI PRIVATE

## 2022-11-21 PROCEDURE — 36415 COLL VENOUS BLD VENIPUNCTURE: CPT

## 2022-11-21 PROCEDURE — 6370000000 HC RX 637 (ALT 250 FOR IP): Performed by: INTERNAL MEDICINE

## 2022-11-21 PROCEDURE — 94150 VITAL CAPACITY TEST: CPT

## 2022-11-21 PROCEDURE — 97535 SELF CARE MNGMENT TRAINING: CPT

## 2022-11-21 PROCEDURE — 6370000000 HC RX 637 (ALT 250 FOR IP): Performed by: STUDENT IN AN ORGANIZED HEALTH CARE EDUCATION/TRAINING PROGRAM

## 2022-11-21 PROCEDURE — 6360000002 HC RX W HCPCS

## 2022-11-21 PROCEDURE — 99231 SBSQ HOSP IP/OBS SF/LOW 25: CPT | Performed by: INTERNAL MEDICINE

## 2022-11-21 PROCEDURE — 94640 AIRWAY INHALATION TREATMENT: CPT

## 2022-11-21 PROCEDURE — 97530 THERAPEUTIC ACTIVITIES: CPT

## 2022-11-21 PROCEDURE — 97116 GAIT TRAINING THERAPY: CPT

## 2022-11-21 PROCEDURE — 71045 X-RAY EXAM CHEST 1 VIEW: CPT

## 2022-11-21 PROCEDURE — 94761 N-INVAS EAR/PLS OXIMETRY MLT: CPT

## 2022-11-21 PROCEDURE — 94668 MNPJ CHEST WALL SBSQ: CPT

## 2022-11-21 PROCEDURE — 80048 BASIC METABOLIC PNL TOTAL CA: CPT

## 2022-11-21 PROCEDURE — 2580000003 HC RX 258: Performed by: NURSE PRACTITIONER

## 2022-11-21 PROCEDURE — 84145 PROCALCITONIN (PCT): CPT

## 2022-11-21 PROCEDURE — 99232 SBSQ HOSP IP/OBS MODERATE 35: CPT | Performed by: NURSE PRACTITIONER

## 2022-11-21 PROCEDURE — 94669 MECHANICAL CHEST WALL OSCILL: CPT

## 2022-11-21 PROCEDURE — 86140 C-REACTIVE PROTEIN: CPT

## 2022-11-21 RX ADMIN — ATENOLOL 25 MG: 25 TABLET ORAL at 18:07

## 2022-11-21 RX ADMIN — ISOSORBIDE MONONITRATE 60 MG: 60 TABLET, EXTENDED RELEASE ORAL at 10:26

## 2022-11-21 RX ADMIN — MEROPENEM 500 MG: 500 INJECTION, POWDER, FOR SOLUTION INTRAVENOUS at 12:12

## 2022-11-21 RX ADMIN — SODIUM CHLORIDE, PRESERVATIVE FREE 5 ML: 5 INJECTION INTRAVENOUS at 10:38

## 2022-11-21 RX ADMIN — AMLODIPINE BESYLATE 10 MG: 10 TABLET ORAL at 20:51

## 2022-11-21 RX ADMIN — SODIUM BICARBONATE 325 MG: 650 TABLET ORAL at 20:51

## 2022-11-21 RX ADMIN — LEVOTHYROXINE SODIUM 88 MCG: 0.09 TABLET ORAL at 05:39

## 2022-11-21 RX ADMIN — Medication 1 DROP: at 10:30

## 2022-11-21 RX ADMIN — Medication 1 CAPSULE: at 10:25

## 2022-11-21 RX ADMIN — ATORVASTATIN CALCIUM 20 MG: 10 TABLET, FILM COATED ORAL at 20:51

## 2022-11-21 RX ADMIN — MEROPENEM 500 MG: 500 INJECTION, POWDER, FOR SOLUTION INTRAVENOUS at 23:39

## 2022-11-21 RX ADMIN — SODIUM BICARBONATE 325 MG: 650 TABLET ORAL at 10:26

## 2022-11-21 RX ADMIN — RANOLAZINE 500 MG: 500 TABLET, FILM COATED, EXTENDED RELEASE ORAL at 10:26

## 2022-11-21 RX ADMIN — SERTRALINE HYDROCHLORIDE 50 MG: 50 TABLET ORAL at 10:26

## 2022-11-21 RX ADMIN — Medication 2000 UNITS: at 10:25

## 2022-11-21 RX ADMIN — HEPARIN SODIUM 5000 UNITS: 5000 INJECTION INTRAVENOUS; SUBCUTANEOUS at 20:50

## 2022-11-21 RX ADMIN — SENNOSIDES 8.6 MG: 8.6 TABLET, FILM COATED ORAL at 10:25

## 2022-11-21 RX ADMIN — IPRATROPIUM BROMIDE AND ALBUTEROL SULFATE 1 AMPULE: .5; 2.5 SOLUTION RESPIRATORY (INHALATION) at 08:53

## 2022-11-21 RX ADMIN — SENNOSIDES 8.6 MG: 8.6 TABLET, FILM COATED ORAL at 20:51

## 2022-11-21 RX ADMIN — CLOPIDOGREL BISULFATE 75 MG: 75 TABLET ORAL at 10:35

## 2022-11-21 RX ADMIN — SODIUM CHLORIDE, PRESERVATIVE FREE 10 ML: 5 INJECTION INTRAVENOUS at 10:27

## 2022-11-21 RX ADMIN — PANTOPRAZOLE SODIUM 40 MG: 40 TABLET, DELAYED RELEASE ORAL at 05:39

## 2022-11-21 RX ADMIN — IPRATROPIUM BROMIDE AND ALBUTEROL SULFATE 1 AMPULE: .5; 2.5 SOLUTION RESPIRATORY (INHALATION) at 21:15

## 2022-11-21 RX ADMIN — HEPARIN SODIUM 5000 UNITS: 5000 INJECTION INTRAVENOUS; SUBCUTANEOUS at 05:39

## 2022-11-21 RX ADMIN — SODIUM CHLORIDE, PRESERVATIVE FREE 10 ML: 5 INJECTION INTRAVENOUS at 20:51

## 2022-11-21 RX ADMIN — SODIUM CHLORIDE, PRESERVATIVE FREE 10 ML: 5 INJECTION INTRAVENOUS at 22:24

## 2022-11-21 RX ADMIN — HEPARIN SODIUM 5000 UNITS: 5000 INJECTION INTRAVENOUS; SUBCUTANEOUS at 13:56

## 2022-11-21 RX ADMIN — POLYETHYLENE GLYCOL (3350) 17 G: 17 POWDER, FOR SOLUTION ORAL at 10:27

## 2022-11-21 RX ADMIN — RANOLAZINE 500 MG: 500 TABLET, FILM COATED, EXTENDED RELEASE ORAL at 20:51

## 2022-11-21 NOTE — PROGRESS NOTES
Nephrology Progress Note        [address], [address] [address]  Phone: [phone]  Office Hours: 8:30AM - 4:30PM  Monday - Friday 11/21/2022 7:08 AM  Subjective:   Admit Date: 11/14/2022  PCP: Tanika Wheeler MD  Interval History:     Good bp  Resting  On nc    Diet: ADULT DIET; Easy to Chew; 1500 ml      Data:   Scheduled Meds:   polyethylene glycol  17 g Oral Daily    senna  1 tablet Oral BID    docusate sodium  100 mg Oral Daily    ipratropium-albuterol  1 ampule Inhalation BID    lactobacillus  1 capsule Oral Daily with breakfast    meropenem  500 mg IntraVENous Q12H    amLODIPine  10 mg Oral Nightly    atenolol  25 mg Oral Daily    sodium bicarbonate  325 mg Oral BID    clopidogrel  75 mg Oral Daily    sodium chloride flush  5-40 mL IntraVENous 2 times per day    sodium chloride flush  5-40 mL IntraVENous 2 times per day    heparin (porcine)  5,000 Units SubCUTAneous 3 times per day    Vitamin D  2,000 Units Oral Daily    pantoprazole  40 mg Oral QAM AC    fluorometholone  1 drop Both Eyes BID    isosorbide mononitrate  60 mg Oral Daily    levothyroxine  88 mcg Oral Daily    ranolazine  500 mg Oral BID    sertraline  50 mg Oral Daily    [Held by provider] losartan  12.5 mg Oral Daily    atorvastatin  20 mg Oral Nightly     Continuous Infusions:   sodium chloride 10 mL/hr at 11/20/22 2333    sodium chloride       PRN Meds:melatonin, ipratropium-albuterol, sodium chloride flush, sodium chloride, ondansetron **OR** ondansetron, polyethylene glycol, acetaminophen **OR** acetaminophen, sodium chloride flush, sodium chloride  I/O last 3 completed shifts: In: 690 [P.O.:690]  Out: 1145 [Urine:1145]  No intake/output data recorded.     Intake/Output Summary (Last 24 hours) at 11/21/2022 0708  Last data filed at 11/21/2022 0529  Gross per 24 hour   Intake 240 ml   Output 1125 ml   Net -885 ml       CBC:   Recent Labs     11/18/22  0928 11/20/22  0638   WBC 11.5* 10.1   HGB 8.9* 8.7*    433       BMP:    Recent Labs     11/19/22  0655 11/20/22  0638 11/21/22  0136   * 135 136   K 5.4* 4.8 4.8    99 101   CO2 25 27 28   BUN 55* 48* 42*   CREATININE 2.2* 2.0* 1.9*   GLUCOSE 91 94 94       Objective:   Vitals: BP (!) 146/59   Pulse 65   Temp 98.2 °F (36.8 °C) (Oral)   Resp 19   Ht 5' (1.524 m)   Wt 134 lb 4.2 oz (60.9 kg)   LMP  (LMP Unknown)   SpO2 95%   BMI 26.22 kg/m²   General appearance: in no acute distress  HEENT: normocephalic, atraumatic,   Neck: supple, trachea midline  Lungs: clear to auscultation bilaterally, breathing comfortably on nc  Heart[de-identified] regular rate and rhythm,   Extremities: extremities atraumatic, no cyanosis or edema      MEDICAL DECISION MAKING       -PAUL from ; improving  -CKD3: baseline cr 1.8  -HTN: controlled  -Acute hypoxic resp failure from bilateral lower lobe pneumonia  -Fluid overload: this component has already been taken care with diuresis for 3 days  -Moderate to severe aortic stenosis: not candidate for TAVR at this point     Suggest:  -Cr better at 1.9  -No diuretics planned  -Continue pneumonia treatment,   -Avoid nephrotoxins  -BP at goal, continue current meds       Thank you                      Electronically signed by Bruna Briseno DO on 11/21/2022 at 7:08 AM    [address]  [place], [place] [address]  PHONE: [phone]  FAX: [phone]

## 2022-11-21 NOTE — PROGRESS NOTES
V2.0  [facility] Hospitalist Progress Note      Name:  John Pinon /Age/Sex: 1927  (80 y.o. female)   MRN & CSN:  [id number] & [id number] Encounter Date/Time: 2022 6:37 PM EST    Location:  3290/2154-X PCP: Jose Phipps MD       Hospital Day: 8    Assessment and Plan: John Pinon is a 80 y.o. female with pmh of CAd, GERD, UTI, Hypothyroidism, HTN, Hyperlipedmia, CKD  who presents with Sepsis ([address]      Plan:    Acute hypoxic respiratory failure from CAP and Pulmonary edema: Sepsis resolved. Blood cx negative. Pro-Cody 0.2. CXR Shows -pulmonary edema and concern for pneumonia. Echo: EF 55-60. RVSP 44. Respiratory panel neg. CT Chest showed bilateral infiltrate. Pulmonology following. Repeat chest x-ray stable. Continue Meropenem. Duo-neb. Diuresis on hold. No o2 at home currently requiring 3 L. May need evaluation for home oxygen. PAUL with Hx of Renal insuffiencey:Likely in the setting of diuresis. Base line around 2. Plan to hold diuresis and monitor. Dr. Elijah Green following. Cr better today almost back to her base line. Strict I/O. Family denied HD  Hyperkalemia - S/P Lokelma. Resolved. Nephrology following. BMP daily  Pyelonephritis - H/O Recurrent UTI: Treated with several rounds of ATB x 1 month (Including Cipro/ doxy/ Macrobid ). Dirty UA. Urine culture growing Enterococcus fecalis - sensitive to ampicillin. ID consulted (Off note - patient is not allergic penicillin). CT abdomen pelvis negative for stones. Meropenem for total of 14 days. End date 22  Acute on chronic systolic CHF:   For now IV Lasix intermittently. Strict I's/O. Daily weight  CAD - Most recent heart cath . most recent echo -2018; EF 50%. PBNP 6,417. EKG shows LBBB. Elevated troponin likely from CKD. Repeat troponin showed downtrend. On atenolol, norvasc, olmesartan, Imdur, statin, ASA. Cardiology following.     Severe AS: follow up at [facility] for TAVR-balloon valvuloplasty   Hypertension -- Continue home Imdur,atenolol, Norvasc, olmesartan  Anemia - Hb Stable. Needs OP work up for anemia. GERD -- continue PPI  Suspicion for aspiration today as patient chocked while taking her oral meds. Chest xray placed pending. SLP eval pending    PT/OT recommended ARU. ARU was denied. Expedited appeal pending. Diet ADULT DIET; Dysphagia - Minced and Moist; 1500 ml; Please provide extra moisteners and gravies  ADULT ORAL NUTRITION SUPPLEMENT; Dinner; Diabetic Oral Supplement   DVT Prophylaxis [] Lovenox, []  Heparin, [] SCDs, [] Ambulation,  [] Eliquis, [] Xarelto  [] Coumadin   Code Status Limited   Disposition From: Home  Expected Disposition: ARU  Estimated Date of Discharge: 1 Days  Patient requires continued admission due to Pending ARU. Surrogate Decision Maker/ POA      Subjective:     Chief Complaint: Shortness of Breath       Araceli Olivo is a 80 y.o. female who presents with SOB with productive cough      Patient continues to improve. Oxygen tapered off to 3 L today and making urine. Expedited appeal for ARU pending - Today it felt like she was ready for DC. Later in the day RN reported, Suspicion for aspiration as patient chocked while taking her oral meds. Chest xray placed pending. SLP eval pending    Denies any fever, Chest pain, headache, dizziness. Bowel and bladder habits normal.       Review of Systems:    Review of Systems    All other systems reviewed and are negative. Objective: Intake/Output Summary (Last 24 hours) at 11/21/2022 1231  Last data filed at 11/21/2022 1103  Gross per 24 hour   Intake 600 ml   Output 1125 ml   Net -525 ml          Vitals:   Vitals:    11/21/22 0858   BP:    Pulse:    Resp:    Temp:    SpO2: 90%       Physical Exam:   Looks comfortable but on 6 L supplement oxygen  General: NAD  Eyes: EOMI  ENT: neck supple  Cardiovascular: Regular rate.   Respiratory: Coarse crackles  Gastrointestinal: Soft, non tender  Genitourinary: no suprapubic tenderness  Musculoskeletal: No edema  Skin: warm, dry  Neuro: Alert. Psych: Mood appropriate.      Medications:   Medications:    polyethylene glycol  17 g Oral Daily    senna  1 tablet Oral BID    docusate sodium  100 mg Oral Daily    ipratropium-albuterol  1 ampule Inhalation BID    lactobacillus  1 capsule Oral Daily with breakfast    meropenem  500 mg IntraVENous Q12H    amLODIPine  10 mg Oral Nightly    atenolol  25 mg Oral Daily    sodium bicarbonate  325 mg Oral BID    clopidogrel  75 mg Oral Daily    sodium chloride flush  5-40 mL IntraVENous 2 times per day    sodium chloride flush  5-40 mL IntraVENous 2 times per day    heparin (porcine)  5,000 Units SubCUTAneous 3 times per day    Vitamin D  2,000 Units Oral Daily    pantoprazole  40 mg Oral QAM AC    fluorometholone  1 drop Both Eyes BID    isosorbide mononitrate  60 mg Oral Daily    levothyroxine  88 mcg Oral Daily    ranolazine  500 mg Oral BID    sertraline  50 mg Oral Daily    [Held by provider] losartan  12.5 mg Oral Daily    atorvastatin  20 mg Oral Nightly      Infusions:    sodium chloride 10 mL/hr at 11/20/22 2333    sodium chloride       PRN Meds: melatonin, 3 mg, Nightly PRN  ipratropium-albuterol, 1 ampule, Q4H PRN  sodium chloride flush, 5-40 mL, PRN  sodium chloride, , PRN  ondansetron, 4 mg, Q8H PRN   Or  ondansetron, 4 mg, Q6H PRN  polyethylene glycol, 17 g, Daily PRN  acetaminophen, 650 mg, Q6H PRN   Or  acetaminophen, 650 mg, Q6H PRN  sodium chloride flush, 5-40 mL, PRN  sodium chloride, , PRN      Labs      Recent Results (from the past 24 hour(s))   Basic Metabolic Panel    Collection Time: 11/21/22  1:36 AM   Result Value Ref Range    Sodium 136 135 - 145 MMOL/L    Potassium 4.8 3.5 - 5.1 MMOL/L    Chloride 101 99 - 110 mMol/L    CO2 28 21 - 32 MMOL/L    Anion Gap 7 4 - 16    BUN 42 (H) 6 - 23 MG/DL    Creatinine 1.9 (H) 0.6 - 1.1 MG/DL    Est, Glom Filt Rate 24 (L) >60 mL/min/1.73m2    Glucose 94 70 - 99 MG/DL    Calcium 9.0 8.3 - 10.6 MG/DL   C-Reactive Protein    Collection Time: 11/21/22  1:36 AM   Result Value Ref Range    CRP High Sensitivity 48.0 (H) <5.0 mg/L        Imaging/Diagnostics Last 24 Hours   CT CHEST WO CONTRAST    Result Date: 11/14/2022  EXAMINATION: CT OF THE CHEST WITHOUT CONTRAST 11/14/2022 7:33 pm TECHNIQUE: CT of the chest was performed without the administration of intravenous contrast. Multiplanar reformatted images are provided for review. Automated exposure control, iterative reconstruction, and/or weight based adjustment of the mA/kV was utilized to reduce the radiation dose to as low as reasonably achievable. COMPARISON: 01/06/2018 HISTORY: ORDERING SYSTEM PROVIDED HISTORY: sob TECHNOLOGIST PROVIDED HISTORY: Reason for exam:->sob Decision Support Exception - unselect if not a suspected or confirmed emergency medical condition->Emergency Medical Condition (MA) Reason for Exam: sob FINDINGS: Mediastinum: Calcific atherosclerotic disease in the aorta. The heart size is within normal limits. The esophagus is grossly unremarkable without hiatal hernia. There are mediastinal lymph nodes measuring up to 1.6 cm in short axis diameter likely reactive in nature. There are bilateral pleural effusions layering up to 3 cm on the right and 1 cm on the left. There is bilateral patchy pulmonary infiltrates. The previously seen pulmonary nodule in the right middle lobe appears slightly larger, however there is a central calcification and this most likely represents a granuloma. Lungs/pleura: As above Upper Abdomen: There is a cyst in the upper pole the left kidney and the remainder of the visualized upper abdominal organs are unremarkable Soft Tissues/Bones: Degenerative change     Bilateral pulmonary infiltrates and bilateral pleural effusions with probable reactive mediastinal adenopathy Probable granuloma in the right middle lobe. No follow-up imaging recommended.      XR CHEST PORTABLE    Result Date: 11/14/2022  EXAMINATION: ONE XRAY VIEW OF THE CHEST 11/14/2022 3:17 pm COMPARISON: 08/18/2018 HISTORY: ORDERING SYSTEM PROVIDED HISTORY: sob TECHNOLOGIST PROVIDED HISTORY: Reason for exam:->sob Reason for Exam: sob FINDINGS: Cardiomegaly. Ill-defined pulmonary opacities. Bibasilar hypoaeration. No pneumothorax.      Cardiomegaly Bilateral ill-defined pulmonary opacities could represent multifocal pneumonia or edema       Electronically signed by Alecia Tolbert MD on 11/21/2022 at 12:31 PM

## 2022-11-21 NOTE — CONSULTS
Physical Therapy Treatment Note  Name: Whitley Mendoza MRN: [id number] :   [id number]   Date:  2022   Admission Date: 2022 Room:  [address] [place] [address]   Restrictions/Precautions:          none  Communication with other providers:  d/w caregiver Maggie. Subjective:  Patient states:  agreeable to visit. Remains interested in ARU. Highly motivated. Feeling some better. Up to shower with Maggie earlier today, not back to bed yet. Interested in bathroom at this time. Interested in ambulation. Feels good today. Fatigued upon completion of tx. Pain:   Location, Type, Intensity (0/10 to 10/10):  0/10  Objective:    Observation:  Alert, oriented, pleasant, participatory. Highly motivated, interested in recovery. Objective Measures:  WFL AROM BUE and BLE. CGA for sit-stand, later SBA for sit-stand. Periodically lacks power to stand efficiently. CGA with 4WW to amb 15 ft to bathroom. CGA stand-sit on commode after self managing underpants. Controlled voiding, wiped self after, min a donning underwear. Amb 100 ft with 4WW, including left/right turns and some backward ambulation, CGA. Ambulated only 10 feet without physical assist today. CGA for SPT to sit in recliner. Removed purewick initially, did not re-place new one afterward per patient desire, will call for RN as needed. Treatment, including education/measures:  Therapeutic Activity Training:   Therapeutic activity training was instructed today. Cues were given for safety, sequence, UE/LE placement, awareness, and balance. Activities performed today included, sit-stand, SPT. Self Care Training:   Cues were given for safety, sequence, UE/LE placement, visual cues, and balance. Toileting and clothing management. Gait Training:  Cues were given for safety, device management to be closer to 4WW base, balance, upright posture, path with turns. Assessment / Impression:    Great effort today.     Patient's tolerance of treatment:  very good   Adverse Reaction: none  Significant change in status and impact:  some improved since last visits. Barriers to improvement:  NONE:  no barrier to participation at all. Has excellent ability to comply with IRF/ARU program requirements. Anticipate patient has excellent ability to recover to functionally independent level. IRF/ARU is ideal setting for postacute recovery. Plan for Next Session:    Ambulation training with 4WW and body position cues, repetitive xfers. Time in:  1252  Time out:  1332  Timed treatment minutes:  40  Total treatment time:  40    Previously filed items:  Social/Functional History  Lives With: Alone (pt has home health aide daily for 8 hours/day, also supportive granddaughter)  Type of Home: Apartment  Home Layout: One level  Home Access: Level entry  Bathroom Shower/Tub: Walk-in shower  Bathroom Toilet: Handicap height  Bathroom Equipment: Built-in shower seat, Grab bars in shower, Commode  Bathroom Accessibility: Accessible  Home Equipment: Walker, 4 wheeled, Lift chair  ADL Assistance: Independent (aide supervises with bathing but pt able to manage per granddaughter, pt able to dress and toilet independently)  [address]: Needs assistance  Homemaking Responsibilities: No (home health aide manages)  Ambulation Assistance: Independent (mod I with 4ww household distances)  Transfer Assistance: Independent  Active : No  Occupation: Retired        Short Term Goals  Time Frame for Willow Ortega Term Goals: 1 week  Short Term Goal 1: Pt will perform all bed mobility tasks with SBA, min cues  Short Term Goal 2: Pt will manage all compenents of rollator for STS with SBA. Short Term Goal 3: Pt will AMB x45 ft with RW, stable 02, CGA-SBA for safety    Electronically signed by:     Savannah Bolaños PT  11/21/2022, 1:42 PM

## 2022-11-21 NOTE — PROGRESS NOTES
Detail Level: Detailed Add 33864 Cpt? (Important Note: In 2017 The Use Of 82555 Is Being Tracked By Cms To Determine Future Global Period Reimbursement For Global Periods): yes catheterization done in the  past and LAD had 70% stenosis present and she was treated medically at  that time. Anemia, hyperlipidemia, and arthritis present. PAST SURGICAL HISTORY:  She has a history of having hip surgery done. Moderate coronary artery disease, heart catheterization done in 2018,  LAD with 70% stenosis distally. She was treated medically for that. She had cataract surgery and hysterectomy done.       Objective:   BP (!) 146/59   Pulse 65   Temp 98.2 °F (36.8 °C) (Oral)   Resp 19   Ht 5' (1.524 m)   Wt 134 lb 4.2 oz (60.9 kg)   LMP  (LMP Unknown)   SpO2 95%   BMI 26.22 kg/m²     Intake/Output Summary (Last 24 hours) at 11/21/2022 0813  Last data filed at 11/21/2022 0529  Gross per 24 hour   Intake 240 ml   Output 1125 ml   Net -885 ml       Medications:   Scheduled Meds:   polyethylene glycol  17 g Oral Daily    senna  1 tablet Oral BID    docusate sodium  100 mg Oral Daily    ipratropium-albuterol  1 ampule Inhalation BID    lactobacillus  1 capsule Oral Daily with breakfast    meropenem  500 mg IntraVENous Q12H    amLODIPine  10 mg Oral Nightly    atenolol  25 mg Oral Daily    sodium bicarbonate  325 mg Oral BID    clopidogrel  75 mg Oral Daily    sodium chloride flush  5-40 mL IntraVENous 2 times per day    sodium chloride flush  5-40 mL IntraVENous 2 times per day    heparin (porcine)  5,000 Units SubCUTAneous 3 times per day    Vitamin D  2,000 Units Oral Daily    pantoprazole  40 mg Oral QAM AC    fluorometholone  1 drop Both Eyes BID    isosorbide mononitrate  60 mg Oral Daily    levothyroxine  88 mcg Oral Daily    ranolazine  500 mg Oral BID    sertraline  50 mg Oral Daily    [Held by provider] losartan  12.5 mg Oral Daily    atorvastatin  20 mg Oral Nightly      Infusions:   sodium chloride 10 mL/hr at 11/20/22 2333    sodium chloride        PRN Meds:  melatonin, ipratropium-albuterol, sodium chloride flush, sodium chloride, ondansetron **OR** ondansetron, polyethylene Wound Evaluated By: Adan Oliva MD glycol, acetaminophen **OR** acetaminophen, sodium chloride flush, sodium chloride     Physical Exam:  Vitals:    11/21/22 0529   BP: (!) 146/59   Pulse: 65   Resp: 19   Temp: 98.2 °F (36.8 °C)   SpO2: 95%        General: AAO, NAD  Chest: Nontender  Cardiac: First and Second Heart Sounds are Normal, No Murmurs or Gallops noted  Lungs:Clear to auscultation and percussion. Abdomen: Soft, NT, ND, +BS  Extremities: No clubbing, no edema  Vascular:  Equal 2+ peripheral pulses. Lab Data:  CBC:   Recent Labs     11/18/22  0928 11/20/22  0638   WBC 11.5* 10.1   HGB 8.9* 8.7*   HCT 26.7* 26.1*   MCV 94.7 94.9    433     BMP:   Recent Labs     11/19/22  0655 11/20/22  0638 11/21/22  0136   * 135 136   K 5.4* 4.8 4.8    99 101   CO2 25 27 28   BUN 55* 48* 42*   CREATININE 2.2* 2.0* 1.9*     LIVER PROFILE: No results for input(s): AST, ALT, LIPASE, BILIDIR, BILITOT, ALKPHOS in the last 72 hours. Invalid input(s): AMYLASE,  ALB  PT/INR: No results for input(s): PROTIME, INR in the last 72 hours. APTT: No results for input(s): APTT in the last 72 hours. BNP:  No results for input(s): BNP in the last 72 hours.       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 08/18/2018    Bradycardia     LBBB (left bundle branch block)     PAUL (acute kidney injury) ([facility] [address] 12/26/2017    Pneumonia due to infectious organism 11/16/2022    Pyelonephritis 11/16/2022    Chronic kidney disease 11/16/2022    Dyspnea and respiratory abnormalities 11/15/2022    Sepsis ([facility] [address] 11/14/2022    Localized edema 07/11/2022    GERD (gastroesophageal reflux disease)     Hypertension     Hypothyroid     Sinus congestion 03/23/2022    Vaginal prolapse 09/07/2021    Primary osteoarthritis of left foot 08/30/2021    Closed nondisplaced fracture of fourth metatarsal bone of left foot 08/30/2021    Closed nondisplaced fracture of third metatarsal bone of left foot 08/30/2021 Closed nondisplaced fracture of fifth left metatarsal bone 08/30/2021    Vertigo     Dizziness     Labyrinthitis     Chronic renal impairment, stage 3 (moderate) ([facility])     Constipation, slow transit 06/19/2018    Cystitis     Physical deconditioning     Hyponatremia 01/01/2018    Pruritic condition 01/22/2016    Gout     Hyperuricemia     Low back pain     Eczema     Coronary artery disease involving native heart without angina pectoris     Depression     Hyperlipidemia     Generalized osteoarthritis     Osteoporosis        Electronically signed by Khushboo Escalante PA-C on 11/21/2022 at 8:13 AM    Electronically signed by Monroe Alexander MD on 11/21/22 at 9:07 AM EST

## 2022-11-21 NOTE — PROGRESS NOTES
While administering patients oral medications patient she began coughing. This nurse helped sit her up and patient spit out medications. She stated crushed medications would be best. Notified MD. New orders for speech eval and chest xray.

## 2022-11-21 NOTE — CARE COORDINATION
Received pt in transfer, reviewed chart pt was denied for ARU and requested appeal.  Sent message to [facility] to follow up on status. Spoke with Analia from ARU, obtained appeal letter, Dr. Raul Watts signed and sent back to Dahiana Carpio to submit to insurance.

## 2022-11-21 NOTE — PROGRESS NOTES
Comprehensive Nutrition Assessment    Type and Reason for Visit:  Initial, RD Nutrition Re-Screen/LOS    Nutrition Recommendations/Plan:   Consult speech for swallow/chewing evaluation   Will downgrade to Minced and moist diet   Start Diabetic oral nutrition supplements daily (vanilla)   Please keep HOB 30-45 deg at meals to prevent risk of aspiration   Recommend assist at meals/set up meals PRN   Record po intakes in I/O daily      Malnutrition Assessment:  Malnutrition Status: At risk for malnutrition (Comment) (11/21/22 1207)    Context:  Social/Environmental Circumstances     Findings of the 6 clinical characteristics of malnutrition:  Energy Intake:  No significant decrease in energy intake (good appetite pta, family states \"She eats like a \")  Weight Loss:  Unable to assess     Body Fat Loss:  No significant body fat loss     Muscle Mass Loss:  Severe muscle mass loss (Moderate to severe muscle wasting) Calf (gastrocnemius), Thigh (quadraceps), Clavicles (pectoralis & deltoids)  Fluid Accumulation:  Unable to assess     Strength:  Not Performed    Nutrition Assessment:    Admitted with acute hypoxic respiratory failure from CAP and pulmonary edema, sepsis (resolving). Hx of CAD, Severe AS, CHF. Pt sitting up in chair, family reports pt with difficulty chewing on Easy to chew diet. Pt would benefit to downgrade, pt having difficulty swallow pills at visit. Unclear if pt with weight loss due to fluids. Recommend to monitor weights during admit. Pt willing to start oral nutrition supplement daily, as pt feels weak, consuming 51% of meals, and c/o possible loose dentures. Performed NFPE, moderate to severe wasting in LE likely s/s advanced aging. Follow as high nutriton risk. Nutrition Related Findings:    CRP 48, est GFR 24, BUN 42, Cr 1.9 , H/H 8.7/26.1 Sitting in chair.  Wound Type: None       Current Nutrition Intake & Therapies:    Average Meal Intake: 26-50%, 51-75%  Average Supplements Intake: None Ordered  ADULT DIET; Easy to Chew; 1500 ml    Anthropometric Measures:  Height: 5' (152.4 cm)  Ideal Body Weight (IBW): 100 lbs (45 kg)       Current Body Weight: 134 lb 4.2 oz (60.9 kg), 134.3 % IBW. Weight Source: Bed Scale  Current BMI (kg/m2): 26.2  Usual Body Weight: 118 lb (53.5 kg) (per loved one at bedside.)  % Weight Change (Calculated): 13.8  Weight Adjustment For: No Adjustment                 BMI Categories: Overweight (BMI 25.0-29. 9)    Estimated Daily Nutrient Needs:  Energy Requirements Based On: Kcal/kg  Weight Used for Energy Requirements: Current  Energy (kcal/day): 4806-2913 (22-25 kcals/kg ABW)  Weight Used for Protein Requirements: Ideal  Protein (g/day): 45-54 (1-1.2 g/kg)  Method Used for Fluid Requirements: 1 ml/kcal  Fluid (ml/day): at least 1500, 1500 per MD FR    Nutrition Diagnosis:   Inadequate oral intake related to biting/chewing (masticatory) difficulty as evidenced by intake 26-50%    Nutrition Interventions:   Food and/or Nutrient Delivery: Modify Current Diet, Start Oral Nutrition Supplement (Consult speech)  Nutrition Education/Counseling: No recommendation at this time  Coordination of Nutrition Care: Continue to monitor while inpatient, Feeding Assistance/Environment Change, Speech Therapy, Swallow Evaluation, Coordination of Care  Plan of Care discussed with: pt and loved ones at bedside    Goals:     Goals: PO intake 50% or greater       Nutrition Monitoring and Evaluation:   Behavioral-Environmental Outcomes: None Identified  Food/Nutrient Intake Outcomes: Diet Advancement/Tolerance, Food and Nutrient Intake, Supplement Intake  Physical Signs/Symptoms Outcomes: Biochemical Data, Chewing or Swallowing, GI Status, Meal Time Behavior, Weight, Nutrition Focused Physical Findings    Discharge Planning:     Too soon to determine     Young ELIZABETH Duke, LD  Contact: [phone]

## 2022-11-21 NOTE — PROGRESS NOTES
Occupational Therapy  . Occupational Therapy Treatment Note    Name: Nora Kanner MRN: [id number] :   1927   Date:  2022   Admission Date: 2022 Room:  [address] [place] [address]A       Discharge Recommendation: Inpatient Rehabilitation    Primary Problem:      Restrictions/Precautions:          General precautions, fall risk,       Communication with other providers: RN, phlebotomy    Subjective:  Patient states:  Im tired, Nani showered, walked and done more. Pain:   Location, Type, Intensity (0/10 to 10/10):  none stated    Objective:    Observation: patient seated upright on recliner. Agreeable to getting back to bed. She is tired from having a busy day. Objective Measures:  pocket tele, 3L 02    Treatment, including education:    ADL activity training was instructed today. Cues were given for safety, sequence, UE/LE placement, visual cues, and balance. Activities performed today included  hand hygiene, and grooming. Facial hygiene- SBA  Grooming- SBA for using napkin numerous trials to clear throat and spit up mucous. Therapeutic activity training was instructed today. Cues were given for safety, sequence, UE/LE placement, awareness, and balance. Activities performed today included bed mobility training, sup-sit, sit-stand, SPT. Stand from [place] to EOB no AD- Min A, patient attempting to sit too early needing cues. Sit to Eob- CGA  Return supine- Min a for BLE. Boost to [facility] in trendelenburg- Mos A with use of BLE. Patient demo long sitting for a brief moment. In supine patient  did have coughing bout. placed patient in high fowlers and patient still having trouble clearing throat. Patient educated on role of OT , benefits of OT and rationale for therapeutic intervention. Benefit of OOB/EOB activities, benefit of movement.  AE/AD, WS/EC,     All therapeutic intervention performed c emphasis on dynamic balance / standing tolerance to increase strength, endurance and activity tolerance for increased [place] c ADL tasks and func transfers / mobility. Patient left safely in bed at end of session, with call light in reach, alarm on and nursing aware. Gait belt was used for func transfers / mobility. Assessment / Impression:    Patient tolerated well. Would continue to benefit from d/c to ARU to regain strength and tolerance to live more IND.    Patient's tolerance of treatment: Well  Adverse Reaction: None  Significant change in status and impact: Improved from previous session  Barriers to improvement: weakness      Plan for Next Session:    Continue with OT POC      Time in:  1412  Time out:  1442  Timed treatment minutes:  30  Total treatment time:  30      Electronically signed by:    MEERA Ivey COTA/L [id number]    11/21/2022, 2:30 PM

## 2022-11-21 NOTE — PROGRESS NOTES
Pulmonary and Critical Care  Progress Note      VITALS:  BP (!) 160/58   Pulse 65   Temp 98.5 °F (36.9 °C)   Resp 18   Ht 5' (1.524 m)   Wt 134 lb 4.2 oz (60.9 kg)   LMP  (LMP Unknown)   SpO2 90%   BMI 26.22 kg/m²     Subjective:   CHIEF COMPLAINT :SOB     HPI:                The patient is a 80 y.o. female is sitting in the bed. She is not in acute resp distress. Her PAUL is slowly improving    Objective:   PHYSICAL EXAM:    LUNGS:Occasional basal crackles  Abd-soft, BS+,NT  Ext- no pedal edema  CVS-s1s2, no murmurs      DATA:    CBC:  Recent Labs     11/20/22  0638   WBC 10.1   RBC 2.75*   HGB 8.7*   HCT 26.1*      MCV 94.9   MCH 31.6*   MCHC 33.3   RDW 13.3   SEGSPCT 62.6      BMP:  Recent Labs     11/19/22  0655 11/20/22  0638 11/21/22  0136   * 135 136   K 5.4* 4.8 4.8    99 101   CO2 25 27 28   BUN 55* 48* 42*   CREATININE 2.2* 2.0* 1.9*   CALCIUM 9.2 9.3 9.0   GLUCOSE 91 94 94      ABG:  No results for input(s): PH, PO2ART, MXA2PIX, HCO3, BEART, O2SAT in the last 72 hours.   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 08/18/2018     Priority: High    Bradycardia      Priority: High    LBBB (left bundle branch block)      Priority: High    PAUL (acute kidney injury) ([address] 12/26/2017     Priority: High    Pneumonia due to infectious organism 11/16/2022     Priority: Medium    Pyelonephritis 11/16/2022     Priority: Medium    Chronic kidney disease 11/16/2022     Priority: Medium    Dyspnea and respiratory abnormalities 11/15/2022     Priority: Medium    Sepsis ([address] 11/14/2022     Priority: Medium    Localized edema 07/11/2022     Priority: Medium    GERD (gastroesophageal reflux disease)      Priority: Medium     Overview Note:     Esophagitis Hypertension      Priority: Low    Hypothyroid      Priority: Low     Overview Note:     Hypothyroidism      Sinus congestion 03/23/2022     Overview Note:     CHRONIC W POST NASAL DRIP      Vaginal prolapse 09/07/2021    Primary osteoarthritis of left foot 08/30/2021    Closed nondisplaced fracture of fourth metatarsal bone of left foot 08/30/2021    Closed nondisplaced fracture of third metatarsal bone of left foot 08/30/2021    Closed nondisplaced fracture of fifth left metatarsal bone 08/30/2021    Vertigo      Overview Note:     Dr Suarez [facility]      Dizziness     Labyrinthitis     Chronic renal impairment, stage 3 (moderate) ([facility])     Constipation, slow transit 06/19/2018    Cystitis     Physical deconditioning     Hyponatremia 01/01/2018    Pruritic condition 01/22/2016    Gout     Hyperuricemia     Low back pain     Eczema     Coronary artery disease involving native heart without angina pectoris      Overview Note:     Dr Ingris Figueredo/Jose Figueredo      Depression     Hyperlipidemia     Generalized osteoarthritis     Osteoporosis      Overview Note:     Generalized     Acute Hypoxic resp failure sec to Cardiogenic and Non Cardiogenic Pulmonary edema  Small bilateral Pleural effusions  PAUL on CKD- slowly improving  Leukocytosis- Lymphocytic predominant  Severe AS  Mild to Moderate Pulmonary HTN  CAD  H/o Recurrent UTI  GERD  HLD  ?  Atypical pneumonia  PAUL-slight improvement       Abx  F/u C&S  BMP in am  ICS  OOB  Keep sats > 92%  Await placement  C/w present management    Electronically signed by Emily Sarmiento MD on 11/21/2022 at 10:07 AM

## 2022-11-21 NOTE — PLAN OF CARE
Problem: Discharge Planning  Goal: Discharge to home or other facility with appropriate resources  Outcome: Progressing     Problem: Hematologic - Adult  Goal: Maintains hematologic stability  Outcome: Progressing     Problem: Pain  Goal: Verbalizes/displays adequate comfort level or baseline comfort level  Outcome: Progressing     Problem: Safety - Adult  Goal: Free from fall injury  Outcome: Progressing     Problem: ABCDS Injury Assessment  Goal: Absence of physical injury  Outcome: Progressing     Problem: Skin/Tissue Integrity  Goal: Absence of new skin breakdown  Description: 1. Monitor for areas of redness and/or skin breakdown  2. Assess vascular access sites hourly  3. Every 4-6 hours minimum:  Change oxygen saturation probe site  4. Every 4-6 hours:  If on nasal continuous positive airway pressure, respiratory therapy assess nares and determine need for appliance change or resting period.   Outcome: Progressing

## 2022-11-21 NOTE — CARE COORDINATION
Signed expedited appeal letter received from Kang Waller [facility], [address] that has been signed by the physician. Awaiting updated PT/OT documentation to forward appeal information to [facility] fax. Will follow for appropriate documentation to initiate appeal.      1431: Appeal successfully faxed to [facility] fast appeals department at fax# [phone]. Will follow for determination.

## 2022-11-21 NOTE — PROGRESS NOTES
Speech Language Pathology  Facility/Department: [facility]   CLINICAL BEDSIDE SWALLOW EVALUATION    NAME: Jt Murillo  : 1927  MRN: [id number]  ADMISSION DATE: 2022    Impressions: Jt Murillo was seen for a bedside swallow evaluation after being admitted to [facility] with sepsis. Medical hx includes CAD, GERD, HTN, HLD, hypothyroidism. Seen this admission , diet recommendations were easy to chew and thin liquids. Jt Murillo was seated upright in chair, alert, cooperative, and pleasant. Granddaughter present, she reported pt had difficulty swallowing pills this morning. Oral [facility] exam appears to be [place] with intact labial coordination/ROM, lingual ROM/strength/coordination, and velar elevation. No asymmetry was identified. PO trials of ice chips, thins via cup/straw, puree, and solids were given. Oral stage appears grossly intact characterized by intact labial closure, adequate bolus mastication/reduction, intact AP transit, and adequate oral clearance. Pharyngeal stage appears WFL with no s/s of aspiration observed throughout PO trials. Recommendations:  Continue easy to chew solids and thin liquid diet. Pills can be taken in puree whole. Results/recommendations d/w pt and granddaughter. SLP will continue to follow for diet tolerance. 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  ONSET DATE: this admission   Recent Chest Xray/CT of Chest: see chart     Date of Eval: 11/21/2022  Evaluating Therapist: Mandi Padilla    Current Diet level:  Current Diet : Easy to chew      Primary Complaint  Patient Complaint: weakness    Pain:  Pain Assessment  Pain Assessment: None - Denies Pain  Pain Level: 0  Joe-Combs Pain Rating: Hurts worst  Patient's Stated Pain Goal: 0 - No pain  Pain Location: Chest  Pain Orientation: Right, Left, Lower  Pain Frequency: Continuous  Pain Onset: On-going  Multiple Pain Sites: Yes    Reason for Referral  Marlene Back was referred for a bedside swallow evaluation to assess the efficiency of her swallow function, identify signs and symptoms of aspiration and make recommendations regarding safe dietary consistencies, effective compensatory strategies, and safe eating environment.     Impression  Dysphagia Diagnosis: Swallow function appears [facility]  Dysphagia Outcome Severity Scale: Level 6: Within functional limits/Modified independence     Treatment Plan  Requires SLP Intervention: Yes  Duration of Treatment: 1x diet monitoring          Recommended Diet and Intervention        Recommended Form of Meds: Whole with puree     Therapeutic Interventions: Diet tolerance monitoring    Compensatory Swallowing Strategies  Compensatory Swallowing Strategies : Upright as possible for all oral intake;Eat/Feed slowly    Treatment/Goals  Short-term Goals  Timeframe for Short-term Goals: LOS or until goals are met  Goal 1: Pt will tolerate soft and bite sized solids/thin liquids by straw sips without clinical evidence of aspiration 100%  Goal 2: Pt/caregivers will demonstrate comprehension of recommendations/POC    General  Chart Reviewed: Yes  Behavior/Cognition: Alert; Cooperative;Pleasant mood  Respiratory Status: Room air  O2 Device: None (Room air)  Communication Observation: Functional  Follows Directions: Complex  Dentition: Adequate;Dentures top;Dentures bottom  Patient Positioning: Upright in chair  Baseline Vocal Quality: Normal  Prior Dysphagia History: Pt denies hx of dsyphagia  Consistencies Administered: Regular;Pureed; Thin - cup; Thin - straw; Ice Chips           Vision/Hearing  Vision  Vision: Within Functional Limits  Hearing  Hearing: Within functional limits    Oral Motor Deficits       Oral Phase Dysfunction  Oral Phase  Oral Phase: WFL     Indicators of Pharyngeal Phase Dysfunction        Prognosis  Individuals consulted  Consulted and agree with results and recommendations: Patient;RN    Education  Patient Education: recommendations  Patient Education Response: Verbalizes understanding  Safety Devices in place: Yes  Type of devices: Left in chair; All fall risk precautions in place       Therapy Time  SLP Individual Minutes  Time In: 9996  Time Out: 1430  Minutes: 15            Jean Carlos Mendieta  11/21/2022 1:44 PM

## 2022-11-22 LAB
ANION GAP SERPL CALCULATED.3IONS-SCNC: 8 MMOL/L (ref 4–16)
BUN BLDV-MCNC: 35 MG/DL (ref 6–23)
CALCIUM SERPL-MCNC: 9.4 MG/DL (ref 8.3–10.6)
CHLORIDE BLD-SCNC: 102 MMOL/L (ref 99–110)
CO2: 28 MMOL/L (ref 21–32)
CREAT SERPL-MCNC: 1.8 MG/DL (ref 0.6–1.1)
GFR SERPL CREATININE-BSD FRML MDRD: 26 ML/MIN/1.73M2
GLUCOSE BLD-MCNC: 95 MG/DL (ref 70–99)
POTASSIUM SERPL-SCNC: 5 MMOL/L (ref 3.5–5.1)
SODIUM BLD-SCNC: 138 MMOL/L (ref 135–145)

## 2022-11-22 PROCEDURE — 6370000000 HC RX 637 (ALT 250 FOR IP): Performed by: INTERNAL MEDICINE

## 2022-11-22 PROCEDURE — 36415 COLL VENOUS BLD VENIPUNCTURE: CPT

## 2022-11-22 PROCEDURE — 6360000002 HC RX W HCPCS

## 2022-11-22 PROCEDURE — 6370000000 HC RX 637 (ALT 250 FOR IP)

## 2022-11-22 PROCEDURE — 80048 BASIC METABOLIC PNL TOTAL CA: CPT

## 2022-11-22 PROCEDURE — 94668 MNPJ CHEST WALL SBSQ: CPT

## 2022-11-22 PROCEDURE — 2580000003 HC RX 258

## 2022-11-22 PROCEDURE — 94761 N-INVAS EAR/PLS OXIMETRY MLT: CPT

## 2022-11-22 PROCEDURE — 6360000002 HC RX W HCPCS: Performed by: NURSE PRACTITIONER

## 2022-11-22 PROCEDURE — 94664 DEMO&/EVAL PT USE INHALER: CPT

## 2022-11-22 PROCEDURE — 1200000000 HC SEMI PRIVATE

## 2022-11-22 PROCEDURE — 99231 SBSQ HOSP IP/OBS SF/LOW 25: CPT | Performed by: INTERNAL MEDICINE

## 2022-11-22 PROCEDURE — 94640 AIRWAY INHALATION TREATMENT: CPT

## 2022-11-22 PROCEDURE — 99232 SBSQ HOSP IP/OBS MODERATE 35: CPT | Performed by: NURSE PRACTITIONER

## 2022-11-22 PROCEDURE — 2700000000 HC OXYGEN THERAPY PER DAY

## 2022-11-22 PROCEDURE — 6370000000 HC RX 637 (ALT 250 FOR IP): Performed by: STUDENT IN AN ORGANIZED HEALTH CARE EDUCATION/TRAINING PROGRAM

## 2022-11-22 PROCEDURE — 2580000003 HC RX 258: Performed by: NURSE PRACTITIONER

## 2022-11-22 RX ORDER — AMOXICILLIN AND CLAVULANATE POTASSIUM 875; 125 MG/1; MG/1
1 TABLET, FILM COATED ORAL EVERY 12 HOURS SCHEDULED
Status: DISCONTINUED | OUTPATIENT
Start: 2022-11-22 | End: 2022-11-22 | Stop reason: DRUGHIGH

## 2022-11-22 RX ORDER — AMOXICILLIN AND CLAVULANATE POTASSIUM 500; 125 MG/1; MG/1
1 TABLET, FILM COATED ORAL EVERY 12 HOURS SCHEDULED
Status: DISCONTINUED | OUTPATIENT
Start: 2022-11-22 | End: 2022-11-22

## 2022-11-22 RX ORDER — SENNA AND DOCUSATE SODIUM 50; 8.6 MG/1; MG/1
1 TABLET, FILM COATED ORAL 2 TIMES DAILY
Status: DISCONTINUED | OUTPATIENT
Start: 2022-11-22 | End: 2022-12-06 | Stop reason: HOSPADM

## 2022-11-22 RX ORDER — IPRATROPIUM BROMIDE AND ALBUTEROL SULFATE 2.5; .5 MG/3ML; MG/3ML
1 SOLUTION RESPIRATORY (INHALATION) 3 TIMES DAILY
Status: DISCONTINUED | OUTPATIENT
Start: 2022-11-22 | End: 2022-11-28

## 2022-11-22 RX ADMIN — LEVOTHYROXINE SODIUM 88 MCG: 0.09 TABLET ORAL at 06:30

## 2022-11-22 RX ADMIN — HEPARIN SODIUM 5000 UNITS: 5000 INJECTION INTRAVENOUS; SUBCUTANEOUS at 14:58

## 2022-11-22 RX ADMIN — SENNOSIDES AND DOCUSATE SODIUM 1 TABLET: 50; 8.6 TABLET ORAL at 11:59

## 2022-11-22 RX ADMIN — SODIUM CHLORIDE, PRESERVATIVE FREE 10 ML: 5 INJECTION INTRAVENOUS at 20:15

## 2022-11-22 RX ADMIN — IPRATROPIUM BROMIDE AND ALBUTEROL SULFATE 1 AMPULE: .5; 3 SOLUTION RESPIRATORY (INHALATION) at 20:11

## 2022-11-22 RX ADMIN — SODIUM CHLORIDE, PRESERVATIVE FREE 10 ML: 5 INJECTION INTRAVENOUS at 09:02

## 2022-11-22 RX ADMIN — ATENOLOL 25 MG: 25 TABLET ORAL at 17:12

## 2022-11-22 RX ADMIN — PANTOPRAZOLE SODIUM 40 MG: 40 TABLET, DELAYED RELEASE ORAL at 06:29

## 2022-11-22 RX ADMIN — SODIUM BICARBONATE 325 MG: 650 TABLET ORAL at 08:52

## 2022-11-22 RX ADMIN — AMLODIPINE BESYLATE 10 MG: 10 TABLET ORAL at 20:14

## 2022-11-22 RX ADMIN — HEPARIN SODIUM 5000 UNITS: 5000 INJECTION INTRAVENOUS; SUBCUTANEOUS at 06:29

## 2022-11-22 RX ADMIN — SERTRALINE HYDROCHLORIDE 50 MG: 50 TABLET ORAL at 08:53

## 2022-11-22 RX ADMIN — Medication 1 CAPSULE: at 08:52

## 2022-11-22 RX ADMIN — POLYETHYLENE GLYCOL (3350) 17 G: 17 POWDER, FOR SOLUTION ORAL at 08:52

## 2022-11-22 RX ADMIN — ISOSORBIDE MONONITRATE 60 MG: 60 TABLET, EXTENDED RELEASE ORAL at 08:52

## 2022-11-22 RX ADMIN — Medication 3 MG: at 20:33

## 2022-11-22 RX ADMIN — ATORVASTATIN CALCIUM 20 MG: 10 TABLET, FILM COATED ORAL at 20:14

## 2022-11-22 RX ADMIN — MEROPENEM 500 MG: 500 INJECTION, POWDER, FOR SOLUTION INTRAVENOUS at 11:16

## 2022-11-22 RX ADMIN — RANOLAZINE 500 MG: 500 TABLET, FILM COATED, EXTENDED RELEASE ORAL at 08:52

## 2022-11-22 RX ADMIN — IPRATROPIUM BROMIDE AND ALBUTEROL SULFATE 1 AMPULE: .5; 3 SOLUTION RESPIRATORY (INHALATION) at 16:28

## 2022-11-22 RX ADMIN — IPRATROPIUM BROMIDE AND ALBUTEROL SULFATE 1 AMPULE: .5; 2.5 SOLUTION RESPIRATORY (INHALATION) at 08:07

## 2022-11-22 RX ADMIN — SENNOSIDES 8.6 MG: 8.6 TABLET, FILM COATED ORAL at 08:52

## 2022-11-22 RX ADMIN — Medication 1 DROP: at 10:09

## 2022-11-22 RX ADMIN — RANOLAZINE 500 MG: 500 TABLET, FILM COATED, EXTENDED RELEASE ORAL at 20:14

## 2022-11-22 RX ADMIN — CLOPIDOGREL BISULFATE 75 MG: 75 TABLET ORAL at 08:52

## 2022-11-22 RX ADMIN — SENNOSIDES AND DOCUSATE SODIUM 1 TABLET: 50; 8.6 TABLET ORAL at 20:14

## 2022-11-22 RX ADMIN — MEROPENEM 500 MG: 500 INJECTION, POWDER, FOR SOLUTION INTRAVENOUS at 17:19

## 2022-11-22 RX ADMIN — SODIUM BICARBONATE 325 MG: 650 TABLET ORAL at 20:14

## 2022-11-22 RX ADMIN — Medication 2000 UNITS: at 08:52

## 2022-11-22 RX ADMIN — DOCUSATE SODIUM 100 MG: 100 CAPSULE, LIQUID FILLED ORAL at 08:52

## 2022-11-22 NOTE — PROGRESS NOTES
Infectious Disease Progress Note  2022   Patient Name: Sana Lechuga : 1927   Impression  Pyelonephritis secondary to Enterococcus faecalis Complicated UTI:  Multifocal Pneumonia and Pulmonary Edema Complicated by Acute Hypoxic Respiratory Failure:  Afebrile, no leukocytosis  CRP on DWT, patient appears to be improving as oxygen needs have decreased  -UA WBC 9, RBC none, Urine Culture: Enterococcus faecalis 50,000  -CT Chest WO Contrast: Bilateral pulmonary infiltrates and bilateral pleural effusions with probable   reactive mediastinal adenopathy   Probable granuloma in the right middle lobe. No follow-up imaging   recommended. Oxygen needs have increased from  of 5 L/min/nc to 11/15 of 10/L HFNC  Dr. Alina Vasquez onboard for pulmonology, imp of possible atypical pneumonia  PAUL on CKD3:  Dr. Dylan Brothers onboard  CAD/ HTN/ HLD/ Mod to Severe AS/ Mild to mod PHTN:  Dr. Trinidad Atkinson onboard  11/15-Limited Echo: EF 55-60%, Mod to severe AS, mild to mod AR, mild to mod TR.   OA/ Eczema/ Gout:  Hypothyroidism:  Allergy to cephalexin (rash)  Multi-morbidity: per PMHx:  s/p hyster, colonoscopy  Plan:  Continue IV meropenem 500 mg q12h to complete a 14 day course (end date 22), the family, POA, would like to continue to IV regimen to complete the full course as the patient has tolerated well and usually doesn't tolerate ABX therapy very well  Trend CRP and Pct, ordered  Follow up in ID clinic in 1 week please  Weekly labs drawn on Monday during the course of treatment  CBC with differential, CMP, ESR, CRP  Fax results to Attn: [facility] Staff  # [phone]   OK from ID standpoint to [place] when ready    Ongoing Antimicrobial Therapy  Meropenem -? Completed Antimicrobial Therapy  Levofloxacin -?? History:? Interval history noted. Chief complaint: pyelonephritis. Multifocal pneumonia with acute hypoxic respiratory failure.    Denies n/v/d/f or untoward effects of antibiotics. States no longer has dyspnea and denies any supra-pubic or CVA tenderness. Physical Exam:  Vital Signs: BP (!) 155/73   Pulse 64   Temp 97.7 °F (36.5 °C) (Oral)   Resp 18   Ht 5' (1.524 m)   Wt 132 lb 15 oz (60.3 kg)   LMP  (LMP Unknown)   SpO2 95%   BMI 25.96 kg/m²     Gen: Alert, resting up in the chair, no distress or dyspnea  Chest: no distress and CTA. Posterior breath sounds with bibasilar fine crackles. Oxygen per NC  Heart: NSR and no MRG. Abd: soft, non-distended, no tenderness, no hepatomegaly. Normoactive bowel sounds. Bilateral CVA tenderness has subsided. Ext: no clubbing, cyanosis, or edema  Neuro: Mental status intact. CN 2-12 intact and no focal sensory or motor deficits     Radiologic / Imaging / TESTING  11/14/22 XR Chest Portable:  Impression   Cardiomegaly       Bilateral ill-defined pulmonary opacities could represent multifocal   pneumonia or edema      11/14/22 CT Chest WO Contrast:  Impression   Bilateral pulmonary infiltrates and bilateral pleural effusions with probable   reactive mediastinal adenopathy       Probable granuloma in the right middle lobe. No follow-up imaging   recommended. 11/14/22 CT Chest WO Contrast:  Impression   Bilateral pulmonary infiltrates and bilateral pleural effusions with probable   reactive mediastinal adenopathy       Probable granuloma in the right middle lobe. No follow-up imaging   recommended. 11/15/22 Limited Echo:   Summary   Left ventricular systolic function is normal.   Ejection fraction is visually estimated at 55-60%. Moderately dilated left atrium. Moderate to severe aortic stenosis is present; Mean PG 40mmHg, YANIRA 0.99 cm2   Mitral annular calcification is present. Mild to moderate mitral regurgitation. Mild to moderate tricuspid regurgitation; RVSP: 44 mmHg. No evidence of any pericardial effusion. 11/16/22 XR Chest Portable:  Impression   Extensive bilateral airspace opacities. Suspected small bilateral pleural effusions. 11/17/22 CT Abdomen Pelvis WO Contrast:  Impression   1. Negative for urinary tract stones. 2. Consolidation in the lower lobes and bilateral pleural effusions. 11/19/22 XR Chest Portable:  Impression   Multifocal airspace opacities are redemonstrated and not significantly   changed. Considerations would still be multifocal pneumonia or edema. Small amount of left pleural effusion and basilar atelectasis may also be   present.          11/21/22 XR Chest Portable:  Impression   Stable chest.         Labs:    Recent Results (from the past 24 hour(s))   Procalcitonin    Collection Time: 11/21/22  2:08 PM   Result Value Ref Range    Procalcitonin 3.603    Basic Metabolic Panel    Collection Time: 11/22/22  5:23 AM   Result Value Ref Range    Sodium 138 135 - 145 MMOL/L    Potassium 5.0 3.5 - 5.1 MMOL/L    Chloride 102 99 - 110 mMol/L    CO2 28 21 - 32 MMOL/L    Anion Gap 8 4 - 16    BUN 35 (H) 6 - 23 MG/DL    Creatinine 1.8 (H) 0.6 - 1.1 MG/DL    Est, Glom Filt Rate 26 (L) >60 mL/min/1.73m2    Glucose 95 70 - 99 MG/DL    Calcium 9.4 8.3 - 10.6 MG/DL     CULTURE results: Invalid input(s): BLOOD CULTURE,  URINE CULTURE, SURGICAL CULTURE    Diagnosis:  Patient Active Problem List   Diagnosis    Hypertension    GERD (gastroesophageal reflux disease)    Depression    Hyperlipidemia    Generalized osteoarthritis    Osteoporosis    Hypothyroid    Coronary artery disease involving native heart without angina pectoris    Eczema    Low back pain    Hyperuricemia    Gout    Pruritic condition    PAUL (acute kidney injury) ([facility])    Hyponatremia    Cystitis    Physical deconditioning    Bradycardia    LBBB (left bundle branch block)    Constipation, slow transit    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    Chronic renal impairment, stage 3 (moderate) ([facility])    Dizziness    Labyrinthitis    Vertigo    Primary osteoarthritis of left foot    Closed nondisplaced fracture of fourth metatarsal bone of left foot    Closed nondisplaced fracture of third metatarsal bone of left foot    Closed nondisplaced fracture of fifth left metatarsal bone    Vaginal prolapse    Sinus congestion    Localized edema    Sepsis (HCC)    Dyspnea and respiratory abnormalities    Pneumonia due to infectious organism    Pyelonephritis    Chronic kidney disease       Active Problems  Principal Problem:    Sepsis ([address]  Active Problems:    PAUL (acute kidney injury) ([address]    Dyspnea and respiratory abnormalities    Pneumonia due to infectious organism    Pyelonephritis    Chronic kidney disease  Resolved Problems:    * No resolved hospital problems. *    Electronically signed by: Electronically signed by Anni Macario.  CLEMENT Corley CNP on 11/22/2022 at 1:02 PM

## 2022-11-22 NOTE — PROGRESS NOTES
Pulmonary and Critical Care  Progress Note      VITALS:  BP (!) 155/73   Pulse 64   Temp 97.7 °F (36.5 °C) (Oral)   Resp 18   Ht 5' (1.524 m)   Wt 132 lb 15 oz (60.3 kg)   LMP  (LMP Unknown)   SpO2 95%   BMI 25.96 kg/m²     Subjective:   CHIEF COMPLAINT :SOB     HPI:                The patient is a 80 y.o. female is sitting in the bed. She is not in acute reps distress  Objective:   PHYSICAL EXAM:    LUNGS:Occasional basal crackles  Abd-soft, BS+,NT  Ext- no pedal edema  CVS-s1s2, no murmurs      DATA:    CBC:  Recent Labs     11/20/22  0638   WBC 10.1   RBC 2.75*   HGB 8.7*   HCT 26.1*      MCV 94.9   MCH 31.6*   MCHC 33.3   RDW 13.3   SEGSPCT 62.6      BMP:  Recent Labs     11/20/22  0638 11/21/22  0136 11/22/22  0523    136 138   K 4.8 4.8 5.0   CL 99 101 102   CO2 27 28 28   BUN 48* 42* 35*   CREATININE 2.0* 1.9* 1.8*   CALCIUM 9.3 9.0 9.4   GLUCOSE 94 94 95      ABG:  No results for input(s): PH, PO2ART, MOJ0BDL, HCO3, BEART, O2SAT in the last 72 hours.   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 08/18/2018     Priority: High    Bradycardia      Priority: High    LBBB (left bundle branch block)      Priority: High    PAUL (acute kidney injury) ([address] 12/26/2017     Priority: High    Pneumonia due to infectious organism 11/16/2022     Priority: Medium    Pyelonephritis 11/16/2022     Priority: Medium    Chronic kidney disease 11/16/2022     Priority: Medium    Dyspnea and respiratory abnormalities 11/15/2022     Priority: Medium    Sepsis ([address] 11/14/2022     Priority: Medium    Localized edema 07/11/2022     Priority: Medium    GERD (gastroesophageal reflux disease)      Priority: Medium     Overview Note:     Esophagitis      Hypertension

## 2022-11-22 NOTE — PROGRESS NOTES
Daily Progress Note  Subjective:    Pt. Awake and alert and feeling better  Bp and HR stable, off tele  No CP, SOB stable per pt. -on 3 L NC    Attending Note:  Feeling much better  Down to 3 liters of oxygen  Severe AS-consider TAVR as outpatient  BP meds and diuretics per renal   Pneumonia improving  Would benefit with rehab   Renal insuffiencey stable    Impression and Plan:     Sepsis    CAP, noted on CT chest    Respiratory panel negative    WBC count elevated    ABX per primary    Using 3L NC now-weaning slowly     Acute on Chronic HFpEF    BNP on admission was 6,417    Last echo showed EF 55-60%    Severe Aortic stenosis    Diuretics per renal at this time    Accurate I's and O's    1500 Fluid restriction     Plan to have her f/u with [facility] OP for potential valvuloplasty      CKD    Renal following; Avoid nephrotoxic medications     Will follow  Med. Tx. and increase activity     Most Recent Echo  11/15/2022   Left ventricular systolic function is normal.   Ejection fraction is visually estimated at 55-60%. Moderately dilated left atrium. Moderate to severe aortic stenosis is present; Mean PG 40mmHg, YANIRA 0.99 cm2. Mitral annular calcification is present. Mild to moderate mitral regurgitation. Mild to moderate tricuspid regurgitation; RVSP: 44 mmHg. No evidence of any pericardial effusion. Radiology  CT chest 11/14/2022  Impression   Bilateral pulmonary infiltrates and bilateral pleural effusions with probable   reactive mediastinal adenopathy       Probable granuloma in the right middle lobe. No follow-up imaging   recommended. PAST MEDICAL HISTORY:  The patient has a history of hypertension, renal  insufficiency present, and she has been treated medically. She is known  to have coronary artery disease also present. She has a chronic left  ventricular block present.   She had a heart catheterization done in the  past and LAD had 70% stenosis present and she was treated medically at  that time. Anemia, hyperlipidemia, and arthritis present. PAST SURGICAL HISTORY:  She has a history of having hip surgery done. Moderate coronary artery disease, heart catheterization done in 2018,  LAD with 70% stenosis distally. She was treated medically for that. She had cataract surgery and hysterectomy done.       Objective:   BP (!) 155/73   Pulse 64   Temp 97.7 °F (36.5 °C) (Oral)   Resp 18   Ht 5' (1.524 m)   Wt 132 lb 15 oz (60.3 kg)   LMP  (LMP Unknown)   SpO2 95%   BMI 25.96 kg/m²     Intake/Output Summary (Last 24 hours) at 11/22/2022 0223  Last data filed at 11/21/2022 1828  Gross per 24 hour   Intake 600 ml   Output 500 ml   Net 100 ml       Medications:   Scheduled Meds:   polyethylene glycol  17 g Oral Daily    senna  1 tablet Oral BID    docusate sodium  100 mg Oral Daily    ipratropium-albuterol  1 ampule Inhalation BID    lactobacillus  1 capsule Oral Daily with breakfast    meropenem  500 mg IntraVENous Q12H    amLODIPine  10 mg Oral Nightly    atenolol  25 mg Oral Daily    sodium bicarbonate  325 mg Oral BID    clopidogrel  75 mg Oral Daily    sodium chloride flush  5-40 mL IntraVENous 2 times per day    sodium chloride flush  5-40 mL IntraVENous 2 times per day    heparin (porcine)  5,000 Units SubCUTAneous 3 times per day    Vitamin D  2,000 Units Oral Daily    pantoprazole  40 mg Oral QAM AC    fluorometholone  1 drop Both Eyes BID    isosorbide mononitrate  60 mg Oral Daily    levothyroxine  88 mcg Oral Daily    ranolazine  500 mg Oral BID    sertraline  50 mg Oral Daily    [Held by provider] losartan  12.5 mg Oral Daily    atorvastatin  20 mg Oral Nightly      Infusions:   sodium chloride 10 mL/hr at 11/20/22 2333    sodium chloride        PRN Meds:  melatonin, ipratropium-albuterol, sodium chloride flush, sodium chloride, ondansetron **OR** ondansetron, polyethylene glycol, acetaminophen **OR** acetaminophen, sodium chloride flush, sodium chloride     Physical Exam:  Vitals:    11/22/22 0754   BP: (!) 155/73   Pulse: 64   Resp: 18   Temp: 97.7 °F (36.5 °C)   SpO2: 95%        General: AAO, NAD  Chest: Nontender  Cardiac: First and Second Heart Sounds are Normal, No Murmurs or Gallops noted  Lungs:Clear to auscultation and percussion. Abdomen: Soft, NT, ND, +BS  Extremities: No clubbing, no edema  Vascular:  Equal 2+ peripheral pulses. Lab Data:  CBC:   Recent Labs     11/20/22  0638   WBC 10.1   HGB 8.7*   HCT 26.1*   MCV 94.9        BMP:   Recent Labs     11/20/22  0638 11/21/22  0136 11/22/22  0523    136 138   K 4.8 4.8 5.0   CL 99 101 102   CO2 27 28 28   BUN 48* 42* 35*   CREATININE 2.0* 1.9* 1.8*     LIVER PROFILE: No results for input(s): AST, ALT, LIPASE, BILIDIR, BILITOT, ALKPHOS in the last 72 hours. Invalid input(s): AMYLASE,  ALB  PT/INR: No results for input(s): PROTIME, INR in the last 72 hours. APTT: No results for input(s): APTT in the last 72 hours. BNP:  No results for input(s): BNP in the last 72 hours.       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 08/18/2018    Bradycardia     LBBB (left bundle branch block)     PAUL (acute kidney injury) ([facility] 12/26/2017    Pneumonia due to infectious organism 11/16/2022    Pyelonephritis 11/16/2022    Chronic kidney disease 11/16/2022    Dyspnea and respiratory abnormalities 11/15/2022    Sepsis ([facility] 11/14/2022    Localized edema 07/11/2022    GERD (gastroesophageal reflux disease)     Hypertension     Hypothyroid     Sinus congestion 03/23/2022    Vaginal prolapse 09/07/2021    Primary osteoarthritis of left foot 08/30/2021    Closed nondisplaced fracture of fourth metatarsal bone of left foot 08/30/2021    Closed nondisplaced fracture of third metatarsal bone of left foot 08/30/2021    Closed nondisplaced fracture of fifth left metatarsal bone 08/30/2021    Vertigo     Dizziness     Labyrinthitis     Chronic renal impairment, stage 3 (moderate) (HCC)     Constipation, slow transit 06/19/2018    Cystitis     Physical deconditioning     Hyponatremia 01/01/2018    Pruritic condition 01/22/2016    Gout     Hyperuricemia     Low back pain     Eczema     Coronary artery disease involving native heart without angina pectoris     Depression     Hyperlipidemia     Generalized osteoarthritis     Osteoporosis        Electronically signed by Angela Yadav PA-C on 11/22/2022 at 9:04 AM    Electronically signed by Derek Rivas MD on 11/22/22 at 12:03 PM EST

## 2022-11-22 NOTE — CARE COORDINATION
Spoke with Leigh Ann with ARU and appeal is still pending. Spoke with granddghtr Maggie- if denial upheld for ARU by insurance then she wants to appeal if another level available. TC to [facility] and she will call Maggie and provide info if needed.

## 2022-11-22 NOTE — PROGRESS NOTES
V2.0  [facility] [place]      Name:  Sana Lechuga /Age/Sex: 1927  (80 y.o. female)   MRN & CSN:  [id number] & [id number] Encounter Date/Time: 2022 2:01 PM EST    Location:  [facility]/8980-R PCP: Drew Montana MD       Hospital Day: 9    Assessment and Plan:    Sana Lechuga is a 80 y.o. female with PMH of CAD, GERD, UTI, Hypothyroidism, HTN, Hyperlipedmia, CKD who presents with Sepsis ([facility]    #Acute hypoxic respiratory failure 2/2 CAP and pulmonary edema-resolving  -Blood cultures negative  -Chest x-ray showing pulmonary edema and concern for pneumonia  -Echo: EF of 55 to 60% with RVSP of 44  -Negative respiratory PCR  -CT chest showing bilateral infiltrates    Plan:  Pulmonology following, appreciate recs  Continue meropenem until  per ID (daughter requested to stay on meropenem as the patient has tolerated it well and has issues with tolerating antibiotics normally)  Continue DuoNebs  Patient is not on home oxygen however is requiring 3 L at home    #Pyelonephritis with history of recurrent UTI  -Received ciprofloxacin, doxycycline and nitrofurantoin without relief  -Urine culture growing Enterococcus faecalis sensitive to ampicillin    Plan:  ID on consult, appreciate recs  Meropenem for 14 days, end date is     #Acute decompensated HFpEF  -Chest x-ray with pulmonary edema and elevated RVSP    Plan:  IV diuresis as needed, holding for now in the setting of PAUL  Strict I's and O  Daily weights    #PAUL on CKD likely prerenal in the setting of diuresis  -Baseline creatinine around 2    Nephrology following, appreciate recommendations  Strict I's and O's  Family refused HD  Holding diuresis    #Suspected aspiration--> ruled out  -Seen by SLP, did not have any signs or symptoms of aspiration    Plan:  Continue to monitor    #Hyperkalemia-resolved  -Received Lokelma    Plan:  Continue monitoring BMP    #Elevated troponin likely from CKD and demand ischemia  #CAD  -Down trended  -EKG with LBBB    Plan:  Cardiology on consult, appreciate recommendations  Continue atenolol, amlodipine, Imdur, statin and aspirin  Holding telmisartan in the setting of PAUL    Chronic medical problems:    #Severe AS  -Follow-up at [facility] for TAVR balloon valvuloplasty    #Hypertension  Plan: Continue home meds except for on losartan    #GERD  Continue PPI    Patient was denied from ARU, expedited appeal pending  Patient medically ready for discharge      Diet ADULT DIET; Dysphagia - Minced and Moist; 1500 ml; Please provide extra moisteners and gravies  ADULT ORAL NUTRITION SUPPLEMENT; Dinner; Diabetic Oral Supplement   DVT Prophylaxis [] Lovenox, [x]  Heparin, [] SCDs, [] Ambulation,  [] Eliquis, [] Xarelto  [] Coumadin   Code Status Limited   Disposition From: home  Expected Disposition: acute rehab  Estimated Date of Discharge: pending placement  Patient requires continued admission due to pending placement   Surrogate Decision Maker/ CANDIS Rico     Subjective:     Chief Complaint: Shortness of Breath       Patient states that she is feeling well. Thought her breakfast was too greasy so she was not enjoying eating it. Review of Systems:    General: No fever, no chills  Heart: No chest pain, no palpitations  Lungs: No shortness of breath, no cough  Abdomen: No abdominal pain, no nausea, no vomiting, no constipation, no diarrhea  : No frequency, no dysuria, no urgency  MSK: No lower extremity swelling  Neuro: No confusion, no weakness  Skin: No rashes      Objective:      Intake/Output Summary (Last 24 hours) at 11/22/2022 1401  Last data filed at 11/21/2022 1828  Gross per 24 hour   Intake 480 ml   Output 500 ml   Net -20 ml        Vitals:   Vitals:    11/22/22 0754   BP: (!) 155/73   Pulse: 64   Resp: 18   Temp: 97.7 °F (36.5 °C)   SpO2: 95%       Physical Exam:     General: NAD  Eyes: EOMI  HENT: Atraumatic  CVS: Normal S1 and S2, no murmurs, RRR  Respiratory: Normal breath sounds, Potassium 5.0 3.5 - 5.1 MMOL/L    Chloride 102 99 - 110 mMol/L    CO2 28 21 - 32 MMOL/L    Anion Gap 8 4 - 16    BUN 35 (H) 6 - 23 MG/DL    Creatinine 1.8 (H) 0.6 - 1.1 MG/DL    Est, Glom Filt Rate 26 (L) >60 mL/min/1.73m2    Glucose 95 70 - 99 MG/DL    Calcium 9.4 8.3 - 10.6 MG/DL        Imaging/Diagnostics Last 24 Hours   XR CHEST PORTABLE    Result Date: 11/21/2022  EXAMINATION: ONE XRAY VIEW OF THE CHEST 11/21/2022 12:19 pm COMPARISON: 11/17/2022. HISTORY: ORDERING SYSTEM PROVIDED HISTORY: sob TECHNOLOGIST PROVIDED HISTORY: Reason for exam:->sob Reason for Exam:  sob FINDINGS: Cardiomediastinal silhouette is stable. Similar bilateral airspace opacities. No pleural effusion or pneumothorax. No gross bony abnormality.      Stable chest.       Electronically signed by Martha Gutierrez MD on 11/22/2022 at 2:01 PM

## 2022-11-22 NOTE — PROGRESS NOTES
RT Inhaler-Nebulizer Bronchodilator Protocol Note    There is a bronchodilator order in the chart from a provider indicating to follow the RT Bronchodilator Protocol and there is an Initiate RT Inhaler-Nebulizer Bronchodilator Protocol order as well (see protocol at bottom of note). CXR Findings:  XR CHEST PORTABLE    Result Date: 11/21/2022  Stable chest.       The findings from the last RT Protocol Assessment were as follows:   History Pulmonary Disease: None or smoker <15 pack years  Respiratory Pattern: Dyspnea on exertion or RR 21-25 bpm  Breath Sounds: Inspiratory and expiratory or bilateral wheezing and/or rhonchi  Cough: Strong, spontaneous, non-productive  Indication for Bronchodilator Therapy: Wheezing associated with pulm disorder  Bronchodilator Assessment Score: 8    Aerosolized bronchodilator medication orders have been revised according to the RT Inhaler-Nebulizer Bronchodilator Protocol below. Respiratory Therapist to perform RT Therapy Protocol Assessment initially then follow the protocol. Repeat RT Therapy Protocol Assessment PRN for score 0-3 or on second treatment, BID, and PRN for scores above 3. No Indications - adjust the frequency to every 6 hours PRN wheezing or bronchospasm, if no treatments needed after 48 hours then discontinue using Per Protocol order mode. If indication present, adjust the RT bronchodilator orders based on the Bronchodilator Assessment Score as indicated below. Use Inhaler orders unless patient has one or more of the following: on home nebulizer, not able to hold breath for 10 seconds, is not alert and oriented, cannot activate and use MDI correctly, or respiratory rate 25 breaths per minute or more, then use the equivalent nebulizer order(s) with same Frequency and PRN reasons based on the score. If a patient is on this medication at home then do not decrease Frequency below that used at home.     0-3 - enter or revise RT bronchodilator order(s) to equivalent RT Bronchodilator order with Frequency of every 4 hours PRN for wheezing or increased work of breathing using Per Protocol order mode. 4-6 - enter or revise RT Bronchodilator order(s) to two equivalent RT bronchodilator orders with one order with BID Frequency and one order with Frequency of every 4 hours PRN wheezing or increased work of breathing using Per Protocol order mode. 7-10 - enter or revise RT Bronchodilator order(s) to two equivalent RT bronchodilator orders with one order with TID Frequency and one order with Frequency of every 4 hours PRN wheezing or increased work of breathing using Per Protocol order mode. 11-13 - enter or revise RT Bronchodilator order(s) to one equivalent RT bronchodilator order with QID Frequency and an Albuterol order with Frequency of every 4 hours PRN wheezing or increased work of breathing using Per Protocol order mode. Greater than 13 - enter or revise RT Bronchodilator order(s) to one equivalent RT bronchodilator order with every 4 hours Frequency and an Albuterol order with Frequency of every 2 hours PRN wheezing or increased work of breathing using Per Protocol order mode. RT to enter RT Home Evaluation for COPD & MDI Assessment order using Per Protocol order mode.     Electronically signed by Jay Falcon RCP on 11/22/2022 at 3:50 PM

## 2022-11-22 NOTE — PROGRESS NOTES
Nephrology Progress Note        [address], [address] [address]  Phone: [phone]  Office Hours: 8:30AM - 4:30PM  Monday - Friday 11/22/2022 7:23 AM  Subjective:   Admit Date: 11/14/2022  PCP: Delores Conner MD  Interval History:   Resting comfortably  On 3L oxygen    Diet: ADULT DIET; Dysphagia - Minced and Moist; 1500 ml; Please provide extra moisteners and gravies  ADULT ORAL NUTRITION SUPPLEMENT; Dinner; Diabetic Oral Supplement      Data:   Scheduled Meds:   polyethylene glycol  17 g Oral Daily    senna  1 tablet Oral BID    docusate sodium  100 mg Oral Daily    ipratropium-albuterol  1 ampule Inhalation BID    lactobacillus  1 capsule Oral Daily with breakfast    meropenem  500 mg IntraVENous Q12H    amLODIPine  10 mg Oral Nightly    atenolol  25 mg Oral Daily    sodium bicarbonate  325 mg Oral BID    clopidogrel  75 mg Oral Daily    sodium chloride flush  5-40 mL IntraVENous 2 times per day    sodium chloride flush  5-40 mL IntraVENous 2 times per day    heparin (porcine)  5,000 Units SubCUTAneous 3 times per day    Vitamin D  2,000 Units Oral Daily    pantoprazole  40 mg Oral QAM AC    fluorometholone  1 drop Both Eyes BID    isosorbide mononitrate  60 mg Oral Daily    levothyroxine  88 mcg Oral Daily    ranolazine  500 mg Oral BID    sertraline  50 mg Oral Daily    [Held by provider] losartan  12.5 mg Oral Daily    atorvastatin  20 mg Oral Nightly     Continuous Infusions:   sodium chloride 10 mL/hr at 11/20/22 2333    sodium chloride       PRN Meds:melatonin, ipratropium-albuterol, sodium chloride flush, sodium chloride, ondansetron **OR** ondansetron, polyethylene glycol, acetaminophen **OR** acetaminophen, sodium chloride flush, sodium chloride  I/O last 3 completed shifts: In: 1080 [P.O.:1080]  Out: 1625 [Urine:1625]  No intake/output data recorded.     Intake/Output Summary (Last 24 hours) at 11/22/2022 0723  Last data filed at 11/21/2022 1828  Gross per 24 hour   Intake 840 ml   Output 500 ml   Net 340 ml       CBC:   Recent Labs     11/20/22  0638   WBC 10.1   HGB 8.7*          BMP:    Recent Labs     11/20/22  0638 11/21/22  0136 11/22/22  0523    136 138   K 4.8 4.8 5.0   CL 99 101 102   CO2 27 28 28   BUN 48* 42* 35*   CREATININE 2.0* 1.9* 1.8*   GLUCOSE 94 94 95         Objective:   Vitals: BP (!) 165/70   Pulse 68   Temp 97.9 °F (36.6 °C) (Oral)   Resp 17   Ht 5' (1.524 m)   Wt 132 lb 15 oz (60.3 kg)   LMP  (LMP Unknown)   SpO2 92%   BMI 25.96 kg/m²   General appearance: in no acute distress  HEENT: normocephalic, atraumatic,   Neck: supple, trachea midline  Lungs: breathing comfortably on nc  Extremities: extremities atraumatic, no cyanosis or edema      MEDICAL DECISION MAKING     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 08/18/2018    Bradycardia     LBBB (left bundle branch block)     APUL (acute kidney injury) ([facility] [address] 12/26/2017    Pneumonia due to infectious organism 11/16/2022    Pyelonephritis 11/16/2022    Chronic kidney disease 11/16/2022    Dyspnea and respiratory abnormalities 11/15/2022    Sepsis ([address] 11/14/2022    Localized edema 07/11/2022    GERD (gastroesophageal reflux disease)     Hypertension     Hypothyroid     Sinus congestion 03/23/2022    Vaginal prolapse 09/07/2021    Primary osteoarthritis of left foot 08/30/2021    Closed nondisplaced fracture of fourth metatarsal bone of left foot 08/30/2021    Closed nondisplaced fracture of third metatarsal bone of left foot 08/30/2021    Closed nondisplaced fracture of fifth left metatarsal bone 08/30/2021    Vertigo     Dizziness     Labyrinthitis     Chronic renal impairment, stage 3 (moderate) (HCC)     Constipation, slow transit 06/19/2018    Cystitis     Physical deconditioning     Hyponatremia 01/01/2018    Pruritic condition 01/22/2016    Gout     Hyperuricemia     Low back pain     Eczema     Coronary artery disease involving native heart without angina pectoris     Depression     Hyperlipidemia     Generalized osteoarthritis     Osteoporosis      -Cr better at 1.8  -Will resume olmesartan once PAUL has completely resolved  -Resp status is better    Thank you                    Electronically signed by Harrison Meng DO on 11/22/2022 at [facility], [address],  [address], [facility] [id number]  PHONE: [phone]  FAX: [phone]

## 2022-11-23 LAB — PROCALCITONIN: 0.11

## 2022-11-23 PROCEDURE — 97530 THERAPEUTIC ACTIVITIES: CPT

## 2022-11-23 PROCEDURE — 6360000002 HC RX W HCPCS: Performed by: NURSE PRACTITIONER

## 2022-11-23 PROCEDURE — 6370000000 HC RX 637 (ALT 250 FOR IP)

## 2022-11-23 PROCEDURE — 6370000000 HC RX 637 (ALT 250 FOR IP): Performed by: INTERNAL MEDICINE

## 2022-11-23 PROCEDURE — 92526 ORAL FUNCTION THERAPY: CPT

## 2022-11-23 PROCEDURE — 6360000002 HC RX W HCPCS

## 2022-11-23 PROCEDURE — 2580000003 HC RX 258: Performed by: NURSE PRACTITIONER

## 2022-11-23 PROCEDURE — 2700000000 HC OXYGEN THERAPY PER DAY

## 2022-11-23 PROCEDURE — 94669 MECHANICAL CHEST WALL OSCILL: CPT

## 2022-11-23 PROCEDURE — 6370000000 HC RX 637 (ALT 250 FOR IP): Performed by: STUDENT IN AN ORGANIZED HEALTH CARE EDUCATION/TRAINING PROGRAM

## 2022-11-23 PROCEDURE — 1200000000 HC SEMI PRIVATE

## 2022-11-23 PROCEDURE — 84145 PROCALCITONIN (PCT): CPT

## 2022-11-23 PROCEDURE — 94640 AIRWAY INHALATION TREATMENT: CPT

## 2022-11-23 PROCEDURE — 99232 SBSQ HOSP IP/OBS MODERATE 35: CPT | Performed by: NURSE PRACTITIONER

## 2022-11-23 PROCEDURE — 94761 N-INVAS EAR/PLS OXIMETRY MLT: CPT

## 2022-11-23 PROCEDURE — 87205 SMEAR GRAM STAIN: CPT

## 2022-11-23 PROCEDURE — 97116 GAIT TRAINING THERAPY: CPT

## 2022-11-23 PROCEDURE — 94668 MNPJ CHEST WALL SBSQ: CPT

## 2022-11-23 PROCEDURE — 2580000003 HC RX 258

## 2022-11-23 PROCEDURE — 99231 SBSQ HOSP IP/OBS SF/LOW 25: CPT | Performed by: INTERNAL MEDICINE

## 2022-11-23 PROCEDURE — 36415 COLL VENOUS BLD VENIPUNCTURE: CPT

## 2022-11-23 PROCEDURE — 87070 CULTURE OTHR SPECIMN AEROBIC: CPT

## 2022-11-23 RX ADMIN — CLOPIDOGREL BISULFATE 75 MG: 75 TABLET ORAL at 12:41

## 2022-11-23 RX ADMIN — PANTOPRAZOLE SODIUM 40 MG: 40 TABLET, DELAYED RELEASE ORAL at 06:23

## 2022-11-23 RX ADMIN — Medication 1 DROP: at 12:43

## 2022-11-23 RX ADMIN — SENNOSIDES AND DOCUSATE SODIUM 1 TABLET: 50; 8.6 TABLET ORAL at 12:41

## 2022-11-23 RX ADMIN — SODIUM BICARBONATE 325 MG: 650 TABLET ORAL at 21:19

## 2022-11-23 RX ADMIN — HEPARIN SODIUM 5000 UNITS: 5000 INJECTION INTRAVENOUS; SUBCUTANEOUS at 06:23

## 2022-11-23 RX ADMIN — ATORVASTATIN CALCIUM 20 MG: 10 TABLET, FILM COATED ORAL at 21:19

## 2022-11-23 RX ADMIN — DOCUSATE SODIUM 100 MG: 100 CAPSULE, LIQUID FILLED ORAL at 12:42

## 2022-11-23 RX ADMIN — RANOLAZINE 500 MG: 500 TABLET, FILM COATED, EXTENDED RELEASE ORAL at 21:19

## 2022-11-23 RX ADMIN — MEROPENEM 500 MG: 500 INJECTION, POWDER, FOR SOLUTION INTRAVENOUS at 17:16

## 2022-11-23 RX ADMIN — HEPARIN SODIUM 5000 UNITS: 5000 INJECTION INTRAVENOUS; SUBCUTANEOUS at 21:19

## 2022-11-23 RX ADMIN — SENNOSIDES AND DOCUSATE SODIUM 1 TABLET: 50; 8.6 TABLET ORAL at 21:19

## 2022-11-23 RX ADMIN — IPRATROPIUM BROMIDE AND ALBUTEROL SULFATE 1 AMPULE: .5; 3 SOLUTION RESPIRATORY (INHALATION) at 16:38

## 2022-11-23 RX ADMIN — SODIUM CHLORIDE, PRESERVATIVE FREE 10 ML: 5 INJECTION INTRAVENOUS at 12:46

## 2022-11-23 RX ADMIN — Medication 1 CAPSULE: at 12:42

## 2022-11-23 RX ADMIN — ATENOLOL 25 MG: 25 TABLET ORAL at 17:09

## 2022-11-23 RX ADMIN — IPRATROPIUM BROMIDE AND ALBUTEROL SULFATE 1 AMPULE: .5; 3 SOLUTION RESPIRATORY (INHALATION) at 08:05

## 2022-11-23 RX ADMIN — SODIUM BICARBONATE 325 MG: 650 TABLET ORAL at 12:41

## 2022-11-23 RX ADMIN — Medication 3 MG: at 22:50

## 2022-11-23 RX ADMIN — POLYETHYLENE GLYCOL (3350) 17 G: 17 POWDER, FOR SOLUTION ORAL at 12:40

## 2022-11-23 RX ADMIN — Medication 1 DROP: at 21:19

## 2022-11-23 RX ADMIN — HEPARIN SODIUM 5000 UNITS: 5000 INJECTION INTRAVENOUS; SUBCUTANEOUS at 17:10

## 2022-11-23 RX ADMIN — LEVOTHYROXINE SODIUM 88 MCG: 0.09 TABLET ORAL at 06:23

## 2022-11-23 RX ADMIN — Medication 2000 UNITS: at 12:41

## 2022-11-23 RX ADMIN — ISOSORBIDE MONONITRATE 60 MG: 60 TABLET, EXTENDED RELEASE ORAL at 12:41

## 2022-11-23 RX ADMIN — RANOLAZINE 500 MG: 500 TABLET, FILM COATED, EXTENDED RELEASE ORAL at 12:42

## 2022-11-23 RX ADMIN — MEROPENEM 500 MG: 500 INJECTION, POWDER, FOR SOLUTION INTRAVENOUS at 06:36

## 2022-11-23 RX ADMIN — SERTRALINE HYDROCHLORIDE 50 MG: 50 TABLET ORAL at 12:42

## 2022-11-23 RX ADMIN — AMLODIPINE BESYLATE 10 MG: 10 TABLET ORAL at 21:19

## 2022-11-23 RX ADMIN — SODIUM CHLORIDE, PRESERVATIVE FREE 10 ML: 5 INJECTION INTRAVENOUS at 21:20

## 2022-11-23 RX ADMIN — IPRATROPIUM BROMIDE AND ALBUTEROL SULFATE 1 AMPULE: .5; 3 SOLUTION RESPIRATORY (INHALATION) at 21:08

## 2022-11-23 NOTE — PROGRESS NOTES
Pulmonary and Critical Care  Progress Note      VITALS:  BP (!) 144/60   Pulse 62   Temp 97.5 °F (36.4 °C) (Oral)   Resp 18   Ht 5' (1.524 m)   Wt 128 lb 8.5 oz (58.3 kg)   LMP  (LMP Unknown)   SpO2 96%   BMI 25.10 kg/m²     Subjective:   CHIEF COMPLAINT :SOB     HPI:                The patient is a 80 y.o. female is sitting in the chair. She is not in acute resp distress. Her PAUL is improving    Objective:   PHYSICAL EXAM:    LUNGS:Occasional basal crackles  Abd-soft, BS+,NT  Ext- no pedal edema  CVS-s1s2, no murmurs      DATA:    CBC:  No results for input(s): WBC, RBC, HGB, HCT, PLT, MCV, MCH, MCHC, RDW, NRBC, SEGSPCT, BANDSPCT in the last 72 hours. BMP:  Recent Labs     11/21/22  0136 11/22/22  0523    138   K 4.8 5.0    102   CO2 28 28   BUN 42* 35*   CREATININE 1.9* 1.8*   CALCIUM 9.0 9.4   GLUCOSE 94 95      ABG:  No results for input(s): PH, PO2ART, LLA4HZJ, HCO3, BEART, O2SAT in the last 72 hours.   BNP  Lab Results   Component Value Date     (H) 05/09/2012      D-Dimer:  Lab Results   Component Value Date    NHOCWN 714 (H) 01/07/2018      Radiology: None      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 08/18/2018     Priority: High    Bradycardia      Priority: High    LBBB (left bundle branch block)      Priority: High    PAUL (acute kidney injury) ([facility] [address] 12/26/2017     Priority: High    Pneumonia due to infectious organism 11/16/2022     Priority: Medium    Pyelonephritis 11/16/2022     Priority: Medium    Chronic kidney disease 11/16/2022     Priority: Medium    Dyspnea and respiratory abnormalities 11/15/2022     Priority: Medium    Sepsis ([facility] [address] 11/14/2022     Priority: Medium    Localized edema 07/11/2022     Priority: Medium    GERD (gastroesophageal reflux disease)      Priority: Medium     Overview Note:     Esophagitis      Hypertension      Priority: Low    Hypothyroid      Priority: Low Overview Note:     Hypothyroidism      Sinus congestion 03/23/2022     Overview Note:     CHRONIC W POST NASAL DRIP      Vaginal prolapse 09/07/2021    Primary osteoarthritis of left foot 08/30/2021    Closed nondisplaced fracture of fourth metatarsal bone of left foot 08/30/2021    Closed nondisplaced fracture of third metatarsal bone of left foot 08/30/2021    Closed nondisplaced fracture of fifth left metatarsal bone 08/30/2021    Vertigo      Overview Note:     Dr Suarez [facility]      Dizziness     Labyrinthitis     Chronic renal impairment, stage 3 (moderate) (HCC)     Constipation, slow transit 06/19/2018    Cystitis     Physical deconditioning     Hyponatremia 01/01/2018    Pruritic condition 01/22/2016    Gout     Hyperuricemia     Low back pain     Eczema     Coronary artery disease involving native heart without angina pectoris      Overview Note:     Dr Ingris Figueredo/Jose Figueredo      Depression     Hyperlipidemia     Generalized osteoarthritis     Osteoporosis      Overview Note:     Generalized     Acute Hypoxic resp failure sec to Cardiogenic and Non Cardiogenic Pulmonary edema  Small bilateral Pleural effusions  PAUL on CKD- slowly improving  Leukocytosis- Improved  Severe AS  Mild to Moderate Pulmonary HTN  CAD  H/o Recurrent UTI  GERD  HLD  ?  Atypical pneumonia       PO Abx  Inhalers  ICS  OOB  PT/OT  Await eval for the severe AS at [facility] later  Supportive care  Await placement  CKatelynnw present management    Electronically signed by Emily Sarmiento MD on 11/23/2022 at 12:36 PM

## 2022-11-23 NOTE — PROGRESS NOTES
Infectious Disease Progress Note  2022   Patient Name: Francene Kayser : 1927   Impression  Pyelonephritis secondary to Enterococcus faecalis Complicated UTI:  Multifocal Pneumonia and Pulmonary Edema Complicated by Acute Hypoxic Respiratory Failure:  Afebrile, no leukocytosis  CRP on DWT, patient appears to be improving as oxygen needs have decreased and states she is feeling better  -UA WBC 9, RBC none, Urine Culture: Enterococcus faecalis 50,000  -CT Chest WO Contrast: Bilateral pulmonary infiltrates and bilateral pleural effusions with probable   reactive mediastinal adenopathy   Probable granuloma in the right middle lobe. No follow-up imaging   recommended. Oxygen needs have increased from  of 5 L/min/nc to 11/15 of 10/L HFNC  Dr. Saniya Aaron onboard for pulmonology, imp of possible atypical pneumonia  PAUL on CKD3:  Dr. Krysta Bashir onboard  CAD/ HTN/ HLD/ Mod to Severe AS/ Mild to mod PHTN:  Dr. Irvin Wallace onboard  11/15-Limited Echo: EF 55-60%, Mod to severe AS, mild to mod AR, mild to mod TR.   OA/ Eczema/ Gout:  Hypothyroidism:  Allergy to cephalexin (rash)  Multi-morbidity: per PMHx:  s/p hyster, colonoscopy  Plan:  Continue IV meropenem 500 mg q12h to complete a 14 day course (end date 22), the family, POA, would like to continue to IV regimen to complete the full course as the patient has tolerated well and usually doesn't tolerate ABX therapy very well  Follow up in ID clinic in 1 week please  Weekly labs drawn on Monday during the course of treatment  CBC with differential, CMP, ESR, CRP  [facility]  # [phone]   OK from ID standpoint to DC when ready  ID Service will return on 22    Ongoing Antimicrobial Therapy  Meropenem -? Completed Antimicrobial Therapy  Levofloxacin -?? History:? Interval history noted. Chief complaint: pyelonephritis. Multifocal pneumonia with acute hypoxic respiratory failure. Denies n/v/d/f or untoward effects of antibiotics. Up in the chair, states is feeling much improved with her breathing, denies any urinary symptoms. Physical Exam:  Vital Signs: BP (!) 144/60   Pulse 62   Temp 97.5 °F (36.4 °C) (Oral)   Resp 18   Ht 5' (1.524 m)   Wt 128 lb 8.5 oz (58.3 kg)   LMP  (LMP Unknown)   SpO2 96%   BMI 25.10 kg/m²     Gen: Remains A&O x 4  Chest: no distress and CTA. Posterior breath sounds with bibasilar fine crackles. Oxygen per NC  Heart: NSR and no MRG. Abd: soft, non-distended, no tenderness, no hepatomegaly. Normoactive bowel sounds. Bilateral CVA tenderness has subsided. Ext: no clubbing, cyanosis, or edema  Neuro: Mental status intact. CN 2-12 intact and no focal sensory or motor deficits     Radiologic / Imaging / TESTING  11/14/22 XR Chest Portable:  Impression   Cardiomegaly       Bilateral ill-defined pulmonary opacities could represent multifocal   pneumonia or edema      11/14/22 CT Chest WO Contrast:  Impression   Bilateral pulmonary infiltrates and bilateral pleural effusions with probable   reactive mediastinal adenopathy       Probable granuloma in the right middle lobe. No follow-up imaging   recommended. 11/14/22 CT Chest WO Contrast:  Impression   Bilateral pulmonary infiltrates and bilateral pleural effusions with probable   reactive mediastinal adenopathy       Probable granuloma in the right middle lobe. No follow-up imaging   recommended. 11/15/22 Limited Echo:   Summary   Left ventricular systolic function is normal.   Ejection fraction is visually estimated at 55-60%. Moderately dilated left atrium. Moderate to severe aortic stenosis is present; Mean PG 40mmHg, YANIRA 0.99 cm2   Mitral annular calcification is present. Mild to moderate mitral regurgitation. Mild to moderate tricuspid regurgitation; RVSP: 44 mmHg. No evidence of any pericardial effusion.      11/16/22 XR Chest Portable:  Impression   Extensive bilateral airspace opacities. Suspected small bilateral pleural effusions. 11/17/22 CT Abdomen Pelvis WO Contrast:  Impression   1. Negative for urinary tract stones. 2. Consolidation in the lower lobes and bilateral pleural effusions. 11/19/22 XR Chest Portable:  Impression   Multifocal airspace opacities are redemonstrated and not significantly   changed. Considerations would still be multifocal pneumonia or edema. Small amount of left pleural effusion and basilar atelectasis may also be   present. 11/21/22 XR Chest Portable:  Impression   Stable chest.         Labs:    No results found for this or any previous visit (from the past 24 hour(s)).     CULTURE results: Invalid input(s): BLOOD CULTURE,  URINE CULTURE, SURGICAL CULTURE    Diagnosis:  Patient Active Problem List   Diagnosis    Hypertension    GERD (gastroesophageal reflux disease)    Depression    Hyperlipidemia    Generalized osteoarthritis    Osteoporosis    Hypothyroid    Coronary artery disease involving native heart without angina pectoris    Eczema    Low back pain    Hyperuricemia    Gout    Pruritic condition    PAUL (acute kidney injury) ([address]    Hyponatremia    Cystitis    Physical deconditioning    Bradycardia    LBBB (left bundle branch block)    Constipation, slow transit    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    Chronic renal impairment, stage 3 (moderate) (HCC)    Dizziness    Labyrinthitis    Vertigo    Primary osteoarthritis of left foot    Closed nondisplaced fracture of fourth metatarsal bone of left foot    Closed nondisplaced fracture of third metatarsal bone of left foot    Closed nondisplaced fracture of fifth left metatarsal bone    Vaginal prolapse    Sinus congestion    Localized edema    Sepsis (HCC)    Dyspnea and respiratory abnormalities    Pneumonia due to infectious organism    Pyelonephritis    Chronic kidney disease       Active Problems  Principal Problem:    Sepsis ([address]  Active Problems: PAUL (acute kidney injury) ([facility] [address]    Dyspnea and respiratory abnormalities    Pneumonia due to infectious organism    Pyelonephritis    Chronic kidney disease  Resolved Problems:    * No resolved hospital problems. *    Electronically signed by: Electronically signed by Hellen Krishnan.  CLEMENT Corley CNP on 11/23/2022 at 10:52 AM

## 2022-11-23 NOTE — PROGRESS NOTES
V2.0  [facility] [place] Hospitalist Progress Note      Name:  Maria Luz Alan /Age/Sex: 1927  (80 y.o. female)   MRN & CSN:  [id number] & [id number] Encounter Date/Time: 2022 2:01 PM EST    Location:  [address] [place] [address] PCP: Chuy Roche MD       Hospital Day: 10    Assessment and Plan:    Maria Luz Alan is a 80 y.o. female with PMH of CAD, GERD, UTI, Hypothyroidism, HTN, Hyperlipedmia, CKD who presents with Sepsis ([facility] [address]    #Acute hypoxic respiratory failure 2/2 CAP and pulmonary edema-resolving  -Blood cultures negative  -Chest x-ray showing pulmonary edema and concern for pneumonia  -Echo: EF of 55 to 60% with RVSP of 44  -Negative respiratory PCR  -CT chest showing bilateral infiltrates    Plan:  Pulmonology following, appreciate recs  Continue meropenem until  per ID (daughter requested to stay on meropenem as the patient has tolerated it well and has issues with tolerating antibiotics normally)  Continue DuoNebs  Patient is not on home oxygen however is requiring 3 L     #Pyelonephritis with history of recurrent UTI  -Received ciprofloxacin, doxycycline and nitrofurantoin without relief  -Urine culture growing Enterococcus faecalis sensitive to ampicillin    Plan:  ID on consult, appreciate recs  Meropenem for 14 days, end date is     #Acute decompensated HFpEF  -Chest x-ray with pulmonary edema and elevated RVSP    Plan:  IV diuresis as needed, holding for now in the setting of PAUL  Strict I's and O  Daily weights    #PAUL on CKD likely prerenal in the setting of diuresis  -Baseline creatinine around 2    Nephrology following, appreciate recommendations  Strict I's and O's  Family refused HD  Holding diuresis    #Suspected aspiration--> ruled out  -Seen by SLP, did not have any signs or symptoms of aspiration    Plan:  Continue to monitor    #Hyperkalemia-resolved  -Received Lokelma    Plan:  Continue monitoring BMP    #Elevated troponin likely from CKD and demand ischemia  #CAD  -Down trended  -EKG with LBBB    Plan:  Cardiology on consult, appreciate recommendations  Continue atenolol, amlodipine, Imdur, statin and aspirin  Holding telmisartan in the setting of PAUL    Chronic medical problems:    #Severe AS  -Follow[facility]up at [facility] for TAVR balloon valvuloplasty    #Hypertension  Plan: Continue home meds except for on losartan    #GERD  Continue PPI    Patient was denied from ARU, expedited appeal pending  Patient medically ready for discharge      Diet ADULT DIET; Dysphagia - Minced and Moist; 1500 ml; Please provide extra moisteners and gravies  ADULT ORAL NUTRITION SUPPLEMENT; Dinner; Diabetic Oral Supplement   DVT Prophylaxis [] Lovenox, [x]  Heparin, [] SCDs, [] Ambulation,  [] Eliquis, [] Xarelto  [] Coumadin   Code Status Limited   Disposition From: home  Expected Disposition: acute rehab  Estimated Date of Discharge: pending placement  Patient requires continued admission due to pending placement   Surrogate Decision Maker/ CANDIS Rico     Subjective:     Chief Complaint: Shortness of Breath     Patient is patiently waiting to go to rehab. She understands that if she is not approved to go today she'll likely have to wait until Monday. Review of Systems:    General: No fever, no chills  Heart: No chest pain, no palpitations  Lungs: No shortness of breath, no cough  Abdomen: No abdominal pain, no nausea, no vomiting, no constipation, no diarrhea  : No frequency, no dysuria, no urgency  MSK: No lower extremity swelling  Neuro: No confusion, no weakness  Skin: No rashes      Objective:      Intake/Output Summary (Last 24 hours) at 11/23/2022 0661  Last data filed at 11/22/2022 2223  Gross per 24 hour   Intake --   Output 600 ml   Net -600 ml        Vitals:   Vitals:    11/23/22 0806   BP:    Pulse: 62   Resp: 18   Temp:    SpO2: 96%       Physical Exam:     General: NAD  Eyes: EOMI  HENT: Atraumatic  Respiratory: no respiratory distress  GI: nondistended  MSK: no lower extremity edema  Skin: Intact, dry, warm, no rashes  Neuro: CN II to XII grossly intact  Psych: Normal mood    Medications:   Medications:    sennosides-docusate sodium  1 tablet Oral BID    meropenem  500 mg IntraVENous Q12H    ipratropium-albuterol  1 ampule Inhalation TID    polyethylene glycol  17 g Oral Daily    docusate sodium  100 mg Oral Daily    lactobacillus  1 capsule Oral Daily with breakfast    amLODIPine  10 mg Oral Nightly    atenolol  25 mg Oral Daily    sodium bicarbonate  325 mg Oral BID    clopidogrel  75 mg Oral Daily    sodium chloride flush  5-40 mL IntraVENous 2 times per day    sodium chloride flush  5-40 mL IntraVENous 2 times per day    heparin (porcine)  5,000 Units SubCUTAneous 3 times per day    Vitamin D  2,000 Units Oral Daily    pantoprazole  40 mg Oral QAM AC    fluorometholone  1 drop Both Eyes BID    isosorbide mononitrate  60 mg Oral Daily    levothyroxine  88 mcg Oral Daily    ranolazine  500 mg Oral BID    sertraline  50 mg Oral Daily    [Held by provider] losartan  12.5 mg Oral Daily    atorvastatin  20 mg Oral Nightly      Infusions:    sodium chloride 10 mL/hr at 11/20/22 2333    sodium chloride       PRN Meds: melatonin, 3 mg, Nightly PRN  ipratropium-albuterol, 1 ampule, Q4H PRN  sodium chloride flush, 5-40 mL, PRN  sodium chloride, , PRN  ondansetron, 4 mg, Q8H PRN   Or  ondansetron, 4 mg, Q6H PRN  polyethylene glycol, 17 g, Daily PRN  acetaminophen, 650 mg, Q6H PRN   Or  acetaminophen, 650 mg, Q6H PRN  sodium chloride flush, 5-40 mL, PRN  sodium chloride, , PRN      Labs      No results found for this or any previous visit (from the past 24 hour(s)). Imaging/Diagnostics Last 24 Hours   XR CHEST PORTABLE    Result Date: 11/21/2022  EXAMINATION: ONE XRAY VIEW OF THE CHEST 11/21/2022 12:19 pm COMPARISON: 11/17/2022.  HISTORY: ORDERING SYSTEM PROVIDED HISTORY: sob TECHNOLOGIST PROVIDED HISTORY: Reason for exam:->sob Reason for Exam:  sob FINDINGS: Cardiomediastinal silhouette is stable. Similar bilateral airspace opacities. No pleural effusion or pneumothorax. No gross bony abnormality.      Stable chest.       Electronically signed by Avtar Parekh MD on 11/23/2022 at 9:17 AM

## 2022-11-23 NOTE — CARE COORDINATION
Per patients daughter family appeal is pending with Briseyda Melchor at this time. Will continue to follow for determination.

## 2022-11-23 NOTE — CARE COORDINATION
Left VM for [place] nurse representative in appeals dept. Requesting a determination ASAP regarding appeal for ARU. Will follow.

## 2022-11-23 NOTE — PROGRESS NOTES
Physical Therapy    Physical Therapy Treatment Note  Name: Maria Luz Alan MRN: [id number] :   1927   Date:  2022   Admission Date: 2022 Room:  [address] [place] [address]   Restrictions/Precautions:          Fall risk, 4L 02, tele, BP, general  Communication with other providers:  handoff to RN  Subjective:  Patient states:  \"I need to walk every day, I know I'd do better if I did\"  Pain:   Location, Type, Intensity (0/10 to 10/10):  Pt denies  Objective:    Observation:  Pt is awake up in recliner with 02 on 4L, vitals stable. Objective Measures:  Sp02 on 3L in gait decrease to 89%, with seated rest and increase to 4L pt improves to 97%. Treatment, including education/measures:  Sit<>Stand: Pt performed STS from recliner to personal rollator with CGA, v/c for proper sequencing. Pt demonstrates increased effort/ time to upright, BUE support for push-off from recliner. Standing balance: Initial stance at recliner ~30 seconds pt denies any lightheadedness, CGA. Pt required cues for management of rollator brakes. Pt AMB to BR x8 ft, PT cues for management of rollator in small space. Pt performs sit<>stand at commode with cues for grab bar use, and CGA. Seated balance ~ 3 min with nearby SUP. Pt managed own depends with CGA. Gait: Pt AMB x45+/x45 ft in hallway with rollator, step-through pattern with decreased nael and foot clearance, increased kyphotic posture, min deviation of gait path without LOB. X1 seated rest break with use of pt rollator. PT instructs pt in placing rollator against wall for stability, close CGA for all STS at rollator for pt balance. Pt Sp02 improved following seated rest, return to room. Return to seated at recliner pt demonstrates fair-, v/c for safe sequencing, pt without need for cues to reach back to chair, CGA for descent. PT provided lotion at pt request, pt required assist with opening/squeezing bottle.   Education: Discussed PT role, POC, safety precautions, d/c planning  End of session pt left in recliner with LE elevated, on 4L 02, with lines managed, call light, phone, exit alarm, tray, all needs, RN aware. Assessment / Impression:    Patient's tolerance of treatment:  fair   Adverse Reaction: none  Significant change in status and impact:  none  Barriers to improvement:  none  Plan for Next Session:    Continue AMB distance   Time in:  16:07  Time out:  16:32  Timed treatment minutes:  25  Total treatment time:  25    Previously filed items:  Social/Functional History  Lives With: Alone (pt has home health aide daily for 8 hours/day, also supportive granddaughter)  Type of Home: Apartment  Home Layout: One level  Home Access: Level entry  Bathroom Shower/Tub: Walk-in shower  Bathroom Toilet: Handicap height  Bathroom Equipment: Built-in shower seat, Grab bars in shower, Commode  Bathroom Accessibility: Accessible  Home Equipment: [employer], 4 wheeled, Lift chair  ADL Assistance: Independent (aide supervises with bathing but pt able to manage per granddaughter, pt able to dress and toilet independently)  Homemaking Assistance: Needs assistance  Homemaking Responsibilities: No (home health aide manages)  Ambulation Assistance: Independent (mod I with 4ww household distances)  Transfer Assistance: Independent  Active : No  Occupation: Retired        Short Term Goals  Time Frame for Short Term Goals: 1 week  Short Term Goal 1: Pt will perform all bed mobility tasks with SBA, min cues  Short Term Goal 2: Pt will manage all compenents of rollator for STS with SBA.   Short Term Goal 3: Pt will AMB x45 ft with RW, stable 02, CGA-SBA for safety    Electronically signed by:    Dahiana Carpio PT  11/23/2022, 5:10 PM

## 2022-11-23 NOTE — PROGRESS NOTES
Daily Progress Note  Subjective:    Pt. Awake and alert and feeling better  Bp and HR stable, off tele  No CP, SOB stable per pt. -on 3 L NC    Attending Note:  Patient is awake alert  Severe AS stable at present  For TAVR as outpatient    Impression and Plan:     Sepsis    CAP, noted on CT chest    Respiratory panel negative    WBC count elevated    ABX per primary    Using 3L NC now-weaning slowly     Acute on Chronic HFpEF    BNP on admission was 6,417    Last echo showed EF 55-60%    Severe Aortic stenosis    Diuretics per renal at this time    Accurate I's and O's    1500 Fluid restriction     Plan to have her f/u with [facility] OP for potential valvuloplasty      CKD    Renal following; Avoid nephrotoxic medications     Will follow  Med. Tx. and increase activity     Most Recent Echo  11/15/2022   Left ventricular systolic function is normal.   Ejection fraction is visually estimated at 55-60%. Moderately dilated left atrium. Moderate to severe aortic stenosis is present; Mean PG 40mmHg, YANIRA 0.99 cm2. Mitral annular calcification is present. Mild to moderate mitral regurgitation. Mild to moderate tricuspid regurgitation; RVSP: 44 mmHg. No evidence of any pericardial effusion. Radiology  CT chest 11/14/2022  Impression   Bilateral pulmonary infiltrates and bilateral pleural effusions with probable   reactive mediastinal adenopathy       Probable granuloma in the right middle lobe. No follow-up imaging   recommended. PAST MEDICAL HISTORY:  The patient has a history of hypertension, renal  insufficiency present, and she has been treated medically. She is known  to have coronary artery disease also present. She has a chronic left  ventricular block present. She had a heart catheterization done in the  past and LAD had 70% stenosis present and she was treated medically at  that time. Anemia, hyperlipidemia, and arthritis present.      PAST SURGICAL HISTORY:  She has a history of having hip surgery done. Moderate coronary artery disease, heart catheterization done in 2018,  LAD with 70% stenosis distally. She was treated medically for that. She had cataract surgery and hysterectomy done.     Objective:   BP (!) 144/60   Pulse 62   Temp 97.5 °F (36.4 °C) (Oral)   Resp 18   Ht 5' (1.524 m)   Wt 128 lb 8.5 oz (58.3 kg)   LMP  (LMP Unknown)   SpO2 96%   BMI 25.10 kg/m²     Intake/Output Summary (Last 24 hours) at 11/23/2022 0926  Last data filed at 11/22/2022 2223  Gross per 24 hour   Intake --   Output 600 ml   Net -600 ml       Medications:   Scheduled Meds:   sennosides-docusate sodium  1 tablet Oral BID    meropenem  500 mg IntraVENous Q12H    ipratropium-albuterol  1 ampule Inhalation TID    polyethylene glycol  17 g Oral Daily    docusate sodium  100 mg Oral Daily    lactobacillus  1 capsule Oral Daily with breakfast    amLODIPine  10 mg Oral Nightly    atenolol  25 mg Oral Daily    sodium bicarbonate  325 mg Oral BID    clopidogrel  75 mg Oral Daily    sodium chloride flush  5-40 mL IntraVENous 2 times per day    sodium chloride flush  5-40 mL IntraVENous 2 times per day    heparin (porcine)  5,000 Units SubCUTAneous 3 times per day    Vitamin D  2,000 Units Oral Daily    pantoprazole  40 mg Oral QAM AC    fluorometholone  1 drop Both Eyes BID    isosorbide mononitrate  60 mg Oral Daily    levothyroxine  88 mcg Oral Daily    ranolazine  500 mg Oral BID    sertraline  50 mg Oral Daily    [Held by provider] losartan  12.5 mg Oral Daily    atorvastatin  20 mg Oral Nightly      Infusions:   sodium chloride 10 mL/hr at 11/20/22 2333    sodium chloride        PRN Meds:  melatonin, ipratropium-albuterol, sodium chloride flush, sodium chloride, ondansetron **OR** ondansetron, polyethylene glycol, acetaminophen **OR** acetaminophen, sodium chloride flush, sodium chloride     Physical Exam:  Vitals:    11/23/22 0806   BP:    Pulse: 62   Resp: 18   Temp:    SpO2: 96%        General: AAO, NAD  Chest: Nontender  Cardiac: First and Second Heart Sounds are Normal, No Murmurs or Gallops noted  Lungs:Clear to auscultation and percussion. Abdomen: Soft, NT, ND, +BS  Extremities: No clubbing, no edema  Vascular:  Equal 2+ peripheral pulses. Lab Data:  CBC: No results for input(s): WBC, HGB, HCT, MCV, PLT in the last 72 hours. BMP:   Recent Labs     11/21/22  0136 11/22/22  0523    138   K 4.8 5.0    102   CO2 28 28   BUN 42* 35*   CREATININE 1.9* 1.8*     LIVER PROFILE: No results for input(s): AST, ALT, LIPASE, BILIDIR, BILITOT, ALKPHOS in the last 72 hours. Invalid input(s): AMYLASE,  ALB  PT/INR: No results for input(s): PROTIME, INR in the last 72 hours. APTT: No results for input(s): APTT in the last 72 hours. BNP:  No results for input(s): BNP in the last 72 hours.       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 08/18/2018    Bradycardia     LBBB (left bundle branch block)     PAUL (acute kidney injury) ([facility] [address] 12/26/2017    Pneumonia due to infectious organism 11/16/2022    Pyelonephritis 11/16/2022    Chronic kidney disease 11/16/2022    Dyspnea and respiratory abnormalities 11/15/2022    Sepsis ([address] 11/14/2022    Localized edema 07/11/2022    GERD (gastroesophageal reflux disease)     Hypertension     Hypothyroid     Sinus congestion 03/23/2022    Vaginal prolapse 09/07/2021    Primary osteoarthritis of left foot 08/30/2021    Closed nondisplaced fracture of fourth metatarsal bone of left foot 08/30/2021    Closed nondisplaced fracture of third metatarsal bone of left foot 08/30/2021    Closed nondisplaced fracture of fifth left metatarsal bone 08/30/2021    Vertigo     Dizziness     Labyrinthitis     Chronic renal impairment, stage 3 (moderate) (HCC)     Constipation, slow transit 06/19/2018    Cystitis     Physical deconditioning     Hyponatremia 01/01/2018    Pruritic condition 01/22/2016    Gout Hyperuricemia     Low back pain     Eczema     Coronary artery disease involving native heart without angina pectoris     Depression     Hyperlipidemia     Generalized osteoarthritis     Osteoporosis        Electronically signed by Khushboo Escalante PA-C on 11/23/2022 at 9:26 AM    Electronically signed by Monroe Alexander MD on 11/23/22 at 1:42 PM EST

## 2022-11-23 NOTE — PROGRESS NOTES
Nephrology Progress Note        [address], [address] [place] [address]  Phone: [phone]  Office Hours: 8:30AM - 4:30PM  Monday - Friday 11/23/2022 7:14 AM  Subjective:   Admit Date: 11/14/2022  PCP: Guille Quigley MD  Interval History: On nc  In good spirits  Good appettite    Diet: ADULT DIET; Dysphagia - Minced and Moist; 1500 ml; Please provide extra moisteners and gravies  ADULT ORAL NUTRITION SUPPLEMENT; Dinner; Diabetic Oral Supplement      Data:   Scheduled Meds:   sennosides-docusate sodium  1 tablet Oral BID    meropenem  500 mg IntraVENous Q12H    ipratropium-albuterol  1 ampule Inhalation TID    polyethylene glycol  17 g Oral Daily    docusate sodium  100 mg Oral Daily    lactobacillus  1 capsule Oral Daily with breakfast    amLODIPine  10 mg Oral Nightly    atenolol  25 mg Oral Daily    sodium bicarbonate  325 mg Oral BID    clopidogrel  75 mg Oral Daily    sodium chloride flush  5-40 mL IntraVENous 2 times per day    sodium chloride flush  5-40 mL IntraVENous 2 times per day    heparin (porcine)  5,000 Units SubCUTAneous 3 times per day    Vitamin D  2,000 Units Oral Daily    pantoprazole  40 mg Oral QAM AC    fluorometholone  1 drop Both Eyes BID    isosorbide mononitrate  60 mg Oral Daily    levothyroxine  88 mcg Oral Daily    ranolazine  500 mg Oral BID    sertraline  50 mg Oral Daily    [Held by provider] losartan  12.5 mg Oral Daily    atorvastatin  20 mg Oral Nightly     Continuous Infusions:   sodium chloride 10 mL/hr at 11/20/22 2333    sodium chloride       PRN Meds:melatonin, ipratropium-albuterol, sodium chloride flush, sodium chloride, ondansetron **OR** ondansetron, polyethylene glycol, acetaminophen **OR** acetaminophen, sodium chloride flush, sodium chloride  I/O last 3 completed shifts:  In: -   Out: 600 [Urine:600]  No intake/output data recorded.     Intake/Output Summary (Last 24 hours) at 11/23/2022 4036  Last data filed at 11/22/2022 2223  Gross per 24 hour   Intake --   Output 600 ml   Net -600 ml       CBC: No results for input(s): WBC, HGB, PLT in the last 72 hours. BMP:    Recent Labs     11/21/22  0136 11/22/22  0523    138   K 4.8 5.0    102   CO2 28 28   BUN 42* 35*   CREATININE 1.9* 1.8*   GLUCOSE 94 95     Hepatic: No results for input(s): AST, ALT, ALB, BILITOT, ALKPHOS in the last 72 hours. Troponin: No results for input(s): TROPONINI in the last 72 hours. BNP: No results for input(s): BNP in the last 72 hours. Lipids: No results for input(s): CHOL, HDL in the last 72 hours. Invalid input(s): LDLCALCU  ABGs:   Lab Results   Component Value Date/Time    PO2ART 83 11/14/2022 11:30 PM    AEZ1ETC 29.0 11/14/2022 11:30 PM     INR: No results for input(s): INR in the last 72 hours.     Objective:   Vitals: /62   Pulse 71   Temp 97.5 °F (36.4 °C) (Oral)   Resp 16   Ht 5' (1.524 m)   Wt 128 lb 8.5 oz (58.3 kg)   LMP  (LMP Unknown)   SpO2 93%   BMI 25.10 kg/m²   General appearance: alert and cooperative with exam, in no acute distress  HEENT: normocephalic, atraumatic,   Neck: supple, trachea midline  Lungs: breathing comfortably on nc  Extremities: extremities atraumatic, no cyanosis or edema  Neurologic: alert, oriented, follows commands, interactive    MEDICAL DECISION MAKING     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 08/18/2018    Bradycardia     LBBB (left bundle branch block)     PAUL (acute kidney injury) ([facility] [address] 12/26/2017    Pneumonia due to infectious organism 11/16/2022    Pyelonephritis 11/16/2022    Chronic kidney disease 11/16/2022    Dyspnea and respiratory abnormalities 11/15/2022    Sepsis ([facility] [address] 11/14/2022    Localized edema 07/11/2022    GERD (gastroesophageal reflux disease)     Hypertension     Hypothyroid     Sinus congestion 03/23/2022    Vaginal prolapse 09/07/2021    Primary osteoarthritis of left foot 08/30/2021    Closed nondisplaced fracture of fourth metatarsal bone of left foot 08/30/2021    Closed nondisplaced fracture of third metatarsal bone of left foot 08/30/2021    Closed nondisplaced fracture of fifth left metatarsal bone 08/30/2021    Vertigo     Dizziness     Labyrinthitis     Chronic renal impairment, stage 3 (moderate) (HCC)     Constipation, slow transit 06/19/2018    Cystitis     Physical deconditioning     Hyponatremia 01/01/2018    Pruritic condition 01/22/2016    Gout     Hyperuricemia     Low back pain     Eczema     Coronary artery disease involving native heart without angina pectoris     Depression     Hyperlipidemia     Generalized osteoarthritis     Osteoporosis      Cr 1.8  Continue to hold benicar for now  Awaiting rehab  Avoid nephrotoxins  Will follow  Ok to place midline if needed for abx                  Electronically signed by Immanuel Olivarez DO on 11/23/2022 at 7:14 AM    MD Alireza Becerril DO [address],  [address]  Formerly [facility], [place] [address]  PHONE: [phone]  FAX: [phone]

## 2022-11-23 NOTE — PROGRESS NOTES
[address] [facility] SPEECH/LANGUAGE PATHOLOGY  DAILY PROGRESS NOTE  Smooth Johnson  11/23/2022  [id number]  Dyspnea and respiratory abnormalities [R06.00, R06.89]  Troponin I above reference range [R77.8]  Sepsis ([address] [A41.9]  Pneumonia due to infectious organism, unspecified laterality, unspecified part of lung [J18.9]  Chronic kidney disease, unspecified CKD stage [N18.9]  Allergies   Allergen Reactions    Bactrim [Sulfamethoxazole-Trimethoprim] Nausea And Vomiting    Cephalexin Rash    Tape [Adhesive Tape] Itching         Pt was seen this date for dysphagia treatment. IMPRESSION AND RECOMMENDATIONS:   Smooth Johnson was seen for diet tolerance monitoring. Pt was seated upright in chair, alert and cooperative throughout. Pt was seen with her lunch tray, accepting PO trials of easy to chew solids, puree consistency and thin liquids by straw/cup sips. Oral stage was [facility] with adequate labial seal, mastication, AP transit and oral clearance. Pharyngeal appears grossly intact, with immediate cough following 1/6 trials of thin liquids. Pt tolerated straw sips and cup sips this date, but states she feels safer drinking from straw. No s/s of aspiration observed with other consistencies. Recommend continue to easy to chew solids/thin liquids by straw sips with aspiration precautions. No further acute SLP needs identified at this time.      GOALS (current status in bold):  Short-term Goals  Timeframe for Short-term Goals: LOS or until goals are met  Goal 1: Pt will tolerate easy to chew solids/thin liquids by straw sips without clinical evidence of aspiration 100% Goal met  Goal 2: Pt/caregivers will demonstrate comprehension of recommendations/POC Goal met       EDUCATION: recommendations/POC    PAIN RATING (0-10 Scale): denies   Time in/Time out: SLP Individual Minutes  Time In: 1320  Time Out: North   Minutes: 15    Visit number: JAZMIN [address] 112, MS, CF-SLP 11/23/2022

## 2022-11-23 NOTE — CARE COORDINATION
Spoke with Maggie and she received denial for ARU and has started appeal process, she spoke with physician advisor who states he will consider info but may take until Monday for decision. Leigh Ann with ARU updated. CM to cont to follow. Placed whiteboard note requesting PT/OT keep pt on services for the holiday/ weekend so pt does not regress.

## 2022-11-24 LAB
ANION GAP SERPL CALCULATED.3IONS-SCNC: 9 MMOL/L (ref 4–16)
BUN BLDV-MCNC: 40 MG/DL (ref 6–23)
CALCIUM SERPL-MCNC: 9.6 MG/DL (ref 8.3–10.6)
CHLORIDE BLD-SCNC: 104 MMOL/L (ref 99–110)
CO2: 24 MMOL/L (ref 21–32)
CREAT SERPL-MCNC: 1.7 MG/DL (ref 0.6–1.1)
GFR SERPL CREATININE-BSD FRML MDRD: 27 ML/MIN/1.73M2
GLUCOSE BLD-MCNC: 89 MG/DL (ref 70–99)
POTASSIUM SERPL-SCNC: 4.9 MMOL/L (ref 3.5–5.1)
SODIUM BLD-SCNC: 137 MMOL/L (ref 135–145)

## 2022-11-24 PROCEDURE — 6360000002 HC RX W HCPCS: Performed by: NURSE PRACTITIONER

## 2022-11-24 PROCEDURE — 6370000000 HC RX 637 (ALT 250 FOR IP): Performed by: INTERNAL MEDICINE

## 2022-11-24 PROCEDURE — 6370000000 HC RX 637 (ALT 250 FOR IP)

## 2022-11-24 PROCEDURE — 2700000000 HC OXYGEN THERAPY PER DAY

## 2022-11-24 PROCEDURE — 94761 N-INVAS EAR/PLS OXIMETRY MLT: CPT

## 2022-11-24 PROCEDURE — 99231 SBSQ HOSP IP/OBS SF/LOW 25: CPT | Performed by: INTERNAL MEDICINE

## 2022-11-24 PROCEDURE — 1200000000 HC SEMI PRIVATE

## 2022-11-24 PROCEDURE — 97535 SELF CARE MNGMENT TRAINING: CPT

## 2022-11-24 PROCEDURE — 97530 THERAPEUTIC ACTIVITIES: CPT

## 2022-11-24 PROCEDURE — 94640 AIRWAY INHALATION TREATMENT: CPT

## 2022-11-24 PROCEDURE — 6370000000 HC RX 637 (ALT 250 FOR IP): Performed by: STUDENT IN AN ORGANIZED HEALTH CARE EDUCATION/TRAINING PROGRAM

## 2022-11-24 PROCEDURE — 2580000003 HC RX 258

## 2022-11-24 PROCEDURE — 36415 COLL VENOUS BLD VENIPUNCTURE: CPT

## 2022-11-24 PROCEDURE — 6360000002 HC RX W HCPCS

## 2022-11-24 PROCEDURE — 80048 BASIC METABOLIC PNL TOTAL CA: CPT

## 2022-11-24 PROCEDURE — 2580000003 HC RX 258: Performed by: NURSE PRACTITIONER

## 2022-11-24 PROCEDURE — 94669 MECHANICAL CHEST WALL OSCILL: CPT

## 2022-11-24 RX ADMIN — MEROPENEM 500 MG: 500 INJECTION, POWDER, FOR SOLUTION INTRAVENOUS at 05:33

## 2022-11-24 RX ADMIN — Medication 1 DROP: at 20:27

## 2022-11-24 RX ADMIN — SODIUM CHLORIDE, PRESERVATIVE FREE 10 ML: 5 INJECTION INTRAVENOUS at 09:08

## 2022-11-24 RX ADMIN — Medication 1 DROP: at 09:12

## 2022-11-24 RX ADMIN — PANTOPRAZOLE SODIUM 40 MG: 40 TABLET, DELAYED RELEASE ORAL at 05:30

## 2022-11-24 RX ADMIN — LEVOTHYROXINE SODIUM 88 MCG: 0.09 TABLET ORAL at 05:30

## 2022-11-24 RX ADMIN — ATENOLOL 25 MG: 25 TABLET ORAL at 17:39

## 2022-11-24 RX ADMIN — SODIUM BICARBONATE 325 MG: 650 TABLET ORAL at 20:26

## 2022-11-24 RX ADMIN — ISOSORBIDE MONONITRATE 60 MG: 60 TABLET, EXTENDED RELEASE ORAL at 09:07

## 2022-11-24 RX ADMIN — IPRATROPIUM BROMIDE AND ALBUTEROL SULFATE 1 AMPULE: .5; 3 SOLUTION RESPIRATORY (INHALATION) at 14:25

## 2022-11-24 RX ADMIN — SENNOSIDES AND DOCUSATE SODIUM 1 TABLET: 50; 8.6 TABLET ORAL at 20:26

## 2022-11-24 RX ADMIN — RANOLAZINE 500 MG: 500 TABLET, FILM COATED, EXTENDED RELEASE ORAL at 09:07

## 2022-11-24 RX ADMIN — SODIUM CHLORIDE, PRESERVATIVE FREE 10 ML: 5 INJECTION INTRAVENOUS at 09:12

## 2022-11-24 RX ADMIN — AMLODIPINE BESYLATE 10 MG: 10 TABLET ORAL at 20:25

## 2022-11-24 RX ADMIN — SODIUM CHLORIDE: 9 INJECTION, SOLUTION INTRAVENOUS at 17:39

## 2022-11-24 RX ADMIN — CLOPIDOGREL BISULFATE 75 MG: 75 TABLET ORAL at 09:07

## 2022-11-24 RX ADMIN — Medication 1 CAPSULE: at 09:07

## 2022-11-24 RX ADMIN — Medication 3 MG: at 20:25

## 2022-11-24 RX ADMIN — Medication 2000 UNITS: at 09:07

## 2022-11-24 RX ADMIN — SERTRALINE HYDROCHLORIDE 50 MG: 50 TABLET ORAL at 09:07

## 2022-11-24 RX ADMIN — HEPARIN SODIUM 5000 UNITS: 5000 INJECTION INTRAVENOUS; SUBCUTANEOUS at 05:30

## 2022-11-24 RX ADMIN — SODIUM CHLORIDE, PRESERVATIVE FREE 10 ML: 5 INJECTION INTRAVENOUS at 20:27

## 2022-11-24 RX ADMIN — IPRATROPIUM BROMIDE AND ALBUTEROL SULFATE 1 AMPULE: .5; 3 SOLUTION RESPIRATORY (INHALATION) at 21:36

## 2022-11-24 RX ADMIN — SENNOSIDES AND DOCUSATE SODIUM 1 TABLET: 50; 8.6 TABLET ORAL at 09:07

## 2022-11-24 RX ADMIN — HEPARIN SODIUM 5000 UNITS: 5000 INJECTION INTRAVENOUS; SUBCUTANEOUS at 20:31

## 2022-11-24 RX ADMIN — SODIUM BICARBONATE 325 MG: 650 TABLET ORAL at 09:07

## 2022-11-24 RX ADMIN — POLYETHYLENE GLYCOL (3350) 17 G: 17 POWDER, FOR SOLUTION ORAL at 09:07

## 2022-11-24 RX ADMIN — ATORVASTATIN CALCIUM 20 MG: 10 TABLET, FILM COATED ORAL at 20:25

## 2022-11-24 RX ADMIN — RANOLAZINE 500 MG: 500 TABLET, FILM COATED, EXTENDED RELEASE ORAL at 20:26

## 2022-11-24 RX ADMIN — DOCUSATE SODIUM 100 MG: 100 CAPSULE, LIQUID FILLED ORAL at 09:07

## 2022-11-24 RX ADMIN — HEPARIN SODIUM 5000 UNITS: 5000 INJECTION INTRAVENOUS; SUBCUTANEOUS at 14:41

## 2022-11-24 RX ADMIN — IPRATROPIUM BROMIDE AND ALBUTEROL SULFATE 1 AMPULE: .5; 3 SOLUTION RESPIRATORY (INHALATION) at 07:21

## 2022-11-24 RX ADMIN — MEROPENEM 500 MG: 500 INJECTION, POWDER, FOR SOLUTION INTRAVENOUS at 18:26

## 2022-11-24 ASSESSMENT — PAIN SCALES - WONG BAKER
WONGBAKER_NUMERICALRESPONSE: 0

## 2022-11-24 ASSESSMENT — PAIN SCALES - GENERAL: PAINLEVEL_OUTOF10: 0

## 2022-11-24 NOTE — PROGRESS NOTES
Occupational Therapy    Occupational Therapy Treatment Note    Name: Honore Hodgkin MRN: [id number] :   1927   Date:  2022   Admission Date: 2022 Room:  [address] [place] [address]     Primary Problem:  Sepsis    Restrictions/Precautions:  General precautions, fall risk, oxygen     Communication with other providers: RN approved session    Subjective:  Patient states:  Pt agreeable to therapy session. Pain:   Location, Type, Intensity (0/10 to 10/10): Denies pain    Objective:    Observation: Pt seated in chair. Objective Measures:  Pt alert and oriented. Treatment, including education:  Therapeutic Activity Training:   Therapeutic activity training was instructed today. Cues were given for safety, sequence, UE/LE placement, awareness, and balance. Activities performed today included bed mobility training, sup-sit, sit-stand, SPT. Pt seated in chair upon arrival and agreeable to therapy session. Pt completed sit to stand from armed chair with gait belt donned and RWW with set up and CGA with increased time. Pt completed functional mobility with RWW with close SBA to bathroom and into hallway less than household distance with three seated rest breaks. Pt required CGA to MIN A when turning to sit on seated portion of RWW. However given increased verbal and tactile cues turns progressed to CGA for balance. Pt returned in room and seated in chair. Self Care Training:   Cues were given for safety, sequence, UE/LE placement, visual cues, and balance. Activities performed today included toileting, hand hygiene at sink    Pt completed toilet transfer with use of [employer] and grab bar with SBA and completed doffing of undergarment with supervision. Pt required increased time for toileting needs. Pt completed sit to stand off toilet with use of grab bar and SBA and donned undergarment with supervision.  Pt completed functional transfer to sink to wash hands with set up and SBA with noted good balance.       Assessment / Impression:    Patient's tolerance of treatment: Well  Adverse Reaction: None  Significant change in status and impact: Improved from initial evaluation  Barriers to improvement: None noted      Plan for Next Session:    Continue OT POC and progress standing ADLs     Time in:  0950  Time out:  1028  Timed treatment minutes:  38  Total treatment time:  38      Electronically signed by:    MEERA Bhagat,   11/24/2022, 11:00 AM

## 2022-11-24 NOTE — PROGRESS NOTES
Daily Progress Note  Subjective:  Pt. Awake and alert;   Denies any CP, SOB is stable  Still on 3L NC  BP and HR stable; not on tele    Attending Note:      Impression and Plan:   Sepsis    CAP, noted on CT chest    Respiratory panel negative    WBC count elevated    ABX per primary    Using 3L NC now-weaning slowly     Acute on Chronic HFpEF    BNP on admission was 6,417    Last echo showed EF 55-60%    Severe Aortic stenosis    Diuretics per renal at this time    Accurate I's and O's    1500 Fluid restriction     Plan to have her f/u with [facility] OP for potential valvuloplasty      CKD    Renal following; Avoid nephrotoxic medications     Will follow  Med. Tx. and increase activity     Most Recent Echo  11/15/2022   Left ventricular systolic function is normal.   Ejection fraction is visually estimated at 55-60%. Moderately dilated left atrium. Moderate to severe aortic stenosis is present; Mean PG 40mmHg, YANIRA 0.99 cm2. Mitral annular calcification is present. Mild to moderate mitral regurgitation. Mild to moderate tricuspid regurgitation; RVSP: 44 mmHg. No evidence of any pericardial effusion. Radiology  CT chest 11/14/2022  Impression   Bilateral pulmonary infiltrates and bilateral pleural effusions with probable   reactive mediastinal adenopathy       Probable granuloma in the right middle lobe. No follow-up imaging   recommended. PAST MEDICAL HISTORY:  The patient has a history of hypertension, renal  insufficiency present, and she has been treated medically. She is known  to have coronary artery disease also present. She has a chronic left  ventricular block present. She had a heart catheterization done in the  past and LAD had 70% stenosis present and she was treated medically at  that time. Anemia, hyperlipidemia, and arthritis present. PAST SURGICAL HISTORY:  She has a history of having hip surgery done.    Moderate coronary artery disease, heart catheterization done in 2018,  LAD with 70% stenosis distally. She was treated medically for that. She had cataract surgery and hysterectomy done.       Objective:   BP (!) 138/54   Pulse 73   Temp 98.2 °F (36.8 °C) (Oral)   Resp 18   Ht 5' (1.524 m)   Wt 131 lb 2.8 oz (59.5 kg)   LMP  (LMP Unknown)   SpO2 95%   BMI 25.62 kg/m²     Intake/Output Summary (Last 24 hours) at 11/24/2022 0730  Last data filed at 11/24/2022 0447  Gross per 24 hour   Intake 120 ml   Output 700 ml   Net -580 ml       Medications:   Scheduled Meds:   sennosides-docusate sodium  1 tablet Oral BID    meropenem  500 mg IntraVENous Q12H    ipratropium-albuterol  1 ampule Inhalation TID    polyethylene glycol  17 g Oral Daily    docusate sodium  100 mg Oral Daily    lactobacillus  1 capsule Oral Daily with breakfast    amLODIPine  10 mg Oral Nightly    atenolol  25 mg Oral Daily    sodium bicarbonate  325 mg Oral BID    clopidogrel  75 mg Oral Daily    sodium chloride flush  5-40 mL IntraVENous 2 times per day    sodium chloride flush  5-40 mL IntraVENous 2 times per day    heparin (porcine)  5,000 Units SubCUTAneous 3 times per day    Vitamin D  2,000 Units Oral Daily    pantoprazole  40 mg Oral QAM AC    fluorometholone  1 drop Both Eyes BID    isosorbide mononitrate  60 mg Oral Daily    levothyroxine  88 mcg Oral Daily    ranolazine  500 mg Oral BID    sertraline  50 mg Oral Daily    [Held by provider] losartan  12.5 mg Oral Daily    atorvastatin  20 mg Oral Nightly      Infusions:   sodium chloride 10 mL/hr at 11/20/22 2333    sodium chloride        PRN Meds:  melatonin, ipratropium-albuterol, sodium chloride flush, sodium chloride, ondansetron **OR** ondansetron, polyethylene glycol, acetaminophen **OR** acetaminophen, sodium chloride flush, sodium chloride     Physical Exam:  Vitals:    11/24/22 0722   BP:    Pulse:    Resp:    Temp:    SpO2: 95%        General: AAO, NAD  Chest: Nontender  Cardiac: First and Second Heart Sounds are Normal, No Murmurs or Gallops noted  Lungs:Clear to auscultation and percussion. Abdomen: Soft, NT, ND, +BS  Extremities: No clubbing, no edema  Vascular:  Equal 2+ peripheral pulses. Lab Data:  CBC: No results for input(s): WBC, HGB, HCT, MCV, PLT in the last 72 hours. BMP:   Recent Labs     11/22/22  0523 11/24/22  0413    137   K 5.0 4.9    104   CO2 28 24   BUN 35* 40*   CREATININE 1.8* 1.7*     LIVER PROFILE: No results for input(s): AST, ALT, LIPASE, BILIDIR, BILITOT, ALKPHOS in the last 72 hours. Invalid input(s): AMYLASE,  ALB  PT/INR: No results for input(s): PROTIME, INR in the last 72 hours. APTT: No results for input(s): APTT in the last 72 hours. BNP:  No results for input(s): BNP in the last 72 hours.       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 08/18/2018    Bradycardia     LBBB (left bundle branch block)     PAUL (acute kidney injury) ([facility] [address] 12/26/2017    Pneumonia due to infectious organism 11/16/2022    Pyelonephritis 11/16/2022    Chronic kidney disease 11/16/2022    Dyspnea and respiratory abnormalities 11/15/2022    Sepsis ([facility] [address] 11/14/2022    Localized edema 07/11/2022    GERD (gastroesophageal reflux disease)     Hypertension     Hypothyroid     Sinus congestion 03/23/2022    Vaginal prolapse 09/07/2021    Primary osteoarthritis of left foot 08/30/2021    Closed nondisplaced fracture of fourth metatarsal bone of left foot 08/30/2021    Closed nondisplaced fracture of third metatarsal bone of left foot 08/30/2021    Closed nondisplaced fracture of fifth left metatarsal bone 08/30/2021    Vertigo     Dizziness     Labyrinthitis     Chronic renal impairment, stage 3 (moderate) (HCC)     Constipation, slow transit 06/19/2018    Cystitis     Physical deconditioning     Hyponatremia 01/01/2018    Pruritic condition 01/22/2016    Gout     Hyperuricemia     Low back pain     Eczema     Coronary artery disease involving native heart without angina pectoris     Depression     Hyperlipidemia     Generalized osteoarthritis     Osteoporosis        Electronically signed by CLEMENT Reyes CNP on 11/24/2022 at 7:30 AM  Patient seen and examined agree with above assessment and plan patient clinically much better Electronically signed by Reji Davidson MD on 11/24/2022 at 11:12 AM

## 2022-11-24 NOTE — PROGRESS NOTES
V2.0  [facility] Hospitalist Progress Note      Name:  Marlene Back /Age/Sex: 1927  (80 y.o. female)   MRN & CSN:  [id number] & [id number] Encounter Date/Time: 2022 2:01 PM EST    Location:  005/1550-J PCP: Cathi Atkinson MD       Hospital Day: 11    Assessment and Plan:    Marlene Back is a 80 y.o. female with PMH of CAD, GERD, UTI, Hypothyroidism, HTN, Hyperlipedmia, CKD who presents with Sepsis ([facility] [address]    #Acute hypoxic respiratory failure 2/2 CAP and pulmonary edema-resolving  -Blood cultures negative  -Chest x-ray showing pulmonary edema and concern for pneumonia  -Echo: EF of 55 to 60% with RVSP of 44  -Negative respiratory PCR  -CT chest showing bilateral infiltrates    Plan:  Pulmonology following, appreciate recs  Continue meropenem until  per ID (daughter requested to stay on meropenem as the patient has tolerated it well and has issues with tolerating antibiotics normally)  Continue DuoNebs  Patient is not on home oxygen however is requiring 3 L     #Pyelonephritis with history of recurrent UTI  -Received ciprofloxacin, doxycycline and nitrofurantoin without relief  -Urine culture growing Enterococcus faecalis sensitive to ampicillin    Plan:  ID on consult, appreciate recs  Meropenem for 14 days, end date is     #Acute decompensated HFpEF  -Chest x-ray with pulmonary edema and elevated RVSP    Plan:  IV diuresis as needed, holding for now in the setting of PAUL  Strict I's and O  Daily weights    #PAUL on CKD likely prerenal in the setting of diuresis  -Baseline creatinine around 2    Nephrology following, appreciate recommendations  Strict I's and O's  Family refused HD  Holding diuresis    #Suspected aspiration--> ruled out  -Seen by SLP, did not have any signs or symptoms of aspiration    Plan:  Continue to monitor    #Hyperkalemia-resolved  -Received Lokelma    Plan:  Continue monitoring BMP    #Elevated troponin likely from CKD and demand ischemia  #CAD  -Down trended  -EKG with LBBB    Plan:  Cardiology on consult, appreciate recommendations  Continue atenolol, amlodipine, Imdur, statin and aspirin  Holding telmisartan in the setting of PAUL    Chronic medical problems:    #Severe AS  -Follow-up at [facility] for TAVR balloon valvuloplasty    #Hypertension  Plan: Continue home meds except for on losartan    #GERD  Continue PPI    Patient was denied from ARU, expedited appeal pending  Patient medically ready for discharge      Diet ADULT DIET; Dysphagia - Minced and Moist; 1500 ml; Please provide extra moisteners and gravies  ADULT ORAL NUTRITION SUPPLEMENT; Dinner; Diabetic Oral Supplement   DVT Prophylaxis [] Lovenox, [x]  Heparin, [] SCDs, [] Ambulation,  [] Eliquis, [] Xarelto  [] Coumadin   Code Status Limited   Disposition From: home  Expected Disposition: acute rehab  Estimated Date of Discharge: pending placement  Patient requires continued admission due to pending placement   Surrogate Decision Maker/ CANDIS Rico     Subjective:     Chief Complaint: Shortness of Breath     Patient doing well this morning. No complaints. Review of Systems:    General: No fever, no chills  Heart: No chest pain, no palpitations  Lungs: No shortness of breath, no cough  Abdomen: No abdominal pain, no nausea, no vomiting, no constipation, no diarrhea  : No frequency, no dysuria, no urgency  MSK: No lower extremity swelling  Neuro: No confusion, no weakness  Skin: No rashes      Objective:      Intake/Output Summary (Last 24 hours) at 11/24/2022 0903  Last data filed at 11/24/2022 0603  Gross per 24 hour   Intake 120 ml   Output 700 ml   Net -580 ml        Vitals:   Vitals:    11/24/22 0739   BP: 127/61   Pulse: 68   Resp: 18   Temp: 97.5 °F (36.4 °C)   SpO2: 92%       Physical Exam:     General: NAD  Eyes: EOMI  HENT: Atraumatic  Respiratory: no respiratory distress  GI: nondistended  MSK: no lower extremity edema  Skin: Intact, dry, warm, no rashes  Neuro: CN II to XII grossly intact  Psych: Normal mood    Medications:   Medications:    sennosides-docusate sodium  1 tablet Oral BID    meropenem  500 mg IntraVENous Q12H    ipratropium-albuterol  1 ampule Inhalation TID    polyethylene glycol  17 g Oral Daily    docusate sodium  100 mg Oral Daily    lactobacillus  1 capsule Oral Daily with breakfast    amLODIPine  10 mg Oral Nightly    atenolol  25 mg Oral Daily    sodium bicarbonate  325 mg Oral BID    clopidogrel  75 mg Oral Daily    sodium chloride flush  5-40 mL IntraVENous 2 times per day    sodium chloride flush  5-40 mL IntraVENous 2 times per day    heparin (porcine)  5,000 Units SubCUTAneous 3 times per day    Vitamin D  2,000 Units Oral Daily    pantoprazole  40 mg Oral QAM AC    fluorometholone  1 drop Both Eyes BID    isosorbide mononitrate  60 mg Oral Daily    levothyroxine  88 mcg Oral Daily    ranolazine  500 mg Oral BID    sertraline  50 mg Oral Daily    [Held by provider] losartan  12.5 mg Oral Daily    atorvastatin  20 mg Oral Nightly      Infusions:    sodium chloride 10 mL/hr at 11/20/22 2333    sodium chloride       PRN Meds: melatonin, 3 mg, Nightly PRN  ipratropium-albuterol, 1 ampule, Q4H PRN  sodium chloride flush, 5-40 mL, PRN  sodium chloride, , PRN  ondansetron, 4 mg, Q8H PRN   Or  ondansetron, 4 mg, Q6H PRN  polyethylene glycol, 17 g, Daily PRN  acetaminophen, 650 mg, Q6H PRN   Or  acetaminophen, 650 mg, Q6H PRN  sodium chloride flush, 5-40 mL, PRN  sodium chloride, , PRN      Labs      Recent Results (from the past 24 hour(s))   Procalcitonin    Collection Time: 11/23/22  1:23 PM   Result Value Ref Range    Procalcitonin 0.084    Basic Metabolic Panel    Collection Time: 11/24/22  4:13 AM   Result Value Ref Range    Sodium 137 135 - 145 MMOL/L    Potassium 4.9 3.5 - 5.1 MMOL/L    Chloride 104 99 - 110 mMol/L    CO2 24 21 - 32 MMOL/L    Anion Gap 9 4 - 16    BUN 40 (H) 6 - 23 MG/DL    Creatinine 1.7 (H) 0.6 - 1.1 MG/DL    Est, Glom Filt Rate 27 (L) >60 mL/min/1.73m2    Glucose 89 70 - 99 MG/DL    Calcium 9.6 8.3 - 10.6 MG/DL          Imaging/Diagnostics Last 24 Hours   XR CHEST PORTABLE    Result Date: 11/21/2022  EXAMINATION: ONE XRAY VIEW OF THE CHEST 11/21/2022 12:19 pm COMPARISON: 11/17/2022. HISTORY: ORDERING SYSTEM PROVIDED HISTORY: sob TECHNOLOGIST PROVIDED HISTORY: Reason for exam:->sob Reason for Exam:  sob FINDINGS: Cardiomediastinal silhouette is stable. Similar bilateral airspace opacities. No pleural effusion or pneumothorax. No gross bony abnormality.      Stable chest.       Electronically signed by Gardiner Alpers, MD on 11/24/2022 at 9:03 AM

## 2022-11-24 NOTE — PLAN OF CARE
Problem: Discharge Planning  Goal: Discharge to home or other facility with appropriate resources  Outcome: Progressing  Flowsheets (Taken 11/24/2022 0815)  Discharge to home or other facility with appropriate resources: Identify barriers to discharge with patient and caregiver     Problem: Hematologic - Adult  Goal: Maintains hematologic stability  Outcome: Progressing  Flowsheets (Taken 11/24/2022 0815)  Maintains hematologic stability: Assess for signs and symptoms of bleeding or hemorrhage     Problem: Pain  Goal: Verbalizes/displays adequate comfort level or baseline comfort level  Outcome: Progressing     Problem: Safety - Adult  Goal: Free from fall injury  Outcome: Progressing     Problem: ABCDS Injury Assessment  Goal: Absence of physical injury  Outcome: Progressing     Problem: Skin/Tissue Integrity  Goal: Absence of new skin breakdown  Description: 1. Monitor for areas of redness and/or skin breakdown  2. Assess vascular access sites hourly  3. Every 4-6 hours minimum:  Change oxygen saturation probe site  4. Every 4-6 hours:  If on nasal continuous positive airway pressure, respiratory therapy assess nares and determine need for appliance change or resting period.   Outcome: Progressing     Problem: Nutrition Deficit:  Goal: Optimize nutritional status  Outcome: Progressing

## 2022-11-24 NOTE — PROGRESS NOTES
Nephrology Progress Note        [address], [address] [address]  Phone: [phone]  Office Hours: 8:30AM - 4:30PM  Monday - Friday 11/24/2022 8:35 AM  Subjective:   Admit Date: 11/14/2022  PCP: Crissy Leary MD  Interval History: doing well  On nc  Sitting in a chair  In good spirits  Awaiting rehab approval from health insurance company    Diet: ADULT DIET; Dysphagia - Minced and Moist; 1500 ml; Please provide extra moisteners and gravies  ADULT ORAL NUTRITION SUPPLEMENT; Dinner; Diabetic Oral Supplement      Data:   Scheduled Meds:   sennosides-docusate sodium  1 tablet Oral BID    meropenem  500 mg IntraVENous Q12H    ipratropium-albuterol  1 ampule Inhalation TID    polyethylene glycol  17 g Oral Daily    docusate sodium  100 mg Oral Daily    lactobacillus  1 capsule Oral Daily with breakfast    amLODIPine  10 mg Oral Nightly    atenolol  25 mg Oral Daily    sodium bicarbonate  325 mg Oral BID    clopidogrel  75 mg Oral Daily    sodium chloride flush  5-40 mL IntraVENous 2 times per day    sodium chloride flush  5-40 mL IntraVENous 2 times per day    heparin (porcine)  5,000 Units SubCUTAneous 3 times per day    Vitamin D  2,000 Units Oral Daily    pantoprazole  40 mg Oral QAM AC    fluorometholone  1 drop Both Eyes BID    isosorbide mononitrate  60 mg Oral Daily    levothyroxine  88 mcg Oral Daily    ranolazine  500 mg Oral BID    sertraline  50 mg Oral Daily    [Held by provider] losartan  12.5 mg Oral Daily    atorvastatin  20 mg Oral Nightly     Continuous Infusions:   sodium chloride 10 mL/hr at 11/20/22 2333    sodium chloride       PRN Meds:melatonin, ipratropium-albuterol, sodium chloride flush, sodium chloride, ondansetron **OR** ondansetron, polyethylene glycol, acetaminophen **OR** acetaminophen, sodium chloride flush, sodium chloride  I/O last 3 completed shifts: In: 120 [P.O.:120]  Out: 1300 [Urine:1300]  No intake/output data recorded.     Intake/Output Summary (Last 24 hours) at 11/24/2022 0835  Last data filed at 11/24/2022 0603  Gross per 24 hour   Intake 120 ml   Output 700 ml   Net -580 ml       CBC: No results for input(s): WBC, HGB, PLT in the last 72 hours.     BMP:    Recent Labs     11/22/22  0523 11/24/22  0413    137   K 5.0 4.9    104   CO2 28 24   BUN 35* 40*   CREATININE 1.8* 1.7*   GLUCOSE 95 89         Objective:   Vitals: /61   Pulse 68   Temp 97.5 °F (36.4 °C) (Oral)   Resp 18   Ht 5' (1.524 m)   Wt 131 lb 2.8 oz (59.5 kg)   LMP  (LMP Unknown)   SpO2 92%   BMI 25.62 kg/m²   General appearance: alert and cooperative with exam, in no acute distress  HEENT: normocephalic, atraumatic,   Neck: supple, trachea midline  Lungs: breathing comfortably on nc  Heart[de-identified] regular rate and rhythm, systolic murmur  Extremities: extremities atraumatic, no cyanosis or edema  Neurologic: alert, oriented, follows commands, interactive    MEDICAL DECISION MAKING     -PAUL from ATN: cr peaked at 2.7, resolved  -CKD3: baseline cr 1.8  -Metabolic acidosis: on sodium bicarb  -HTN: controlled  -Hyperkalemia: resolved  -Acute hypoxic resp failure from bilateral lower lobe pneumonia: better  -Fluid overload: this component has already been taken care with diuresis for 3 days, upon admission  -Moderate to severe aortic stenosis: not candidate for TAVR at this point//will most likely end up on HD during the workup which requires many doses if IV contrast//assess risks vs benefits    Cr at baseline of 1.7  Resume benicar on dc  Avoid nephrotoxins  Awaiting rehab    Thank you                    Electronically signed by Annabelle El DO on 11/24/2022 at 8:35 AM    [facility]  MD Yo Mallory DO [address],  [place] [facility] [address]  PHONE: [phone]  FAX: [phone]

## 2022-11-24 NOTE — PROGRESS NOTES
Pulmonary and Critical Care  Progress Note      VITALS:  /61   Pulse 68   Temp 97.5 °F (36.4 °C) (Oral)   Resp 18   Ht 5' (1.524 m)   Wt 131 lb 2.8 oz (59.5 kg)   LMP  (LMP Unknown)   SpO2 92%   BMI 25.62 kg/m²     Subjective:   CHIEF COMPLAINT :SOB     HPI:                The patient is a 80 y.o. female is sitting in the bed. She is not in acute resp distress    Objective:   PHYSICAL EXAM:    LUNGS:Occasional basal crackles  Abd-soft, BS+,NT  Ext- no pedal edema  CVS-s1s2, no murmurs      DATA:    CBC:  No results for input(s): WBC, RBC, HGB, HCT, PLT, MCV, MCH, MCHC, RDW, NRBC, SEGSPCT, BANDSPCT in the last 72 hours. BMP:  Recent Labs     11/22/22  0523 11/24/22  0413    137   K 5.0 4.9    104   CO2 28 24   BUN 35* 40*   CREATININE 1.8* 1.7*   CALCIUM 9.4 9.6   GLUCOSE 95 89      ABG:  No results for input(s): PH, PO2ART, VOO0BQK, HCO3, BEART, O2SAT in the last 72 hours.   BNP  Lab Results   Component Value Date     (H) 05/09/2012      D-Dimer:  Lab Results   Component Value Date    RGXMUB 094 (H) 01/07/2018      Radiology: None      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 08/18/2018     Priority: High    Bradycardia      Priority: High    LBBB (left bundle branch block)      Priority: High    PAUL (acute kidney injury) ([address] 12/26/2017     Priority: High    Pneumonia due to infectious organism 11/16/2022     Priority: Medium    Pyelonephritis 11/16/2022     Priority: Medium    Chronic kidney disease 11/16/2022     Priority: Medium    Dyspnea and respiratory abnormalities 11/15/2022     Priority: Medium    Sepsis ([address] 11/14/2022     Priority: Medium    Localized edema 07/11/2022     Priority: Medium    GERD (gastroesophageal reflux disease)      Priority: Medium     Overview Note:     Esophagitis      Hypertension      Priority: Low    Hypothyroid      Priority: Low     Overview Note: Hypothyroidism      Sinus congestion 03/23/2022     Overview Note:     CHRONIC W POST NASAL DRIP      Vaginal prolapse 09/07/2021    Primary osteoarthritis of left foot 08/30/2021    Closed nondisplaced fracture of fourth metatarsal bone of left foot 08/30/2021    Closed nondisplaced fracture of third metatarsal bone of left foot 08/30/2021    Closed nondisplaced fracture of fifth left metatarsal bone 08/30/2021    Vertigo      Overview Note:     Dr [facility]don[facility]      Dizziness     Labyrinthitis     Chronic renal impairment, stage 3 (moderate) (HCC)     Constipation, slow transit 06/19/2018    Cystitis     Physical deconditioning     Hyponatremia 01/01/2018    Pruritic condition 01/22/2016    Gout     Hyperuricemia     Low back pain     Eczema     Coronary artery disease involving native heart without angina pectoris      Overview Note:     Dr Rosendo Figueredo/[facility] Figueredo      Depression     Hyperlipidemia     Generalized osteoarthritis     Osteoporosis      Overview Note:     Generalized     Acute Hypoxic resp failure sec to Cardiogenic and Non Cardiogenic Pulmonary edema  Small bilateral Pleural effusions  PAUL on CKD- slowly improving  Leukocytosis- Improved  Severe AS  Mild to Moderate Pulmonary HTN  CAD  H/o Recurrent UTI  GERD  HLD  ?  Atypical pneumonia       Inhalers  Abx  ICS  OOB  PT/OT  Await eval at [facility] > 92%  Await placement  C.w present management    Electronically signed by Rimma Gillespie MD on 11/24/2022 at 11:16 AM

## 2022-11-24 NOTE — PLAN OF CARE
Problem: Discharge Planning  Goal: Discharge to home or other facility with appropriate resources  11/24/2022 1843 by Marcelo Daniels RN  Outcome: Progressing  11/24/2022 0914 by Marcelo Daniels RN  Outcome: Progressing  Flowsheets (Taken 11/24/2022 0815)  Discharge to home or other facility with appropriate resources: Identify barriers to discharge with patient and caregiver     Problem: Hematologic - Adult  Goal: Maintains hematologic stability  11/24/2022 1843 by Marcelo Daniels RN  Outcome: Progressing  11/24/2022 0914 by Marcelo Daniels RN  Outcome: Progressing  Flowsheets (Taken 11/24/2022 0815)  Maintains hematologic stability: Assess for signs and symptoms of bleeding or hemorrhage     Problem: Pain  Goal: Verbalizes/displays adequate comfort level or baseline comfort level  11/24/2022 1843 by Marcelo Daniels RN  Outcome: Progressing  11/24/2022 0914 by Marcelo Daniels RN  Outcome: Progressing     Problem: Safety - Adult  Goal: Free from fall injury  11/24/2022 1843 by Marcelo Daniels RN  Outcome: Progressing  11/24/2022 0914 by Marcelo Daniels RN  Outcome: Progressing     Problem: ABCDS Injury Assessment  Goal: Absence of physical injury  11/24/2022 1843 by Marcelo Daniels RN  Outcome: Progressing  11/24/2022 0914 by Marcelo Daniels RN  Outcome: Progressing     Problem: Skin/Tissue Integrity  Goal: Absence of new skin breakdown  Description: 1. Monitor for areas of redness and/or skin breakdown  2. Assess vascular access sites hourly  3. Every 4-6 hours minimum:  Change oxygen saturation probe site  4. Every 4-6 hours:  If on nasal continuous positive airway pressure, respiratory therapy assess nares and determine need for appliance change or resting period.   11/24/2022 1843 by Marcelo Daniels RN  Outcome: Progressing  11/24/2022 0914 by Marcelo Daniels RN  Outcome: Progressing     Problem: Nutrition Deficit:  Goal: Optimize nutritional status  11/24/2022 1843 by Marcelo Daniels RN  Outcome: Progressing  11/24/2022 0914 by Tobi Marcial RN  Outcome: Progressing

## 2022-11-25 LAB
ANION GAP SERPL CALCULATED.3IONS-SCNC: 10 MMOL/L (ref 4–16)
BUN BLDV-MCNC: 41 MG/DL (ref 6–23)
CALCIUM SERPL-MCNC: 10 MG/DL (ref 8.3–10.6)
CHLORIDE BLD-SCNC: 101 MMOL/L (ref 99–110)
CO2: 25 MMOL/L (ref 21–32)
CREAT SERPL-MCNC: 1.9 MG/DL (ref 0.6–1.1)
GFR SERPL CREATININE-BSD FRML MDRD: 24 ML/MIN/1.73M2
GLUCOSE BLD-MCNC: 87 MG/DL (ref 70–99)
POTASSIUM SERPL-SCNC: 5.4 MMOL/L (ref 3.5–5.1)
SODIUM BLD-SCNC: 136 MMOL/L (ref 135–145)

## 2022-11-25 PROCEDURE — 36415 COLL VENOUS BLD VENIPUNCTURE: CPT

## 2022-11-25 PROCEDURE — 6370000000 HC RX 637 (ALT 250 FOR IP)

## 2022-11-25 PROCEDURE — 6360000002 HC RX W HCPCS

## 2022-11-25 PROCEDURE — 2580000003 HC RX 258: Performed by: NURSE PRACTITIONER

## 2022-11-25 PROCEDURE — 99231 SBSQ HOSP IP/OBS SF/LOW 25: CPT | Performed by: INTERNAL MEDICINE

## 2022-11-25 PROCEDURE — 80048 BASIC METABOLIC PNL TOTAL CA: CPT

## 2022-11-25 PROCEDURE — 6360000002 HC RX W HCPCS: Performed by: NURSE PRACTITIONER

## 2022-11-25 PROCEDURE — 94640 AIRWAY INHALATION TREATMENT: CPT

## 2022-11-25 PROCEDURE — 2580000003 HC RX 258

## 2022-11-25 PROCEDURE — 1200000000 HC SEMI PRIVATE

## 2022-11-25 PROCEDURE — 6370000000 HC RX 637 (ALT 250 FOR IP): Performed by: INTERNAL MEDICINE

## 2022-11-25 PROCEDURE — 6370000000 HC RX 637 (ALT 250 FOR IP): Performed by: STUDENT IN AN ORGANIZED HEALTH CARE EDUCATION/TRAINING PROGRAM

## 2022-11-25 PROCEDURE — 2700000000 HC OXYGEN THERAPY PER DAY

## 2022-11-25 PROCEDURE — 94761 N-INVAS EAR/PLS OXIMETRY MLT: CPT

## 2022-11-25 RX ADMIN — RANOLAZINE 500 MG: 500 TABLET, FILM COATED, EXTENDED RELEASE ORAL at 11:49

## 2022-11-25 RX ADMIN — Medication 1 DROP: at 12:02

## 2022-11-25 RX ADMIN — LEVOTHYROXINE SODIUM 88 MCG: 0.09 TABLET ORAL at 06:14

## 2022-11-25 RX ADMIN — Medication 2000 UNITS: at 11:50

## 2022-11-25 RX ADMIN — HEPARIN SODIUM 5000 UNITS: 5000 INJECTION INTRAVENOUS; SUBCUTANEOUS at 06:14

## 2022-11-25 RX ADMIN — CLOPIDOGREL BISULFATE 75 MG: 75 TABLET ORAL at 11:49

## 2022-11-25 RX ADMIN — MEROPENEM 500 MG: 500 INJECTION, POWDER, FOR SOLUTION INTRAVENOUS at 18:33

## 2022-11-25 RX ADMIN — SODIUM ZIRCONIUM CYCLOSILICATE 10 G: 10 POWDER, FOR SUSPENSION ORAL at 21:12

## 2022-11-25 RX ADMIN — Medication 1 DROP: at 21:22

## 2022-11-25 RX ADMIN — IPRATROPIUM BROMIDE AND ALBUTEROL SULFATE 1 AMPULE: .5; 3 SOLUTION RESPIRATORY (INHALATION) at 14:19

## 2022-11-25 RX ADMIN — SODIUM BICARBONATE 325 MG: 650 TABLET ORAL at 21:11

## 2022-11-25 RX ADMIN — Medication 1 CAPSULE: at 11:49

## 2022-11-25 RX ADMIN — AMLODIPINE BESYLATE 10 MG: 10 TABLET ORAL at 21:11

## 2022-11-25 RX ADMIN — POLYETHYLENE GLYCOL (3350) 17 G: 17 POWDER, FOR SOLUTION ORAL at 12:00

## 2022-11-25 RX ADMIN — Medication 3 MG: at 21:27

## 2022-11-25 RX ADMIN — ATORVASTATIN CALCIUM 20 MG: 10 TABLET, FILM COATED ORAL at 21:12

## 2022-11-25 RX ADMIN — PANTOPRAZOLE SODIUM 40 MG: 40 TABLET, DELAYED RELEASE ORAL at 06:14

## 2022-11-25 RX ADMIN — SODIUM CHLORIDE, PRESERVATIVE FREE 10 ML: 5 INJECTION INTRAVENOUS at 11:50

## 2022-11-25 RX ADMIN — SODIUM ZIRCONIUM CYCLOSILICATE 10 G: 10 POWDER, FOR SUSPENSION ORAL at 11:48

## 2022-11-25 RX ADMIN — HEPARIN SODIUM 5000 UNITS: 5000 INJECTION INTRAVENOUS; SUBCUTANEOUS at 21:18

## 2022-11-25 RX ADMIN — SODIUM BICARBONATE 325 MG: 650 TABLET ORAL at 11:48

## 2022-11-25 RX ADMIN — DOCUSATE SODIUM 100 MG: 100 CAPSULE, LIQUID FILLED ORAL at 11:49

## 2022-11-25 RX ADMIN — HEPARIN SODIUM 5000 UNITS: 5000 INJECTION INTRAVENOUS; SUBCUTANEOUS at 14:32

## 2022-11-25 RX ADMIN — IPRATROPIUM BROMIDE AND ALBUTEROL SULFATE 1 AMPULE: .5; 3 SOLUTION RESPIRATORY (INHALATION) at 20:40

## 2022-11-25 RX ADMIN — ATENOLOL 25 MG: 25 TABLET ORAL at 18:31

## 2022-11-25 RX ADMIN — RANOLAZINE 500 MG: 500 TABLET, FILM COATED, EXTENDED RELEASE ORAL at 21:11

## 2022-11-25 RX ADMIN — SERTRALINE HYDROCHLORIDE 50 MG: 50 TABLET ORAL at 11:50

## 2022-11-25 RX ADMIN — SENNOSIDES AND DOCUSATE SODIUM 1 TABLET: 50; 8.6 TABLET ORAL at 21:12

## 2022-11-25 RX ADMIN — MEROPENEM 500 MG: 500 INJECTION, POWDER, FOR SOLUTION INTRAVENOUS at 06:16

## 2022-11-25 RX ADMIN — SENNOSIDES AND DOCUSATE SODIUM 1 TABLET: 50; 8.6 TABLET ORAL at 11:49

## 2022-11-25 RX ADMIN — ISOSORBIDE MONONITRATE 60 MG: 60 TABLET, EXTENDED RELEASE ORAL at 11:48

## 2022-11-25 RX ADMIN — IPRATROPIUM BROMIDE AND ALBUTEROL SULFATE 1 AMPULE: .5; 3 SOLUTION RESPIRATORY (INHALATION) at 08:38

## 2022-11-25 NOTE — PROGRESS NOTES
Daily Progress Note  Subjective:    Pt. Awake and alert;   Denies any CP, SOB is stable  Still on 2L NC  BP and HR stable; not on tele    Attending Note:      Impression and Plan:     Sepsis    CAP, noted on CT chest    Respiratory panel negative    WBC count elevated    ABX per primary    Using 2L NC now-weaning slowly     Acute on Chronic HFpEF    BNP on admission was 6,417    Last echo showed EF 55-60%    Severe Aortic stenosis    Diuretics per renal at this time    Accurate I's and O's    1500 Fluid restriction     Plan to have her f/u with [facility] OP for potential valvuloplasty-will arrange this OP     CKD    Renal following; Avoid nephrotoxic medications    K elevated again today- getting lokelma     Stable from cardiac standpoint  Med. Tx. and increase activity  Awaiting placement      Most Recent Echo  11/15/2022   Left ventricular systolic function is normal.   Ejection fraction is visually estimated at 55-60%. Moderately dilated left atrium. Moderate to severe aortic stenosis is present; Mean PG 40mmHg, YANIRA 0.99 cm2. Mitral annular calcification is present. Mild to moderate mitral regurgitation. Mild to moderate tricuspid regurgitation; RVSP: 44 mmHg. No evidence of any pericardial effusion. Radiology  CT chest 11/14/2022  Impression   Bilateral pulmonary infiltrates and bilateral pleural effusions with probable   reactive mediastinal adenopathy       Probable granuloma in the right middle lobe. No follow-up imaging   recommended. PAST MEDICAL HISTORY:  The patient has a history of hypertension, renal  insufficiency present, and she has been treated medically. She is known  to have coronary artery disease also present. She has a chronic left  ventricular block present. She had a heart catheterization done in the  past and LAD had 70% stenosis present and she was treated medically at  that time. Anemia, hyperlipidemia, and arthritis present.      PAST SURGICAL HISTORY:  She has a history of having hip surgery done. Moderate coronary artery disease, heart catheterization done in 2018,  LAD with 70% stenosis distally. She was treated medically for that. She had cataract surgery and hysterectomy done.       Objective:   BP (!) 135/59   Pulse 71   Temp 98.2 °F (36.8 °C) (Oral)   Resp 16   Ht 5' (1.524 m)   Wt 131 lb 2.8 oz (59.5 kg)   LMP  (LMP Unknown)   SpO2 94%   BMI 25.62 kg/m²     Intake/Output Summary (Last 24 hours) at 11/25/2022 0802  Last data filed at 11/24/2022 0915  Gross per 24 hour   Intake 240 ml   Output --   Net 240 ml       Medications:   Scheduled Meds:   sodium zirconium cyclosilicate  10 g Oral BID    sennosides-docusate sodium  1 tablet Oral BID    meropenem  500 mg IntraVENous Q12H    ipratropium-albuterol  1 ampule Inhalation TID    polyethylene glycol  17 g Oral Daily    docusate sodium  100 mg Oral Daily    lactobacillus  1 capsule Oral Daily with breakfast    amLODIPine  10 mg Oral Nightly    atenolol  25 mg Oral Daily    sodium bicarbonate  325 mg Oral BID    clopidogrel  75 mg Oral Daily    sodium chloride flush  5-40 mL IntraVENous 2 times per day    sodium chloride flush  5-40 mL IntraVENous 2 times per day    heparin (porcine)  5,000 Units SubCUTAneous 3 times per day    Vitamin D  2,000 Units Oral Daily    pantoprazole  40 mg Oral QAM AC    fluorometholone  1 drop Both Eyes BID    isosorbide mononitrate  60 mg Oral Daily    levothyroxine  88 mcg Oral Daily    ranolazine  500 mg Oral BID    sertraline  50 mg Oral Daily    [Held by provider] losartan  12.5 mg Oral Daily    atorvastatin  20 mg Oral Nightly      Infusions:   sodium chloride 75 mL/hr at 11/24/22 1739    sodium chloride        PRN Meds:  melatonin, ipratropium-albuterol, sodium chloride flush, sodium chloride, ondansetron **OR** ondansetron, polyethylene glycol, acetaminophen **OR** acetaminophen, sodium chloride flush, sodium chloride     Physical Exam:  Vitals:    11/25/22 0253   BP: (!) 135/59   Pulse: 71   Resp: 16   Temp: 98.2 °F (36.8 °C)   SpO2: 94%        General: AAO, NAD  Chest: Nontender  Cardiac: First and Second Heart Sounds are Normal, No Murmurs or Gallops noted  Lungs:Clear to auscultation and percussion. Abdomen: Soft, NT, ND, +BS  Extremities: No clubbing, no edema  Vascular:  Equal 2+ peripheral pulses. Lab Data:  CBC: No results for input(s): WBC, HGB, HCT, MCV, PLT in the last 72 hours. BMP:   Recent Labs     11/24/22  0413 11/25/22  0506    136   K 4.9 5.4*    101   CO2 24 25   BUN 40* 41*   CREATININE 1.7* 1.9*     LIVER PROFILE: No results for input(s): AST, ALT, LIPASE, BILIDIR, BILITOT, ALKPHOS in the last 72 hours. Invalid input(s): AMYLASE,  ALB  PT/INR: No results for input(s): PROTIME, INR in the last 72 hours. APTT: No results for input(s): APTT in the last 72 hours. BNP:  No results for input(s): BNP in the last 72 hours.       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 08/18/2018    Bradycardia     LBBB (left bundle branch block)     PAUL (acute kidney injury) ([facility] 12/26/2017    Pneumonia due to infectious organism 11/16/2022    Pyelonephritis 11/16/2022    Chronic kidney disease 11/16/2022    Dyspnea and respiratory abnormalities 11/15/2022    Sepsis ([facility] [address] 11/14/2022    Localized edema 07/11/2022    GERD (gastroesophageal reflux disease)     Hypertension     Hypothyroid     Sinus congestion 03/23/2022    Vaginal prolapse 09/07/2021    Primary osteoarthritis of left foot 08/30/2021    Closed nondisplaced fracture of fourth metatarsal bone of left foot 08/30/2021    Closed nondisplaced fracture of third metatarsal bone of left foot 08/30/2021    Closed nondisplaced fracture of fifth left metatarsal bone 08/30/2021    Vertigo     Dizziness     Labyrinthitis     Chronic renal impairment, stage 3 (moderate) (HCC)     Constipation, slow transit 06/19/2018    Cystitis     Physical deconditioning     Hyponatremia 01/01/2018    Pruritic condition 01/22/2016    Gout     Hyperuricemia     Low back pain     Eczema     Coronary artery disease involving native heart without angina pectoris     Depression     Hyperlipidemia     Generalized osteoarthritis     Osteoporosis        Electronically signed by Joselo Curry PA-C on 11/25/2022 at 8:03 AM  Patient was seen and examined agree with above assessment and plan Electronically signed by Mick Victor MD on 11/25/2022 at 12:13 PM

## 2022-11-25 NOTE — PROGRESS NOTES
V2.0  [facility] Hospitalist Progress Note      Name:  Jt Murillo /Age/Sex: 1927  (80 y.o. female)   MRN & CSN:  [id number] & [id number] Encounter Date/Time: 2022 2:01 PM EST    Location:  [address] [place], [place] [id number] PCP: Mary Downey MD       Hospital Day: 12    Assessment and Plan:    Jt Murillo is a 80 y.o. female with PMH of CAD, GERD, UTI, Hypothyroidism, HTN, Hyperlipedmia, CKD who presents with Sepsis ([address]    #Acute hypoxic respiratory failure 2/2 CAP and pulmonary edema-resolving  -Blood cultures negative  -Chest x-ray showing pulmonary edema and concern for pneumonia  -Echo: EF of 55 to 60% with RVSP of 44  -Negative respiratory PCR  -CT chest showing bilateral infiltrates    Plan:  Pulmonology following, appreciate recs  Continue meropenem until  per ID (daughter requested to stay on meropenem as the patient has tolerated it well and has issues with tolerating antibiotics normally)  Continue DuoNebs  Patient is not on home oxygen however is requiring 3 L --> wean as tolerated    #Pyelonephritis with history of recurrent UTI  -Received ciprofloxacin, doxycycline and nitrofurantoin without relief  -Urine culture growing Enterococcus faecalis sensitive to ampicillin    Plan:  ID on consult, appreciate recs  Meropenem for 14 days, end date is     #Acute decompensated HFpEF-resolving  -Chest x-ray with pulmonary edema and elevated RVSP    Plan:  IV diuresis as needed, holding for now in the setting of PAUL  Strict I's and O  Daily weights    #PAUL on CKD likely prerenal in the setting of diuresis  -Baseline creatinine around 2    Nephrology following, appreciate recommendations  Strict I's and O's  Family refused HD  Holding diuresis    #Suspected aspiration--> ruled out  -Seen by SLP, did not have any signs or symptoms of aspiration    Plan:  Continue to monitor    #Hyperkalemia-resolved  -Received Lokelma    Plan:  Continue monitoring BMP    #Elevated troponin likely from CKD and demand ischemia  #CAD  -Down trended  -EKG with LBBB    Plan:  Cardiology on consult, appreciate recommendations  Continue atenolol, amlodipine, Imdur, statin and aspirin  Holding telmisartan in the setting of PAUL    Chronic medical problems:    #Severe AS  -Follow[facility]up at [facility] for TAVR balloon valvuloplasty    #Hypertension  Plan: Continue home meds except for on losartan    #GERD  Continue PPI    Patient was denied from ARU, expedited appeal pending  Patient medically ready for discharge      Diet ADULT DIET; Dysphagia - Minced and Moist; Low Potassium (Less than 3000 mg/day); 1500 ml; Please provide extra moisteners and gravies  ADULT ORAL NUTRITION SUPPLEMENT; Dinner; Frozen Oral Supplement   DVT Prophylaxis [] Lovenox, [x]  Heparin, [] SCDs, [] Ambulation,  [] Eliquis, [] Xarelto  [] Coumadin   Code Status Limited   Disposition From: home  Expected Disposition: acute rehab  Estimated Date of Discharge: pending placement  Patient requires continued admission due to pending placement   Surrogate Decision Maker/ CANDIS Rico     Subjective:     Chief Complaint: Shortness of Breath     Patient has no complaints this morning. She wants to leave but understands that we will likely not hear from the insurance company today. Review of Systems:    General: No fever, no chills  Heart: No chest pain, no palpitations  Lungs: No shortness of breath, no cough  Abdomen: No abdominal pain, no nausea, no vomiting, no constipation, no diarrhea  : No frequency, no dysuria, no urgency  MSK: No lower extremity swelling  Neuro: No confusion, no weakness  Skin: No rashes      Objective:      Intake/Output Summary (Last 24 hours) at 11/25/2022 1459  Last data filed at 11/25/2022 0933  Gross per 24 hour   Intake 386 ml   Output --   Net 386 ml        Vitals:   Vitals:    11/25/22 0859   BP: (!) 144/53   Pulse: 84   Resp: 20   Temp: 98 °F (36.7 °C)   SpO2: 92%       Physical Exam:     General: NAD  Eyes: EOMI  HENT: Atraumatic  Respiratory: no respiratory distress  GI: nondistended  MSK: no lower extremity edema  Skin: Intact, dry, warm, no rashes  Neuro: CN II to XII grossly intact  Psych: Normal mood    Medications:   Medications:    sodium zirconium cyclosilicate  10 g Oral BID    sennosides-docusate sodium  1 tablet Oral BID    meropenem  500 mg IntraVENous Q12H    ipratropium-albuterol  1 ampule Inhalation TID    polyethylene glycol  17 g Oral Daily    docusate sodium  100 mg Oral Daily    lactobacillus  1 capsule Oral Daily with breakfast    amLODIPine  10 mg Oral Nightly    atenolol  25 mg Oral Daily    sodium bicarbonate  325 mg Oral BID    clopidogrel  75 mg Oral Daily    sodium chloride flush  5-40 mL IntraVENous 2 times per day    sodium chloride flush  5-40 mL IntraVENous 2 times per day    heparin (porcine)  5,000 Units SubCUTAneous 3 times per day    Vitamin D  2,000 Units Oral Daily    pantoprazole  40 mg Oral QAM AC    fluorometholone  1 drop Both Eyes BID    isosorbide mononitrate  60 mg Oral Daily    levothyroxine  88 mcg Oral Daily    ranolazine  500 mg Oral BID    sertraline  50 mg Oral Daily    [Held by provider] losartan  12.5 mg Oral Daily    atorvastatin  20 mg Oral Nightly      Infusions:    sodium chloride 75 mL/hr at 11/24/22 1739    sodium chloride       PRN Meds: melatonin, 3 mg, Nightly PRN  ipratropium-albuterol, 1 ampule, Q4H PRN  sodium chloride flush, 5-40 mL, PRN  sodium chloride, , PRN  ondansetron, 4 mg, Q8H PRN   Or  ondansetron, 4 mg, Q6H PRN  polyethylene glycol, 17 g, Daily PRN  acetaminophen, 650 mg, Q6H PRN   Or  acetaminophen, 650 mg, Q6H PRN  sodium chloride flush, 5-40 mL, PRN  sodium chloride, , PRN      Labs      Recent Results (from the past 24 hour(s))   Basic Metabolic Panel    Collection Time: 11/25/22  5:06 AM   Result Value Ref Range    Sodium 136 135 - 145 MMOL/L    Potassium 5.4 (H) 3.5 - 5.1 MMOL/L    Chloride 101 99 - 110 mMol/L    CO2 25 21 - 32 MMOL/L    Anion Gap 10 4 - 16    BUN 41 (H) 6 - 23 MG/DL    Creatinine 1.9 (H) 0.6 - 1.1 MG/DL    Est, Glom Filt Rate 24 (L) >60 mL/min/1.73m2    Glucose 87 70 - 99 MG/DL    Calcium 10.0 8.3 - 10.6 MG/DL          Imaging/Diagnostics Last 24 Hours   XR CHEST PORTABLE    Result Date: 11/21/2022  EXAMINATION: ONE XRAY VIEW OF THE CHEST 11/21/2022 12:19 pm COMPARISON: 11/17/2022. HISTORY: ORDERING SYSTEM PROVIDED HISTORY: sob TECHNOLOGIST PROVIDED HISTORY: Reason for exam:->sob Reason for Exam:  sob FINDINGS: Cardiomediastinal silhouette is stable. Similar bilateral airspace opacities. No pleural effusion or pneumothorax. No gross bony abnormality.      Stable chest.       Electronically signed by Emiliano Brar MD on 11/25/2022 at 2:59 PM

## 2022-11-25 NOTE — PROGRESS NOTES
Comprehensive Nutrition Assessment    Type and Reason for Visit:  Reassess    Nutrition Recommendations/Plan:   Will add Potassium restriction at this time   Consider potassium binders if pt feels diet is too restrictive   Continue Minced and Moist/Thins at this time   Will offer Frozen oral nutrition supplements daily      Malnutrition Assessment:  Malnutrition Status: At risk for malnutrition (Comment) (11/21/22 1207)    Context:  Social/Environmental Circumstances       Nutrition Assessment:    Tolerates minced and moist diet. SLP performed evals, recommends easy to chew/thins but pt prefers minced and moist at this time. Pt has been counting her fluids, stated consumed 24 oz during breakfast. Dislikes vanilla glucerna at this time, will d/c supplements. Pt c/o waiting to go into chair, nursing staff addressed at visit. Monitor as moderate nutrition risk. Nutrition Related Findings:    K 5.4 Wound Type: None       Current Nutrition Intake & Therapies:    Average Meal Intake: %  Average Supplements Intake: Refusing to take  ADULT DIET; Dysphagia - Minced and Moist; 1500 ml; Please provide extra moisteners and gravies  ADULT ORAL NUTRITION SUPPLEMENT; Dinner; Diabetic Oral Supplement    Anthropometric Measures:  Height: 5' (152.4 cm)  Ideal Body Weight (IBW): 100 lbs (45 kg)       Current Body Weight: 131 lb 2.8 oz (59.5 kg), 134.3 % IBW. Weight Source: Bed Scale  Current BMI (kg/m2): 25.6  Usual Body Weight: 118 lb (53.5 kg) (per loved one at bedside.)  % Weight Change (Calculated): 13.8  Weight Adjustment For: No Adjustment                 BMI Categories: Overweight (BMI 25.0-29. 9)    Estimated Daily Nutrient Needs:  Energy Requirements Based On: Kcal/kg  Weight Used for Energy Requirements: Current  Energy (kcal/day): 0402-2588 (22-25 kcals/kg ABW)  Weight Used for Protein Requirements: Ideal  Protein (g/day): 45-54 (1-1.2 g/kg)  Method Used for Fluid Requirements: 1 ml/kcal  Fluid (ml/day): at least 1500, 1500 per MD FR    Nutrition Diagnosis:   Inadequate oral intake related to biting/chewing (masticatory) difficulty as evidenced by intake 26-50%    Nutrition Interventions:   Food and/or Nutrient Delivery: Modify Current Diet, Discontinue Oral Nutrition Supplement  Nutrition Education/Counseling: No recommendation at this time  Coordination of Nutrition Care: Continue to monitor while inpatient, Feeding Assistance/Environment Change, Speech Therapy, Swallow Evaluation, Coordination of Care  Plan of Care discussed with: pt and loved ones at bedside    Goals:  Previous Goal Met: Progressing toward Goal(s)  Goals: PO intake 50% or greater       Nutrition Monitoring and Evaluation:   Behavioral-Environmental Outcomes: None Identified  Food/Nutrient Intake Outcomes: Diet Advancement/Tolerance, Food and Nutrient Intake  Physical Signs/Symptoms Outcomes: Biochemical Data, Chewing or Swallowing, GI Status, Meal Time Behavior, Weight, Nutrition Focused Physical Findings    Discharge Planning:    Continue current diet     Yanni Fraser RD, LD  Contact: [phone]

## 2022-11-25 NOTE — PROGRESS NOTES
Nephrology Progress Note        [address], [address] [place] [address]  Phone: [phone]  Office Hours: 8:30AM - 4:30PM  Monday - Friday 11/25/2022 8:29 AM  Subjective:   Admit Date: 11/14/2022  PCP: Kuldeep Travis MD  Interval History: on nc  Eager to leave the hospital  Good BP    Diet: ADULT DIET; Dysphagia - Minced and Moist; 1500 ml; Please provide extra moisteners and gravies  ADULT ORAL NUTRITION SUPPLEMENT; Dinner; Diabetic Oral Supplement      Data:   Scheduled Meds:   sodium zirconium cyclosilicate  10 g Oral BID    sennosides-docusate sodium  1 tablet Oral BID    meropenem  500 mg IntraVENous Q12H    ipratropium-albuterol  1 ampule Inhalation TID    polyethylene glycol  17 g Oral Daily    docusate sodium  100 mg Oral Daily    lactobacillus  1 capsule Oral Daily with breakfast    amLODIPine  10 mg Oral Nightly    atenolol  25 mg Oral Daily    sodium bicarbonate  325 mg Oral BID    clopidogrel  75 mg Oral Daily    sodium chloride flush  5-40 mL IntraVENous 2 times per day    sodium chloride flush  5-40 mL IntraVENous 2 times per day    heparin (porcine)  5,000 Units SubCUTAneous 3 times per day    Vitamin D  2,000 Units Oral Daily    pantoprazole  40 mg Oral QAM AC    fluorometholone  1 drop Both Eyes BID    isosorbide mononitrate  60 mg Oral Daily    levothyroxine  88 mcg Oral Daily    ranolazine  500 mg Oral BID    sertraline  50 mg Oral Daily    [Held by provider] losartan  12.5 mg Oral Daily    atorvastatin  20 mg Oral Nightly     Continuous Infusions:   sodium chloride 75 mL/hr at 11/24/22 1739    sodium chloride       PRN Meds:melatonin, ipratropium-albuterol, sodium chloride flush, sodium chloride, ondansetron **OR** ondansetron, polyethylene glycol, acetaminophen **OR** acetaminophen, sodium chloride flush, sodium chloride  I/O last 3 completed shifts: In: 360 [P.O.:360]  Out: 700 [Urine:700]  No intake/output data recorded.     Intake/Output Summary (Last 24 hours) at 11/25/2022 0829  Last data filed at 11/24/2022 0915  Gross per 24 hour   Intake 240 ml   Output --   Net 240 ml       CBC: No results for input(s): WBC, HGB, PLT in the last 72 hours.     BMP:    Recent Labs     11/24/22  0413 11/25/22  0506    136   K 4.9 5.4*    101   CO2 24 25   BUN 40* 41*   CREATININE 1.7* 1.9*   GLUCOSE 89 87         Objective:   Vitals: BP (!) 135/59   Pulse 71   Temp 98.2 °F (36.8 °C) (Oral)   Resp 16   Ht 5' (1.524 m)   Wt 131 lb 2.8 oz (59.5 kg)   LMP  (LMP Unknown)   SpO2 94%   BMI 25.62 kg/m²   General appearance: alert and cooperative with exam, in no acute distress  HEENT: normocephalic, atraumatic,   Neck: supple, trachea midline  Lungs: breathing comfortably on nc  Heart[de-identified] regular rate and rhythm,  Extremities: extremities atraumatic, no cyanosis or edema  Neurologic: alert, oriented, follows commands, interactive    MEDICAL DECISION MAKING     -PAUL from ATN: cr peaked at 2.7, resolved  -CKD3: baseline cr 1.8  -Metabolic acidosis: on sodium bicarb  -HTN: controlled  -Hyperkalemia:   -Acute hypoxic resp failure from bilateral lower lobe pneumonia: better  -Fluid overload: this component has already been taken care with diuresis for 3 days, upon admission  -Moderate to severe aortic stenosis: not candidate for TAVR at this point, anyway her cardiologist is aware and following///will most likely end up on HD during the workup which requires many doses if IV contrast//assess risks vs benefits       Suggest:  -Cr 1.9, still within baseline fluctuations  -Give 2 doses of lokelma for the hyperkalemia    Thank you                Electronically signed by Hesham Andrade DO on 11/25/2022 at 8:29 AM    [address], [address] [place] [address], DO Mikael Gary,  Jose Crane  [place] [address]  PHONE: [phone]  FAX: [phone]

## 2022-11-25 NOTE — PROGRESS NOTES
Pulmonary and Critical Care  Progress Note      VITALS:  BP (!) 144/53   Pulse 84   Temp 98 °F (36.7 °C) (Oral)   Resp 20   Ht 5' (1.524 m)   Wt 131 lb 2.8 oz (59.5 kg)   LMP  (LMP Unknown)   SpO2 92%   BMI 25.62 kg/m²     Subjective:   CHIEF COMPLAINT :SOB     HPI:                The patient is a 80 y.o. female is sitting in the bed. She is not in acute resp distress    Objective:   PHYSICAL EXAM:    LUNGS:Occasional basal crackles  Abd-soft, BS+,NT  Ext- no pedal edema  CVS-s1s2, no murmurs      DATA:    CBC:  No results for input(s): WBC, RBC, HGB, HCT, PLT, MCV, MCH, MCHC, RDW, NRBC, SEGSPCT, BANDSPCT in the last 72 hours. BMP:  Recent Labs     11/24/22  0413 11/25/22  0506    136   K 4.9 5.4*    101   CO2 24 25   BUN 40* 41*   CREATININE 1.7* 1.9*   CALCIUM 9.6 10.0   GLUCOSE 89 87      ABG:  No results for input(s): PH, PO2ART, EVH9ZTA, HCO3, BEART, O2SAT in the last 72 hours.   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 08/18/2018     Priority: High    Bradycardia      Priority: High    LBBB (left bundle branch block)      Priority: High    PAUL (acute kidney injury) ([address] 12/26/2017     Priority: High    Pneumonia due to infectious organism 11/16/2022     Priority: Medium    Pyelonephritis 11/16/2022     Priority: Medium    Chronic kidney disease 11/16/2022     Priority: Medium    Dyspnea and respiratory abnormalities 11/15/2022     Priority: Medium    Sepsis ([address] 11/14/2022     Priority: Medium    Localized edema 07/11/2022     Priority: Medium    GERD (gastroesophageal reflux disease)      Priority: Medium     Overview Note:     Esophagitis      Hypertension      Priority: Low    Hypothyroid      Priority: Low     Overview Note: Hypothyroidism      Sinus congestion 03/23/2022     Overview Note:     CHRONIC W POST NASAL DRIP      Vaginal prolapse 09/07/2021    Primary osteoarthritis of left foot 08/30/2021    Closed nondisplaced fracture of fourth metatarsal bone of left foot 08/30/2021    Closed nondisplaced fracture of third metatarsal bone of left foot 08/30/2021    Closed nondisplaced fracture of fifth left metatarsal bone 08/30/2021    Vertigo      Overview Note:      [facility]      Dizziness     Labyrinthitis     Chronic renal impairment, stage 3 (moderate) ([facility])     Constipation, slow transit 06/19/2018    Cystitis     Physical deconditioning     Hyponatremia 01/01/2018    Pruritic condition 01/22/2016    Gout     Hyperuricemia     Low back pain     Eczema     Coronary artery disease involving native heart without angina pectoris      Overview Note:     Dr Alea Figueredo/Jose Figueredo      Depression     Hyperlipidemia     Generalized osteoarthritis     Osteoporosis      Overview Note:     Generalized     Acute Hypoxic resp failure sec to Cardiogenic and Non Cardiogenic Pulmonary edema  Small bilateral Pleural effusions  PAUL on CKD- slowly improving  Leukocytosis- Improved  Severe AS  Mild to Moderate Pulmonary HTN  CAD  H/o Recurrent UTI  GERD  HLD  ?  Atypical pneumonia          Abx  ICS  OOB  PT/OT  Keep sats >92%  Await placement  C/w present management    Electronically signed by Jil Colón MD on 11/25/2022 at 9:24 AM

## 2022-11-25 NOTE — PLAN OF CARE
Problem: Discharge Planning  Goal: Discharge to home or other facility with appropriate resources  11/24/2022 2203 by Nathan Turpin RN  Outcome: Progressing  11/24/2022 1843 by Trace Haile RN  Outcome: Progressing  11/24/2022 0914 by Trace Haile RN  Outcome: Progressing  Flowsheets (Taken 11/24/2022 0815)  Discharge to home or other facility with appropriate resources: Identify barriers to discharge with patient and caregiver     Problem: Hematologic - Adult  Goal: Maintains hematologic stability  11/24/2022 2203 by Nathan Turpin RN  Outcome: Progressing  11/24/2022 1843 by Trace Haile RN  Outcome: Progressing  11/24/2022 0914 by Trace Haile RN  Outcome: Progressing  Flowsheets (Taken 11/24/2022 0815)  Maintains hematologic stability: Assess for signs and symptoms of bleeding or hemorrhage     Problem: Pain  Goal: Verbalizes/displays adequate comfort level or baseline comfort level  11/24/2022 2203 by Nathan Turpin RN  Outcome: Progressing  11/24/2022 1843 by Trace Haile RN  Outcome: Progressing  11/24/2022 0914 by Trace Haile RN  Outcome: Progressing     Problem: Safety - Adult  Goal: Free from fall injury  11/24/2022 2203 by Nathan Turpin RN  Outcome: Progressing  11/24/2022 1843 by Trace Haile RN  Outcome: Progressing  11/24/2022 0914 by Trace Haile RN  Outcome: Progressing     Problem: ABCDS Injury Assessment  Goal: Absence of physical injury  11/24/2022 2203 by Nathan Turpin RN  Outcome: Progressing  11/24/2022 1843 by Trace Haile RN  Outcome: Progressing  11/24/2022 0914 by Trace Haile RN  Outcome: Progressing     Problem: Skin/Tissue Integrity  Goal: Absence of new skin breakdown  Description: 1. Monitor for areas of redness and/or skin breakdown  2. Assess vascular access sites hourly  3. Every 4-6 hours minimum:  Change oxygen saturation probe site  4.   Every 4-6 hours:  If on nasal continuous positive airway pressure, respiratory therapy assess nares and determine need for appliance change or resting period.   11/24/2022 2203 by Jadyn Wall RN  Outcome: Progressing  11/24/2022 1843 by Kameron Michele RN  Outcome: Progressing  11/24/2022 0914 by Kameron Michele RN  Outcome: Progressing

## 2022-11-26 LAB
ANION GAP SERPL CALCULATED.3IONS-SCNC: 8 MMOL/L (ref 4–16)
BUN BLDV-MCNC: 42 MG/DL (ref 6–23)
CALCIUM SERPL-MCNC: 9.8 MG/DL (ref 8.3–10.6)
CHLORIDE BLD-SCNC: 99 MMOL/L (ref 99–110)
CO2: 25 MMOL/L (ref 21–32)
CREAT SERPL-MCNC: 1.9 MG/DL (ref 0.6–1.1)
GFR SERPL CREATININE-BSD FRML MDRD: 24 ML/MIN/1.73M2
GLUCOSE BLD-MCNC: 82 MG/DL (ref 70–99)
POTASSIUM SERPL-SCNC: 4.8 MMOL/L (ref 3.5–5.1)
SODIUM BLD-SCNC: 132 MMOL/L (ref 135–145)

## 2022-11-26 PROCEDURE — 94150 VITAL CAPACITY TEST: CPT

## 2022-11-26 PROCEDURE — 2580000003 HC RX 258

## 2022-11-26 PROCEDURE — 94640 AIRWAY INHALATION TREATMENT: CPT

## 2022-11-26 PROCEDURE — 6370000000 HC RX 637 (ALT 250 FOR IP)

## 2022-11-26 PROCEDURE — 97530 THERAPEUTIC ACTIVITIES: CPT

## 2022-11-26 PROCEDURE — 6360000002 HC RX W HCPCS

## 2022-11-26 PROCEDURE — 94761 N-INVAS EAR/PLS OXIMETRY MLT: CPT

## 2022-11-26 PROCEDURE — 2580000003 HC RX 258: Performed by: NURSE PRACTITIONER

## 2022-11-26 PROCEDURE — 36415 COLL VENOUS BLD VENIPUNCTURE: CPT

## 2022-11-26 PROCEDURE — 97116 GAIT TRAINING THERAPY: CPT

## 2022-11-26 PROCEDURE — 6360000002 HC RX W HCPCS: Performed by: NURSE PRACTITIONER

## 2022-11-26 PROCEDURE — 6370000000 HC RX 637 (ALT 250 FOR IP): Performed by: STUDENT IN AN ORGANIZED HEALTH CARE EDUCATION/TRAINING PROGRAM

## 2022-11-26 PROCEDURE — 99231 SBSQ HOSP IP/OBS SF/LOW 25: CPT | Performed by: INTERNAL MEDICINE

## 2022-11-26 PROCEDURE — 6370000000 HC RX 637 (ALT 250 FOR IP): Performed by: NURSE PRACTITIONER

## 2022-11-26 PROCEDURE — 1200000000 HC SEMI PRIVATE

## 2022-11-26 PROCEDURE — 6370000000 HC RX 637 (ALT 250 FOR IP): Performed by: INTERNAL MEDICINE

## 2022-11-26 PROCEDURE — 94669 MECHANICAL CHEST WALL OSCILL: CPT

## 2022-11-26 PROCEDURE — 80048 BASIC METABOLIC PNL TOTAL CA: CPT

## 2022-11-26 PROCEDURE — 2700000000 HC OXYGEN THERAPY PER DAY

## 2022-11-26 RX ORDER — HYDROXYZINE PAMOATE 25 MG/1
25 CAPSULE ORAL 3 TIMES DAILY PRN
Status: DISCONTINUED | OUTPATIENT
Start: 2022-11-26 | End: 2022-12-06 | Stop reason: HOSPADM

## 2022-11-26 RX ADMIN — HYDROXYZINE PAMOATE 25 MG: 25 CAPSULE ORAL at 03:33

## 2022-11-26 RX ADMIN — ONDANSETRON 4 MG: 2 INJECTION INTRAMUSCULAR; INTRAVENOUS at 03:33

## 2022-11-26 RX ADMIN — RANOLAZINE 500 MG: 500 TABLET, FILM COATED, EXTENDED RELEASE ORAL at 09:50

## 2022-11-26 RX ADMIN — SODIUM BICARBONATE 325 MG: 650 TABLET ORAL at 20:53

## 2022-11-26 RX ADMIN — SODIUM CHLORIDE, PRESERVATIVE FREE 10 ML: 5 INJECTION INTRAVENOUS at 09:53

## 2022-11-26 RX ADMIN — HEPARIN SODIUM 5000 UNITS: 5000 INJECTION INTRAVENOUS; SUBCUTANEOUS at 14:33

## 2022-11-26 RX ADMIN — Medication 1 DROP: at 09:55

## 2022-11-26 RX ADMIN — MEROPENEM 500 MG: 500 INJECTION, POWDER, FOR SOLUTION INTRAVENOUS at 18:28

## 2022-11-26 RX ADMIN — CLOPIDOGREL BISULFATE 75 MG: 75 TABLET ORAL at 09:50

## 2022-11-26 RX ADMIN — SENNOSIDES AND DOCUSATE SODIUM 1 TABLET: 50; 8.6 TABLET ORAL at 09:50

## 2022-11-26 RX ADMIN — SERTRALINE HYDROCHLORIDE 50 MG: 50 TABLET ORAL at 09:50

## 2022-11-26 RX ADMIN — POLYETHYLENE GLYCOL (3350) 17 G: 17 POWDER, FOR SOLUTION ORAL at 09:52

## 2022-11-26 RX ADMIN — SODIUM CHLORIDE, PRESERVATIVE FREE 10 ML: 5 INJECTION INTRAVENOUS at 22:20

## 2022-11-26 RX ADMIN — SODIUM BICARBONATE 325 MG: 650 TABLET ORAL at 09:51

## 2022-11-26 RX ADMIN — ISOSORBIDE MONONITRATE 60 MG: 60 TABLET, EXTENDED RELEASE ORAL at 09:50

## 2022-11-26 RX ADMIN — ACETAMINOPHEN 650 MG: 325 TABLET ORAL at 20:52

## 2022-11-26 RX ADMIN — RANOLAZINE 500 MG: 500 TABLET, FILM COATED, EXTENDED RELEASE ORAL at 20:53

## 2022-11-26 RX ADMIN — SENNOSIDES AND DOCUSATE SODIUM 1 TABLET: 50; 8.6 TABLET ORAL at 21:05

## 2022-11-26 RX ADMIN — IPRATROPIUM BROMIDE AND ALBUTEROL SULFATE 1 AMPULE: .5; 3 SOLUTION RESPIRATORY (INHALATION) at 08:09

## 2022-11-26 RX ADMIN — Medication 3 MG: at 20:53

## 2022-11-26 RX ADMIN — ATENOLOL 25 MG: 25 TABLET ORAL at 18:35

## 2022-11-26 RX ADMIN — Medication 1 CAPSULE: at 10:01

## 2022-11-26 RX ADMIN — HEPARIN SODIUM 5000 UNITS: 5000 INJECTION INTRAVENOUS; SUBCUTANEOUS at 20:54

## 2022-11-26 RX ADMIN — AMLODIPINE BESYLATE 10 MG: 10 TABLET ORAL at 20:52

## 2022-11-26 RX ADMIN — HYDROXYZINE PAMOATE 25 MG: 25 CAPSULE ORAL at 20:52

## 2022-11-26 RX ADMIN — PANTOPRAZOLE SODIUM 40 MG: 40 TABLET, DELAYED RELEASE ORAL at 06:34

## 2022-11-26 RX ADMIN — IPRATROPIUM BROMIDE AND ALBUTEROL SULFATE 1 AMPULE: .5; 3 SOLUTION RESPIRATORY (INHALATION) at 19:16

## 2022-11-26 RX ADMIN — DOCUSATE SODIUM 100 MG: 100 CAPSULE, LIQUID FILLED ORAL at 09:51

## 2022-11-26 RX ADMIN — Medication 2000 UNITS: at 09:51

## 2022-11-26 RX ADMIN — HEPARIN SODIUM 5000 UNITS: 5000 INJECTION INTRAVENOUS; SUBCUTANEOUS at 06:29

## 2022-11-26 RX ADMIN — ATORVASTATIN CALCIUM 20 MG: 10 TABLET, FILM COATED ORAL at 20:52

## 2022-11-26 RX ADMIN — MEROPENEM 500 MG: 500 INJECTION, POWDER, FOR SOLUTION INTRAVENOUS at 06:29

## 2022-11-26 RX ADMIN — LEVOTHYROXINE SODIUM 88 MCG: 0.09 TABLET ORAL at 06:34

## 2022-11-26 RX ADMIN — IPRATROPIUM BROMIDE AND ALBUTEROL SULFATE 1 AMPULE: .5; 3 SOLUTION RESPIRATORY (INHALATION) at 14:38

## 2022-11-26 NOTE — PROGRESS NOTES
V2.0  [facility] [place]      Name:  Orly Castellon /Age/Sex: 1927  (80 y.o. female)   MRN & CSN:  [id number] & [id number] Encounter Date/Time: 2022 2:01 PM EST    Location:  [address] [place] [address] PCP: Sabrina Robertson MD       Hospital Day: 13    Assessment and Plan:    Orly Castellon is a 80 y.o. female with PMH of CAD, GERD, UTI, Hypothyroidism, HTN, Hyperlipedmia, CKD who presents with Sepsis ([facility] [address]    #Acute hypoxic respiratory failure 2/2 CAP and pulmonary edema-resolving  -Blood cultures negative  -Chest x-ray showing pulmonary edema and concern for pneumonia  -Echo: EF of 55 to 60% with RVSP of 44  -Negative respiratory PCR  -CT chest showing bilateral infiltrates    Plan:  Pulmonology following, appreciate recs  Continue meropenem until  per ID (daughter requested to stay on meropenem as the patient has tolerated it well and has issues with tolerating antibiotics normally)  Continue DuoNebs  Patient is not on home oxygen however is requiring 3 L --> wean as tolerated    #Generalized pruritus  -likely in the setting of dry air (patient uses humidifier at home)  -no jaundice, no rash    Plan:  CMP to assess liver function  Lotion    #Pyelonephritis with history of recurrent UTI  -Received ciprofloxacin, doxycycline and nitrofurantoin without relief  -Urine culture growing Enterococcus faecalis sensitive to ampicillin    Plan:  ID on consult, appreciate recs  Meropenem for 14 days, end date is     #Acute decompensated HFpEF-resolving  -Chest x-ray with pulmonary edema and elevated RVSP    Plan:  IV diuresis as needed, holding for now in the setting of PAUL  Strict I's and O  Daily weights    #PAUL on CKD likely prerenal in the setting of diuresis  -Baseline creatinine around 2    Nephrology following, appreciate recommendations  Strict I's and O's  Family refused HD  Holding diuresis    #Suspected aspiration--> ruled out  -Seen by SLP, did not have any signs or symptoms of aspiration    Plan:  Continue to monitor    #Hyperkalemia-resolved  -Received Lokelma    Plan:  Continue monitoring BMP    #Elevated troponin likely from CKD and demand ischemia  #CAD  -Down trended  -EKG with LBBB    Plan:  Cardiology on consult, appreciate recommendations  Continue atenolol, amlodipine, Imdur, statin and aspirin  Holding telmisartan in the setting of PAUL    Chronic medical problems:    #Severe AS  -Follow-up at [facility] for TAVR balloon valvuloplasty    #Hypertension  Plan: Continue home meds except for on losartan    #GERD  Continue PPI    Patient was denied from ARU, expedited appeal pending  Patient medically ready for discharge      Diet ADULT DIET; Dysphagia - Minced and Moist; Low Potassium (Less than 3000 mg/day); 1500 ml; Please provide extra moisteners and gravies  ADULT ORAL NUTRITION SUPPLEMENT; Dinner; Frozen Oral Supplement   DVT Prophylaxis [] Lovenox, [x]  Heparin, [] SCDs, [] Ambulation,  [] Eliquis, [] Xarelto  [] Coumadin   Code Status Limited   Disposition From: home  Expected Disposition: acute rehab  Estimated Date of Discharge: pending placement  Patient requires continued admission due to pending placement   Surrogate Decision Maker/ CANDIS Rico     Subjective:     Chief Complaint: Shortness of Breath     Patient complains of significant prutitus that happens all day and has not been letting her sleep either. She has no allergies to detergents. No rash or discoloration. No other symptoms. Patient does use a humidifier at home. Review of Systems:    General: No fever, no chills, +pruritus  Heart: No chest pain, no palpitations  Lungs: No shortness of breath, no cough  Abdomen: No abdominal pain, no nausea, no vomiting, no constipation, no diarrhea  : No frequency, no dysuria, no urgency  MSK: No lower extremity swelling  Neuro: No confusion, no weakness  Skin: No rashes      Objective:      Intake/Output Summary (Last 24 hours) at 11/26/2022 1889  Last data filed at 11/26/2022 0300  Gross per 24 hour   Intake 386 ml   Output 600 ml   Net -214 ml        Vitals:   Vitals:    11/26/22 0826   BP: 100/78   Pulse: 73   Resp: 17   Temp: 98.5 °F (36.9 °C)   SpO2: 91%       Physical Exam:     General: NAD  Eyes: EOMI  HENT: Atraumatic  Respiratory: no respiratory distress  GI: nondistended  MSK: no lower extremity edema  Skin: Intact, dry, warm, no rashes, no excoriations  Neuro: CN II to XII grossly intact  Psych: Normal mood    Medications:   Medications:    sennosides-docusate sodium  1 tablet Oral BID    meropenem  500 mg IntraVENous Q12H    ipratropium-albuterol  1 ampule Inhalation TID    polyethylene glycol  17 g Oral Daily    docusate sodium  100 mg Oral Daily    lactobacillus  1 capsule Oral Daily with breakfast    amLODIPine  10 mg Oral Nightly    atenolol  25 mg Oral Daily    sodium bicarbonate  325 mg Oral BID    clopidogrel  75 mg Oral Daily    sodium chloride flush  5-40 mL IntraVENous 2 times per day    sodium chloride flush  5-40 mL IntraVENous 2 times per day    heparin (porcine)  5,000 Units SubCUTAneous 3 times per day    Vitamin D  2,000 Units Oral Daily    pantoprazole  40 mg Oral QAM AC    fluorometholone  1 drop Both Eyes BID    isosorbide mononitrate  60 mg Oral Daily    levothyroxine  88 mcg Oral Daily    ranolazine  500 mg Oral BID    sertraline  50 mg Oral Daily    [Held by provider] losartan  12.5 mg Oral Daily    atorvastatin  20 mg Oral Nightly      Infusions:    sodium chloride 75 mL/hr at 11/24/22 1739    sodium chloride       PRN Meds: hydrOXYzine pamoate, 25 mg, TID PRN  melatonin, 3 mg, Nightly PRN  ipratropium-albuterol, 1 ampule, Q4H PRN  sodium chloride flush, 5-40 mL, PRN  sodium chloride, , PRN  ondansetron, 4 mg, Q8H PRN   Or  ondansetron, 4 mg, Q6H PRN  polyethylene glycol, 17 g, Daily PRN  acetaminophen, 650 mg, Q6H PRN   Or  acetaminophen, 650 mg, Q6H PRN  sodium chloride flush, 5-40 mL, PRN  sodium chloride, , PRN      Labs      Recent Results (from the past 24 hour(s))   Basic Metabolic Panel    Collection Time: 11/26/22  7:29 AM   Result Value Ref Range    Sodium 132 (L) 135 - 145 MMOL/L    Potassium 4.8 3.5 - 5.1 MMOL/L    Chloride 99 99 - 110 mMol/L    CO2 25 21 - 32 MMOL/L    Anion Gap 8 4 - 16    BUN 42 (H) 6 - 23 MG/DL    Creatinine 1.9 (H) 0.6 - 1.1 MG/DL    Est, Glom Filt Rate 24 (L) >60 mL/min/1.73m2    Glucose 82 70 - 99 MG/DL    Calcium 9.8 8.3 - 10.6 MG/DL          Imaging/Diagnostics Last 24 Hours   XR CHEST PORTABLE    Result Date: 11/21/2022  EXAMINATION: ONE XRAY VIEW OF THE CHEST 11/21/2022 12:19 pm COMPARISON: 11/17/2022. HISTORY: ORDERING SYSTEM PROVIDED HISTORY: sob TECHNOLOGIST PROVIDED HISTORY: Reason for exam:->sob Reason for Exam:  sob FINDINGS: Cardiomediastinal silhouette is stable. Similar bilateral airspace opacities. No pleural effusion or pneumothorax. No gross bony abnormality.      Stable chest.       Electronically signed by Samm Mills MD on 11/26/2022 at 8:37 AM

## 2022-11-26 NOTE — PLAN OF CARE
Problem: Discharge Planning  Goal: Discharge to home or other facility with appropriate resources  11/26/2022 1037 by Kasia Callejas RN  Outcome: Progressing  11/25/2022 2239 by Nathan Turpin RN  Outcome: Progressing     Problem: Hematologic - Adult  Goal: Maintains hematologic stability  11/26/2022 1037 by Kasia Callejas RN  Outcome: Progressing  11/25/2022 2239 by Nathan Turpin RN  Outcome: Progressing     Problem: Pain  Goal: Verbalizes/displays adequate comfort level or baseline comfort level  11/26/2022 1037 by Kasia Callejas RN  Outcome: Progressing  11/25/2022 2239 by Nathan Turpin RN  Outcome: Progressing     Problem: Safety - Adult  Goal: Free from fall injury  11/26/2022 1037 by Kasia Callejas RN  Outcome: Progressing  11/25/2022 2239 by Nathan Turpin RN  Outcome: Progressing     Problem: ABCDS Injury Assessment  Goal: Absence of physical injury  11/26/2022 1037 by Kasia Callejas RN  Outcome: Progressing  11/25/2022 2239 by Nathan Turpin RN  Outcome: Progressing     Problem: Skin/Tissue Integrity  Goal: Absence of new skin breakdown  Description: 1. Monitor for areas of redness and/or skin breakdown  2. Assess vascular access sites hourly  3. Every 4-6 hours minimum:  Change oxygen saturation probe site  4. Every 4-6 hours:  If on nasal continuous positive airway pressure, respiratory therapy assess nares and determine need for appliance change or resting period.   11/26/2022 1037 by Kasia Callejas RN  Outcome: Progressing  11/25/2022 2239 by Nathan Turpin RN  Outcome: Progressing     Problem: Nutrition Deficit:  Goal: Optimize nutritional status  11/26/2022 1037 by Kasia Callejas RN  Outcome: Progressing  11/25/2022 2239 by Nathan Turpin RN  Outcome: Progressing  Flowsheets (Taken 11/25/2022 1207 by Angel Yuan, RD, LD)  Nutrient intake appropriate for improving, restoring, or maintaining nutritional needs:   Monitor oral intake, labs, and treatment plans   Recommend appropriate diets, oral nutritional supplements, and vitamin/mineral supplements

## 2022-11-26 NOTE — PROGRESS NOTES
Physical Therapy  Name: Leonie Nguyen MRN: [id number] :   1927   Date:  2022   Admission Date: 2022 Room:  [address] [place] [address]   Restrictions/Precautions:         Fall risk, NC O2, tele, BP, general    Communication with other providers:  Appropriate for rehab via chart review    Subjective:  Patient states:  Patient is agreeable for rehab  Pain:   Location, Type, Intensity (0/10 to 10/10):  denies    Objective:    Observation:  Patient seated in recliner with 1L NC O2, SpO2 at rest 91-94%. NC O2 doffed during activities, SpO2 88-89%. NC Donned at 2L with a quick rebound to 95%. Patient left on 1.5L NC O2 at EOS    Treatment, including education/measures:    Transfers with line management of DynaMap, [place]wick, Pulse Ox, Pocket tele  Sit to stand :From recliner CGA, from Commode CGA  Stand to sit :to commode and recliner CGA  Standing balance: With UE support x 1 on 4WW CGA ding light dynamic activities   SPT:Min A d/t needing walker management during pivot    Gait:  Pt amb with 4WW for 50 ft + 3' seated rest breaks to recover from fatigue + 50 ft with CGA assist  Pt needed VC's for increase AD proximity, increase step length and increased upright posture. Safety  Patient left safely in the recliner, with call light/phone in reach with alarm applied. Gait belt and mask were used for transfers and gait.     Assessment / Impression:     Patient's tolerance of treatment:  Good   Adverse Reaction: none  Significant change in status and impact:  none  Barriers to improvement:  endurance    Plan for Next Session:    Will cont to work towards pt's goals per patient tolerance  Time in:  1307  Time out:  1340  Timed treatment minutes:  33  Total treatment time:  33  Previously filed items:  Social/Functional History  Lives With: Alone (pt has home health aide daily for 8 hours/day, also supportive granddaughter)  Type of Home: Apartment  Home Layout: One level  Home Access: Level entry  Bathroom Shower/Tub: Walk-in shower  Bathroom Toilet: Handicap height  Bathroom Equipment: Built-in shower seat, Grab bars in shower, Commode  Bathroom Accessibility: Accessible  Home Equipment: Walker, 4 wheeled, Lift chair  ADL Assistance: Independent (aide supervises with bathing but pt able to manage per granddaughter, pt able to dress and toilet independently)  Homemaking Assistance: Needs assistance  Homemaking Responsibilities: No (home health aide manages)  Ambulation Assistance: Independent (mod I with 4ww household distances)  Transfer Assistance: Independent  Active : No  Occupation: Retired  Short Term Goals  Time Frame for [employer] Term Goals: 1 week  Short Term Goal 1: Pt will perform all bed mobility tasks with SBA, min cues  Short Term Goal 2: Pt will manage all compenents of rollator for STS with SBA. Short Term Goal 3: Pt will AMB x45 ft with RW, stable 02, CGA-SBA for safety       Electronically signed by:     Alo Boyd, PTA  11/26/2022, 1:12 PM

## 2022-11-26 NOTE — PLAN OF CARE
Problem: Discharge Planning  Goal: Discharge to home or other facility with appropriate resources  Outcome: Progressing     Problem: Hematologic - Adult  Goal: Maintains hematologic stability  Outcome: Progressing     Problem: Pain  Goal: Verbalizes/displays adequate comfort level or baseline comfort level  Outcome: Progressing     Problem: Safety - Adult  Goal: Free from fall injury  Outcome: Progressing     Problem: ABCDS Injury Assessment  Goal: Absence of physical injury  Outcome: Progressing     Problem: Skin/Tissue Integrity  Goal: Absence of new skin breakdown  Description: 1. Monitor for areas of redness and/or skin breakdown  2. Assess vascular access sites hourly  3. Every 4-6 hours minimum:  Change oxygen saturation probe site  4. Every 4-6 hours:  If on nasal continuous positive airway pressure, respiratory therapy assess nares and determine need for appliance change or resting period.   Outcome: Progressing     Problem: Nutrition Deficit:  Goal: Optimize nutritional status  Outcome: Progressing  Flowsheets (Taken 11/25/2022 1207 by Antonieta Hernandez, RD, LD)  Nutrient intake appropriate for improving, restoring, or maintaining nutritional needs:   Monitor oral intake, labs, and treatment plans   Recommend appropriate diets, oral nutritional supplements, and vitamin/mineral supplements

## 2022-11-26 NOTE — PROGRESS NOTES
The following message sent to Dr Sheikh Leader via [employer]:  Family is concerned about their Mom being \"up all night itching\". Wants to know \"if there is a shot we can give her\".  She does get hydroxyzine TiD PRN itching, and had it last at 0333  Reply: Sure

## 2022-11-26 NOTE — PROGRESS NOTES
Daily Progress Note  Subjective:  Awake and alert; up in chair  Denies any CP, SOB is improving, down to 1L NC  BP and HR stable, not on tele  Increase activity    Attending Note:      Impression and Plan:   Sepsis    CAP, noted on CT chest    Respiratory panel negative    WBC count elevated    ABX per primary    Using 1L NC now-weaning slowly     Acute on Chronic HFpEF    BNP on admission was 6,417    Last echo showed EF 55-60%    Severe Aortic stenosis    Diuretics per renal at this time    Accurate I's and O's    1500 Fluid restriction     Plan to have her f/u with [facility] OP for potential valvuloplasty-will arrange this OP     CKD    Renal following; Avoid nephrotoxic medications    K improved here today     Stable from cardiac standpoint  Med. Tx. and increase activity  Awaiting placement      Most Recent Echo  11/15/2022   Left ventricular systolic function is normal.   Ejection fraction is visually estimated at 55-60%. Moderately dilated left atrium. Moderate to severe aortic stenosis is present; Mean PG 40mmHg, YANIRA 0.99 cm2. Mitral annular calcification is present. Mild to moderate mitral regurgitation. Mild to moderate tricuspid regurgitation; RVSP: 44 mmHg. No evidence of any pericardial effusion. Radiology  CT chest 11/14/2022  Impression   Bilateral pulmonary infiltrates and bilateral pleural effusions with probable   reactive mediastinal adenopathy       Probable granuloma in the right middle lobe. No follow-up imaging   recommended. PAST MEDICAL HISTORY:  The patient has a history of hypertension, renal  insufficiency present, and she has been treated medically. She is known  to have coronary artery disease also present. She has a chronic left  ventricular block present. She had a heart catheterization done in the  past and LAD had 70% stenosis present and she was treated medically at  that time. Anemia, hyperlipidemia, and arthritis present.      PAST SURGICAL HISTORY:  She has a history of having hip surgery done. Moderate coronary artery disease, heart catheterization done in 2018,  LAD with 70% stenosis distally. She was treated medically for that. She had cataract surgery and hysterectomy done.     Objective:   /78   Pulse 73   Temp 98.5 °F (36.9 °C) (Oral)   Resp 17   Ht 5' (1.524 m)   Wt 131 lb 2.8 oz (59.5 kg)   LMP  (LMP Unknown)   SpO2 91%   BMI 25.62 kg/m²     Intake/Output Summary (Last 24 hours) at 11/26/2022 0834  Last data filed at 11/26/2022 0300  Gross per 24 hour   Intake 386 ml   Output 600 ml   Net -214 ml       Medications:   Scheduled Meds:   sennosides-docusate sodium  1 tablet Oral BID    meropenem  500 mg IntraVENous Q12H    ipratropium-albuterol  1 ampule Inhalation TID    polyethylene glycol  17 g Oral Daily    docusate sodium  100 mg Oral Daily    lactobacillus  1 capsule Oral Daily with breakfast    amLODIPine  10 mg Oral Nightly    atenolol  25 mg Oral Daily    sodium bicarbonate  325 mg Oral BID    clopidogrel  75 mg Oral Daily    sodium chloride flush  5-40 mL IntraVENous 2 times per day    sodium chloride flush  5-40 mL IntraVENous 2 times per day    heparin (porcine)  5,000 Units SubCUTAneous 3 times per day    Vitamin D  2,000 Units Oral Daily    pantoprazole  40 mg Oral QAM AC    fluorometholone  1 drop Both Eyes BID    isosorbide mononitrate  60 mg Oral Daily    levothyroxine  88 mcg Oral Daily    ranolazine  500 mg Oral BID    sertraline  50 mg Oral Daily    [Held by provider] losartan  12.5 mg Oral Daily    atorvastatin  20 mg Oral Nightly      Infusions:   sodium chloride 75 mL/hr at 11/24/22 1739    sodium chloride        PRN Meds:  hydrOXYzine pamoate, melatonin, ipratropium-albuterol, sodium chloride flush, sodium chloride, ondansetron **OR** ondansetron, polyethylene glycol, acetaminophen **OR** acetaminophen, sodium chloride flush, sodium chloride     Physical Exam:  Vitals:    11/26/22 0826   BP: 100/78   Pulse: 73   Resp: 17   Temp: 98.5 °F (36.9 °C)   SpO2: 91%        General: AAO, NAD  Chest: Nontender  Cardiac: First and Second Heart Sounds are Normal, No Murmurs or Gallops noted  Lungs:Clear to auscultation and percussion. Abdomen: Soft, NT, ND, +BS  Extremities: No clubbing, no edema  Vascular:  Equal 2+ peripheral pulses. Lab Data:  CBC: No results for input(s): WBC, HGB, HCT, MCV, PLT in the last 72 hours. BMP:   Recent Labs     11/24/22  0413 11/25/22  0506 11/26/22  0729    136 132*   K 4.9 5.4* 4.8    101 99   CO2 24 25 25   BUN 40* 41* 42*   CREATININE 1.7* 1.9* 1.9*     LIVER PROFILE: No results for input(s): AST, ALT, LIPASE, BILIDIR, BILITOT, ALKPHOS in the last 72 hours. Invalid input(s): AMYLASE,  ALB  PT/INR: No results for input(s): PROTIME, INR in the last 72 hours. APTT: No results for input(s): APTT in the last 72 hours. BNP:  No results for input(s): BNP in the last 72 hours.       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 08/18/2018    Bradycardia     LBBB (left bundle branch block)     PAUL (acute kidney injury) ([facility] [address] 12/26/2017    Pneumonia due to infectious organism 11/16/2022    Pyelonephritis 11/16/2022    Chronic kidney disease 11/16/2022    Dyspnea and respiratory abnormalities 11/15/2022    Sepsis ([facility] [address] 11/14/2022    Localized edema 07/11/2022    GERD (gastroesophageal reflux disease)     Hypertension     Hypothyroid     Sinus congestion 03/23/2022    Vaginal prolapse 09/07/2021    Primary osteoarthritis of left foot 08/30/2021    Closed nondisplaced fracture of fourth metatarsal bone of left foot 08/30/2021    Closed nondisplaced fracture of third metatarsal bone of left foot 08/30/2021    Closed nondisplaced fracture of fifth left metatarsal bone 08/30/2021    Vertigo     Dizziness     Labyrinthitis     Chronic renal impairment, stage 3 (moderate) (HCC)     Constipation, slow transit 06/19/2018    Cystitis Physical deconditioning     Hyponatremia 01/01/2018    Pruritic condition 01/22/2016    Gout     Hyperuricemia     Low back pain     Eczema     Coronary artery disease involving native heart without angina pectoris     Depression     Hyperlipidemia     Generalized osteoarthritis     Osteoporosis        Electronically signed by CLEMENT Barber CNP on 11/26/2022 at 8:34 AM  Patient seen and examined agree with above assessment and plan stable from cardiac standpoint Electronically signed by Kit Naranjo MD on 11/26/2022 at 10:49 AM

## 2022-11-26 NOTE — PROGRESS NOTES
Pulmonary and Critical Care  Progress Note      VITALS:  /78   Pulse 73   Temp 98.5 °F (36.9 °C) (Oral)   Resp 17   Ht 5' (1.524 m)   Wt 131 lb 2.8 oz (59.5 kg)   LMP  (LMP Unknown)   SpO2 91%   BMI 25.62 kg/m²     Subjective:   CHIEF COMPLAINT :SOB     HPI:                The patient is a 80 y.o. female is sitting in the chair. She is not in acute resp distress    Objective:   PHYSICAL EXAM:    LUNGS:Occasional basal crackles  Abd-soft, BS+,NT  Ext- no pedal edema  CVS-s1s2, no murmurs      DATA:    CBC:  No results for input(s): WBC, RBC, HGB, HCT, PLT, MCV, MCH, MCHC, RDW, NRBC, SEGSPCT, BANDSPCT in the last 72 hours. BMP:  Recent Labs     11/24/22  0413 11/25/22  0506 11/26/22  0729    136 132*   K 4.9 5.4* 4.8    101 99   CO2 24 25 25   BUN 40* 41* 42*   CREATININE 1.7* 1.9* 1.9*   CALCIUM 9.6 10.0 9.8   GLUCOSE 89 87 82      ABG:  No results for input(s): PH, PO2ART, WZP2ZDH, HCO3, BEART, O2SAT in the last 72 hours.   BNP  Lab Results   Component Value Date     (H) 05/09/2012      D-Dimer:  Lab Results   Component Value Date    GQJCDB 426 (H) 01/07/2018      Radiology: None      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 08/18/2018     Priority: High    Bradycardia      Priority: High    LBBB (left bundle branch block)      Priority: High    PAUL (acute kidney injury) ([facility] [address] 12/26/2017     Priority: High    Pneumonia due to infectious organism 11/16/2022     Priority: Medium    Pyelonephritis 11/16/2022     Priority: Medium    Chronic kidney disease 11/16/2022     Priority: Medium    Dyspnea and respiratory abnormalities 11/15/2022     Priority: Medium    Sepsis ([facility] [address] 11/14/2022     Priority: Medium    Localized edema 07/11/2022     Priority: Medium    GERD (gastroesophageal reflux disease)      Priority: Medium     Overview Note:     Esophagitis      Hypertension      Priority: Low    Hypothyroid Priority: Low     Overview Note:     Hypothyroidism      Sinus congestion 03/23/2022     Overview Note:     CHRONIC W POST NASAL DRIP      Vaginal prolapse 09/07/2021    Primary osteoarthritis of left foot 08/30/2021    Closed nondisplaced fracture of fourth metatarsal bone of left foot 08/30/2021    Closed nondisplaced fracture of third metatarsal bone of left foot 08/30/2021    Closed nondisplaced fracture of fifth left metatarsal bone 08/30/2021    Vertigo      Overview Note:     Dr [facility]      Dizziness     Labyrinthitis     Chronic renal impairment, stage 3 (moderate) ([facility])     Constipation, slow transit 06/19/2018    Cystitis     Physical deconditioning     Hyponatremia 01/01/2018    Pruritic condition 01/22/2016    Gout     Hyperuricemia     Low back pain     Eczema     Coronary artery disease involving native heart without angina pectoris      Overview Note:     Dr Chavez Figueredo/Jose Figueredo      Depression     Hyperlipidemia     Generalized osteoarthritis     Osteoporosis      Overview Note:     Generalized     Acute Hypoxic resp failure sec to Cardiogenic and Non Cardiogenic Pulmonary edema  Small bilateral Pleural effusions  PAUL on CKD- slowly improving  Leukocytosis- Improved  Severe AS  Mild to Moderate Pulmonary HTN  CAD  H/o Recurrent UTI  GERD  HLD  ?  Atypical pneumonia       Watch the patient off Abx  ICS  OOB   Inhalers  PT/OT  Keep sats > 92%  Await placement  C/w present management    Electronically signed by Fariba Fishman MD on 11/26/2022 at 12:47 PM

## 2022-11-26 NOTE — PROGRESS NOTES
Nephrology Progress Note        [address], [address] [address]  Phone: [phone]  Office Hours: 8:30AM - 4:30PM  Monday - Friday 11/26/2022 7:53 AM  Subjective:   Admit Date: 11/14/2022  PCP: Priyank Luis MD  Interval History:   Resting in a chair  On nasal cannula  BP at goal    Diet: ADULT DIET; Dysphagia - Minced and Moist; Low Potassium (Less than 3000 mg/day); 1500 ml; Please provide extra moisteners and gravies  ADULT ORAL NUTRITION SUPPLEMENT; Dinner; Frozen Oral Supplement      Data:   Scheduled Meds:   sennosides-docusate sodium  1 tablet Oral BID    meropenem  500 mg IntraVENous Q12H    ipratropium-albuterol  1 ampule Inhalation TID    polyethylene glycol  17 g Oral Daily    docusate sodium  100 mg Oral Daily    lactobacillus  1 capsule Oral Daily with breakfast    amLODIPine  10 mg Oral Nightly    atenolol  25 mg Oral Daily    sodium bicarbonate  325 mg Oral BID    clopidogrel  75 mg Oral Daily    sodium chloride flush  5-40 mL IntraVENous 2 times per day    sodium chloride flush  5-40 mL IntraVENous 2 times per day    heparin (porcine)  5,000 Units SubCUTAneous 3 times per day    Vitamin D  2,000 Units Oral Daily    pantoprazole  40 mg Oral QAM AC    fluorometholone  1 drop Both Eyes BID    isosorbide mononitrate  60 mg Oral Daily    levothyroxine  88 mcg Oral Daily    ranolazine  500 mg Oral BID    sertraline  50 mg Oral Daily    [Held by provider] losartan  12.5 mg Oral Daily    atorvastatin  20 mg Oral Nightly     Continuous Infusions:   sodium chloride 75 mL/hr at 11/24/22 1739    sodium chloride       PRN Meds:hydrOXYzine pamoate, melatonin, ipratropium-albuterol, sodium chloride flush, sodium chloride, ondansetron **OR** ondansetron, polyethylene glycol, acetaminophen **OR** acetaminophen, sodium chloride flush, sodium chloride  I/O last 3 completed shifts: In: 386 [P.O.:386]  Out: 600 [Urine:600]  No intake/output data recorded.     Intake/Output Summary (Last 24 hours) at 11/26/2022 0753  Last data filed at 11/26/2022 0300  Gross per 24 hour   Intake 386 ml   Output 600 ml   Net -214 ml       CBC: No results for input(s): WBC, HGB, PLT in the last 72 hours.     BMP:    Recent Labs     11/24/22  0413 11/25/22  0506    136   K 4.9 5.4*    101   CO2 24 25   BUN 40* 41*   CREATININE 1.7* 1.9*   GLUCOSE 89 87         Objective:   Vitals: BP (!) 140/59   Pulse 69   Temp 97.9 °F (36.6 °C) (Oral)   Resp 16   Ht 5' (1.524 m)   Wt 131 lb 2.8 oz (59.5 kg)   LMP  (LMP Unknown)   SpO2 95%   BMI 25.62 kg/m²   General appearance: in no acute distress  HEENT: normocephalic, atraumatic,   Neck: supple, trachea midline  Lungs: breathing comfortably on nc  Extremities: extremities atraumatic, no cyanosis or edema  Neurologic: drowsy    MEDICAL DECISION MAKING     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 08/18/2018    Bradycardia     LBBB (left bundle branch block)     PAUL (acute kidney injury) ([address] 12/26/2017    Pneumonia due to infectious organism 11/16/2022    Pyelonephritis 11/16/2022    Chronic kidney disease 11/16/2022    Dyspnea and respiratory abnormalities 11/15/2022    Sepsis ([address] 11/14/2022    Localized edema 07/11/2022    GERD (gastroesophageal reflux disease)     Hypertension     Hypothyroid     Sinus congestion 03/23/2022    Vaginal prolapse 09/07/2021    Primary osteoarthritis of left foot 08/30/2021    Closed nondisplaced fracture of fourth metatarsal bone of left foot 08/30/2021    Closed nondisplaced fracture of third metatarsal bone of left foot 08/30/2021    Closed nondisplaced fracture of fifth left metatarsal bone 08/30/2021    Vertigo     Dizziness     Labyrinthitis     Chronic renal impairment, stage 3 (moderate) ([facility])     Constipation, slow transit 06/19/2018    Cystitis     Physical deconditioning     Hyponatremia 01/01/2018    Pruritic condition 01/22/2016    Gout Hyperuricemia     Low back pain     Eczema     Coronary artery disease involving native heart without angina pectoris     Depression     Hyperlipidemia     Generalized osteoarthritis     Osteoporosis      Obtain BMP today  Treated for hyperkalemia, with lokelma yesterday  Avoid nephrotoxins  Continue BP monitoring                  Electronically signed by Rose Jefferson DO on 11/26/2022 at 7:53 AM    [address]  [place], [place]  PHONE: : [phone]

## 2022-11-27 LAB
ALBUMIN SERPL-MCNC: 3 GM/DL (ref 3.4–5)
ALP BLD-CCNC: 90 IU/L (ref 40–128)
ALT SERPL-CCNC: 23 U/L (ref 10–40)
ANION GAP SERPL CALCULATED.3IONS-SCNC: 7 MMOL/L (ref 4–16)
AST SERPL-CCNC: 32 IU/L (ref 15–37)
BILIRUB SERPL-MCNC: 0.2 MG/DL (ref 0–1)
BUN BLDV-MCNC: 43 MG/DL (ref 6–23)
CALCIUM SERPL-MCNC: 10.1 MG/DL (ref 8.3–10.6)
CHLORIDE BLD-SCNC: 101 MMOL/L (ref 99–110)
CHLORIDE URINE RANDOM: 46 MMOL/L (ref 43–210)
CO2: 27 MMOL/L (ref 21–32)
CREAT SERPL-MCNC: 2.1 MG/DL (ref 0.6–1.1)
GFR SERPL CREATININE-BSD FRML MDRD: 21 ML/MIN/1.73M2
GLUCOSE BLD-MCNC: 78 MG/DL (ref 70–99)
POTASSIUM SERPL-SCNC: 4.9 MMOL/L (ref 3.5–5.1)
POTASSIUM, UR: 19.8 MMOL/L (ref 22–119)
SODIUM BLD-SCNC: 135 MMOL/L (ref 135–145)
SODIUM URINE: 61 MMOL/L (ref 35–167)
TOTAL PROTEIN: 6.3 GM/DL (ref 6.4–8.2)

## 2022-11-27 PROCEDURE — 6360000002 HC RX W HCPCS

## 2022-11-27 PROCEDURE — 6370000000 HC RX 637 (ALT 250 FOR IP): Performed by: STUDENT IN AN ORGANIZED HEALTH CARE EDUCATION/TRAINING PROGRAM

## 2022-11-27 PROCEDURE — 6360000002 HC RX W HCPCS: Performed by: NURSE PRACTITIONER

## 2022-11-27 PROCEDURE — 36415 COLL VENOUS BLD VENIPUNCTURE: CPT

## 2022-11-27 PROCEDURE — 6370000000 HC RX 637 (ALT 250 FOR IP)

## 2022-11-27 PROCEDURE — 82436 ASSAY OF URINE CHLORIDE: CPT

## 2022-11-27 PROCEDURE — 6370000000 HC RX 637 (ALT 250 FOR IP): Performed by: INTERNAL MEDICINE

## 2022-11-27 PROCEDURE — 94669 MECHANICAL CHEST WALL OSCILL: CPT

## 2022-11-27 PROCEDURE — 94761 N-INVAS EAR/PLS OXIMETRY MLT: CPT

## 2022-11-27 PROCEDURE — 6370000000 HC RX 637 (ALT 250 FOR IP): Performed by: NURSE PRACTITIONER

## 2022-11-27 PROCEDURE — 94640 AIRWAY INHALATION TREATMENT: CPT

## 2022-11-27 PROCEDURE — 2580000003 HC RX 258

## 2022-11-27 PROCEDURE — 84300 ASSAY OF URINE SODIUM: CPT

## 2022-11-27 PROCEDURE — 84133 ASSAY OF URINE POTASSIUM: CPT

## 2022-11-27 PROCEDURE — 1200000000 HC SEMI PRIVATE

## 2022-11-27 PROCEDURE — 94668 MNPJ CHEST WALL SBSQ: CPT

## 2022-11-27 PROCEDURE — 80053 COMPREHEN METABOLIC PANEL: CPT

## 2022-11-27 PROCEDURE — 2700000000 HC OXYGEN THERAPY PER DAY

## 2022-11-27 PROCEDURE — 99231 SBSQ HOSP IP/OBS SF/LOW 25: CPT | Performed by: INTERNAL MEDICINE

## 2022-11-27 PROCEDURE — 80048 BASIC METABOLIC PNL TOTAL CA: CPT

## 2022-11-27 PROCEDURE — 2580000003 HC RX 258: Performed by: NURSE PRACTITIONER

## 2022-11-27 RX ORDER — DIPHENHYDRAMINE HYDROCHLORIDE, ZINC ACETATE 2; .1 G/100G; G/100G
CREAM TOPICAL 4 TIMES DAILY
Status: DISCONTINUED | OUTPATIENT
Start: 2022-11-27 | End: 2022-12-06 | Stop reason: HOSPADM

## 2022-11-27 RX ADMIN — LEVOTHYROXINE SODIUM 88 MCG: 0.09 TABLET ORAL at 06:05

## 2022-11-27 RX ADMIN — SODIUM BICARBONATE 325 MG: 650 TABLET ORAL at 08:52

## 2022-11-27 RX ADMIN — DIPHENHYDRAMINE HYDROCHLORIDE, ZINC ACETATE: 2; .1 CREAM TOPICAL at 08:53

## 2022-11-27 RX ADMIN — ATENOLOL 25 MG: 25 TABLET ORAL at 16:35

## 2022-11-27 RX ADMIN — IPRATROPIUM BROMIDE AND ALBUTEROL SULFATE 1 AMPULE: .5; 3 SOLUTION RESPIRATORY (INHALATION) at 07:56

## 2022-11-27 RX ADMIN — SERTRALINE HYDROCHLORIDE 50 MG: 50 TABLET ORAL at 08:52

## 2022-11-27 RX ADMIN — DOCUSATE SODIUM 100 MG: 100 CAPSULE, LIQUID FILLED ORAL at 08:52

## 2022-11-27 RX ADMIN — HYDROXYZINE PAMOATE 25 MG: 25 CAPSULE ORAL at 20:40

## 2022-11-27 RX ADMIN — SODIUM BICARBONATE 325 MG: 650 TABLET ORAL at 20:35

## 2022-11-27 RX ADMIN — PANTOPRAZOLE SODIUM 40 MG: 40 TABLET, DELAYED RELEASE ORAL at 06:05

## 2022-11-27 RX ADMIN — Medication 2000 UNITS: at 08:52

## 2022-11-27 RX ADMIN — MEROPENEM 500 MG: 500 INJECTION, POWDER, FOR SOLUTION INTRAVENOUS at 06:03

## 2022-11-27 RX ADMIN — IPRATROPIUM BROMIDE AND ALBUTEROL SULFATE 1 AMPULE: .5; 3 SOLUTION RESPIRATORY (INHALATION) at 20:58

## 2022-11-27 RX ADMIN — RANOLAZINE 500 MG: 500 TABLET, FILM COATED, EXTENDED RELEASE ORAL at 08:52

## 2022-11-27 RX ADMIN — SODIUM CHLORIDE, PRESERVATIVE FREE 10 ML: 5 INJECTION INTRAVENOUS at 08:53

## 2022-11-27 RX ADMIN — DIPHENHYDRAMINE HYDROCHLORIDE, ZINC ACETATE: 2; .1 CREAM TOPICAL at 16:35

## 2022-11-27 RX ADMIN — HEPARIN SODIUM 5000 UNITS: 5000 INJECTION INTRAVENOUS; SUBCUTANEOUS at 13:49

## 2022-11-27 RX ADMIN — DIPHENHYDRAMINE HYDROCHLORIDE, ZINC ACETATE: 2; .1 CREAM TOPICAL at 20:37

## 2022-11-27 RX ADMIN — Medication 1 CAPSULE: at 08:52

## 2022-11-27 RX ADMIN — Medication 1 DROP: at 20:37

## 2022-11-27 RX ADMIN — POLYETHYLENE GLYCOL (3350) 17 G: 17 POWDER, FOR SOLUTION ORAL at 08:52

## 2022-11-27 RX ADMIN — SODIUM CHLORIDE: 9 INJECTION, SOLUTION INTRAVENOUS at 16:40

## 2022-11-27 RX ADMIN — HEPARIN SODIUM 5000 UNITS: 5000 INJECTION INTRAVENOUS; SUBCUTANEOUS at 06:05

## 2022-11-27 RX ADMIN — IPRATROPIUM BROMIDE AND ALBUTEROL SULFATE 1 AMPULE: .5; 3 SOLUTION RESPIRATORY (INHALATION) at 15:01

## 2022-11-27 RX ADMIN — MEROPENEM 500 MG: 500 INJECTION, POWDER, FOR SOLUTION INTRAVENOUS at 16:40

## 2022-11-27 RX ADMIN — CLOPIDOGREL BISULFATE 75 MG: 75 TABLET ORAL at 08:52

## 2022-11-27 RX ADMIN — ISOSORBIDE MONONITRATE 60 MG: 60 TABLET, EXTENDED RELEASE ORAL at 08:52

## 2022-11-27 RX ADMIN — ATORVASTATIN CALCIUM 20 MG: 10 TABLET, FILM COATED ORAL at 20:36

## 2022-11-27 RX ADMIN — SENNOSIDES AND DOCUSATE SODIUM 1 TABLET: 50; 8.6 TABLET ORAL at 08:52

## 2022-11-27 RX ADMIN — SENNOSIDES AND DOCUSATE SODIUM 1 TABLET: 50; 8.6 TABLET ORAL at 20:36

## 2022-11-27 RX ADMIN — AMLODIPINE BESYLATE 10 MG: 10 TABLET ORAL at 20:36

## 2022-11-27 RX ADMIN — DIPHENHYDRAMINE HYDROCHLORIDE, ZINC ACETATE: 2; .1 CREAM TOPICAL at 13:49

## 2022-11-27 RX ADMIN — RANOLAZINE 500 MG: 500 TABLET, FILM COATED, EXTENDED RELEASE ORAL at 20:36

## 2022-11-27 RX ADMIN — Medication 1 DROP: at 08:53

## 2022-11-27 RX ADMIN — SODIUM CHLORIDE, PRESERVATIVE FREE 10 ML: 5 INJECTION INTRAVENOUS at 20:36

## 2022-11-27 RX ADMIN — HEPARIN SODIUM 5000 UNITS: 5000 INJECTION INTRAVENOUS; SUBCUTANEOUS at 20:36

## 2022-11-27 ASSESSMENT — PAIN SCALES - WONG BAKER: WONGBAKER_NUMERICALRESPONSE: 0

## 2022-11-27 NOTE — CARE COORDINATION
LSW reviewed the patient medical record. Patient is awaiting the results of the family appeal for ARU. Call to [facility] at [address] who has not heard anything. W was informed that the insurance would call the family first as they initiated the appeal. Call to the patient granddaughter and CANDIS Maggie. She has not heard anything yet but will reach out to the insurance on Monday.

## 2022-11-27 NOTE — PROGRESS NOTES
Pulmonary and Critical Care  Progress Note      VITALS:  /87   Pulse 73   Temp 98.5 °F (36.9 °C) (Oral)   Resp 18   Ht 5' (1.524 m)   Wt 130 lb 1 oz (59 kg)   LMP  (LMP Unknown)   SpO2 96%   BMI 25.40 kg/m²     Subjective:   CHIEF COMPLAINT :SOB     HPI:                The patient is a 80 y.o. female is sitting in the chair. She is not in acute resp distress    Objective:   PHYSICAL EXAM:    LUNGS:Occasional basal crackles  Abd-soft, BS+,NT  Ext- no pedal edema  CVS-s1s2, no murmurs      DATA:    CBC:  No results for input(s): WBC, RBC, HGB, HCT, PLT, MCV, MCH, MCHC, RDW, NRBC, SEGSPCT, BANDSPCT in the last 72 hours. BMP:  Recent Labs     11/25/22  0506 11/26/22  0729 11/27/22  0629    132* 135   K 5.4* 4.8 4.9    99 101   CO2 25 25 27   BUN 41* 42* 43*   CREATININE 1.9* 1.9* 2.1*   CALCIUM 10.0 9.8 10.1   GLUCOSE 87 82 78      ABG:  No results for input(s): PH, PO2ART, URO3BUC, HCO3, BEART, O2SAT in the last 72 hours.   BNP  Lab Results   Component Value Date     (H) 05/09/2012      D-Dimer:  Lab Results   Component Value Date    HNQFIR 796 (H) 01/07/2018      Radiology: None      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 08/18/2018     Priority: High    Bradycardia      Priority: High    LBBB (left bundle branch block)      Priority: High    PAUL (acute kidney injury) ([facility] [address] 12/26/2017     Priority: High    Pneumonia due to infectious organism 11/16/2022     Priority: Medium    Pyelonephritis 11/16/2022     Priority: Medium    Chronic kidney disease 11/16/2022     Priority: Medium    Dyspnea and respiratory abnormalities 11/15/2022     Priority: Medium    Sepsis ([address] 11/14/2022     Priority: Medium    Localized edema 07/11/2022     Priority: Medium    GERD (gastroesophageal reflux disease)      Priority: Medium     Overview Note:     Esophagitis      Hypertension      Priority: Low    Hypothyroid Priority: Low     Overview Note:     Hypothyroidism      Sinus congestion 03/23/2022     Overview Note:     CHRONIC W POST NASAL DRIP      Vaginal prolapse 09/07/2021    Primary osteoarthritis of left foot 08/30/2021    Closed nondisplaced fracture of fourth metatarsal bone of left foot 08/30/2021    Closed nondisplaced fracture of third metatarsal bone of left foot 08/30/2021    Closed nondisplaced fracture of fifth left metatarsal bone 08/30/2021    Vertigo      Overview Note:     Dr [facility]      Dizziness     Labyrinthitis     Chronic renal impairment, stage 3 (moderate) ([facility])     Constipation, slow transit 06/19/2018    Cystitis     Physical deconditioning     Hyponatremia 01/01/2018    Pruritic condition 01/22/2016    Gout     Hyperuricemia     Low back pain     Eczema     Coronary artery disease involving native heart without angina pectoris      Overview Note:     Dr Aldair Michele Ahmed/[facility] Rivasmed      Depression     Hyperlipidemia     Generalized osteoarthritis     Osteoporosis      Overview Note:     Generalized     Acute Hypoxic resp failure sec to Cardiogenic and Non Cardiogenic Pulmonary edema  Small bilateral Pleural effusions  PAUL on CKD- slowly improving  Leukocytosis- Improved  Severe AS  Mild to Moderate Pulmonary HTN  CAD  H/o Recurrent UTI  GERD  HLD  ?  Atypical pneumonia       ICS  OOB  Inhalers  PT/OT  Keep sats > 92%  Await placement  C/w present management    Electronically signed by Sheila Tanner MD on 11/27/2022 at 11:46 AM

## 2022-11-27 NOTE — PROGRESS NOTES
V2.0  [facility] Hospitalist Progress Note      Name:  Js Lovell /Age/Sex: 1927  (80 y.o. female)   MRN & CSN:  [id number] & [id number] Encounter Date/Time: 2022 2:01 PM EST    Location:  [address] [place], [place] [address] PCP: Mart Rodriguez MD       Hospital Day: 14    Assessment and Plan:    Js Lovell is a 80 y.o. female with PMH of CAD, GERD, UTI, Hypothyroidism, HTN, Hyperlipedmia, CKD who presents with Sepsis ([facility] [address]    #Acute hypoxic respiratory failure 2/2 CAP and pulmonary edema-resolving  -Blood cultures negative  -Chest x-ray showing pulmonary edema and concern for pneumonia  -Echo: EF of 55 to 60% with RVSP of 44  -Negative respiratory PCR  -CT chest showing bilateral infiltrates    Plan:  Pulmonology following, appreciate recs  Continue meropenem until  per ID (daughter requested to stay on meropenem as the patient has tolerated it well and has issues with tolerating antibiotics normally)  Continue DuoNebs  Patient is not on home oxygen however is requiring 3 L --> wean as tolerated    #Generalized pruritus  -likely in the setting of dry air (patient uses humidifier at home)  -no jaundice, no rash    Plan:  CMP to assess liver function  Lotion    #Pyelonephritis with history of recurrent UTI  -Received ciprofloxacin, doxycycline and nitrofurantoin without relief  -Urine culture growing Enterococcus faecalis sensitive to ampicillin    Plan:  ID on consult, appreciate recs  Meropenem for 14 days, end date is     #Acute decompensated HFpEF-resolving  -Chest x-ray with pulmonary edema and elevated RVSP    Plan:  IV diuresis as needed, holding for now in the setting of PAUL  Strict I's and O  Daily weights    #PAUL on CKD likely prerenal in the setting of diuresis  -Baseline creatinine around 2    Nephrology following, appreciate recommendations  Strict I's and O's  Family refused HD  Holding diuresis    #Suspected aspiration--> ruled out  -Seen by SLP, did not have any signs or symptoms of aspiration    Plan:  Continue to monitor    #Hyperkalemia-resolved  -Received Lokelma    Plan:  Continue monitoring BMP    #Elevated troponin likely from CKD and demand ischemia  #CAD  -Down trended  -EKG with LBBB    Plan:  Cardiology on consult, appreciate recommendations  Continue atenolol, amlodipine, Imdur, statin and aspirin  Holding telmisartan in the setting of PAUL    Chronic medical problems:    #Severe AS  -[facility] for TAVR balloon valvuloplasty    #Hypertension  Plan: Continue home meds except for on losartan    #GERD  Continue PPI    Patient was denied from ARU, expedited appeal pending  Patient medically ready for discharge      Diet ADULT DIET; Dysphagia - Minced and Moist; Low Potassium (Less than 3000 mg/day); 1500 ml; Please provide extra moisteners and gravies  ADULT ORAL NUTRITION SUPPLEMENT; Dinner; Frozen Oral Supplement   DVT Prophylaxis [] Lovenox, [x]  Heparin, [] SCDs, [] Ambulation,  [] Eliquis, [] Xarelto  [] Coumadin   Code Status Limited   Disposition From: home  Expected Disposition: acute rehab  Estimated Date of Discharge: pending placement  Patient requires continued admission due to pending placement   Surrogate Decision Maker/ CANDIS Rico     Subjective:     Chief Complaint: Shortness of Breath     Patient feeling well today. No complaints. Review of Systems:    General: No fever, no chills, +pruritus  Heart: No chest pain, no palpitations  Lungs: No shortness of breath, no cough  Abdomen: No abdominal pain, no nausea, no vomiting, no constipation, no diarrhea  : No frequency, no dysuria, no urgency  MSK: No lower extremity swelling  Neuro: No confusion, no weakness  Skin: No rashes      Objective:      Intake/Output Summary (Last 24 hours) at 11/27/2022 1420  Last data filed at 11/27/2022 0639  Gross per 24 hour   Intake --   Output 1100 ml   Net -1100 ml        Vitals:   Vitals:    11/27/22 0848   BP: 112/87   Pulse: 73   Resp: 18   Temp: 98.5 °F (36.9 °C)   SpO2: 96%       Physical Exam:     General: NAD  Eyes: EOMI  HENT: Atraumatic  Respiratory: no respiratory distress  GI: nondistended  MSK: no lower extremity edema  Skin: Intact, dry, warm, no rashes  Neuro: CN II to XII grossly intact  Psych: Normal mood    Medications:   Medications:    diphenhydrAMINE-zinc acetate   Topical 4x daily    sennosides-docusate sodium  1 tablet Oral BID    meropenem  500 mg IntraVENous Q12H    ipratropium-albuterol  1 ampule Inhalation TID    polyethylene glycol  17 g Oral Daily    docusate sodium  100 mg Oral Daily    lactobacillus  1 capsule Oral Daily with breakfast    amLODIPine  10 mg Oral Nightly    atenolol  25 mg Oral Daily    sodium bicarbonate  325 mg Oral BID    clopidogrel  75 mg Oral Daily    sodium chloride flush  5-40 mL IntraVENous 2 times per day    sodium chloride flush  5-40 mL IntraVENous 2 times per day    heparin (porcine)  5,000 Units SubCUTAneous 3 times per day    Vitamin D  2,000 Units Oral Daily    pantoprazole  40 mg Oral QAM AC    fluorometholone  1 drop Both Eyes BID    isosorbide mononitrate  60 mg Oral Daily    levothyroxine  88 mcg Oral Daily    ranolazine  500 mg Oral BID    sertraline  50 mg Oral Daily    [Held by provider] losartan  12.5 mg Oral Daily    atorvastatin  20 mg Oral Nightly      Infusions:    sodium chloride 75 mL/hr at 11/24/22 1739    sodium chloride       PRN Meds: hydrOXYzine pamoate, 25 mg, TID PRN  melatonin, 3 mg, Nightly PRN  ipratropium-albuterol, 1 ampule, Q4H PRN  sodium chloride flush, 5-40 mL, PRN  sodium chloride, , PRN  ondansetron, 4 mg, Q8H PRN   Or  ondansetron, 4 mg, Q6H PRN  polyethylene glycol, 17 g, Daily PRN  acetaminophen, 650 mg, Q6H PRN   Or  acetaminophen, 650 mg, Q6H PRN  sodium chloride flush, 5-40 mL, PRN  sodium chloride, , PRN      Labs      Recent Results (from the past 24 hour(s))   Comprehensive Metabolic Panel    Collection Time: 11/27/22  6:29 AM   Result Value Ref Range    Sodium 135 135 - 145 MMOL/L    Potassium 4.9 3.5 - 5.1 MMOL/L    Chloride 101 99 - 110 mMol/L    CO2 27 21 - 32 MMOL/L    BUN 43 (H) 6 - 23 MG/DL    Creatinine 2.1 (H) 0.6 - 1.1 MG/DL    Est, Glom Filt Rate 21 (L) >60 mL/min/1.73m2    Glucose 78 70 - 99 MG/DL    Calcium 10.1 8.3 - 10.6 MG/DL    Albumin 3.0 (L) 3.4 - 5.0 GM/DL    Total Protein 6.3 (L) 6.4 - 8.2 GM/DL    Total Bilirubin 0.2 0.0 - 1.0 MG/DL    ALT 23 10 - 40 U/L    AST 32 15 - 37 IU/L    Alkaline Phosphatase 90 40 - 128 IU/L    Anion Gap 7 4 - 16          Imaging/Diagnostics Last 24 Hours   XR CHEST PORTABLE    Result Date: 11/21/2022  EXAMINATION: ONE XRAY VIEW OF THE CHEST 11/21/2022 12:19 pm COMPARISON: 11/17/2022. HISTORY: ORDERING SYSTEM PROVIDED HISTORY: sob TECHNOLOGIST PROVIDED HISTORY: Reason for exam:->sob Reason for Exam:  sob FINDINGS: Cardiomediastinal silhouette is stable. Similar bilateral airspace opacities. No pleural effusion or pneumothorax. No gross bony abnormality.      Stable chest.       Electronically signed by Martha Gutierrez MD on 11/27/2022 at 2:20 PM

## 2022-11-27 NOTE — PROGRESS NOTES
Daily Progress Note  Subjective:  Awake and alert; feeling well  Denies any Cp, SOB is stable  BP and HR stable  Not on tele  Waiting for placement    Attending Note:      Impression and Plan:   Sepsis    CAP, noted on CT chest    Respiratory panel negative    WBC count elevated    ABX per primary    Using 2L NC now-weaning slowly     Acute on Chronic HFpEF    BNP on admission was 6,417    Last echo showed EF 55-60%    Severe Aortic stenosis    Diuretics per renal at this time    Accurate I's and O's    1500 Fluid restriction     Plan to have her f/u with [facility] OP for potential valvuloplasty-will arrange this OP     CKD    Renal following; Avoid nephrotoxic medications     Stable from cardiac standpoint  Med. Tx. and increase activity  Awaiting placement      Most Recent Echo  11/15/2022   Left ventricular systolic function is normal.   Ejection fraction is visually estimated at 55-60%. Moderately dilated left atrium. Moderate to severe aortic stenosis is present; Mean PG 40mmHg, YANIRA 0.99 cm2. Mitral annular calcification is present. Mild to moderate mitral regurgitation. Mild to moderate tricuspid regurgitation; RVSP: 44 mmHg. No evidence of any pericardial effusion. Radiology  CT chest 11/14/2022  Impression   Bilateral pulmonary infiltrates and bilateral pleural effusions with probable   reactive mediastinal adenopathy       Probable granuloma in the right middle lobe. No follow-up imaging   recommended. PAST MEDICAL HISTORY:  The patient has a history of hypertension, renal  insufficiency present, and she has been treated medically. She is known  to have coronary artery disease also present. She has a chronic left  ventricular block present. She had a heart catheterization done in the  past and LAD had 70% stenosis present and she was treated medically at  that time. Anemia, hyperlipidemia, and arthritis present.      PAST SURGICAL HISTORY:  She has a history of having hip surgery done.   Moderate coronary artery disease, heart catheterization done in 2018,  LAD with 70% stenosis distally. She was treated medically for that. She had cataract surgery and hysterectomy done.     Objective:   /87   Pulse 73   Temp 98.5 °F (36.9 °C) (Oral)   Resp 18   Ht 5' (1.524 m)   Wt 130 lb 1 oz (59 kg)   LMP  (LMP Unknown)   SpO2 96%   BMI 25.40 kg/m²     Intake/Output Summary (Last 24 hours) at 11/27/2022 0919  Last data filed at 11/27/2022 6801  Gross per 24 hour   Intake --   Output 1100 ml   Net -1100 ml       Medications:   Scheduled Meds:   diphenhydrAMINE-zinc acetate   Topical 4x daily    sennosides-docusate sodium  1 tablet Oral BID    meropenem  500 mg IntraVENous Q12H    ipratropium-albuterol  1 ampule Inhalation TID    polyethylene glycol  17 g Oral Daily    docusate sodium  100 mg Oral Daily    lactobacillus  1 capsule Oral Daily with breakfast    amLODIPine  10 mg Oral Nightly    atenolol  25 mg Oral Daily    sodium bicarbonate  325 mg Oral BID    clopidogrel  75 mg Oral Daily    sodium chloride flush  5-40 mL IntraVENous 2 times per day    sodium chloride flush  5-40 mL IntraVENous 2 times per day    heparin (porcine)  5,000 Units SubCUTAneous 3 times per day    Vitamin D  2,000 Units Oral Daily    pantoprazole  40 mg Oral QAM AC    fluorometholone  1 drop Both Eyes BID    isosorbide mononitrate  60 mg Oral Daily    levothyroxine  88 mcg Oral Daily    ranolazine  500 mg Oral BID    sertraline  50 mg Oral Daily    [Held by provider] losartan  12.5 mg Oral Daily    atorvastatin  20 mg Oral Nightly      Infusions:   sodium chloride 75 mL/hr at 11/24/22 1739    sodium chloride        PRN Meds:  hydrOXYzine pamoate, melatonin, ipratropium-albuterol, sodium chloride flush, sodium chloride, ondansetron **OR** ondansetron, polyethylene glycol, acetaminophen **OR** acetaminophen, sodium chloride flush, sodium chloride     Physical Exam:  Vitals:    11/27/22 0848   BP: 112/87   Pulse: 73 Resp: 18   Temp: 98.5 °F (36.9 °C)   SpO2: 96%        General: AAO, NAD  Chest: Nontender  Cardiac: First and Second Heart Sounds are Normal, No Murmurs or Gallops noted  Lungs:Clear to auscultation and percussion. Abdomen: Soft, NT, ND, +BS  Extremities: No clubbing, no edema  Vascular:  Equal 2+ peripheral pulses. Lab Data:  CBC: No results for input(s): WBC, HGB, HCT, MCV, PLT in the last 72 hours. BMP:   Recent Labs     11/25/22  0506 11/26/22  0729 11/27/22  0629    132* 135   K 5.4* 4.8 4.9    99 101   CO2 25 25 27   BUN 41* 42* 43*   CREATININE 1.9* 1.9* 2.1*     LIVER PROFILE:   Recent Labs     11/27/22  0629   AST 32   ALT 23   BILITOT 0.2   ALKPHOS 90     PT/INR: No results for input(s): PROTIME, INR in the last 72 hours. APTT: No results for input(s): APTT in the last 72 hours. BNP:  No results for input(s): BNP in the last 72 hours.       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 08/18/2018    Bradycardia     LBBB (left bundle branch block)     PAUL (acute kidney injury) ([facility] [address] 12/26/2017    Pneumonia due to infectious organism 11/16/2022    Pyelonephritis 11/16/2022    Chronic kidney disease 11/16/2022    Dyspnea and respiratory abnormalities 11/15/2022    Sepsis ([address] 11/14/2022    Localized edema 07/11/2022    GERD (gastroesophageal reflux disease)     Hypertension     Hypothyroid     Sinus congestion 03/23/2022    Vaginal prolapse 09/07/2021    Primary osteoarthritis of left foot 08/30/2021    Closed nondisplaced fracture of fourth metatarsal bone of left foot 08/30/2021    Closed nondisplaced fracture of third metatarsal bone of left foot 08/30/2021    Closed nondisplaced fracture of fifth left metatarsal bone 08/30/2021    Vertigo     Dizziness     Labyrinthitis     Chronic renal impairment, stage 3 (moderate) (HCC)     Constipation, slow transit 06/19/2018    Cystitis     Physical deconditioning     Hyponatremia 01/01/2018    Pruritic condition 01/22/2016    Gout     Hyperuricemia     Low back pain     Eczema     Coronary artery disease involving native heart without angina pectoris     Depression     Hyperlipidemia     Generalized osteoarthritis     Osteoporosis        Electronically signed by CLEMENT Del Rosario CNP on 11/27/2022 at 9:19 AM  Patient seen and examined agree with above assessment and plan stable from cardiac standpoint Electronically signed by Chris Cortez MD on 11/27/2022 at 11:23 AM

## 2022-11-27 NOTE — PLAN OF CARE
Problem: Discharge Planning  Goal: Discharge to home or other facility with appropriate resources  Outcome: Progressing  Flowsheets (Taken 11/27/2022 0848)  Discharge to home or other facility with appropriate resources:   Identify barriers to discharge with patient and caregiver   Arrange for needed discharge resources and transportation as appropriate   Identify discharge learning needs (meds, wound care, etc)   Refer to discharge planning if patient needs post-hospital services based on physician order or complex needs related to functional status, cognitive ability or social support system     Problem: Hematologic - Adult  Goal: Maintains hematologic stability  Outcome: Progressing  Flowsheets (Taken 11/27/2022 0848)  Maintains hematologic stability:   Assess for signs and symptoms of bleeding or hemorrhage   Monitor labs for bleeding or clotting disorders   Administer blood products/factors as ordered     Problem: Pain  Goal: Verbalizes/displays adequate comfort level or baseline comfort level  Outcome: Progressing  Flowsheets (Taken 11/27/2022 0848)  Verbalizes/displays adequate comfort level or baseline comfort level:   Encourage patient to monitor pain and request assistance   Assess pain using appropriate pain scale   Administer analgesics based on type and severity of pain and evaluate response   Implement non-pharmacological measures as appropriate and evaluate response   Consider cultural and social influences on pain and pain management   Notify Licensed Independent Practitioner if interventions unsuccessful or patient reports new pain     Problem: Safety - Adult  Goal: Free from fall injury  Outcome: Progressing     Problem: ABCDS Injury Assessment  Goal: Absence of physical injury  Outcome: Progressing     Problem: Skin/Tissue Integrity  Goal: Absence of new skin breakdown  Description: 1. Monitor for areas of redness and/or skin breakdown  2. Assess vascular access sites hourly  3.   Every 4-6 hours minimum:  Change oxygen saturation probe site  4. Every 4-6 hours:  If on nasal continuous positive airway pressure, respiratory therapy assess nares and determine need for appliance change or resting period.   Outcome: Progressing     Problem: Nutrition Deficit:  Goal: Optimize nutritional status  Outcome: Progressing

## 2022-11-27 NOTE — PROGRESS NOTES
Nephrology Progress Note        [address], [address] [place] [address]  Phone: [phone]  Office Hours: 8:30AM - 4:30PM  Monday - Friday 11/27/2022 7:45 AM  Subjective:   Admit Date: 11/14/2022  PCP: Mart Rodriguez MD  Interval History: On nc  Reports itching    Diet: ADULT DIET;  Dysphagia - Minced and Moist; Low Potassium (Less than 3000 mg/day); 1500 ml; Please provide extra moisteners and gravies  ADULT ORAL NUTRITION SUPPLEMENT; Dinner; Frozen Oral Supplement      Data:   Scheduled Meds:   diphenhydrAMINE-zinc acetate   Topical 4x daily    sennosides-docusate sodium  1 tablet Oral BID    meropenem  500 mg IntraVENous Q12H    ipratropium-albuterol  1 ampule Inhalation TID    polyethylene glycol  17 g Oral Daily    docusate sodium  100 mg Oral Daily    lactobacillus  1 capsule Oral Daily with breakfast    amLODIPine  10 mg Oral Nightly    atenolol  25 mg Oral Daily    sodium bicarbonate  325 mg Oral BID    clopidogrel  75 mg Oral Daily    sodium chloride flush  5-40 mL IntraVENous 2 times per day    sodium chloride flush  5-40 mL IntraVENous 2 times per day    heparin (porcine)  5,000 Units SubCUTAneous 3 times per day    Vitamin D  2,000 Units Oral Daily    pantoprazole  40 mg Oral QAM AC    fluorometholone  1 drop Both Eyes BID    isosorbide mononitrate  60 mg Oral Daily    levothyroxine  88 mcg Oral Daily    ranolazine  500 mg Oral BID    sertraline  50 mg Oral Daily    [Held by provider] losartan  12.5 mg Oral Daily    atorvastatin  20 mg Oral Nightly     Continuous Infusions:   sodium chloride 75 mL/hr at 11/24/22 1739    sodium chloride       PRN Meds:hydrOXYzine pamoate, melatonin, ipratropium-albuterol, sodium chloride flush, sodium chloride, ondansetron **OR** ondansetron, polyethylene glycol, acetaminophen **OR** acetaminophen, sodium chloride flush, sodium chloride  I/O last 3 completed shifts:  In: -   Out: 1700 [Urine:1700]  No intake/output data recorded. Intake/Output Summary (Last 24 hours) at 11/27/2022 0745  Last data filed at 11/27/2022 7746  Gross per 24 hour   Intake --   Output 1100 ml   Net -1100 ml       CBC: No results for input(s): WBC, HGB, PLT in the last 72 hours. BMP:    Recent Labs     11/25/22  0506 11/26/22  0729 11/27/22  0629    132* 135   K 5.4* 4.8 4.9    99 101   CO2 25 25 27   BUN 41* 42* 43*   CREATININE 1.9* 1.9* 2.1*   GLUCOSE 87 82 78     Hepatic:   Recent Labs     11/27/22  0629   AST 32   ALT 23   BILITOT 0.2   ALKPHOS 90     Troponin: No results for input(s): TROPONINI in the last 72 hours. BNP: No results for input(s): BNP in the last 72 hours. Lipids: No results for input(s): CHOL, HDL in the last 72 hours. Invalid input(s): LDLCALCU  ABGs:   Lab Results   Component Value Date/Time    PO2ART 83 11/14/2022 11:30 PM    NBU7ZOB 29.0 11/14/2022 11:30 PM     INR: No results for input(s): INR in the last 72 hours.     Objective:   Vitals: BP (!) 116/57   Pulse 65   Temp 98.4 °F (36.9 °C) (Oral)   Resp 16   Ht 5' (1.524 m)   Wt 130 lb 1 oz (59 kg)   LMP  (LMP Unknown)   SpO2 93%   BMI 25.40 kg/m²   General appearance: alert and cooperative with exam, in no acute distress  HEENT: normocephalic, atraumatic,   Neck: supple, trachea midline  Lungs:  breathing comfortably on nc  Abdomen: soft, non-tender; non distended, +bowel sounds  Extremities: extremities atraumatic, no cyanosis or edema  Neurologic: alert, oriented, follows commands, interactive    MEDICAL DECISION MAKING     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 08/18/2018    Bradycardia     LBBB (left bundle branch block)     PAUL (acute kidney injury) ([facility] [address] 12/26/2017    Pneumonia due to infectious organism 11/16/2022    Pyelonephritis 11/16/2022    Chronic kidney disease 11/16/2022    Dyspnea and respiratory abnormalities 11/15/2022    Sepsis ([facility] [address] 11/14/2022    Localized edema 07/11/2022 GERD (gastroesophageal reflux disease)     Hypertension     Hypothyroid     Sinus congestion 03/23/2022    Vaginal prolapse 09/07/2021    Primary osteoarthritis of left foot 08/30/2021    Closed nondisplaced fracture of fourth metatarsal bone of left foot 08/30/2021    Closed nondisplaced fracture of third metatarsal bone of left foot 08/30/2021    Closed nondisplaced fracture of fifth left metatarsal bone 08/30/2021    Vertigo     Dizziness     Labyrinthitis     Chronic renal impairment, stage 3 (moderate) ([facility])     Constipation, slow transit 06/19/2018    Cystitis     Physical deconditioning     Hyponatremia 01/01/2018    Pruritic condition 01/22/2016    Gout     Hyperuricemia     Low back pain     Eczema     Coronary artery disease involving native heart without angina pectoris     Depression     Hyperlipidemia     Generalized osteoarthritis     Osteoporosis      Cr 2.1 today, obtain urine electrolytes  Ordered benadryl cream for itching                  Electronically signed by Enmanuel Gonzales DO on 11/27/2022 at 7:45 AM    [address],  [address] [facility], [place] [address]  PHONE: [phone]  FAX: [phone]

## 2022-11-28 LAB
ANION GAP SERPL CALCULATED.3IONS-SCNC: 9 MMOL/L (ref 4–16)
BUN BLDV-MCNC: 39 MG/DL (ref 6–23)
CALCIUM SERPL-MCNC: 9.9 MG/DL (ref 8.3–10.6)
CHLORIDE BLD-SCNC: 104 MMOL/L (ref 99–110)
CO2: 26 MMOL/L (ref 21–32)
CREAT SERPL-MCNC: 1.9 MG/DL (ref 0.6–1.1)
GFR SERPL CREATININE-BSD FRML MDRD: 24 ML/MIN/1.73M2
GLUCOSE BLD-MCNC: 89 MG/DL (ref 70–99)
POTASSIUM SERPL-SCNC: 4.7 MMOL/L (ref 3.5–5.1)
SODIUM BLD-SCNC: 139 MMOL/L (ref 135–145)

## 2022-11-28 PROCEDURE — 97110 THERAPEUTIC EXERCISES: CPT

## 2022-11-28 PROCEDURE — 6370000000 HC RX 637 (ALT 250 FOR IP)

## 2022-11-28 PROCEDURE — 6370000000 HC RX 637 (ALT 250 FOR IP): Performed by: INTERNAL MEDICINE

## 2022-11-28 PROCEDURE — 6360000002 HC RX W HCPCS

## 2022-11-28 PROCEDURE — 1200000000 HC SEMI PRIVATE

## 2022-11-28 PROCEDURE — 94761 N-INVAS EAR/PLS OXIMETRY MLT: CPT

## 2022-11-28 PROCEDURE — 99231 SBSQ HOSP IP/OBS SF/LOW 25: CPT | Performed by: INTERNAL MEDICINE

## 2022-11-28 PROCEDURE — 6370000000 HC RX 637 (ALT 250 FOR IP): Performed by: NURSE PRACTITIONER

## 2022-11-28 PROCEDURE — 2700000000 HC OXYGEN THERAPY PER DAY

## 2022-11-28 PROCEDURE — 2580000003 HC RX 258

## 2022-11-28 PROCEDURE — 6370000000 HC RX 637 (ALT 250 FOR IP): Performed by: STUDENT IN AN ORGANIZED HEALTH CARE EDUCATION/TRAINING PROGRAM

## 2022-11-28 PROCEDURE — 36415 COLL VENOUS BLD VENIPUNCTURE: CPT

## 2022-11-28 PROCEDURE — 2580000003 HC RX 258: Performed by: NURSE PRACTITIONER

## 2022-11-28 PROCEDURE — 97530 THERAPEUTIC ACTIVITIES: CPT

## 2022-11-28 PROCEDURE — 94669 MECHANICAL CHEST WALL OSCILL: CPT

## 2022-11-28 PROCEDURE — 6360000002 HC RX W HCPCS: Performed by: NURSE PRACTITIONER

## 2022-11-28 PROCEDURE — 97116 GAIT TRAINING THERAPY: CPT

## 2022-11-28 PROCEDURE — 80048 BASIC METABOLIC PNL TOTAL CA: CPT

## 2022-11-28 PROCEDURE — 99232 SBSQ HOSP IP/OBS MODERATE 35: CPT | Performed by: NURSE PRACTITIONER

## 2022-11-28 PROCEDURE — 94640 AIRWAY INHALATION TREATMENT: CPT

## 2022-11-28 PROCEDURE — 97535 SELF CARE MNGMENT TRAINING: CPT

## 2022-11-28 RX ORDER — HYDROXYZINE PAMOATE 25 MG/1
25 CAPSULE ORAL ONCE
Status: COMPLETED | OUTPATIENT
Start: 2022-11-28 | End: 2022-11-28

## 2022-11-28 RX ORDER — IPRATROPIUM BROMIDE AND ALBUTEROL SULFATE 2.5; .5 MG/3ML; MG/3ML
1 SOLUTION RESPIRATORY (INHALATION) 3 TIMES DAILY
Status: DISCONTINUED | OUTPATIENT
Start: 2022-11-28 | End: 2022-12-06 | Stop reason: HOSPADM

## 2022-11-28 RX ORDER — IPRATROPIUM BROMIDE AND ALBUTEROL SULFATE 2.5; .5 MG/3ML; MG/3ML
1 SOLUTION RESPIRATORY (INHALATION) EVERY 4 HOURS PRN
Status: DISCONTINUED | OUTPATIENT
Start: 2022-11-28 | End: 2022-11-28

## 2022-11-28 RX ADMIN — CLOPIDOGREL BISULFATE 75 MG: 75 TABLET ORAL at 11:02

## 2022-11-28 RX ADMIN — SENNOSIDES AND DOCUSATE SODIUM 1 TABLET: 50; 8.6 TABLET ORAL at 10:47

## 2022-11-28 RX ADMIN — DIPHENHYDRAMINE HYDROCHLORIDE, ZINC ACETATE: 2; .1 CREAM TOPICAL at 14:54

## 2022-11-28 RX ADMIN — RANOLAZINE 500 MG: 500 TABLET, FILM COATED, EXTENDED RELEASE ORAL at 22:51

## 2022-11-28 RX ADMIN — Medication 1 DROP: at 11:04

## 2022-11-28 RX ADMIN — IPRATROPIUM BROMIDE AND ALBUTEROL SULFATE 1 AMPULE: .5; 3 SOLUTION RESPIRATORY (INHALATION) at 15:41

## 2022-11-28 RX ADMIN — DIPHENHYDRAMINE HYDROCHLORIDE, ZINC ACETATE: 2; .1 CREAM TOPICAL at 11:03

## 2022-11-28 RX ADMIN — Medication 1 DROP: at 22:49

## 2022-11-28 RX ADMIN — SODIUM BICARBONATE 325 MG: 650 TABLET ORAL at 10:48

## 2022-11-28 RX ADMIN — SODIUM BICARBONATE 325 MG: 650 TABLET ORAL at 22:38

## 2022-11-28 RX ADMIN — RANOLAZINE 500 MG: 500 TABLET, FILM COATED, EXTENDED RELEASE ORAL at 10:48

## 2022-11-28 RX ADMIN — Medication 3 MG: at 02:16

## 2022-11-28 RX ADMIN — HYDROXYZINE PAMOATE 25 MG: 25 CAPSULE ORAL at 22:42

## 2022-11-28 RX ADMIN — MEROPENEM 500 MG: 500 INJECTION, POWDER, FOR SOLUTION INTRAVENOUS at 18:12

## 2022-11-28 RX ADMIN — MEROPENEM 500 MG: 500 INJECTION, POWDER, FOR SOLUTION INTRAVENOUS at 05:30

## 2022-11-28 RX ADMIN — DOCUSATE SODIUM 100 MG: 100 CAPSULE, LIQUID FILLED ORAL at 10:48

## 2022-11-28 RX ADMIN — DIPHENHYDRAMINE HYDROCHLORIDE, ZINC ACETATE: 2; .1 CREAM TOPICAL at 22:47

## 2022-11-28 RX ADMIN — SERTRALINE HYDROCHLORIDE 50 MG: 50 TABLET ORAL at 10:47

## 2022-11-28 RX ADMIN — SODIUM CHLORIDE, PRESERVATIVE FREE 10 ML: 5 INJECTION INTRAVENOUS at 22:48

## 2022-11-28 RX ADMIN — ATORVASTATIN CALCIUM 20 MG: 10 TABLET, FILM COATED ORAL at 22:39

## 2022-11-28 RX ADMIN — ISOSORBIDE MONONITRATE 60 MG: 60 TABLET, EXTENDED RELEASE ORAL at 10:47

## 2022-11-28 RX ADMIN — POLYETHYLENE GLYCOL (3350) 17 G: 17 POWDER, FOR SOLUTION ORAL at 11:01

## 2022-11-28 RX ADMIN — Medication 3 MG: at 22:38

## 2022-11-28 RX ADMIN — Medication 2000 UNITS: at 11:03

## 2022-11-28 RX ADMIN — LEVOTHYROXINE SODIUM 88 MCG: 0.09 TABLET ORAL at 05:30

## 2022-11-28 RX ADMIN — HYDROXYZINE PAMOATE 25 MG: 25 CAPSULE ORAL at 02:06

## 2022-11-28 RX ADMIN — PANTOPRAZOLE SODIUM 40 MG: 40 TABLET, DELAYED RELEASE ORAL at 05:30

## 2022-11-28 RX ADMIN — IPRATROPIUM BROMIDE AND ALBUTEROL SULFATE 1 AMPULE: .5; 3 SOLUTION RESPIRATORY (INHALATION) at 09:01

## 2022-11-28 RX ADMIN — SENNOSIDES AND DOCUSATE SODIUM 1 TABLET: 50; 8.6 TABLET ORAL at 22:39

## 2022-11-28 RX ADMIN — AMLODIPINE BESYLATE 10 MG: 10 TABLET ORAL at 22:39

## 2022-11-28 RX ADMIN — ATENOLOL 25 MG: 25 TABLET ORAL at 18:06

## 2022-11-28 RX ADMIN — Medication 1 CAPSULE: at 10:47

## 2022-11-28 RX ADMIN — HEPARIN SODIUM 5000 UNITS: 5000 INJECTION INTRAVENOUS; SUBCUTANEOUS at 05:30

## 2022-11-28 RX ADMIN — HEPARIN SODIUM 5000 UNITS: 5000 INJECTION INTRAVENOUS; SUBCUTANEOUS at 14:54

## 2022-11-28 RX ADMIN — HEPARIN SODIUM 5000 UNITS: 5000 INJECTION INTRAVENOUS; SUBCUTANEOUS at 22:39

## 2022-11-28 NOTE — PROGRESS NOTES
Pulmonary and Critical Care  Progress Note      VITALS:  BP (!) 161/56   Pulse 62   Temp 98.2 °F (36.8 °C)   Resp 16   Ht 5' (1.524 m)   Wt 132 lb 0.3 oz (59.9 kg)   LMP  (LMP Unknown)   SpO2 92%   BMI 25.78 kg/m²     Subjective:   CHIEF COMPLAINT :SOB     HPI:                The patient is a 80 y.o. female is sitting in the bed. She is not in acute resp distress    Objective:   PHYSICAL EXAM:    LUNGS:Occasional basal crackles  Abd-soft, BS+,NT  Ext- no pedal edema  CVS-s1s2, no murmurs      DATA:    CBC:  No results for input(s): WBC, RBC, HGB, HCT, PLT, MCV, MCH, MCHC, RDW, NRBC, SEGSPCT, BANDSPCT in the last 72 hours. BMP:  Recent Labs     11/26/22  0729 11/27/22  0629 11/28/22  0619   * 135 139   K 4.8 4.9 4.7   CL 99 101 104   CO2 25 27 26   BUN 42* 43* 39*   CREATININE 1.9* 2.1* 1.9*   CALCIUM 9.8 10.1 9.9   GLUCOSE 82 78 89      ABG:  No results for input(s): PH, PO2ART, TJE1DOR, HCO3, BEART, O2SAT in the last 72 hours.   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 08/18/2018     Priority: High    Bradycardia      Priority: High    LBBB (left bundle branch block)      Priority: High    PAUL (acute kidney injury) ([facility] [address] 12/26/2017     Priority: High    Pneumonia due to infectious organism 11/16/2022     Priority: Medium    Pyelonephritis 11/16/2022     Priority: Medium    Chronic kidney disease 11/16/2022     Priority: Medium    Dyspnea and respiratory abnormalities 11/15/2022     Priority: Medium    Sepsis ([address] 11/14/2022     Priority: Medium    Localized edema 07/11/2022     Priority: Medium    GERD (gastroesophageal reflux disease)      Priority: Medium     Overview Note:     Esophagitis      Hypertension      Priority: Low    Hypothyroid Priority: Low     Overview Note:     Hypothyroidism      Sinus congestion 03/23/2022     Overview Note:     CHRONIC W POST NASAL DRIP      Vaginal prolapse 09/07/2021    Primary osteoarthritis of left foot 08/30/2021    Closed nondisplaced fracture of fourth metatarsal bone of left foot 08/30/2021    Closed nondisplaced fracture of third metatarsal bone of left foot 08/30/2021    Closed nondisplaced fracture of fifth left metatarsal bone 08/30/2021    Vertigo      Overview Note:     Dr Odonnell [facility]      Dizziness     Labyrinthitis     Chronic renal impairment, stage 3 (moderate) (HCC)     Constipation, slow transit 06/19/2018    Cystitis     Physical deconditioning     Hyponatremia 01/01/2018    Pruritic condition 01/22/2016    Gout     Hyperuricemia     Low back pain     Eczema     Coronary artery disease involving native heart without angina pectoris      Overview Note:     Dr Rosendo Figueredo/[place] Figueredo      Depression     Hyperlipidemia     Generalized osteoarthritis     Osteoporosis      Overview Note:     Generalized     Acute Hypoxic resp failure sec to Cardiogenic and Non Cardiogenic Pulmonary edema  Small bilateral Pleural effusions  PAUL on CKD- slowly improving  Leukocytosis- Improved  Severe AS  Mild to Moderate Pulmonary HTN  CAD  H/o Recurrent UTI  GERD  HLD  ?  Atypical pneumonia       Inhalers  Diuresis  ICS  OOB  Keep sats > 92%  Await placement  C.w present management    Electronically signed by Rimam Gillespie MD on 11/28/2022 at 11:04 AM

## 2022-11-28 NOTE — RT PROTOCOL NOTE
RT Inhaler-Nebulizer Bronchodilator Protocol Note    There is a bronchodilator order in the chart from a provider indicating to follow the RT Bronchodilator Protocol and there is an Initiate RT Inhaler-Nebulizer Bronchodilator Protocol order as well (see protocol at bottom of note). CXR Findings:  No results found. The findings from the last RT Protocol Assessment were as follows:   History Pulmonary Disease: None or smoker <15 pack years  Respiratory Pattern: Regular pattern and RR 12-20 bpm  Breath Sounds: Slightly diminished and/or crackles  Cough: Strong, productive  Indication for Bronchodilator Therapy: Wheezing associated with pulm disorder  Bronchodilator Assessment Score: 3    Aerosolized bronchodilator medication orders have been revised according to the RT Inhaler-Nebulizer Bronchodilator Protocol below. Respiratory Therapist to perform RT Therapy Protocol Assessment initially then follow the protocol. Repeat RT Therapy Protocol Assessment PRN for score 0-3 or on second treatment, BID, and PRN for scores above 3. No Indications - adjust the frequency to every 6 hours PRN wheezing or bronchospasm, if no treatments needed after 48 hours then discontinue using Per Protocol order mode. If indication present, adjust the RT bronchodilator orders based on the Bronchodilator Assessment Score as indicated below. Use Inhaler orders unless patient has one or more of the following: on home nebulizer, not able to hold breath for 10 seconds, is not alert and oriented, cannot activate and use MDI correctly, or respiratory rate 25 breaths per minute or more, then use the equivalent nebulizer order(s) with same Frequency and PRN reasons based on the score. If a patient is on this medication at home then do not decrease Frequency below that used at home.     0-3 - enter or revise RT bronchodilator order(s) to equivalent RT Bronchodilator order with Frequency of every 4 hours PRN for wheezing or

## 2022-11-28 NOTE — PROGRESS NOTES
Daily Progress Note  Subjective:  Awake and alert;   Denies any CP, SOB is stable  Up in chair  Waiting on placement  Discussed with granddaughter in the room    Attending Note:  Feeling much better  On two liters of NC oxygen  Severe AS plan for work up once recovers from pneumonia  HTN per renal  cr is stable at 1.9  Renal insuffiencey stable   Pneumonia per ID   Supportive care for now  Overall doing better     Impression and Plan:   Sepsis    CAP, noted on CT chest    Respiratory panel negative    WBC count elevated    ABX per primary    Using 2L NC now-weaning slowly     Acute on Chronic HFpEF    BNP on admission was 6,417    Last echo showed EF 55-60%    Severe Aortic stenosis    Diuretics per renal at this time    Accurate I's and O's    1500 Fluid restriction     Plan to have her f/u with [facility] OP for potential valvuloplasty-will arrange this OP     CKD    Renal following; Avoid nephrotoxic medications     Stable from cardiac standpoint  Med. Tx. and increase activity  Awaiting placement      Most Recent Echo  11/15/2022   Left ventricular systolic function is normal.   Ejection fraction is visually estimated at 55-60%. Moderately dilated left atrium. Moderate to severe aortic stenosis is present; Mean PG 40mmHg, YANIRA 0.99 cm2. Mitral annular calcification is present. Mild to moderate mitral regurgitation. Mild to moderate tricuspid regurgitation; RVSP: 44 mmHg. No evidence of any pericardial effusion. Radiology  CT chest 11/14/2022  Impression   Bilateral pulmonary infiltrates and bilateral pleural effusions with probable   reactive mediastinal adenopathy       Probable granuloma in the right middle lobe. No follow-up imaging   recommended. PAST MEDICAL HISTORY:  The patient has a history of hypertension, renal  insufficiency present, and she has been treated medically. She is known  to have coronary artery disease also present. She has a chronic left  ventricular block present. She had a heart catheterization done in the  past and LAD had 70% stenosis present and she was treated medically at  that time. Anemia, hyperlipidemia, and arthritis present. PAST SURGICAL HISTORY:  She has a history of having hip surgery done. Moderate coronary artery disease, heart catheterization done in 2018,  LAD with 70% stenosis distally. She was treated medically for that. She had cataract surgery and hysterectomy done.        Objective:   BP (!) 161/56   Pulse 62   Temp 98.2 °F (36.8 °C)   Resp 16   Ht 5' (1.524 m)   Wt 132 lb 0.3 oz (59.9 kg)   LMP  (LMP Unknown)   SpO2 92%   BMI 25.78 kg/m²     Intake/Output Summary (Last 24 hours) at 11/28/2022 2823  Last data filed at 11/28/2022 0640  Gross per 24 hour   Intake --   Output 300 ml   Net -300 ml       Medications:   Scheduled Meds:   diphenhydrAMINE-zinc acetate   Topical 4x daily    sennosides-docusate sodium  1 tablet Oral BID    meropenem  500 mg IntraVENous Q12H    ipratropium-albuterol  1 ampule Inhalation TID    polyethylene glycol  17 g Oral Daily    docusate sodium  100 mg Oral Daily    lactobacillus  1 capsule Oral Daily with breakfast    amLODIPine  10 mg Oral Nightly    atenolol  25 mg Oral Daily    sodium bicarbonate  325 mg Oral BID    clopidogrel  75 mg Oral Daily    sodium chloride flush  5-40 mL IntraVENous 2 times per day    sodium chloride flush  5-40 mL IntraVENous 2 times per day    heparin (porcine)  5,000 Units SubCUTAneous 3 times per day    Vitamin D  2,000 Units Oral Daily    pantoprazole  40 mg Oral QAM AC    fluorometholone  1 drop Both Eyes BID    isosorbide mononitrate  60 mg Oral Daily    levothyroxine  88 mcg Oral Daily    ranolazine  500 mg Oral BID    sertraline  50 mg Oral Daily    [Held by provider] losartan  12.5 mg Oral Daily    atorvastatin  20 mg Oral Nightly      Infusions:   sodium chloride 75 mL/hr at 11/24/22 1739    sodium chloride 20 mL/hr at 11/27/22 1640      PRN Meds:  hydrOXYzine pamoate, melatonin, ipratropium-albuterol, sodium chloride flush, sodium chloride, ondansetron **OR** ondansetron, polyethylene glycol, acetaminophen **OR** acetaminophen, sodium chloride flush, sodium chloride     Physical Exam:  Vitals:    11/28/22 0849   BP: (!) 161/56   Pulse: 62   Resp: 16   Temp: 98.2 °F (36.8 °C)   SpO2: 92%        General: AAO, NAD  Chest: Nontender  Cardiac: First and Second Heart Sounds are Normal, No Murmurs or Gallops noted  Lungs:Clear to auscultation and percussion. Abdomen: Soft, NT, ND, +BS  Extremities: No clubbing, no edema  Vascular:  Equal 2+ peripheral pulses. Lab Data:  CBC: No results for input(s): WBC, HGB, HCT, MCV, PLT in the last 72 hours. BMP:   Recent Labs     11/26/22  0729 11/27/22  0629 11/28/22  0619   * 135 139   K 4.8 4.9 4.7   CL 99 101 104   CO2 25 27 26   BUN 42* 43* 39*   CREATININE 1.9* 2.1* 1.9*     LIVER PROFILE:   Recent Labs     11/27/22  0629   AST 32   ALT 23   BILITOT 0.2   ALKPHOS 90     PT/INR: No results for input(s): PROTIME, INR in the last 72 hours. APTT: No results for input(s): APTT in the last 72 hours. BNP:  No results for input(s): BNP in the last 72 hours.       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 08/18/2018    Bradycardia     LBBB (left bundle branch block)     PAUL (acute kidney injury) ([facility] [address] 12/26/2017    Pneumonia due to infectious organism 11/16/2022    Pyelonephritis 11/16/2022    Chronic kidney disease 11/16/2022    Dyspnea and respiratory abnormalities 11/15/2022    Sepsis ([facility] [address] 11/14/2022    Localized edema 07/11/2022    GERD (gastroesophageal reflux disease)     Hypertension     Hypothyroid     Sinus congestion 03/23/2022    Vaginal prolapse 09/07/2021    Primary osteoarthritis of left foot 08/30/2021    Closed nondisplaced fracture of fourth metatarsal bone of left foot 08/30/2021    Closed nondisplaced fracture of third metatarsal bone of left foot 08/30/2021    Closed nondisplaced fracture of fifth left metatarsal bone 08/30/2021    Vertigo     Dizziness     Labyrinthitis     Chronic renal impairment, stage 3 (moderate) (HCC)     Constipation, slow transit 06/19/2018    Cystitis     Physical deconditioning     Hyponatremia 01/01/2018    Pruritic condition 01/22/2016    Gout     Hyperuricemia     Low back pain     Eczema     Coronary artery disease involving native heart without angina pectoris     Depression     Hyperlipidemia     Generalized osteoarthritis     Osteoporosis        Electronically signed by CLEMENT Sen CNP on 11/28/2022 at 9:24 AM    Electronically signed by Analisa Anderson MD on 11/28/22 at 11:16 AM EST

## 2022-11-28 NOTE — PLAN OF CARE
Problem: Discharge Planning  Goal: Discharge to home or other facility with appropriate resources  Outcome: Progressing     Problem: Hematologic - Adult  Goal: Maintains hematologic stability  Outcome: Progressing     Problem: Pain  Goal: Verbalizes/displays adequate comfort level or baseline comfort level  Outcome: Progressing     Problem: Safety - Adult  Goal: Free from fall injury  Outcome: Progressing     Problem: ABCDS Injury Assessment  Goal: Absence of physical injury  Outcome: Progressing     Problem: Skin/Tissue Integrity  Goal: Absence of new skin breakdown  Description: 1. Monitor for areas of redness and/or skin breakdown  2. Assess vascular access sites hourly  3. Every 4-6 hours minimum:  Change oxygen saturation probe site  4. Every 4-6 hours:  If on nasal continuous positive airway pressure, respiratory therapy assess nares and determine need for appliance change or resting period.   Outcome: Progressing     Problem: Nutrition Deficit:  Goal: Optimize nutritional status  Outcome: Progressing

## 2022-11-28 NOTE — PROGRESS NOTES
Nephrology Progress Note        [address], [address], [place] [address]  Phone: [phone]  Office Hours: 8:30AM - 4:30PM  Monday - Friday 11/28/2022 8:10 AM  Subjective:   Admit Date: 11/14/2022  PCP: Delores Conner MD  Interval History: on nc  Maggie at bedside    Diet: ADULT DIET;  Dysphagia - Minced and Moist; Low Potassium (Less than 3000 mg/day); 1500 ml; Please provide extra moisteners and gravies  ADULT ORAL NUTRITION SUPPLEMENT; Dinner; Frozen Oral Supplement      Data:   Scheduled Meds:   diphenhydrAMINE-zinc acetate   Topical 4x daily    sennosides-docusate sodium  1 tablet Oral BID    meropenem  500 mg IntraVENous Q12H    ipratropium-albuterol  1 ampule Inhalation TID    polyethylene glycol  17 g Oral Daily    docusate sodium  100 mg Oral Daily    lactobacillus  1 capsule Oral Daily with breakfast    amLODIPine  10 mg Oral Nightly    atenolol  25 mg Oral Daily    sodium bicarbonate  325 mg Oral BID    clopidogrel  75 mg Oral Daily    sodium chloride flush  5-40 mL IntraVENous 2 times per day    sodium chloride flush  5-40 mL IntraVENous 2 times per day    heparin (porcine)  5,000 Units SubCUTAneous 3 times per day    Vitamin D  2,000 Units Oral Daily    pantoprazole  40 mg Oral QAM AC    fluorometholone  1 drop Both Eyes BID    isosorbide mononitrate  60 mg Oral Daily    levothyroxine  88 mcg Oral Daily    ranolazine  500 mg Oral BID    sertraline  50 mg Oral Daily    [Held by provider] losartan  12.5 mg Oral Daily    atorvastatin  20 mg Oral Nightly     Continuous Infusions:   sodium chloride 75 mL/hr at 11/24/22 1739    sodium chloride 20 mL/hr at 11/27/22 1640     PRN Meds:hydrOXYzine pamoate, melatonin, ipratropium-albuterol, sodium chloride flush, sodium chloride, ondansetron **OR** ondansetron, polyethylene glycol, acetaminophen **OR** acetaminophen, sodium chloride flush, sodium chloride  I/O last 3 completed shifts:  In: -   Out: 1400 [Urine:1400]  No intake/output data recorded. Intake/Output Summary (Last 24 hours) at 11/28/2022 0810  Last data filed at 11/28/2022 0640  Gross per 24 hour   Intake --   Output 300 ml   Net -300 ml       CBC: No results for input(s): WBC, HGB, PLT in the last 72 hours. BMP:    Recent Labs     11/26/22  0729 11/27/22  0629 11/28/22  0619   * 135 139   K 4.8 4.9 4.7   CL 99 101 104   CO2 25 27 26   BUN 42* 43* 39*   CREATININE 1.9* 2.1* 1.9*   GLUCOSE 82 78 89     Hepatic:   Recent Labs     11/27/22  0629   AST 32   ALT 23   BILITOT 0.2   ALKPHOS 90     Troponin: No results for input(s): TROPONINI in the last 72 hours. BNP: No results for input(s): BNP in the last 72 hours. Lipids: No results for input(s): CHOL, HDL in the last 72 hours. Invalid input(s): LDLCALCU  ABGs:   Lab Results   Component Value Date/Time    PO2ART 83 11/14/2022 11:30 PM    BWA8CEN 29.0 11/14/2022 11:30 PM     INR: No results for input(s): INR in the last 72 hours.     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 08/18/2018    Bradycardia     LBBB (left bundle branch block)     PAUL (acute kidney injury) ([facility] [address] 12/26/2017    Pneumonia due to infectious organism 11/16/2022    Pyelonephritis 11/16/2022    Chronic kidney disease 11/16/2022    Dyspnea and respiratory abnormalities 11/15/2022    Sepsis ([facility] [address] 11/14/2022    Localized edema 07/11/2022    GERD (gastroesophageal reflux disease) Hypertension     Hypothyroid     Sinus congestion 03/23/2022    Vaginal prolapse 09/07/2021    Primary osteoarthritis of left foot 08/30/2021    Closed nondisplaced fracture of fourth metatarsal bone of left foot 08/30/2021    Closed nondisplaced fracture of third metatarsal bone of left foot 08/30/2021    Closed nondisplaced fracture of fifth left metatarsal bone 08/30/2021    Vertigo     Dizziness     Labyrinthitis     Chronic renal impairment, stage 3 (moderate) (HCC)     Constipation, slow transit 06/19/2018    Cystitis     Physical deconditioning     Hyponatremia 01/01/2018    Pruritic condition 01/22/2016    Gout     Hyperuricemia     Low back pain     Eczema     Coronary artery disease involving native heart without angina pectoris     Depression     Hyperlipidemia     Generalized osteoarthritis     Osteoporosis      Cr better at 1.9 today  Urine electrolytes yesterday do not suggest volume depletion  Will continue to follow renal function  BP controlled                  Electronically signed by Kristine Kulkarni DO on 11/28/2022 at 8:10 AM    [address] Naik DO  [address],  [facility] [place]  PHONE: [phone]  FAX: [phone]

## 2022-11-28 NOTE — CARE COORDINATION
Spoke with Dr. Agnes Pemberton, pt remains medically stable for discharge once disposition is determined. Requested updated PT/OT via whiteboard. no pedal edema/no clubbing/no cyanosis

## 2022-11-28 NOTE — PROGRESS NOTES
Physical Therapy  Name: Maria Luz Alan MRN: [id number] :   1927   Date:  2022   Admission Date: 2022 Room:  [address] [place], [place] [address]-   Restrictions/Precautions:        fall risk  Communication with other providers:  Dalia Farrar RN states pt is ok to see for therapy  Subjective:  Patient states:  she has to go the bathroom  Pain:   Location, Type, Intensity (0/10 to 10/10):  0/10  Objective:    Observation:  pt was sistting up in the chair  Treatment, including education/measures:  Transfers with line management of O2  Scooting :SBA  Sit to stand :CGA from chair and toilet  Stand to sit :CGA to toilet and chair  Gait: pt wanted to try walking without O2 since she doesn't use it at home,   Pt amb with 4WW for 10 ft x 2 and 45 ft x 2 with CGA, pt had 1 LOB when turning around with her 4WW, needing min A to recover. Pt was slightly sob upon return to her room and wanted her O2 back on. No monitors available to measure O2 sats  Gait Comments: with VC's' and TC's for B LE placement, walker placement and sequence throughout ambulation; with VC's and TC's to maintain upright posture in order to avoid COM shifting outside of ANGEL; with VC's for PLB throughout ambulation  Sitting Exercises: Ankle pumps x 10  LAQ's x 20  Marching x 10   Therapeutic Exercise:  Therapeutic exercises were instructed today. Cues were given for technique, safety, recruitment, and rationale. Cues were verbal and/or tactile. Safety  Patient left safely in the chair, with call light/phone in reach. Gait belt was used for transfers and gait.   Assessment / Impression:     Patient's tolerance of treatment:  Good, very motivated and eager to get home   Adverse Reaction: none  Significant change in status and impact:  none  Barriers to improvement:  sob with activity, weakness, endurance and balance  Plan for Next Session:    Will cont to work towards pt's goals per her tolerance  Time in:  1500  Time out:  1541  Timed treatment minutes:  41  Total treatment time:  39  Previously filed items:  Social/Functional History  Lives With: Alone (pt has home health aide daily for 8 hours/day, also supportive granddaughter)  Type of Home: Apartment  Home Layout: One level  Home Access: Level entry  Bathroom Shower/Tub: Walk-in shower  Bathroom Toilet: Handicap height  Bathroom Equipment: Built-in shower seat, Grab bars in shower, Commode  Bathroom Accessibility: Accessible  Home Equipment: Phillip Bitter, 4 wheeled, Lift chair  ADL Assistance: Independent (aide supervises with bathing but pt able to manage per granddaughter, pt able to dress and toilet independently)  [address]: Needs assistance  Homemaking Responsibilities: No (home health aide manages)  Ambulation Assistance: Independent (mod I with 4ww household distances)  Transfer Assistance: Independent  Active : No  Occupation: Retired  Short Term Goals  Time Frame for [employer] Term Goals: 1 week  Short Term Goal 1: Pt will perform all bed mobility tasks with SBA, min cues  Short Term Goal 2: Pt will manage all compenents of rollator for STS with SBA. Short Term Goal 3: Pt will AMB x45 ft with RW, stable 02, CGA-SBA for safety     Electronically signed by:     Kinza Kulkarni PTA  11/28/2022, 3:39 PM

## 2022-11-28 NOTE — PROGRESS NOTES
Occupational Therapy  . Occupational Therapy Treatment Note    Name: Js Lovell MRN: [id number] :   1927   Date:  2022   Admission Date: 2022 Room:  [address] [place] [address]A     Discharge Recommendation: Inpatient Rehabilitation    Primary Problem:      Restrictions/Precautions:          General precautions, fall risk      Communication with other providers: rn    Subjective:  Patient states:  I like walks. I do them a lot. Pain:   Location, Type, Intensity (0/10 to 10/10):  none stated    Objective:    Observation: patient sedated upright on recliner. Tech states she just got bathed. RN present to pass meds. Objective Measures:      Treatment, including education:    ADL activity training was instructed today. Cues were given for safety, sequence, UE/LE placement, visual cues, and balance. Activities performed today included dressing, toileting, hand hygiene, and grooming. Nursing in passing meds in applesauce. LB dressing- SBA seated on recliner via forward flexed motion for donning slippers. Oral care- SBA to put in and take out dentures, and to soak dentures in cup    Therapeutic activity training was instructed today. Cues were given for safety, sequence, UE/LE placement, awareness, and balance. Activities performed today included bed mobility training, sup-sit, sit-stand, SPT. Stand to RW- from recliner (x2) and EOB-  CGA for safety. Demo good understanding locking breaks. Functional mobility- SBA- HH distances with x1 prolonged standing rest break. Shuffled and slow gait. Sit to recliner and EOB- CGA    Patient demo shoulder flexion/ex, ankle pumps and glut sets seated. Patient educated on role of OT , benefits of OT and rationale for therapeutic intervention. Benefit of OOB/EOB activities, benefit of movement.  AE/AD, WS/EC,     All therapeutic intervention performed c emphasis on dynamic balance / standing tolerance to increase strength, endurance and activity tolerance for increased Saunders c ADL tasks and func transfers / mobility. Patient left safely in bedside chair at end of session, with call light in reach, alarm on and nursing aware. Gait belt was used for func transfers / mobility. Assessment / Impression:   Patient tolerated well. Does need increased time for gait. Would still benefit from d/c to ARU. Patient lives alone and could gain strength and activity tolerance back from ARU therapy.     Patient's tolerance of treatment: Well  Adverse Reaction: None  Significant change in status and impact: Improved from initial evaluation  Barriers to improvement: None noted      Plan for Next Session:    Continue with OT POC      Time in:  1047  Time out:  1125  Timed treatment minutes:  38  Total treatment time:  38      Electronically signed by:    MEERA Denny COTA/L [id number]    11/28/2022, 11:25 AM

## 2022-11-28 NOTE — PROGRESS NOTES
Infectious Disease Progress Note  2022   Patient Name: Concepcion Richardson : 1927   Impression  Pyelonephritis secondary to Enterococcus faecalis Complicated UTI:  Multifocal Pneumonia and Pulmonary Edema Complicated by Acute Hypoxic Respiratory Failure:  Afebrile, no leukocytosis  CRP on DWT, patient appears to be improving as oxygen needs have decreased, ambulation has increased, breath sounds clear posteriorly with diminished bases, no urinary symptoms   -UA WBC 9, RBC none, Urine Culture: Enterococcus faecalis 50,000  -CT Chest WO Contrast: Bilateral pulmonary infiltrates and bilateral pleural effusions with probable   reactive mediastinal adenopathy   Probable granuloma in the right middle lobe. No follow-up imaging   recommended. Dr. Saranya Thakur onboard for pulmonology, imp of possible atypical pneumonia  PAUL on CKD3:  Dr. Antonette Castro onboard  CAD/ HTN/ HLD/ Mod to Severe AS/ Mild to mod PHTN:  Dr. Tessa Nice onboard  11/15-Limited Echo: EF 55-60%, Mod to severe AS, mild to mod AR, mild to mod TR.   OA/ Eczema/ Gout:  Hypothyroidism:  Allergy to cephalexin (rash)  Multi-morbidity: per PMHx:  s/p hyster, colonoscopy  Plan:  Continue IV meropenem 500 mg q12h to complete a 14 day course (end date 22)  Follow up in ID clinic in 1 week please  Weekly labs drawn on Monday during the course of treatment  CBC with differential, CMP, ESR, CRP  [facility] results [facility] [place] [facility]  # [phone]   OK from ID standpoint to SC when ready    Ongoing Antimicrobial Therapy  Meropenem -? Completed Antimicrobial Therapy  Levofloxacin -?? History:? Interval history noted. Chief complaint: pyelonephritis. Multifocal pneumonia with acute hypoxic respiratory failure. Denies n/v/d/f or untoward effects of antibiotics. Up in the chair, states is continuing to feel improved.   Has just ambulated in the room to bathroom and back to chair with minimal assist.   Physical Exam:  Vital Signs: BP (!) 161/56   Pulse 62   Temp 98.2 °F (36.8 °C)   Resp 16   Ht 5' (1.524 m)   Wt 132 lb 0.3 oz (59.9 kg)   LMP  (LMP Unknown)   SpO2 92%   BMI 25.78 kg/m²     Gen: Alert and oriented x 4, no distress  Chest: no distress and CTA. Posterior breath sounds clear with diminished bases. Oxygen per NC  Heart: NSR and no MRG. Abd: soft, non-distended, no tenderness, no hepatomegaly. Normoactive bowel sounds. Ext: no clubbing, cyanosis, or edema  Neuro: Mental status intact. CN 2-12 intact and no focal sensory or motor deficits     Radiologic / Imaging / TESTING  11/14/22 XR Chest Portable:  Impression   Cardiomegaly       Bilateral ill-defined pulmonary opacities could represent multifocal   pneumonia or edema      11/14/22 CT Chest WO Contrast:  Impression   Bilateral pulmonary infiltrates and bilateral pleural effusions with probable   reactive mediastinal adenopathy       Probable granuloma in the right middle lobe. No follow-up imaging   recommended. 11/14/22 CT Chest WO Contrast:  Impression   Bilateral pulmonary infiltrates and bilateral pleural effusions with probable   reactive mediastinal adenopathy       Probable granuloma in the right middle lobe. No follow-up imaging   recommended. 11/15/22 Limited Echo:   Summary   Left ventricular systolic function is normal.   Ejection fraction is visually estimated at 55-60%. Moderately dilated left atrium. Moderate to severe aortic stenosis is present; Mean PG 40mmHg, YANIRA 0.99 cm2   Mitral annular calcification is present. Mild to moderate mitral regurgitation. Mild to moderate tricuspid regurgitation; RVSP: 44 mmHg. No evidence of any pericardial effusion. 11/16/22 XR Chest Portable:  Impression   Extensive bilateral airspace opacities. Suspected small bilateral pleural effusions. 11/17/22 CT Abdomen Pelvis WO Contrast:  Impression   1. Negative for urinary tract stones.    2. Consolidation in the lower lobes and bilateral pleural effusions. 11/19/22 XR Chest Portable:  Impression   Multifocal airspace opacities are redemonstrated and not significantly   changed. Considerations would still be multifocal pneumonia or edema. Small amount of left pleural effusion and basilar atelectasis may also be   present.          11/21/22 XR Chest Portable:  Impression   Stable chest.         Labs:    Recent Results (from the past 24 hour(s))   Electrolytes urine random    Collection Time: 11/27/22  3:00 PM   Result Value Ref Range    Sodium, Ur 61 35 - 167 MMOL/L    Potassium, Ur 19.8 (L) 22 - 119 MMOL/L    Chloride 46 43 - 210 MMOL/L   Basic Metabolic Panel    Collection Time: 11/28/22  6:19 AM   Result Value Ref Range    Sodium 139 135 - 145 MMOL/L    Potassium 4.7 3.5 - 5.1 MMOL/L    Chloride 104 99 - 110 mMol/L    CO2 26 21 - 32 MMOL/L    Anion Gap 9 4 - 16    BUN 39 (H) 6 - 23 MG/DL    Creatinine 1.9 (H) 0.6 - 1.1 MG/DL    Est, Glom Filt Rate 24 (L) >60 mL/min/1.73m2    Glucose 89 70 - 99 MG/DL    Calcium 9.9 8.3 - 10.6 MG/DL       CULTURE results: Invalid input(s): BLOOD CULTURE,  URINE CULTURE, SURGICAL CULTURE    Diagnosis:  Patient Active Problem List   Diagnosis    Hypertension    GERD (gastroesophageal reflux disease)    Depression    Hyperlipidemia    Generalized osteoarthritis    Osteoporosis    Hypothyroid    Coronary artery disease involving native heart without angina pectoris    Eczema    Low back pain    Hyperuricemia    Gout    Pruritic condition    PAUL (acute kidney injury) ([facility])    Hyponatremia    Cystitis    Physical deconditioning    Bradycardia    LBBB (left bundle branch block)    Constipation, slow transit    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    Chronic renal impairment, stage 3 (moderate) ([facility])    Dizziness    Labyrinthitis    Vertigo    Primary osteoarthritis of left foot    Closed nondisplaced fracture of fourth metatarsal bone of left foot    Closed nondisplaced fracture of third metatarsal bone of left foot    Closed nondisplaced fracture of fifth left metatarsal bone    Vaginal prolapse    Sinus congestion    Localized edema    Sepsis (HCC)    Dyspnea and respiratory abnormalities    Pneumonia due to infectious organism    Pyelonephritis    Chronic kidney disease       Active Problems  Principal Problem:    Sepsis ([facility] [address]  Active Problems:    PAUL (acute kidney injury) ([facility] [address]    Dyspnea and respiratory abnormalities    Pneumonia due to infectious organism    Pyelonephritis    Chronic kidney disease  Resolved Problems:    * No resolved hospital problems. *    Electronically signed by: Electronically signed by Lan Machado.  CLEMENT Corley CNP on 11/28/2022 at 9:35 AM

## 2022-11-28 NOTE — PROGRESS NOTES
V2.0  [facility] Hospitalist Progress Note      Name:  Smooth Johnson /Age/Sex: 1927  (80 y.o. female)   MRN & CSN:  [id number] & [id number] Encounter Date/Time: 2022 2:01 PM EST    Location:  [address] [place] [address] PCP: Cherri Livingston MD       Hospital Day: 15    Assessment and Plan:    Smooth Johnson is a 80 y.o. female with PMH of CAD, GERD, UTI, Hypothyroidism, HTN, Hyperlipedmia, CKD who presents with Sepsis ([facility] [address]    #Acute hypoxic respiratory failure 2/2 CAP and pulmonary edema-resolving  -Blood cultures negative  -Chest x-ray showing pulmonary edema and concern for pneumonia  -Echo: EF of 55 to 60% with RVSP of 44  -Negative respiratory PCR  -CT chest showing bilateral infiltrates    Plan:  Pulmonology following, appreciate recs  Continue meropenem until  per ID (daughter requested to stay on meropenem as the patient has tolerated it well and has issues with tolerating antibiotics normally)  Continue DuoNebs  Patient is not on home oxygen however is requiring 3 L --> wean as tolerated    #Generalized pruritus  -likely in the setting of dry air (patient uses humidifier at home)  -no jaundice, no rash    Plan:  CMP to assess liver function  Lotion    #Pyelonephritis with history of recurrent UTI  -Received ciprofloxacin, doxycycline and nitrofurantoin without relief  -Urine culture growing Enterococcus faecalis sensitive to ampicillin    Plan:  ID on consult, appreciate recs  Meropenem for 14 days, end date is     #Acute decompensated HFpEF-resolving  -Chest x-ray with pulmonary edema and elevated RVSP    Plan:  IV diuresis as needed, holding for now in the setting of PAUL  Strict I's and O  Daily weights    #PAUL on CKD likely prerenal in the setting of diuresis  -Baseline creatinine around 2    Nephrology following, appreciate recommendations  Strict I's and O's  Family refused HD  Holding diuresis    #Suspected aspiration--> ruled out  -Seen by SLP, did not have any signs or symptoms of aspiration    Plan:  Continue to monitor    #Hyperkalemia-resolved  -Received Lokelma    Plan:  Continue monitoring BMP    #Elevated troponin likely from CKD and demand ischemia  #CAD  -Down trended  -EKG with LBBB    Plan:  Cardiology on consult, appreciate recommendations  Continue atenolol, amlodipine, Imdur, statin and aspirin  Holding telmisartan in the setting of PAUL    Chronic medical problems:    #Severe AS  -[facility] at [facility] for TAVR balloon valvuloplasty    #Hypertension  Plan: Continue home meds except for on losartan    #GERD  Continue PPI    State appeal for placement with family for ARU. PT and OT both feel patient will benefit from ARU. Diet ADULT DIET; Dysphagia - Minced and Moist; Low Potassium (Less than 3000 mg/day); 1500 ml; Please provide extra moisteners and gravies  ADULT ORAL NUTRITION SUPPLEMENT; Dinner; Frozen Oral Supplement   DVT Prophylaxis [] Lovenox, [x]  Heparin, [] SCDs, [] Ambulation,  [] Eliquis, [] Xarelto  [] Coumadin   Code Status Limited   Disposition From: home  Expected Disposition: acute rehab  Estimated Date of Discharge: pending placement  Patient requires continued admission due to pending placement   Surrogate Decision Maker/ CANDIS Rico     Subjective:     Chief Complaint: Shortness of Breath     Patient feeling well today. No complaints. Patient's granddaughter at bedside today with concerns that we are not working with the patient and left to make her strong enough to go to ARU. Insurance company stated that patient is too weak to go to acute rehab. Review of Systems:    General: No fever, no chills  Heart: No chest pain, no palpitations  Lungs: No shortness of breath, no cough  Abdomen: No abdominal pain, no nausea, no vomiting, no constipation, no diarrhea  : No frequency, no dysuria, no urgency  MSK: No lower extremity swelling  Neuro: No confusion  Skin: No rashes      Objective:      Intake/Output Summary (Last 24 hours) at 11/28/2022 Fer Bailey filed at 11/28/2022 0640  Gross per 24 hour   Intake --   Output 300 ml   Net -300 ml        Vitals:   Vitals:    11/28/22 0849   BP: (!) 161/56   Pulse: 62   Resp: 16   Temp: 98.2 °F (36.8 °C)   SpO2: 92%       Physical Exam:     General: NAD  Eyes: EOMI  HENT: Atraumatic  Respiratory: no respiratory distress  GI: nondistended  MSK: no lower extremity edema  Skin: Intact, dry, warm, no rashes  Neuro: CN II to XII grossly intact  Psych: Normal mood    Medications:   Medications:    diphenhydrAMINE-zinc acetate   Topical 4x daily    sennosides-docusate sodium  1 tablet Oral BID    meropenem  500 mg IntraVENous Q12H    ipratropium-albuterol  1 ampule Inhalation TID    polyethylene glycol  17 g Oral Daily    docusate sodium  100 mg Oral Daily    lactobacillus  1 capsule Oral Daily with breakfast    amLODIPine  10 mg Oral Nightly    atenolol  25 mg Oral Daily    sodium bicarbonate  325 mg Oral BID    clopidogrel  75 mg Oral Daily    sodium chloride flush  5-40 mL IntraVENous 2 times per day    sodium chloride flush  5-40 mL IntraVENous 2 times per day    heparin (porcine)  5,000 Units SubCUTAneous 3 times per day    Vitamin D  2,000 Units Oral Daily    pantoprazole  40 mg Oral QAM AC    fluorometholone  1 drop Both Eyes BID    isosorbide mononitrate  60 mg Oral Daily    levothyroxine  88 mcg Oral Daily    ranolazine  500 mg Oral BID    sertraline  50 mg Oral Daily    [Held by provider] losartan  12.5 mg Oral Daily    atorvastatin  20 mg Oral Nightly      Infusions:    sodium chloride 75 mL/hr at 11/24/22 1739    sodium chloride 20 mL/hr at 11/27/22 1640     PRN Meds: hydrOXYzine pamoate, 25 mg, TID PRN  melatonin, 3 mg, Nightly PRN  ipratropium-albuterol, 1 ampule, Q4H PRN  sodium chloride flush, 5-40 mL, PRN  sodium chloride, , PRN  ondansetron, 4 mg, Q8H PRN   Or  ondansetron, 4 mg, Q6H PRN  polyethylene glycol, 17 g, Daily PRN  acetaminophen, 650 mg, Q6H PRN   Or  acetaminophen, 650 mg, Q6H PRN  sodium chloride flush, 5-40 mL, PRN  sodium chloride, , PRN      Labs      Recent Results (from the past 24 hour(s))   Electrolytes urine random    Collection Time: 11/27/22  3:00 PM   Result Value Ref Range    Sodium, Ur 61 35 - 167 MMOL/L    Potassium, Ur 19.8 (L) 22 - 119 MMOL/L    Chloride 46 43 - 210 MMOL/L   Basic Metabolic Panel    Collection Time: 11/28/22  6:19 AM   Result Value Ref Range    Sodium 139 135 - 145 MMOL/L    Potassium 4.7 3.5 - 5.1 MMOL/L    Chloride 104 99 - 110 mMol/L    CO2 26 21 - 32 MMOL/L    Anion Gap 9 4 - 16    BUN 39 (H) 6 - 23 MG/DL    Creatinine 1.9 (H) 0.6 - 1.1 MG/DL    Est, Glom Filt Rate 24 (L) >60 mL/min/1.73m2    Glucose 89 70 - 99 MG/DL    Calcium 9.9 8.3 - 10.6 MG/DL          Imaging/Diagnostics Last 24 Hours   XR CHEST PORTABLE    Result Date: 11/21/2022  EXAMINATION: ONE XRAY VIEW OF THE CHEST 11/21/2022 12:19 pm COMPARISON: 11/17/2022. HISTORY: ORDERING SYSTEM PROVIDED HISTORY: sob TECHNOLOGIST PROVIDED HISTORY: Reason for exam:->sob Reason for Exam:  sob FINDINGS: Cardiomediastinal silhouette is stable. Similar bilateral airspace opacities. No pleural effusion or pneumothorax. No gross bony abnormality.      Stable chest.       Electronically signed by Abhishek Patton MD on 11/28/2022 at 9:12 AM

## 2022-11-29 LAB
ANION GAP SERPL CALCULATED.3IONS-SCNC: 10 MMOL/L (ref 4–16)
BUN BLDV-MCNC: 41 MG/DL (ref 6–23)
CALCIUM SERPL-MCNC: 9.8 MG/DL (ref 8.3–10.6)
CHLORIDE BLD-SCNC: 100 MMOL/L (ref 99–110)
CO2: 25 MMOL/L (ref 21–32)
CREAT SERPL-MCNC: 1.9 MG/DL (ref 0.6–1.1)
GFR SERPL CREATININE-BSD FRML MDRD: 24 ML/MIN/1.73M2
GLUCOSE BLD-MCNC: 85 MG/DL (ref 70–99)
POTASSIUM SERPL-SCNC: 4.6 MMOL/L (ref 3.5–5.1)
SODIUM BLD-SCNC: 135 MMOL/L (ref 135–145)

## 2022-11-29 PROCEDURE — 6370000000 HC RX 637 (ALT 250 FOR IP)

## 2022-11-29 PROCEDURE — 97110 THERAPEUTIC EXERCISES: CPT

## 2022-11-29 PROCEDURE — 94640 AIRWAY INHALATION TREATMENT: CPT

## 2022-11-29 PROCEDURE — 2580000003 HC RX 258: Performed by: NURSE PRACTITIONER

## 2022-11-29 PROCEDURE — 99231 SBSQ HOSP IP/OBS SF/LOW 25: CPT | Performed by: INTERNAL MEDICINE

## 2022-11-29 PROCEDURE — 6360000002 HC RX W HCPCS

## 2022-11-29 PROCEDURE — 6370000000 HC RX 637 (ALT 250 FOR IP): Performed by: INTERNAL MEDICINE

## 2022-11-29 PROCEDURE — 6360000002 HC RX W HCPCS: Performed by: NURSE PRACTITIONER

## 2022-11-29 PROCEDURE — 6370000000 HC RX 637 (ALT 250 FOR IP): Performed by: STUDENT IN AN ORGANIZED HEALTH CARE EDUCATION/TRAINING PROGRAM

## 2022-11-29 PROCEDURE — 99232 SBSQ HOSP IP/OBS MODERATE 35: CPT | Performed by: NURSE PRACTITIONER

## 2022-11-29 PROCEDURE — 97116 GAIT TRAINING THERAPY: CPT

## 2022-11-29 PROCEDURE — 2580000003 HC RX 258

## 2022-11-29 PROCEDURE — 1200000000 HC SEMI PRIVATE

## 2022-11-29 PROCEDURE — 36415 COLL VENOUS BLD VENIPUNCTURE: CPT

## 2022-11-29 PROCEDURE — 80048 BASIC METABOLIC PNL TOTAL CA: CPT

## 2022-11-29 RX ORDER — FUROSEMIDE 10 MG/ML
40 INJECTION INTRAMUSCULAR; INTRAVENOUS ONCE
Status: COMPLETED | OUTPATIENT
Start: 2022-11-30 | End: 2022-11-30

## 2022-11-29 RX ADMIN — DIPHENHYDRAMINE HYDROCHLORIDE, ZINC ACETATE: 2; .1 CREAM TOPICAL at 22:59

## 2022-11-29 RX ADMIN — HEPARIN SODIUM 5000 UNITS: 5000 INJECTION INTRAVENOUS; SUBCUTANEOUS at 22:53

## 2022-11-29 RX ADMIN — Medication 1 CAPSULE: at 10:56

## 2022-11-29 RX ADMIN — HEPARIN SODIUM 5000 UNITS: 5000 INJECTION INTRAVENOUS; SUBCUTANEOUS at 16:06

## 2022-11-29 RX ADMIN — ATENOLOL 25 MG: 25 TABLET ORAL at 18:20

## 2022-11-29 RX ADMIN — IPRATROPIUM BROMIDE AND ALBUTEROL SULFATE 1 AMPULE: .5; 3 SOLUTION RESPIRATORY (INHALATION) at 16:02

## 2022-11-29 RX ADMIN — IPRATROPIUM BROMIDE AND ALBUTEROL SULFATE 1 AMPULE: .5; 3 SOLUTION RESPIRATORY (INHALATION) at 19:46

## 2022-11-29 RX ADMIN — SENNOSIDES AND DOCUSATE SODIUM 1 TABLET: 50; 8.6 TABLET ORAL at 22:54

## 2022-11-29 RX ADMIN — AMLODIPINE BESYLATE 10 MG: 10 TABLET ORAL at 22:54

## 2022-11-29 RX ADMIN — SODIUM CHLORIDE, PRESERVATIVE FREE 10 ML: 5 INJECTION INTRAVENOUS at 23:00

## 2022-11-29 RX ADMIN — SODIUM BICARBONATE 325 MG: 650 TABLET ORAL at 10:56

## 2022-11-29 RX ADMIN — RANOLAZINE 500 MG: 500 TABLET, FILM COATED, EXTENDED RELEASE ORAL at 22:54

## 2022-11-29 RX ADMIN — CLOPIDOGREL BISULFATE 75 MG: 75 TABLET ORAL at 10:56

## 2022-11-29 RX ADMIN — DIPHENHYDRAMINE HYDROCHLORIDE, ZINC ACETATE: 2; .1 CREAM TOPICAL at 12:04

## 2022-11-29 RX ADMIN — Medication 1 DROP: at 12:03

## 2022-11-29 RX ADMIN — Medication 2000 UNITS: at 10:56

## 2022-11-29 RX ADMIN — SODIUM BICARBONATE 325 MG: 650 TABLET ORAL at 23:00

## 2022-11-29 RX ADMIN — SERTRALINE HYDROCHLORIDE 50 MG: 50 TABLET ORAL at 10:56

## 2022-11-29 RX ADMIN — MEROPENEM 500 MG: 500 INJECTION, POWDER, FOR SOLUTION INTRAVENOUS at 18:22

## 2022-11-29 RX ADMIN — POLYETHYLENE GLYCOL (3350) 17 G: 17 POWDER, FOR SOLUTION ORAL at 12:03

## 2022-11-29 RX ADMIN — Medication 1 DROP: at 22:59

## 2022-11-29 RX ADMIN — HEPARIN SODIUM 5000 UNITS: 5000 INJECTION INTRAVENOUS; SUBCUTANEOUS at 06:43

## 2022-11-29 RX ADMIN — SENNOSIDES AND DOCUSATE SODIUM 1 TABLET: 50; 8.6 TABLET ORAL at 10:56

## 2022-11-29 RX ADMIN — LEVOTHYROXINE SODIUM 88 MCG: 0.09 TABLET ORAL at 06:43

## 2022-11-29 RX ADMIN — RANOLAZINE 500 MG: 500 TABLET, FILM COATED, EXTENDED RELEASE ORAL at 10:56

## 2022-11-29 RX ADMIN — ATORVASTATIN CALCIUM 20 MG: 10 TABLET, FILM COATED ORAL at 22:54

## 2022-11-29 RX ADMIN — MEROPENEM 500 MG: 500 INJECTION, POWDER, FOR SOLUTION INTRAVENOUS at 06:42

## 2022-11-29 RX ADMIN — ISOSORBIDE MONONITRATE 60 MG: 60 TABLET, EXTENDED RELEASE ORAL at 10:56

## 2022-11-29 RX ADMIN — PANTOPRAZOLE SODIUM 40 MG: 40 TABLET, DELAYED RELEASE ORAL at 06:43

## 2022-11-29 RX ADMIN — DOCUSATE SODIUM 100 MG: 100 CAPSULE, LIQUID FILLED ORAL at 10:57

## 2022-11-29 NOTE — PROGRESS NOTES
Daily Progress Note  Subjective:  Awake and alert; up in chair  Denies any CP or SOB  BP and HR stable  Off tele  Waiting on placement    Attending Note:  Overall doing better  Heart rate is stable  Pneumonia improving clinically  Severe AS work up once stable   She wants to go to [facility] for a second opinion on d/c  Waiting on placement       Impression and Plan:   Sepsis    CAP, noted on CT chest    Respiratory panel negative    WBC count elevated    ABX per primary    Using 1L NC now-weaning slowly     Acute on Chronic HFpEF    BNP on admission was 6,417    Last echo showed EF 55-60%    Severe Aortic stenosis    Diuretics per renal at this time    Accurate I's and O's    1500 Fluid restriction     Plan to have her f/u with [facility] OP for potential valvuloplasty-will arrange this OP     CKD    Renal following; Avoid nephrotoxic medications     Stable from cardiac standpoint  Med. Tx. and increase activity  Awaiting placement      Most Recent Echo  11/15/2022   Left ventricular systolic function is normal.   Ejection fraction is visually estimated at 55-60%. Moderately dilated left atrium. Moderate to severe aortic stenosis is present; Mean PG 40mmHg, YANIRA 0.99 cm2. Mitral annular calcification is present. Mild to moderate mitral regurgitation. Mild to moderate tricuspid regurgitation; RVSP: 44 mmHg. No evidence of any pericardial effusion. Radiology  CT chest 11/14/2022  Impression   Bilateral pulmonary infiltrates and bilateral pleural effusions with probable   reactive mediastinal adenopathy       Probable granuloma in the right middle lobe. No follow-up imaging   recommended. PAST MEDICAL HISTORY:  The patient has a history of hypertension, renal  insufficiency present, and she has been treated medically. She is known  to have coronary artery disease also present. She has a chronic left  ventricular block present.   She had a heart catheterization done in the  past and LAD had 70% stenosis present and she was treated medically at  that time. Anemia, hyperlipidemia, and arthritis present. PAST SURGICAL HISTORY:  She has a history of having hip surgery done. Moderate coronary artery disease, heart catheterization done in 2018,  LAD with 70% stenosis distally. She was treated medically for that. She had cataract surgery and hysterectomy done.     Objective:   BP (!) 171/65   Pulse 69   Temp 97.5 °F (36.4 °C)   Resp 15   Ht 5' (1.524 m)   Wt 140 lb 3.4 oz (63.6 kg)   LMP  (LMP Unknown)   SpO2 95%   BMI 27.38 kg/m²   No intake or output data in the 24 hours ending 11/29/22 1157    Medications:   Scheduled Meds:   ipratropium-albuterol  1 ampule Inhalation TID    diphenhydrAMINE-zinc acetate   Topical 4x daily    sennosides-docusate sodium  1 tablet Oral BID    meropenem  500 mg IntraVENous Q12H    polyethylene glycol  17 g Oral Daily    docusate sodium  100 mg Oral Daily    lactobacillus  1 capsule Oral Daily with breakfast    amLODIPine  10 mg Oral Nightly    atenolol  25 mg Oral Daily    sodium bicarbonate  325 mg Oral BID    clopidogrel  75 mg Oral Daily    sodium chloride flush  5-40 mL IntraVENous 2 times per day    sodium chloride flush  5-40 mL IntraVENous 2 times per day    heparin (porcine)  5,000 Units SubCUTAneous 3 times per day    Vitamin D  2,000 Units Oral Daily    pantoprazole  40 mg Oral QAM AC    fluorometholone  1 drop Both Eyes BID    isosorbide mononitrate  60 mg Oral Daily    levothyroxine  88 mcg Oral Daily    ranolazine  500 mg Oral BID    sertraline  50 mg Oral Daily    [Held by provider] losartan  12.5 mg Oral Daily    atorvastatin  20 mg Oral Nightly      Infusions:   sodium chloride 75 mL/hr at 11/24/22 1739    sodium chloride 20 mL/hr at 11/27/22 1640      PRN Meds:  hydrOXYzine pamoate, melatonin, sodium chloride flush, sodium chloride, ondansetron **OR** ondansetron, polyethylene glycol, acetaminophen **OR** acetaminophen, sodium chloride flush, sodium chloride     Physical Exam:  Vitals:    11/29/22 0954   BP: (!) 171/65   Pulse: 69   Resp: 15   Temp: 97.5 °F (36.4 °C)   SpO2: 95%        General: appears younger than given age  Chest: Nontender  Cardiac: First and Second Heart Sounds are Normal, No Murmurs or Gallops noted  Lungs:Clear to auscultation and percussion. Abdomen: Soft, NT, ND, +BS  Extremities: No clubbing, no edema  Vascular:  Equal 2+ peripheral pulses. Lab Data:  CBC: No results for input(s): WBC, HGB, HCT, MCV, PLT in the last 72 hours. BMP:   Recent Labs     11/27/22  0629 11/28/22  0619 11/29/22  0349    139 135   K 4.9 4.7 4.6    104 100   CO2 27 26 25   BUN 43* 39* 41*   CREATININE 2.1* 1.9* 1.9*     LIVER PROFILE:   Recent Labs     11/27/22  0629   AST 32   ALT 23   BILITOT 0.2   ALKPHOS 90     PT/INR: No results for input(s): PROTIME, INR in the last 72 hours. APTT: No results for input(s): APTT in the last 72 hours. BNP:  No results for input(s): BNP in the last 72 hours.       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 08/18/2018    Bradycardia     LBBB (left bundle branch block)     PAUL (acute kidney injury) ([facility] [address] 12/26/2017    Pneumonia due to infectious organism 11/16/2022    Pyelonephritis 11/16/2022    Chronic kidney disease 11/16/2022    Dyspnea and respiratory abnormalities 11/15/2022    Sepsis ([address] 11/14/2022    Localized edema 07/11/2022    GERD (gastroesophageal reflux disease)     Hypertension     Hypothyroid     Sinus congestion 03/23/2022    Vaginal prolapse 09/07/2021    Primary osteoarthritis of left foot 08/30/2021    Closed nondisplaced fracture of fourth metatarsal bone of left foot 08/30/2021    Closed nondisplaced fracture of third metatarsal bone of left foot 08/30/2021    Closed nondisplaced fracture of fifth left metatarsal bone 08/30/2021    Vertigo     Dizziness     Labyrinthitis     Chronic renal impairment, stage 3 (moderate) (HCC)     Constipation, slow transit 06/19/2018    Cystitis     Physical deconditioning     Hyponatremia 01/01/2018    Pruritic condition 01/22/2016    Gout     Hyperuricemia     Low back pain     Eczema     Coronary artery disease involving native heart without angina pectoris     Depression     Hyperlipidemia     Generalized osteoarthritis     Osteoporosis        Electronically signed by CLEMENT Reyes CNP on 11/29/2022 at 11:57 AM    Electronically signed by Nika Cedeño MD on 11/29/22 at 2:24 PM EST

## 2022-11-29 NOTE — PROGRESS NOTES
Pulmonary and Critical Care  Progress Note      VITALS:  BP (!) 171/65   Pulse 69   Temp 97.5 °F (36.4 °C)   Resp 15   Ht 5' (1.524 m)   Wt 140 lb 3.4 oz (63.6 kg)   LMP  (LMP Unknown)   SpO2 95%   BMI 27.38 kg/m²     Subjective:   CHIEF COMPLAINT :SOB     HPI:                The patient is a 80 y.o. female is sitting in the chair. She is not in acute resp distress    Objective:   PHYSICAL EXAM:    LUNGS:Occasional basal crackles  Abd-soft, BS+,NT  Ext- no pedal edema  CVS-s1s2, no murmurs      DATA:    CBC:  No results for input(s): WBC, RBC, HGB, HCT, PLT, MCV, MCH, MCHC, RDW, NRBC, SEGSPCT, BANDSPCT in the last 72 hours. BMP:  Recent Labs     11/27/22  0629 11/28/22  0619 11/29/22  0349    139 135   K 4.9 4.7 4.6    104 100   CO2 27 26 25   BUN 43* 39* 41*   CREATININE 2.1* 1.9* 1.9*   CALCIUM 10.1 9.9 9.8   GLUCOSE 78 89 85      ABG:  No results for input(s): PH, PO2ART, FNE3POY, HCO3, BEART, O2SAT in the last 72 hours.   BNP  Lab Results   Component Value Date     (H) 05/09/2012      D-Dimer:  Lab Results   Component Value Date    HZLPUC 933 (H) 01/07/2018      Radiology: None      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 08/18/2018     Priority: High    Bradycardia      Priority: High    LBBB (left bundle branch block)      Priority: High    PAUL (acute kidney injury) ([facility] [address] 12/26/2017     Priority: High    Pneumonia due to infectious organism 11/16/2022     Priority: Medium    Pyelonephritis 11/16/2022     Priority: Medium    Chronic kidney disease 11/16/2022     Priority: Medium    Dyspnea and respiratory abnormalities 11/15/2022     Priority: Medium    Sepsis ([facility] [address] 11/14/2022     Priority: Medium    Localized edema 07/11/2022     Priority: Medium    GERD (gastroesophageal reflux disease)      Priority: Medium     Overview Note:     Esophagitis      Hypertension      Priority: Low    Hypothyroid Priority: Low     Overview Note:     Hypothyroidism      Sinus congestion 03/23/2022     Overview Note:     CHRONIC W POST NASAL DRIP      Vaginal prolapse 09/07/2021    Primary osteoarthritis of left foot 08/30/2021    Closed nondisplaced fracture of fourth metatarsal bone of left foot 08/30/2021    Closed nondisplaced fracture of third metatarsal bone of left foot 08/30/2021    Closed nondisplaced fracture of fifth left metatarsal bone 08/30/2021    Vertigo      Overview Note:     Dr [facility]      Dizziness     Labyrinthitis     Chronic renal impairment, stage 3 (moderate) (HCC)     Constipation, slow transit 06/19/2018    Cystitis     Physical deconditioning     Hyponatremia 01/01/2018    Pruritic condition 01/22/2016    Gout     Hyperuricemia     Low back pain     Eczema     Coronary artery disease involving native heart without angina pectoris      Overview Note:     Dr Aldair Michele Ahmed/[facility]      Depression     Hyperlipidemia     Generalized osteoarthritis     Osteoporosis      Overview Note:     Generalized     Acute Hypoxic resp failure sec to Cardiogenic and Non Cardiogenic Pulmonary edema  Small bilateral Pleural effusions  PAUL on CKD- slowly improving  Leukocytosis- Improved  Severe AS  Mild to Moderate Pulmonary HTN  CAD  H/o Recurrent UTI  GERD  HLD  ?  Atypical pneumonia       ICS  OOB  Diuresis  PT/OT  Await eval at [facility] for the AS   Keep sats > 92%  Await placement  C/w present management    Electronically signed by Sheila Tanner MD on 11/29/2022 at 11:59 AM

## 2022-11-29 NOTE — CARE COORDINATION
Left VM for Allyssa Box at [facility] requesting call back ASAP with fax # where to send updated clinicals to.     1100:  Per Sue Aguilar. Yoni Ndiaye is now requesting for a new pre-cert for ARU to be sent over. New pre-cert initiated with updated clinicals and therapy notes. Requested for a determination ASAP. Will continue to follow for determination.

## 2022-11-29 NOTE — CARE COORDINATION
Received vm from Joselito marquez with [facility] stating that they rec'd starting prior auth over again/ vm from Maggie (pt's granddghtr requesting the same). Notified Leigh Ann with [facility] who sent in another request for IPR auth.

## 2022-11-29 NOTE — PLAN OF CARE
Problem: Discharge Planning  Goal: Discharge to home or other facility with appropriate resources  11/29/2022 0043 by Quinton Jordan RN  Outcome: Progressing  11/28/2022 1452 by Domonique Tanner RN  Outcome: Progressing     Problem: Hematologic - Adult  Goal: Maintains hematologic stability  11/29/2022 0043 by Quinton Jordan RN  Outcome: Progressing  11/28/2022 1452 by Domonique Tanner RN  Outcome: Progressing     Problem: Pain  Goal: Verbalizes/displays adequate comfort level or baseline comfort level  11/29/2022 0043 by Quinton Jordan RN  Outcome: Progressing  11/28/2022 1452 by Domoniqeu Tanner RN  Outcome: Progressing     Problem: Safety - Adult  Goal: Free from fall injury  11/29/2022 0043 by Quinton Jordan RN  Outcome: Progressing  11/28/2022 1452 by Domonique Tanner RN  Outcome: Progressing     Problem: ABCDS Injury Assessment  Goal: Absence of physical injury  11/29/2022 0043 by Quinton Jordan RN  Outcome: Progressing  Flowsheets (Taken 11/29/2022 0042)  Absence of Physical Injury: Implement safety measures based on patient assessment  11/28/2022 1452 by Domonique Tanner RN  Outcome: Progressing     Problem: Skin/Tissue Integrity  Goal: Absence of new skin breakdown  Description: 1. Monitor for areas of redness and/or skin breakdown  2. Assess vascular access sites hourly  3. Every 4-6 hours minimum:  Change oxygen saturation probe site  4. Every 4-6 hours:  If on nasal continuous positive airway pressure, respiratory therapy assess nares and determine need for appliance change or resting period.   11/29/2022 0043 by Quinton Jordan RN  Outcome: Progressing  11/28/2022 1452 by Domonique Tanner RN  Outcome: Progressing     Problem: Nutrition Deficit:  Goal: Optimize nutritional status  11/29/2022 0043 by Quinton Jordan RN  Outcome: Progressing  11/28/2022 1452 by Domonique Tanner RN  Outcome: Progressing

## 2022-11-29 NOTE — PROGRESS NOTES
On nc  Cr stable at 1.9, monitor  Awaiting rehab placement  Will follow    Thank you    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 08/18/2018    Bradycardia     LBBB (left bundle branch block)     PAUL (acute kidney injury) ([facility] [address] 12/26/2017    Pneumonia due to infectious organism 11/16/2022    Pyelonephritis 11/16/2022    Chronic kidney disease 11/16/2022    Dyspnea and respiratory abnormalities 11/15/2022    Sepsis ([facility] [address] 11/14/2022    Localized edema 07/11/2022    GERD (gastroesophageal reflux disease)     Hypertension     Hypothyroid     Sinus congestion 03/23/2022    Vaginal prolapse 09/07/2021    Primary osteoarthritis of left foot 08/30/2021    Closed nondisplaced fracture of fourth metatarsal bone of left foot 08/30/2021    Closed nondisplaced fracture of third metatarsal bone of left foot 08/30/2021    Closed nondisplaced fracture of fifth left metatarsal bone 08/30/2021    Vertigo     Dizziness     Labyrinthitis     Chronic renal impairment, stage 3 (moderate) (HCC)     Constipation, slow transit 06/19/2018    Cystitis     Physical deconditioning     Hyponatremia 01/01/2018    Pruritic condition 01/22/2016    Gout     Hyperuricemia     Low back pain     Eczema     Coronary artery disease involving native heart without angina pectoris     Depression     Hyperlipidemia     Generalized osteoarthritis     Osteoporosis

## 2022-11-29 NOTE — PROGRESS NOTES
V2.0  [facility] [place] [facility]      Name:  Honore Hodgkin /Age/Sex: 1927  (80 y.o. female)   MRN & CSN:  [id number] & [id number] Encounter Date/Time: 2022 2:01 PM EST    Location:  -I PCP: Amaya Rincon MD       Hospital Day: 16    Assessment and Plan:    Honore Hodgkin is a 80 y.o. female with PMH of CAD, GERD, UTI, Hypothyroidism, HTN, Hyperlipedmia, CKD who presents with Sepsis ([address]    #Acute hypoxic respiratory failure 2/2 CAP and pulmonary edema-resolving  -Blood cultures negative  -Chest x-ray showing pulmonary edema and concern for pneumonia  -Echo: EF of 55 to 60% with RVSP of 44  -Negative respiratory PCR  -CT chest showing bilateral infiltrates  - On 1L/min when seen    Plan:  Pulmonology following, appreciate recs  Continue meropenem until  per ID (daughter requested to stay on meropenem as the patient has tolerated it well and has issues with tolerating antibiotics normally)  Continue DuoNebs  Patient is not on home oxygen however is requiring 1 L --> wean as tolerated    #Generalized pruritus 2/2 likely xerosis 2/2 AC - Resolved  -likely in the setting of dry air (patient uses humidifier at home)  -no jaundice, no rash    Plan:  Emollients     #Pyelonephritis with history of recurrent UTI - Improved  -Received ciprofloxacin, doxycycline and nitrofurantoin without relief  -Urine culture growing Enterococcus faecalis sensitive to ampicillin    Plan:  ID on consult, appreciate recs  Meropenem for 14 days, end date is     #Acute decompensated HFpEF-resolving  -Chest x-ray with pulmonary edema and elevated RVSP    Plan:  IV diuresis as needed  Strict I's and O  Daily weights    #PAUL on CKD likely prerenal in the setting of diuresis - Stable  -Baseline creatinine around 2  - Stable    Nephrology following, appreciate recommendations  Strict I's and O's  Family refused HD  Holding diuresis    #Suspected aspiration--> ruled out  -Seen by SLP, did not have any signs or symptoms of aspiration    Plan:  Continue to monitor    #Hyperkalemia-resolved  -Received Lokelma    Plan:  Continue monitoring BMP    #Elevated troponin likely from CKD and demand ischemia  #CAD  -Down trended  -EKG with LBBB    Plan:  Cardiology on consult, appreciate recommendations  Continue atenolol, amlodipine, Imdur, statin and aspirin  Holding telmisartan in the setting of PAUL    Chronic medical problems:    #Severe AS  -Follow-up at [facility] for TAVR balloon valvuloplasty    #Hypertension  Plan: Continue home meds except for on losartan    #GERD  Continue PPI    PT and OT both feel patient will benefit from ARU. Pre-cert re-submitted again. Diet ADULT DIET; Dysphagia - Minced and Moist; Low Potassium (Less than 3000 mg/day); 1500 ml; Please provide extra moisteners and gravies  ADULT ORAL NUTRITION SUPPLEMENT; Dinner; Frozen Oral Supplement   DVT Prophylaxis [] Lovenox, [x]  Heparin, [] SCDs, [] Ambulation,  [] Eliquis, [] Xarelto  [] Coumadin   Code Status Limited   Disposition From: home  Expected Disposition: acute rehab  Estimated Date of Discharge: Ready for discharge. Pre-Cert pending   Surrogate Decision Maker/ CANDIS Rico     Subjective:     Chief Complaint: Shortness of Breath     Patient feeling well today. No complaints. Still on 1L/min. Review of Systems:    General: No fever, no chills  Heart: No chest pain, no palpitations  Lungs: No shortness of breath, no cough  Abdomen: No abdominal pain, no nausea, no vomiting, no constipation, no diarrhea  : No frequency, no dysuria, no urgency  MSK: No lower extremity swelling  Neuro: No confusion  Skin: No rashes      Objective:   No intake or output data in the 24 hours ending 11/29/22 1708       Vitals:   Vitals:    11/29/22 1528   BP: (!) 145/73   Pulse: 68   Resp: 14   Temp: 97.6 °F (36.4 °C)   SpO2: 95%       Physical Exam:     General: NAD. On 1L/min O2 when seen.    Eyes: EOMI  HENT: Atraumatic  Respiratory: no respiratory distress. B/L equal air entry. No wheeze or rhonchi  GI: nondistended. BS normal.   CVS: S1 + S2. Equal peripheral pulses.    MSK: no lower extremity edema  Skin: Intact, dry, warm, no rashes  Neuro: CN II to XII grossly intact  Psych: Normal mood    Medications:   Medications:    ipratropium-albuterol  1 ampule Inhalation TID    diphenhydrAMINE-zinc acetate   Topical 4x daily    sennosides-docusate sodium  1 tablet Oral BID    meropenem  500 mg IntraVENous Q12H    polyethylene glycol  17 g Oral Daily    docusate sodium  100 mg Oral Daily    lactobacillus  1 capsule Oral Daily with breakfast    amLODIPine  10 mg Oral Nightly    atenolol  25 mg Oral Daily    sodium bicarbonate  325 mg Oral BID    clopidogrel  75 mg Oral Daily    sodium chloride flush  5-40 mL IntraVENous 2 times per day    sodium chloride flush  5-40 mL IntraVENous 2 times per day    heparin (porcine)  5,000 Units SubCUTAneous 3 times per day    Vitamin D  2,000 Units Oral Daily    pantoprazole  40 mg Oral QAM AC    fluorometholone  1 drop Both Eyes BID    isosorbide mononitrate  60 mg Oral Daily    levothyroxine  88 mcg Oral Daily    ranolazine  500 mg Oral BID    sertraline  50 mg Oral Daily    [Held by provider] losartan  12.5 mg Oral Daily    atorvastatin  20 mg Oral Nightly      Infusions:    sodium chloride 75 mL/hr at 11/24/22 1739    sodium chloride 20 mL/hr at 11/27/22 1640     PRN Meds: hydrOXYzine pamoate, 25 mg, TID PRN  melatonin, 3 mg, Nightly PRN  sodium chloride flush, 5-40 mL, PRN  sodium chloride, , PRN  ondansetron, 4 mg, Q8H PRN   Or  ondansetron, 4 mg, Q6H PRN  polyethylene glycol, 17 g, Daily PRN  acetaminophen, 650 mg, Q6H PRN   Or  acetaminophen, 650 mg, Q6H PRN  sodium chloride flush, 5-40 mL, PRN  sodium chloride, , PRN      Labs      Recent Results (from the past 24 hour(s))   Basic Metabolic Panel    Collection Time: 11/29/22  3:49 AM   Result Value Ref Range    Sodium 135 135 - 145 MMOL/L    Potassium 4.6 3.5 - 5. 1 MMOL/L    Chloride 100 99 - 110 mMol/L    CO2 25 21 - 32 MMOL/L    Anion Gap 10 4 - 16    BUN 41 (H) 6 - 23 MG/DL    Creatinine 1.9 (H) 0.6 - 1.1 MG/DL    Est, Glom Filt Rate 24 (L) >60 mL/min/1.73m2    Glucose 85 70 - 99 MG/DL    Calcium 9.8 8.3 - 10.6 MG/DL          Imaging/Diagnostics Last 24 Hours   XR CHEST PORTABLE    Result Date: 11/21/2022  EXAMINATION: ONE XRAY VIEW OF THE CHEST 11/21/2022 12:19 pm COMPARISON: 11/17/2022. HISTORY: ORDERING SYSTEM PROVIDED HISTORY: sob TECHNOLOGIST PROVIDED HISTORY: Reason for exam:->sob Reason for Exam:  sob FINDINGS: Cardiomediastinal silhouette is stable. Similar bilateral airspace opacities. No pleural effusion or pneumothorax. No gross bony abnormality.      Stable chest.       Electronically signed by Anna Tripathi MD on 11/29/2022 at 5:08 PM

## 2022-11-29 NOTE — PROGRESS NOTES
Physical Therapy  Name: Francene Kayser MRN: [id number] :   1927   Date:  2022   Admission Date: 2022 Room:  [address] [place] [address]   Restrictions/Precautions:        fall risk  Communication with other providers:  Dalbert Bumpers RN states pt is ok to see for therapy  Subjective:  Patient states:  she would love to walk  Pain:   Location, Type, Intensity (0/10 to 10/10):  0/10  Objective:    Observation:  pt was sitting up in the chair  Treatment, including education/measures:  Transfers with line management of O2  Scooting :SBA  Sit to stand :CGA from chair and  4WW with seat  Stand to sit :CGA to 4WW with seat and chair  Gait: amb with O2 today  Pt amb with 4WW for 45 ft x 2 and 30 ft x 2 with CGA  Pt needed VC's for staying inside her 4WW as she turned around  Gait Comments: with VC's' and TC's for B LE placement, walker placement and sequence throughout ambulation; with VC's and TC's to maintain upright posture in order to avoid COM shifting outside of ANGEL; with VC's for PLB throughout ambulation  Sitting Exercises: Ankle pumps x 10  LAQ's x 10  Therapeutic Exercise:  Therapeutic exercises were instructed today. Cues were given for technique, safety, recruitment, and rationale. Cues were verbal and/or tactile. Safety  Patient left safely in the chair, with call light/phone in reach with alarm applied. Gait belt was used for transfers and gait.   Assessment / Impression:     Patient's tolerance of treatment:  Good, No sob today with gt  Adverse Reaction: none  Significant change in status and impact:  none  Barriers to improvement:  safety with 4WW  Plan for Next Session:    Will cont to work towards pt's goals per her tolerance  Time in:  1440  Time out:  1518  Timed treatment minutes:  38  Total treatment time:  45  Previously filed items:  Social/Functional History  Lives With: Alone (pt has home health aide daily for 8 hours/day, also supportive granddaughter)  Type of Home: [address] [place] [address] Drive: One level  Home Access: Level entry  Bathroom Shower/Tub: Walk-in shower  Bathroom Toilet: Handicap height  Bathroom Equipment: Built-in shower seat, Grab bars in shower, Commode  Bathroom Accessibility: Accessible  Home Equipment: Walker, 4 wheeled, Lift chair  ADL Assistance: Independent (aide supervises with bathing but pt able to manage per granddaughter, pt able to dress and toilet independently)  Homemaking Assistance: Needs assistance  Homemaking Responsibilities: No (home health aide manages)  Ambulation Assistance: Independent (mod I with 4ww household distances)  Transfer Assistance: Independent  Active : No  Occupation: Retired  Short Term Goals  Time Frame for [employer] Term Goals: 1 week  Short Term Goal 1: Pt will perform all bed mobility tasks with SBA, min cues  Short Term Goal 2: Pt will manage all compenents of rollator for STS with SBA. Short Term Goal 3: Pt will AMB x45 ft with RW, stable 02, CGA-SBA for safety     Electronically signed by:     Tomas Brito PTA  11/29/2022, 3:06 PM

## 2022-11-29 NOTE — PROGRESS NOTES
Infectious Disease Progress Note  2022   Patient Name: Js Lovell : 1927   Impression  Pyelonephritis secondary to Enterococcus faecalis Complicated UTI:  Multifocal Pneumonia and Pulmonary Edema Complicated by Acute Hypoxic Respiratory Failure:  Afebrile, no leukocytosis  CRP on DWT, patient appears to be improving as oxygen needs have decreased, ambulation has increased, breath sounds clear posteriorly with diminished bases, no urinary symptoms   -UA WBC 9, RBC none, Urine Culture: Enterococcus faecalis 50,000  -CT Chest WO Contrast: Bilateral pulmonary infiltrates and bilateral pleural effusions with probable   reactive mediastinal adenopathy   Probable granuloma in the right middle lobe. No follow-up imaging   recommended. Dr. Poppy Ward onboard for pulmonology, imp of possible atypical pneumonia  PAUL on CKD3:  Dr. Debbie Terry onboard  CAD/ HTN/ HLD/ Mod to Severe AS/ Mild to mod PHTN:  Dr. Casa Dalton onboard  11/15-Limited Echo: EF 55-60%, Mod to severe AS, mild to mod AR, mild to mod TR.   OA/ Eczema/ Gout:  Hypothyroidism:  Allergy to cephalexin (rash)  Multi-morbidity: per PMHx:  s/p hyster, colonoscopy  Plan:  Continue IV meropenem 500 mg q12h to complete a 14 day course (end date 22)  Follow up in ID clinic in 1 week please  Weekly labs drawn on Monday during the course of treatment  CBC with differential, CMP, ESR, CRP  [facility]  # [phone]   OK from ID standpoint to MS when ready    Ongoing Antimicrobial Therapy  Meropenem -? Completed Antimicrobial Therapy  Levofloxacin -?? History:? Interval history noted. Chief complaint: pyelonephritis. Multifocal pneumonia with acute hypoxic respiratory failure. Up in the chair, states is having a good day, denies any pain or dyspnea.    Physical Exam:  Vital Signs: BP (!) 171/65   Pulse 69   Temp 97.5 °F (36.4 °C)   Resp 15   Ht 5' (1.524 m)   Wt 140 lb 3.4 oz (63.6 kg)   LMP  (LMP Unknown)   SpO2 95%   BMI 27.38 kg/m²     Gen: remains A&O x 4, no distress  Chest: no distress and CTA. Posterior breath sounds clear with diminished bases. Oxygen per NC  Heart: NSR and no MRG. Abd: soft, non-distended, no tenderness, no hepatomegaly. Normoactive bowel sounds. Ext: no clubbing, cyanosis, or edema  Neuro: Mental status intact. CN 2-12 intact and no focal sensory or motor deficits     Radiologic / Imaging / TESTING  11/14/22 XR Chest Portable:  Impression   Cardiomegaly       Bilateral ill-defined pulmonary opacities could represent multifocal   pneumonia or edema      11/14/22 CT Chest WO Contrast:  Impression   Bilateral pulmonary infiltrates and bilateral pleural effusions with probable   reactive mediastinal adenopathy       Probable granuloma in the right middle lobe. No follow-up imaging   recommended. 11/14/22 CT Chest WO Contrast:  Impression   Bilateral pulmonary infiltrates and bilateral pleural effusions with probable   reactive mediastinal adenopathy       Probable granuloma in the right middle lobe. No follow-up imaging   recommended. 11/15/22 Limited Echo:   Summary   Left ventricular systolic function is normal.   Ejection fraction is visually estimated at 55-60%. Moderately dilated left atrium. Moderate to severe aortic stenosis is present; Mean PG 40mmHg, YANIRA 0.99 cm2   Mitral annular calcification is present. Mild to moderate mitral regurgitation. Mild to moderate tricuspid regurgitation; RVSP: 44 mmHg. No evidence of any pericardial effusion. 11/16/22 XR Chest Portable:  Impression   Extensive bilateral airspace opacities. Suspected small bilateral pleural effusions. 11/17/22 CT Abdomen Pelvis WO Contrast:  Impression   1. Negative for urinary tract stones. 2. Consolidation in the lower lobes and bilateral pleural effusions.      11/19/22 XR Chest Portable:  Impression   Multifocal airspace opacities are redemonstrated and not significantly   changed. Considerations would still be multifocal pneumonia or edema. Small amount of left pleural effusion and basilar atelectasis may also be   present.          11/21/22 XR Chest Portable:  Impression   Stable chest.         Labs:    Recent Results (from the past 24 hour(s))   Basic Metabolic Panel    Collection Time: 11/29/22  3:49 AM   Result Value Ref Range    Sodium 135 135 - 145 MMOL/L    Potassium 4.6 3.5 - 5.1 MMOL/L    Chloride 100 99 - 110 mMol/L    CO2 25 21 - 32 MMOL/L    Anion Gap 10 4 - 16    BUN 41 (H) 6 - 23 MG/DL    Creatinine 1.9 (H) 0.6 - 1.1 MG/DL    Est, Glom Filt Rate 24 (L) >60 mL/min/1.73m2    Glucose 85 70 - 99 MG/DL    Calcium 9.8 8.3 - 10.6 MG/DL       CULTURE results: Invalid input(s): BLOOD CULTURE,  URINE CULTURE, SURGICAL CULTURE    Diagnosis:  Patient Active Problem List   Diagnosis    Hypertension    GERD (gastroesophageal reflux disease)    Depression    Hyperlipidemia    Generalized osteoarthritis    Osteoporosis    Hypothyroid    Coronary artery disease involving native heart without angina pectoris    Eczema    Low back pain    Hyperuricemia    Gout    Pruritic condition    PAUL (acute kidney injury) (HCC)    Hyponatremia    Cystitis    Physical deconditioning    Bradycardia    LBBB (left bundle branch block)    Constipation, slow transit    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    Chronic renal impairment, stage 3 (moderate) ([facility])    Dizziness    Labyrinthitis    Vertigo    Primary osteoarthritis of left foot    Closed nondisplaced fracture of fourth metatarsal bone of left foot    Closed nondisplaced fracture of third metatarsal bone of left foot    Closed nondisplaced fracture of fifth left metatarsal bone    Vaginal prolapse    Sinus congestion    Localized edema    Sepsis (HCC)    Dyspnea and respiratory abnormalities    Pneumonia due to infectious organism    Pyelonephritis    Chronic kidney disease       Active Problems  Principal Problem:    Sepsis ([facility] [address]  Active Problems:    PAUL (acute kidney injury) ([facility] [address]    Dyspnea and respiratory abnormalities    Pneumonia due to infectious organism    Pyelonephritis    Chronic kidney disease  Resolved Problems:    * No resolved hospital problems. *    Electronically signed by: Electronically signed by Audie Cooley.  Nessa Gomez CNP on 11/29/2022 at 2:43 PM

## 2022-11-30 LAB
ANION GAP SERPL CALCULATED.3IONS-SCNC: 12 MMOL/L (ref 4–16)
BUN BLDV-MCNC: 47 MG/DL (ref 6–23)
CALCIUM SERPL-MCNC: 10.2 MG/DL (ref 8.3–10.6)
CHLORIDE BLD-SCNC: 102 MMOL/L (ref 99–110)
CO2: 24 MMOL/L (ref 21–32)
CREAT SERPL-MCNC: 2.3 MG/DL (ref 0.6–1.1)
GFR SERPL CREATININE-BSD FRML MDRD: 19 ML/MIN/1.73M2
GLUCOSE BLD-MCNC: 97 MG/DL (ref 70–99)
POTASSIUM SERPL-SCNC: 4.9 MMOL/L (ref 3.5–5.1)
SODIUM BLD-SCNC: 138 MMOL/L (ref 135–145)

## 2022-11-30 PROCEDURE — 99232 SBSQ HOSP IP/OBS MODERATE 35: CPT | Performed by: NURSE PRACTITIONER

## 2022-11-30 PROCEDURE — 6370000000 HC RX 637 (ALT 250 FOR IP): Performed by: INTERNAL MEDICINE

## 2022-11-30 PROCEDURE — 2580000003 HC RX 258

## 2022-11-30 PROCEDURE — 6360000002 HC RX W HCPCS: Performed by: NURSE PRACTITIONER

## 2022-11-30 PROCEDURE — 6370000000 HC RX 637 (ALT 250 FOR IP): Performed by: STUDENT IN AN ORGANIZED HEALTH CARE EDUCATION/TRAINING PROGRAM

## 2022-11-30 PROCEDURE — 6370000000 HC RX 637 (ALT 250 FOR IP)

## 2022-11-30 PROCEDURE — 80048 BASIC METABOLIC PNL TOTAL CA: CPT

## 2022-11-30 PROCEDURE — 6360000002 HC RX W HCPCS

## 2022-11-30 PROCEDURE — 94668 MNPJ CHEST WALL SBSQ: CPT

## 2022-11-30 PROCEDURE — 1200000000 HC SEMI PRIVATE

## 2022-11-30 PROCEDURE — 99232 SBSQ HOSP IP/OBS MODERATE 35: CPT | Performed by: INTERNAL MEDICINE

## 2022-11-30 PROCEDURE — 94640 AIRWAY INHALATION TREATMENT: CPT

## 2022-11-30 PROCEDURE — 94761 N-INVAS EAR/PLS OXIMETRY MLT: CPT

## 2022-11-30 PROCEDURE — 36415 COLL VENOUS BLD VENIPUNCTURE: CPT

## 2022-11-30 PROCEDURE — 2700000000 HC OXYGEN THERAPY PER DAY

## 2022-11-30 PROCEDURE — 2580000003 HC RX 258: Performed by: NURSE PRACTITIONER

## 2022-11-30 RX ADMIN — DIPHENHYDRAMINE HYDROCHLORIDE, ZINC ACETATE: 2; .1 CREAM TOPICAL at 23:08

## 2022-11-30 RX ADMIN — FUROSEMIDE 40 MG: 10 INJECTION, SOLUTION INTRAMUSCULAR; INTRAVENOUS at 00:01

## 2022-11-30 RX ADMIN — SODIUM CHLORIDE, PRESERVATIVE FREE 10 ML: 5 INJECTION INTRAVENOUS at 09:20

## 2022-11-30 RX ADMIN — SODIUM CHLORIDE, PRESERVATIVE FREE 10 ML: 5 INJECTION INTRAVENOUS at 23:09

## 2022-11-30 RX ADMIN — IPRATROPIUM BROMIDE AND ALBUTEROL SULFATE 1 AMPULE: .5; 3 SOLUTION RESPIRATORY (INHALATION) at 20:35

## 2022-11-30 RX ADMIN — HEPARIN SODIUM 5000 UNITS: 5000 INJECTION INTRAVENOUS; SUBCUTANEOUS at 15:06

## 2022-11-30 RX ADMIN — HEPARIN SODIUM 5000 UNITS: 5000 INJECTION INTRAVENOUS; SUBCUTANEOUS at 23:01

## 2022-11-30 RX ADMIN — CLOPIDOGREL BISULFATE 75 MG: 75 TABLET ORAL at 09:19

## 2022-11-30 RX ADMIN — Medication 1 CAPSULE: at 09:20

## 2022-11-30 RX ADMIN — DOCUSATE SODIUM 100 MG: 100 CAPSULE, LIQUID FILLED ORAL at 09:19

## 2022-11-30 RX ADMIN — PANTOPRAZOLE SODIUM 40 MG: 40 TABLET, DELAYED RELEASE ORAL at 06:07

## 2022-11-30 RX ADMIN — Medication 2000 UNITS: at 09:19

## 2022-11-30 RX ADMIN — DIPHENHYDRAMINE HYDROCHLORIDE, ZINC ACETATE: 2; .1 CREAM TOPICAL at 12:23

## 2022-11-30 RX ADMIN — RANOLAZINE 500 MG: 500 TABLET, FILM COATED, EXTENDED RELEASE ORAL at 09:20

## 2022-11-30 RX ADMIN — SENNOSIDES AND DOCUSATE SODIUM 1 TABLET: 50; 8.6 TABLET ORAL at 23:01

## 2022-11-30 RX ADMIN — ATORVASTATIN CALCIUM 20 MG: 10 TABLET, FILM COATED ORAL at 23:01

## 2022-11-30 RX ADMIN — SERTRALINE HYDROCHLORIDE 50 MG: 50 TABLET ORAL at 09:22

## 2022-11-30 RX ADMIN — RANOLAZINE 500 MG: 500 TABLET, FILM COATED, EXTENDED RELEASE ORAL at 23:01

## 2022-11-30 RX ADMIN — SODIUM BICARBONATE 325 MG: 650 TABLET ORAL at 09:20

## 2022-11-30 RX ADMIN — LEVOTHYROXINE SODIUM 88 MCG: 0.09 TABLET ORAL at 09:19

## 2022-11-30 RX ADMIN — IPRATROPIUM BROMIDE AND ALBUTEROL SULFATE 1 AMPULE: .5; 3 SOLUTION RESPIRATORY (INHALATION) at 09:01

## 2022-11-30 RX ADMIN — MEROPENEM 500 MG: 500 INJECTION, POWDER, FOR SOLUTION INTRAVENOUS at 19:28

## 2022-11-30 RX ADMIN — HEPARIN SODIUM 5000 UNITS: 5000 INJECTION INTRAVENOUS; SUBCUTANEOUS at 06:07

## 2022-11-30 RX ADMIN — Medication 3 MG: at 23:01

## 2022-11-30 RX ADMIN — Medication 1 DROP: at 09:22

## 2022-11-30 RX ADMIN — SENNOSIDES AND DOCUSATE SODIUM 1 TABLET: 50; 8.6 TABLET ORAL at 09:20

## 2022-11-30 RX ADMIN — POLYETHYLENE GLYCOL (3350) 17 G: 17 POWDER, FOR SOLUTION ORAL at 09:19

## 2022-11-30 RX ADMIN — IPRATROPIUM BROMIDE AND ALBUTEROL SULFATE 1 AMPULE: .5; 3 SOLUTION RESPIRATORY (INHALATION) at 16:22

## 2022-11-30 RX ADMIN — SODIUM CHLORIDE, PRESERVATIVE FREE 10 ML: 5 INJECTION INTRAVENOUS at 09:23

## 2022-11-30 RX ADMIN — DIPHENHYDRAMINE HYDROCHLORIDE, ZINC ACETATE: 2; .1 CREAM TOPICAL at 09:22

## 2022-11-30 RX ADMIN — Medication 3 MG: at 00:03

## 2022-11-30 RX ADMIN — ATENOLOL 25 MG: 25 TABLET ORAL at 19:24

## 2022-11-30 RX ADMIN — SODIUM BICARBONATE 325 MG: 650 TABLET ORAL at 23:01

## 2022-11-30 RX ADMIN — AMLODIPINE BESYLATE 10 MG: 10 TABLET ORAL at 23:01

## 2022-11-30 RX ADMIN — ISOSORBIDE MONONITRATE 60 MG: 60 TABLET, EXTENDED RELEASE ORAL at 09:19

## 2022-11-30 RX ADMIN — MEROPENEM 500 MG: 500 INJECTION, POWDER, FOR SOLUTION INTRAVENOUS at 06:17

## 2022-11-30 RX ADMIN — Medication 1 DROP: at 23:08

## 2022-11-30 RX ADMIN — DIPHENHYDRAMINE HYDROCHLORIDE, ZINC ACETATE: 2; .1 CREAM TOPICAL at 17:50

## 2022-11-30 ASSESSMENT — PAIN SCALES - WONG BAKER
WONGBAKER_NUMERICALRESPONSE: 0

## 2022-11-30 ASSESSMENT — PAIN SCALES - GENERAL
PAINLEVEL_OUTOF10: 0

## 2022-11-30 NOTE — CARE COORDINATION
Received call from [facility] stating patient has been denied again for admission to ARU. There is not a P2P option, but an appeal option if MD wishes to pursue again. Appeals phone #[phone] and fax #[phone]. Ref# [id number]. Left VM for Vickicarmela VERONICA to call back regarding above information and discuss the next steps to take. 1330:  Received VM from patients granddaughter stating [facility] nurse reviewer is requesting MD at [facility] contact information for MD at [place] to discuss case. Dr. Morrison Push contact and availability given to [facility] for P2P to be performed. 12:  Sent signed expedited appeal with updated clinical notes to [facility] expedited appeal dept. ARU expedited appeal is currently pending at this time. Will continue to follow for determination.

## 2022-11-30 NOTE — PROGRESS NOTES
Pulmonary and Critical Care  Progress Note      VITALS:  BP (!) 144/76   Pulse 69   Temp 97.6 °F (36.4 °C) (Oral)   Resp 18   Ht 5' (1.524 m)   Wt 140 lb 3.4 oz (63.6 kg)   LMP  (LMP Unknown)   SpO2 93%   BMI 27.38 kg/m²     Subjective:   CHIEF COMPLAINT :SOB     HPI:                The patient is a 80 y.o. female is sitting in the bed. She is not in acute resp distress. She is now on 2 L?min of oxygen    Objective:   PHYSICAL EXAM:    LUNGS:Occasional basal crackles  Abd-soft, BS+,NT  Ext- no pedal edema  CVS-s1s2, no murmurs      DATA:    CBC:  No results for input(s): WBC, RBC, HGB, HCT, PLT, MCV, MCH, MCHC, RDW, NRBC, SEGSPCT, BANDSPCT in the last 72 hours. BMP:  Recent Labs     11/28/22  0619 11/29/22  0349 11/30/22  0538    135 138   K 4.7 4.6 4.9    100 102   CO2 26 25 24   BUN 39* 41* 47*   CREATININE 1.9* 1.9* 2.3*   CALCIUM 9.9 9.8 10.2   GLUCOSE 89 85 97      ABG:  No results for input(s): PH, PO2ART, XVZ4RPK, HCO3, BEART, O2SAT in the last 72 hours.   BNP  Lab Results   Component Value Date     (H) 05/09/2012      D-Dimer:  Lab Results   Component Value Date    GUQMQX 169 (H) 01/07/2018      Radiology: None      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 08/18/2018     Priority: High    Bradycardia      Priority: High    LBBB (left bundle branch block)      Priority: High    PAUL (acute kidney injury) ([address] 12/26/2017     Priority: High    Pneumonia due to infectious organism 11/16/2022     Priority: Medium    Pyelonephritis 11/16/2022     Priority: Medium    Chronic kidney disease 11/16/2022     Priority: Medium    Dyspnea and respiratory abnormalities 11/15/2022     Priority: Medium    Sepsis ([address] 11/14/2022     Priority: Medium    Localized edema 07/11/2022     Priority: Medium    GERD (gastroesophageal reflux disease)      Priority: Medium     Overview Note:     Esophagitis      Hypertension Priority: Low    Hypothyroid      Priority: Low     Overview Note:     Hypothyroidism      Sinus congestion 03/23/2022     Overview Note:     CHRONIC W POST NASAL DRIP      Vaginal prolapse 09/07/2021    Primary osteoarthritis of left foot 08/30/2021    Closed nondisplaced fracture of fourth metatarsal bone of left foot 08/30/2021    Closed nondisplaced fracture of third metatarsal bone of left foot 08/30/2021    Closed nondisplaced fracture of fifth left metatarsal bone 08/30/2021    Vertigo      Overview Note:     Dr Odonnell [facility]      Dizziness     Labyrinthitis     Chronic renal impairment, stage 3 (moderate) (HCC)     Constipation, slow transit 06/19/2018    Cystitis     Physical deconditioning     Hyponatremia 01/01/2018    Pruritic condition 01/22/2016    Gout     Hyperuricemia     Low back pain     Eczema     Coronary artery disease involving native heart without angina pectoris      Overview Note:     Dr Rosendo Figueredo/Jose Figueredo      Depression     Hyperlipidemia     Generalized osteoarthritis     Osteoporosis      Overview Note:     Generalized       Acute Hypoxic resp failure sec to Cardiogenic and Non Cardiogenic Pulmonary edema  Small bilateral Pleural effusions  PAUL on CKD- slowly improving  Leukocytosis- Improved  Severe AS  Mild to Moderate Pulmonary HTN  CAD  H/o Recurrent UTI  GERD  HLD  ?  Atypical pneumonia     Hold Diuretics  Watch off Abx  CXR in am  Keep sats > 92%  ICS  OOB  PT/OT  Await placement  C.w present management    Electronically signed by Rimma Gillespie MD on 11/30/2022 at 11:44 AM

## 2022-11-30 NOTE — PLAN OF CARE
Problem: Discharge Planning  Goal: Discharge to home or other facility with appropriate resources  11/30/2022 1022 by Romario Winters LPN  Outcome: Progressing  11/30/2022 0131 by Leopoldo Ike, RN  Outcome: Progressing     Problem: Hematologic - Adult  Goal: Maintains hematologic stability  11/30/2022 1022 by Romario Winters LPN  Outcome: Progressing  11/30/2022 0131 by Leopoldo Ike, RN  Outcome: Progressing     Problem: Pain  Goal: Verbalizes/displays adequate comfort level or baseline comfort level  11/30/2022 1022 by Romario Winters LPN  Outcome: Progressing  11/30/2022 0131 by Leopoldo Ike, RN  Outcome: Progressing     Problem: Safety - Adult  Goal: Free from fall injury  11/30/2022 1022 by Romario Winters LPN  Outcome: Progressing  11/30/2022 0131 by Leopoldo Ike, RN  Outcome: Progressing     Problem: ABCDS Injury Assessment  Goal: Absence of physical injury  11/30/2022 1022 by Romario Winters LPN  Outcome: Progressing  Flowsheets (Taken 11/30/2022 0133 by Leopoldo Ike, RN)  Absence of Physical Injury: Implement safety measures based on patient assessment  11/30/2022 0131 by Leopoldo Ike, RN  Outcome: Progressing     Problem: Skin/Tissue Integrity  Goal: Absence of new skin breakdown  Description: 1. Monitor for areas of redness and/or skin breakdown  2. Assess vascular access sites hourly  3. Every 4-6 hours minimum:  Change oxygen saturation probe site  4. Every 4-6 hours:  If on nasal continuous positive airway pressure, respiratory therapy assess nares and determine need for appliance change or resting period.   11/30/2022 1022 by Romario Winters LPN  Outcome: Progressing  11/30/2022 0131 by Leopoldo Ike, RN  Outcome: Progressing     Problem: Nutrition Deficit:  Goal: Optimize nutritional status  11/30/2022 1022 by Romario Winters LPN  Outcome: Progressing  11/30/2022 0131 by Leopoldo Ike, RN  Outcome: Progressing

## 2022-11-30 NOTE — PROGRESS NOTES
Nephrology Progress Note        [address], [address] [place] [address]  Phone: [phone]  Office Hours: 8:30AM - 4:30PM  Monday - Friday 11/30/2022 8:04 AM  Subjective:   Admit Date: 11/14/2022  PCP: Bobbi Page MD  Interval History: On nc  Sitting in a chair  Diet: ADULT DIET; Dysphagia - Minced and Moist; Low Potassium (Less than 3000 mg/day); 1500 ml; Please provide extra moisteners and gravies  ADULT ORAL NUTRITION SUPPLEMENT; Dinner; Frozen Oral Supplement      Data:   Scheduled Meds:   ipratropium-albuterol  1 ampule Inhalation TID    diphenhydrAMINE-zinc acetate   Topical 4x daily    sennosides-docusate sodium  1 tablet Oral BID    meropenem  500 mg IntraVENous Q12H    polyethylene glycol  17 g Oral Daily    docusate sodium  100 mg Oral Daily    lactobacillus  1 capsule Oral Daily with breakfast    amLODIPine  10 mg Oral Nightly    atenolol  25 mg Oral Daily    sodium bicarbonate  325 mg Oral BID    clopidogrel  75 mg Oral Daily    sodium chloride flush  5-40 mL IntraVENous 2 times per day    sodium chloride flush  5-40 mL IntraVENous 2 times per day    heparin (porcine)  5,000 Units SubCUTAneous 3 times per day    Vitamin D  2,000 Units Oral Daily    pantoprazole  40 mg Oral QAM AC    fluorometholone  1 drop Both Eyes BID    isosorbide mononitrate  60 mg Oral Daily    levothyroxine  88 mcg Oral Daily    ranolazine  500 mg Oral BID    sertraline  50 mg Oral Daily    [Held by provider] losartan  12.5 mg Oral Daily    atorvastatin  20 mg Oral Nightly     Continuous Infusions:   sodium chloride 75 mL/hr at 11/24/22 1739    sodium chloride 20 mL/hr at 11/27/22 1640     PRN Meds:hydrOXYzine pamoate, melatonin, sodium chloride flush, sodium chloride, ondansetron **OR** ondansetron, polyethylene glycol, acetaminophen **OR** acetaminophen, sodium chloride flush, sodium chloride  No intake/output data recorded. No intake/output data recorded.   No intake or output data in the 24 hours ending 11/30/22 0804    CBC: No results for input(s): WBC, HGB, PLT in the last 72 hours. BMP:    Recent Labs     11/28/22  0619 11/29/22  0349 11/30/22  0538    135 138   K 4.7 4.6 4.9    100 102   CO2 26 25 24   BUN 39* 41* 47*   CREATININE 1.9* 1.9* 2.3*   GLUCOSE 89 85 97     Hepatic: No results for input(s): AST, ALT, ALB, BILITOT, ALKPHOS in the last 72 hours. Troponin: No results for input(s): TROPONINI in the last 72 hours. BNP: No results for input(s): BNP in the last 72 hours. Lipids: No results for input(s): CHOL, HDL in the last 72 hours. Invalid input(s): LDLCALCU  ABGs:   Lab Results   Component Value Date/Time    PO2ART 83 11/14/2022 11:30 PM    YGL3CXZ 29.0 11/14/2022 11:30 PM     INR: No results for input(s): INR in the last 72 hours.     Objective:   Vitals: BP (!) 159/71   Pulse 71   Temp 98.4 °F (36.9 °C) (Oral)   Resp 15   Ht 5' (1.524 m)   Wt 140 lb 3.4 oz (63.6 kg)   LMP  (LMP Unknown)   SpO2 94%   BMI 27.38 kg/m²   General appearance: alert and cooperative with exam, in no acute distress  HEENT: normocephalic, atraumatic,   Neck: supple, trachea midline  Lungs: breathing comfortably on nc  Heart[de-identified] regular rate and rhythm,   Extremities: extremities atraumatic, no cyanosis or edema  Neurologic: alert, oriented, follows commands, interactive    MEDICAL DECISION MAKING       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 08/18/2018    Bradycardia     LBBB (left bundle branch block)     PAUL (acute kidney injury) ([facility] 12/26/2017    Pneumonia due to infectious organism 11/16/2022    Pyelonephritis 11/16/2022    Chronic kidney disease 11/16/2022    Dyspnea and respiratory abnormalities 11/15/2022    Sepsis ([facility] 11/14/2022    Localized edema 07/11/2022    GERD (gastroesophageal reflux disease)     Hypertension     Hypothyroid     Sinus congestion 03/23/2022    Vaginal prolapse 09/07/2021    Primary osteoarthritis of left foot 08/30/2021    Closed nondisplaced fracture of fourth metatarsal bone of left foot 08/30/2021    Closed nondisplaced fracture of third metatarsal bone of left foot 08/30/2021    Closed nondisplaced fracture of fifth left metatarsal bone 08/30/2021    Vertigo     Dizziness     Labyrinthitis     Chronic renal impairment, stage 3 (moderate) (HCC)     Constipation, slow transit 06/19/2018    Cystitis     Physical deconditioning     Hyponatremia 01/01/2018    Pruritic condition 01/22/2016    Gout     Hyperuricemia     Low back pain     Eczema     Coronary artery disease involving native heart without angina pectoris     Depression     Hyperlipidemia     Generalized osteoarthritis     Osteoporosis      -Cr 2.3 this am, unclear why received lasix last night, please avoid overdiuresis//can we try compression socks first?  -Remains on abx  -Awaiting rehab    Thank you                Electronically signed by Mi Dixon DO on 11/30/2022 at 8:04 AM    [address],  [facility], [place] [address]  PHONE: [phone]  FAX: [phone]

## 2022-11-30 NOTE — CARE COORDINATION
New ARU pre-cert upon [facility]/patients family request still pending at this time. Will continue to follow for determination.

## 2022-11-30 NOTE — PROGRESS NOTES
Daily Progress Note  Subjective:  Awake and alert; feeling fair  Some SOB is present; back on O2  BP and HR stable; off tele  Waiting for precert to ARU    Attending Note:    Patient is awake alert  Feeling ok  Had issues last night  Leg edema received lasix, has stocking on   She was off her o2-last night   She is on 2 liters she is doing better  HTN and renal insuffiencey per renal  Severe AS refer for TAVR as outpatient if she is stable  Pneumonia improving  Waiting on rehab placement   Overall prognosis is guarded     Impression and Plan:   Sepsis    CAP, noted on CT chest    Respiratory panel negative    WBC count elevated    ABX per primary    Using 1L NC now-weaning slowly     Acute on Chronic HFpEF    BNP on admission was 6,417    Last echo showed EF 55-60%    Severe Aortic stenosis    Diuretics per renal at this time    Lasix given last night    Accurate I's and O's    1500 Fluid restriction     Plan to have her f/u with [facility] OP for potential valvuloplasty-will arrange this OP     CKD    Renal following; Avoid nephrotoxic medications     Stable from cardiac standpoint  Med. Tx. and increase activity  Awaiting placement      Most Recent Echo  11/15/2022   Left ventricular systolic function is normal.   Ejection fraction is visually estimated at 55-60%. Moderately dilated left atrium. Moderate to severe aortic stenosis is present; Mean PG 40mmHg, AYNIRA 0.99 cm2. Mitral annular calcification is present. Mild to moderate mitral regurgitation. Mild to moderate tricuspid regurgitation; RVSP: 44 mmHg. No evidence of any pericardial effusion. Radiology  CT chest 11/14/2022  Impression   Bilateral pulmonary infiltrates and bilateral pleural effusions with probable   reactive mediastinal adenopathy       Probable granuloma in the right middle lobe. No follow-up imaging   recommended.       PAST MEDICAL HISTORY:  The patient has a history of hypertension, renal  insufficiency present, and she has been treated medically. She is known  to have coronary artery disease also present. She has a chronic left  ventricular block present. She had a heart catheterization done in the  past and LAD had 70% stenosis present and she was treated medically at  that time. Anemia, hyperlipidemia, and arthritis present. PAST SURGICAL HISTORY:  She has a history of having hip surgery done. Moderate coronary artery disease, heart catheterization done in 2018,  LAD with 70% stenosis distally. She was treated medically for that. She had cataract surgery and hysterectomy done.     Objective:   BP (!) 144/76   Pulse 69   Temp 97.6 °F (36.4 °C) (Oral)   Resp 18   Ht 5' (1.524 m)   Wt 140 lb 3.4 oz (63.6 kg)   LMP  (LMP Unknown)   SpO2 93%   BMI 27.38 kg/m²   No intake or output data in the 24 hours ending 11/30/22 0943    Medications:   Scheduled Meds:   ipratropium-albuterol  1 ampule Inhalation TID    diphenhydrAMINE-zinc acetate   Topical 4x daily    sennosides-docusate sodium  1 tablet Oral BID    meropenem  500 mg IntraVENous Q12H    polyethylene glycol  17 g Oral Daily    docusate sodium  100 mg Oral Daily    lactobacillus  1 capsule Oral Daily with breakfast    amLODIPine  10 mg Oral Nightly    atenolol  25 mg Oral Daily    sodium bicarbonate  325 mg Oral BID    clopidogrel  75 mg Oral Daily    sodium chloride flush  5-40 mL IntraVENous 2 times per day    sodium chloride flush  5-40 mL IntraVENous 2 times per day    heparin (porcine)  5,000 Units SubCUTAneous 3 times per day    Vitamin D  2,000 Units Oral Daily    pantoprazole  40 mg Oral QAM AC    fluorometholone  1 drop Both Eyes BID    isosorbide mononitrate  60 mg Oral Daily    levothyroxine  88 mcg Oral Daily    ranolazine  500 mg Oral BID    sertraline  50 mg Oral Daily    [Held by provider] losartan  12.5 mg Oral Daily    atorvastatin  20 mg Oral Nightly      Infusions:   sodium chloride 75 mL/hr at 11/24/22 0253    sodium chloride 20 mL/hr at 11/27/22 1640      PRN Meds:  hydrOXYzine pamoate, melatonin, sodium chloride flush, sodium chloride, ondansetron **OR** ondansetron, polyethylene glycol, acetaminophen **OR** acetaminophen, sodium chloride flush, sodium chloride     Physical Exam:  Vitals:    11/30/22 0901   BP:    Pulse:    Resp:    Temp:    SpO2: 93%        General: AAO, NAD  Chest: Nontender  Cardiac: First and Second Heart Sounds are Normal, No Murmurs or Gallops noted  Lungs:Clear to auscultation and percussion. Abdomen: Soft, NT, ND, +BS  Extremities: No clubbing, no edema  Vascular:  Equal 2+ peripheral pulses. Lab Data:  CBC: No results for input(s): WBC, HGB, HCT, MCV, PLT in the last 72 hours. BMP:   Recent Labs     11/28/22  0619 11/29/22  0349 11/30/22  0538    135 138   K 4.7 4.6 4.9    100 102   CO2 26 25 24   BUN 39* 41* 47*   CREATININE 1.9* 1.9* 2.3*     LIVER PROFILE: No results for input(s): AST, ALT, LIPASE, BILIDIR, BILITOT, ALKPHOS in the last 72 hours. Invalid input(s): AMYLASE,  ALB  PT/INR: No results for input(s): PROTIME, INR in the last 72 hours. APTT: No results for input(s): APTT in the last 72 hours. BNP:  No results for input(s): BNP in the last 72 hours.       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 08/18/2018    Bradycardia     LBBB (left bundle branch block)     PAUL (acute kidney injury) ([facility] [address] 12/26/2017    Pneumonia due to infectious organism 11/16/2022    Pyelonephritis 11/16/2022    Chronic kidney disease 11/16/2022    Dyspnea and respiratory abnormalities 11/15/2022    Sepsis ([facility] [address] 11/14/2022    Localized edema 07/11/2022    GERD (gastroesophageal reflux disease)     Hypertension     Hypothyroid     Sinus congestion 03/23/2022    Vaginal prolapse 09/07/2021    Primary osteoarthritis of left foot 08/30/2021    Closed nondisplaced fracture of fourth metatarsal bone of left foot 08/30/2021    Closed nondisplaced fracture of third metatarsal bone of left foot 08/30/2021    Closed nondisplaced fracture of fifth left metatarsal bone 08/30/2021    Vertigo     Dizziness     Labyrinthitis     Chronic renal impairment, stage 3 (moderate) (HCC)     Constipation, slow transit 06/19/2018    Cystitis     Physical deconditioning     Hyponatremia 01/01/2018    Pruritic condition 01/22/2016    Gout     Hyperuricemia     Low back pain     Eczema     Coronary artery disease involving native heart without angina pectoris     Depression     Hyperlipidemia     Generalized osteoarthritis     Osteoporosis        Electronically signed by CLEMENT Downey CNP on 11/30/2022 at 9:43 AM    Electronically signed by Monroe Alexander MD on 11/30/22 at 11:00 AM EST

## 2022-11-30 NOTE — PROGRESS NOTES
Infectious Disease Progress Note  2022   Patient Name: Nigel Bolivar : 1927   Impression  Pyelonephritis secondary to Enterococcus faecalis Complicated UTI:  Multifocal Pneumonia and Pulmonary Edema Complicated by Acute Hypoxic Respiratory Failure:  Afebrile, no leukocytosis  CRP on DWT, patient appears to be improving as oxygen needs have decreased, ambulation has increased, breath sounds clear posteriorly with diminished bases, no urinary symptoms   -UA WBC 9, RBC none, Urine Culture: Enterococcus faecalis 50,000  -CT Chest WO Contrast: Bilateral pulmonary infiltrates and bilateral pleural effusions with probable   reactive mediastinal adenopathy   Probable granuloma in the right middle lobe. No follow-up imaging   recommended. Dr. Braydon Peterson onboard for pulmonology, imp of possible atypical pneumonia  PAUL on CKD3:  Dr. Ambika Henley onboard  CAD/ HTN/ HLD/ Mod to Severe AS/ Mild to mod PHTN:  Dr. Milad Hayes onboard  11/15-Limited Echo: EF 55-60%, Mod to severe AS, mild to mod AR, mild to mod TR.   OA/ Eczema/ Gout:  Hypothyroidism:  Allergy to cephalexin (rash)  Multi-morbidity: per PMHx:  s/p hyster, colonoscopy  Plan:  Continue IV meropenem 500 mg q12h to complete a 14 day course (end date 22)  OK from ID standpoint to DC when ready  Will sign off and not follow the patient actively, please reconsult as needed. Ongoing Antimicrobial Therapy  Meropenem -? Completed Antimicrobial Therapy  Levofloxacin -?? History:? Interval history noted. Chief complaint: pyelonephritis. Multifocal pneumonia with acute hypoxic respiratory failure. Stephenie Rasheed Physical Exam:  Vital Signs: BP (!) 144/76   Pulse 69   Temp 97.6 °F (36.4 °C) (Oral)   Resp 18   Ht 5' (1.524 m)   Wt 140 lb 3.4 oz (63.6 kg)   LMP  (LMP Unknown)   SpO2 93%   BMI 27.38 kg/m²     Gen: alert and oriented x 4  Chest: no distress and CTA. Posterior breath sounds clear with diminished bases.   Oxygen per NC  Heart: NSR and no MRG. Abd: soft, non-distended, no tenderness, no hepatomegaly. Normoactive bowel sounds. Ext: no clubbing, cyanosis, or edema  Neuro: Mental status intact. CN 2-12 intact and no focal sensory or motor deficits     Radiologic / Imaging / TESTING  11/14/22 XR Chest Portable:  Impression   Cardiomegaly       Bilateral ill-defined pulmonary opacities could represent multifocal   pneumonia or edema      11/14/22 CT Chest WO Contrast:  Impression   Bilateral pulmonary infiltrates and bilateral pleural effusions with probable   reactive mediastinal adenopathy       Probable granuloma in the right middle lobe. No follow-up imaging   recommended. 11/14/22 CT Chest WO Contrast:  Impression   Bilateral pulmonary infiltrates and bilateral pleural effusions with probable   reactive mediastinal adenopathy       Probable granuloma in the right middle lobe. No follow-up imaging   recommended. 11/15/22 Limited Echo:   Summary   Left ventricular systolic function is normal.   Ejection fraction is visually estimated at 55-60%. Moderately dilated left atrium. Moderate to severe aortic stenosis is present; Mean PG 40mmHg, YANIRA 0.99 cm2   Mitral annular calcification is present. Mild to moderate mitral regurgitation. Mild to moderate tricuspid regurgitation; RVSP: 44 mmHg. No evidence of any pericardial effusion. 11/16/22 XR Chest Portable:  Impression   Extensive bilateral airspace opacities. Suspected small bilateral pleural effusions. 11/17/22 CT Abdomen Pelvis WO Contrast:  Impression   1. Negative for urinary tract stones. 2. Consolidation in the lower lobes and bilateral pleural effusions. 11/19/22 XR Chest Portable:  Impression   Multifocal airspace opacities are redemonstrated and not significantly   changed. Considerations would still be multifocal pneumonia or edema. Small amount of left pleural effusion and basilar atelectasis may also be   present. 11/21/22 XR Chest Portable:  Impression   Stable chest.         Labs:    Recent Results (from the past 24 hour(s))   Basic Metabolic Panel    Collection Time: 11/30/22  5:38 AM   Result Value Ref Range    Sodium 138 135 - 145 MMOL/L    Potassium 4.9 3.5 - 5.1 MMOL/L    Chloride 102 99 - 110 mMol/L    CO2 24 21 - 32 MMOL/L    Anion Gap 12 4 - 16    BUN 47 (H) 6 - 23 MG/DL    Creatinine 2.3 (H) 0.6 - 1.1 MG/DL    Est, Glom Filt Rate 19 (L) >60 mL/min/1.73m2    Glucose 97 70 - 99 MG/DL    Calcium 10.2 8.3 - 10.6 MG/DL       CULTURE results: Invalid input(s): BLOOD CULTURE,  URINE CULTURE, SURGICAL CULTURE    Diagnosis:  Patient Active Problem List   Diagnosis    Hypertension    GERD (gastroesophageal reflux disease)    Depression    Hyperlipidemia    Generalized osteoarthritis    Osteoporosis    Hypothyroid    Coronary artery disease involving native heart without angina pectoris    Eczema    Low back pain    Hyperuricemia    Gout    Pruritic condition    PAUL (acute kidney injury) (HCC)    Hyponatremia    Cystitis    Physical deconditioning    Bradycardia    LBBB (left bundle branch block)    Constipation, slow transit    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    Chronic renal impairment, stage 3 (moderate) ([facility])    Dizziness    Labyrinthitis    Vertigo    Primary osteoarthritis of left foot    Closed nondisplaced fracture of fourth metatarsal bone of left foot    Closed nondisplaced fracture of third metatarsal bone of left foot    Closed nondisplaced fracture of fifth left metatarsal bone    Vaginal prolapse    Sinus congestion    Localized edema    Sepsis (HCC)    Dyspnea and respiratory abnormalities    Pneumonia due to infectious organism    Pyelonephritis    Chronic kidney disease       Active Problems  Principal Problem:    Sepsis ([address]  Active Problems:    PAUL (acute kidney injury) ([address]    Dyspnea and respiratory abnormalities    Pneumonia due to infectious organism    Pyelonephritis    Chronic kidney disease  Resolved Problems:    * No resolved hospital problems. *    Electronically signed by: Electronically signed by Emiliano Burnham.  CLEMENT Corley CNP on 11/30/2022 at 4:12 PM

## 2022-11-30 NOTE — PROGRESS NOTES
Comprehensive Nutrition Assessment    Type and Reason for Visit:  Reassess    Nutrition Recommendations/Plan:   Continue current diet and oral nutrition supplement as ordered   Monitor plan of care, lytes, labs, fluids, wts, po intakes      Malnutrition Assessment:  Malnutrition Status: At risk for malnutrition (Comment) (11/21/22 1207)    Context:  Social/Environmental Circumstances     Nutrition Assessment:    Continues on minced and moist diet, consuming %. Electrolytes are within limited. No N/V/P. Will monitor as low nutrition risk at this time. Nutrition Related Findings:    Pt enjoys sitting up in her chair. Pt was denied to ARU. Wound Type: None       Current Nutrition Intake & Therapies:    Average Meal Intake: %  Average Supplements Intake: %  ADULT DIET; Dysphagia - Minced and Moist; Low Potassium (Less than 3000 mg/day); 1500 ml; Please provide extra moisteners and gravies  ADULT ORAL NUTRITION SUPPLEMENT; Dinner; Frozen Oral Supplement    Anthropometric Measures:  Height: 5' (152.4 cm)  Ideal Body Weight (IBW): 100 lbs (45 kg)       Current Body Weight: 140 lb 3.4 oz (63.6 kg), 140.2 % IBW. Weight Source: Bed Scale  Current BMI (kg/m2): 27.4  Usual Body Weight: 118 lb (53.5 kg) (per loved one at bedside.)  % Weight Change (Calculated): 13.8  Weight Adjustment For: No Adjustment                 BMI Categories: Overweight (BMI 25.0-29. 9)    Estimated Daily Nutrient Needs:  Energy Requirements Based On: Kcal/kg  Weight Used for Energy Requirements: Current  Energy (kcal/day): 2036-8049 (22-25 kcals/kg ABW)  Weight Used for Protein Requirements: Ideal  Protein (g/day): 45-54 (1-1.2 g/kg)  Method Used for Fluid Requirements: 1 ml/kcal  Fluid (ml/day): at least 1500, 1500 per MD FR    Nutrition Diagnosis:   Inadequate oral intake related to biting/chewing (masticatory) difficulty as evidenced by intake 26-50% in beginning of stay    Nutrition Interventions:   Food and/or Nutrient Delivery: Continue Current Diet, Continue Oral Nutrition Supplement  Nutrition Education/Counseling: No recommendation at this time  Coordination of Nutrition Care: Continue to monitor while inpatient, Feeding Assistance/Environment Change, Speech Therapy, Swallow Evaluation, Coordination of Care  Plan of Care discussed with: pt and loved ones at bedside    Goals:  Previous Goal Met: Progressing toward Goal(s)  Goals: Meet at least 75% of estimated needs       Nutrition Monitoring and Evaluation:   Behavioral-Environmental Outcomes: None Identified  Food/Nutrient Intake Outcomes: Diet Advancement/Tolerance, Food and Nutrient Intake  Physical Signs/Symptoms Outcomes: Biochemical Data, Chewing or Swallowing, GI Status, Meal Time Behavior, Weight, Nutrition Focused Physical Findings    Discharge Planning:    Continue current diet     Hansa Garcia RD, LD  Contact: [phone]

## 2022-12-01 ENCOUNTER — APPOINTMENT (OUTPATIENT)
Dept: GENERAL RADIOLOGY | Age: 87
DRG: 871 | End: 2022-12-01
Payer: COMMERCIAL

## 2022-12-01 LAB
ANION GAP SERPL CALCULATED.3IONS-SCNC: 11 MMOL/L (ref 4–16)
BUN BLDV-MCNC: 46 MG/DL (ref 6–23)
CALCIUM SERPL-MCNC: 10 MG/DL (ref 8.3–10.6)
CHLORIDE BLD-SCNC: 102 MMOL/L (ref 99–110)
CO2: 25 MMOL/L (ref 21–32)
CREAT SERPL-MCNC: 2.1 MG/DL (ref 0.6–1.1)
GFR SERPL CREATININE-BSD FRML MDRD: 21 ML/MIN/1.73M2
GLUCOSE BLD-MCNC: 89 MG/DL (ref 70–99)
POTASSIUM SERPL-SCNC: 4.6 MMOL/L (ref 3.5–5.1)
SODIUM BLD-SCNC: 138 MMOL/L (ref 135–145)

## 2022-12-01 PROCEDURE — 94761 N-INVAS EAR/PLS OXIMETRY MLT: CPT

## 2022-12-01 PROCEDURE — 71045 X-RAY EXAM CHEST 1 VIEW: CPT

## 2022-12-01 PROCEDURE — 97530 THERAPEUTIC ACTIVITIES: CPT

## 2022-12-01 PROCEDURE — 80048 BASIC METABOLIC PNL TOTAL CA: CPT

## 2022-12-01 PROCEDURE — 6370000000 HC RX 637 (ALT 250 FOR IP): Performed by: INTERNAL MEDICINE

## 2022-12-01 PROCEDURE — 6370000000 HC RX 637 (ALT 250 FOR IP)

## 2022-12-01 PROCEDURE — 6360000002 HC RX W HCPCS

## 2022-12-01 PROCEDURE — 97116 GAIT TRAINING THERAPY: CPT

## 2022-12-01 PROCEDURE — 2700000000 HC OXYGEN THERAPY PER DAY

## 2022-12-01 PROCEDURE — 99231 SBSQ HOSP IP/OBS SF/LOW 25: CPT | Performed by: INTERNAL MEDICINE

## 2022-12-01 PROCEDURE — 2580000003 HC RX 258

## 2022-12-01 PROCEDURE — 94640 AIRWAY INHALATION TREATMENT: CPT

## 2022-12-01 PROCEDURE — 6370000000 HC RX 637 (ALT 250 FOR IP): Performed by: NURSE PRACTITIONER

## 2022-12-01 PROCEDURE — 1200000000 HC SEMI PRIVATE

## 2022-12-01 PROCEDURE — 36415 COLL VENOUS BLD VENIPUNCTURE: CPT

## 2022-12-01 PROCEDURE — 6360000002 HC RX W HCPCS: Performed by: NURSE PRACTITIONER

## 2022-12-01 PROCEDURE — 2580000003 HC RX 258: Performed by: NURSE PRACTITIONER

## 2022-12-01 PROCEDURE — 6370000000 HC RX 637 (ALT 250 FOR IP): Performed by: STUDENT IN AN ORGANIZED HEALTH CARE EDUCATION/TRAINING PROGRAM

## 2022-12-01 RX ADMIN — POLYETHYLENE GLYCOL (3350) 17 G: 17 POWDER, FOR SOLUTION ORAL at 09:31

## 2022-12-01 RX ADMIN — DIPHENHYDRAMINE HYDROCHLORIDE, ZINC ACETATE: 2; .1 CREAM TOPICAL at 09:32

## 2022-12-01 RX ADMIN — Medication 1 DROP: at 09:33

## 2022-12-01 RX ADMIN — DIPHENHYDRAMINE HYDROCHLORIDE, ZINC ACETATE: 2; .1 CREAM TOPICAL at 17:14

## 2022-12-01 RX ADMIN — DOCUSATE SODIUM 100 MG: 100 CAPSULE, LIQUID FILLED ORAL at 09:31

## 2022-12-01 RX ADMIN — SODIUM CHLORIDE, PRESERVATIVE FREE 10 ML: 5 INJECTION INTRAVENOUS at 09:34

## 2022-12-01 RX ADMIN — HEPARIN SODIUM 5000 UNITS: 5000 INJECTION INTRAVENOUS; SUBCUTANEOUS at 17:13

## 2022-12-01 RX ADMIN — ISOSORBIDE MONONITRATE 60 MG: 60 TABLET, EXTENDED RELEASE ORAL at 09:31

## 2022-12-01 RX ADMIN — IPRATROPIUM BROMIDE AND ALBUTEROL SULFATE 1 AMPULE: .5; 3 SOLUTION RESPIRATORY (INHALATION) at 21:11

## 2022-12-01 RX ADMIN — LEVOTHYROXINE SODIUM 88 MCG: 0.09 TABLET ORAL at 06:40

## 2022-12-01 RX ADMIN — Medication 1 DROP: at 21:54

## 2022-12-01 RX ADMIN — DIPHENHYDRAMINE HYDROCHLORIDE, ZINC ACETATE: 2; .1 CREAM TOPICAL at 13:00

## 2022-12-01 RX ADMIN — RANOLAZINE 500 MG: 500 TABLET, FILM COATED, EXTENDED RELEASE ORAL at 09:31

## 2022-12-01 RX ADMIN — PANTOPRAZOLE SODIUM 40 MG: 40 TABLET, DELAYED RELEASE ORAL at 06:40

## 2022-12-01 RX ADMIN — HEPARIN SODIUM 5000 UNITS: 5000 INJECTION INTRAVENOUS; SUBCUTANEOUS at 21:55

## 2022-12-01 RX ADMIN — IPRATROPIUM BROMIDE AND ALBUTEROL SULFATE 1 AMPULE: .5; 3 SOLUTION RESPIRATORY (INHALATION) at 09:41

## 2022-12-01 RX ADMIN — SENNOSIDES AND DOCUSATE SODIUM 1 TABLET: 50; 8.6 TABLET ORAL at 09:32

## 2022-12-01 RX ADMIN — ATORVASTATIN CALCIUM 20 MG: 10 TABLET, FILM COATED ORAL at 21:54

## 2022-12-01 RX ADMIN — RANOLAZINE 500 MG: 500 TABLET, FILM COATED, EXTENDED RELEASE ORAL at 21:54

## 2022-12-01 RX ADMIN — ATENOLOL 25 MG: 25 TABLET ORAL at 17:13

## 2022-12-01 RX ADMIN — SODIUM CHLORIDE, PRESERVATIVE FREE 10 ML: 5 INJECTION INTRAVENOUS at 21:55

## 2022-12-01 RX ADMIN — HYDROXYZINE PAMOATE 25 MG: 25 CAPSULE ORAL at 22:05

## 2022-12-01 RX ADMIN — HEPARIN SODIUM 5000 UNITS: 5000 INJECTION INTRAVENOUS; SUBCUTANEOUS at 06:40

## 2022-12-01 RX ADMIN — SODIUM BICARBONATE 325 MG: 650 TABLET ORAL at 21:55

## 2022-12-01 RX ADMIN — CLOPIDOGREL BISULFATE 75 MG: 75 TABLET ORAL at 09:31

## 2022-12-01 RX ADMIN — SENNOSIDES AND DOCUSATE SODIUM 1 TABLET: 50; 8.6 TABLET ORAL at 21:54

## 2022-12-01 RX ADMIN — DIPHENHYDRAMINE HYDROCHLORIDE, ZINC ACETATE: 2; .1 CREAM TOPICAL at 21:55

## 2022-12-01 RX ADMIN — Medication 2000 UNITS: at 09:32

## 2022-12-01 RX ADMIN — Medication 1 CAPSULE: at 09:31

## 2022-12-01 RX ADMIN — SODIUM BICARBONATE 325 MG: 650 TABLET ORAL at 09:31

## 2022-12-01 RX ADMIN — SERTRALINE HYDROCHLORIDE 50 MG: 50 TABLET ORAL at 09:31

## 2022-12-01 RX ADMIN — AMLODIPINE BESYLATE 10 MG: 10 TABLET ORAL at 21:54

## 2022-12-01 RX ADMIN — MEROPENEM 500 MG: 500 INJECTION, POWDER, FOR SOLUTION INTRAVENOUS at 06:44

## 2022-12-01 RX ADMIN — Medication 3 MG: at 22:04

## 2022-12-01 ASSESSMENT — PAIN SCALES - WONG BAKER: WONGBAKER_NUMERICALRESPONSE: 0

## 2022-12-01 NOTE — PROGRESS NOTES
Pulmonary and Critical Care  Progress Note      VITALS:  BP (!) 151/63   Pulse 68   Temp 98.2 °F (36.8 °C)   Resp 16   Ht 5' (1.524 m)   Wt 140 lb 3.4 oz (63.6 kg)   LMP  (LMP Unknown)   SpO2 96%   BMI 27.38 kg/m²     Subjective:   CHIEF COMPLAINT :SOB     HPI:                The patient is a 80 y.o. female is sitting in the chair. She is not in acute resp distress    Objective:   PHYSICAL EXAM:    LUNGS:Occasional basal crackles  Abd-soft, BS+,NT  Ext- no pedal edema  CVS-s1s2, no murmurs      DATA:    CBC:  No results for input(s): WBC, RBC, HGB, HCT, PLT, MCV, MCH, MCHC, RDW, NRBC, SEGSPCT, BANDSPCT in the last 72 hours. BMP:  Recent Labs     11/29/22  0349 11/30/22  0538 12/01/22  0321    138 138   K 4.6 4.9 4.6    102 102   CO2 25 24 25   BUN 41* 47* 46*   CREATININE 1.9* 2.3* 2.1*   CALCIUM 9.8 10.2 10.0   GLUCOSE 85 97 89      ABG:  No results for input(s): PH, PO2ART, UDT6KJA, HCO3, BEART, O2SAT in the last 72 hours.   BNP  Lab Results   Component Value Date     (H) 05/09/2012      D-Dimer:  Lab Results   Component Value Date    QHDFJO 635 (H) 01/07/2018      Radiology: None      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 08/18/2018     Priority: High    Bradycardia      Priority: High    LBBB (left bundle branch block)      Priority: High    PAUL (acute kidney injury) ([facility] [address] 12/26/2017     Priority: High    Pneumonia due to infectious organism 11/16/2022     Priority: Medium    Pyelonephritis 11/16/2022     Priority: Medium    Chronic kidney disease 11/16/2022     Priority: Medium    Dyspnea and respiratory abnormalities 11/15/2022     Priority: Medium    Sepsis ([facility] [address] 11/14/2022     Priority: Medium    Localized edema 07/11/2022     Priority: Medium    GERD (gastroesophageal reflux disease)      Priority: Medium     Overview Note:     Esophagitis      Hypertension      Priority: Low    Hypothyroid Priority: Low     Overview Note:     Hypothyroidism      Sinus congestion 03/23/2022     Overview Note:     CHRONIC W POST NASAL DRIP      Vaginal prolapse 09/07/2021    Primary osteoarthritis of left foot 08/30/2021    Closed nondisplaced fracture of fourth metatarsal bone of left foot 08/30/2021    Closed nondisplaced fracture of third metatarsal bone of left foot 08/30/2021    Closed nondisplaced fracture of fifth left metatarsal bone 08/30/2021    Vertigo      Overview Note:      [facility]      Dizziness     Labyrinthitis     Chronic renal impairment, stage 3 (moderate) (HCC)     Constipation, slow transit 06/19/2018    Cystitis     Physical deconditioning     Hyponatremia 01/01/2018    Pruritic condition 01/22/2016    Gout     Hyperuricemia     Low back pain     Eczema     Coronary artery disease involving native heart without angina pectoris      Overview Note:     Dr Steffanie Figueredo/Jose Figueredo      Depression     Hyperlipidemia     Generalized osteoarthritis     Osteoporosis      Overview Note:     Generalized     Acute Hypoxic resp failure sec to Cardiogenic and Non Cardiogenic Pulmonary edema  Small bilateral Pleural effusions  PAUL on CKD- slowly improving  Leukocytosis- Improved  Severe AS  Mild to Moderate Pulmonary HTN  CAD  H/o Recurrent UTI  GERD  HLD  ?  Atypical pneumonia       Inhalers  ICS  OOB  PT/OT  Keep sats > 92%  Await placement  C.w present management    Electronically signed by Adina Fournier MD on 12/1/2022 at 1:06 PM

## 2022-12-01 NOTE — PROGRESS NOTES
V2.0  [facility] [place], [place] Hospitalist Progress Note      Name:  Maria Luz Alan /Age/Sex: 1927  (80 y.o. female)   MRN & CSN:  [id number] & [id number] Encounter Date/Time: 2022 2:01 PM EST    Location:  [address] [place] [address] PCP: Chuy Roche MD       Hospital Day: 18    Assessment and Plan:    Maria Luz Alan is a 80 y.o. female with PMH of CAD, GERD, UTI, Hypothyroidism, HTN, Hyperlipedmia, CKD who presents with Sepsis ([facility] [address]    #Acute hypoxic respiratory failure 2/2 CAP and pulmonary edema-resolving  -Blood cultures negative  -Chest x-ray showing pulmonary edema and concern for pneumonia  -Echo: EF of 55 to 60% with RVSP of 44  -Negative respiratory PCR  -CT chest showing bilateral infiltrates  - On 2L/min when seen - apparently desaturated to 85-86% with off O2  - Not on O2 at home  - S/p meropenem, ID signed off    Plan:  Pulmonology following, appreciate recs  Continue DuoNebs    #Generalized pruritus 2/2 likely xerosis 2/2 AC - Resolved  -likely in the setting of dry air (patient uses humidifier at home)  -no jaundice, no rash    Plan:  Emollients     #Pyelonephritis with history of recurrent UTI - Improved  -Received ciprofloxacin, doxycycline and nitrofurantoin without relief  -Urine culture growing Enterococcus faecalis sensitive to ampicillin  - S/P meropenem     #Acute decompensated HFpEF-resolving  -Chest x-ray with pulmonary edema and elevated RVSP    Plan:  IV diuresis as needed  Strict I's and O  Daily weights    #PAUL on CKD likely prerenal in the setting of diuresis - Stable  -Baseline creatinine around 2  - Cr 2.1 today and stable     Nephrology following, appreciate recommendations  Strict I's and O's  Family refused HD  Holding diuresis    #Suspected aspiration--> ruled out  -Seen by SLP, did not have any signs or symptoms of aspiration    Plan:  Continue to monitor    #Hyperkalemia-resolved  -Received Lokelma    Plan:  Continue monitoring BMP    #Elevated troponin likely from CKD and demand ischemia  #CAD  -Down trended  -EKG with LBBB    Plan:  Cardiology on consult, appreciate recommendations  Continue atenolol, amlodipine, Imdur, statin and aspirin  Holding telmisartan in the setting of PAUL    Chronic medical problems:    #Severe AS  -Follow-up at [facility] for TAVR balloon valvuloplasty    #Hypertension  Plan: Continue home meds except for on losartan    #GERD  Continue PPI    PT and OT both feel patient will benefit from ARU. Expedited appeal signed. Patient's LOS will likely be longer at SNF due to less rehab. Needs continuous monitoring of Cr and O2 saturation - would benefit with inpatient rehab. Diet ADULT DIET; Dysphagia - Minced and Moist; Low Potassium (Less than 3000 mg/day); 1500 ml; Please provide extra moisteners and gravies  ADULT ORAL NUTRITION SUPPLEMENT; Dinner; Frozen Oral Supplement   DVT Prophylaxis [] Lovenox, [x]  Heparin, [] SCDs, [] Ambulation,  [] Eliquis, [] Xarelto  [] Coumadin   Code Status Limited   Disposition From: home  Expected Disposition: acute rehab  Estimated Date of Discharge: Ready for discharge. Appeal pending   Surrogate Decision Maker/ CANDIS Rico     Subjective:     Chief Complaint: Shortness of Breath     Patient feeling well today. No complaints. On 2L/min when seen. No new complaints       Review of Systems:    General: No fever, no chills  Heart: No chest pain, no palpitations  Lungs: No shortness of breath, no cough  Abdomen: No abdominal pain, no nausea, no vomiting, no constipation, no diarrhea  : No frequency, no dysuria, no urgency  MSK: No lower extremity swelling  Neuro: No confusion  Skin: No rashes      Objective:   No intake or output data in the 24 hours ending 12/01/22 1513       Vitals:   Vitals:    12/01/22 1458   BP: (!) 175/74   Pulse: 68   Resp: 14   Temp: 97.9 °F (36.6 °C)   SpO2: 97%       Physical Exam:     General: NAD. On 2/min O2 when seen. Eyes: EOMI  HENT: Atraumatic  Respiratory: no respiratory distress.  B/L equal air entry. No wheeze or rhonchi  GI: nondistended. BS normal.   CVS: S1 + S2. Equal peripheral pulses.    MSK: no lower extremity edema  Skin: Intact, dry, warm, no rashes  Neuro: CN II to XII grossly intact  Psych: Normal mood    Medications:   Medications:    ipratropium-albuterol  1 ampule Inhalation TID    diphenhydrAMINE-zinc acetate   Topical 4x daily    sennosides-docusate sodium  1 tablet Oral BID    polyethylene glycol  17 g Oral Daily    docusate sodium  100 mg Oral Daily    lactobacillus  1 capsule Oral Daily with breakfast    amLODIPine  10 mg Oral Nightly    atenolol  25 mg Oral Daily    sodium bicarbonate  325 mg Oral BID    clopidogrel  75 mg Oral Daily    sodium chloride flush  5-40 mL IntraVENous 2 times per day    sodium chloride flush  5-40 mL IntraVENous 2 times per day    heparin (porcine)  5,000 Units SubCUTAneous 3 times per day    Vitamin D  2,000 Units Oral Daily    pantoprazole  40 mg Oral QAM AC    fluorometholone  1 drop Both Eyes BID    isosorbide mononitrate  60 mg Oral Daily    levothyroxine  88 mcg Oral Daily    ranolazine  500 mg Oral BID    sertraline  50 mg Oral Daily    [Held by provider] losartan  12.5 mg Oral Daily    atorvastatin  20 mg Oral Nightly      Infusions:    sodium chloride 75 mL/hr at 11/24/22 1739    sodium chloride 20 mL/hr at 11/27/22 1640     PRN Meds: hydrOXYzine pamoate, 25 mg, TID PRN  melatonin, 3 mg, Nightly PRN  sodium chloride flush, 5-40 mL, PRN  sodium chloride, , PRN  ondansetron, 4 mg, Q8H PRN   Or  ondansetron, 4 mg, Q6H PRN  polyethylene glycol, 17 g, Daily PRN  acetaminophen, 650 mg, Q6H PRN   Or  acetaminophen, 650 mg, Q6H PRN  sodium chloride flush, 5-40 mL, PRN  sodium chloride, , PRN      Labs      Recent Results (from the past 24 hour(s))   Basic Metabolic Panel    Collection Time: 12/01/22  3:21 AM   Result Value Ref Range    Sodium 138 135 - 145 MMOL/L    Potassium 4.6 3.5 - 5.1 MMOL/L    Chloride 102 99 - 110 mMol/L    CO2 25 21 - 32 MMOL/L    Anion Gap 11 4 - 16    BUN 46 (H) 6 - 23 MG/DL    Creatinine 2.1 (H) 0.6 - 1.1 MG/DL    Est, Glom Filt Rate 21 (L) >60 mL/min/1.73m2    Glucose 89 70 - 99 MG/DL    Calcium 10.0 8.3 - 10.6 MG/DL          Imaging/Diagnostics Last 24 Hours   XR CHEST PORTABLE    Result Date: 11/21/2022  EXAMINATION: ONE XRAY VIEW OF THE CHEST 11/21/2022 12:19 pm COMPARISON: 11/17/2022. HISTORY: ORDERING SYSTEM PROVIDED HISTORY: sob TECHNOLOGIST PROVIDED HISTORY: Reason for exam:->sob Reason for Exam:  sob FINDINGS: Cardiomediastinal silhouette is stable. Similar bilateral airspace opacities. No pleural effusion or pneumothorax. No gross bony abnormality.      Stable chest.       Electronically signed by Olga Diaz MD on 12/1/2022 at 3:13 PM

## 2022-12-01 NOTE — PLAN OF CARE
Received call from physician at [facility] - she stated that they have received the appeal and are reviewing it. She stated that patient was unlikely to win the appeal due to walking 45 feet with CGA.      Waiting for the final appeal verdict     Electronically signed by Lilo Paige MD on 12/1/2022 at 1:55 PM

## 2022-12-01 NOTE — PLAN OF CARE
Problem: Discharge Planning  Goal: Discharge to home or other facility with appropriate resources  12/1/2022 0015 by Marcy Jimenez RN  Outcome: Progressing  11/30/2022 1022 by Lisa Gibson LPN  Outcome: Progressing     Problem: Hematologic - Adult  Goal: Maintains hematologic stability  12/1/2022 0015 by Marcy Jimenez RN  Outcome: Progressing  11/30/2022 1022 by Lisa Gibson LPN  Outcome: Progressing     Problem: Pain  Goal: Verbalizes/displays adequate comfort level or baseline comfort level  12/1/2022 0015 by Marcy Jimenez RN  Outcome: Progressing  11/30/2022 1022 by Lisa Gibson LPN  Outcome: Progressing     Problem: Safety - Adult  Goal: Free from fall injury  12/1/2022 0015 by Marcy Jimenez RN  Outcome: Progressing  11/30/2022 1022 by Lisa Gibson LPN  Outcome: Progressing     Problem: ABCDS Injury Assessment  Goal: Absence of physical injury  12/1/2022 0015 by Marcy Jimenez RN  Outcome: Progressing  11/30/2022 1022 by Lisa Gibson LPN  Outcome: Progressing  Flowsheets (Taken 11/30/2022 0133 by Marcy Jimenez RN)  Absence of Physical Injury: Implement safety measures based on patient assessment     Problem: Skin/Tissue Integrity  Goal: Absence of new skin breakdown  Description: 1. Monitor for areas of redness and/or skin breakdown  2. Assess vascular access sites hourly  3. Every 4-6 hours minimum:  Change oxygen saturation probe site  4. Every 4-6 hours:  If on nasal continuous positive airway pressure, respiratory therapy assess nares and determine need for appliance change or resting period.   12/1/2022 0015 by Marcy Jimeenz RN  Outcome: Progressing  11/30/2022 1022 by Lisa Gibson LPN  Outcome: Progressing     Problem: Nutrition Deficit:  Goal: Optimize nutritional status  12/1/2022 0015 by Marcy Jimenez RN  Outcome: Progressing  Flowsheets (Taken 11/30/2022 1445 by Donah Boeck, RD, LD)  Nutrient intake appropriate for improving, restoring, or maintaining nutritional needs:  Monitor oral intake, labs, and treatment plans  11/30/2022 1022 by Johnathan Sutherland LPN  Outcome: Progressing

## 2022-12-01 NOTE — PROGRESS NOTES
Daily Progress Note  Subjective:  Awake and alert; up in chair  Denies any CP or SOB  BP and HR Stable  Waiting on placement    Attending Note:    Doing ok no chest pain no dyspnea  Heart rate and BP stable  No issues over night  Continue with leg stocking  Diuretics and BP meds  per renal  Waiting on rehab  Will consider TAVR for severe AS-as outpatient    Impression and Plan:   Sepsis    CAP, noted on CT chest    Respiratory panel negative    WBC count elevated    ABX per primary    Using 1L NC now-weaning slowly    CXR showing improvement 11/30/2022     Acute on Chronic HFpEF    BNP on admission was 6,417    Last echo showed EF 55-60%    Severe Aortic stenosis    Diuretics per renal at this time    Lasix given the other night    Accurate I's and O's    1500 Fluid restriction     Plan to have her f/u with [facility] OP for potential valvuloplasty-will arrange this OP     CKD    Renal following; Avoid nephrotoxic medications     Stable from cardiac standpoint  Med. Tx. and increase activity  Awaiting placement      Most Recent Echo  11/15/2022   Left ventricular systolic function is normal.   Ejection fraction is visually estimated at 55-60%. Moderately dilated left atrium. Moderate to severe aortic stenosis is present; Mean PG 40mmHg, YANIRA 0.99 cm2. Mitral annular calcification is present. Mild to moderate mitral regurgitation. Mild to moderate tricuspid regurgitation; RVSP: 44 mmHg. No evidence of any pericardial effusion. Radiology  CT chest 11/14/2022  Impression   Bilateral pulmonary infiltrates and bilateral pleural effusions with probable   reactive mediastinal adenopathy       Probable granuloma in the right middle lobe. No follow-up imaging   recommended. PAST MEDICAL HISTORY:  The patient has a history of hypertension, renal  insufficiency present, and she has been treated medically. She is known  to have coronary artery disease also present.   She has a chronic left  ventricular block present. She had a heart catheterization done in the  past and LAD had 70% stenosis present and she was treated medically at  that time. Anemia, hyperlipidemia, and arthritis present. PAST SURGICAL HISTORY:  She has a history of having hip surgery done. Moderate coronary artery disease, heart catheterization done in 2018,  LAD with 70% stenosis distally. She was treated medically for that. She had cataract surgery and hysterectomy done.        Objective:   BP (!) 151/63   Pulse 62   Temp 98.2 °F (36.8 °C)   Resp 16   Ht 5' (1.524 m)   Wt 140 lb 3.4 oz (63.6 kg)   LMP  (LMP Unknown)   SpO2 95%   BMI 27.38 kg/m²   No intake or output data in the 24 hours ending 12/01/22 0826    Medications:   Scheduled Meds:   ipratropium-albuterol  1 ampule Inhalation TID    diphenhydrAMINE-zinc acetate   Topical 4x daily    sennosides-docusate sodium  1 tablet Oral BID    meropenem  500 mg IntraVENous Q12H    polyethylene glycol  17 g Oral Daily    docusate sodium  100 mg Oral Daily    lactobacillus  1 capsule Oral Daily with breakfast    amLODIPine  10 mg Oral Nightly    atenolol  25 mg Oral Daily    sodium bicarbonate  325 mg Oral BID    clopidogrel  75 mg Oral Daily    sodium chloride flush  5-40 mL IntraVENous 2 times per day    sodium chloride flush  5-40 mL IntraVENous 2 times per day    heparin (porcine)  5,000 Units SubCUTAneous 3 times per day    Vitamin D  2,000 Units Oral Daily    pantoprazole  40 mg Oral QAM AC    fluorometholone  1 drop Both Eyes BID    isosorbide mononitrate  60 mg Oral Daily    levothyroxine  88 mcg Oral Daily    ranolazine  500 mg Oral BID    sertraline  50 mg Oral Daily    [Held by provider] losartan  12.5 mg Oral Daily    atorvastatin  20 mg Oral Nightly      Infusions:   sodium chloride 75 mL/hr at 11/24/22 1739    sodium chloride 20 mL/hr at 11/27/22 1640      PRN Meds:  hydrOXYzine pamoate, melatonin, sodium chloride flush, sodium chloride, ondansetron **OR** ondansetron, polyethylene glycol, acetaminophen **OR** acetaminophen, sodium chloride flush, sodium chloride     Physical Exam:  Vitals:    12/01/22 0627   BP: (!) 151/63   Pulse: 62   Resp: 16   Temp: 98.2 °F (36.8 °C)   SpO2: 95%        General: AAO, NAD  Chest: Nontender  Cardiac: First and Second Heart Sounds are Normal, No Murmurs or Gallops noted  Lungs:Clear to auscultation and percussion. Abdomen: Soft, NT, ND, +BS  Extremities: No clubbing, no edema  Vascular:  Equal 2+ peripheral pulses. Lab Data:  CBC: No results for input(s): WBC, HGB, HCT, MCV, PLT in the last 72 hours. BMP:   Recent Labs     11/29/22  0349 11/30/22  0538 12/01/22  0321    138 138   K 4.6 4.9 4.6    102 102   CO2 25 24 25   BUN 41* 47* 46*   CREATININE 1.9* 2.3* 2.1*     LIVER PROFILE: No results for input(s): AST, ALT, LIPASE, BILIDIR, BILITOT, ALKPHOS in the last 72 hours. Invalid input(s): AMYLASE,  ALB  PT/INR: No results for input(s): PROTIME, INR in the last 72 hours. APTT: No results for input(s): APTT in the last 72 hours. BNP:  No results for input(s): BNP in the last 72 hours.       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 08/18/2018    Bradycardia     LBBB (left bundle branch block)     PAUL (acute kidney injury) ([facility] [address] 12/26/2017    Pneumonia due to infectious organism 11/16/2022    Pyelonephritis 11/16/2022    Chronic kidney disease 11/16/2022    Dyspnea and respiratory abnormalities 11/15/2022    Sepsis ([address] 11/14/2022    Localized edema 07/11/2022    GERD (gastroesophageal reflux disease)     Hypertension     Hypothyroid     Sinus congestion 03/23/2022    Vaginal prolapse 09/07/2021    Primary osteoarthritis of left foot 08/30/2021    Closed nondisplaced fracture of fourth metatarsal bone of left foot 08/30/2021    Closed nondisplaced fracture of third metatarsal bone of left foot 08/30/2021    Closed nondisplaced fracture of fifth left metatarsal bone 08/30/2021    Vertigo     Dizziness     Labyrinthitis     Chronic renal impairment, stage 3 (moderate) ([facility])     Constipation, slow transit 06/19/2018    Cystitis     Physical deconditioning     Hyponatremia 01/01/2018    Pruritic condition 01/22/2016    Gout     Hyperuricemia     Low back pain     Eczema     Coronary artery disease involving native heart without angina pectoris     Depression     Hyperlipidemia     Generalized osteoarthritis     Osteoporosis        Electronically signed by CLEMENT Fountain CNP on 12/1/2022 at 8:26 AM  Electronically signed by Braydon Saucedo MD on 12/1/22 at 10:27 AM EST

## 2022-12-01 NOTE — PROGRESS NOTES
Physical Therapy    Physical Therapy Treatment Note  Name: Angie Farooq MRN: [id number] :   1927   Date:  2022   Admission Date: 2022 Room:  [address] [place] [address]   Restrictions/Precautions:          fall risk  Communication with other providers:  per nurse ok to tx and notified that pt reports she has lost her magnifying glass. This therapist went thru laundry bag and trash and also called kitchen to see if pt left it on breakfast tray. Subjective:  Patient states:  pt motivated and agreeable to tx. Son present and supportive   Pain:   Location, Type, Intensity (0/10 to 10/10):  no c/o pain during tx session  Objective:    Observation:  alert and oriented on 2 liters O2    Treatment, including education/measures:  Sit<=>stand from chair and commode sba and cues for hand placement and safety. Pt has 4WW and needing assist to manage it in restroom/small area. Pt needing min assist to doff and don depends with threading it thru leg holes and getting it above knees. Pt is independent with regina care in sitting. Amb sba to min assist with 4WW walker 10' x 2, 300' x 1, 100' x 1 but did need to sit on walker seat x 1 and needing min assist to manage walker safely when pt turning to sit. Safety  Patient left safely in the chair, with call light/phone in reach with alarm applied. Gait belt was used for transfers and gait. Assessment / Impression:      Patient's tolerance of treatment:  good   Adverse Reaction: ba  Significant change in status and impact:  ba  Barriers to improvement:  safety  Plan for Next Session:    Cont.  POC  Time in:  1120  Time out:  1200  Timed treatment minutes:  40  Total treatment time:  36    Previously filed items:  Social/Functional History  Lives With: Alone (pt has home health aide daily for 8 hours/day, also supportive granddaughter)  Type of Home: Apartment  Home Layout: One level  Home Access: Level entry  Bathroom Shower/Tub: Walk-in shower  Bathroom Toilet: Handicap height  Bathroom Equipment: Built-in shower seat, Grab bars in shower, Commode  Bathroom Accessibility: Accessible  Home Equipment: Elizabeth Rosales, 4 wheeled, Lift chair  ADL Assistance: Independent (aide supervises with bathing but pt able to manage per granddaughter, pt able to dress and toilet independently)  Homemaking Assistance: Needs assistance  Homemaking Responsibilities: No (home health aide manages)  Ambulation Assistance: Independent (mod I with 4ww household distances)  Transfer Assistance: Independent  Active : No  Occupation: Retired        Short Term Goals  Time Frame for [employer] Term Goals: 1 week  Short Term Goal 1: Pt will perform all bed mobility tasks with SBA, min cues  Short Term Goal 2: Pt will manage all compenents of rollator for STS with SBA.   Short Term Goal 3: Pt will AMB x45 ft with RWcara 02, CGA-SBA for safety    Electronically signed by:    Isis Teixeira PTA  12/1/2022, 8:43 AM

## 2022-12-01 NOTE — CARE COORDINATION
Reviewed chart for continued discharge planning- discussed pt in IDR, per Dr. Angie Marquez pt remains medically stable for discharge pending disposition. I spoke with Safia Barboza and appeal remains pending. Spoke with malcom/ JULIET Serrano- advised appeal still pending. She states she has spoke with Hillary Emmanuel at [facility] as a possible option if denial continues to be upheld. We had discussed [facility] but Maggie feels that is too far out of town.   CM to cont to follow and assist.

## 2022-12-01 NOTE — PLAN OF CARE
Problem: Discharge Planning  Goal: Discharge to home or other facility with appropriate resources  12/1/2022 1240 by Raisa Campos RN  Outcome: Progressing  12/1/2022 0015 by Ita Swain RN  Outcome: Progressing     Problem: Hematologic - Adult  Goal: Maintains hematologic stability  12/1/2022 1240 by Raisa Campos RN  Outcome: Progressing  12/1/2022 0015 by Ita Swain RN  Outcome: Progressing     Problem: Pain  Goal: Verbalizes/displays adequate comfort level or baseline comfort level  12/1/2022 1240 by Raisa Campos RN  Outcome: Progressing  12/1/2022 0015 by Ita Swain RN  Outcome: Progressing     Problem: Safety - Adult  Goal: Free from fall injury  12/1/2022 1240 by Raisa Campos RN  Outcome: Progressing  12/1/2022 0015 by Ita Swain RN  Outcome: Progressing     Problem: ABCDS Injury Assessment  Goal: Absence of physical injury  12/1/2022 1240 by Raisa Campos RN  Outcome: Progressing  12/1/2022 0015 by Ita Swain RN  Outcome: Progressing     Problem: Skin/Tissue Integrity  Goal: Absence of new skin breakdown  Description: 1. Monitor for areas of redness and/or skin breakdown  2. Assess vascular access sites hourly  3. Every 4-6 hours minimum:  Change oxygen saturation probe site  4. Every 4-6 hours:  If on nasal continuous positive airway pressure, respiratory therapy assess nares and determine need for appliance change or resting period.   12/1/2022 1240 by Raisa Campso RN  Outcome: Progressing  12/1/2022 0015 by Ita Swain RN  Outcome: Progressing     Problem: Nutrition Deficit:  Goal: Optimize nutritional status  12/1/2022 1240 by Raisa Campos RN  Outcome: Progressing  12/1/2022 0015 by Ita Swain RN  Outcome: Progressing  Flowsheets (Taken 11/30/2022 1445 by Mendel Settle, RD, LD)  Nutrient intake appropriate for improving, restoring, or maintaining nutritional needs: Monitor oral intake, labs, and treatment plans

## 2022-12-01 NOTE — PROGRESS NOTES
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 08/18/2018    Bradycardia     LBBB (left bundle branch block)     PAUL (acute kidney injury) ([facility] 12/26/2017    Pneumonia due to infectious organism 11/16/2022    Pyelonephritis 11/16/2022    Chronic kidney disease 11/16/2022    Dyspnea and respiratory abnormalities 11/15/2022    Sepsis ([facility] [address] [address] 11/14/2022    Localized edema 07/11/2022    GERD (gastroesophageal reflux disease)     Hypertension     Hypothyroid     Sinus congestion 03/23/2022    Vaginal prolapse 09/07/2021    Primary osteoarthritis of left foot 08/30/2021    Closed nondisplaced fracture of fourth metatarsal bone of left foot 08/30/2021    Closed nondisplaced fracture of third metatarsal bone of left foot 08/30/2021    Closed nondisplaced fracture of fifth left metatarsal bone 08/30/2021    Vertigo     Dizziness     Labyrinthitis     Chronic renal impairment, stage 3 (moderate) (HCC)     Constipation, slow transit 06/19/2018    Cystitis     Physical deconditioning     Hyponatremia 01/01/2018    Pruritic condition 01/22/2016    Gout     Hyperuricemia     Low back pain     Eczema     Coronary artery disease involving native heart without angina pectoris     Depression     Hyperlipidemia     Generalized osteoarthritis     Osteoporosis

## 2022-12-02 LAB
ANION GAP SERPL CALCULATED.3IONS-SCNC: 11 MMOL/L (ref 4–16)
BUN BLDV-MCNC: 46 MG/DL (ref 6–23)
CALCIUM SERPL-MCNC: 9.9 MG/DL (ref 8.3–10.6)
CHLORIDE BLD-SCNC: 103 MMOL/L (ref 99–110)
CO2: 25 MMOL/L (ref 21–32)
CREAT SERPL-MCNC: 1.8 MG/DL (ref 0.6–1.1)
GFR SERPL CREATININE-BSD FRML MDRD: 26 ML/MIN/1.73M2
GLUCOSE BLD-MCNC: 92 MG/DL (ref 70–99)
POTASSIUM SERPL-SCNC: 5.2 MMOL/L (ref 3.5–5.1)
SODIUM BLD-SCNC: 139 MMOL/L (ref 135–145)

## 2022-12-02 PROCEDURE — 94761 N-INVAS EAR/PLS OXIMETRY MLT: CPT

## 2022-12-02 PROCEDURE — 97530 THERAPEUTIC ACTIVITIES: CPT

## 2022-12-02 PROCEDURE — 97535 SELF CARE MNGMENT TRAINING: CPT

## 2022-12-02 PROCEDURE — 6370000000 HC RX 637 (ALT 250 FOR IP)

## 2022-12-02 PROCEDURE — 6370000000 HC RX 637 (ALT 250 FOR IP): Performed by: STUDENT IN AN ORGANIZED HEALTH CARE EDUCATION/TRAINING PROGRAM

## 2022-12-02 PROCEDURE — 80048 BASIC METABOLIC PNL TOTAL CA: CPT

## 2022-12-02 PROCEDURE — 94640 AIRWAY INHALATION TREATMENT: CPT

## 2022-12-02 PROCEDURE — 6370000000 HC RX 637 (ALT 250 FOR IP): Performed by: INTERNAL MEDICINE

## 2022-12-02 PROCEDURE — 1200000000 HC SEMI PRIVATE

## 2022-12-02 PROCEDURE — 36415 COLL VENOUS BLD VENIPUNCTURE: CPT

## 2022-12-02 PROCEDURE — 2700000000 HC OXYGEN THERAPY PER DAY

## 2022-12-02 PROCEDURE — 97116 GAIT TRAINING THERAPY: CPT

## 2022-12-02 PROCEDURE — 99231 SBSQ HOSP IP/OBS SF/LOW 25: CPT | Performed by: INTERNAL MEDICINE

## 2022-12-02 PROCEDURE — 6360000002 HC RX W HCPCS

## 2022-12-02 RX ADMIN — Medication 1 DROP: at 22:35

## 2022-12-02 RX ADMIN — IPRATROPIUM BROMIDE AND ALBUTEROL SULFATE 1 AMPULE: .5; 3 SOLUTION RESPIRATORY (INHALATION) at 20:20

## 2022-12-02 RX ADMIN — DOCUSATE SODIUM 100 MG: 100 CAPSULE, LIQUID FILLED ORAL at 10:23

## 2022-12-02 RX ADMIN — HEPARIN SODIUM 5000 UNITS: 5000 INJECTION INTRAVENOUS; SUBCUTANEOUS at 22:23

## 2022-12-02 RX ADMIN — DIPHENHYDRAMINE HYDROCHLORIDE, ZINC ACETATE: 2; .1 CREAM TOPICAL at 22:22

## 2022-12-02 RX ADMIN — SERTRALINE HYDROCHLORIDE 50 MG: 50 TABLET ORAL at 10:23

## 2022-12-02 RX ADMIN — LEVOTHYROXINE SODIUM 88 MCG: 0.09 TABLET ORAL at 06:08

## 2022-12-02 RX ADMIN — PANTOPRAZOLE SODIUM 40 MG: 40 TABLET, DELAYED RELEASE ORAL at 06:09

## 2022-12-02 RX ADMIN — HEPARIN SODIUM 5000 UNITS: 5000 INJECTION INTRAVENOUS; SUBCUTANEOUS at 16:12

## 2022-12-02 RX ADMIN — Medication 1 CAPSULE: at 10:23

## 2022-12-02 RX ADMIN — HEPARIN SODIUM 5000 UNITS: 5000 INJECTION INTRAVENOUS; SUBCUTANEOUS at 06:09

## 2022-12-02 RX ADMIN — SODIUM BICARBONATE 325 MG: 650 TABLET ORAL at 10:23

## 2022-12-02 RX ADMIN — ATENOLOL 25 MG: 25 TABLET ORAL at 16:12

## 2022-12-02 RX ADMIN — Medication 3 MG: at 22:20

## 2022-12-02 RX ADMIN — SODIUM BICARBONATE 325 MG: 650 TABLET ORAL at 22:20

## 2022-12-02 RX ADMIN — SENNOSIDES AND DOCUSATE SODIUM 1 TABLET: 50; 8.6 TABLET ORAL at 10:23

## 2022-12-02 RX ADMIN — ATORVASTATIN CALCIUM 20 MG: 10 TABLET, FILM COATED ORAL at 22:20

## 2022-12-02 RX ADMIN — DIPHENHYDRAMINE HYDROCHLORIDE, ZINC ACETATE: 2; .1 CREAM TOPICAL at 16:11

## 2022-12-02 RX ADMIN — ISOSORBIDE MONONITRATE 60 MG: 60 TABLET, EXTENDED RELEASE ORAL at 10:22

## 2022-12-02 RX ADMIN — SENNOSIDES AND DOCUSATE SODIUM 1 TABLET: 50; 8.6 TABLET ORAL at 22:20

## 2022-12-02 RX ADMIN — POLYETHYLENE GLYCOL (3350) 17 G: 17 POWDER, FOR SOLUTION ORAL at 10:23

## 2022-12-02 RX ADMIN — Medication 1 DROP: at 11:15

## 2022-12-02 RX ADMIN — RANOLAZINE 500 MG: 500 TABLET, FILM COATED, EXTENDED RELEASE ORAL at 10:22

## 2022-12-02 RX ADMIN — IPRATROPIUM BROMIDE AND ALBUTEROL SULFATE 1 AMPULE: .5; 3 SOLUTION RESPIRATORY (INHALATION) at 07:43

## 2022-12-02 RX ADMIN — DIPHENHYDRAMINE HYDROCHLORIDE, ZINC ACETATE: 2; .1 CREAM TOPICAL at 10:23

## 2022-12-02 RX ADMIN — CLOPIDOGREL BISULFATE 75 MG: 75 TABLET ORAL at 10:23

## 2022-12-02 RX ADMIN — AMLODIPINE BESYLATE 10 MG: 10 TABLET ORAL at 22:20

## 2022-12-02 RX ADMIN — Medication 2000 UNITS: at 10:23

## 2022-12-02 RX ADMIN — IPRATROPIUM BROMIDE AND ALBUTEROL SULFATE 1 AMPULE: .5; 3 SOLUTION RESPIRATORY (INHALATION) at 15:06

## 2022-12-02 RX ADMIN — RANOLAZINE 500 MG: 500 TABLET, FILM COATED, EXTENDED RELEASE ORAL at 22:20

## 2022-12-02 ASSESSMENT — PAIN SCALES - GENERAL: PAINLEVEL_OUTOF10: 0

## 2022-12-02 NOTE — CARE COORDINATION
TC to Queenie Huff with [employer] to check status of appeal- advised we would like decision prior to the weekend. Received call from Queenie Huff with [facility] and pt denied for ARU again through appeal.  I requested if an immediate response for SNF was offered and she stated not that a separate precert would need to be sent for SNF. Per PT treatment yesterday pt \"Amb sba to min assist with 4WW walker 10' x 2, 300' x 1, 100' x 1 but did need to sit on walker seat x 1 and needing min assist to manage walker safely when pt turning to sit. \"  Pt may not qualify for SNF level of care at this point. I therefore requested updated PT rec'd for discharge with pt's current level of functioning. Spoke with Brandon De La Rosa from PT and he states pt still would benefit from SNF level of care- he will specify justification of rec'd in his note. TC to malcom/ CANDIS Serrano- she was agreeable for referral to [facility]. TC to [facility] had to leave messaged, requested call back advised info has been faxed to [phone].

## 2022-12-02 NOTE — PROGRESS NOTES
Daily Progress Note  Subjective:  Awake and alert; up in chair  Denies any CP or SOB  BP and HR stable  No events overnight  Off tele  Waiting on placement to rehab    Attending Note:  Patient is awake alert feeling ok  No chest pain   Heart rate is stable   Severe AS-plan for TAVR once stable as outpatient  HTN and cardio-renal syndrome =-diuretics per renal   Overall doing better   Waiting for placement     Impression and Plan:   Sepsis    CAP, noted on CT chest    Respiratory panel negative    WBC count elevated    ABX per primary    Using 1L NC now-weaning slowly    CXR showing improvement 11/30/2022     Acute on Chronic HFpEF    BNP on admission was 6,417    Last echo showed EF 55-60%    Severe Aortic stenosis    Diuretics per renal at this time    Lasix given the other night    Accurate I's and O's    1500 Fluid restriction     Plan to have her f/u with [facility] OP for potential valvuloplasty-will arrange this OP     CKD    Renal following; Avoid nephrotoxic medications     Stable from cardiac standpoint  Med. Tx. and increase activity  Awaiting placement      Most Recent Echo  11/15/2022   Left ventricular systolic function is normal.   Ejection fraction is visually estimated at 55-60%. Moderately dilated left atrium. Moderate to severe aortic stenosis is present; Mean PG 40mmHg, YANIRA 0.99 cm2. Mitral annular calcification is present. Mild to moderate mitral regurgitation. Mild to moderate tricuspid regurgitation; RVSP: 44 mmHg. No evidence of any pericardial effusion. Radiology  CT chest 11/14/2022  Impression   Bilateral pulmonary infiltrates and bilateral pleural effusions with probable   reactive mediastinal adenopathy       Probable granuloma in the right middle lobe. No follow-up imaging   recommended. PAST MEDICAL HISTORY:  The patient has a history of hypertension, renal  insufficiency present, and she has been treated medically.   She is known  to have coronary artery disease also present. She has a chronic left  ventricular block present. She had a heart catheterization done in the  past and LAD had 70% stenosis present and she was treated medically at  that time. Anemia, hyperlipidemia, and arthritis present. PAST SURGICAL HISTORY:  She has a history of having hip surgery done. Moderate coronary artery disease, heart catheterization done in 2018,  LAD with 70% stenosis distally. She was treated medically for that. She had cataract surgery and hysterectomy done.        Objective:   BP (!) 150/72   Pulse 62   Temp 97.9 °F (36.6 °C) (Oral)   Resp 16   Ht 5' (1.524 m)   Wt 140 lb 3.4 oz (63.6 kg)   LMP  (LMP Unknown)   SpO2 97%   BMI 27.38 kg/m²   No intake or output data in the 24 hours ending 12/02/22 7271    Medications:   Scheduled Meds:   ipratropium-albuterol  1 ampule Inhalation TID    diphenhydrAMINE-zinc acetate   Topical 4x daily    sennosides-docusate sodium  1 tablet Oral BID    polyethylene glycol  17 g Oral Daily    docusate sodium  100 mg Oral Daily    lactobacillus  1 capsule Oral Daily with breakfast    amLODIPine  10 mg Oral Nightly    atenolol  25 mg Oral Daily    sodium bicarbonate  325 mg Oral BID    clopidogrel  75 mg Oral Daily    sodium chloride flush  5-40 mL IntraVENous 2 times per day    sodium chloride flush  5-40 mL IntraVENous 2 times per day    heparin (porcine)  5,000 Units SubCUTAneous 3 times per day    Vitamin D  2,000 Units Oral Daily    pantoprazole  40 mg Oral QAM AC    fluorometholone  1 drop Both Eyes BID    isosorbide mononitrate  60 mg Oral Daily    levothyroxine  88 mcg Oral Daily    ranolazine  500 mg Oral BID    sertraline  50 mg Oral Daily    [Held by provider] losartan  12.5 mg Oral Daily    atorvastatin  20 mg Oral Nightly      Infusions:   sodium chloride 75 mL/hr at 11/24/22 1739    sodium chloride 20 mL/hr at 11/27/22 1640      PRN Meds:  hydrOXYzine pamoate, melatonin, sodium chloride flush, sodium chloride, ondansetron **OR** ondansetron, polyethylene glycol, acetaminophen **OR** acetaminophen, sodium chloride flush, sodium chloride     Physical Exam:  Vitals:    12/02/22 0743   BP:    Pulse: 62   Resp: 16   Temp:    SpO2: 97%        General: AAO, NAD  Chest: Nontender  Cardiac: First and Second Heart Sounds are Normal, No Murmurs or Gallops noted  Lungs:Clear to auscultation and percussion. Abdomen: Soft, NT, ND, +BS  Extremities: No clubbing, no edema  Vascular:  Equal 2+ peripheral pulses. Lab Data:  CBC: No results for input(s): WBC, HGB, HCT, MCV, PLT in the last 72 hours. BMP:   Recent Labs     11/30/22  0538 12/01/22  0321    138   K 4.9 4.6    102   CO2 24 25   BUN 47* 46*   CREATININE 2.3* 2.1*     LIVER PROFILE: No results for input(s): AST, ALT, LIPASE, BILIDIR, BILITOT, ALKPHOS in the last 72 hours. Invalid input(s): AMYLASE,  ALB  PT/INR: No results for input(s): PROTIME, INR in the last 72 hours. APTT: No results for input(s): APTT in the last 72 hours. BNP:  No results for input(s): BNP in the last 72 hours.       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 08/18/2018    Bradycardia     LBBB (left bundle branch block)     PAUL (acute kidney injury) ([facility] 12/26/2017    Pneumonia due to infectious organism 11/16/2022    Pyelonephritis 11/16/2022    Chronic kidney disease 11/16/2022    Dyspnea and respiratory abnormalities 11/15/2022    Sepsis ([facility] 11/14/2022    Localized edema 07/11/2022    GERD (gastroesophageal reflux disease)     Hypertension     Hypothyroid     Sinus congestion 03/23/2022    Vaginal prolapse 09/07/2021    Primary osteoarthritis of left foot 08/30/2021    Closed nondisplaced fracture of fourth metatarsal bone of left foot 08/30/2021    Closed nondisplaced fracture of third metatarsal bone of left foot 08/30/2021    Closed nondisplaced fracture of fifth left metatarsal bone 08/30/2021    Vertigo     Dizziness Labyrinthitis     Chronic renal impairment, stage 3 (moderate) (HCC)     Constipation, slow transit 06/19/2018    Cystitis     Physical deconditioning     Hyponatremia 01/01/2018    Pruritic condition 01/22/2016    Gout     Hyperuricemia     Low back pain     Eczema     Coronary artery disease involving native heart without angina pectoris     Depression     Hyperlipidemia     Generalized osteoarthritis     Osteoporosis        Electronically signed by CLEMENT Hernandez CNP on 12/2/2022 at 8:11 AM    Electronically signed by Reyes Sheffield MD on 12/2/22 at 12:25 PM EST

## 2022-12-02 NOTE — PROGRESS NOTES
Cr 1.8  Potassium 5.2, monitor  Will follow    Thank you    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 08/18/2018    Bradycardia     LBBB (left bundle branch block)     PAUL (acute kidney injury) ([facility] 12/26/2017    Pneumonia due to infectious organism 11/16/2022    Pyelonephritis 11/16/2022    Chronic kidney disease 11/16/2022    Dyspnea and respiratory abnormalities 11/15/2022    Sepsis ([facility] 11/14/2022    Localized edema 07/11/2022    GERD (gastroesophageal reflux disease)     Hypertension     Hypothyroid     Sinus congestion 03/23/2022    Vaginal prolapse 09/07/2021    Primary osteoarthritis of left foot 08/30/2021    Closed nondisplaced fracture of fourth metatarsal bone of left foot 08/30/2021    Closed nondisplaced fracture of third metatarsal bone of left foot 08/30/2021    Closed nondisplaced fracture of fifth left metatarsal bone 08/30/2021    Vertigo     Dizziness     Labyrinthitis     Chronic renal impairment, stage 3 (moderate) (HCC)     Constipation, slow transit 06/19/2018    Cystitis     Physical deconditioning     Hyponatremia 01/01/2018    Pruritic condition 01/22/2016    Gout     Hyperuricemia     Low back pain     Eczema     Coronary artery disease involving native heart without angina pectoris     Depression     Hyperlipidemia     Generalized osteoarthritis     Osteoporosis

## 2022-12-02 NOTE — PROGRESS NOTES
Occupational Therapy  . Occupational Therapy Treatment Note    Name: John Pinon MRN: [id number] :   1927   Date:  2022   Admission Date: 2022 Room:  [address] [place] [address]     Primary Problem:      Restrictions/Precautions:          General precautions, fall risk      Communication with other providers: cm updated note    Subjective:  Patient states:  im tired today. Pain:   Location, Type, Intensity (0/10 to 10/10):  none     Objective:    Observation: patient seated upright on recliner. Agreeable . Pleasant and motivated. Objective Measures:  , 02    Treatment, including education:    ADL activity training was instructed today. Cues were given for safety, sequence, UE/LE placement, visual cues, and balance. Activities performed today included dressing, toileting, hand hygiene. Toileting- on standard commode- Min A for assist for depend hike. Hand hygiene- CGA in stance at sink. Cues for walker placement. Therapeutic activity training was instructed today. Cues were given for safety, sequence, UE/LE placement, awareness, and balance. Activities performed today included bed mobility training, sup-sit, sit-stand, SPT. Stand from recliner to [address] for safety. Functional mobility- CGA for safety- tolerated short distances. Sit to toilet- CGA for safety. Sitting balance- good. Stand from toilet- Min A with use of grab bars. Standing balance during depend hike- Min A d/t depends getting caught on toilet lip. Standing balance at sink- CGA cues to scoot closer to sink for safety. Sit to recliner- CGA    Patient educated on role of OT , benefits of OT and rationale for therapeutic intervention. Benefit of OOB/EOB activities, benefit of movement.  AE/AD, WS/EC,     All therapeutic intervention performed c emphasis on dynamic balance / standing tolerance to increase strength, endurance and activity tolerance for increased [place] c ADL tasks and func transfers / mobility. Patient left safely in bedside chair at end of session, with call light in reach, alarm on and nursing aware. Gait belt was used for func transfers / mobility. Assessment / Impression:    Patient tolerates therapy well. Does need some increased time for fatigue.    Patient's tolerance of treatment: Well  Adverse Reaction: None  Significant change in status and impact: Improved from initial evaluation  Barriers to improvement: weakness      Plan for Next Session:    Continue with OT POC      Time in:  1240  Time out:  1300  Timed treatment minutes:  20  Total treatment time:  20      Electronically signed by:    MEERA Guaman COTA/L [id number]    12/2/2022, 1:00 PM

## 2022-12-02 NOTE — PROGRESS NOTES
V2.0  [facility] [place]      Name:  Escobar Jean /Age/Sex: 1927  (80 y.o. female)   MRN & CSN:  [id number] & [id number] Encounter Date/Time: 2022 2:01 PM EST    Location:  [address] [place] [address] PCP: Cheryle Burton MD       Hospital Day: 19    Assessment and Plan:    Escobar Jean is a 80 y.o. female with PMH of CAD, GERD, UTI, Hypothyroidism, HTN, Hyperlipedmia, CKD who presents with Sepsis ([facility] [address]    #Acute hypoxic respiratory failure 2/2 CAP and pulmonary edema-resolving  -Blood cultures negative  -Chest x-ray showing pulmonary edema and concern for pneumonia  -Echo: EF of 55 to 60% with RVSP of 44  -Negative respiratory PCR  -CT chest showing bilateral infiltrates  - On 2L/min when seen - apparently desaturated to 85-86% with off O2  - Not on O2 at home  - S/p meropenem, ID signed off    Plan:  Pulmonology following, appreciate recs  Continue DuoNebs    #Generalized pruritus 2/2 likely xerosis 2/2 AC - Resolved  -likely in the setting of dry air (patient uses humidifier at home)  -no jaundice, no rash    Plan:  Emollients     #Pyelonephritis with history of recurrent UTI - Improved  -Received ciprofloxacin, doxycycline and nitrofurantoin without relief  -Urine culture growing Enterococcus faecalis sensitive to ampicillin  - S/P meropenem     #Acute decompensated HFpEF-resolving  -Chest x-ray with pulmonary edema and elevated RVSP    Plan:  IV diuresis as needed  Strict I's and O  Daily weights    #PAUL on CKD likely prerenal in the setting of diuresis - Stable  -Baseline creatinine around 2  - Cr 1.8 today and stable     Nephrology following, appreciate recommendations  Strict I's and O's  Holding diuresis    #Suspected aspiration--> ruled out  -Seen by SLP, did not have any signs or symptoms of aspiration    Plan:  Continue to monitor    #Hyperkalemia  - K 5.2 toady     Plan:  Continue monitoring BMP    #Elevated troponin likely from CKD and demand ischemia  #CAD  -Down trended  -EKG with LBBB    Plan:  Cardiology on consult, appreciate recommendations  Continue atenolol, amlodipine, Imdur, statin and aspirin  Holding telmisartan in the setting of PAUL    Chronic medical problems:    #Severe AS  -Follow-up at [facility] for TAVR balloon valvuloplasty    #Hypertension  Plan: Continue home meds except for on losartan    #GERD  Continue PPI    Pt. Denied appeal X 2 to ARU. PT/OT still recommending SNF. [facility] referral         Diet ADULT DIET; Dysphagia - Minced and Moist; Low Potassium (Less than 3000 mg/day); 1500 ml; Please provide extra moisteners and gravies  ADULT ORAL NUTRITION SUPPLEMENT; Dinner; Frozen Oral Supplement   DVT Prophylaxis [] Lovenox, [x]  Heparin, [] SCDs, [] Ambulation,  [] Eliquis, [] Xarelto  [] Coumadin   Code Status Limited   Disposition From: home  Expected Disposition: [facility]   Estimated Date of Discharge: Ready for discharge. Appeal pending   Surrogate Decision Maker/ CANDIS Rico     Subjective:     Chief Complaint: Shortness of Breath     Patient feeling well today. No complaints. On 2L/min when seen. No new complaints       Review of Systems:    General: No fever, no chills  Heart: No chest pain, no palpitations  Lungs: No shortness of breath, no cough  Abdomen: No abdominal pain, no nausea, no vomiting, no constipation, no diarrhea  : No frequency, no dysuria, no urgency  MSK: No lower extremity swelling  Neuro: No confusion  Skin: No rashes      Objective:   No intake or output data in the 24 hours ending 12/02/22 1610       Vitals:   Vitals:    12/02/22 1506   BP:    Pulse: 74   Resp: 18   Temp:    SpO2: 95%       Physical Exam:     General: NAD. On 2/min O2 when seen. Pleasant. Eyes: EOMI  HENT: Atraumatic  Respiratory: no respiratory distress. B/L equal air entry. No wheeze or rhonchi. GI: nondistended. BS normal.   CVS: S1 + S2. Equal peripheral pulses.    MSK: no lower extremity edema  Skin: Intact, dry, warm, no rashes  Neuro: CN II to XII grossly intact  Psych: Normal mood    Medications:   Medications:    ipratropium-albuterol  1 ampule Inhalation TID    diphenhydrAMINE-zinc acetate   Topical 4x daily    sennosides-docusate sodium  1 tablet Oral BID    polyethylene glycol  17 g Oral Daily    docusate sodium  100 mg Oral Daily    lactobacillus  1 capsule Oral Daily with breakfast    amLODIPine  10 mg Oral Nightly    atenolol  25 mg Oral Daily    sodium bicarbonate  325 mg Oral BID    clopidogrel  75 mg Oral Daily    sodium chloride flush  5-40 mL IntraVENous 2 times per day    sodium chloride flush  5-40 mL IntraVENous 2 times per day    heparin (porcine)  5,000 Units SubCUTAneous 3 times per day    Vitamin D  2,000 Units Oral Daily    pantoprazole  40 mg Oral QAM AC    fluorometholone  1 drop Both Eyes BID    isosorbide mononitrate  60 mg Oral Daily    levothyroxine  88 mcg Oral Daily    ranolazine  500 mg Oral BID    sertraline  50 mg Oral Daily    [Held by provider] losartan  12.5 mg Oral Daily    atorvastatin  20 mg Oral Nightly      Infusions:    sodium chloride 75 mL/hr at 11/24/22 1739    sodium chloride 20 mL/hr at 11/27/22 1640     PRN Meds: hydrOXYzine pamoate, 25 mg, TID PRN  melatonin, 3 mg, Nightly PRN  sodium chloride flush, 5-40 mL, PRN  sodium chloride, , PRN  ondansetron, 4 mg, Q8H PRN   Or  ondansetron, 4 mg, Q6H PRN  polyethylene glycol, 17 g, Daily PRN  acetaminophen, 650 mg, Q6H PRN   Or  acetaminophen, 650 mg, Q6H PRN  sodium chloride flush, 5-40 mL, PRN  sodium chloride, , PRN      Labs      Recent Results (from the past 24 hour(s))   Basic Metabolic Panel    Collection Time: 12/02/22  7:47 AM   Result Value Ref Range    Sodium 139 135 - 145 MMOL/L    Potassium 5.2 (H) 3.5 - 5.1 MMOL/L    Chloride 103 99 - 110 mMol/L    CO2 25 21 - 32 MMOL/L    Anion Gap 11 4 - 16    BUN 46 (H) 6 - 23 MG/DL    Creatinine 1.8 (H) 0.6 - 1.1 MG/DL    Est, Glom Filt Rate 26 (L) >60 mL/min/1.73m2    Glucose 92 70 - 99 MG/DL    Calcium 9.9 8.3 - 10.6 MG/DL          Imaging/Diagnostics Last 24 Hours   XR CHEST PORTABLE    Result Date: 11/21/2022  EXAMINATION: ONE XRAY VIEW OF THE CHEST 11/21/2022 12:19 pm COMPARISON: 11/17/2022. HISTORY: ORDERING SYSTEM PROVIDED HISTORY: sob TECHNOLOGIST PROVIDED HISTORY: Reason for exam:->sob Reason for Exam:  sob FINDINGS: Cardiomediastinal silhouette is stable. Similar bilateral airspace opacities. No pleural effusion or pneumothorax. No gross bony abnormality.      Stable chest.       Electronically signed by Ashley Brice MD on 12/2/2022 at 4:10 PM

## 2022-12-02 NOTE — CARE COORDINATION
Left VM for Sandra Hunter at [facility] requesting a determination ASAP for pending ARU expedited appeal.     Will continue to follow. 1125:  Received call from Sandra Hunter from  [facility] stating patient is still denied for ARU after completion of 2nd appeal. Discussed with Roberto Rodriguez.

## 2022-12-02 NOTE — PROGRESS NOTES
Pulmonary and Critical Care  Progress Note      VITALS:  BP (!) 153/74   Pulse 69   Temp 97.7 °F (36.5 °C) (Oral)   Resp 24   Ht 5' (1.524 m)   Wt 140 lb 3.4 oz (63.6 kg)   LMP  (LMP Unknown)   SpO2 98%   BMI 27.38 kg/m²     Subjective:   CHIEF COMPLAINT :SOB     HPI:                The patient is a 80 y.o. female is sitting in the bed. She is not in acute resp distress    Objective:   PHYSICAL EXAM:    LUNGS:Occasional basal crackles  Abd-soft, BS+,NT  Ext- no pedal edema  CVS-s1s2, no murmurs      DATA:    CBC:  No results for input(s): WBC, RBC, HGB, HCT, PLT, MCV, MCH, MCHC, RDW, NRBC, SEGSPCT, BANDSPCT in the last 72 hours. BMP:  Recent Labs     11/30/22  0538 12/01/22  0321 12/02/22  0747    138 139   K 4.9 4.6 5.2*    102 103   CO2 24 25 25   BUN 47* 46* 46*   CREATININE 2.3* 2.1* 1.8*   CALCIUM 10.2 10.0 9.9   GLUCOSE 97 89 92      ABG:  No results for input(s): PH, PO2ART, IWD9XDD, HCO3, BEART, O2SAT in the last 72 hours.   BNP  Lab Results   Component Value Date     (H) 05/09/2012      D-Dimer:  Lab Results   Component Value Date    FYXNYL 460 (H) 01/07/2018      Radiology: None      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 08/18/2018     Priority: High    Bradycardia      Priority: High    LBBB (left bundle branch block)      Priority: High    PAUL (acute kidney injury) ([address] 12/26/2017     Priority: High    Pneumonia due to infectious organism 11/16/2022     Priority: Medium    Pyelonephritis 11/16/2022     Priority: Medium    Chronic kidney disease 11/16/2022     Priority: Medium    Dyspnea and respiratory abnormalities 11/15/2022     Priority: Medium    Sepsis ([address] 11/14/2022     Priority: Medium    Localized edema 07/11/2022     Priority: Medium    GERD (gastroesophageal reflux disease)      Priority: Medium     Overview Note:     Esophagitis      Hypertension      Priority: Low    Hypothyroid Priority: Low     Overview Note:     Hypothyroidism      Sinus congestion 03/23/2022     Overview Note:     CHRONIC W POST NASAL DRIP      Vaginal prolapse 09/07/2021    Primary osteoarthritis of left foot 08/30/2021    Closed nondisplaced fracture of fourth metatarsal bone of left foot 08/30/2021    Closed nondisplaced fracture of third metatarsal bone of left foot 08/30/2021    Closed nondisplaced fracture of fifth left metatarsal bone 08/30/2021    Vertigo      Overview Note:      [facility]      Dizziness     Labyrinthitis     Chronic renal impairment, stage 3 (moderate) (HCC)     Constipation, slow transit 06/19/2018    Cystitis     Physical deconditioning     Hyponatremia 01/01/2018    Pruritic condition 01/22/2016    Gout     Hyperuricemia     Low back pain     Eczema     Coronary artery disease involving native heart without angina pectoris      Overview Note:     Dr Maximus Grewaling Ahmed/Jose Ahmed      Depression     Hyperlipidemia     Generalized osteoarthritis     Osteoporosis      Overview Note:     Generalized       Acute Hypoxic resp failure sec to Cardiogenic and Non Cardiogenic Pulmonary edema  Small bilateral Pleural effusions  PAUL on CKD- slowly improving  Leukocytosis- Improved  Severe AS  Mild to Moderate Pulmonary HTN  CAD  H/o Recurrent UTI  GERD  HLD  ?  Atypical pneumonia     ICS  OOB  PT/OT  Inhalers  Await eval at [facility] for the severe AS  Await placement  C.w present management    Electronically signed by Irineo Mondragon MD on 12/2/2022 at 12:18 PM

## 2022-12-02 NOTE — PROGRESS NOTES
Physical Therapy    Physical Therapy Treatment Note  Name: Lazaro Monaco MRN: [id number] :   1927   Date:  2022   Admission Date: 2022 Room:  [address] [place] [address]-   Restrictions/Precautions:          general precautions; fall risk    Subjective:  Patient states:  \"I'm feeling very tired today\"  Pain:   Location, Type, Intensity (0/10 to 10/10):  denies; 0/10    Objective:    Observation:  pt sitting upright in chair upon entry      Treatment, including education/measures:  Pt agreeable to participating in therapy at this time. Therapeutic Activity Training:   Therapeutic activity training was instructed today. Cues were given for safety, sequence, UE/LE placement, awareness, and balance. Activities performed today included bed mobility training, sup-sit, sit-stand. Pt completed sit to stand from chair with CGAx1 with verbal cues to push through chair and avoid pulling on walker. Pt completed stand to sit onto 4ww seat with minAx1 with verbal cues to lock brakes, feel chair against back of legs, reach back, and sit slowly. Pt completed sit to rangel from 4ww seat with minAx1 with verbal cues to push through hand  and avoid pulling on walker. Pt completed stand to sit onto chair with minAx1 with verbal cues to feel chair against back of legs, reach back, and sit slowly. Gait Training:  Cues were given for safety, sequence, device management, balance, posture, awareness, path. Pt ambulated 25 feet + 15 feet + 12 feet with minAx1 with a 4ww with a decreased gait speed, a decreased step length bilaterally, and an unsteady gait. Pt provided with verbal and tactile cues for BLE placement, walker placement, and sequence throughout ambulation. Pt provided with verbal cues for directions in order to successfully navigate hallway and return to correct room. Pt provided with verbal and tactile cues to maintain upright posture in order to avoid COM shifting outside of ANGEL.     Safety  Patient left safely in the chair, with call light/phone in reach with alarm applied. Gait belt was used for transfers and gait. Assessment / Impression:    Patient's tolerance of treatment:  Good; patient tolerated ambulation in hallway (with rest breaks between bouts) today   Adverse Reaction: none  Significant change in status and impact:  none  Barriers to improvement:  decreased aerobic capacity  Discharge recommendation:  SNF  (patient is requiring physical assistance from therapist throughout functional mobility and is not safe to return home at this time)    Plan for Next Session:    Continue progressing toward goals per plan of care. Progress independence with transfers and ambulation as tolerated and appropriate. Progress ambulation distance as tolerated and appropriate.       Time in:  1305  Time out:  1346  Timed treatment minutes:  41  Total treatment time:  41    Previously filed items:  Social/Functional History  Lives With: Alone (pt has home health aide daily for 8 hours/day, also supportive granddaughter)  Type of Home: Apartment  Home Layout: One level  Home Access: Level entry  Bathroom Shower/Tub: Walk-in shower  Bathroom Toilet: Handicap height  Bathroom Equipment: Built-in shower seat, Grab bars in shower, Commode  Bathroom Accessibility: Accessible  Home Equipment: Walker, 4 wheeled, Lift chair  ADL Assistance: Independent (aide supervises with bathing but pt able to manage per granddaughter, pt able to dress and toilet independently)  [address]: Needs assistance  Homemaking Responsibilities: No (home health aide manages)  Ambulation Assistance: Independent (mod I with 4ww household distances)  Transfer Assistance: Independent  Active : No  Occupation: Retired        Short Term Goals  Time Frame for Short Term Goals: 1 week  Short Term Goal 1: Pt will perform all bed mobility tasks with SBA, min cues  Short Term Goal 2: Pt will manage all compenents of rollator for STS with SBA.  Short Term Goal 3: Pt will AMB x45 ft with RWcara 02, CGA-SBA for safety    Electronically signed by:    Jazmine Pina PT, DPT  License #: [id number]

## 2022-12-03 LAB
ANION GAP SERPL CALCULATED.3IONS-SCNC: 11 MMOL/L (ref 4–16)
BUN BLDV-MCNC: 54 MG/DL (ref 6–23)
CALCIUM SERPL-MCNC: 9.6 MG/DL (ref 8.3–10.6)
CHLORIDE BLD-SCNC: 101 MMOL/L (ref 99–110)
CO2: 23 MMOL/L (ref 21–32)
CREAT SERPL-MCNC: 2 MG/DL (ref 0.6–1.1)
GFR SERPL CREATININE-BSD FRML MDRD: 23 ML/MIN/1.73M2
GLUCOSE BLD-MCNC: 85 MG/DL (ref 70–99)
POTASSIUM SERPL-SCNC: 4.8 MMOL/L (ref 3.5–5.1)
SODIUM BLD-SCNC: 135 MMOL/L (ref 135–145)

## 2022-12-03 PROCEDURE — 99231 SBSQ HOSP IP/OBS SF/LOW 25: CPT | Performed by: INTERNAL MEDICINE

## 2022-12-03 PROCEDURE — 36415 COLL VENOUS BLD VENIPUNCTURE: CPT

## 2022-12-03 PROCEDURE — 6370000000 HC RX 637 (ALT 250 FOR IP)

## 2022-12-03 PROCEDURE — 6370000000 HC RX 637 (ALT 250 FOR IP): Performed by: STUDENT IN AN ORGANIZED HEALTH CARE EDUCATION/TRAINING PROGRAM

## 2022-12-03 PROCEDURE — 94668 MNPJ CHEST WALL SBSQ: CPT

## 2022-12-03 PROCEDURE — 2700000000 HC OXYGEN THERAPY PER DAY

## 2022-12-03 PROCEDURE — 6370000000 HC RX 637 (ALT 250 FOR IP): Performed by: INTERNAL MEDICINE

## 2022-12-03 PROCEDURE — 1200000000 HC SEMI PRIVATE

## 2022-12-03 PROCEDURE — 94761 N-INVAS EAR/PLS OXIMETRY MLT: CPT

## 2022-12-03 PROCEDURE — 6360000002 HC RX W HCPCS

## 2022-12-03 PROCEDURE — 94640 AIRWAY INHALATION TREATMENT: CPT

## 2022-12-03 PROCEDURE — 80048 BASIC METABOLIC PNL TOTAL CA: CPT

## 2022-12-03 PROCEDURE — 97116 GAIT TRAINING THERAPY: CPT

## 2022-12-03 PROCEDURE — 97530 THERAPEUTIC ACTIVITIES: CPT

## 2022-12-03 RX ADMIN — AMLODIPINE BESYLATE 10 MG: 10 TABLET ORAL at 22:23

## 2022-12-03 RX ADMIN — HEPARIN SODIUM 5000 UNITS: 5000 INJECTION INTRAVENOUS; SUBCUTANEOUS at 22:23

## 2022-12-03 RX ADMIN — IPRATROPIUM BROMIDE AND ALBUTEROL SULFATE 1 AMPULE: .5; 3 SOLUTION RESPIRATORY (INHALATION) at 20:57

## 2022-12-03 RX ADMIN — SODIUM BICARBONATE 325 MG: 650 TABLET ORAL at 09:56

## 2022-12-03 RX ADMIN — HEPARIN SODIUM 5000 UNITS: 5000 INJECTION INTRAVENOUS; SUBCUTANEOUS at 06:24

## 2022-12-03 RX ADMIN — IPRATROPIUM BROMIDE AND ALBUTEROL SULFATE 1 AMPULE: .5; 3 SOLUTION RESPIRATORY (INHALATION) at 15:27

## 2022-12-03 RX ADMIN — DOCUSATE SODIUM 100 MG: 100 CAPSULE, LIQUID FILLED ORAL at 09:56

## 2022-12-03 RX ADMIN — Medication 3 MG: at 22:23

## 2022-12-03 RX ADMIN — SENNOSIDES AND DOCUSATE SODIUM 1 TABLET: 50; 8.6 TABLET ORAL at 10:01

## 2022-12-03 RX ADMIN — SODIUM BICARBONATE 325 MG: 650 TABLET ORAL at 22:23

## 2022-12-03 RX ADMIN — SERTRALINE HYDROCHLORIDE 50 MG: 50 TABLET ORAL at 09:56

## 2022-12-03 RX ADMIN — Medication 1 CAPSULE: at 09:56

## 2022-12-03 RX ADMIN — Medication 2000 UNITS: at 09:56

## 2022-12-03 RX ADMIN — POLYETHYLENE GLYCOL (3350) 17 G: 17 POWDER, FOR SOLUTION ORAL at 09:57

## 2022-12-03 RX ADMIN — ATENOLOL 25 MG: 25 TABLET ORAL at 17:12

## 2022-12-03 RX ADMIN — DIPHENHYDRAMINE HYDROCHLORIDE, ZINC ACETATE: 2; .1 CREAM TOPICAL at 10:05

## 2022-12-03 RX ADMIN — PANTOPRAZOLE SODIUM 40 MG: 40 TABLET, DELAYED RELEASE ORAL at 06:24

## 2022-12-03 RX ADMIN — DIPHENHYDRAMINE HYDROCHLORIDE, ZINC ACETATE: 2; .1 CREAM TOPICAL at 17:12

## 2022-12-03 RX ADMIN — ATORVASTATIN CALCIUM 20 MG: 10 TABLET, FILM COATED ORAL at 22:21

## 2022-12-03 RX ADMIN — ISOSORBIDE MONONITRATE 60 MG: 60 TABLET, EXTENDED RELEASE ORAL at 09:56

## 2022-12-03 RX ADMIN — CLOPIDOGREL BISULFATE 75 MG: 75 TABLET ORAL at 09:56

## 2022-12-03 RX ADMIN — IPRATROPIUM BROMIDE AND ALBUTEROL SULFATE 1 AMPULE: .5; 3 SOLUTION RESPIRATORY (INHALATION) at 08:12

## 2022-12-03 RX ADMIN — LEVOTHYROXINE SODIUM 88 MCG: 0.09 TABLET ORAL at 06:24

## 2022-12-03 RX ADMIN — DIPHENHYDRAMINE HYDROCHLORIDE, ZINC ACETATE: 2; .1 CREAM TOPICAL at 13:28

## 2022-12-03 RX ADMIN — Medication 1 DROP: at 22:29

## 2022-12-03 RX ADMIN — SENNOSIDES AND DOCUSATE SODIUM 1 TABLET: 50; 8.6 TABLET ORAL at 22:23

## 2022-12-03 RX ADMIN — HEPARIN SODIUM 5000 UNITS: 5000 INJECTION INTRAVENOUS; SUBCUTANEOUS at 14:26

## 2022-12-03 RX ADMIN — RANOLAZINE 500 MG: 500 TABLET, FILM COATED, EXTENDED RELEASE ORAL at 09:56

## 2022-12-03 RX ADMIN — RANOLAZINE 500 MG: 500 TABLET, FILM COATED, EXTENDED RELEASE ORAL at 22:23

## 2022-12-03 RX ADMIN — DIPHENHYDRAMINE HYDROCHLORIDE, ZINC ACETATE: 2; .1 CREAM TOPICAL at 22:26

## 2022-12-03 RX ADMIN — Medication 1 DROP: at 10:05

## 2022-12-03 ASSESSMENT — PAIN SCALES - WONG BAKER
WONGBAKER_NUMERICALRESPONSE: 0

## 2022-12-03 ASSESSMENT — PAIN SCALES - GENERAL: PAINLEVEL_OUTOF10: 0

## 2022-12-03 NOTE — PROGRESS NOTES
Pulmonary and Critical Care  Progress Note      VITALS:  BP (!) 152/80   Pulse 67   Temp 97.7 °F (36.5 °C) (Oral)   Resp 18   Ht 5' (1.524 m)   Wt 140 lb 3.4 oz (63.6 kg)   LMP  (LMP Unknown)   SpO2 97%   BMI 27.38 kg/m²     Subjective:   CHIEF COMPLAINT :SOB     HPI:                The patient is a 80 y.o. female is lying in the bed. She is not in acute resp distress    Objective:   PHYSICAL EXAM:    LUNGS:Occasional basal crackles  Abd-soft, BS+,NT  Ext- no pedal edema  CVS-s1s2, no murmurs      DATA:    CBC:  No results for input(s): WBC, RBC, HGB, HCT, PLT, MCV, MCH, MCHC, RDW, NRBC, SEGSPCT, BANDSPCT in the last 72 hours. BMP:  Recent Labs     12/01/22  0321 12/02/22  0747 12/03/22  0627    139 135   K 4.6 5.2* 4.8    103 101   CO2 25 25 23   BUN 46* 46* 54*   CREATININE 2.1* 1.8* 2.0*   CALCIUM 10.0 9.9 9.6   GLUCOSE 89 92 85      ABG:  No results for input(s): PH, PO2ART, UYA6BBT, HCO3, BEART, O2SAT in the last 72 hours.   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 08/18/2018     Priority: High    Bradycardia      Priority: High    LBBB (left bundle branch block)      Priority: High    PAUL (acute kidney injury) ([address] 12/26/2017     Priority: High    Pneumonia due to infectious organism 11/16/2022     Priority: Medium    Pyelonephritis 11/16/2022     Priority: Medium    Chronic kidney disease 11/16/2022     Priority: Medium    Dyspnea and respiratory abnormalities 11/15/2022     Priority: Medium    Sepsis ([address] 11/14/2022     Priority: Medium    Localized edema 07/11/2022     Priority: Medium    GERD (gastroesophageal reflux disease)      Priority: Medium     Overview Note:     Esophagitis      Hypertension      Priority: Low    Hypothyroid Priority: Low     Overview Note:     Hypothyroidism      Sinus congestion 03/23/2022     Overview Note:     CHRONIC W POST NASAL DRIP      Vaginal prolapse 09/07/2021    Primary osteoarthritis of left foot 08/30/2021    Closed nondisplaced fracture of fourth metatarsal bone of left foot 08/30/2021    Closed nondisplaced fracture of third metatarsal bone of left foot 08/30/2021    Closed nondisplaced fracture of fifth left metatarsal bone 08/30/2021    Vertigo      Overview Note:     [facility]      Dizziness     Labyrinthitis     Chronic renal impairment, stage 3 (moderate) ([facility])     Constipation, slow transit 06/19/2018    Cystitis     Physical deconditioning     Hyponatremia 01/01/2018    Pruritic condition 01/22/2016    Gout     Hyperuricemia     Low back pain     Eczema     Coronary artery disease involving native heart without angina pectoris      Overview Note:     Dr Sherrie Figueredo/Jose Figueredo      Depression     Hyperlipidemia     Generalized osteoarthritis     Osteoporosis      Overview Note:     Generalized     Acute Hypoxic resp failure sec to Cardiogenic and Non Cardiogenic Pulmonary edema  Small bilateral Pleural effusions  PAUL on CKD- slowly improving  Leukocytosis- Improved  Severe AS  Mild to Moderate Pulmonary HTN  CAD  H/o Recurrent UTI  GERD  HLD  ?  Atypical pneumonia       Inhalers  ICS  OOB  PT/OT  Keep sats > 92%  Await placement  C.w present management    Electronically signed by Dana Orlando MD on 12/3/2022 at 12:54 PM

## 2022-12-03 NOTE — PROGRESS NOTES
V2.0  [facility] [place] [facility] Progress Note      Name:  Adeline Del Angel /Age/Sex: 1927  (80 y.o. female)   MRN & CSN:  [id number] & [id number] Encounter Date/Time: 12/3/2022 2:01 PM EST    Location:  [address] [place] [address]- PCP: Delores Conner MD       Hospital Day: 20    Assessment and Plan:    Adeline Del Angel is a 80 y.o. female with PMH of CAD, GERD, UTI, Hypothyroidism, HTN, Hyperlipedmia, CKD who presents with Sepsis ([facility] [address]    #Acute hypoxic respiratory failure 2/2 CAP and pulmonary edema-resolving  -Blood cultures negative  -Chest x-ray showing pulmonary edema and concern for pneumonia  -Echo: EF of 55 to 60% with RVSP of 44  -Negative respiratory PCR  -CT chest showing bilateral infiltrates  - On 2L/min when seen - apparently desaturated to 85-86% with off O2  - Not on O2 at home  - S/p meropenem, ID signed off    Plan:  Pulmonology following, appreciate recs  Continue DuoNebs  Continue O2    #Generalized pruritus 2/2 likely xerosis 2/2 AC - Resolved  -likely in the setting of dry air (patient uses humidifier at home)  -no jaundice, no rash    Plan:  Emollients and now stable     #Pyelonephritis with history of recurrent UTI - Improved  -Received ciprofloxacin, doxycycline and nitrofurantoin without relief  -Urine culture growing Enterococcus faecalis sensitive to ampicillin  - S/P meropenem     #Acute decompensated HFpEF-resolving  -Chest x-ray with pulmonary edema and elevated RVSP    Plan:  IV diuresis as needed  Strict I's and O  Daily weights    #PAUL on CKD likely prerenal in the setting of diuresis - Stable  -Baseline creatinine around 2  - Cr 2.0 today    Nephrology following, appreciate recommendations  Strict I's and O's  Holding diuresis    #Suspected aspiration--> ruled out  -Seen by SLP, did not have any signs or symptoms of aspiration    Plan:  Continue to monitor    #Hyperkalemia  - K 4.8 toady     Plan:  Continue monitoring BMP    #Elevated troponin likely from CKD and demand ischemia  #CAD  #LBBB chronic  -Down trended  -EKG with LBBB    Plan:  Cardiology on consult, appreciate recommendations  Continue atenolol, amlodipine, Imdur, statin and aspirin  Defer telmesartain to nephro/cardo    Chronic medical problems:    #Severe AS  -Follow-up at [facility] for TAVR balloon valvuloplasty    #Hypertension  Plan: Continue home meds except for on losartan    #GERD  Continue PPI    Pt. Denied appeal X 2 to ARU. PT/OT still recommending SNF. [facility] referral pending        Diet ADULT DIET; Dysphagia - Minced and Moist; Low Potassium (Less than 3000 mg/day); 1500 ml; Please provide extra moisteners and gravies  ADULT ORAL NUTRITION SUPPLEMENT; Dinner; Frozen Oral Supplement   DVT Prophylaxis [] Lovenox, [x]  Heparin, [] SCDs, [] Ambulation,  [] Eliquis, [] Xarelto  [] Coumadin   Code Status Limited   Disposition From: home  Expected Disposition: [facility]   Estimated Date of Discharge: Ready for discharge. Appeal pending   Surrogate Decision Maker/ CANDIS Rico     Subjective:     Chief Complaint: Shortness of Breath     Patient feeling well today. No complaints. On 2L/min when seen. No new complaints. She was walking with PT initially. Review of Systems:    General: No fever, no chills  Heart: No chest pain, no palpitations  Lungs: No shortness of breath, no cough  Abdomen: No abdominal pain, no nausea, no vomiting, no constipation, no diarrhea  : No frequency, no dysuria, no urgency  MSK: No lower extremity swelling  Neuro: No confusion  Skin: No rashes      Objective: Intake/Output Summary (Last 24 hours) at 12/3/2022 1451  Last data filed at 12/3/2022 1033  Gross per 24 hour   Intake 240 ml   Output --   Net 240 ml          Vitals:   Vitals:    12/03/22 1421   BP: 134/64   Pulse: 68   Resp: 18   Temp: 97.8 °F (36.6 °C)   SpO2: 98%       Physical Exam:     General: NAD. On 2/min O2 when seen. Pleasant. Eyes: EOMI  HENT: Atraumatic  Respiratory: no respiratory distress. B/L equal air entry.  No wheeze or rhonchi. GI: nondistended. BS normal.   CVS: S1 + S2. Equal peripheral pulses.    MSK: no lower extremity edema  Skin: Intact, dry, warm, no rashes  Neuro: CN II to XII grossly intact  Psych: Normal mood    Medications:   Medications:    ipratropium-albuterol  1 ampule Inhalation TID    diphenhydrAMINE-zinc acetate   Topical 4x daily    sennosides-docusate sodium  1 tablet Oral BID    polyethylene glycol  17 g Oral Daily    docusate sodium  100 mg Oral Daily    lactobacillus  1 capsule Oral Daily with breakfast    amLODIPine  10 mg Oral Nightly    atenolol  25 mg Oral Daily    sodium bicarbonate  325 mg Oral BID    clopidogrel  75 mg Oral Daily    sodium chloride flush  5-40 mL IntraVENous 2 times per day    sodium chloride flush  5-40 mL IntraVENous 2 times per day    heparin (porcine)  5,000 Units SubCUTAneous 3 times per day    Vitamin D  2,000 Units Oral Daily    pantoprazole  40 mg Oral QAM AC    fluorometholone  1 drop Both Eyes BID    isosorbide mononitrate  60 mg Oral Daily    levothyroxine  88 mcg Oral Daily    ranolazine  500 mg Oral BID    sertraline  50 mg Oral Daily    [Held by provider] losartan  12.5 mg Oral Daily    atorvastatin  20 mg Oral Nightly      Infusions:    sodium chloride 75 mL/hr at 11/24/22 1739    sodium chloride 20 mL/hr at 11/27/22 1640     PRN Meds: hydrOXYzine pamoate, 25 mg, TID PRN  melatonin, 3 mg, Nightly PRN  sodium chloride flush, 5-40 mL, PRN  sodium chloride, , PRN  ondansetron, 4 mg, Q8H PRN   Or  ondansetron, 4 mg, Q6H PRN  polyethylene glycol, 17 g, Daily PRN  acetaminophen, 650 mg, Q6H PRN   Or  acetaminophen, 650 mg, Q6H PRN  sodium chloride flush, 5-40 mL, PRN  sodium chloride, , PRN      Labs      Recent Results (from the past 24 hour(s))   Basic Metabolic Panel    Collection Time: 12/03/22  6:27 AM   Result Value Ref Range    Sodium 135 135 - 145 MMOL/L    Potassium 4.8 3.5 - 5.1 MMOL/L    Chloride 101 99 - 110 mMol/L    CO2 23 21 - 32 MMOL/L    Anion Gap 11 4 - 16    BUN 54 (H) 6 - 23 MG/DL    Creatinine 2.0 (H) 0.6 - 1.1 MG/DL    Est, Glom Filt Rate 23 (L) >60 mL/min/1.73m2    Glucose 85 70 - 99 MG/DL    Calcium 9.6 8.3 - 10.6 MG/DL          Imaging/Diagnostics Last 24 Hours   XR CHEST PORTABLE    Result Date: 11/21/2022  EXAMINATION: ONE XRAY VIEW OF THE CHEST 11/21/2022 12:19 pm COMPARISON: 11/17/2022. HISTORY: ORDERING SYSTEM PROVIDED HISTORY: sob TECHNOLOGIST PROVIDED HISTORY: Reason for exam:->sob Reason for Exam:  sob FINDINGS: Cardiomediastinal silhouette is stable. Similar bilateral airspace opacities. No pleural effusion or pneumothorax. No gross bony abnormality.      Stable chest.       Electronically signed by Lynn Rosales MD on 12/3/2022 at 2:51 PM

## 2022-12-03 NOTE — PROGRESS NOTES
Daily Progress Note  Subjective:  Pt awake-no complaints  No SOB or CP  BP and HR stable  No tele  Severe AS-plan for TAVR once stable as OP  Waiting on rehab placement    Attending Note:        I have personally seen and examined this patient. I have fully participated in the care of this patient and I have reviewed and agree with all pertinent clinical information including history, physical exam, and plan. See my impression and plan below. Severe AS. Plan for TAVR outpatient when she fully recovers from recent hospitalization. At this time patient denies any cardiac symptoms    Electronically signed by Isabella Erickson,  on 12/3/22 at 1:54 PM EST      Impression and Plan:     Sepsis    CAP, noted on CT chest    Respiratory panel negative    WBC count elevated    ABX per primary    Using 1L NC now-weaning slowly    CXR showing improvement 11/30/2022     Acute on Chronic HFpEF    BNP on admission was 6,417    Last echo showed EF 55-60%    Severe Aortic stenosis    Diuretics per renal at this time    Lasix given the other night    Accurate I's and O's    1500 Fluid restriction     Plan to have her f/u with [facility] OP for potential valvuloplasty-will arrange this OP     CKD    Renal following; Avoid nephrotoxic medications     Stable from cardiac standpoint  Med. Tx. and increase activity  Awaiting placement      Most Recent Echo  11/15/2022   Left ventricular systolic function is normal.   Ejection fraction is visually estimated at 55-60%. Moderately dilated left atrium. Moderate to severe aortic stenosis is present; Mean PG 40mmHg, YANIRA 0.99 cm2. Mitral annular calcification is present. Mild to moderate mitral regurgitation. Mild to moderate tricuspid regurgitation; RVSP: 44 mmHg. No evidence of any pericardial effusion.      Radiology  CT chest 11/14/2022  Impression   Bilateral pulmonary infiltrates and bilateral pleural effusions with probable   reactive mediastinal adenopathy Probable granuloma in the right middle lobe. No follow-up imaging   recommended. PAST MEDICAL HISTORY:  The patient has a history of hypertension, renal  insufficiency present, and she has been treated medically. She is known  to have coronary artery disease also present. She has a chronic left  ventricular block present. She had a heart catheterization done in the  past and LAD had 70% stenosis present and she was treated medically at  that time. Anemia, hyperlipidemia, and arthritis present. PAST SURGICAL HISTORY:  She has a history of having hip surgery done. Moderate coronary artery disease, heart catheterization done in 2018,  LAD with 70% stenosis distally. She was treated medically for that. She had cataract surgery and hysterectomy done.        Objective:   BP (!) 152/80   Pulse 67   Temp 97.7 °F (36.5 °C) (Oral)   Resp 18   Ht 5' (1.524 m)   Wt 140 lb 3.4 oz (63.6 kg)   LMP  (LMP Unknown)   SpO2 97%   BMI 27.38 kg/m²     Intake/Output Summary (Last 24 hours) at 12/3/2022 1010  Last data filed at 12/2/2022 2219  Gross per 24 hour   Intake 120 ml   Output --   Net 120 ml       Medications:   Scheduled Meds:   ipratropium-albuterol  1 ampule Inhalation TID    diphenhydrAMINE-zinc acetate   Topical 4x daily    sennosides-docusate sodium  1 tablet Oral BID    polyethylene glycol  17 g Oral Daily    docusate sodium  100 mg Oral Daily    lactobacillus  1 capsule Oral Daily with breakfast    amLODIPine  10 mg Oral Nightly    atenolol  25 mg Oral Daily    sodium bicarbonate  325 mg Oral BID    clopidogrel  75 mg Oral Daily    sodium chloride flush  5-40 mL IntraVENous 2 times per day    sodium chloride flush  5-40 mL IntraVENous 2 times per day    heparin (porcine)  5,000 Units SubCUTAneous 3 times per day    Vitamin D  2,000 Units Oral Daily    pantoprazole  40 mg Oral QAM AC    fluorometholone  1 drop Both Eyes BID    isosorbide mononitrate  60 mg Oral Daily    levothyroxine  88 mcg Oral Daily    ranolazine  500 mg Oral BID    sertraline  50 mg Oral Daily    [Held by provider] losartan  12.5 mg Oral Daily    atorvastatin  20 mg Oral Nightly      Infusions:   sodium chloride 75 mL/hr at 11/24/22 1739    sodium chloride 20 mL/hr at 11/27/22 1640      PRN Meds:  hydrOXYzine pamoate, melatonin, sodium chloride flush, sodium chloride, ondansetron **OR** ondansetron, polyethylene glycol, acetaminophen **OR** acetaminophen, sodium chloride flush, sodium chloride     Physical Exam:  Vitals:    12/03/22 0812   BP:    Pulse:    Resp: 18   Temp:    SpO2:         General: AAO, NAD  Chest: Nontender  Cardiac: First and Second Heart Sounds are Normal, No Murmurs or Gallops noted  Lungs:Clear to auscultation and percussion. Abdomen: Soft, NT, ND, +BS  Extremities: No clubbing, no edema  Vascular:  Equal 2+ peripheral pulses. Lab Data:  CBC: No results for input(s): WBC, HGB, HCT, MCV, PLT in the last 72 hours. BMP:   Recent Labs     12/01/22  0321 12/02/22  0747 12/03/22  0627    139 135   K 4.6 5.2* 4.8    103 101   CO2 25 25 23   BUN 46* 46* 54*   CREATININE 2.1* 1.8* 2.0*     LIVER PROFILE: No results for input(s): AST, ALT, LIPASE, BILIDIR, BILITOT, ALKPHOS in the last 72 hours. Invalid input(s): AMYLASE,  ALB  PT/INR: No results for input(s): PROTIME, INR in the last 72 hours. APTT: No results for input(s): APTT in the last 72 hours. BNP:  No results for input(s): BNP in the last 72 hours.       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

## 2022-12-03 NOTE — PROGRESS NOTES
Nephrology Progress Note        [address], [address] [address]  Phone: [phone]  Office Hours: 8:30AM - 4:30PM  Monday - Friday        12/3/2022 7:22 AM  Subjective:   Admit Date: 11/14/2022  PCP: Tanika Wheeler MD  Interval History: On nc  Sitting in a chair  Doing ok  Awaiting rehab at this point    Diet: ADULT DIET; Dysphagia - Minced and Moist; Low Potassium (Less than 3000 mg/day); 1500 ml; Please provide extra moisteners and gravies  ADULT ORAL NUTRITION SUPPLEMENT; Dinner; Frozen Oral Supplement      Data:   Scheduled Meds:   ipratropium-albuterol  1 ampule Inhalation TID    diphenhydrAMINE-zinc acetate   Topical 4x daily    sennosides-docusate sodium  1 tablet Oral BID    polyethylene glycol  17 g Oral Daily    docusate sodium  100 mg Oral Daily    lactobacillus  1 capsule Oral Daily with breakfast    amLODIPine  10 mg Oral Nightly    atenolol  25 mg Oral Daily    sodium bicarbonate  325 mg Oral BID    clopidogrel  75 mg Oral Daily    sodium chloride flush  5-40 mL IntraVENous 2 times per day    sodium chloride flush  5-40 mL IntraVENous 2 times per day    heparin (porcine)  5,000 Units SubCUTAneous 3 times per day    Vitamin D  2,000 Units Oral Daily    pantoprazole  40 mg Oral QAM AC    fluorometholone  1 drop Both Eyes BID    isosorbide mononitrate  60 mg Oral Daily    levothyroxine  88 mcg Oral Daily    ranolazine  500 mg Oral BID    sertraline  50 mg Oral Daily    [Held by provider] losartan  12.5 mg Oral Daily    atorvastatin  20 mg Oral Nightly     Continuous Infusions:   sodium chloride 75 mL/hr at 11/24/22 1739    sodium chloride 20 mL/hr at 11/27/22 1640     PRN Meds:hydrOXYzine pamoate, melatonin, sodium chloride flush, sodium chloride, ondansetron **OR** ondansetron, polyethylene glycol, acetaminophen **OR** acetaminophen, sodium chloride flush, sodium chloride  I/O last 3 completed shifts:   In: 120 [P.O.:120]  Out: -   No intake/output data recorded. Intake/Output Summary (Last 24 hours) at 12/3/2022 0521  Last data filed at 12/2/2022 2219  Gross per 24 hour   Intake 120 ml   Output --   Net 120 ml       CBC: No results for input(s): WBC, HGB, PLT in the last 72 hours. BMP:    Recent Labs     12/01/22  0321 12/02/22  0747    139   K 4.6 5.2*    103   CO2 25 25   BUN 46* 46*   CREATININE 2.1* 1.8*   GLUCOSE 89 92     Hepatic: No results for input(s): AST, ALT, ALB, BILITOT, ALKPHOS in the last 72 hours. Troponin: No results for input(s): TROPONINI in the last 72 hours. BNP: No results for input(s): BNP in the last 72 hours. Lipids: No results for input(s): CHOL, HDL in the last 72 hours. Invalid input(s): LDLCALCU  ABGs:   Lab Results   Component Value Date/Time    PO2ART 83 11/14/2022 11:30 PM    VBO1ZPJ 29.0 11/14/2022 11:30 PM     INR: No results for input(s): INR in the last 72 hours.     Objective:   Vitals: BP (!) 167/55   Pulse 72   Temp 97.9 °F (36.6 °C) (Oral)   Resp 16   Ht 5' (1.524 m)   Wt 140 lb 3.4 oz (63.6 kg)   LMP  (LMP Unknown)   SpO2 97%   BMI 27.38 kg/m²   General appearance: alert and cooperative with exam, in no acute distress  HEENT: normocephalic, atraumatic,   Neck: supple, trachea midline  Lungs: breathing comfortably on room air  Heart[de-identified] regular rate and rhythm,  Extremities: extremities atraumatic, no cyanosis or edema  Neurologic: alert, oriented, follows commands, interactive    MEDICAL DECISION MAKING       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 08/18/2018    Bradycardia     LBBB (left bundle branch block)     PAUL (acute kidney injury) ([facility] [address] 12/26/2017    Pneumonia due to infectious organism 11/16/2022    Pyelonephritis 11/16/2022    Chronic kidney disease 11/16/2022    Dyspnea and respiratory abnormalities 11/15/2022    Sepsis ([address] 11/14/2022    Localized edema 07/11/2022    GERD (gastroesophageal reflux disease)     Hypertension Hypothyroid     Sinus congestion 03/23/2022    Vaginal prolapse 09/07/2021    Primary osteoarthritis of left foot 08/30/2021    Closed nondisplaced fracture of fourth metatarsal bone of left foot 08/30/2021    Closed nondisplaced fracture of third metatarsal bone of left foot 08/30/2021    Closed nondisplaced fracture of fifth left metatarsal bone 08/30/2021    Vertigo     Dizziness     Labyrinthitis     Chronic renal impairment, stage 3 (moderate) (HCC)     Constipation, slow transit 06/19/2018    Cystitis     Physical deconditioning     Hyponatremia 01/01/2018    Pruritic condition 01/22/2016    Gout     Hyperuricemia     Low back pain     Eczema     Coronary artery disease involving native heart without angina pectoris     Depression     Hyperlipidemia     Generalized osteoarthritis     Osteoporosis      K wnl today  Cr 2, stable  Ok to take off the compression socks during the night  Continue BP meds                Electronically signed by Tegan Conley DO on 12/3/2022 at [address], MD Jose Alejandro Morin DO  [facility],  [address]  [place] [address]  PHONE: [phone]  FAX: [phone]

## 2022-12-04 LAB
ANION GAP SERPL CALCULATED.3IONS-SCNC: 10 MMOL/L (ref 4–16)
BUN BLDV-MCNC: 49 MG/DL (ref 6–23)
CALCIUM SERPL-MCNC: 9.5 MG/DL (ref 8.3–10.6)
CHLORIDE BLD-SCNC: 102 MMOL/L (ref 99–110)
CO2: 23 MMOL/L (ref 21–32)
CREAT SERPL-MCNC: 2 MG/DL (ref 0.6–1.1)
GFR SERPL CREATININE-BSD FRML MDRD: 23 ML/MIN/1.73M2
GLUCOSE BLD-MCNC: 109 MG/DL (ref 70–99)
POTASSIUM SERPL-SCNC: 4.6 MMOL/L (ref 3.5–5.1)
SODIUM BLD-SCNC: 135 MMOL/L (ref 135–145)

## 2022-12-04 PROCEDURE — 99231 SBSQ HOSP IP/OBS SF/LOW 25: CPT | Performed by: INTERNAL MEDICINE

## 2022-12-04 PROCEDURE — 97530 THERAPEUTIC ACTIVITIES: CPT

## 2022-12-04 PROCEDURE — 36415 COLL VENOUS BLD VENIPUNCTURE: CPT

## 2022-12-04 PROCEDURE — 1200000000 HC SEMI PRIVATE

## 2022-12-04 PROCEDURE — 94640 AIRWAY INHALATION TREATMENT: CPT

## 2022-12-04 PROCEDURE — 6370000000 HC RX 637 (ALT 250 FOR IP): Performed by: INTERNAL MEDICINE

## 2022-12-04 PROCEDURE — 80048 BASIC METABOLIC PNL TOTAL CA: CPT

## 2022-12-04 PROCEDURE — 6370000000 HC RX 637 (ALT 250 FOR IP)

## 2022-12-04 PROCEDURE — 6360000002 HC RX W HCPCS

## 2022-12-04 PROCEDURE — 6370000000 HC RX 637 (ALT 250 FOR IP): Performed by: STUDENT IN AN ORGANIZED HEALTH CARE EDUCATION/TRAINING PROGRAM

## 2022-12-04 PROCEDURE — 97116 GAIT TRAINING THERAPY: CPT

## 2022-12-04 PROCEDURE — 97535 SELF CARE MNGMENT TRAINING: CPT

## 2022-12-04 PROCEDURE — 94761 N-INVAS EAR/PLS OXIMETRY MLT: CPT

## 2022-12-04 PROCEDURE — 94668 MNPJ CHEST WALL SBSQ: CPT

## 2022-12-04 RX ADMIN — Medication 1 CAPSULE: at 08:47

## 2022-12-04 RX ADMIN — SENNOSIDES AND DOCUSATE SODIUM 1 TABLET: 50; 8.6 TABLET ORAL at 08:46

## 2022-12-04 RX ADMIN — DOCUSATE SODIUM 100 MG: 100 CAPSULE, LIQUID FILLED ORAL at 08:46

## 2022-12-04 RX ADMIN — CLOPIDOGREL BISULFATE 75 MG: 75 TABLET ORAL at 08:46

## 2022-12-04 RX ADMIN — DIPHENHYDRAMINE HYDROCHLORIDE, ZINC ACETATE: 2; .1 CREAM TOPICAL at 22:24

## 2022-12-04 RX ADMIN — SODIUM BICARBONATE 325 MG: 650 TABLET ORAL at 08:46

## 2022-12-04 RX ADMIN — RANOLAZINE 500 MG: 500 TABLET, FILM COATED, EXTENDED RELEASE ORAL at 22:24

## 2022-12-04 RX ADMIN — POLYETHYLENE GLYCOL (3350) 17 G: 17 POWDER, FOR SOLUTION ORAL at 08:46

## 2022-12-04 RX ADMIN — SODIUM BICARBONATE 325 MG: 650 TABLET ORAL at 22:38

## 2022-12-04 RX ADMIN — ISOSORBIDE MONONITRATE 60 MG: 60 TABLET, EXTENDED RELEASE ORAL at 08:46

## 2022-12-04 RX ADMIN — Medication 1 DROP: at 08:49

## 2022-12-04 RX ADMIN — DIPHENHYDRAMINE HYDROCHLORIDE, ZINC ACETATE: 2; .1 CREAM TOPICAL at 12:18

## 2022-12-04 RX ADMIN — Medication 1 DROP: at 22:25

## 2022-12-04 RX ADMIN — IPRATROPIUM BROMIDE AND ALBUTEROL SULFATE 1 AMPULE: .5; 3 SOLUTION RESPIRATORY (INHALATION) at 16:33

## 2022-12-04 RX ADMIN — IPRATROPIUM BROMIDE AND ALBUTEROL SULFATE 1 AMPULE: .5; 3 SOLUTION RESPIRATORY (INHALATION) at 08:31

## 2022-12-04 RX ADMIN — HEPARIN SODIUM 5000 UNITS: 5000 INJECTION INTRAVENOUS; SUBCUTANEOUS at 06:38

## 2022-12-04 RX ADMIN — HEPARIN SODIUM 5000 UNITS: 5000 INJECTION INTRAVENOUS; SUBCUTANEOUS at 12:18

## 2022-12-04 RX ADMIN — AMLODIPINE BESYLATE 10 MG: 10 TABLET ORAL at 22:24

## 2022-12-04 RX ADMIN — IPRATROPIUM BROMIDE AND ALBUTEROL SULFATE 1 AMPULE: .5; 3 SOLUTION RESPIRATORY (INHALATION) at 20:04

## 2022-12-04 RX ADMIN — RANOLAZINE 500 MG: 500 TABLET, FILM COATED, EXTENDED RELEASE ORAL at 08:47

## 2022-12-04 RX ADMIN — ATENOLOL 25 MG: 25 TABLET ORAL at 16:55

## 2022-12-04 RX ADMIN — SENNOSIDES AND DOCUSATE SODIUM 1 TABLET: 50; 8.6 TABLET ORAL at 22:24

## 2022-12-04 RX ADMIN — HEPARIN SODIUM 5000 UNITS: 5000 INJECTION INTRAVENOUS; SUBCUTANEOUS at 22:24

## 2022-12-04 RX ADMIN — Medication 2000 UNITS: at 08:47

## 2022-12-04 RX ADMIN — PANTOPRAZOLE SODIUM 40 MG: 40 TABLET, DELAYED RELEASE ORAL at 06:39

## 2022-12-04 RX ADMIN — Medication 3 MG: at 22:24

## 2022-12-04 RX ADMIN — ATORVASTATIN CALCIUM 20 MG: 10 TABLET, FILM COATED ORAL at 22:24

## 2022-12-04 RX ADMIN — SERTRALINE HYDROCHLORIDE 50 MG: 50 TABLET ORAL at 08:46

## 2022-12-04 RX ADMIN — DIPHENHYDRAMINE HYDROCHLORIDE, ZINC ACETATE: 2; .1 CREAM TOPICAL at 16:56

## 2022-12-04 RX ADMIN — DIPHENHYDRAMINE HYDROCHLORIDE, ZINC ACETATE: 2; .1 CREAM TOPICAL at 08:47

## 2022-12-04 RX ADMIN — LEVOTHYROXINE SODIUM 88 MCG: 0.09 TABLET ORAL at 06:39

## 2022-12-04 NOTE — PROGRESS NOTES
Daily Progress Note  Subjective:  Pt awake-no complaints  No SOB or CP  BP and HR stable  No tele  Severe AS-plan for TAVR once stable as OP  Waiting on rehab placement-pt hoping to go to [facility]    Attending Note:        I have personally seen and examined this patient. I have fully participated in the care of this patient and I have reviewed and agree with all pertinent clinical information including history, physical exam, and plan. See my impression and plan below. Stable continue current medical therapy. Electronically signed by Yoselyn Godinez DO on 12/4/22 at 12:33 PM EST      Impression and Plan:     Sepsis    CAP, noted on CT chest    Respiratory panel negative    WBC count elevated    ABX per primary    Using 1L NC now-weaning slowly    CXR showing improvement 11/30/2022     Acute on Chronic HFpEF    BNP on admission was 6,417    Last echo showed EF 55-60%    Severe Aortic stenosis    Diuretics per renal at this time    Lasix given the other night    Accurate I's and O's    1500 Fluid restriction     Plan to have her f/u with [facility] OP for potential valvuloplasty-will arrange this OP     CKD    Renal following; Avoid nephrotoxic medications     Stable from cardiac standpoint  Med. Tx. and increase activity  Awaiting placement      Most Recent Echo  11/15/2022   Left ventricular systolic function is normal.   Ejection fraction is visually estimated at 55-60%. Moderately dilated left atrium. Moderate to severe aortic stenosis is present; Mean PG 40mmHg, YANIRA 0.99 cm2. Mitral annular calcification is present. Mild to moderate mitral regurgitation. Mild to moderate tricuspid regurgitation; RVSP: 44 mmHg. No evidence of any pericardial effusion. Radiology  CT chest 11/14/2022  Impression   Bilateral pulmonary infiltrates and bilateral pleural effusions with probable   reactive mediastinal adenopathy       Probable granuloma in the right middle lobe.   No follow-up imaging recommended. PAST MEDICAL HISTORY:  The patient has a history of hypertension, renal  insufficiency present, and she has been treated medically. She is known  to have coronary artery disease also present. She has a chronic left  ventricular block present. She had a heart catheterization done in the  past and LAD had 70% stenosis present and she was treated medically at  that time. Anemia, hyperlipidemia, and arthritis present. PAST SURGICAL HISTORY:  She has a history of having hip surgery done. Moderate coronary artery disease, heart catheterization done in 2018,  LAD with 70% stenosis distally. She was treated medically for that. She had cataract surgery and hysterectomy done.        Objective:   BP (!) 157/62   Pulse 79   Temp 98 °F (36.7 °C)   Resp 18   Ht 5' (1.524 m)   Wt 140 lb 3.4 oz (63.6 kg)   LMP  (LMP Unknown)   SpO2 98%   BMI 27.38 kg/m²     Intake/Output Summary (Last 24 hours) at 12/4/2022 0187  Last data filed at 12/3/2022 1825  Gross per 24 hour   Intake 120 ml   Output 800 ml   Net -680 ml       Medications:   Scheduled Meds:   ipratropium-albuterol  1 ampule Inhalation TID    diphenhydrAMINE-zinc acetate   Topical 4x daily    sennosides-docusate sodium  1 tablet Oral BID    polyethylene glycol  17 g Oral Daily    docusate sodium  100 mg Oral Daily    lactobacillus  1 capsule Oral Daily with breakfast    amLODIPine  10 mg Oral Nightly    atenolol  25 mg Oral Daily    sodium bicarbonate  325 mg Oral BID    clopidogrel  75 mg Oral Daily    sodium chloride flush  5-40 mL IntraVENous 2 times per day    sodium chloride flush  5-40 mL IntraVENous 2 times per day    heparin (porcine)  5,000 Units SubCUTAneous 3 times per day    Vitamin D  2,000 Units Oral Daily    pantoprazole  40 mg Oral QAM AC    fluorometholone  1 drop Both Eyes BID    isosorbide mononitrate  60 mg Oral Daily    levothyroxine  88 mcg Oral Daily    ranolazine  500 mg Oral BID    sertraline  50 mg Oral Daily [Held by provider] losartan  12.5 mg Oral Daily    atorvastatin  20 mg Oral Nightly      Infusions:   sodium chloride 75 mL/hr at 11/24/22 1739    sodium chloride 20 mL/hr at 11/27/22 1640      PRN Meds:  hydrOXYzine pamoate, melatonin, sodium chloride flush, sodium chloride, ondansetron **OR** ondansetron, polyethylene glycol, acetaminophen **OR** acetaminophen, sodium chloride flush, sodium chloride     Physical Exam:  Vitals:    12/04/22 0840   BP: (!) 157/62   Pulse: 79   Resp: 18   Temp: 98 °F (36.7 °C)   SpO2: 98%        General: AAO, NAD  Chest: Nontender  Cardiac: First and Second Heart Sounds are Normal, 2/6 systolic Murmur no Gallops noted  Lungs:Clear to auscultation and percussion. Abdomen: Soft, NT, ND, +BS  Extremities: No clubbing, no edema  Vascular:  Equal 2+ peripheral pulses. Lab Data:  CBC: No results for input(s): WBC, HGB, HCT, MCV, PLT in the last 72 hours. BMP:   Recent Labs     12/02/22  0747 12/03/22  0627 12/04/22  0011    135 135   K 5.2* 4.8 4.6    101 102   CO2 25 23 23   BUN 46* 54* 49*   CREATININE 1.8* 2.0* 2.0*     LIVER PROFILE: No results for input(s): AST, ALT, LIPASE, BILIDIR, BILITOT, ALKPHOS in the last 72 hours. Invalid input(s): AMYLASE,  ALB  PT/INR: No results for input(s): PROTIME, INR in the last 72 hours. APTT: No results for input(s): APTT in the last 72 hours. BNP:  No results for input(s): BNP in the last 72 hours.       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 08/18/2018    Bradycardia     LBBB (left bundle branch block)     PAUL (acute kidney injury) ([facility] [address] 12/26/2017    Pneumonia due to infectious organism 11/16/2022    Pyelonephritis 11/16/2022    Chronic kidney disease 11/16/2022    Dyspnea and respiratory abnormalities 11/15/2022    Sepsis ([address] 11/14/2022    Localized edema 07/11/2022    GERD (gastroesophageal reflux disease)     Hypertension     Hypothyroid     Sinus congestion 03/23/2022    Vaginal prolapse 09/07/2021    Primary osteoarthritis of left foot 08/30/2021    Closed nondisplaced fracture of fourth metatarsal bone of left foot 08/30/2021    Closed nondisplaced fracture of third metatarsal bone of left foot 08/30/2021    Closed nondisplaced fracture of fifth left metatarsal bone 08/30/2021    Vertigo     Dizziness     Labyrinthitis     Chronic renal impairment, stage 3 (moderate) (HCC)     Constipation, slow transit 06/19/2018    Cystitis     Physical deconditioning     Hyponatremia 01/01/2018    Pruritic condition 01/22/2016    Gout     Hyperuricemia     Low back pain     Eczema     Coronary artery disease involving native heart without angina pectoris     Depression     Hyperlipidemia     Generalized osteoarthritis     Osteoporosis        Electronically signed by Geneva Gatiac PA-C on 12/4/2022 at 9:05 AM

## 2022-12-04 NOTE — PROGRESS NOTES
Pulmonary and Critical Care  Progress Note      VITALS:  BP (!) 157/62   Pulse 79   Temp 98 °F (36.7 °C)   Resp 18   Ht 5' (1.524 m)   Wt 140 lb 3.4 oz (63.6 kg)   LMP  (LMP Unknown)   SpO2 98%   BMI 27.38 kg/m²     Subjective:   CHIEF COMPLAINT :SOB     HPI:                The patient is a 80 y.o. female is sitting in the bed. She is not in acute resp distress    Objective:   PHYSICAL EXAM:    LUNGS:Occasional basal crackles  Abd-soft, BS+,NT  Ext- no pedal edema  CVS-s1s2, no murmurs      DATA:    CBC:  No results for input(s): WBC, RBC, HGB, HCT, PLT, MCV, MCH, MCHC, RDW, NRBC, SEGSPCT, BANDSPCT in the last 72 hours. BMP:  Recent Labs     12/02/22  0747 12/03/22  0627 12/04/22  0011    135 135   K 5.2* 4.8 4.6    101 102   CO2 25 23 23   BUN 46* 54* 49*   CREATININE 1.8* 2.0* 2.0*   CALCIUM 9.9 9.6 9.5   GLUCOSE 92 85 109*      ABG:  No results for input(s): PH, PO2ART, OXJ5VNU, HCO3, BEART, O2SAT in the last 72 hours.   BNP  Lab Results   Component Value Date     (H) 05/09/2012      D-Dimer:  Lab Results   Component Value Date    HNUJGS 788 (H) 01/07/2018      Radiology: None      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 08/18/2018     Priority: High    Bradycardia      Priority: High    LBBB (left bundle branch block)      Priority: High    PAUL (acute kidney injury) ([facility] [address] 12/26/2017     Priority: High    Pneumonia due to infectious organism 11/16/2022     Priority: Medium    Pyelonephritis 11/16/2022     Priority: Medium    Chronic kidney disease 11/16/2022     Priority: Medium    Dyspnea and respiratory abnormalities 11/15/2022     Priority: Medium    Sepsis ([facility] [address] 11/14/2022     Priority: Medium    Localized edema 07/11/2022     Priority: Medium    GERD (gastroesophageal reflux disease)      Priority: Medium     Overview Note:     Esophagitis      Hypertension      Priority: Low    Hypothyroid Priority: Low     Overview Note:     Hypothyroidism      Sinus congestion 03/23/2022     Overview Note:     CHRONIC W POST NASAL DRIP      Vaginal prolapse 09/07/2021    Primary osteoarthritis of left foot 08/30/2021    Closed nondisplaced fracture of fourth metatarsal bone of left foot 08/30/2021    Closed nondisplaced fracture of third metatarsal bone of left foot 08/30/2021    Closed nondisplaced fracture of fifth left metatarsal bone 08/30/2021    Vertigo      Overview Note:     Dr [facility]      Dizziness     Labyrinthitis     Chronic renal impairment, stage 3 (moderate) ([facility])     Constipation, slow transit 06/19/2018    Cystitis     Physical deconditioning     Hyponatremia 01/01/2018    Pruritic condition 01/22/2016    Gout     Hyperuricemia     Low back pain     Eczema     Coronary artery disease involving native heart without angina pectoris      Overview Note:     Dr Ingris Figueredo/Jose Figueredo      Depression     Hyperlipidemia     Generalized osteoarthritis     Osteoporosis      Overview Note:     Generalized     Acute Hypoxic resp failure sec to Cardiogenic and Non Cardiogenic Pulmonary edema- improving  Small bilateral Pleural effusions  PAUL on CKD- slowly improving  Leukocytosis- Improved  Severe AS  Mild to Moderate Pulmonary HTN  CAD  H/o Recurrent UTI  GERD  HLD  ?  Atypical pneumonia       ICS  OOB  Inhalers  Keep sats > 92%  PT/OT  Await placement  C.w present management    Electronically signed by Emily Sarmiento MD on 12/4/2022 at 12:57 PM

## 2022-12-04 NOTE — PROGRESS NOTES
V2.0  [facility] Progress Note      Name:  Adeline Del Angel /Age/Sex: 1927  (80 y.o. female)   MRN & CSN:  [id number] & [id number] Encounter Date/Time: 2022 2:01 PM EST    Location:  [address] [place] [address]-A PCP: Delores Conner MD       Hospital Day: 21    Assessment and Plan:    Adeline Del Angel is a 80 y.o. female with PMH of CAD, GERD, UTI, Hypothyroidism, HTN, Hyperlipedmia, CKD who presents with Sepsis ([facility] [address]    #Acute hypoxic respiratory failure 2/2 CAP and pulmonary edema-resolving  -Blood cultures negative  -Chest x-ray showing pulmonary edema and concern for pneumonia  -Echo: EF of 55 to 60% with RVSP of 44  -Negative respiratory PCR  -CT chest showing bilateral infiltrates  - On 2L/min when seen - apparently desaturated to 85-86% with off O2  - Not on O2 at home  - S/p meropenem, ID signed off    Plan:  Pulmonology following, appreciate recs  Continue DuoNebs  Continue O2    #Generalized pruritus 2/2 likely xerosis 2/2 AC - Resolved  -likely in the setting of dry air (patient uses humidifier at home)  -no jaundice, no rash    Plan:  Emollients and now stable     #Pyelonephritis with history of recurrent UTI - Improved  -Received ciprofloxacin, doxycycline and nitrofurantoin without relief  -Urine culture growing Enterococcus faecalis sensitive to ampicillin  - S/P meropenem     #Acute decompensated HFpEF-resolving  -Chest x-ray with pulmonary edema and elevated RVSP    Plan:  IV diuresis as needed  Strict I's and O  Daily weights    #PAUL on CKD likely prerenal in the setting of diuresis - Stable  -Baseline creatinine around 2  - Cr 2.0 today    Nephrology following, appreciate recommendations  Strict I's and O's    #Suspected aspiration--> ruled out  -Seen by SLP, did not have any signs or symptoms of aspiration    Plan:  Continue to monitor    #Hyperkalemia  - K 4.6 today    Plan:  Continue monitoring BMP    #Elevated troponin likely from CKD and demand ischemia  #CAD  #LBBB chronic  -Down trended  -EKG with LBBB    Plan:  Cardiology on consult, appreciate recommendations  Continue atenolol, amlodipine, Imdur, statin and aspirin  Defer telmesartain to nephro/cardo    Chronic medical problems:    #Severe AS  -Follow-up at [facility] for TAVR balloon valvuloplasty    #Hypertension  Plan: Continue home meds except for on losartan    #GERD  Continue PPI    Pt. Denied appeal X 2 to ARU. PT/OT still recommending SNF. [facility] referral pending        Diet ADULT DIET; Dysphagia - Minced and Moist; Low Potassium (Less than 3000 mg/day); 1500 ml; Please provide extra moisteners and gravies  ADULT ORAL NUTRITION SUPPLEMENT; Dinner; Frozen Oral Supplement   DVT Prophylaxis [] Lovenox, [x]  Heparin, [] SCDs, [] Ambulation,  [] Eliquis, [] Xarelto  [] Coumadin   Code Status Limited   Disposition From: home  Expected Disposition: [facility]   Estimated Date of Discharge: Ready for discharge. Appeal pending   Surrogate Decision Maker/ CANDIS Rico     Subjective:     Chief Complaint: Shortness of Breath     Patient feeling well today. No complaints. On 2L/min when seen. No new complaints. She was sleeping initially but promptly woke up. Review of Systems:    General: No fever, no chills  Heart: No chest pain, no palpitations  Lungs: No shortness of breath, no cough  Abdomen: No abdominal pain, no nausea, no vomiting, no constipation, no diarrhea  : No frequency, no dysuria, no urgency  MSK: No lower extremity swelling  Neuro: No confusion  Skin: No rashes      Objective: Intake/Output Summary (Last 24 hours) at 12/4/2022 1527  Last data filed at 12/4/2022 1015  Gross per 24 hour   Intake 120 ml   Output 800 ml   Net -680 ml          Vitals:   Vitals:    12/04/22 1357   BP: (!) 162/68   Pulse: 83   Resp: 18   Temp: 98.3 °F (36.8 °C)   SpO2: 98%       Physical Exam:     General: NAD. On 2/min O2 when seen. Pleasant and initially asleep but woke up promptly.     Eyes: EOMI  HENT: Atraumatic  Respiratory: no respiratory distress. B/L equal air entry. No wheeze or rhonchi. GI: nondistended. BS normal.   CVS: S1 + S2. Equal peripheral pulses.    MSK: no lower extremity edema  Skin: Intact, dry, warm, no rashes  Neuro: CN II to XII grossly intact  Psych: Normal mood    Medications:   Medications:    ipratropium-albuterol  1 ampule Inhalation TID    diphenhydrAMINE-zinc acetate   Topical 4x daily    sennosides-docusate sodium  1 tablet Oral BID    polyethylene glycol  17 g Oral Daily    docusate sodium  100 mg Oral Daily    lactobacillus  1 capsule Oral Daily with breakfast    amLODIPine  10 mg Oral Nightly    atenolol  25 mg Oral Daily    sodium bicarbonate  325 mg Oral BID    clopidogrel  75 mg Oral Daily    sodium chloride flush  5-40 mL IntraVENous 2 times per day    sodium chloride flush  5-40 mL IntraVENous 2 times per day    heparin (porcine)  5,000 Units SubCUTAneous 3 times per day    Vitamin D  2,000 Units Oral Daily    pantoprazole  40 mg Oral QAM AC    fluorometholone  1 drop Both Eyes BID    isosorbide mononitrate  60 mg Oral Daily    levothyroxine  88 mcg Oral Daily    ranolazine  500 mg Oral BID    sertraline  50 mg Oral Daily    [Held by provider] losartan  12.5 mg Oral Daily    atorvastatin  20 mg Oral Nightly      Infusions:    sodium chloride 75 mL/hr at 11/24/22 1739    sodium chloride 20 mL/hr at 11/27/22 1640     PRN Meds: hydrOXYzine pamoate, 25 mg, TID PRN  melatonin, 3 mg, Nightly PRN  sodium chloride flush, 5-40 mL, PRN  sodium chloride, , PRN  ondansetron, 4 mg, Q8H PRN   Or  ondansetron, 4 mg, Q6H PRN  polyethylene glycol, 17 g, Daily PRN  acetaminophen, 650 mg, Q6H PRN   Or  acetaminophen, 650 mg, Q6H PRN  sodium chloride flush, 5-40 mL, PRN  sodium chloride, , PRN      Labs      Recent Results (from the past 24 hour(s))   Basic Metabolic Panel    Collection Time: 12/04/22 12:11 AM   Result Value Ref Range    Sodium 135 135 - 145 MMOL/L    Potassium 4.6 3.5 - 5.1 MMOL/L    Chloride 102 99 - 110 mMol/L    CO2 23 21 - 32 MMOL/L    Anion Gap 10 4 - 16    BUN 49 (H) 6 - 23 MG/DL    Creatinine 2.0 (H) 0.6 - 1.1 MG/DL    Est, Glom Filt Rate 23 (L) >60 mL/min/1.73m2    Glucose 109 (H) 70 - 99 MG/DL    Calcium 9.5 8.3 - 10.6 MG/DL          Imaging/Diagnostics Last 24 Hours   XR CHEST PORTABLE    Result Date: 11/21/2022  EXAMINATION: ONE XRAY VIEW OF THE CHEST 11/21/2022 12:19 pm COMPARISON: 11/17/2022. HISTORY: ORDERING SYSTEM PROVIDED HISTORY: sob TECHNOLOGIST PROVIDED HISTORY: Reason for exam:->sob Reason for Exam:  sob FINDINGS: Cardiomediastinal silhouette is stable. Similar bilateral airspace opacities. No pleural effusion or pneumothorax. No gross bony abnormality.      Stable chest.       Electronically signed by David Tejada MD on 12/4/2022 at 3:27 PM

## 2022-12-04 NOTE — PROGRESS NOTES
Physical Therapy    Physical Therapy Treatment Note  Name: Concepcion Richardson MRN: [id number] :   1927   Date:  2022   Admission Date: 2022 Room:  [address] [place] [address]   Restrictions/Precautions:          general precautions; fall risk     Subjective:  Patient states:  \"I would like to walk\"  Pain:    Location, Type, Intensity (0/10 to 10/10):  denies; 0/10     Objective:    Observation:  pt sitting upright in chair upon entry        Treatment, including education/measures:  Pt agreeable to participating in therapy at this time. Therapeutic Activity Training:   Therapeutic activity training was instructed today. Cues were given for safety, sequence, UE/LE placement, awareness, and balance. Activities performed today included bed mobility training, sup-sit, sit-stand. Pt completed sit to stand from chair/toilet with CGAx1 with verbal cues to push through chair/grab bar and avoid pulling on walker. Pt completed stand to sit onto chair/toilet with minAx1 with verbal cues to feel chair against back of legs, reach back, and sit slowly. Gait Training:  Cues were given for safety, sequence, device management, balance, posture, awareness, path. Pt ambulated 25 feet + 35 feet + 50 feet with assist varying from CGAx1 to minAx1 with a 4ww with a decreased gait speed, a decreased step length bilaterally, and an unsteady gait. Pt provided with verbal and tactile cues for BLE placement, walker placement, and sequence throughout ambulation. Pt provided with verbal cues for directions in order to successfully navigate hallway and return to correct room. Pt provided with verbal and tactile cues to maintain upright posture in order to avoid COM shifting outside of ANGEL. Safety  Patient left safely in the chair, with call light/phone in reach with alarm applied. Gait belt was used for transfers and gait.         Assessment / Impression:    Patient's tolerance of treatment:  Good; patient tolerated ambulation in hallway  today              Adverse Reaction: none  Significant change in status and impact:  none  Barriers to improvement:  decreased aerobic capacity        Plan for Next Session:    Continue progressing toward goals per plan of care. Progress independence with transfers and ambulation as tolerated and appropriate. Progress ambulation distance as tolerated and appropriate. Time in: 1215  Time out: 1240  Timed treatment minutes: 25  Total treatment time: 25      Previously filed items:  Social/Functional History  Lives With: Alone (pt has home health aide daily for 8 hours/day, also supportive granddaughter)  Type of Home: Apartment  Home Layout: One level  Home Access: Level entry  Bathroom Shower/Tub: Walk-in shower  Bathroom Toilet: Handicap height  Bathroom Equipment: Built-in shower seat, Grab bars in shower, Commode  Bathroom Accessibility: Accessible  Home Equipment: Walker, 4 wheeled, Lift chair  ADL Assistance: Independent (aide supervises with bathing but pt able to manage per granddaughter, pt able to dress and toilet independently)  [address]: Needs assistance  Homemaking Responsibilities: No (home health aide manages)  Ambulation Assistance: Independent (mod I with 4ww household distances)  Transfer Assistance: Independent  Active : No  Occupation: Retired        Short Term Goals  Time Frame for Ali Blaze Term Goals: 1 week  Short Term Goal 1: Pt will perform all bed mobility tasks with SBA, min cues  Short Term Goal 2: Pt will manage all compenents of rollator for STS with SBA.   Short Term Goal 3: Pt will AMB x45 ft with RW, stable 02, CGA-SBA for safety    Electronically signed by:      Nataliia Herrera PT, DPT  License #: [id number]

## 2022-12-04 NOTE — PROGRESS NOTES
Nephrology Progress Note        [address], [address] [address]  Phone: [phone]  Office Hours: 8:30AM - 4:30PM  Monday - Friday 12/4/2022 7:17 AM  Subjective:   Admit Date: 11/14/2022  PCP: Donny Blanco MD  Interval History: On NC  Doing ok  Watching TV  No leg edema    Diet: ADULT DIET; Dysphagia - Minced and Moist; Low Potassium (Less than 3000 mg/day); 1500 ml; Please provide extra moisteners and gravies  ADULT ORAL NUTRITION SUPPLEMENT; Dinner; Frozen Oral Supplement      Data:   Scheduled Meds:   ipratropium-albuterol  1 ampule Inhalation TID    diphenhydrAMINE-zinc acetate   Topical 4x daily    sennosides-docusate sodium  1 tablet Oral BID    polyethylene glycol  17 g Oral Daily    docusate sodium  100 mg Oral Daily    lactobacillus  1 capsule Oral Daily with breakfast    amLODIPine  10 mg Oral Nightly    atenolol  25 mg Oral Daily    sodium bicarbonate  325 mg Oral BID    clopidogrel  75 mg Oral Daily    sodium chloride flush  5-40 mL IntraVENous 2 times per day    sodium chloride flush  5-40 mL IntraVENous 2 times per day    heparin (porcine)  5,000 Units SubCUTAneous 3 times per day    Vitamin D  2,000 Units Oral Daily    pantoprazole  40 mg Oral QAM AC    fluorometholone  1 drop Both Eyes BID    isosorbide mononitrate  60 mg Oral Daily    levothyroxine  88 mcg Oral Daily    ranolazine  500 mg Oral BID    sertraline  50 mg Oral Daily    [Held by provider] losartan  12.5 mg Oral Daily    atorvastatin  20 mg Oral Nightly     Continuous Infusions:   sodium chloride 75 mL/hr at 11/24/22 1739    sodium chloride 20 mL/hr at 11/27/22 1640     PRN Meds:hydrOXYzine pamoate, melatonin, sodium chloride flush, sodium chloride, ondansetron **OR** ondansetron, polyethylene glycol, acetaminophen **OR** acetaminophen, sodium chloride flush, sodium chloride  I/O last 3 completed shifts:   In: 240 [P.O.:240]  Out: 800 [Urine:800]  No intake/output data recorded. Intake/Output Summary (Last 24 hours) at 12/4/2022 0717  Last data filed at 12/3/2022 1825  Gross per 24 hour   Intake 120 ml   Output 800 ml   Net -680 ml       CBC: No results for input(s): WBC, HGB, PLT in the last 72 hours. BMP:    Recent Labs     12/02/22  0747 12/03/22  0627 12/04/22  0011    135 135   K 5.2* 4.8 4.6    101 102   CO2 25 23 23   BUN 46* 54* 49*   CREATININE 1.8* 2.0* 2.0*   GLUCOSE 92 85 109*     Hepatic: No results for input(s): AST, ALT, ALB, BILITOT, ALKPHOS in the last 72 hours. Troponin: No results for input(s): TROPONINI in the last 72 hours. BNP: No results for input(s): BNP in the last 72 hours. Lipids: No results for input(s): CHOL, HDL in the last 72 hours. Invalid input(s): LDLCALCU  ABGs:   Lab Results   Component Value Date/Time    PO2ART 83 11/14/2022 11:30 PM    BRL6YWL 29.0 11/14/2022 11:30 PM     INR: No results for input(s): INR in the last 72 hours.     Objective:   Vitals: BP (!) 157/64   Pulse 63   Temp 97.9 °F (36.6 °C)   Resp 16   Ht 5' (1.524 m)   Wt 140 lb 3.4 oz (63.6 kg)   LMP  (LMP Unknown)   SpO2 96%   BMI 27.38 kg/m²   General appearance: alert and cooperative with exam, in no acute distress  HEENT: normocephalic, atraumatic,   Neck: supple, trachea midline  Lungs:breathing comfortably on nc  Abdomen: non distended,   Extremities: extremities atraumatic, no cyanosis or edema  Neurologic: alert, oriented, follows commands, interactive    MEDICAL DECISION MAKING     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 08/18/2018    Bradycardia     LBBB (left bundle branch block)     PAUL (acute kidney injury) ([facility] [address] 12/26/2017    Pneumonia due to infectious organism 11/16/2022    Pyelonephritis 11/16/2022    Chronic kidney disease 11/16/2022    Dyspnea and respiratory abnormalities 11/15/2022    Sepsis ([facility] [address] 11/14/2022    Localized edema 07/11/2022    GERD (gastroesophageal reflux disease)     Hypertension     Hypothyroid     Sinus congestion 03/23/2022    Vaginal prolapse 09/07/2021    Primary osteoarthritis of left foot 08/30/2021    Closed nondisplaced fracture of fourth metatarsal bone of left foot 08/30/2021    Closed nondisplaced fracture of third metatarsal bone of left foot 08/30/2021    Closed nondisplaced fracture of fifth left metatarsal bone 08/30/2021    Vertigo     Dizziness     Labyrinthitis     Chronic renal impairment, stage 3 (moderate) (HCC)     Constipation, slow transit 06/19/2018    Cystitis     Physical deconditioning     Hyponatremia 01/01/2018    Pruritic condition 01/22/2016    Gout     Hyperuricemia     Low back pain     Eczema     Coronary artery disease involving native heart without angina pectoris     Depression     Hyperlipidemia     Generalized osteoarthritis     Osteoporosis      Cr stable at 2  Continue supportive mgmt  Continue the antihypertensives  Awaiting rehab at this point    Thank you                  Electronically signed by Soledad Szymanski DO on 12/4/2022 at 7:17 AM    [address]oni Naik DO  [address],  Jose Crane  [facility], [facility] [address]  PHONE: [phone]  FAX: [phone]

## 2022-12-04 NOTE — PROGRESS NOTES
Physical Therapy    Physical Therapy Treatment Note  Name: Adeline Del Angel MRN: [id number] :   1927   Date:  12/3/2022   Admission Date: 2022 Room:  [address] [place] [address]   Restrictions/Precautions:          general precautions; fall risk     Subjective:  Patient states:  \"I'm feeling better today\"  Pain:    Location, Type, Intensity (0/10 to 10/10):  denies; 0/10     Objective:    Observation:  pt sitting upright in chair upon entry        Treatment, including education/measures:  Pt agreeable to participating in therapy at this time. Therapeutic Activity Training:   Therapeutic activity training was instructed today. Cues were given for safety, sequence, UE/LE placement, awareness, and balance. Activities performed today included bed mobility training, sup-sit, sit-stand. Pt completed sit to stand from chair with CGAx1 with verbal cues to push through chair and avoid pulling on walker. Pt completed stand to sit onto chair with minAx1 with verbal cues to feel chair against back of legs, reach back, and sit slowly. Gait Training:  Cues were given for safety, sequence, device management, balance, posture, awareness, path. Pt ambulated 25 feet + 35 feet + 12 feet with assist varying from CGAx1 to minAx1 with a 4ww with a decreased gait speed, a decreased step length bilaterally, and an unsteady gait. Pt provided with verbal and tactile cues for BLE placement, walker placement, and sequence throughout ambulation. Pt provided with verbal cues for directions in order to successfully navigate hallway and return to correct room. Pt provided with verbal and tactile cues to maintain upright posture in order to avoid COM shifting outside of ANGEL. Safety  Patient left safely in the chair, with call light/phone in reach with alarm applied. Gait belt was used for transfers and gait.         Assessment / Impression:    Patient's tolerance of treatment:  Good; patient tolerated ambulation in hallway today              Adverse Reaction: none  Significant change in status and impact:  none  Barriers to improvement:  decreased aerobic capacity       Plan for Next Session:    Continue progressing toward goals per plan of care. Progress independence with transfers and ambulation as tolerated and appropriate. Progress ambulation distance as tolerated and appropriate. Time in: 1021  Time out:  1103  Timed treatment minutes: 42  Total treatment time:  43    Previously filed items:  Social/Functional History  Lives With: Alone (pt has home health aide daily for 8 hours/day, also supportive granddaughter)  Type of Home: Apartment  Home Layout: One level  Home Access: Level entry  Bathroom Shower/Tub: Walk-in shower  Bathroom Toilet: Handicap height  Bathroom Equipment: Built-in shower seat, Grab bars in shower, Commode  Bathroom Accessibility: Accessible  Home Equipment: Walker, 4 wheeled, Lift chair  ADL Assistance: Independent (aide supervises with bathing but pt able to manage per granddaughter, pt able to dress and toilet independently)  [address]: Needs assistance  Homemaking Responsibilities: No (home health aide manages)  Ambulation Assistance: Independent (mod I with 4ww household distances)  Transfer Assistance: Independent  Active : No  Occupation: Retired        Short Term Goals  Time Frame for [employer] Term Goals: 1 week  Short Term Goal 1: Pt will perform all bed mobility tasks with SBA, min cues  Short Term Goal 2: Pt will manage all compenents of rollator for STS with SBA.   Short Term Goal 3: Pt will AMB x45 ft with RW, stable 02, CGA-SBA for safety    Electronically signed by:    Padmini Neely PT, DPT  License #: [id number]

## 2022-12-05 LAB
ANION GAP SERPL CALCULATED.3IONS-SCNC: 12 MMOL/L (ref 4–16)
BUN BLDV-MCNC: 45 MG/DL (ref 6–23)
CALCIUM SERPL-MCNC: 9.8 MG/DL (ref 8.3–10.6)
CHLORIDE BLD-SCNC: 103 MMOL/L (ref 99–110)
CO2: 22 MMOL/L (ref 21–32)
CREAT SERPL-MCNC: 2 MG/DL (ref 0.6–1.1)
GFR SERPL CREATININE-BSD FRML MDRD: 23 ML/MIN/1.73M2
GLUCOSE BLD-MCNC: 92 MG/DL (ref 70–99)
POTASSIUM SERPL-SCNC: 4.5 MMOL/L (ref 3.5–5.1)
SODIUM BLD-SCNC: 137 MMOL/L (ref 135–145)

## 2022-12-05 PROCEDURE — 80048 BASIC METABOLIC PNL TOTAL CA: CPT

## 2022-12-05 PROCEDURE — 6370000000 HC RX 637 (ALT 250 FOR IP): Performed by: INTERNAL MEDICINE

## 2022-12-05 PROCEDURE — 6360000002 HC RX W HCPCS

## 2022-12-05 PROCEDURE — 94640 AIRWAY INHALATION TREATMENT: CPT

## 2022-12-05 PROCEDURE — 1200000000 HC SEMI PRIVATE

## 2022-12-05 PROCEDURE — 6370000000 HC RX 637 (ALT 250 FOR IP)

## 2022-12-05 PROCEDURE — 36415 COLL VENOUS BLD VENIPUNCTURE: CPT

## 2022-12-05 PROCEDURE — 94761 N-INVAS EAR/PLS OXIMETRY MLT: CPT

## 2022-12-05 PROCEDURE — 2700000000 HC OXYGEN THERAPY PER DAY

## 2022-12-05 PROCEDURE — 99231 SBSQ HOSP IP/OBS SF/LOW 25: CPT | Performed by: INTERNAL MEDICINE

## 2022-12-05 PROCEDURE — 6370000000 HC RX 637 (ALT 250 FOR IP): Performed by: STUDENT IN AN ORGANIZED HEALTH CARE EDUCATION/TRAINING PROGRAM

## 2022-12-05 RX ADMIN — IPRATROPIUM BROMIDE AND ALBUTEROL SULFATE 1 AMPULE: .5; 3 SOLUTION RESPIRATORY (INHALATION) at 08:43

## 2022-12-05 RX ADMIN — DOCUSATE SODIUM 100 MG: 100 CAPSULE, LIQUID FILLED ORAL at 09:32

## 2022-12-05 RX ADMIN — ATORVASTATIN CALCIUM 20 MG: 10 TABLET, FILM COATED ORAL at 21:38

## 2022-12-05 RX ADMIN — CLOPIDOGREL BISULFATE 75 MG: 75 TABLET ORAL at 09:32

## 2022-12-05 RX ADMIN — DIPHENHYDRAMINE HYDROCHLORIDE, ZINC ACETATE: 2; .1 CREAM TOPICAL at 14:04

## 2022-12-05 RX ADMIN — SENNOSIDES AND DOCUSATE SODIUM 1 TABLET: 50; 8.6 TABLET ORAL at 09:32

## 2022-12-05 RX ADMIN — DIPHENHYDRAMINE HYDROCHLORIDE, ZINC ACETATE: 2; .1 CREAM TOPICAL at 16:52

## 2022-12-05 RX ADMIN — ISOSORBIDE MONONITRATE 60 MG: 60 TABLET, EXTENDED RELEASE ORAL at 09:33

## 2022-12-05 RX ADMIN — Medication 1 DROP: at 21:45

## 2022-12-05 RX ADMIN — IPRATROPIUM BROMIDE AND ALBUTEROL SULFATE 1 AMPULE: .5; 3 SOLUTION RESPIRATORY (INHALATION) at 15:15

## 2022-12-05 RX ADMIN — HEPARIN SODIUM 5000 UNITS: 5000 INJECTION INTRAVENOUS; SUBCUTANEOUS at 05:26

## 2022-12-05 RX ADMIN — SODIUM BICARBONATE 325 MG: 650 TABLET ORAL at 21:37

## 2022-12-05 RX ADMIN — Medication 1 DROP: at 09:33

## 2022-12-05 RX ADMIN — ATENOLOL 25 MG: 25 TABLET ORAL at 16:11

## 2022-12-05 RX ADMIN — HEPARIN SODIUM 5000 UNITS: 5000 INJECTION INTRAVENOUS; SUBCUTANEOUS at 16:11

## 2022-12-05 RX ADMIN — Medication 1 CAPSULE: at 09:32

## 2022-12-05 RX ADMIN — AMLODIPINE BESYLATE 10 MG: 10 TABLET ORAL at 21:37

## 2022-12-05 RX ADMIN — SENNOSIDES AND DOCUSATE SODIUM 1 TABLET: 50; 8.6 TABLET ORAL at 21:37

## 2022-12-05 RX ADMIN — RANOLAZINE 500 MG: 500 TABLET, FILM COATED, EXTENDED RELEASE ORAL at 09:32

## 2022-12-05 RX ADMIN — POLYETHYLENE GLYCOL (3350) 17 G: 17 POWDER, FOR SOLUTION ORAL at 09:33

## 2022-12-05 RX ADMIN — DIPHENHYDRAMINE HYDROCHLORIDE, ZINC ACETATE: 2; .1 CREAM TOPICAL at 09:33

## 2022-12-05 RX ADMIN — HEPARIN SODIUM 5000 UNITS: 5000 INJECTION INTRAVENOUS; SUBCUTANEOUS at 21:38

## 2022-12-05 RX ADMIN — IPRATROPIUM BROMIDE AND ALBUTEROL SULFATE 1 AMPULE: .5; 3 SOLUTION RESPIRATORY (INHALATION) at 21:27

## 2022-12-05 RX ADMIN — DIPHENHYDRAMINE HYDROCHLORIDE, ZINC ACETATE: 2; .1 CREAM TOPICAL at 21:53

## 2022-12-05 RX ADMIN — SODIUM BICARBONATE 325 MG: 650 TABLET ORAL at 09:32

## 2022-12-05 RX ADMIN — LEVOTHYROXINE SODIUM 88 MCG: 0.09 TABLET ORAL at 05:26

## 2022-12-05 RX ADMIN — Medication 2000 UNITS: at 09:32

## 2022-12-05 RX ADMIN — SERTRALINE HYDROCHLORIDE 50 MG: 50 TABLET ORAL at 09:32

## 2022-12-05 RX ADMIN — RANOLAZINE 500 MG: 500 TABLET, FILM COATED, EXTENDED RELEASE ORAL at 21:38

## 2022-12-05 RX ADMIN — PANTOPRAZOLE SODIUM 40 MG: 40 TABLET, DELAYED RELEASE ORAL at 05:26

## 2022-12-05 RX ADMIN — Medication 3 MG: at 21:37

## 2022-12-05 NOTE — PROGRESS NOTES
V2.0  [facility]      Name:  Aby Chew /Age/Sex: 1927  (80 y.o. female)   MRN & CSN:  [id number] & [id number] Encounter Date/Time: 2022 2:01 PM EST    Location:  [address] [place] [address] PCP: Stefania Sumner MD       Hospital Day:     Assessment and Plan:    Aby Chew is a 80 y.o. female with PMH of CAD, GERD, UTI, Hypothyroidism, HTN, Hyperlipedmia, CKD who presents with Sepsis ([address]    #Acute hypoxic respiratory failure 2/2 CAP and pulmonary edema-resolving  -Blood cultures negative  -Chest x-ray showing pulmonary edema and concern for pneumonia  -Echo: EF of 55 to 60% with RVSP of 44  -Negative respiratory PCR  -CT chest showing bilateral infiltrates  - On 2L/min when seen - apparently desaturated to 85-86% with off O2  - Not on O2 at home  - S/p meropenem, ID signed off    Plan:  Pulmonology following, appreciate recs  Continue DuoNebs  Continue O2    #Generalized pruritus 2/2 likely xerosis 2/2 AC - Resolved  -likely in the setting of dry air (patient uses humidifier at home)  -no jaundice, no rash    Plan:  Emollients and now stable     #Pyelonephritis with history of recurrent UTI - Improved  -Received ciprofloxacin, doxycycline and nitrofurantoin without relief  -Urine culture growing Enterococcus faecalis sensitive to ampicillin  - S/P meropenem     #Acute decompensated HFpEF-resolving  -Chest x-ray with pulmonary edema and elevated RVSP    Plan:  IV diuresis as needed  Strict I's and O  Daily weights    #PAUL on CKD likely prerenal in the setting of diuresis - Stable  -Baseline creatinine around 2  - Cr 2.0 today as well    Nephrology following, appreciate recommendations  Strict I's and O's    #Suspected aspiration--> ruled out  -Seen by SLP, did not have any signs or symptoms of aspiration    Plan:  Continue to monitor    #Hyperkalemia  - K 4.5 today    Plan:  Continue monitoring BMP    #Elevated troponin likely from CKD and demand ischemia  #CAD  #LBBB chronic  -Down trended  -EKG with LBBB    Plan:  Cardiology on consult, appreciate recommendations  Continue atenolol, amlodipine, Imdur, statin and aspirin  Defer telmesartain to nephro/cardo    Chronic medical problems:    #Severe AS  -Follow-up at [facility] for TAVR balloon valvuloplasty    #Hypertension  Plan: Continue home meds except for on losartan    #GERD  Continue PPI    Pt. Denied appeal X 2 to ARU. PT/OT still recommending SNF. [facility] referral pending        Diet ADULT DIET; Dysphagia - Minced and Moist; Low Potassium (Less than 3000 mg/day); 1500 ml; Please provide extra moisteners and gravies  ADULT ORAL NUTRITION SUPPLEMENT; Dinner; Frozen Oral Supplement   DVT Prophylaxis [] Lovenox, [x]  Heparin, [] SCDs, [] Ambulation,  [] Eliquis, [] Xarelto  [] Coumadin   Code Status Limited   Disposition From: home  Expected Disposition: [facility]   Estimated Date of Discharge: Ready for discharge. Appeal pending   Surrogate Decision Maker/ POCHELSEA Rico     Subjective:     Chief Complaint: Shortness of Breath     Patient feeling well today. No complaints. On 2L/min when seen. No new complaints. In good spirits and hopeful the appeal goes through. Review of Systems:    General: No fever, no chills  Heart: No chest pain, no palpitations  Lungs: No shortness of breath, no cough  Abdomen: No abdominal pain, no nausea, no vomiting, no constipation, no diarrhea  : No frequency, no dysuria, no urgency  MSK: No lower extremity swelling  Neuro: No confusion  Skin: No rashes      Objective:   No intake or output data in the 24 hours ending 12/05/22 1545       Vitals:   Vitals:    12/05/22 1515   BP:    Pulse: 70   Resp: 16   Temp:    SpO2: 94%       Physical Exam:     General: NAD. On 2/min O2 when seen. Appears younger than stated age. Eyes: EOMI  HENT: Atraumatic  Respiratory: no respiratory distress. B/L equal air entry. No wheeze or rhonchi. GI: nondistended.  BS normal.   CVS: S1 + S2. Equal peripheral pulses.    MSK: no lower extremity edema  Skin: Intact, dry, warm, no rashes  Neuro: CN II to XII grossly intact  Psych: Normal mood    Medications:   Medications:    ipratropium-albuterol  1 ampule Inhalation TID    diphenhydrAMINE-zinc acetate   Topical 4x daily    sennosides-docusate sodium  1 tablet Oral BID    polyethylene glycol  17 g Oral Daily    docusate sodium  100 mg Oral Daily    lactobacillus  1 capsule Oral Daily with breakfast    amLODIPine  10 mg Oral Nightly    atenolol  25 mg Oral Daily    sodium bicarbonate  325 mg Oral BID    clopidogrel  75 mg Oral Daily    sodium chloride flush  5-40 mL IntraVENous 2 times per day    sodium chloride flush  5-40 mL IntraVENous 2 times per day    heparin (porcine)  5,000 Units SubCUTAneous 3 times per day    Vitamin D  2,000 Units Oral Daily    pantoprazole  40 mg Oral QAM AC    fluorometholone  1 drop Both Eyes BID    isosorbide mononitrate  60 mg Oral Daily    levothyroxine  88 mcg Oral Daily    ranolazine  500 mg Oral BID    sertraline  50 mg Oral Daily    [Held by provider] losartan  12.5 mg Oral Daily    atorvastatin  20 mg Oral Nightly      Infusions:    sodium chloride 75 mL/hr at 11/24/22 1739    sodium chloride 20 mL/hr at 11/27/22 1640     PRN Meds: hydrOXYzine pamoate, 25 mg, TID PRN  melatonin, 3 mg, Nightly PRN  sodium chloride flush, 5-40 mL, PRN  sodium chloride, , PRN  ondansetron, 4 mg, Q8H PRN   Or  ondansetron, 4 mg, Q6H PRN  polyethylene glycol, 17 g, Daily PRN  acetaminophen, 650 mg, Q6H PRN   Or  acetaminophen, 650 mg, Q6H PRN  sodium chloride flush, 5-40 mL, PRN  sodium chloride, , PRN      Labs      Recent Results (from the past 24 hour(s))   Basic Metabolic Panel    Collection Time: 12/05/22  5:13 AM   Result Value Ref Range    Sodium 137 135 - 145 MMOL/L    Potassium 4.5 3.5 - 5.1 MMOL/L    Chloride 103 99 - 110 mMol/L    CO2 22 21 - 32 MMOL/L    Anion Gap 12 4 - 16    BUN 45 (H) 6 - 23 MG/DL    Creatinine 2.0 (H) 0.6 - 1.1 MG/DL    Est, Glom Filt Rate 23 (L) >60 mL/min/1.73m2    Calcium 9.8 8.3 - 10.6 MG/DL          Imaging/Diagnostics Last 24 Hours   XR CHEST PORTABLE    Result Date: 11/21/2022  EXAMINATION: ONE XRAY VIEW OF THE CHEST 11/21/2022 12:19 pm COMPARISON: 11/17/2022. HISTORY: ORDERING SYSTEM PROVIDED HISTORY: sob TECHNOLOGIST PROVIDED HISTORY: Reason for exam:->sob Reason for Exam:  sob FINDINGS: Cardiomediastinal silhouette is stable. Similar bilateral airspace opacities. No pleural effusion or pneumothorax. No gross bony abnormality.      Stable chest.       Electronically signed by Lynn Rosales MD on 12/5/2022 at 3:45 PM

## 2022-12-05 NOTE — PLAN OF CARE
Problem: Discharge Planning  Goal: Discharge to home or other facility with appropriate resources  Outcome: Progressing     Problem: Hematologic - Adult  Goal: Maintains hematologic stability  Outcome: Progressing     Problem: Pain  Goal: Verbalizes/displays adequate comfort level or baseline comfort level  Outcome: Progressing  Flowsheets (Taken 12/5/2022 7130 by Gina Molina LPN)  Verbalizes/displays adequate comfort level or baseline comfort level:   Encourage patient to monitor pain and request assistance   Assess pain using appropriate pain scale   Administer analgesics based on type and severity of pain and evaluate response   Implement non-pharmacological measures as appropriate and evaluate response   Consider cultural and social influences on pain and pain management   Notify Licensed Independent Practitioner if interventions unsuccessful or patient reports new pain     Problem: Safety - Adult  Goal: Free from fall injury  Outcome: Progressing     Problem: ABCDS Injury Assessment  Goal: Absence of physical injury  Outcome: Progressing     Problem: Skin/Tissue Integrity  Goal: Absence of new skin breakdown  Description: 1. Monitor for areas of redness and/or skin breakdown  2. Assess vascular access sites hourly  3. Every 4-6 hours minimum:  Change oxygen saturation probe site  4. Every 4-6 hours:  If on nasal continuous positive airway pressure, respiratory therapy assess nares and determine need for appliance change or resting period.   Outcome: Progressing     Problem: Nutrition Deficit:  Goal: Optimize nutritional status  Outcome: Progressing

## 2022-12-05 NOTE — CARE COORDINATION
TC to [facility], left vm requesting call back regarding referral/status of precert-   Spoke with Maggie, she did tour this am and I updated her that status remains pending. TC to [facility] cell phone to follow up on referral.         Received vm from [facility] that they had been very busy all day and forgot to call me to let me know that they are out of network for pt's insurance and cannot take her. CM to reach to Maggie in am for next SNF choice.

## 2022-12-05 NOTE — PROGRESS NOTES
Pulmonary and Critical Care  Progress Note      VITALS:  BP (!) 173/74   Pulse 69   Temp 98.1 °F (36.7 °C) (Oral)   Resp 18   Ht 5' (1.524 m)   Wt 140 lb 3.4 oz (63.6 kg)   LMP  (LMP Unknown)   SpO2 98%   BMI 27.38 kg/m²     Subjective:   CHIEF COMPLAINT :SOB     HPI:                The patient is a 80 y.o. female is sitting in the bed. She is not in acute resp distress    Objective:   PHYSICAL EXAM:    LUNGS:Occasional basal crackles  Abd-soft, BS+,NT  Ext- no pedal edema  CVS-s1s2, no murmurs      DATA:    CBC:  No results for input(s): WBC, RBC, HGB, HCT, PLT, MCV, MCH, MCHC, RDW, NRBC, SEGSPCT, BANDSPCT in the last 72 hours. BMP:  Recent Labs     12/03/22  0627 12/04/22  0011 12/05/22  0513    135 137   K 4.8 4.6 4.5    102 103   CO2 23 23 22   BUN 54* 49* 45*   CREATININE 2.0* 2.0* 2.0*   CALCIUM 9.6 9.5 9.8   GLUCOSE 85 109*  --       ABG:  No results for input(s): PH, PO2ART, QHP8NMT, HCO3, BEART, O2SAT in the last 72 hours.   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 08/18/2018     Priority: High    Bradycardia      Priority: High    LBBB (left bundle branch block)      Priority: High    PAUL (acute kidney injury) ([address] 12/26/2017     Priority: High    Pneumonia due to infectious organism 11/16/2022     Priority: Medium    Pyelonephritis 11/16/2022     Priority: Medium    Chronic kidney disease 11/16/2022     Priority: Medium    Dyspnea and respiratory abnormalities 11/15/2022     Priority: Medium    Sepsis ([address] 11/14/2022     Priority: Medium    Localized edema 07/11/2022     Priority: Medium    GERD (gastroesophageal reflux disease)      Priority: Medium     Overview Note:     Esophagitis      Hypertension      Priority: Low    Hypothyroid Priority: Low     Overview Note:     Hypothyroidism      Sinus congestion 03/23/2022     Overview Note:     CHRONIC W POST NASAL DRIP      Vaginal prolapse 09/07/2021    Primary osteoarthritis of left foot 08/30/2021    Closed nondisplaced fracture of fourth metatarsal bone of left foot 08/30/2021    Closed nondisplaced fracture of third metatarsal bone of left foot 08/30/2021    Closed nondisplaced fracture of fifth left metatarsal bone 08/30/2021    Vertigo      Overview Note:      [facility]      Dizziness     Labyrinthitis     Chronic renal impairment, stage 3 (moderate) (HCC)     Constipation, slow transit 06/19/2018    Cystitis     Physical deconditioning     Hyponatremia 01/01/2018    Pruritic condition 01/22/2016    Gout     Hyperuricemia     Low back pain     Eczema     Coronary artery disease involving native heart without angina pectoris      Overview Note:     Dr Sidra Figueredo/Jose Figueredo      Depression     Hyperlipidemia     Generalized osteoarthritis     Osteoporosis      Overview Note:     Generalized       Acute Hypoxic resp failure sec to Cardiogenic and Non Cardiogenic Pulmonary edema- improving  Small bilateral Pleural effusions  PAUL on CKD- slowly improving  Leukocytosis- Improved  Severe AS  Mild to Moderate Pulmonary HTN  CAD  H/o Recurrent UTI  GERD  HLD  ?  Atypical pneumonia       Inhalers  ICS  OOB  Keep sats > 92%  Await rehab placement  C.w present management  PT/OT    Electronically signed by Tere Pereira MD on 12/5/2022 at 12:41 PM

## 2022-12-05 NOTE — PROGRESS NOTES
Nephrology Progress Note        [address], [address] [address]  Phone: [phone]  Office Hours: 8:30AM - 4:30PM  Monday - Friday 12/5/2022 7:09 AM  Subjective:   Admit Date: 11/14/2022  PCP: Michael Burnett MD  Interval History: On NC  Sitting in a chair  Walked in the hallway yesterday    Diet: ADULT DIET; Dysphagia - Minced and Moist; Low Potassium (Less than 3000 mg/day); 1500 ml; Please provide extra moisteners and gravies  ADULT ORAL NUTRITION SUPPLEMENT; Dinner; Frozen Oral Supplement      Data:   Scheduled Meds:   ipratropium-albuterol  1 ampule Inhalation TID    diphenhydrAMINE-zinc acetate   Topical 4x daily    sennosides-docusate sodium  1 tablet Oral BID    polyethylene glycol  17 g Oral Daily    docusate sodium  100 mg Oral Daily    lactobacillus  1 capsule Oral Daily with breakfast    amLODIPine  10 mg Oral Nightly    atenolol  25 mg Oral Daily    sodium bicarbonate  325 mg Oral BID    clopidogrel  75 mg Oral Daily    sodium chloride flush  5-40 mL IntraVENous 2 times per day    sodium chloride flush  5-40 mL IntraVENous 2 times per day    heparin (porcine)  5,000 Units SubCUTAneous 3 times per day    Vitamin D  2,000 Units Oral Daily    pantoprazole  40 mg Oral QAM AC    fluorometholone  1 drop Both Eyes BID    isosorbide mononitrate  60 mg Oral Daily    levothyroxine  88 mcg Oral Daily    ranolazine  500 mg Oral BID    sertraline  50 mg Oral Daily    [Held by provider] losartan  12.5 mg Oral Daily    atorvastatin  20 mg Oral Nightly     Continuous Infusions:   sodium chloride 75 mL/hr at 11/24/22 1739    sodium chloride 20 mL/hr at 11/27/22 1640     PRN Meds:hydrOXYzine pamoate, melatonin, sodium chloride flush, sodium chloride, ondansetron **OR** ondansetron, polyethylene glycol, acetaminophen **OR** acetaminophen, sodium chloride flush, sodium chloride  I/O last 3 completed shifts:   In: 120 [P.O.:120]  Out: -   No intake/output data recorded. Intake/Output Summary (Last 24 hours) at 12/5/2022 0709  Last data filed at 12/4/2022 1015  Gross per 24 hour   Intake 120 ml   Output --   Net 120 ml       CBC: No results for input(s): WBC, HGB, PLT in the last 72 hours.     BMP:    Recent Labs     12/02/22  0747 12/03/22  0627 12/04/22  0011 12/05/22  0513    135 135 137   K 5.2* 4.8 4.6 4.5    101 102 103   CO2 25 23 23  --    BUN 46* 54* 49*  --    CREATININE 1.8* 2.0* 2.0*  --    GLUCOSE 92 85 109*  --          Objective:   Vitals: BP (!) 166/71   Pulse 75   Temp 98.1 °F (36.7 °C) (Oral)   Resp 18   Ht 5' (1.524 m)   Wt 140 lb 3.4 oz (63.6 kg)   LMP  (LMP Unknown)   SpO2 96%   BMI 27.38 kg/m²   General appearance: alert and cooperative with exam, in no acute distress  HEENT: normocephalic, atraumatic,   Neck: supple, trachea midline  Lungs:  breathing comfortably on nc  Extremities: extremities atraumatic, no cyanosis or edema  Neurologic: alert, oriented, follows commands, interactive    MEDICAL DECISION MAKING     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 08/18/2018    Bradycardia     LBBB (left bundle branch block)     PAUL (acute kidney injury) ([facility] [address] 12/26/2017    Pneumonia due to infectious organism 11/16/2022    Pyelonephritis 11/16/2022    Chronic kidney disease 11/16/2022    Dyspnea and respiratory abnormalities 11/15/2022    Sepsis ([address] 11/14/2022    Localized edema 07/11/2022    GERD (gastroesophageal reflux disease)     Hypertension     Hypothyroid     Sinus congestion 03/23/2022    Vaginal prolapse 09/07/2021    Primary osteoarthritis of left foot 08/30/2021    Closed nondisplaced fracture of fourth metatarsal bone of left foot 08/30/2021    Closed nondisplaced fracture of third metatarsal bone of left foot 08/30/2021    Closed nondisplaced fracture of fifth left metatarsal bone 08/30/2021    Vertigo     Dizziness     Labyrinthitis     Chronic renal impairment, stage 3 (moderate) (HCC)     Constipation, slow transit 06/19/2018    Cystitis     Physical deconditioning     Hyponatremia 01/01/2018    Pruritic condition 01/22/2016    Gout     Hyperuricemia     Low back pain     Eczema     Coronary artery disease involving native heart without angina pectoris     Depression     Hyperlipidemia     Generalized osteoarthritis     Osteoporosis      Cr stable at 2  Potassium wnl today  Continue antihypertensives                  Electronically signed by Immanuel Olivarez DO on 12/5/2022 at 7:09 AM    [address] Naik [address],  [facility] [address] [place] [id number]  PHONE: [phone]  FAX: [phone]

## 2022-12-06 VITALS
HEART RATE: 73 BPM | HEIGHT: 60 IN | RESPIRATION RATE: 14 BRPM | BODY MASS INDEX: 27.53 KG/M2 | SYSTOLIC BLOOD PRESSURE: 133 MMHG | TEMPERATURE: 97.7 F | WEIGHT: 140.21 LBS | OXYGEN SATURATION: 89 % | DIASTOLIC BLOOD PRESSURE: 62 MMHG

## 2022-12-06 LAB
ANION GAP SERPL CALCULATED.3IONS-SCNC: 12 MMOL/L (ref 4–16)
BACTERIA: NEGATIVE /HPF
BILIRUBIN URINE: NEGATIVE MG/DL
BLOOD, URINE: NEGATIVE
BUN BLDV-MCNC: 51 MG/DL (ref 6–23)
CALCIUM SERPL-MCNC: 10 MG/DL (ref 8.3–10.6)
CHLORIDE BLD-SCNC: 103 MMOL/L (ref 99–110)
CLARITY: CLEAR
CO2: 22 MMOL/L (ref 21–32)
COLOR: YELLOW
CREAT SERPL-MCNC: 2.2 MG/DL (ref 0.6–1.1)
GFR SERPL CREATININE-BSD FRML MDRD: 20 ML/MIN/1.73M2
GLUCOSE BLD-MCNC: 97 MG/DL (ref 70–99)
GLUCOSE, URINE: NEGATIVE MG/DL
KETONES, URINE: NEGATIVE MG/DL
LEUKOCYTE ESTERASE, URINE: NEGATIVE
MUCUS: ABNORMAL HPF
NITRITE URINE, QUANTITATIVE: NEGATIVE
PH, URINE: 6.5 (ref 5–8)
POTASSIUM SERPL-SCNC: 5.2 MMOL/L (ref 3.5–5.1)
PROTEIN UA: 30 MG/DL
RBC URINE: ABNORMAL /HPF (ref 0–6)
SODIUM BLD-SCNC: 137 MMOL/L (ref 135–145)
SPECIFIC GRAVITY UA: 1.01 (ref 1–1.03)
TRICHOMONAS: ABNORMAL /HPF
UROBILINOGEN, URINE: 0.2 MG/DL (ref 0.2–1)
WBC UA: <1 /HPF (ref 0–5)

## 2022-12-06 PROCEDURE — 97116 GAIT TRAINING THERAPY: CPT

## 2022-12-06 PROCEDURE — 94640 AIRWAY INHALATION TREATMENT: CPT

## 2022-12-06 PROCEDURE — 36415 COLL VENOUS BLD VENIPUNCTURE: CPT

## 2022-12-06 PROCEDURE — 99232 SBSQ HOSP IP/OBS MODERATE 35: CPT | Performed by: INTERNAL MEDICINE

## 2022-12-06 PROCEDURE — 81003 URINALYSIS AUTO W/O SCOPE: CPT

## 2022-12-06 PROCEDURE — 2700000000 HC OXYGEN THERAPY PER DAY

## 2022-12-06 PROCEDURE — 97530 THERAPEUTIC ACTIVITIES: CPT

## 2022-12-06 PROCEDURE — 94761 N-INVAS EAR/PLS OXIMETRY MLT: CPT

## 2022-12-06 PROCEDURE — 6370000000 HC RX 637 (ALT 250 FOR IP): Performed by: STUDENT IN AN ORGANIZED HEALTH CARE EDUCATION/TRAINING PROGRAM

## 2022-12-06 PROCEDURE — 6370000000 HC RX 637 (ALT 250 FOR IP): Performed by: INTERNAL MEDICINE

## 2022-12-06 PROCEDURE — 94618 PULMONARY STRESS TESTING: CPT

## 2022-12-06 PROCEDURE — 80048 BASIC METABOLIC PNL TOTAL CA: CPT

## 2022-12-06 PROCEDURE — 81001 URINALYSIS AUTO W/SCOPE: CPT

## 2022-12-06 PROCEDURE — 94668 MNPJ CHEST WALL SBSQ: CPT

## 2022-12-06 PROCEDURE — 6370000000 HC RX 637 (ALT 250 FOR IP)

## 2022-12-06 PROCEDURE — 6360000002 HC RX W HCPCS

## 2022-12-06 RX ORDER — CLOPIDOGREL BISULFATE 75 MG/1
75 TABLET ORAL DAILY
Qty: 30 TABLET | Refills: 0 | Status: SHIPPED | OUTPATIENT
Start: 2022-12-07

## 2022-12-06 RX ORDER — IPRATROPIUM BROMIDE AND ALBUTEROL SULFATE 2.5; .5 MG/3ML; MG/3ML
3 SOLUTION RESPIRATORY (INHALATION) 3 TIMES DAILY
Qty: 360 ML | Refills: 0 | Status: SHIPPED | OUTPATIENT
Start: 2022-12-06

## 2022-12-06 RX ORDER — BUMETANIDE 2 MG/1
TABLET ORAL
Qty: 30 TABLET | Refills: 0 | Status: SHIPPED | OUTPATIENT
Start: 2022-12-06

## 2022-12-06 RX ORDER — DIPHENHYDRAMINE HYDROCHLORIDE, ZINC ACETATE 2; .1 G/100G; G/100G
CREAM TOPICAL
Qty: 28 G | Refills: 0 | Status: SHIPPED | OUTPATIENT
Start: 2022-12-06

## 2022-12-06 RX ORDER — BUMETANIDE 0.5 MG/1
2 TABLET ORAL
Status: DISCONTINUED | OUTPATIENT
Start: 2022-12-06 | End: 2022-12-06 | Stop reason: HOSPADM

## 2022-12-06 RX ADMIN — SERTRALINE HYDROCHLORIDE 50 MG: 50 TABLET ORAL at 10:55

## 2022-12-06 RX ADMIN — Medication 1 DROP: at 13:44

## 2022-12-06 RX ADMIN — DOCUSATE SODIUM 100 MG: 100 CAPSULE, LIQUID FILLED ORAL at 10:55

## 2022-12-06 RX ADMIN — POLYETHYLENE GLYCOL (3350) 17 G: 17 POWDER, FOR SOLUTION ORAL at 10:54

## 2022-12-06 RX ADMIN — ISOSORBIDE MONONITRATE 60 MG: 60 TABLET, EXTENDED RELEASE ORAL at 10:55

## 2022-12-06 RX ADMIN — LEVOTHYROXINE SODIUM 88 MCG: 0.09 TABLET ORAL at 06:58

## 2022-12-06 RX ADMIN — PANTOPRAZOLE SODIUM 40 MG: 40 TABLET, DELAYED RELEASE ORAL at 06:58

## 2022-12-06 RX ADMIN — SENNOSIDES AND DOCUSATE SODIUM 1 TABLET: 50; 8.6 TABLET ORAL at 10:55

## 2022-12-06 RX ADMIN — HEPARIN SODIUM 5000 UNITS: 5000 INJECTION INTRAVENOUS; SUBCUTANEOUS at 06:57

## 2022-12-06 RX ADMIN — CLOPIDOGREL BISULFATE 75 MG: 75 TABLET ORAL at 10:55

## 2022-12-06 RX ADMIN — Medication 1 CAPSULE: at 10:55

## 2022-12-06 RX ADMIN — DIPHENHYDRAMINE HYDROCHLORIDE, ZINC ACETATE: 2; .1 CREAM TOPICAL at 13:47

## 2022-12-06 RX ADMIN — RANOLAZINE 500 MG: 500 TABLET, FILM COATED, EXTENDED RELEASE ORAL at 10:55

## 2022-12-06 RX ADMIN — IPRATROPIUM BROMIDE AND ALBUTEROL SULFATE 1 AMPULE: .5; 3 SOLUTION RESPIRATORY (INHALATION) at 15:33

## 2022-12-06 RX ADMIN — SODIUM BICARBONATE 325 MG: 650 TABLET ORAL at 10:55

## 2022-12-06 RX ADMIN — HEPARIN SODIUM 5000 UNITS: 5000 INJECTION INTRAVENOUS; SUBCUTANEOUS at 13:44

## 2022-12-06 RX ADMIN — Medication 2000 UNITS: at 10:55

## 2022-12-06 RX ADMIN — IPRATROPIUM BROMIDE AND ALBUTEROL SULFATE 1 AMPULE: .5; 3 SOLUTION RESPIRATORY (INHALATION) at 07:54

## 2022-12-06 NOTE — PLAN OF CARE
Problem: Discharge Planning  Goal: Discharge to home or other facility with appropriate resources  Outcome: Adequate for Discharge     Problem: Hematologic - Adult  Goal: Maintains hematologic stability  Outcome: Adequate for Discharge     Problem: Pain  Goal: Verbalizes/displays adequate comfort level or baseline comfort level  Outcome: Adequate for Discharge     Problem: Safety - Adult  Goal: Free from fall injury  Outcome: Adequate for Discharge     Problem: ABCDS Injury Assessment  Goal: Absence of physical injury  Outcome: Adequate for Discharge     Problem: Skin/Tissue Integrity  Goal: Absence of new skin breakdown  Description: 1. Monitor for areas of redness and/or skin breakdown  2. Assess vascular access sites hourly  3. Every 4-6 hours minimum:  Change oxygen saturation probe site  4. Every 4-6 hours:  If on nasal continuous positive airway pressure, respiratory therapy assess nares and determine need for appliance change or resting period.   Outcome: Adequate for Discharge     Problem: Nutrition Deficit:  Goal: Optimize nutritional status  Outcome: Adequate for Discharge

## 2022-12-06 NOTE — PROGRESS NOTES
Daily Progress Note  Subjective:    Pt awake-no complaints  No SOB or CP  BP and HR stable  No tele    Attending Note:  Patient is awake alert  Feeling better  She wants to go home   She has AS-plan for TAVR -as outpatient  Keep on current meds  BP meds and diuretics per renal   Hold ARB for now due to elevated K       Impression and Plan:     Sepsis    CAP, noted on CT chest    Respiratory panel negative    ABX per primary    Using 1L NC now-weaning slowly     Acute on Chronic HFpEF    Last echo showed EF 55-60%    Severe Aortic stenosis    Diuretics per renal at this time    Accurate I's and O's    1500 Fluid restriction     Plan to have her f/u with [facility] OP for potential valvuloplasty     CKD    Renal following; Avoid nephrotoxic medications     Stable from cardiac standpoint  Med. Tx. and increase activity  Awaiting placement      Most Recent Echo  11/15/2022   Left ventricular systolic function is normal.   Ejection fraction is visually estimated at 55-60%. Moderately dilated left atrium. Moderate to severe aortic stenosis is present; Mean PG 40mmHg, YANIRA 0.99 cm2. Mitral annular calcification is present. Mild to moderate mitral regurgitation. Mild to moderate tricuspid regurgitation; RVSP: 44 mmHg. No evidence of any pericardial effusion. Radiology  CT chest 11/14/2022  Impression   Bilateral pulmonary infiltrates and bilateral pleural effusions with probable   reactive mediastinal adenopathy       Probable granuloma in the right middle lobe. No follow-up imaging   recommended. PAST MEDICAL HISTORY:  The patient has a history of hypertension, renal  insufficiency present, and she has been treated medically. She is known  to have coronary artery disease also present. She has a chronic left  ventricular block present. She had a heart catheterization done in the  past and LAD had 70% stenosis present and she was treated medically at  that time.   Anemia, hyperlipidemia, and arthritis present. PAST SURGICAL HISTORY:  She has a history of having hip surgery done. Moderate coronary artery disease, heart catheterization done in 2018,  LAD with 70% stenosis distally. She was treated medically for that. She had cataract surgery and hysterectomy done.       Objective:   BP (!) 168/57   Pulse 66   Temp 97.9 °F (36.6 °C) (Oral)   Resp 20   Ht 5' (1.524 m)   Wt 140 lb 3.4 oz (63.6 kg)   LMP  (LMP Unknown)   SpO2 99%   BMI 27.38 kg/m²   No intake or output data in the 24 hours ending 12/06/22 0904    Medications:   Scheduled Meds:   bumetanide  2 mg Oral Once per day on Tue Thu Sat    ipratropium-albuterol  1 ampule Inhalation TID    diphenhydrAMINE-zinc acetate   Topical 4x daily    sennosides-docusate sodium  1 tablet Oral BID    polyethylene glycol  17 g Oral Daily    docusate sodium  100 mg Oral Daily    lactobacillus  1 capsule Oral Daily with breakfast    amLODIPine  10 mg Oral Nightly    atenolol  25 mg Oral Daily    sodium bicarbonate  325 mg Oral BID    clopidogrel  75 mg Oral Daily    sodium chloride flush  5-40 mL IntraVENous 2 times per day    sodium chloride flush  5-40 mL IntraVENous 2 times per day    heparin (porcine)  5,000 Units SubCUTAneous 3 times per day    Vitamin D  2,000 Units Oral Daily    pantoprazole  40 mg Oral QAM AC    fluorometholone  1 drop Both Eyes BID    isosorbide mononitrate  60 mg Oral Daily    levothyroxine  88 mcg Oral Daily    ranolazine  500 mg Oral BID    sertraline  50 mg Oral Daily    [Held by provider] losartan  12.5 mg Oral Daily    atorvastatin  20 mg Oral Nightly      Infusions:   sodium chloride 75 mL/hr at 11/24/22 1739    sodium chloride 20 mL/hr at 11/27/22 1640      PRN Meds:  hydrOXYzine pamoate, melatonin, sodium chloride flush, sodium chloride, ondansetron **OR** ondansetron, polyethylene glycol, acetaminophen **OR** acetaminophen, sodium chloride flush, sodium chloride     Physical Exam:  Vitals:    12/06/22 0807   BP: (!) 168/57 Pulse: 66   Resp: 20   Temp: 97.9 °F (36.6 °C)   SpO2: 99%        General: AAO, NAD  Chest: Nontender  Cardiac: First and Second Heart Sounds are Normal, No Murmurs or Gallops noted  Lungs:Clear to auscultation and percussion. Abdomen: Soft, NT, ND, +BS  Extremities: No clubbing, no edema  Vascular:  Equal 2+ peripheral pulses. Lab Data:  CBC: No results for input(s): WBC, HGB, HCT, MCV, PLT in the last 72 hours. BMP:   Recent Labs     12/04/22  0011 12/05/22  0513 12/06/22  0438    137 137   K 4.6 4.5 5.2*    103 103   CO2 23 22 22   BUN 49* 45* 51*   CREATININE 2.0* 2.0* 2.2*     LIVER PROFILE: No results for input(s): AST, ALT, LIPASE, BILIDIR, BILITOT, ALKPHOS in the last 72 hours. Invalid input(s): AMYLASE,  ALB  PT/INR: No results for input(s): PROTIME, INR in the last 72 hours. APTT: No results for input(s): APTT in the last 72 hours. BNP:  No results for input(s): BNP in the last 72 hours.       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 08/18/2018    Bradycardia     LBBB (left bundle branch block)     PAUL (acute kidney injury) ([facility] [address] 12/26/2017    Pneumonia due to infectious organism 11/16/2022    Pyelonephritis 11/16/2022    Chronic kidney disease 11/16/2022    Dyspnea and respiratory abnormalities 11/15/2022    Sepsis ([address] 11/14/2022    Localized edema 07/11/2022    GERD (gastroesophageal reflux disease)     Hypertension     Hypothyroid     Sinus congestion 03/23/2022    Vaginal prolapse 09/07/2021    Primary osteoarthritis of left foot 08/30/2021    Closed nondisplaced fracture of fourth metatarsal bone of left foot 08/30/2021    Closed nondisplaced fracture of third metatarsal bone of left foot 08/30/2021    Closed nondisplaced fracture of fifth left metatarsal bone 08/30/2021    Vertigo     Dizziness     Labyrinthitis     Chronic renal impairment, stage 3 (moderate) (HCC)     Constipation, slow transit 06/19/2018    Cystitis     Physical deconditioning     Hyponatremia 01/01/2018    Pruritic condition 01/22/2016    Gout     Hyperuricemia     Low back pain     Eczema     Coronary artery disease involving native heart without angina pectoris     Depression     Hyperlipidemia     Generalized osteoarthritis     Osteoporosis        Electronically signed by Miguelangel Hicks PA-C on 12/6/2022 at 9:04 AM    Electronically signed by Anel Mcfadden MD on 12/6/22 at 3:09 PM EST

## 2022-12-06 NOTE — CARE COORDINATION
Verified with Rock Almeida at [facility] that pt's insurance is HMO not dual and therefore they are out of network. She called Maggie to update. I met natasha Serrano and they have decided to take pt home with services pt had established prior to admission including aides through [facility], [facility] through [facility]- pt has DME however needs evaluated for Home 02, Med Nebs (orders are placed for both) and she would like chest vest ordered through [facility]- fax clinical/info to [phone]. Updated Mayur Michele with Pulm that pt is NOT going to SNF and therefore does need Home 02 eval and that is only thing needed to complete pt's discharge. Pt was up walking the unit with PTA, placed pt on pulse ox- Mayur Michele came to unit to complete home 02 eval.      Notified Nish at [facility] of pt's discharge and faxed [facility] order and clinical to [phone]. Notified Clare Moreland at [facility] of pt's discharge, she has already spoke with [facility] and plans to resume services in am.      Sent PS to Dr. Lesley Bourgeois because I still need order for chest vest placed. Faxed Chest Vest Order/ all radiology reports from admission, [address] and [address] consults and progress notes, H&P and demographics to [facility] at [phone]    Provided [facility] with contact info for Emily Sarmiento MD Pulmonology Phone [phone] and fax [phone] for any follow up info for chest vest order if needed. Verified med neb orders sent to [facility]. will go to office to .

## 2022-12-06 NOTE — PROGRESS NOTES
Pt was discharged home with oxygen, pt was transported down to [facility] by hospital staff and in a wheelchair. Pt was transported home via POA in private car.

## 2022-12-06 NOTE — PROGRESS NOTES
Pulmonary and Critical Care  Progress Note      VITALS:  BP (!) 168/57   Pulse 66   Temp 97.9 °F (36.6 °C) (Oral)   Resp 20   Ht 5' (1.524 m)   Wt 140 lb 3.4 oz (63.6 kg)   LMP  (LMP Unknown)   SpO2 99%   BMI 27.38 kg/m²     Subjective:   CHIEF COMPLAINT :SOB     HPI:                The patient is a 80 y.o. female is sitting in the bed. She is not in acute resp distress    Objective:   PHYSICAL EXAM:    LUNGS:Occasional basal crackles  Abd-soft, BS+,NT  Ext- no pedal edema  CVS-s1s2, no murmurs      DATA:    CBC:  No results for input(s): WBC, RBC, HGB, HCT, PLT, MCV, MCH, MCHC, RDW, NRBC, SEGSPCT, BANDSPCT in the last 72 hours. BMP:  Recent Labs     12/04/22  0011 12/05/22  0513 12/06/22  0438    137 137   K 4.6 4.5 5.2*    103 103   CO2 23 22 22   BUN 49* 45* 51*   CREATININE 2.0* 2.0* 2.2*   CALCIUM 9.5 9.8 10.0   GLUCOSE 109* 92 97      ABG:  No results for input(s): PH, PO2ART, NKW4XKB, HCO3, BEART, O2SAT in the last 72 hours.   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 08/18/2018     Priority: High    Bradycardia      Priority: High    LBBB (left bundle branch block)      Priority: High    PAUL (acute kidney injury) ([address] 12/26/2017     Priority: High    Pneumonia due to infectious organism 11/16/2022     Priority: Medium    Pyelonephritis 11/16/2022     Priority: Medium    Chronic kidney disease 11/16/2022     Priority: Medium    Dyspnea and respiratory abnormalities 11/15/2022     Priority: Medium    Sepsis ([address] 11/14/2022     Priority: Medium    Localized edema 07/11/2022     Priority: Medium    GERD (gastroesophageal reflux disease)      Priority: Medium     Overview Note:     Esophagitis      Hypertension      Priority: Low    Hypothyroid Priority: Low     Overview Note:     Hypothyroidism      Sinus congestion 03/23/2022     Overview Note:     CHRONIC W POST NASAL DRIP      Vaginal prolapse 09/07/2021    Primary osteoarthritis of left foot 08/30/2021    Closed nondisplaced fracture of fourth metatarsal bone of left foot 08/30/2021    Closed nondisplaced fracture of third metatarsal bone of left foot 08/30/2021    Closed nondisplaced fracture of fifth left metatarsal bone 08/30/2021    Vertigo      Overview Note:     Dr [facility]      Dizziness     Labyrinthitis     Chronic renal impairment, stage 3 (moderate) (HCC)     Constipation, slow transit 06/19/2018    Cystitis     Physical deconditioning     Hyponatremia 01/01/2018    Pruritic condition 01/22/2016    Gout     Hyperuricemia     Low back pain     Eczema     Coronary artery disease involving native heart without angina pectoris      Overview Note:     Dr Wayne Led [facility]      Depression     Hyperlipidemia     Generalized osteoarthritis     Osteoporosis      Overview Note:     Generalized       Acute Hypoxic resp failure sec to Cardiogenic and Non Cardiogenic Pulmonary edema- improving  Small bilateral Pleural effusions  PAUL on CKD- slowly improving  Leukocytosis- Improved  Severe AS  Mild to Moderate Pulmonary HTN  CAD  H/o Recurrent UTI  GERD  HLD  ?  Atypical pneumonia     Chest vest  Inhalers  ICS  OOB  PT/OT  Keep sats > 92%  Await placement  C.w present management    Electronically signed by Machelle Joshua MD on 12/6/2022 at 12:40 PM

## 2022-12-06 NOTE — DISCHARGE INSTRUCTIONS
Internal Medicine Discharge Instruction    Discharge to:  Home  Diet: Dysphagia minced and moist, low potassium. Fluid restriction <1500 mL per day  Activity: As tolerated       Be compliant with medications  Please take medications as prescribed   Please follow up with consultants as mentioned in the follow up section   Please follow up with espinoza regarding your aortic valve.    Please obtain blood work in a few days        Electronically signed by Martha Orlando MD on 12/6/2022 at 11:13 AM

## 2022-12-06 NOTE — PROGRESS NOTES
Physical Therapy    Physical Therapy Treatment Note  Name: Js Lovell MRN: [id number] :   [id number]   Date:  2022   Admission Date: 2022 Room:  [address] [place] [address]   Restrictions/Precautions:           fall risk  Communication with other providers:  per nurse ok to tx but doing O2 study for home O2 at this time. Subjective:  Patient states:  pt very motivated and agreeable to tx. Pain:   Location, Type, Intensity (0/10 to 10/10):  no c/o pain during tx session  Objective:    Observation:  alert and oriented  Objective Measures:    Pt was on room air sitting up in chair and was 93%, pt started to amb and O2 dropped to 84%. Respiratory therapist  re-applied O2 3 liters and pt returned to 90%. Treatment, including education/measures:  Sit<=>stand sba/cga needing more assist when turning to sit on seat of 4WW. Amb with 4WW 100' x 2, 60' x1. Sba/cga and cues to keep 4WW close  Pt left in care of respiratory therapist  Safety  Patient left safely in the chair, with call light/phone in reach with alarm applied. Gait belt was used for transfers and gait. Assessment / Impression:      Patient's tolerance of treatment:  good   Adverse Reaction: na  Significant change in status and impact:  na  Barriers to improvement:  SOB with activity  Plan for Next Session:    Cont.  POC  Time in:  1455  Time out:  1520  Timed treatment minutes:  25  Total treatment time:  25    Previously filed items:  Social/Functional History  Lives With: Alone (pt has home health aide daily for 8 hours/day, also supportive granddaughter)  Type of Home: Apartment  Home Layout: One level  Home Access: Level entry  Bathroom Shower/Tub: Walk-in shower  Bathroom Toilet: Handicap height  Bathroom Equipment: Built-in shower seat, Grab bars in shower, Commode  Bathroom Accessibility: Accessible  Home Equipment: Orason Katia, 4 wheeled, Lift chair  ADL Assistance: Independent (aide supervises with bathing but pt able to manage per granddaughter, pt able to dress and toilet independently)  Homemaking Assistance: Needs assistance  Homemaking Responsibilities: No (home health aide manages)  Ambulation Assistance: Independent (mod I with 4ww household distances)  Transfer Assistance: Independent  Active : No  Occupation: Retired        Short Term Goals  Time Frame for Stephen Carvalho Term Goals: 1 week  Short Term Goal 1: Pt will perform all bed mobility tasks with SBA, min cues  Short Term Goal 2: Pt will manage all compenents of rollator for STS with SBA.   Short Term Goal 3: Pt will AMB x45 ft with RW, stable 02, CGA-SBA for safety    Electronically signed by:    Nevaeh Raines PTA  12/6/2022, 1:07 PM

## 2022-12-06 NOTE — DISCHARGE INSTR - COC
Continuity of Care Form    Patient Name: Yassine Oliva   :  1927  MRN:  [id number]    Admit date:  2022  Discharge date:  ***    Code Status Order: Limited   Advance Directives:     Admitting Physician:  No admitting provider for patient encounter. PCP: Cesar Barragan MD    Discharging Nurse: [facility] Unit/Room#: 2595/8518-U  Discharging Unit Phone Number: ***    Emergency Contact:   Extended Emergency Contact Information  Primary Emergency Contact: Simon Moralesing  Address: [address] [place] [address], [address] [place] [address] Phone: [phone]  Work Phone: [employer] [facility] Phone: [phone]  Relation: [address] [employer] [place]  Secondary Emergency Contact: JAZMIN Flaherty Phone: [phone]  Work Phone: [phone]  Mobile Phone: [phone]  Relation: Child    Past Surgical History:  Past Surgical History:   Procedure Laterality Date    CATARACT REMOVAL      R Aug. 2006 and L 2007 Dr. [facility]  2004    right foot realignment [address] Right 2018    rt hip Zheng    HYSTERECTOMY (CERVIX STATUS UNKNOWN)      TONSILLECTOMY         Immunization History:   Immunization History   Administered Date(s) Administered    COVID-19, PFIZER PURPLE top, DILUTE for use, (age 15 y+), 30mcg/0.3mL 2021, 2021, 10/11/2021    Influenza 2012, 2013    Influenza, FLUAD, (age 72 y+), Adjuvanted, 0.5mL 2020, 2021, 2022    Influenza, High Dose (Fluzone 65 yrs and older) 2014, 2015, 10/21/2016, 2017, 2018, 2019    Pneumococcal Conjugate 13-valent (Opakyhb57) 10/22/2015    Pneumococcal Polysaccharide (Aczgfvoni94) 2011    Zoster Live (Zostavax) 2014       Active Problems:  Patient Active Problem List   Diagnosis Code    Hypertension I10    GERD (gastroesophageal reflux disease) K21.9    Depression F32. A    Hyperlipidemia E78.5    Generalized osteoarthritis M15.9    Osteoporosis M81.0    Hypothyroid E03.9    Coronary artery disease involving native heart without angina pectoris I25.10    Eczema L30.9    Low back pain M54.50    Hyperuricemia E79.0    Gout M10.9    Pruritic condition L29.9    PAUL (acute kidney injury) ([facility] [address] N17.9    Hyponatremia E87.1    Cystitis N30.90    Physical deconditioning R53.81    Bradycardia R00.1    LBBB (left bundle branch block) I44.7    Constipation, slow transit K59.01    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 S72.001K    Chronic renal impairment, stage 3 (moderate) ([facility]) N18.30    Dizziness R42    Labyrinthitis H83.09    Vertigo R42    Primary osteoarthritis of left foot M19.072    Closed nondisplaced fracture of fourth metatarsal bone of left foot S92.345A    Closed nondisplaced fracture of third metatarsal bone of left foot S92.335A    Closed nondisplaced fracture of fifth left metatarsal bone S92.355A    Vaginal prolapse N81.10    Sinus congestion R09.81    Localized edema R60.0    Sepsis ([facility]) A41.9    Dyspnea and respiratory abnormalities R06.00, R06.89    Pneumonia due to infectious organism J18.9    Pyelonephritis N12    Chronic kidney disease N18.9       Isolation/Infection:   Isolation            No Isolation          Patient Infection Status       Infection Onset Added Last Indicated Last Indicated By Review Planned Expiration Resolved Resolved By    None active    Resolved    COVID-19 (Rule Out) 11/14/22 11/14/22 11/14/22 Respiratory Panel, Molecular, with COVID-19 (Restricted: peds pts or suitable admitted adults) (Ordered)   11/14/22 Rule-Out Test Resulted            Nurse Assessment:  Last Vital Signs: BP (!) 168/57   Pulse 66   Temp 97.9 °F (36.6 °C) (Oral)   Resp 20   Ht 5' (1.524 m)   Wt 140 lb 3.4 oz (63.6 kg)   LMP  (LMP Unknown)   SpO2 99%   BMI 27.38 kg/m²     Last documented pain score (0-10 scale): Pain Level: 0  Last Weight:   Wt Readings from Last 1 Encounters: 11/30/22 140 lb 3.4 oz (63.6 kg)     Mental Status:  {IP PT MENTAL STATUS:78895}    IV Access:  { MARY IV ACCESS:416784383}    Nursing Mobility/ADLs:  Walking   {CHP DME NISO:257065882}  Transfer  {CHP DME CVUF:717240062}  Bathing  {CHP DME TFNT:955330572}  Dressing  {CHP DME NMAV:169804828}  Toileting  {CHP DME MGVE:882549507}  Feeding  {P DME MBZM:828930398}  Med Admin  {P DME LTPR:803512055}  Med Delivery   { MARY MED Delivery:178372690}    Wound Care Documentation and Therapy:        Elimination:  Continence: Bowel: {YES / OS:08661}  Bladder: {YES / IA:82955}  Urinary Catheter: {Urinary Catheter:465628744}   Colostomy/Ileostomy/Ileal Conduit: {YES / LX:90902}       Date of Last BM: ***  No intake or output data in the 24 hours ending 12/06/22 1114  No intake/output data recorded. Safety Concerns:     [address] [employer] Safety Concerns:421350506}    Impairments/Disabilities:      [address] [employer] Impairments/Disabilities:208463177}      Patient's personal belongings (please select all that are sent with patient):  {[facility] DME Belongings:813586749}    RN SIGNATURE:  {Esignature:146246203}    CASE MANAGEMENT/SOCIAL WORK SECTION    Inpatient Status Date: ***    Readmission Risk Assessment Score:  Readmission Risk              Risk of Unplanned Readmission:  28           Discharging to Facility/ Agency   Name:   Address:  Phone:  Fax:    Dialysis Facility (if applicable)   Name:  Address:  Dialysis Schedule:  Phone:  Fax:    / signature: {Esignature:415431419}    PHYSICIAN SECTION  Nutrition Therapy:  Current Nutrition Therapy:   - Oral Diet:   Dysphagia minced and moist, low potassium. Routes of Feeding: Oral  Liquids: No Restrictions  Daily Fluid Restriction: yes - amount 1500  Last Modified Barium Swallow with Video (Video Swallowing Test): not done    Treatments at the Time of Hospital Discharge:   Respiratory Treatments: Duoneb every 4 hours as needed.    Oxygen Therapy:  {Therapy; copd

## 2022-12-06 NOTE — PROGRESS NOTES
Patient was seen in hospital for CAD, Pneumonia. I am prescribing oxygen because the diagnosis and testing requires the patient to have oxygen in the home. Conditions will improve or be benefited by oxygen use. The patient is able to perform good mobility and therefore requires the use of a portable oxygen system for ambulation.

## 2022-12-06 NOTE — PROGRESS NOTES
Nephrology Progress Note        [address], [address] [address]  Phone: [phone]  Office Hours: 8:30AM - 4:30PM  Monday - Friday 12/6/2022 8:28 AM  Subjective:   Admit Date: 11/14/2022  PCP: Alfredo Foster MD  Interval History: on nc  Her insurance not willing to cover for rehab, it seems    Diet: ADULT DIET; Dysphagia - Minced and Moist; Low Potassium (Less than 3000 mg/day); 1500 ml; Please provide extra moisteners and gravies  ADULT ORAL NUTRITION SUPPLEMENT; Dinner; Frozen Oral Supplement      Data:   Scheduled Meds:   bumetanide  2 mg Oral Once per day on Tue Thu Sat    ipratropium-albuterol  1 ampule Inhalation TID    diphenhydrAMINE-zinc acetate   Topical 4x daily    sennosides-docusate sodium  1 tablet Oral BID    polyethylene glycol  17 g Oral Daily    docusate sodium  100 mg Oral Daily    lactobacillus  1 capsule Oral Daily with breakfast    amLODIPine  10 mg Oral Nightly    atenolol  25 mg Oral Daily    sodium bicarbonate  325 mg Oral BID    clopidogrel  75 mg Oral Daily    sodium chloride flush  5-40 mL IntraVENous 2 times per day    sodium chloride flush  5-40 mL IntraVENous 2 times per day    heparin (porcine)  5,000 Units SubCUTAneous 3 times per day    Vitamin D  2,000 Units Oral Daily    pantoprazole  40 mg Oral QAM AC    fluorometholone  1 drop Both Eyes BID    isosorbide mononitrate  60 mg Oral Daily    levothyroxine  88 mcg Oral Daily    ranolazine  500 mg Oral BID    sertraline  50 mg Oral Daily    [Held by provider] losartan  12.5 mg Oral Daily    atorvastatin  20 mg Oral Nightly     Continuous Infusions:   sodium chloride 75 mL/hr at 11/24/22 1739    sodium chloride 20 mL/hr at 11/27/22 1640     PRN Meds:hydrOXYzine pamoate, melatonin, sodium chloride flush, sodium chloride, ondansetron **OR** ondansetron, polyethylene glycol, acetaminophen **OR** acetaminophen, sodium chloride flush, sodium chloride  No intake/output data recorded.   No intake/output data recorded. No intake or output data in the 24 hours ending 12/06/22 0828    CBC: No results for input(s): WBC, HGB, PLT in the last 72 hours. BMP:    Recent Labs     12/04/22  0011 12/05/22  0513 12/06/22  0438    137 137   K 4.6 4.5 5.2*    103 103   CO2 23 22 22   BUN 49* 45* 51*   CREATININE 2.0* 2.0* 2.2*   GLUCOSE 109* 92 97     Hepatic: No results for input(s): AST, ALT, ALB, BILITOT, ALKPHOS in the last 72 hours. Troponin: No results for input(s): TROPONINI in the last 72 hours. BNP: No results for input(s): BNP in the last 72 hours. Lipids: No results for input(s): CHOL, HDL in the last 72 hours. Invalid input(s): LDLCALCU  ABGs:   Lab Results   Component Value Date/Time    PO2ART 83 11/14/2022 11:30 PM    RUU9HTY 29.0 11/14/2022 11:30 PM     INR: No results for input(s): INR in the last 72 hours.     Objective:   Vitals: BP (!) 168/57   Pulse 66   Temp 97.9 °F (36.6 °C) (Oral)   Resp 20   Ht 5' (1.524 m)   Wt 140 lb 3.4 oz (63.6 kg)   LMP  (LMP Unknown)   SpO2 99%   BMI 27.38 kg/m²   General appearance: alert and cooperative with exam, in no acute distress  HEENT: normocephalic, atraumatic,   Neck: supple, trachea midline  Lungs: breathing comfortably on nc  Heart[de-identified] regular rate and rhythm,systolic murmur  Extremities: extremities atraumatic, no cyanosis or edema  Neurologic: alert, oriented, follows commands, interactive    MEDICAL DECISION MAKING     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 08/18/2018    Bradycardia     LBBB (left bundle branch block)     PAUL (acute kidney injury) ([facility] [address] 12/26/2017    Pneumonia due to infectious organism 11/16/2022    Pyelonephritis 11/16/2022    Chronic kidney disease 11/16/2022    Dyspnea and respiratory abnormalities 11/15/2022    Sepsis ([address] 11/14/2022    Localized edema 07/11/2022    GERD (gastroesophageal reflux disease)     Hypertension     Hypothyroid     Sinus congestion 03/23/2022    Vaginal prolapse 09/07/2021    Primary osteoarthritis of left foot 08/30/2021    Closed nondisplaced fracture of fourth metatarsal bone of left foot 08/30/2021    Closed nondisplaced fracture of third metatarsal bone of left foot 08/30/2021    Closed nondisplaced fracture of fifth left metatarsal bone 08/30/2021    Vertigo     Dizziness     Labyrinthitis     Chronic renal impairment, stage 3 (moderate) (HCC)     Constipation, slow transit 06/19/2018    Cystitis     Physical deconditioning     Hyponatremia 01/01/2018    Pruritic condition 01/22/2016    Gout     Hyperuricemia     Low back pain     Eczema     Coronary artery disease involving native heart without angina pectoris     Depression     Hyperlipidemia     Generalized osteoarthritis     Osteoporosis      K 5.2 so hold her benicar on dc  Will start bumex 2mg on TTS for volume mgmt  Cr stable, will recheck as outpt  Ok to dc per renal stdpt    Thank you                  Electronically signed by Quinton Joyce DO on 12/6/2022 at 8:28 MD Curtis Cavanaugh DO  [facility]  [place], [place] [address]  PHONE: [phone]  FAX: [phone]

## 2022-12-06 NOTE — DISCHARGE SUMMARY
Discharge Summary    Name:  Lucy Pickens /Age/Sex: 1927  (80 y.o. female)   MRN & CSN:  [id number] & [id number] Admission Date/Time: 2022  2:11 PM   Attending:  Lissa Overton MD Discharging Physician: Lissa Overton MD       Admission Diagnosis:   Community Acquired Pneumonia (CAP)   Pulmonary Edema  Acute respiratory distress with hypoxia  Sepsis  Recurrent UTI  Hypomagnesemia   H/O CAD, HTN  PAUL with H/O Renal Insufficiency   Anemia   GERD    Discharge Diagnosis:  Sepsis 2/2 CAP and Pyelonephritis (present on admission)  Acute hypoxic respiratory failure 2/2 CAP, pulmonary edema, pulm HTN, possible underlying COPD  Generalized pruritus 2/2 likely xerosis 2/2 likely air conditioner - Resolved  Pyelonephritis with history of recurrent UTI - Resolved  Acute decompensated HFpEF 2/2 CAD and H/O HTN - Stable   PAUL on CKD likely prerenal in the setting of diuresis   Suspected aspiration--> ruled out  Hyperkalemia  Elevated troponin likely from CKD and demand ischemia  H/O CKD  H/O Chronic LBBB  Severe AS  H/O HTN, HLD  H/O GERD      Discharge Exam  /62   Pulse 73   Temp 97.7 °F (36.5 °C)   Resp 14   Ht 5' (1.524 m)   Wt 140 lb 3.4 oz (63.6 kg)   LMP  (LMP Unknown)   SpO2 (!) 89%   BMI 27.38 kg/m²     General: NAD. On 1.5/min O2 when seen. Appears younger than stated age. Eyes: EOMI  HENT: Atraumatic  Respiratory: no respiratory distress. B/L equal air entry. No wheeze or rhonchi. GI: nondistended. BS normal.   CVS: S1 + S2. Equal peripheral pulses. MSK: no lower extremity edema  Skin: Intact, dry, warm, no rashes  Neuro: CN II to XII grossly intact  Psych: Normal mood      Hospital Course: Lucy Pickens is a 80 y.o.  female  who presents with Sepsis [facility]    Patient admitted on 2022    Per H+P:   Lucy Pickens is a 80 y.o. female with a pmh of CAd, GERD, UTI, Hypothyroidism, HTN, Hyperlipedmia, CKD who presents with Sepsis ([address]  Patient seen at bedside, in ED, not in distress, reports feeling tired, and \"not too good\". Patient tells me that she has been treating an UTI for a month, and has since started coughing and feeling short of breathe. Patient is on RA at baseline, however is requiring 2L O2/ NC at this time. No CP, N, V,diarrhea. No urinary urgency, or frequency. Patient follows with Josefina Castro for renal insuffiencey, and Dr. Trinidad Atkinson for CAD. Both were consulted in ED. Patient was given 500 cc bolus in ED, CXR shows pneumonia vs pulmonary edema. CT chest showed B/L pulm infiltrates, B/L effusions with reactive mediastinal adenopathy.      Hospital course, assessment and plan:  #Acute hypoxic respiratory failure 2/2 CAP and pulmonary edema, pulm HTN, possible underlying COPD  -Blood cultures negative  -Chest x-ray showing pulmonary edema and concern for pneumonia  -Echo: EF of 55 to 60% with RVSP of 44  -Negative respiratory PCR  -CT chest showing bilateral infiltrates  - On 1.5L/min when seen - apparently desaturated to 85-86% with off O2  - Not on O2 at home  - S/p meropenem, ID signed off     Plan:  Pulmonology following, appreciate recs  Continue DuoNebs  Continue O2 - Home O2 eval done, patient qualifies for home O2 at 3L/min     #Generalized pruritus 2/2 likely xerosis 2/2 AC - Resolved  -likely in the setting of dry air (patient uses humidifier at home)  -no jaundice, no rash     Plan:  Emollients and now stable      #Pyelonephritis with history of recurrent UTI - Improved  -Received ciprofloxacin, doxycycline and nitrofurantoin without relief  -Urine culture growing Enterococcus faecalis sensitive to ampicillin  - S/P meropenem      #Acute decompensated HFpEF-resolving  -Chest x-ray with pulmonary edema and elevated RVSP     Plan:  IV diuresis as needed  Strict I's and O  Daily weights     #PAUL on CKD likely prerenal in the setting of diuresis - Stable  -Baseline creatinine around 2  - Cr 2.2     Plan:  Nephrology following, appreciate recommendations  Strict I's and O's  Fluid restriction 1500ml     #Suspected aspiration--> ruled out  -Seen by SLP, did not have any signs or symptoms of aspiration     Plan:  Continue to monitor     #Hyperkalemia  - K 5.2 today     Plan:  Continue monitoring BMP - to get labs in a few days     #Elevated troponin likely from CKD and demand ischemia  #CAD  #LBBB chronic  -Down trended  -EKG with LBBB     Plan:  Cardiology on consult, appreciate recommendations  Continue atenolol, amlodipine, Imdur, statin and aspirin  Defer telmesartain to nephro/cardo     Chronic medical problems:     #Severe AS  -Follow-up at [facility] for TAVR balloon valvuloplasty      We had tried to send patient to ARU but she was denied, appeal denied twice. Shavon Abarca denied patient as well. Patient deemed stable enough to discharge home with [address] and with home O2. Patient also ordered nebulizer due to pneumonia and possible underlying COPD currently requiring O2. Patient was seen in hospital for CAD, Pneumonia. I am prescribing oxygen because the diagnosis and testing requires the patient to have oxygen in the home. Conditions will improve or be benefited by oxygen use. The patient is able to perform good mobility and therefore requires the use of a portable oxygen system for ambulation    The patient expressed appropriate understanding of and agreement with the discharge recommendations, medications, and plan. Consults this admission:  IP CONSULT TO CARDIOLOGY  IP CONSULT TO NEPHROLOGY  IP CONSULT TO PULMONOLOGY  IP CONSULT TO INFECTIOUS DISEASES  IP CONSULT TO HOME CARE NEEDS      Discharge Instruction:   Handoff to PCP:     Follow up appointments: PCP, ID, Nephro, pulm and cardio.    Primary care physician: Discharge instructions as given to patient:   Be compliant with medications  Please take medications as prescribed   Please follow up with consultants as mentioned in the follow up section   Please follow up with [place] regarding your aortic valve. Please obtain blood work in a few days    Diet:  Dysphagia minced and moist, low potassium. Fluid restriction <1500 mL per day  Activity: activity as tolerated  Disposition: Discharged to:   [x]Home, [x]C, []SNF, []Acute Rehab, []Hospice   Condition on discharge: Stable    Discharge Medications:        Medication List        START taking these medications      bumetanide 2 MG tablet  Commonly known as: BUMEX  1 tablet on Tuesday, Thursday and Saturday     clopidogrel 75 MG tablet  Commonly known as: PLAVIX  Take 1 tablet by mouth daily  Start taking on: December 7, 2022     diphenhydrAMINE-zinc acetate 2-0.1 % cream  Apply topically 3 times daily as needed to itchy areas on skin     ipratropium-albuterol 0.5-2.5 (3) MG/3ML Soln nebulizer solution  Commonly known as: DUONEB  Inhale 3 mLs into the lungs in the morning, at noon, and at bedtime            CHANGE how you take these medications      alendronate 70 MG tablet  Commonly known as: FOSAMAX  FOSA  What changed: See the new instructions. allopurinol 100 MG tablet  Commonly known as: ZYLOPRIM  TAKE ONE (1) TABLET BY MOUTH DAILY  What changed: See the new instructions. atenolol 25 MG tablet  Commonly known as: TENORMIN  Take 1 tablet by mouth daily  What changed: when to take this     simvastatin 20 MG tablet  Commonly known as: ZOCOR  TAKE 1 TABLET BY MOUTH EVERY NIGHT  What changed: See the new instructions.             CONTINUE taking these medications      acetaminophen 325 MG tablet  Commonly known as: Aminofen  Take 2 tablets by mouth every 8 hours as needed for Pain     amLODIPine 10 MG tablet  Commonly known as: NORVASC  Take 1 tablet by mouth nightly     aspirin EC 81 MG EC tablet     azelastine 0.1 % nasal spray  Commonly known as: ASTELIN  1 spray by Nasal route 2 times daily Use in each nostril as directed     dexlansoprazole 60 MG Cpdr delayed release capsule  Commonly known as: Dexilant  Take 1 capsule by mouth daily     Dialyvite 800-Zinc 15 0.8 MG Tabs  Take 1 tablet by mouth daily     docusate sodium 100 MG capsule  Commonly known as: COLACE  Take 1 capsule by mouth daily     estradiol 0.1 MG/GM vaginal cream  Commonly known as: ESTRACE VAGINAL  Place 1 g vaginally daily Daily for 2 weeks and then 2-3 times per week     fluorometholone 0.1 % ophthalmic suspension  Commonly known as: FML     isosorbide mononitrate 60 MG extended release tablet  Commonly known as: IMDUR  Take 1 tablet by mouth daily     levothyroxine 88 MCG tablet  Commonly known as: SYNTHROID  TAKE ONE (1) TABLET BY MOUTH DAILY     mineral oil-hydrophilic petrolatum ointment     nitroGLYCERIN 0.4 MG SL tablet  Commonly known as: NITROSTAT  NITRO     nystatin 569736 UNIT/GM powder  Commonly known as: MYCOSTATIN  Apply 3 times daily.      polyethylene glycol 17 GM/SCOOP powder  Commonly known as: GaviLAX  MIX 17 GRAMS (1 HEAPING TABLESPOONFUL) OF POWDER IN 8 OUNCES OF LIQUID AND DRINK DAILY     ranolazine 500 MG extended release tablet  Commonly known as: RANEXA  TAKE ONE (1) TABLET BY MOUTH TWO (2) TIMES DAILY     senna 8.6 MG tablet  Commonly known as: Natural Senna Laxative  Take 1 tablet by mouth 2 times daily     sertraline 50 MG tablet  Commonly known as: ZOLOFT  Take 1 tablet by mouth daily     sodium bicarbonate 325 MG tablet     triamcinolone 0.1 % ointment  Commonly known as: KENALOG  Apply topically 2 times daily     Vitamin D3 50 MCG (2000 UT) Tabs  Take 1 tablet by mouth daily     [address] 5 % Soln  Generic drug: Lifitegrast            STOP taking these medications      diphenhydrAMINE 25 MG tablet  Commonly known as: BENADRYL     fexofenadine 60 MG tablet  Commonly known as: ALLEGRA     hydrocortisone 2.5 % cream     nitrofurantoin (macrocrystal-monohydrate) 100 MG capsule  Commonly known as: MACROBID     olmesartan 5 MG tablet  Commonly known as: BENICAR     ondansetron 4 MG tablet  Commonly known as: ZOFRAN               Where to Get Your Medications        These medications were sent to [address] [place] [address], [address] [place] [address], [address] [facility] [address]      Phone: [phone]   bumetanide 2 MG tablet  clopidogrel 75 MG tablet  diphenhydrAMINE-zinc acetate 2-0.1 % cream  ipratropium-albuterol 0.5-2.5 (3) MG/3ML Soln nebulizer solution         Objective Findings at Discharge:       BMP/CBC  Recent Labs     12/04/22  0011 12/05/22  0513 12/06/22  0438    137 137   K 4.6 4.5 5.2*    103 103   CO2 23 22 22   BUN 49* 45* 51*   CREATININE 2.0* 2.0* 2.2*       IMAGING:  CXR 12/1/2022   Partial improvement with incomplete resolution of the multifocal   consolidation. CT Abdomen and pelvis w/o contrast 11/16/2022   1. Negative for urinary tract stones. 2. Consolidation in the lower lobes and bilateral pleural effusions. CT Chest w/o contrast 11/14/2022   Bilateral pulmonary infiltrates and bilateral pleural effusions with probable   reactive mediastinal adenopathy       Probable granuloma in the right middle lobe. No follow-up imaging   recommended. Additional Information: Patient seen and examined day of discharge.  For more information regarding patient's care please contact [facility] [phone]    Discharge Time of 45 minutes    Electronically signed by Guerita Gary MD on 12/6/2022 at 3:24 PM

## 2022-12-06 NOTE — CARE COORDINATION
Spoke with Dr. Geovanna Cummings, he states Maggie spoke with insurance and was advised that [facility] is an in network provider for [employer]. I therefore called and left  for Brandi Tabares with [facility] to verify that the confirmed pt's insurance correctly as insurance is reporting they are an in network provider vs is this an insurance they choose not to accept and referral just denied. Awaiting call back. Per Dr. Geovanna Cummings if [facility] denies, Maggie's next choice is home with HHC/ DME. Will discuss with Maggie.

## 2022-12-06 NOTE — PROGRESS NOTES
12/6/2022 2:22 PM  Patient Room #: 8299/0414-K  Patient Name: Tamera Velazquez    (Step 1 Done by RN if possible otherwise call Pulmonary Diagnostics)  Place patient on room air at rest for at least 30 minutes. If patient falls below 88% before 30 minutes then you can record the level and stop. Record room air saturation level 87 %. If patient is at 88% or below, they will qualify for home oxygen and you can stop. If level does not fall below 88%, fill in level above. If indicated continue to Step 2. Signature:_Viki Nice, RRT_ Date: _12/06/2022__  (Step 2&3 Done by P)  Ambulate patient on room air until saturation falls below 89%. Record level of room air saturation with ambulation_84 %. Next, place patient back on _3_lpm oxygen and ambulate, record level _94_%. (Note:  this level must show improvement from room air level done with ambulation.)  If patients saturation on room air with ambulation is 88% or below AND patient shows improvement with oxygen during ambulation, they will qualify for home oxygen and you can stop. If patient does not drop below 89%, then patient should have an overnight oximetry trending on room air to see if level falls below 88%. Complete level in Step 3 below. Room air overnight oximetry level 88 % for___  cumulative minutes. If patients room air oxygen level is < 89% for at least 5 cumulative minutes, patient will qualify for home oxygen and you can stop. (Attach Night Trending Report)    Complete order below: Diagnosis:__CAD, Pneumonia __  Home oxygen at:  Length of Need: X? Lifetime ?  3 Months     _3__lpm or __%   via  [x] nasal cannula  []mask  [] other         [x]continuous [x]  with activity  [x]  Nocturnal   [x] Portable Tanks [x]  Concentrator  [] Conserving Device        Therapist Signature:_Viki Nice, RRT_     Date:  _12/06/2022__  Physician Signature:  __Electronically Signed in EMR_    Date:___  Physician Printed Name: __Rayo Patricia MD  NPI:  [id number]___    [x] Patient Qualifies      [] Patient Does NOT qualify

## 2022-12-07 ENCOUNTER — TELEPHONE (OUTPATIENT)
Dept: INTERNAL MEDICINE CLINIC | Age: 87
End: 2022-12-07

## 2022-12-07 LAB
CULTURE: NORMAL
GRAM SMEAR: NORMAL
Lab: NORMAL
SPECIMEN: NORMAL

## 2022-12-07 NOTE — TELEPHONE ENCOUNTER
Care Transitions Initial Follow Up Call    Outreach made within 2 business days of discharge: Yes    Patient: Orlin Ocampo Patient : 1927   MRN: [id number]  Reason for Admission: There are no discharge diagnoses documented for the most recent discharge. Discharge Date: 22       Spoke with: MICHAEL for a return call to schedule. Discharge department/facility: [facility]    TCM Interactive Patient Contact:  Was patient able to fill all prescriptions: LM to call us back   Was patient instructed to bring all medications to the follow-up visit: LM to call us back   Is patient taking all medications as directed in the discharge summary?  LM to call us back   Does patient understand their discharge instructions: LM to call us back   Does patient have questions or concerns that need addressed prior to 7-14 day follow up office visit: LM to call us back     Scheduled appointment with PCP within 7-14 days    Follow Up  Future Appointments   Date Time Provider Manjeet Teresa   2022  2:00 PM Jessa Teixeira MD [facility]   2023  2:00 PM Jessa Teixeira MD [facility]   3/27/2023  2:15 PM Claudia Baxter MD [address] [facility] Danielito Hillman MA

## 2022-12-12 ENCOUNTER — TELEMEDICINE (OUTPATIENT)
Dept: INTERNAL MEDICINE CLINIC | Age: 87
End: 2022-12-12

## 2022-12-12 DIAGNOSIS — J81.0 ACUTE PULMONARY EDEMA (HCC): ICD-10-CM

## 2022-12-12 DIAGNOSIS — R06.03 ACUTE RESPIRATORY DISTRESS: ICD-10-CM

## 2022-12-12 DIAGNOSIS — N17.9 AKI (ACUTE KIDNEY INJURY) (HCC): ICD-10-CM

## 2022-12-12 DIAGNOSIS — A41.9 SEPSIS, DUE TO UNSPECIFIED ORGANISM, UNSPECIFIED WHETHER ACUTE ORGAN DYSFUNCTION PRESENT (HCC): ICD-10-CM

## 2022-12-12 DIAGNOSIS — Z09 HOSPITAL DISCHARGE FOLLOW-UP: ICD-10-CM

## 2022-12-12 DIAGNOSIS — I35.0 AORTIC VALVE STENOSIS, ETIOLOGY OF CARDIAC VALVE DISEASE UNSPECIFIED: ICD-10-CM

## 2022-12-12 DIAGNOSIS — J18.9 COMMUNITY ACQUIRED PNEUMONIA, UNSPECIFIED LATERALITY: Primary | ICD-10-CM

## 2022-12-12 SDOH — ECONOMIC STABILITY: FOOD INSECURITY: WITHIN THE PAST 12 MONTHS, THE FOOD YOU BOUGHT JUST DIDN'T LAST AND YOU DIDN'T HAVE MONEY TO GET MORE.: NEVER TRUE

## 2022-12-12 SDOH — ECONOMIC STABILITY: FOOD INSECURITY: WITHIN THE PAST 12 MONTHS, YOU WORRIED THAT YOUR FOOD WOULD RUN OUT BEFORE YOU GOT MONEY TO BUY MORE.: NEVER TRUE

## 2022-12-12 ASSESSMENT — SOCIAL DETERMINANTS OF HEALTH (SDOH): HOW HARD IS IT FOR YOU TO PAY FOR THE VERY BASICS LIKE FOOD, HOUSING, MEDICAL CARE, AND HEATING?: NOT HARD AT ALL

## 2022-12-12 NOTE — PROGRESS NOTES
Post-Discharge Transitional Care  Follow Up  VIDEO VISIT TODAY    Julien Salamanca   YOB: 1927    Date of Office Visit:  12/12/2022  Date of Hospital Admission: 11/14/22  Date of Hospital Discharge: 12/6/22  Risk of hospital readmission (high >=14%. Medium >=10%) :Readmission Risk Score: 16.4      Care management risk score Rising risk (score 2-5) and Complex Care (Scores >=6): No Risk Score On File     Non face to face  following discharge, date last encounter closed (first attempt may have been earlier): 12/07/2022    Call initiated 2 business days of discharge: Yes    ASSESSMENT/PLAN:   Community acquired pneumonia, unspecified laterality- CLINICALLY RECOVERING WELL, SHE WANTS TO WEAN OFF O2 IF POSSIBLE. CHECK LAB. -     Magnesium; Future  -     Comprehensive Metabolic Panel; Future  -     CBC with Auto Differential; Future  Sepsis, due to unspecified organism, unspecified whether acute organ dysfunction present ([facility] [address]- NOW CLINICALLY RESOLVED  -     Magnesium; Future  -     Comprehensive Metabolic Panel; Future  -     CBC with Auto Differential; Future  Acute pulmonary edema (HCC)- FOR CONT EVAL CARDIO, HAS AORTIC STENOSIS NOTED ON RECENT ECHO, JE STATES IS PENDING FOR FURTHER EVAL AT [place]?  -     Magnesium; Future  -     Comprehensive Metabolic Panel; Future  -     CBC with Auto Differential; Future  PAUL (acute kidney injury) ([facility] [address]- RECHECK LAB TO SEE IF RECOVERING  -     Magnesium; Future  -     Comprehensive Metabolic Panel; Future  -     CBC with Auto Differential; Future  Acute respiratory distress- ON HOME O2  -     Magnesium; Future  -     Comprehensive Metabolic Panel; Future  -     CBC with Auto Differential; Future  Hospital discharge follow-up  -     CO DISCHARGE MEDS RECONCILED W/ CURRENT OUTPATIENT MED LIST    Medical Decision Making: high complexity  Return in about 3 months (around 3/12/2023) for keep next as scheduled.            Subjective:   HPI:  Follow up of AMADOU SALINAS problems/diagnosis(es): see below-  THIS IS A VIDEO VISIT TODAY    Inpatient course: Discharge summary reviewed- see chart. Admission Diagnosis:   Community Acquired Pneumonia (CAP)   Pulmonary Edema  Acute respiratory distress with hypoxia  Sepsis  Recurrent UTI  Hypomagnesemia   H/O CAD, HTN  PAUL with H/O Renal Insufficiency   Anemia   GERD     Discharge Diagnosis:  Sepsis 2/2 CAP and Pyelonephritis (present on admission)  Acute hypoxic respiratory failure 2/2 CAP, pulmonary edema, pulm HTN, possible underlying COPD  Generalized pruritus 2/2 likely xerosis 2/2 likely air conditioner - Resolved  Pyelonephritis with history of recurrent UTI - Resolved  Acute decompensated HFpEF 2/2 CAD and H/O HTN - Stable   PAUL on CKD likely prerenal in the setting of diuresis   Suspected aspiration--> ruled out  Hyperkalemia  Elevated troponin likely from CKD and demand ischemia  H/O CKD  H/O Chronic LBBB  Severe AS  H/O HTN, HLD  H/O GERD        Discharge Exam  /62   Pulse 73   Temp 97.7 °F (36.5 °C)   Resp 14   Ht 5' (1.524 m)   Wt 140 lb 3.4 oz (63.6 kg)   LMP  (LMP Unknown)   SpO2 (!) 89%   BMI 27.38 kg/m²      General: NAD. On 1.5/min O2 when seen. Appears younger than stated age. Eyes: EOMI  HENT: Atraumatic  Respiratory: no respiratory distress. B/L equal air entry. No wheeze or rhonchi. GI: nondistended. BS normal.   CVS: S1 + S2. Equal peripheral pulses. MSK: no lower extremity edema  Skin: Intact, dry, warm, no rashes  Neuro: CN II to XII grossly intact  Psych: Normal mood        Hospital Course: Nigel Bolivar is a 80 y.o.  female  who presents with Sepsis [facility])     Patient admitted on 11/14/2022     Per H+P:   Nigel Bolivar is a 80 y.o. female with a pmh of CAd, GERD, UTI, Hypothyroidism, HTN, Hyperlipedmia, CKD who presents with Sepsis ([address] Patient seen at bedside, in ED, not in distress, reports feeling tired, and \"not too good\".  Patient tells me that she has been treating an UTI for a month, and has since started coughing and feeling short of breathe. Patient is on RA at baseline, however is requiring 2L O2/ NC at this time. No CP, N, V,diarrhea. No urinary urgency, or frequency. Patient follows with Roxanne Thomas for renal insuffiencey, and Dr. Domenico Wright for CAD. Both were consulted in ED. Patient was given 500 cc bolus in ED, CXR shows pneumonia vs pulmonary edema. CT chest showed B/L pulm infiltrates, B/L effusions with reactive mediastinal adenopathy.       Hospital course, assessment and plan:  #Acute hypoxic respiratory failure 2/2 CAP and pulmonary edema, pulm HTN, possible underlying COPD  -Blood cultures negative  -Chest x-ray showing pulmonary edema and concern for pneumonia  -Echo: EF of 55 to 60% with RVSP of 44  -Negative respiratory PCR  -CT chest showing bilateral infiltrates  - On 1.5L/min when seen - apparently desaturated to 85-86% with off O2  - Not on O2 at home  - S/p meropenem, ID signed off     Plan:  Pulmonology following, appreciate recs  Continue DuoNebs  Continue O2 - Home O2 eval done, patient qualifies for home O2 at 3L/min     #Generalized pruritus 2/2 likely xerosis 2/2 AC - Resolved  -likely in the setting of dry air (patient uses humidifier at home)  -no jaundice, no rash     Plan:  Emollients and now stable      #Pyelonephritis with history of recurrent UTI - Improved  -Received ciprofloxacin, doxycycline and nitrofurantoin without relief  -Urine culture growing Enterococcus faecalis sensitive to ampicillin  - S/P meropenem      #Acute decompensated HFpEF-resolving  -Chest x-ray with pulmonary edema and elevated RVSP     Plan:  IV diuresis as needed  Strict I's and O  Daily weights     #PAUL on CKD likely prerenal in the setting of diuresis - Stable  -Baseline creatinine around 2  - Cr 2.2      Plan:  Nephrology following, appreciate recommendations  Strict I's and O's  Fluid restriction 1500ml     #Suspected aspiration--> ruled out  -Seen by SLP, did not have any signs or symptoms of aspiration     Plan:  Continue to monitor     #Hyperkalemia  - K 5.2 today     Plan:  Continue monitoring BMP - to get labs in a few days     #Elevated troponin likely from CKD and demand ischemia  #CAD  #LBBB chronic  -Down trended  -EKG with LBBB     Plan:  Cardiology on consult, appreciate recommendations  Continue atenolol, amlodipine, Imdur, statin and aspirin  Defer telmesartain to nephro/cardo     Chronic medical problems:     #Severe AS  -Follow-up at [facility] for TAVR balloon valvuloplasty        We had tried to send patient to ARU but she was denied, appeal denied twice. Clearence Anger denied patient as well. Patient deemed stable enough to discharge home with [facility] and with home O2. Patient also ordered nebulizer due to pneumonia and possible underlying COPD currently requiring O2. Patient was seen in hospital for CAD, Pneumonia. I am prescribing oxygen because the diagnosis and testing requires the patient to have oxygen in the home. Conditions will improve or be benefited by oxygen use. The patient is able to perform good mobility and therefore requires the use of a portable oxygen system for ambulation  --  --  --      Interval history/Current status: ON HOME O2, Patient was evaluated today for the diagnosis of CHF. I entered a DME order for home oxygen because the diagnosis and testing requires the patient to have supplemental oxygen. Condition will improve or be benefited by oxygen use. The patient is  able to perform good mobility in a home setting and therefore does require the use of a portable oxygen system. The need for this equipment was discussed with the patient and she understands and is in agreement. ALSO W CHF AND OA, IN NEED OF WHEELCHAIR.   Monica Diallo was evaluated today and a DME order was entered for a standard wheelchair because she requires this to successfully complete daily living tasks of eating, bathing, toileting, personal cares, ambulating, grooming, hygiene, dressing upper body, dressing lower body, meal preparation, and taking own medications. A standard manual wheelchair is necessary due to patient's impaired ambulation and mobility restrictions and would be unable to resolve these daily living tasks using a cane or walker. The patient is capable of using a standard wheelchair safely in their home and can maneuver within their home with adequate access. There is a caregiver available to provide necessary assistance. The need for this equipment was discussed with the patient and she understands, is in agreement, and has not expressed an unwillingness to use the wheelchair. PROGRESSIVE AS NOTED ON RECHECK ECHO IN HOSP COMPARED TO 2018. NOW TO SEE CARDIO AT [address]. CAD- ALSO IS NOW ON PLAVIX PER DR JOHNSON. HTN- BP STABLE AT HOME.--- 130S/70S.   ROS- NO COUGH, SOB, WHEEZING, FEVER OR CHILLS, N/V/D.  EATING BETTER NOW AT HOME    Patient Active Problem List   Diagnosis    Hypertension    GERD (gastroesophageal reflux disease)    Depression    Hyperlipidemia    Generalized osteoarthritis    Osteoporosis    Hypothyroid    Coronary artery disease involving native heart without angina pectoris    Eczema    Low back pain    Hyperuricemia    Gout    Pruritic condition    PAUL (acute kidney injury) ([facility] [address]    Hyponatremia    Cystitis    Physical deconditioning    Bradycardia    LBBB (left bundle branch block)    Constipation, slow transit    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    Chronic renal impairment, stage 3 (moderate) (HCC)    Dizziness    Labyrinthitis    Vertigo    Primary osteoarthritis of left foot    Closed nondisplaced fracture of fourth metatarsal bone of left foot    Closed nondisplaced fracture of third metatarsal bone of left foot    Closed nondisplaced fracture of fifth left metatarsal bone    Vaginal prolapse    Sinus congestion    Localized edema    Sepsis (HCC)    Dyspnea and respiratory abnormalities Pneumonia due to infectious organism    Pyelonephritis    Chronic kidney disease       Medications listed as ordered at the time of discharge from hospital     Medication List            Accurate as of December 12, 2022  8:53 AM. If you have any questions, ask your nurse or doctor. CHANGE how you take these medications      alendronate 70 MG tablet  Commonly known as: FOSAMAX  FOSA  What changed: See the new instructions. allopurinol 100 MG tablet  Commonly known as: ZYLOPRIM  TAKE ONE (1) TABLET BY MOUTH DAILY  What changed: See the new instructions. atenolol 25 MG tablet  Commonly known as: TENORMIN  Take 1 tablet by mouth daily  What changed: when to take this     simvastatin 20 MG tablet  Commonly known as: ZOCOR  TAKE 1 TABLET BY MOUTH EVERY NIGHT  What changed: See the new instructions.             CONTINUE taking these medications      acetaminophen 325 MG tablet  Commonly known as: Aminofen  Take 2 tablets by mouth every 8 hours as needed for Pain     amLODIPine 10 MG tablet  Commonly known as: NORVASC  Take 1 tablet by mouth nightly     aspirin EC 81 MG EC tablet     azelastine 0.1 % nasal spray  Commonly known as: ASTELIN  1 spray by Nasal route 2 times daily Use in each nostril as directed     bumetanide 2 MG tablet  Commonly known as: BUMEX  1 tablet on Tuesday, Thursday and Saturday     clopidogrel 75 MG tablet  Commonly known as: PLAVIX  Take 1 tablet by mouth daily     dexlansoprazole 60 MG Cpdr delayed release capsule  Commonly known as: Dexilant  Take 1 capsule by mouth daily     Dialyvite 800-Zinc 15 0.8 MG Tabs  Take 1 tablet by mouth daily     diphenhydrAMINE-zinc acetate 2-0.1 % cream  Apply topically 3 times daily as needed to itchy areas on skin     docusate sodium 100 MG capsule  Commonly known as: COLACE  Take 1 capsule by mouth daily     estradiol 0.1 MG/GM vaginal cream  Commonly known as: ESTRACE VAGINAL  Place 1 g vaginally daily Daily for 2 weeks and then 2-3 times per week     fluorometholone 0.1 % ophthalmic suspension  Commonly known as: FML     ipratropium-albuterol 0.5-2.5 (3) MG/3ML Soln nebulizer solution  Commonly known as: DUONEB  Inhale 3 mLs into the lungs in the morning, at noon, and at bedtime     isosorbide mononitrate 60 MG extended release tablet  Commonly known as: IMDUR  Take 1 tablet by mouth daily     levothyroxine 88 MCG tablet  Commonly known as: SYNTHROID  TAKE ONE (1) TABLET BY MOUTH DAILY     mineral oil-hydrophilic petrolatum ointment     nitroGLYCERIN 0.4 MG SL tablet  Commonly known as: NITROSTAT  NITRO     nystatin 314126 UNIT/GM powder  Commonly known as: MYCOSTATIN  Apply 3 times daily.      OXYGEN     polyethylene glycol 17 GM/SCOOP powder  Commonly known as: GaviLAX  MIX 17 GRAMS (1 HEAPING TABLESPOONFUL) OF POWDER IN 8 OUNCES OF LIQUID AND DRINK DAILY     ranolazine 500 MG extended release tablet  Commonly known as: RANEXA  TAKE ONE (1) TABLET BY MOUTH TWO (2) TIMES DAILY     senna 8.6 MG tablet  Commonly known as: Natural Senna Laxative  Take 1 tablet by mouth 2 times daily     sertraline 50 MG tablet  Commonly known as: ZOLOFT  Take 1 tablet by mouth daily     sodium bicarbonate 325 MG tablet     triamcinolone 0.1 % ointment  Commonly known as: KENALOG  Apply topically 2 times daily     Vitamin D3 50 MCG (2000 UT) Tabs  Take 1 tablet by mouth daily     Xiidra 5 % Soln  Generic drug: Lifitegrast                Medications marked \"taking\" at this time  Outpatient Medications Marked as Taking for the 12/12/22 encounter (Telemedicine) with Cathi Atkinson MD   Medication Sig Dispense Refill    OXYGEN Inhale 2 L into the lungs continuous      ipratropium-albuterol (DUONEB) 0.5-2.5 (3) MG/3ML SOLN nebulizer solution Inhale 3 mLs into the lungs in the morning, at noon, and at bedtime 360 mL 0    diphenhydrAMINE-zinc acetate 2-0.1 % cream Apply topically 3 times daily as needed to itchy areas on skin 28 g 0    bumetanide (BUMEX) 2 MG tablet 1 tablet on Tuesday, Thursday and Saturday 30 tablet 0    clopidogrel (PLAVIX) 75 MG tablet Take 1 tablet by mouth daily 30 tablet 0    sodium bicarbonate 325 MG tablet Take 1 tablet by mouth in the morning and 1 tablet in the evening. XIIDRA 5 % SOLN Place 1 drop into both eyes 2 times daily      fluorometholone (FML) 0.1 % ophthalmic suspension Place 1 drop into both eyes 2 times daily      atenolol (TENORMIN) 25 MG tablet Take 1 tablet by mouth daily (Patient taking differently: Take 25 mg by mouth Daily with supper) 180 tablet 1    dexlansoprazole (DEXILANT) 60 MG CPDR delayed release capsule Take 1 capsule by mouth daily 90 capsule 1    docusate sodium (COLACE) 100 MG capsule Take 1 capsule by mouth daily 90 capsule 1    senna (NATURAL SENNA LAXATIVE) 8.6 MG tablet Take 1 tablet by mouth 2 times daily 180 tablet 1    sertraline (ZOLOFT) 50 MG tablet Take 1 tablet by mouth daily 90 tablet 1    isosorbide mononitrate (IMDUR) 60 MG extended release tablet Take 1 tablet by mouth daily 90 tablet 1    polyethylene glycol (GAVILAX) 17 GM/SCOOP powder MIX 17 GRAMS (1 HEAPING TABLESPOONFUL) OF POWDER IN 8 OUNCES OF LIQUID AND DRINK DAILY 850 g 3    nystatin (MYCOSTATIN) 312589 UNIT/GM powder Apply 3 times daily.  60 g 1    Cholecalciferol (VITAMIN D3) 50 MCG (2000 UT) TABS Take 1 tablet by mouth daily 90 tablet 1    nitroGLYCERIN (NITROSTAT) 0.4 MG SL tablet NITRO 25 tablet 1    allopurinol (ZYLOPRIM) 100 MG tablet TAKE ONE (1) TABLET BY MOUTH DAILY (Patient taking differently: Take 100 mg by mouth daily) 90 tablet 1    ranolazine (RANEXA) 500 MG extended release tablet TAKE ONE (1) TABLET BY MOUTH TWO (2) TIMES DAILY 180 tablet 1    alendronate (FOSAMAX) 70 MG tablet FOSA (Patient taking differently: Take 70 mg by mouth every 7 days Takes on Wednesday) 12 tablet 1    levothyroxine (SYNTHROID) 88 MCG tablet TAKE ONE (1) TABLET BY MOUTH DAILY 90 tablet 1    simvastatin (ZOCOR) 20 MG tablet TAKE 1 TABLET BY MOUTH EVERY NIGHT (Patient taking differently: Take 20 mg by mouth nightly) 90 tablet 1    azelastine (ASTELIN) 0.1 % nasal spray 1 spray by Nasal route 2 times daily Use in each nostril as directed 30 mL 5    triamcinolone (KENALOG) 0.1 % ointment Apply topically 2 times daily 30 g 3    estradiol (ESTRACE VAGINAL) 0.1 MG/GM vaginal cream Place 1 g vaginally daily Daily for 2 weeks and then 2-3 times per week 1 each 5    acetaminophen (AMINOFEN) 325 MG tablet Take 2 tablets by mouth every 8 hours as needed for Pain 120 tablet 3    mineral oil-hydrophilic petrolatum (AQUAPHOR) ointment Apply topically as needed for Dry Skin Apply topically as needed. aspirin EC 81 MG EC tablet Take 81 mg by mouth daily. Medications patient taking as of now reconciled against medications ordered at time of hospital discharge: Yes    A comprehensive review of systems was negative except for what was noted in the HPI. Objective:    LMP  (LMP Unknown)   General Appearance: alert and oriented to person, place and time, well developed and well- nourished, in no acute distress  Pulmonary/Chest: no respiratory distress        An electronic signature was used to authenticate this note.   --Tomer Harrison MD

## 2022-12-20 ENCOUNTER — SCHEDULED TELEPHONE ENCOUNTER (OUTPATIENT)
Dept: PULMONOLOGY | Age: 87
End: 2022-12-20
Payer: COMMERCIAL

## 2022-12-20 DIAGNOSIS — I35.0 AORTIC VALVE STENOSIS, ETIOLOGY OF CARDIAC VALVE DISEASE UNSPECIFIED: ICD-10-CM

## 2022-12-20 DIAGNOSIS — R09.02 HYPOXIA: ICD-10-CM

## 2022-12-20 PROCEDURE — 99243 OFF/OP CNSLTJ NEW/EST LOW 30: CPT | Performed by: INTERNAL MEDICINE

## 2022-12-20 ASSESSMENT — ENCOUNTER SYMPTOMS
ABDOMINAL DISTENTION: 0
EYE DISCHARGE: 0
BACK PAIN: 0
EYE ITCHING: 0
SHORTNESS OF BREATH: 1
ABDOMINAL PAIN: 0
COUGH: 0

## 2022-12-20 NOTE — PROGRESS NOTES
Nora Kanner  1/23/1927  Referring Provider: Bobbi Page MD    Subjective:   No chief complaint on file. HPI  Calderon Somers is a 80 y.o. female is doing a telephone follow up. She was seen for the [address]. She has severe AS. She is going to get a cath and TAVR at [facility]. She is on 2 L/min. She has occasional cough, little phlegm,no fever, no chest pain,SOBOE some. She is on Duoneb. She has chest vest.    Current Outpatient Medications   Medication Sig Dispense Refill    OXYGEN Inhale 2 L into the lungs continuous      ipratropium-albuterol (DUONEB) 0.5-2.5 (3) MG/3ML SOLN nebulizer solution Inhale 3 mLs into the lungs in the morning, at noon, and at bedtime 360 mL 0    diphenhydrAMINE-zinc acetate 2-0.1 % cream Apply topically 3 times daily as needed to itchy areas on skin 28 g 0    bumetanide (BUMEX) 2 MG tablet 1 tablet on Tuesday, Thursday and Saturday (Patient taking differently: 1 tablet on Monday, Wednesday, Friday) 30 tablet 0    clopidogrel (PLAVIX) 75 MG tablet Take 1 tablet by mouth daily 30 tablet 0    sodium bicarbonate 325 MG tablet Take 1 tablet by mouth in the morning and 1 tablet in the evening.       XIIDRA 5 % SOLN Place 1 drop into both eyes 2 times daily      fluorometholone (FML) 0.1 % ophthalmic suspension Place 1 drop into both eyes 2 times daily      amLODIPine (NORVASC) 10 MG tablet Take 1 tablet by mouth nightly 30 tablet 4    atenolol (TENORMIN) 25 MG tablet Take 1 tablet by mouth daily (Patient taking differently: Take 25 mg by mouth Daily with supper) 180 tablet 1    dexlansoprazole (DEXILANT) 60 MG CPDR delayed release capsule Take 1 capsule by mouth daily 90 capsule 1    docusate sodium (COLACE) 100 MG capsule Take 1 capsule by mouth daily 90 capsule 1    senna (NATURAL SENNA LAXATIVE) 8.6 MG tablet Take 1 tablet by mouth 2 times daily 180 tablet 1    sertraline (ZOLOFT) 50 MG tablet Take 1 tablet by mouth daily 90 tablet 1    isosorbide mononitrate (IMDUR) 60 MG extended release tablet Take 1 tablet by mouth daily 90 tablet 1    polyethylene glycol (GAVILAX) 17 GM/SCOOP powder MIX 17 GRAMS (1 HEAPING TABLESPOONFUL) OF POWDER IN 8 OUNCES OF LIQUID AND DRINK DAILY 850 g 3    nystatin (MYCOSTATIN) 074406 UNIT/GM powder Apply 3 times daily. 60 g 1    Cholecalciferol (VITAMIN D3) 50 MCG (2000 UT) TABS Take 1 tablet by mouth daily 90 tablet 1    nitroGLYCERIN (NITROSTAT) 0.4 MG SL tablet NITRO 25 tablet 1    allopurinol (ZYLOPRIM) 100 MG tablet TAKE ONE (1) TABLET BY MOUTH DAILY (Patient taking differently: Take 100 mg by mouth daily) 90 tablet 1    ranolazine (RANEXA) 500 MG extended release tablet TAKE ONE (1) TABLET BY MOUTH TWO (2) TIMES DAILY 180 tablet 1    alendronate (FOSAMAX) 70 MG tablet FOSA (Patient taking differently: Take 70 mg by mouth every 7 days Takes on Wednesday) 12 tablet 1    levothyroxine (SYNTHROID) 88 MCG tablet TAKE ONE (1) TABLET BY MOUTH DAILY 90 tablet 1    simvastatin (ZOCOR) 20 MG tablet TAKE 1 TABLET BY MOUTH EVERY NIGHT (Patient taking differently: Take 20 mg by mouth nightly) 90 tablet 1    azelastine (ASTELIN) 0.1 % nasal spray 1 spray by Nasal route 2 times daily Use in each nostril as directed 30 mL 5    triamcinolone (KENALOG) 0.1 % ointment Apply topically 2 times daily 30 g 3    estradiol (ESTRACE VAGINAL) 0.1 MG/GM vaginal cream Place 1 g vaginally daily Daily for 2 weeks and then 2-3 times per week 1 each 5    acetaminophen (AMINOFEN) 325 MG tablet Take 2 tablets by mouth every 8 hours as needed for Pain 120 tablet 3    B Complex-C-Zn-Folic Acid (DIALYVITE 637-LTDW 15) 0.8 MG TABS Take 1 tablet by mouth daily 30 tablet 11    mineral oil-hydrophilic petrolatum (AQUAPHOR) ointment Apply topically as needed for Dry Skin Apply topically as needed. aspirin EC 81 MG EC tablet Take 81 mg by mouth daily. No current facility-administered medications for this visit.        Allergies   Allergen Reactions    Bactrim [Sulfamethoxazole-Trimethoprim] Nausea And Vomiting    Cephalexin Rash    Tape [Adhesive Tape] Itching       Past Medical History:   Diagnosis Date    Aortic stenosis     ON ECHO 11/22- DR HILARY FIGUEREDO    CAD (coronary artery disease)     Dr Serg Figueredo/Jose Figueredo    Chronic renal insufficiency     Dr Leann Perez    Depression     Dry eye syndrome     Dr Anahy Lopez     Elevated LFTs 05/11/2012    Generalized osteoarthritis     GERD (gastroesophageal reflux disease)     Esophagitis    Gout     Hyperlipidemia     Hypertension     Hyperuricemia     Hypothyroid     Hypothyroidism    Low back pain     Osteoporosis     Generalized    Pneumonia due to infectious organism 11/16/2022    Sinus congestion     CHRONIC W POST NASAL DRIP    UTI (urinary tract infection)     Vertigo     Dr Simone Whittaker       Past Surgical History:   Procedure Laterality Date    CATARACT REMOVAL      R Aug. 2006 and L March 2007 Dr. Viraj Christine  2004    right foot realignment [address] Right 08/19/2018    rt hip Zheng    HYSTERECTOMY (CERVIX STATUS UNKNOWN)      TONSILLECTOMY         Social History     Socioeconomic History    Marital status:     Occupational History    Occupation: Retired from [employer]: as noted   Tobacco Use    Smoking status: Former    Smokeless tobacco: Never   Vaping Use    Vaping Use: Never used   Substance and Sexual Activity    Alcohol use: No    Drug use: No   Social History Narrative    Marital Status:   she lives in an [facility] and  has been in [facility] since [facility]        Diet:  Low salt        Seat Belts:  Always     Social Determinants of Health     Financial Resource Strain: Low Risk     Difficulty of Paying Living Expenses: Not hard at all   Food Insecurity: No Food Insecurity    Worried About Running Out of Food in the Last Year: Never true    Ran Out of Food in the Last Year: Never true   Physical Activity: Sufficiently Active    Days of Exercise per Week: 5 days appropriate. Due to this being a TeleHealth encounter (During USDLD-44 public health emergency), evaluation of the following organ systems was limited: Vitals/Constitutional/EENT/Resp/CV/GI//MS/Neuro/Skin/Heme-Lymph-Imm. Pursuant to the emergency declaration under the [address] [place] [address] and the [employer] Act, this Virtual Visit was conducted with patient's (and/or legal guardian's) consent, to reduce the patient's risk of exposure to COVID-19 and provide necessary medical care. The patient (and/or legal guardian) has also been advised to contact this office for worsening conditions or problems, and seek emergency medical treatment and/or call 911 if deemed necessary. Patient identification was verified at the start of the visit: Yes    Total time spent for this encounter:     Services were provided through a video synchronous discussion virtually to substitute for in-person clinic visit. Patient and provider were located at their individual homes. --Dana Orlando MD on 12/20/2022 at 2:31 PM    An electronic signature was used to authenticate this note.      Dana Orlando MD MD  12/20/2022  2:31 PM

## 2023-01-01 ENCOUNTER — ANESTHESIA EVENT (OUTPATIENT)
Dept: ENDOSCOPY | Age: 88
End: 2023-01-01

## 2023-01-01 ENCOUNTER — HOSPITAL ENCOUNTER (INPATIENT)
Age: 88
LOS: 3 days | Discharge: HOSPICE | DRG: 291 | End: 2023-02-06
Attending: EMERGENCY MEDICINE | Admitting: INTERNAL MEDICINE
Payer: COMMERCIAL

## 2023-01-01 ENCOUNTER — APPOINTMENT (OUTPATIENT)
Dept: GENERAL RADIOLOGY | Age: 88
DRG: 291 | End: 2023-01-01
Payer: COMMERCIAL

## 2023-01-01 ENCOUNTER — APPOINTMENT (OUTPATIENT)
Dept: CT IMAGING | Age: 88
DRG: 291 | End: 2023-01-01
Payer: COMMERCIAL

## 2023-01-01 ENCOUNTER — ANESTHESIA (OUTPATIENT)
Dept: ENDOSCOPY | Age: 88
End: 2023-01-01

## 2023-01-01 ENCOUNTER — HOSPITAL ENCOUNTER (INPATIENT)
Age: 88
LOS: 1 days | DRG: 951 | End: 2023-02-07
Attending: FAMILY MEDICINE | Admitting: FAMILY MEDICINE
Payer: COMMERCIAL

## 2023-01-01 VITALS
SYSTOLIC BLOOD PRESSURE: 157 MMHG | BODY MASS INDEX: 24.54 KG/M2 | HEART RATE: 69 BPM | HEIGHT: 60 IN | OXYGEN SATURATION: 96 % | RESPIRATION RATE: 22 BRPM | TEMPERATURE: 97.5 F | DIASTOLIC BLOOD PRESSURE: 73 MMHG | WEIGHT: 125 LBS

## 2023-01-01 VITALS
HEART RATE: 81 BPM | SYSTOLIC BLOOD PRESSURE: 201 MMHG | RESPIRATION RATE: 18 BRPM | OXYGEN SATURATION: 88 % | DIASTOLIC BLOOD PRESSURE: 71 MMHG | TEMPERATURE: 98.5 F

## 2023-01-01 DIAGNOSIS — R06.89 DYSPNEA AND RESPIRATORY ABNORMALITIES: ICD-10-CM

## 2023-01-01 DIAGNOSIS — R07.9 CHEST PAIN, UNSPECIFIED TYPE: Primary | ICD-10-CM

## 2023-01-01 DIAGNOSIS — R06.00 DYSPNEA AND RESPIRATORY ABNORMALITIES: ICD-10-CM

## 2023-01-01 DIAGNOSIS — R77.8 TROPONIN LEVEL ELEVATED: ICD-10-CM

## 2023-01-01 DIAGNOSIS — R79.89 ELEVATED BRAIN NATRIURETIC PEPTIDE (BNP) LEVEL: ICD-10-CM

## 2023-01-01 DIAGNOSIS — R09.02 HYPOXIA: ICD-10-CM

## 2023-01-01 LAB
ALBUMIN SERPL-MCNC: 3.6 GM/DL (ref 3.4–5)
ALBUMIN SERPL-MCNC: 4 GM/DL (ref 3.4–5)
ALP BLD-CCNC: 49 IU/L (ref 40–128)
ALP BLD-CCNC: 72 IU/L (ref 40–129)
ALT SERPL-CCNC: 11 U/L (ref 10–40)
ALT SERPL-CCNC: 14 U/L (ref 10–40)
AMYLASE: 56 U/L (ref 25–115)
ANION GAP SERPL CALCULATED.3IONS-SCNC: 10 MMOL/L (ref 4–16)
ANION GAP SERPL CALCULATED.3IONS-SCNC: 12 MMOL/L (ref 4–16)
ANION GAP SERPL CALCULATED.3IONS-SCNC: 13 MMOL/L (ref 4–16)
ANION GAP SERPL CALCULATED.3IONS-SCNC: 13 MMOL/L (ref 4–16)
APTT: 33.6 SECONDS (ref 25.1–37.1)
APTT: 41.1 SECONDS (ref 25.1–37.1)
AST SERPL-CCNC: 21 IU/L (ref 15–37)
AST SERPL-CCNC: 25 IU/L (ref 15–37)
B PARAP IS1001 DNA NPH QL NAA+NON-PROBE: NOT DETECTED
B PERT.PT PRMT NPH QL NAA+NON-PROBE: NOT DETECTED
BACTERIA: ABNORMAL /HPF
BASE EXCESS: 1 (ref 0–2.4)
BASE EXCESS: 3 (ref 0–2.4)
BASOPHILS ABSOLUTE: 0 K/CU MM
BASOPHILS ABSOLUTE: 0 K/CU MM
BASOPHILS ABSOLUTE: 0.1 K/CU MM
BASOPHILS RELATIVE PERCENT: 0.1 % (ref 0–1)
BASOPHILS RELATIVE PERCENT: 0.2 % (ref 0–1)
BASOPHILS RELATIVE PERCENT: 0.5 % (ref 0–1)
BILIRUB SERPL-MCNC: 0.3 MG/DL (ref 0–1)
BILIRUB SERPL-MCNC: 0.4 MG/DL (ref 0–1)
BILIRUBIN URINE: NEGATIVE MG/DL
BLOOD, URINE: ABNORMAL
BUN SERPL-MCNC: 39 MG/DL (ref 6–23)
BUN SERPL-MCNC: 40 MG/DL (ref 6–23)
BUN SERPL-MCNC: 40 MG/DL (ref 6–23)
BUN SERPL-MCNC: 51 MG/DL (ref 6–23)
C PNEUM DNA NPH QL NAA+NON-PROBE: NOT DETECTED
CALCIUM SERPL-MCNC: 8.4 MG/DL (ref 8.3–10.6)
CALCIUM SERPL-MCNC: 8.7 MG/DL (ref 8.3–10.6)
CALCIUM SERPL-MCNC: 9.1 MG/DL (ref 8.3–10.6)
CALCIUM SERPL-MCNC: 9.3 MG/DL (ref 8.3–10.6)
CHLORIDE BLD-SCNC: 102 MMOL/L (ref 99–110)
CHLORIDE BLD-SCNC: 103 MMOL/L (ref 99–110)
CHLORIDE BLD-SCNC: 105 MMOL/L (ref 99–110)
CHLORIDE BLD-SCNC: 105 MMOL/L (ref 99–110)
CHOLEST SERPL-MCNC: 126 MG/DL
CLARITY: ABNORMAL
CO2: 20 MMOL/L (ref 21–32)
CO2: 23 MMOL/L (ref 21–32)
CO2: 23 MMOL/L (ref 21–32)
CO2: 24 MMOL/L (ref 21–32)
COLOR: YELLOW
COMMENT: ABNORMAL
CREAT SERPL-MCNC: 1.9 MG/DL (ref 0.6–1.1)
CREAT SERPL-MCNC: 1.9 MG/DL (ref 0.6–1.1)
CREAT SERPL-MCNC: 2.3 MG/DL (ref 0.6–1.1)
CREAT SERPL-MCNC: 2.5 MG/DL (ref 0.6–1.1)
CREATININE URINE: 40.8 MG/DL (ref 28–217)
CULTURE: ABNORMAL
CULTURE: ABNORMAL
DIFFERENTIAL TYPE: ABNORMAL
DOSE AMOUNT: NORMAL
DOSE AMOUNT: NORMAL
DOSE TIME: NORMAL
DOSE TIME: NORMAL
EKG ATRIAL RATE: 79 BPM
EKG DIAGNOSIS: NORMAL
EKG P AXIS: 6 DEGREES
EKG P-R INTERVAL: 182 MS
EKG Q-T INTERVAL: 426 MS
EKG QRS DURATION: 146 MS
EKG QTC CALCULATION (BAZETT): 488 MS
EKG R AXIS: -69 DEGREES
EKG T AXIS: 96 DEGREES
EKG VENTRICULAR RATE: 79 BPM
EOSINOPHILS ABSOLUTE: 0 K/CU MM
EOSINOPHILS ABSOLUTE: 0 K/CU MM
EOSINOPHILS ABSOLUTE: 0.2 K/CU MM
EOSINOPHILS RELATIVE PERCENT: 0 % (ref 0–3)
EOSINOPHILS RELATIVE PERCENT: 0 % (ref 0–3)
EOSINOPHILS RELATIVE PERCENT: 2.1 % (ref 0–3)
FLUAV H1 2009 PAN RNA NPH NAA+NON-PROBE: NOT DETECTED
FLUAV H1 RNA NPH QL NAA+NON-PROBE: NOT DETECTED
FLUAV H3 RNA NPH QL NAA+NON-PROBE: NOT DETECTED
FLUAV RNA NPH QL NAA+NON-PROBE: NOT DETECTED
FLUBV RNA NPH QL NAA+NON-PROBE: NOT DETECTED
GFR SERPL CREATININE-BSD FRML MDRD: 17 ML/MIN/1.73M2
GFR SERPL CREATININE-BSD FRML MDRD: 19 ML/MIN/1.73M2
GFR SERPL CREATININE-BSD FRML MDRD: 24 ML/MIN/1.73M2
GFR SERPL CREATININE-BSD FRML MDRD: 24 ML/MIN/1.73M2
GLUCOSE SERPL-MCNC: 113 MG/DL (ref 70–99)
GLUCOSE SERPL-MCNC: 135 MG/DL (ref 70–99)
GLUCOSE SERPL-MCNC: 137 MG/DL (ref 70–99)
GLUCOSE SERPL-MCNC: 148 MG/DL (ref 70–99)
GLUCOSE, URINE: NEGATIVE MG/DL
HADV DNA NPH QL NAA+NON-PROBE: NOT DETECTED
HCO3 VENOUS: 21.8 MMOL/L (ref 19–25)
HCO3 VENOUS: 23.4 MMOL/L (ref 19–25)
HCOV 229E RNA NPH QL NAA+NON-PROBE: NOT DETECTED
HCOV HKU1 RNA NPH QL NAA+NON-PROBE: NOT DETECTED
HCOV NL63 RNA NPH QL NAA+NON-PROBE: NOT DETECTED
HCOV OC43 RNA NPH QL NAA+NON-PROBE: NOT DETECTED
HCT VFR BLD CALC: 21.9 % (ref 37–47)
HCT VFR BLD CALC: 23.1 % (ref 37–47)
HCT VFR BLD CALC: 24.6 % (ref 37–47)
HDLC SERPL-MCNC: 48 MG/DL
HEMOGLOBIN: 6.9 GM/DL (ref 12.5–16)
HEMOGLOBIN: 7.2 GM/DL (ref 12.5–16)
HEMOGLOBIN: 7.9 GM/DL (ref 12.5–16)
HMPV RNA NPH QL NAA+NON-PROBE: NOT DETECTED
HPIV1 RNA NPH QL NAA+NON-PROBE: NOT DETECTED
HPIV2 RNA NPH QL NAA+NON-PROBE: NOT DETECTED
HPIV3 RNA NPH QL NAA+NON-PROBE: NOT DETECTED
HPIV4 RNA NPH QL NAA+NON-PROBE: NOT DETECTED
IMMATURE NEUTROPHIL %: 0.3 % (ref 0–0.43)
IMMATURE NEUTROPHIL %: 0.4 % (ref 0–0.43)
IMMATURE NEUTROPHIL %: 0.5 % (ref 0–0.43)
INR BLD: 1.12 INDEX
INR BLD: 1.39 INDEX
IRON: 18 UG/DL (ref 37–145)
KETONES, URINE: NEGATIVE MG/DL
LDLC SERPL CALC-MCNC: 53 MG/DL
LEUKOCYTE ESTERASE, URINE: NEGATIVE
LIPASE: 28 IU/L (ref 13–60)
LYMPHOCYTES ABSOLUTE: 1 K/CU MM
LYMPHOCYTES ABSOLUTE: 1.6 K/CU MM
LYMPHOCYTES ABSOLUTE: 2.9 K/CU MM
LYMPHOCYTES RELATIVE PERCENT: 13.8 % (ref 24–44)
LYMPHOCYTES RELATIVE PERCENT: 27.2 % (ref 24–44)
LYMPHOCYTES RELATIVE PERCENT: 7.2 % (ref 24–44)
Lab: ABNORMAL
M PNEUMO DNA NPH QL NAA+NON-PROBE: NOT DETECTED
MAGNESIUM: 2 MG/DL (ref 1.8–2.4)
MAGNESIUM: 2.1 MG/DL (ref 1.8–2.4)
MAGNESIUM: 2.2 MG/DL (ref 1.8–2.4)
MAGNESIUM: 2.2 MG/DL (ref 1.8–2.4)
MCH RBC QN AUTO: 30.8 PG (ref 27–31)
MCH RBC QN AUTO: 31.3 PG (ref 27–31)
MCH RBC QN AUTO: 31.5 PG (ref 27–31)
MCHC RBC AUTO-ENTMCNC: 31.2 % (ref 32–36)
MCHC RBC AUTO-ENTMCNC: 31.5 % (ref 32–36)
MCHC RBC AUTO-ENTMCNC: 32.1 % (ref 32–36)
MCV RBC AUTO: 100 FL (ref 78–100)
MCV RBC AUTO: 97.6 FL (ref 78–100)
MCV RBC AUTO: 98.7 FL (ref 78–100)
MONOCYTES ABSOLUTE: 0.7 K/CU MM
MONOCYTES ABSOLUTE: 0.7 K/CU MM
MONOCYTES ABSOLUTE: 1 K/CU MM
MONOCYTES RELATIVE PERCENT: 5.3 % (ref 0–4)
MONOCYTES RELATIVE PERCENT: 6.4 % (ref 0–4)
MONOCYTES RELATIVE PERCENT: 8.8 % (ref 0–4)
NITRITE URINE, QUANTITATIVE: POSITIVE
NUCLEATED RBC %: 0 %
O2 SAT, VEN: 94 % (ref 50–70)
O2 SAT, VEN: 94.8 % (ref 50–70)
PCO2, VEN: 36 MMHG (ref 38–52)
PCO2, VEN: 36 MMHG (ref 38–52)
PCT TRANSFERRIN: 8 % (ref 10–44)
PDW BLD-RTO: 14.6 % (ref 11.7–14.9)
PDW BLD-RTO: 14.6 % (ref 11.7–14.9)
PDW BLD-RTO: 14.8 % (ref 11.7–14.9)
PH VENOUS: 7.39 (ref 7.32–7.42)
PH VENOUS: 7.42 (ref 7.32–7.42)
PH, URINE: 6 (ref 5–8)
PLATELET # BLD: 272 K/CU MM (ref 140–440)
PLATELET # BLD: 299 K/CU MM (ref 140–440)
PLATELET # BLD: 344 K/CU MM (ref 140–440)
PMV BLD AUTO: 8.7 FL (ref 7.5–11.1)
PMV BLD AUTO: 9.1 FL (ref 7.5–11.1)
PMV BLD AUTO: 9.5 FL (ref 7.5–11.1)
PO2, VEN: 140 MMHG (ref 28–48)
PO2, VEN: 202 MMHG (ref 28–48)
POTASSIUM SERPL-SCNC: 3.7 MMOL/L (ref 3.5–5.1)
POTASSIUM SERPL-SCNC: 4.4 MMOL/L (ref 3.5–5.1)
POTASSIUM SERPL-SCNC: 4.5 MMOL/L (ref 3.5–5.1)
POTASSIUM SERPL-SCNC: 4.7 MMOL/L (ref 3.5–5.1)
PRO-BNP: 5978 PG/ML
PRO-BNP: ABNORMAL PG/ML
PROCALCITONIN SERPL-MCNC: 0.22 NG/ML
PROT/CREAT RATIO, UR: 2.7
PROTEIN UA: 100 MG/DL
PROTHROMBIN TIME: 14.4 SECONDS (ref 11.7–14.5)
PROTHROMBIN TIME: 18 SECONDS (ref 11.7–14.5)
RAPID INFLUENZA  B AGN: NEGATIVE
RAPID INFLUENZA A AGN: NEGATIVE
RBC # BLD: 2.19 M/CU MM (ref 4.2–5.4)
RBC # BLD: 2.34 M/CU MM (ref 4.2–5.4)
RBC # BLD: 2.52 M/CU MM (ref 4.2–5.4)
RBC URINE: 2 /HPF (ref 0–6)
RSV RNA NPH QL NAA+NON-PROBE: NOT DETECTED
RV+EV RNA NPH QL NAA+NON-PROBE: NOT DETECTED
SARS-COV-2 RDRP RESP QL NAA+PROBE: NOT DETECTED
SARS-COV-2 RNA NPH QL NAA+NON-PROBE: NOT DETECTED
SEGMENTED NEUTROPHILS ABSOLUTE COUNT: 11.9 K/CU MM
SEGMENTED NEUTROPHILS ABSOLUTE COUNT: 6.6 K/CU MM
SEGMENTED NEUTROPHILS ABSOLUTE COUNT: 9.1 K/CU MM
SEGMENTED NEUTROPHILS RELATIVE PERCENT: 61 % (ref 36–66)
SEGMENTED NEUTROPHILS RELATIVE PERCENT: 79.3 % (ref 36–66)
SEGMENTED NEUTROPHILS RELATIVE PERCENT: 86.9 % (ref 36–66)
SODIUM BLD-SCNC: 138 MMOL/L (ref 135–145)
SODIUM BLD-SCNC: 139 MMOL/L (ref 135–145)
SOURCE: NORMAL
SPECIFIC GRAVITY UA: 1.02 (ref 1–1.03)
SPECIMEN: ABNORMAL
SQUAMOUS EPITHELIAL: 2 /HPF
T4 FREE SERPL-MCNC: 1.08 NG/DL (ref 0.9–1.8)
TOTAL IMMATURE NEUTOROPHIL: 0.03 K/CU MM
TOTAL IMMATURE NEUTOROPHIL: 0.04 K/CU MM
TOTAL IMMATURE NEUTOROPHIL: 0.07 K/CU MM
TOTAL IRON BINDING CAPACITY: 216 UG/DL (ref 250–450)
TOTAL NUCLEATED RBC: 0 K/CU MM
TOTAL PROTEIN: 6.4 GM/DL (ref 6.4–8.2)
TOTAL PROTEIN: 7.5 GM/DL (ref 6.4–8.2)
TRICHOMONAS: ABNORMAL /HPF
TRIGL SERPL-MCNC: 127 MG/DL
TROPONIN T: 0.06 NG/ML
TROPONIN T: 0.06 NG/ML
TROPONIN T: 0.07 NG/ML
TSH SERPL DL<=0.005 MIU/L-ACNC: 8.08 UIU/ML (ref 0.27–4.2)
UNSATURATED IRON BINDING CAPACITY: 198 UG/DL (ref 110–370)
URINE TOTAL PROTEIN: 108.9 MG/DL
UROBILINOGEN, URINE: 0.2 MG/DL (ref 0.2–1)
VANCOMYCIN RANDOM: 11.4 UG/ML
VANCOMYCIN RANDOM: 21.2 UG/ML
WBC # BLD: 10.8 K/CU MM (ref 4–10.5)
WBC # BLD: 11.4 K/CU MM (ref 4–10.5)
WBC # BLD: 13.7 K/CU MM (ref 4–10.5)
WBC UA: 1 /HPF (ref 0–5)

## 2023-01-01 PROCEDURE — 83735 ASSAY OF MAGNESIUM: CPT

## 2023-01-01 PROCEDURE — 87186 SC STD MICRODIL/AGAR DIL: CPT

## 2023-01-01 PROCEDURE — 6370000000 HC RX 637 (ALT 250 FOR IP): Performed by: NURSE PRACTITIONER

## 2023-01-01 PROCEDURE — 96375 TX/PRO/DX INJ NEW DRUG ADDON: CPT

## 2023-01-01 PROCEDURE — 6360000002 HC RX W HCPCS: Performed by: NURSE PRACTITIONER

## 2023-01-01 PROCEDURE — 2580000003 HC RX 258: Performed by: SURGERY

## 2023-01-01 PROCEDURE — 6360000002 HC RX W HCPCS: Performed by: SURGERY

## 2023-01-01 PROCEDURE — 2580000003 HC RX 258: Performed by: INTERNAL MEDICINE

## 2023-01-01 PROCEDURE — 94640 AIRWAY INHALATION TREATMENT: CPT

## 2023-01-01 PROCEDURE — 36415 COLL VENOUS BLD VENIPUNCTURE: CPT

## 2023-01-01 PROCEDURE — 0202U NFCT DS 22 TRGT SARS-COV-2: CPT

## 2023-01-01 PROCEDURE — 93005 ELECTROCARDIOGRAM TRACING: CPT | Performed by: EMERGENCY MEDICINE

## 2023-01-01 PROCEDURE — 80048 BASIC METABOLIC PNL TOTAL CA: CPT

## 2023-01-01 PROCEDURE — 2700000000 HC OXYGEN THERAPY PER DAY

## 2023-01-01 PROCEDURE — 6360000002 HC RX W HCPCS: Performed by: EMERGENCY MEDICINE

## 2023-01-01 PROCEDURE — 82805 BLOOD GASES W/O2 SATURATION: CPT

## 2023-01-01 PROCEDURE — 83550 IRON BINDING TEST: CPT

## 2023-01-01 PROCEDURE — 94660 CPAP INITIATION&MGMT: CPT

## 2023-01-01 PROCEDURE — 85730 THROMBOPLASTIN TIME PARTIAL: CPT

## 2023-01-01 PROCEDURE — 97116 GAIT TRAINING THERAPY: CPT

## 2023-01-01 PROCEDURE — 87804 INFLUENZA ASSAY W/OPTIC: CPT

## 2023-01-01 PROCEDURE — 96374 THER/PROPH/DIAG INJ IV PUSH: CPT

## 2023-01-01 PROCEDURE — 84443 ASSAY THYROID STIM HORMONE: CPT

## 2023-01-01 PROCEDURE — 6360000002 HC RX W HCPCS: Performed by: INTERNAL MEDICINE

## 2023-01-01 PROCEDURE — 80061 LIPID PANEL: CPT

## 2023-01-01 PROCEDURE — 1200000000 HC SEMI PRIVATE

## 2023-01-01 PROCEDURE — 80202 ASSAY OF VANCOMYCIN: CPT

## 2023-01-01 PROCEDURE — 85025 COMPLETE CBC W/AUTO DIFF WBC: CPT

## 2023-01-01 PROCEDURE — 84156 ASSAY OF PROTEIN URINE: CPT

## 2023-01-01 PROCEDURE — 6370000000 HC RX 637 (ALT 250 FOR IP): Performed by: INTERNAL MEDICINE

## 2023-01-01 PROCEDURE — 6360000002 HC RX W HCPCS

## 2023-01-01 PROCEDURE — 1250000000 HC SEMI PRIVATE HOSPICE R&B

## 2023-01-01 PROCEDURE — 71250 CT THORAX DX C-: CPT

## 2023-01-01 PROCEDURE — 94664 DEMO&/EVAL PT USE INHALER: CPT

## 2023-01-01 PROCEDURE — 84145 PROCALCITONIN (PCT): CPT

## 2023-01-01 PROCEDURE — 76937 US GUIDE VASCULAR ACCESS: CPT

## 2023-01-01 PROCEDURE — 87040 BLOOD CULTURE FOR BACTERIA: CPT

## 2023-01-01 PROCEDURE — 71045 X-RAY EXAM CHEST 1 VIEW: CPT

## 2023-01-01 PROCEDURE — 83540 ASSAY OF IRON: CPT

## 2023-01-01 PROCEDURE — 6360000002 HC RX W HCPCS: Performed by: FAMILY MEDICINE

## 2023-01-01 PROCEDURE — 87086 URINE CULTURE/COLONY COUNT: CPT

## 2023-01-01 PROCEDURE — 2060000000 HC ICU INTERMEDIATE R&B

## 2023-01-01 PROCEDURE — 92610 EVALUATE SWALLOWING FUNCTION: CPT

## 2023-01-01 PROCEDURE — 82570 ASSAY OF URINE CREATININE: CPT

## 2023-01-01 PROCEDURE — 85610 PROTHROMBIN TIME: CPT

## 2023-01-01 PROCEDURE — 97530 THERAPEUTIC ACTIVITIES: CPT

## 2023-01-01 PROCEDURE — 99285 EMERGENCY DEPT VISIT HI MDM: CPT

## 2023-01-01 PROCEDURE — 87635 SARS-COV-2 COVID-19 AMP PRB: CPT

## 2023-01-01 PROCEDURE — 84439 ASSAY OF FREE THYROXINE: CPT

## 2023-01-01 PROCEDURE — 2500000003 HC RX 250 WO HCPCS: Performed by: FAMILY MEDICINE

## 2023-01-01 PROCEDURE — 2500000003 HC RX 250 WO HCPCS: Performed by: INTERNAL MEDICINE

## 2023-01-01 PROCEDURE — 80053 COMPREHEN METABOLIC PANEL: CPT

## 2023-01-01 PROCEDURE — 97163 PT EVAL HIGH COMPLEX 45 MIN: CPT

## 2023-01-01 PROCEDURE — 83880 ASSAY OF NATRIURETIC PEPTIDE: CPT

## 2023-01-01 PROCEDURE — 81001 URINALYSIS AUTO W/SCOPE: CPT

## 2023-01-01 PROCEDURE — 94761 N-INVAS EAR/PLS OXIMETRY MLT: CPT

## 2023-01-01 PROCEDURE — 99222 1ST HOSP IP/OBS MODERATE 55: CPT | Performed by: INTERNAL MEDICINE

## 2023-01-01 PROCEDURE — 93010 ELECTROCARDIOGRAM REPORT: CPT | Performed by: INTERNAL MEDICINE

## 2023-01-01 PROCEDURE — 97166 OT EVAL MOD COMPLEX 45 MIN: CPT

## 2023-01-01 PROCEDURE — 2580000003 HC RX 258: Performed by: FAMILY MEDICINE

## 2023-01-01 PROCEDURE — 84484 ASSAY OF TROPONIN QUANT: CPT

## 2023-01-01 PROCEDURE — 97535 SELF CARE MNGMENT TRAINING: CPT

## 2023-01-01 PROCEDURE — 87088 URINE BACTERIA CULTURE: CPT

## 2023-01-01 PROCEDURE — 82150 ASSAY OF AMYLASE: CPT

## 2023-01-01 PROCEDURE — 83690 ASSAY OF LIPASE: CPT

## 2023-01-01 RX ORDER — IPRATROPIUM BROMIDE AND ALBUTEROL SULFATE 2.5; .5 MG/3ML; MG/3ML
3 SOLUTION RESPIRATORY (INHALATION) 3 TIMES DAILY
Status: DISCONTINUED | OUTPATIENT
Start: 2023-01-01 | End: 2023-01-01

## 2023-01-01 RX ORDER — LORAZEPAM 0.5 MG/1
0.5 TABLET ORAL EVERY 4 HOURS PRN
Status: DISCONTINUED | OUTPATIENT
Start: 2023-01-01 | End: 2023-01-01

## 2023-01-01 RX ORDER — BISACODYL 10 MG
10 SUPPOSITORY, RECTAL RECTAL DAILY
Status: DISCONTINUED | OUTPATIENT
Start: 2023-01-01 | End: 2023-01-01 | Stop reason: HOSPADM

## 2023-01-01 RX ORDER — ACETAMINOPHEN 325 MG/1
650 TABLET ORAL EVERY 4 HOURS PRN
Status: DISCONTINUED | OUTPATIENT
Start: 2023-01-01 | End: 2023-01-01 | Stop reason: SDUPTHER

## 2023-01-01 RX ORDER — VITAMIN B COMPLEX
2000 TABLET ORAL DAILY
Status: DISCONTINUED | OUTPATIENT
Start: 2023-01-01 | End: 2023-01-01 | Stop reason: HOSPADM

## 2023-01-01 RX ORDER — ISOSORBIDE MONONITRATE 60 MG/1
60 TABLET, EXTENDED RELEASE ORAL DAILY
Status: DISCONTINUED | OUTPATIENT
Start: 2023-01-01 | End: 2023-01-01 | Stop reason: HOSPADM

## 2023-01-01 RX ORDER — ACETAMINOPHEN 325 MG/1
650 TABLET ORAL EVERY 6 HOURS PRN
Status: CANCELLED | OUTPATIENT
Start: 2023-01-01

## 2023-01-01 RX ORDER — LORAZEPAM 2 MG/ML
0.5 INJECTION INTRAMUSCULAR ONCE
Status: COMPLETED | OUTPATIENT
Start: 2023-01-01 | End: 2023-01-01

## 2023-01-01 RX ORDER — POLYVINYL ALCOHOL 14 MG/ML
1 SOLUTION/ DROPS OPHTHALMIC PRN
Status: CANCELLED | OUTPATIENT
Start: 2023-01-01

## 2023-01-01 RX ORDER — FUROSEMIDE 10 MG/ML
40 INJECTION INTRAMUSCULAR; INTRAVENOUS ONCE
Status: COMPLETED | OUTPATIENT
Start: 2023-01-01 | End: 2023-01-01

## 2023-01-01 RX ORDER — SODIUM CHLORIDE 0.9 % (FLUSH) 0.9 %
5-40 SYRINGE (ML) INJECTION PRN
Status: DISCONTINUED | OUTPATIENT
Start: 2023-01-01 | End: 2023-01-01 | Stop reason: HOSPADM

## 2023-01-01 RX ORDER — GLYCOPYRROLATE 0.2 MG/ML
0.2 INJECTION INTRAMUSCULAR; INTRAVENOUS EVERY 4 HOURS PRN
Status: CANCELLED | OUTPATIENT
Start: 2023-01-01

## 2023-01-01 RX ORDER — SODIUM BICARBONATE 650 MG/1
325 TABLET ORAL 2 TIMES DAILY
Status: DISCONTINUED | OUTPATIENT
Start: 2023-01-01 | End: 2023-01-01 | Stop reason: HOSPADM

## 2023-01-01 RX ORDER — PANTOPRAZOLE SODIUM 40 MG/1
40 TABLET, DELAYED RELEASE ORAL
Status: DISCONTINUED | OUTPATIENT
Start: 2023-01-01 | End: 2023-01-01 | Stop reason: HOSPADM

## 2023-01-01 RX ORDER — BUMETANIDE 0.25 MG/ML
2 INJECTION, SOLUTION INTRAMUSCULAR; INTRAVENOUS 2 TIMES DAILY
Status: DISCONTINUED | OUTPATIENT
Start: 2023-01-01 | End: 2023-01-01

## 2023-01-01 RX ORDER — AMLODIPINE BESYLATE 10 MG/1
10 TABLET ORAL NIGHTLY
Status: DISCONTINUED | OUTPATIENT
Start: 2023-01-01 | End: 2023-01-01

## 2023-01-01 RX ORDER — ACETAMINOPHEN 650 MG/1
650 SUPPOSITORY RECTAL EVERY 6 HOURS PRN
Status: DISCONTINUED | OUTPATIENT
Start: 2023-01-01 | End: 2023-01-01 | Stop reason: HOSPADM

## 2023-01-01 RX ORDER — HYDRALAZINE HYDROCHLORIDE 10 MG/1
10 TABLET, FILM COATED ORAL 2 TIMES DAILY PRN
Status: DISCONTINUED | OUTPATIENT
Start: 2023-01-01 | End: 2023-01-01 | Stop reason: HOSPADM

## 2023-01-01 RX ORDER — ACETAMINOPHEN 650 MG/1
650 SUPPOSITORY RECTAL EVERY 6 HOURS PRN
Status: CANCELLED | OUTPATIENT
Start: 2023-01-01

## 2023-01-01 RX ORDER — SODIUM CHLORIDE 9 MG/ML
INJECTION, SOLUTION INTRAVENOUS PRN
Status: DISCONTINUED | OUTPATIENT
Start: 2023-01-01 | End: 2023-01-01 | Stop reason: HOSPADM

## 2023-01-01 RX ORDER — ATENOLOL 25 MG/1
25 TABLET ORAL
Status: DISCONTINUED | OUTPATIENT
Start: 2023-01-01 | End: 2023-01-01 | Stop reason: HOSPADM

## 2023-01-01 RX ORDER — BISACODYL 10 MG
10 SUPPOSITORY, RECTAL RECTAL DAILY PRN
Status: DISCONTINUED | OUTPATIENT
Start: 2023-01-01 | End: 2023-01-01 | Stop reason: HOSPADM

## 2023-01-01 RX ORDER — ATORVASTATIN CALCIUM 10 MG/1
10 TABLET, FILM COATED ORAL DAILY
Status: DISCONTINUED | OUTPATIENT
Start: 2023-01-01 | End: 2023-01-01 | Stop reason: HOSPADM

## 2023-01-01 RX ORDER — BISACODYL 10 MG
10 SUPPOSITORY, RECTAL RECTAL ONCE
Status: DISCONTINUED | OUTPATIENT
Start: 2023-01-01 | End: 2023-01-01 | Stop reason: HOSPADM

## 2023-01-01 RX ORDER — GLYCOPYRROLATE 0.2 MG/ML
0.2 INJECTION INTRAMUSCULAR; INTRAVENOUS EVERY 4 HOURS PRN
Status: DISCONTINUED | OUTPATIENT
Start: 2023-01-01 | End: 2023-01-01 | Stop reason: HOSPADM

## 2023-01-01 RX ORDER — GABAPENTIN 300 MG/1
300 CAPSULE ORAL DAILY PRN
Status: DISCONTINUED | OUTPATIENT
Start: 2023-01-01 | End: 2023-01-01 | Stop reason: HOSPADM

## 2023-01-01 RX ORDER — SODIUM CHLORIDE 0.9 % (FLUSH) 0.9 %
5-40 SYRINGE (ML) INJECTION PRN
Status: CANCELLED | OUTPATIENT
Start: 2023-01-01

## 2023-01-01 RX ORDER — SODIUM CHLORIDE 0.9 % (FLUSH) 0.9 %
5-40 SYRINGE (ML) INJECTION EVERY 12 HOURS SCHEDULED
Status: DISCONTINUED | OUTPATIENT
Start: 2023-01-01 | End: 2023-01-01 | Stop reason: HOSPADM

## 2023-01-01 RX ORDER — BISACODYL 10 MG
10 SUPPOSITORY, RECTAL RECTAL DAILY PRN
Status: CANCELLED | OUTPATIENT
Start: 2023-01-01

## 2023-01-01 RX ORDER — GABAPENTIN 300 MG/1
300 CAPSULE ORAL DAILY PRN
COMMUNITY

## 2023-01-01 RX ORDER — FERROUS SULFATE 325(65) MG
325 TABLET ORAL
Status: DISCONTINUED | OUTPATIENT
Start: 2023-01-01 | End: 2023-01-01

## 2023-01-01 RX ORDER — CYCLOSPORINE 0.5 MG/ML
1 EMULSION OPHTHALMIC 2 TIMES DAILY
Status: DISCONTINUED | OUTPATIENT
Start: 2023-01-01 | End: 2023-01-01 | Stop reason: CLARIF

## 2023-01-01 RX ORDER — POLYVINYL ALCOHOL 14 MG/ML
1 SOLUTION/ DROPS OPHTHALMIC PRN
Status: DISCONTINUED | OUTPATIENT
Start: 2023-01-01 | End: 2023-01-01 | Stop reason: HOSPADM

## 2023-01-01 RX ORDER — ONDANSETRON 4 MG/1
4 TABLET, ORALLY DISINTEGRATING ORAL EVERY 8 HOURS PRN
Status: CANCELLED | OUTPATIENT
Start: 2023-01-01

## 2023-01-01 RX ORDER — LORAZEPAM 2 MG/ML
INJECTION INTRAMUSCULAR
Status: COMPLETED
Start: 2023-01-01 | End: 2023-01-01

## 2023-01-01 RX ORDER — ACETAMINOPHEN 650 MG/1
650 SUPPOSITORY RECTAL EVERY 4 HOURS PRN
Status: DISCONTINUED | OUTPATIENT
Start: 2023-01-01 | End: 2023-01-01 | Stop reason: SDUPTHER

## 2023-01-01 RX ORDER — METHYLPREDNISOLONE SODIUM SUCCINATE 40 MG/ML
60 INJECTION, POWDER, LYOPHILIZED, FOR SOLUTION INTRAMUSCULAR; INTRAVENOUS ONCE
Status: COMPLETED | OUTPATIENT
Start: 2023-01-01 | End: 2023-01-01

## 2023-01-01 RX ORDER — IPRATROPIUM BROMIDE AND ALBUTEROL SULFATE 2.5; .5 MG/3ML; MG/3ML
1 SOLUTION RESPIRATORY (INHALATION)
Status: DISCONTINUED | OUTPATIENT
Start: 2023-01-01 | End: 2023-01-01 | Stop reason: HOSPADM

## 2023-01-01 RX ORDER — ONDANSETRON 2 MG/ML
4 INJECTION INTRAMUSCULAR; INTRAVENOUS EVERY 30 MIN PRN
Status: DISCONTINUED | OUTPATIENT
Start: 2023-01-01 | End: 2023-01-01 | Stop reason: SDUPTHER

## 2023-01-01 RX ORDER — BUMETANIDE 0.25 MG/ML
1 INJECTION, SOLUTION INTRAMUSCULAR; INTRAVENOUS 2 TIMES DAILY
Status: DISCONTINUED | OUTPATIENT
Start: 2023-01-01 | End: 2023-01-01

## 2023-01-01 RX ORDER — DIPHENHYDRAMINE HYDROCHLORIDE, ZINC ACETATE 2; .1 G/100G; G/100G
CREAM TOPICAL 3 TIMES DAILY PRN
Status: DISCONTINUED | OUTPATIENT
Start: 2023-01-01 | End: 2023-01-01 | Stop reason: HOSPADM

## 2023-01-01 RX ORDER — CETIRIZINE HYDROCHLORIDE 10 MG/1
5 TABLET ORAL DAILY
Status: DISCONTINUED | OUTPATIENT
Start: 2023-01-01 | End: 2023-01-01 | Stop reason: HOSPADM

## 2023-01-01 RX ORDER — M-VIT,TX,IRON,MINS/CALC/FOLIC 27MG-0.4MG
1 TABLET ORAL DAILY
Status: DISCONTINUED | OUTPATIENT
Start: 2023-01-01 | End: 2023-01-01 | Stop reason: HOSPADM

## 2023-01-01 RX ORDER — FUROSEMIDE 10 MG/ML
80 INJECTION INTRAMUSCULAR; INTRAVENOUS ONCE
Status: DISCONTINUED | OUTPATIENT
Start: 2023-01-01 | End: 2023-01-01 | Stop reason: HOSPADM

## 2023-01-01 RX ORDER — CYCLOSPORINE 0.5 MG/ML
1 EMULSION OPHTHALMIC 2 TIMES DAILY
COMMUNITY

## 2023-01-01 RX ORDER — ONDANSETRON 2 MG/ML
4 INJECTION INTRAMUSCULAR; INTRAVENOUS EVERY 6 HOURS PRN
Status: DISCONTINUED | OUTPATIENT
Start: 2023-01-01 | End: 2023-01-01 | Stop reason: HOSPADM

## 2023-01-01 RX ORDER — AMLODIPINE BESYLATE 5 MG/1
5 TABLET ORAL DAILY
Status: DISCONTINUED | OUTPATIENT
Start: 2023-01-01 | End: 2023-01-01 | Stop reason: HOSPADM

## 2023-01-01 RX ORDER — ACETAMINOPHEN 325 MG/1
650 TABLET ORAL EVERY 4 HOURS PRN
Status: CANCELLED | OUTPATIENT
Start: 2023-01-01

## 2023-01-01 RX ORDER — ACETAMINOPHEN 325 MG/1
650 TABLET ORAL EVERY 6 HOURS PRN
Status: DISCONTINUED | OUTPATIENT
Start: 2023-01-01 | End: 2023-01-01 | Stop reason: HOSPADM

## 2023-01-01 RX ORDER — SENNA PLUS 8.6 MG/1
1 TABLET ORAL 2 TIMES DAILY
Status: DISCONTINUED | OUTPATIENT
Start: 2023-01-01 | End: 2023-01-01 | Stop reason: HOSPADM

## 2023-01-01 RX ORDER — METHYLPREDNISOLONE SODIUM SUCCINATE 40 MG/ML
40 INJECTION, POWDER, LYOPHILIZED, FOR SOLUTION INTRAMUSCULAR; INTRAVENOUS DAILY
Status: DISCONTINUED | OUTPATIENT
Start: 2023-01-01 | End: 2023-01-01 | Stop reason: HOSPADM

## 2023-01-01 RX ORDER — ALBUTEROL SULFATE 90 UG/1
2 AEROSOL, METERED RESPIRATORY (INHALATION) EVERY 4 HOURS PRN
Status: DISCONTINUED | OUTPATIENT
Start: 2023-01-01 | End: 2023-01-01

## 2023-01-01 RX ORDER — LANOLIN ALCOHOL/MO/W.PET/CERES
6 CREAM (GRAM) TOPICAL NIGHTLY PRN
Status: DISCONTINUED | OUTPATIENT
Start: 2023-01-01 | End: 2023-01-01 | Stop reason: HOSPADM

## 2023-01-01 RX ORDER — ONDANSETRON 2 MG/ML
4 INJECTION INTRAMUSCULAR; INTRAVENOUS EVERY 6 HOURS PRN
Status: CANCELLED | OUTPATIENT
Start: 2023-01-01

## 2023-01-01 RX ORDER — HYDRALAZINE HYDROCHLORIDE 10 MG/1
10 TABLET, FILM COATED ORAL 2 TIMES DAILY PRN
COMMUNITY

## 2023-01-01 RX ORDER — CLOPIDOGREL BISULFATE 75 MG/1
75 TABLET ORAL DAILY
Status: DISCONTINUED | OUTPATIENT
Start: 2023-01-01 | End: 2023-01-01 | Stop reason: HOSPADM

## 2023-01-01 RX ORDER — ONDANSETRON 4 MG/1
4 TABLET, FILM COATED ORAL EVERY 8 HOURS PRN
COMMUNITY

## 2023-01-01 RX ORDER — METHYLPREDNISOLONE SODIUM SUCCINATE 40 MG/ML
40 INJECTION, POWDER, LYOPHILIZED, FOR SOLUTION INTRAMUSCULAR; INTRAVENOUS EVERY 8 HOURS
Status: DISCONTINUED | OUTPATIENT
Start: 2023-01-01 | End: 2023-01-01

## 2023-01-01 RX ORDER — DOCUSATE SODIUM 100 MG/1
100 CAPSULE, LIQUID FILLED ORAL DAILY
Status: DISCONTINUED | OUTPATIENT
Start: 2023-01-01 | End: 2023-01-01 | Stop reason: HOSPADM

## 2023-01-01 RX ORDER — ALLOPURINOL 100 MG/1
100 TABLET ORAL DAILY
Status: DISCONTINUED | OUTPATIENT
Start: 2023-01-01 | End: 2023-01-01 | Stop reason: HOSPADM

## 2023-01-01 RX ORDER — LORAZEPAM 2 MG/ML
0.5 INJECTION INTRAMUSCULAR EVERY 6 HOURS PRN
Status: DISCONTINUED | OUTPATIENT
Start: 2023-01-01 | End: 2023-01-01 | Stop reason: HOSPADM

## 2023-01-01 RX ORDER — ACETAMINOPHEN 650 MG/1
650 SUPPOSITORY RECTAL EVERY 4 HOURS PRN
Status: CANCELLED | OUTPATIENT
Start: 2023-01-01

## 2023-01-01 RX ORDER — METRONIDAZOLE 500 MG/100ML
500 INJECTION, SOLUTION INTRAVENOUS EVERY 8 HOURS
Status: DISCONTINUED | OUTPATIENT
Start: 2023-01-01 | End: 2023-01-01 | Stop reason: HOSPADM

## 2023-01-01 RX ORDER — METHYLPREDNISOLONE SODIUM SUCCINATE 125 MG/2ML
INJECTION, POWDER, LYOPHILIZED, FOR SOLUTION INTRAMUSCULAR; INTRAVENOUS
Status: COMPLETED
Start: 2023-01-01 | End: 2023-01-01

## 2023-01-01 RX ORDER — ONDANSETRON 4 MG/1
4 TABLET, ORALLY DISINTEGRATING ORAL EVERY 8 HOURS PRN
Status: DISCONTINUED | OUTPATIENT
Start: 2023-01-01 | End: 2023-01-01 | Stop reason: HOSPADM

## 2023-01-01 RX ORDER — AZELASTINE 1 MG/ML
1 SPRAY, METERED NASAL 2 TIMES DAILY
Status: DISCONTINUED | OUTPATIENT
Start: 2023-01-01 | End: 2023-01-01 | Stop reason: HOSPADM

## 2023-01-01 RX ORDER — HALOPERIDOL 5 MG/ML
1 INJECTION INTRAMUSCULAR
Status: DISCONTINUED | OUTPATIENT
Start: 2023-01-01 | End: 2023-01-01 | Stop reason: HOSPADM

## 2023-01-01 RX ORDER — ENOXAPARIN SODIUM 100 MG/ML
40 INJECTION SUBCUTANEOUS DAILY
Status: DISCONTINUED | OUTPATIENT
Start: 2023-01-01 | End: 2023-01-01 | Stop reason: SDUPTHER

## 2023-01-01 RX ORDER — ALBUTEROL SULFATE 90 UG/1
2 AEROSOL, METERED RESPIRATORY (INHALATION) EVERY 6 HOURS PRN
Status: DISCONTINUED | OUTPATIENT
Start: 2023-01-01 | End: 2023-01-01

## 2023-01-01 RX ORDER — MORPHINE SULFATE 4 MG/ML
4 INJECTION, SOLUTION INTRAMUSCULAR; INTRAVENOUS EVERY 30 MIN PRN
Status: DISCONTINUED | OUTPATIENT
Start: 2023-01-01 | End: 2023-01-01 | Stop reason: HOSPADM

## 2023-01-01 RX ORDER — SODIUM CHLORIDE 0.9 % (FLUSH) 0.9 %
5-40 SYRINGE (ML) INJECTION EVERY 12 HOURS SCHEDULED
Status: CANCELLED | OUTPATIENT
Start: 2023-01-01

## 2023-01-01 RX ORDER — RANOLAZINE 500 MG/1
500 TABLET, EXTENDED RELEASE ORAL 2 TIMES DAILY
Status: DISCONTINUED | OUTPATIENT
Start: 2023-01-01 | End: 2023-01-01 | Stop reason: HOSPADM

## 2023-01-01 RX ORDER — FUROSEMIDE 10 MG/ML
20 INJECTION INTRAMUSCULAR; INTRAVENOUS ONCE
Status: COMPLETED | OUTPATIENT
Start: 2023-01-01 | End: 2023-01-01

## 2023-01-01 RX ORDER — SODIUM CHLORIDE 9 MG/ML
INJECTION, SOLUTION INTRAVENOUS PRN
Status: CANCELLED | OUTPATIENT
Start: 2023-01-01

## 2023-01-01 RX ORDER — POLYETHYLENE GLYCOL 3350 17 G/17G
17 POWDER, FOR SOLUTION ORAL 2 TIMES DAILY
Status: DISCONTINUED | OUTPATIENT
Start: 2023-01-01 | End: 2023-01-01 | Stop reason: HOSPADM

## 2023-01-01 RX ORDER — DIPHENHYDRAMINE HYDROCHLORIDE, ZINC ACETATE 2; .1 G/100G; G/100G
CREAM TOPICAL 3 TIMES DAILY PRN
Status: CANCELLED | OUTPATIENT
Start: 2023-01-01

## 2023-01-01 RX ORDER — LEVOTHYROXINE SODIUM 88 UG/1
88 TABLET ORAL DAILY
Status: DISCONTINUED | OUTPATIENT
Start: 2023-01-01 | End: 2023-01-01 | Stop reason: HOSPADM

## 2023-01-01 RX ORDER — ACETYLCYSTEINE 200 MG/ML
600 SOLUTION ORAL; RESPIRATORY (INHALATION) 3 TIMES DAILY
Status: DISCONTINUED | OUTPATIENT
Start: 2023-01-01 | End: 2023-01-01 | Stop reason: HOSPADM

## 2023-01-01 RX ORDER — LORAZEPAM 2 MG/ML
0.5 INJECTION INTRAMUSCULAR
Status: DISCONTINUED | OUTPATIENT
Start: 2023-01-01 | End: 2023-01-01 | Stop reason: HOSPADM

## 2023-01-01 RX ORDER — LORAZEPAM 2 MG/ML
0.5 INJECTION INTRAMUSCULAR
Status: CANCELLED | OUTPATIENT
Start: 2023-01-01

## 2023-01-01 RX ORDER — HALOPERIDOL 5 MG/ML
1 INJECTION INTRAMUSCULAR
Status: CANCELLED | OUTPATIENT
Start: 2023-01-01

## 2023-01-01 RX ADMIN — LORAZEPAM 0.5 MG: 2 INJECTION INTRAMUSCULAR at 04:24

## 2023-01-01 RX ADMIN — Medication 2000 UNITS: at 09:49

## 2023-01-01 RX ADMIN — RANOLAZINE 500 MG: 500 TABLET, EXTENDED RELEASE ORAL at 09:49

## 2023-01-01 RX ADMIN — ISOSORBIDE MONONITRATE 60 MG: 60 TABLET, EXTENDED RELEASE ORAL at 08:56

## 2023-01-01 RX ADMIN — GLYCOPYRROLATE 0.2 MG: 0.2 INJECTION INTRAMUSCULAR; INTRAVENOUS at 00:10

## 2023-01-01 RX ADMIN — POLYETHYLENE GLYCOL (3350) 17 G: 17 POWDER, FOR SOLUTION ORAL at 21:06

## 2023-01-01 RX ADMIN — CETIRIZINE HYDROCHLORIDE 5 MG: 10 TABLET, FILM COATED ORAL at 08:56

## 2023-01-01 RX ADMIN — CEFEPIME HYDROCHLORIDE 1000 MG: 1 INJECTION, POWDER, FOR SOLUTION INTRAMUSCULAR; INTRAVENOUS at 05:36

## 2023-01-01 RX ADMIN — ATENOLOL 25 MG: 25 TABLET ORAL at 22:41

## 2023-01-01 RX ADMIN — BISACODYL 10 MG: 10 SUPPOSITORY RECTAL at 06:44

## 2023-01-01 RX ADMIN — ONDANSETRON 4 MG: 2 INJECTION INTRAMUSCULAR; INTRAVENOUS at 17:17

## 2023-01-01 RX ADMIN — SODIUM CHLORIDE, PRESERVATIVE FREE 10 ML: 5 INJECTION INTRAVENOUS at 21:28

## 2023-01-01 RX ADMIN — IPRATROPIUM BROMIDE AND ALBUTEROL SULFATE 1 AMPULE: 2.5; .5 SOLUTION RESPIRATORY (INHALATION) at 07:31

## 2023-01-01 RX ADMIN — METHYLPREDNISOLONE SODIUM SUCCINATE 40 MG: 40 INJECTION, POWDER, FOR SOLUTION INTRAMUSCULAR; INTRAVENOUS at 08:53

## 2023-01-01 RX ADMIN — METRONIDAZOLE 500 MG: 500 INJECTION, SOLUTION INTRAVENOUS at 01:14

## 2023-01-01 RX ADMIN — HYDROMORPHONE HYDROCHLORIDE 0.5 MG: 1 INJECTION, SOLUTION INTRAMUSCULAR; INTRAVENOUS; SUBCUTANEOUS at 06:02

## 2023-01-01 RX ADMIN — CETIRIZINE HYDROCHLORIDE 5 MG: 10 TABLET, FILM COATED ORAL at 22:40

## 2023-01-01 RX ADMIN — FUROSEMIDE 40 MG: 10 INJECTION, SOLUTION INTRAMUSCULAR; INTRAVENOUS at 04:24

## 2023-01-01 RX ADMIN — HYDROMORPHONE HYDROCHLORIDE 0.5 MG: 1 INJECTION, SOLUTION INTRAMUSCULAR; INTRAVENOUS; SUBCUTANEOUS at 15:37

## 2023-01-01 RX ADMIN — CEFTRIAXONE SODIUM 1000 MG: 1 INJECTION, POWDER, FOR SOLUTION INTRAMUSCULAR; INTRAVENOUS at 09:57

## 2023-01-01 RX ADMIN — SODIUM CHLORIDE 10 ML: 9 INJECTION, SOLUTION INTRAVENOUS at 17:50

## 2023-01-01 RX ADMIN — AZELASTINE 1 SPRAY: 137 SPRAY, METERED NASAL at 09:59

## 2023-01-01 RX ADMIN — SODIUM CHLORIDE 10 ML: 9 INJECTION, SOLUTION INTRAVENOUS at 16:49

## 2023-01-01 RX ADMIN — ACETYLCYSTEINE 600 MG: 200 SOLUTION ORAL; RESPIRATORY (INHALATION) at 21:53

## 2023-01-01 RX ADMIN — IPRATROPIUM BROMIDE AND ALBUTEROL SULFATE 1 AMPULE: 2.5; .5 SOLUTION RESPIRATORY (INHALATION) at 11:22

## 2023-01-01 RX ADMIN — CEFEPIME HYDROCHLORIDE 1000 MG: 1 INJECTION, POWDER, FOR SOLUTION INTRAMUSCULAR; INTRAVENOUS at 18:56

## 2023-01-01 RX ADMIN — ONDANSETRON 4 MG: 2 INJECTION INTRAMUSCULAR; INTRAVENOUS at 01:15

## 2023-01-01 RX ADMIN — APIXABAN 2.5 MG: 2.5 TABLET, FILM COATED ORAL at 21:06

## 2023-01-01 RX ADMIN — SODIUM CHLORIDE 10 ML: 9 INJECTION, SOLUTION INTRAVENOUS at 19:03

## 2023-01-01 RX ADMIN — CETIRIZINE HYDROCHLORIDE 5 MG: 10 TABLET, FILM COATED ORAL at 09:49

## 2023-01-01 RX ADMIN — METHYLPREDNISOLONE SODIUM SUCCINATE 125 MG: 125 INJECTION, POWDER, FOR SOLUTION INTRAMUSCULAR; INTRAVENOUS at 04:25

## 2023-01-01 RX ADMIN — GLYCOPYRROLATE 0.2 MG: 0.2 INJECTION INTRAMUSCULAR; INTRAVENOUS at 06:02

## 2023-01-01 RX ADMIN — ALBUTEROL SULFATE 2 PUFF: 90 AEROSOL, METERED RESPIRATORY (INHALATION) at 03:27

## 2023-01-01 RX ADMIN — ISOSORBIDE MONONITRATE 60 MG: 60 TABLET, EXTENDED RELEASE ORAL at 22:41

## 2023-01-01 RX ADMIN — ONDANSETRON 4 MG: 2 INJECTION INTRAMUSCULAR; INTRAVENOUS at 09:11

## 2023-01-01 RX ADMIN — BUMETANIDE 1 MG: 0.25 INJECTION INTRAMUSCULAR; INTRAVENOUS at 12:08

## 2023-01-01 RX ADMIN — APIXABAN 2.5 MG: 2.5 TABLET, FILM COATED ORAL at 22:38

## 2023-01-01 RX ADMIN — IPRATROPIUM BROMIDE AND ALBUTEROL SULFATE 3 ML: .5; 3 SOLUTION RESPIRATORY (INHALATION) at 12:20

## 2023-01-01 RX ADMIN — SERTRALINE HYDROCHLORIDE 50 MG: 50 TABLET ORAL at 08:56

## 2023-01-01 RX ADMIN — IPRATROPIUM BROMIDE AND ALBUTEROL SULFATE 3 ML: .5; 3 SOLUTION RESPIRATORY (INHALATION) at 19:53

## 2023-01-01 RX ADMIN — SERTRALINE HYDROCHLORIDE 50 MG: 50 TABLET ORAL at 22:41

## 2023-01-01 RX ADMIN — RANOLAZINE 500 MG: 500 TABLET, EXTENDED RELEASE ORAL at 21:28

## 2023-01-01 RX ADMIN — MORPHINE SULFATE 4 MG: 4 INJECTION, SOLUTION INTRAMUSCULAR; INTRAVENOUS at 11:04

## 2023-01-01 RX ADMIN — LORAZEPAM 0.5 MG: 2 INJECTION INTRAMUSCULAR; INTRAVENOUS at 00:00

## 2023-01-01 RX ADMIN — AMLODIPINE BESYLATE 5 MG: 5 TABLET ORAL at 12:08

## 2023-01-01 RX ADMIN — ISOSORBIDE MONONITRATE 60 MG: 60 TABLET, EXTENDED RELEASE ORAL at 09:49

## 2023-01-01 RX ADMIN — BISACODYL 10 MG: 10 SUPPOSITORY RECTAL at 08:57

## 2023-01-01 RX ADMIN — RANOLAZINE 500 MG: 500 TABLET, EXTENDED RELEASE ORAL at 22:38

## 2023-01-01 RX ADMIN — PANTOPRAZOLE SODIUM 40 MG: 40 TABLET, DELAYED RELEASE ORAL at 05:30

## 2023-01-01 RX ADMIN — SODIUM CHLORIDE, PRESERVATIVE FREE 10 ML: 5 INJECTION INTRAVENOUS at 08:57

## 2023-01-01 RX ADMIN — POLYETHYLENE GLYCOL (3350) 17 G: 17 POWDER, FOR SOLUTION ORAL at 09:50

## 2023-01-01 RX ADMIN — DOCUSATE SODIUM 100 MG: 100 CAPSULE, LIQUID FILLED ORAL at 08:56

## 2023-01-01 RX ADMIN — ONDANSETRON 4 MG: 2 INJECTION INTRAMUSCULAR; INTRAVENOUS at 16:39

## 2023-01-01 RX ADMIN — APIXABAN 2.5 MG: 2.5 TABLET, FILM COATED ORAL at 09:50

## 2023-01-01 RX ADMIN — AZELASTINE 1 SPRAY: 137 SPRAY, METERED NASAL at 12:08

## 2023-01-01 RX ADMIN — ACETAMINOPHEN 650 MG: 650 SUPPOSITORY RECTAL at 17:33

## 2023-01-01 RX ADMIN — LORAZEPAM 0.5 MG: 2 INJECTION INTRAMUSCULAR; INTRAVENOUS at 04:24

## 2023-01-01 RX ADMIN — SODIUM BICARBONATE 325 MG: 650 TABLET ORAL at 09:50

## 2023-01-01 RX ADMIN — METHYLPREDNISOLONE SODIUM SUCCINATE 60 MG: 40 INJECTION, POWDER, LYOPHILIZED, FOR SOLUTION INTRAMUSCULAR; INTRAVENOUS at 04:33

## 2023-01-01 RX ADMIN — CLOPIDOGREL BISULFATE 75 MG: 75 TABLET ORAL at 09:50

## 2023-01-01 RX ADMIN — SODIUM BICARBONATE 325 MG: 650 TABLET ORAL at 08:55

## 2023-01-01 RX ADMIN — ATORVASTATIN CALCIUM 10 MG: 10 TABLET, FILM COATED ORAL at 22:41

## 2023-01-01 RX ADMIN — ALLOPURINOL 100 MG: 100 TABLET ORAL at 22:38

## 2023-01-01 RX ADMIN — ATENOLOL 25 MG: 25 TABLET ORAL at 17:44

## 2023-01-01 RX ADMIN — HYDROMORPHONE HYDROCHLORIDE 0.25 MG: 1 INJECTION, SOLUTION INTRAMUSCULAR; INTRAVENOUS; SUBCUTANEOUS at 20:39

## 2023-01-01 RX ADMIN — FUROSEMIDE 5 MG/HR: 10 INJECTION, SOLUTION INTRAMUSCULAR; INTRAVENOUS at 08:51

## 2023-01-01 RX ADMIN — POLYETHYLENE GLYCOL (3350) 17 G: 17 POWDER, FOR SOLUTION ORAL at 08:55

## 2023-01-01 RX ADMIN — ACETYLCYSTEINE 600 MG: 200 SOLUTION ORAL; RESPIRATORY (INHALATION) at 11:23

## 2023-01-01 RX ADMIN — SODIUM CHLORIDE, PRESERVATIVE FREE 10 ML: 5 INJECTION INTRAVENOUS at 09:51

## 2023-01-01 RX ADMIN — SODIUM BICARBONATE 325 MG: 650 TABLET ORAL at 17:43

## 2023-01-01 RX ADMIN — CLOPIDOGREL BISULFATE 75 MG: 75 TABLET ORAL at 22:41

## 2023-01-01 RX ADMIN — POLYETHYLENE GLYCOL (3350) 17 G: 17 POWDER, FOR SOLUTION ORAL at 22:37

## 2023-01-01 RX ADMIN — SENNOSIDES 8.6 MG: 8.6 TABLET, FILM COATED ORAL at 21:28

## 2023-01-01 RX ADMIN — CEFEPIME HYDROCHLORIDE 1000 MG: 1 INJECTION, POWDER, FOR SOLUTION INTRAMUSCULAR; INTRAVENOUS at 06:54

## 2023-01-01 RX ADMIN — FERROUS SULFATE TAB 325 MG (65 MG ELEMENTAL FE) 325 MG: 325 (65 FE) TAB at 09:58

## 2023-01-01 RX ADMIN — ACETAMINOPHEN 650 MG: 325 TABLET ORAL at 09:58

## 2023-01-01 RX ADMIN — VANCOMYCIN HYDROCHLORIDE 1250 MG: 1.25 INJECTION, POWDER, LYOPHILIZED, FOR SOLUTION INTRAVENOUS at 22:14

## 2023-01-01 RX ADMIN — ACETYLCYSTEINE 600 MG: 200 SOLUTION ORAL; RESPIRATORY (INHALATION) at 15:12

## 2023-01-01 RX ADMIN — IRON SUCROSE 200 MG: 20 INJECTION, SOLUTION INTRAVENOUS at 16:49

## 2023-01-01 RX ADMIN — APIXABAN 2.5 MG: 2.5 TABLET, FILM COATED ORAL at 08:56

## 2023-01-01 RX ADMIN — Medication 2000 UNITS: at 22:42

## 2023-01-01 RX ADMIN — MULTIPLE VITAMINS W/ MINERALS TAB 1 TABLET: TAB at 09:50

## 2023-01-01 RX ADMIN — SENNOSIDES 8.6 MG: 8.6 TABLET, FILM COATED ORAL at 08:56

## 2023-01-01 RX ADMIN — SODIUM BICARBONATE 325 MG: 650 TABLET ORAL at 22:38

## 2023-01-01 RX ADMIN — AZELASTINE 1 SPRAY: 137 SPRAY, METERED NASAL at 22:38

## 2023-01-01 RX ADMIN — LEVOTHYROXINE SODIUM 88 MCG: 0.09 TABLET ORAL at 05:30

## 2023-01-01 RX ADMIN — METRONIDAZOLE 500 MG: 500 INJECTION, SOLUTION INTRAVENOUS at 17:51

## 2023-01-01 RX ADMIN — SODIUM CHLORIDE, PRESERVATIVE FREE 10 ML: 5 INJECTION INTRAVENOUS at 02:29

## 2023-01-01 RX ADMIN — BUMETANIDE 1 MG: 0.25 INJECTION INTRAMUSCULAR; INTRAVENOUS at 21:27

## 2023-01-01 RX ADMIN — MORPHINE SULFATE 4 MG: 4 INJECTION, SOLUTION INTRAMUSCULAR; INTRAVENOUS at 09:27

## 2023-01-01 RX ADMIN — ACETYLCYSTEINE 600 MG: 200 SOLUTION ORAL; RESPIRATORY (INHALATION) at 07:31

## 2023-01-01 RX ADMIN — CLOPIDOGREL BISULFATE 75 MG: 75 TABLET ORAL at 08:55

## 2023-01-01 RX ADMIN — LEVOTHYROXINE SODIUM 88 MCG: 0.09 TABLET ORAL at 22:42

## 2023-01-01 RX ADMIN — HYDROMORPHONE HYDROCHLORIDE 0.5 MG: 1 INJECTION, SOLUTION INTRAMUSCULAR; INTRAVENOUS; SUBCUTANEOUS at 02:29

## 2023-01-01 RX ADMIN — AZELASTINE 1 SPRAY: 137 SPRAY, METERED NASAL at 21:17

## 2023-01-01 RX ADMIN — DOCUSATE SODIUM 100 MG: 100 CAPSULE, LIQUID FILLED ORAL at 22:37

## 2023-01-01 RX ADMIN — SODIUM CHLORIDE 10 ML: 9 INJECTION, SOLUTION INTRAVENOUS at 12:24

## 2023-01-01 RX ADMIN — LORAZEPAM 0.5 MG: 2 INJECTION INTRAMUSCULAR; INTRAVENOUS at 02:28

## 2023-01-01 RX ADMIN — IPRATROPIUM BROMIDE AND ALBUTEROL SULFATE 1 AMPULE: 2.5; .5 SOLUTION RESPIRATORY (INHALATION) at 13:55

## 2023-01-01 RX ADMIN — IPRATROPIUM BROMIDE AND ALBUTEROL SULFATE 3 ML: .5; 3 SOLUTION RESPIRATORY (INHALATION) at 16:26

## 2023-01-01 RX ADMIN — Medication 6 MG: at 21:06

## 2023-01-01 RX ADMIN — IPRATROPIUM BROMIDE AND ALBUTEROL SULFATE 1 AMPULE: 2.5; .5 SOLUTION RESPIRATORY (INHALATION) at 21:55

## 2023-01-01 RX ADMIN — MULTIPLE VITAMINS W/ MINERALS TAB 1 TABLET: TAB at 08:56

## 2023-01-01 RX ADMIN — Medication 6 MG: at 21:28

## 2023-01-01 RX ADMIN — ATORVASTATIN CALCIUM 10 MG: 10 TABLET, FILM COATED ORAL at 08:55

## 2023-01-01 RX ADMIN — IPRATROPIUM BROMIDE AND ALBUTEROL SULFATE 1 AMPULE: 2.5; .5 SOLUTION RESPIRATORY (INHALATION) at 15:12

## 2023-01-01 RX ADMIN — IPRATROPIUM BROMIDE AND ALBUTEROL SULFATE 3 ML: .5; 3 SOLUTION RESPIRATORY (INHALATION) at 20:10

## 2023-01-01 RX ADMIN — RANOLAZINE 500 MG: 500 TABLET, EXTENDED RELEASE ORAL at 08:55

## 2023-01-01 RX ADMIN — Medication 6 MG: at 22:37

## 2023-01-01 RX ADMIN — POLYETHYLENE GLYCOL (3350) 17 G: 17 POWDER, FOR SOLUTION ORAL at 21:27

## 2023-01-01 RX ADMIN — FUROSEMIDE 20 MG: 10 INJECTION, SOLUTION INTRAVENOUS at 18:20

## 2023-01-01 RX ADMIN — ALLOPURINOL 100 MG: 100 TABLET ORAL at 09:49

## 2023-01-01 RX ADMIN — ALLOPURINOL 100 MG: 100 TABLET ORAL at 08:55

## 2023-01-01 RX ADMIN — Medication 2000 UNITS: at 08:55

## 2023-01-01 RX ADMIN — SERTRALINE HYDROCHLORIDE 50 MG: 50 TABLET ORAL at 09:50

## 2023-01-01 RX ADMIN — LEVOTHYROXINE SODIUM 88 MCG: 0.09 TABLET ORAL at 09:49

## 2023-01-01 RX ADMIN — SODIUM CHLORIDE, PRESERVATIVE FREE 10 ML: 5 INJECTION INTRAVENOUS at 22:43

## 2023-01-01 RX ADMIN — DOCUSATE SODIUM 100 MG: 100 CAPSULE, LIQUID FILLED ORAL at 09:49

## 2023-01-01 RX ADMIN — CEFEPIME HYDROCHLORIDE 1000 MG: 1 INJECTION, POWDER, FOR SOLUTION INTRAMUSCULAR; INTRAVENOUS at 19:04

## 2023-01-01 RX ADMIN — PANTOPRAZOLE SODIUM 40 MG: 40 TABLET, DELAYED RELEASE ORAL at 09:50

## 2023-01-01 RX ADMIN — SODIUM CHLORIDE, PRESERVATIVE FREE 10 ML: 5 INJECTION INTRAVENOUS at 21:07

## 2023-01-01 RX ADMIN — ACETYLCYSTEINE 600 MG: 200 SOLUTION ORAL; RESPIRATORY (INHALATION) at 13:55

## 2023-01-01 RX ADMIN — ATORVASTATIN CALCIUM 10 MG: 10 TABLET, FILM COATED ORAL at 09:50

## 2023-01-01 RX ADMIN — MORPHINE SULFATE 4 MG: 4 INJECTION, SOLUTION INTRAMUSCULAR; INTRAVENOUS at 16:40

## 2023-01-01 RX ADMIN — IRON SUCROSE 200 MG: 20 INJECTION, SOLUTION INTRAVENOUS at 13:09

## 2023-01-01 RX ADMIN — APIXABAN 2.5 MG: 2.5 TABLET, FILM COATED ORAL at 21:28

## 2023-01-01 RX ADMIN — RANOLAZINE 500 MG: 500 TABLET, EXTENDED RELEASE ORAL at 21:06

## 2023-01-01 RX ADMIN — METRONIDAZOLE 500 MG: 500 INJECTION, SOLUTION INTRAVENOUS at 12:25

## 2023-01-01 ASSESSMENT — PAIN SCALES - GENERAL
PAINLEVEL_OUTOF10: 7
PAINLEVEL_OUTOF10: 2
PAINLEVEL_OUTOF10: 0

## 2023-01-01 ASSESSMENT — PAIN - FUNCTIONAL ASSESSMENT
PAIN_FUNCTIONAL_ASSESSMENT: NONE - DENIES PAIN
PAIN_FUNCTIONAL_ASSESSMENT: NONE - DENIES PAIN
PAIN_FUNCTIONAL_ASSESSMENT: ACTIVITIES ARE NOT PREVENTED

## 2023-01-01 ASSESSMENT — ENCOUNTER SYMPTOMS
ALLERGIC/IMMUNOLOGIC NEGATIVE: 1
SHORTNESS OF BREATH: 1
EYES NEGATIVE: 1
GASTROINTESTINAL NEGATIVE: 1

## 2023-01-01 ASSESSMENT — PAIN DESCRIPTION - DESCRIPTORS: DESCRIPTORS: SORE

## 2023-01-01 ASSESSMENT — PAIN DESCRIPTION - LOCATION
LOCATION: CHEST
LOCATION: GENERALIZED

## 2023-01-25 DIAGNOSIS — H92.01 OTALGIA OF RIGHT EAR: ICD-10-CM

## 2023-01-25 RX ORDER — CETIRIZINE HYDROCHLORIDE 10 MG/1
TABLET ORAL
Qty: 30 TABLET | Refills: 5 | Status: SHIPPED | OUTPATIENT
Start: 2023-01-25

## 2023-02-03 PROBLEM — I50.9 HEART FAILURE (HCC): Status: ACTIVE | Noted: 2023-01-01

## 2023-02-03 NOTE — H&P
V2.0  History and Physical      Name:  Johanna Garnica /Age/Sex: 1927  (80 y.o. female)   MRN & CSN:  [id number] & [id number] Encounter Date/Time: 2/3/2023 5:20 PM EST   Location:  ED20/ED-20 PCP: Gregorio Werner MD       Hospital Day: 1    Assessment and Plan:    Johanna Garnica is a 80 y.o. female with a pmh of pulmonary hypertension, HFpEF, CKD, chronic LBBB, severe AS, hypertension, hyperlipidemia, GERD who presents with <principal problem not specified>    #Acute on chronic HFpEF  #HTN  #Afib  -presents with SOB and chest pressure  -Recent TAVR  -Mild troponin elevation to 0.059  -EKG stable from previous without any acute ischemic changes  -pro-BNP elevated   -Previous echo (2022): EF 55-60%, moderately dilated LA, mod-severe AS, mild-mod MR, mild to mod TR with RVSP of 44    Plan:  IV lasix  Cardiology on consult, appreciate recs  Strict I's and 's  Echo ordered  Daily weights  Low salt diet  Respiratory PCR panel ordered  Continue eliquis, atenolol, atorvastatin, plavix, hydralazine, imdur  Holding amlodipine  Appreciate recs from nephro about antihypertensives  TSH/free T4 ordered  Procal ordered  Follow up on Blood cultures and UA    #CKD  -Cr at baseline at 1.9    Plan:  Nephro consulted, appreciate recs   Continue dialyvyte, sodium bicarb    #Weakness  -came here from rehab center    Plan:  PT/OT consult    #Dysphagia  -on nectar thick liquids     Plan:  SLP consult, appreciate recs  GI consult, appreciate recs    #Constipation  -does not remember when her last BM was    Plan:  Miralax BID  Bisacodyl suppository    Chronic medical problems:   #COPD  -wheezing  Duonebs ordered    #GERD  -On lansoprazole at home  Continue on pantoprazole inpatient    Disposition:   Current Living situation: came from [facility] rehab  Expected Disposition: rehab  Estimated D/C: 2-3 days    Diet No diet orders on file   DVT Prophylaxis [x] Lovenox, []  Heparin, [] SCDs, [] Ambulation,  [] Eliquis, [] Xarelto   Code Status Prior   Surrogate Decision Maker/ CANDIS Rico     History from:     patient, family member - granddaughter    History of Present Illness:     Chief Complaint: <principal problem not specified>  Alice Lopes is a 80 y.o. female with pmh of pulmonary hypertension, HFpEF, CKD, chronic LBBB, severe AS, hypertension, hyperlipidemia, GERD who presents with chest pressure and SOB for one day. She also endorses shortness of breath which is now resolving. She also endorses PND. Patient has been at [facility] for rehab after having a TAVR at [facility].  Patient states rehab is going well. She had 2 episodes where she had chest pressure which has now improved after getting morphine. She denies any lightheadedness, dizziness, fever, abdominal pain, nausea, vomiting or changes in urination. She does endorse feeling cold. She also endorses constipation, she does not remember the last time she had a BM. On presentation patient was hypertensive to 179/53, rest of the vitals are stable. She is saturating 97% on 4 L. Labs are significant for leukocytosis to 10.8, hemoglobin of 7.9, creatinine of 1.9 and BUN of 39 which is about her baseline, troponin of 0.059, BNP of almost 6000, unremarkable VBG. Rapid flu and COVID test were negative. CTA chest shows CHF as most likely diagnosis with small volume bilateral pleural effusion with associated relaxation atelectasis and diffuse interlobular septal thickening and patchy alveolar densities in bilateral lungs, pneumonia is also consideration in areas of consolidation or pleural effusion, vascular calcifications and sequelae from TAVR. Chest x-ray showing bilateral patchy airspace opacities, increased compared to previous suggesting multifocal pneumonia. EKG without acute ischemic findings, LBBB was on previous EKG as well.      Patient seen by cardiology in the emergency room who recommended CT chest, echo and consulting nephrology for renal insufficiency. Patient received 20 mg IV Lasix, morphine 4 mg and Zofran. Review of Systems: Need 10 Elements   Review of Systems    As above     Objective:   No intake or output data in the 24 hours ending 02/03/23 1720   Vitals:   Vitals:    02/03/23 1600 02/03/23 1630 02/03/23 1645 02/03/23 1700   BP: (!) 189/58 (!) 167/61  (!) 126/49   Pulse: 78 75 75 72   Resp: 24 26 17 25   Temp:       TempSrc:       SpO2: 95% 96% 95% 94%       Medications Prior to Admission     Prior to Admission medications    Medication Sig Start Date End Date Taking? Authorizing Provider   cetirizine (ZYRTEC) 10 MG tablet TAKE ONE (1) TABLET BY MOUTH ONCE DAILY 1/25/23   Thea Fletcher MD   bumetanide Charlajason Devi) 2 MG tablet 1 tablet on Tuesday, Thursday and Saturday 1/3/23   Chuck Ruby MD   OXYGEN Inhale 2 L into the lungs continuous    Historical Provider, MD   ipratropium-albuterol (DUONEB) 0.5-2.5 (3) MG/3ML SOLN nebulizer solution Inhale 3 mLs into the lungs in the morning, at noon, and at bedtime 12/6/22   Leighann Freeman MD   diphenhydrAMINE-zinc acetate 2-0.1 % cream Apply topically 3 times daily as needed to itchy areas on skin 12/6/22   Leighann Freeman MD   clopidogrel (PLAVIX) 75 MG tablet Take 1 tablet by mouth daily 12/7/22   Leighann Freeman MD   sodium bicarbonate 325 MG tablet Take 1 tablet by mouth in the morning and 1 tablet in the evening.  10/17/22   Historical Provider, MD   XIIDRA 5 % SOLN Place 1 drop into both eyes 2 times daily 9/19/22   Historical Provider, MD   fluorometholone (FML) 0.1 % ophthalmic suspension Place 1 drop into both eyes 2 times daily 10/20/22   Historical Provider, MD   amLODIPine (NORVASC) 10 MG tablet Take 1 tablet by mouth nightly 11/7/22 12/7/22  Chuck Ruby MD   atenolol (TENORMIN) 25 MG tablet Take 1 tablet by mouth daily  Patient taking differently: Take 25 mg by mouth Daily with supper 9/26/22   Thea Fletcher MD   dexlansoprazole ([facility]) 60 MG CPDR delayed release capsule Take 1 capsule by mouth daily 9/26/22   Karen Unger MD   docusate sodium (COLACE) 100 MG capsule Take 1 capsule by mouth daily 9/26/22   Karen Unger MD   senna (NATURAL SENNA LAXATIVE) 8.6 MG tablet Take 1 tablet by mouth 2 times daily 9/26/22   Karen Unger MD   sertraline (ZOLOFT) 50 MG tablet Take 1 tablet by mouth daily 9/26/22   Karen Unger MD   isosorbide mononitrate (IMDUR) 60 MG extended release tablet Take 1 tablet by mouth daily 9/26/22   Karen Unger MD   polyethylene glycol (GAVILAX) 17 GM/SCOOP powder MIX 17 GRAMS (1 HEAPING TABLESPOONFUL) OF POWDER IN 8 OUNCES OF LIQUID AND DRINK DAILY 9/26/22   Karen Unger MD   nystatin (MYCOSTATIN) 319668 UNIT/GM powder Apply 3 times daily.  9/26/22   Karen Unger MD   Cholecalciferol (VITAMIN D3) 50 MCG (2000 UT) TABS Take 1 tablet by mouth daily 9/26/22   Karen Unger MD   nitroGLYCERIN (NITROSTAT) 0.4 MG SL tablet NITRO 9/26/22   Karen Unger MD   allopurinol (ZYLOPRIM) 100 MG tablet TAKE ONE (1) TABLET BY MOUTH DAILY  Patient taking differently: Take 100 mg by mouth daily 9/14/22   Karen Unger MD   ranolazine (RANEXA) 500 MG extended release tablet TAKE ONE (1) TABLET BY MOUTH TWO (2) TIMES DAILY 9/14/22   Karen Unger MD   alendronate (FOSAMAX) 70 MG tablet FOSA  Patient taking differently: Take 70 mg by mouth every 7 days Takes on Wednesday 9/14/22   Karen Unger MD   levothyroxine (SYNTHROID) 88 MCG tablet TAKE ONE (1) TABLET BY MOUTH DAILY 9/14/22   Karen Unger MD   simvastatin (ZOCOR) 20 MG tablet TAKE 1 TABLET BY MOUTH EVERY NIGHT  Patient taking differently: Take 20 mg by mouth nightly 9/14/22   Karen Unger MD   azelastine (ASTELIN) 0.1 % nasal spray 1 spray by Nasal route 2 times daily Use in each nostril as directed 8/19/22   Karen Unger MD   triamcinolone (KENALOG) 0.1 % ointment Apply topically 2 times daily 7/19/22 Soniya Woodward MD   estradiol (ESTRACE VAGINAL) 0.1 MG/GM vaginal cream Place 1 g vaginally daily Daily for 2 weeks and then 2-3 times per week 1/11/22   Soniya Woodward MD   acetaminophen (AMINOFEN) 325 MG tablet Take 2 tablets by mouth every 8 hours as needed for Pain 9/25/21   Zakia Daugherty MD   B Complex-C-Zn-Folic Acid (DIALYVITE 499-ROLS 15) 0.8 MG TABS Take 1 tablet by mouth daily 4/8/21 11/14/22  Debra Cannon MD   mineral oil-hydrophilic petrolatum (AQUAPHOR) ointment Apply topically as needed for Dry Skin Apply topically as needed. Historical Provider, MD   aspirin EC 81 MG EC tablet Take 81 mg by mouth daily. Historical Provider, MD       Physical Exam: Need 8 Elements   Physical Exam     General: NAD, AAO x3-4  Eyes: EOMI  HENT: Atraumatic, hard of hearing  CVS: Normal S1 and S2, no murmurs, RRR  Respiratory: expiratory wheezing at the bases, no respiratory distress, on 3L O2  GI: Normal bowel sounds, soft, nondistended, nontender  MSK:  no lower extremity edema  Skin: Intact, dry, warm, no rashes  Neuro: CN II to XII grossly intact  Psych: Normal mood      Past Medical History:   PMHx   Past Medical History:   Diagnosis Date    Aortic stenosis     ON ECHO 11/22- DR HILARY FIGUEREDO/TAVR 1/24/23- DR Juan Alberto Pierce    CAD (coronary artery disease)     Dr Pratik Figueredo/Jose Figueredo    Chronic renal insufficiency     Dr Conner Marcelo    Depression     Dry eye syndrome     Dr Shwetha Jamison    Eczema     Elevated LFTs 05/11/2012    Generalized osteoarthritis     GERD (gastroesophageal reflux disease)     Esophagitis    Gout     Hyperlipidemia     Hypertension     Hyperuricemia     Hypothyroid     Hypothyroidism    Low back pain     Osteoporosis     Generalized    Pneumonia due to infectious organism 11/16/2022    Sinus congestion     CHRONIC W POST NASAL DRIP    UTI (urinary tract infection)     Vertigo     Dr Meyer Brought:  has a past surgical history that includes Foot surgery (2004);  Cataract removal; Tonsillectomy; Colonoscopy; Hysterectomy; eye surgery; Hip fracture surgery (Right, 08/19/2018); and Aortic valve replacement (01/24/2023). Allergies: Allergies   Allergen Reactions    Bactrim [Sulfamethoxazole-Trimethoprim] Nausea And Vomiting    Cephalexin Rash    Tape Arsalan.Lion Tape] Itching     Fam HX: mother and father with CAD family history includes Other in her father and mother. Soc HX: former smoker  Social History     Socioeconomic History    Marital status:       Spouse name: None    Number of children: None    Years of education: None    Highest education level: None   Occupational History    Occupation: Retired from [employer]: as noted   Tobacco Use    Smoking status: Former    Smokeless tobacco: Never   Vaping Use    Vaping Use: Never used   Substance and Sexual Activity    Alcohol use: No    Drug use: No   Social History Narrative    Marital Status:   she lives in an [facility] and  has been in [facility] since 2004        Diet:  Low salt        Seat Belts:  Always     Social Determinants of Health     Financial Resource Strain: Low Risk     Difficulty of Paying Living Expenses: Not hard at all   Food Insecurity: No Food Insecurity    Worried About Running Out of Food in the Last Year: Never true    Ran Out of Food in the Last Year: Never true   Physical Activity: Sufficiently Active    Days of Exercise per Week: 5 days    Minutes of Exercise per Session: 30 min       Medications:   Medications:    furosemide  20 mg IntraVENous Once      Infusions:   PRN Meds: morphine, 4 mg, Q30 Min PRN  ondansetron, 4 mg, Q30 Min PRN        Labs      CBC:   Recent Labs     02/03/23  1537   WBC 10.8*   HGB 7.9*        BMP:    Recent Labs     02/03/23  1537      K 3.7      CO2 20*   BUN 39*   CREATININE 1.9*   GLUCOSE 135*     Hepatic:   Recent Labs     02/03/23  1537   AST 25   ALT 14   BILITOT 0.4   ALKPHOS 72     Lipids:   Lab Results   Component Value Date/Time CHOL 185 08/19/2022 11:20 AM    HDL 49 08/19/2022 11:20 AM    HDL 46 02/03/2012 11:09 PM    TRIG 176 08/19/2022 11:20 AM     Hemoglobin A1C: No results found for: LABA1C  TSH:   Lab Results   Component Value Date/Time    TSH 0.62 08/19/2022 11:20 AM     Troponin:   Lab Results   Component Value Date/Time    TROPONINT 0.059 02/03/2023 03:37 PM    TROPONINT 0.030 11/15/2022 04:28 AM    TROPONINT 0.037 11/14/2022 03:26 PM     Lactic Acid: No results for input(s): LACTA in the last 72 hours. BNP:   Recent Labs     02/03/23  1537   PROBNP 5,978*     UA:  Lab Results   Component Value Date/Time    NITRU NEGATIVE 12/06/2022 01:54 PM    NITRU Negative 09/27/2022 03:36 PM    COLORU YELLOW 12/06/2022 01:54 PM    PHUR 6.0 09/27/2022 03:36 PM    WBCUA <1 12/06/2022 01:54 PM    RBCUA NONE SEEN 12/06/2022 01:54 PM    MUCUS RARE 12/06/2022 01:54 PM    TRICHOMONAS NONE SEEN 12/06/2022 01:54 PM    BACTERIA NEGATIVE 12/06/2022 01:54 PM    CLARITYU CLEAR 12/06/2022 01:54 PM    SPECGRAV 1.010 12/06/2022 01:54 PM    LEUKOCYTESUR NEGATIVE 12/06/2022 01:54 PM    UROBILINOGEN 0.2 12/06/2022 01:54 PM    BILIRUBINUR NEGATIVE 12/06/2022 01:54 PM    BLOODU NEGATIVE 12/06/2022 01:54 PM    GLUCOSEU Negative 09/27/2022 03:36 PM    KETUA NEGATIVE 12/06/2022 01:54 PM     Urine Cultures:   Lab Results   Component Value Date/Time    LABURIN  09/27/2022 03:36 PM     <10,000 CFU/ml mixed skin/urogenital mela. No further workup     Blood Cultures: No results found for: BC  No results found for: BLOODCULT2  Organism:   Lab Results   Component Value Date/Time    ORG ENC 04/04/2018 09:00 AM       Imaging/Diagnostics Last 24 Hours   CT CHEST WO CONTRAST    Result Date: 2/3/2023  EXAMINATION: CT OF THE CHEST WITHOUT CONTRAST 2/3/2023 4:19 pm TECHNIQUE: CT of the chest was performed without the administration of intravenous contrast. Multiplanar reformatted images are provided for review.  Automated exposure control, iterative reconstruction, and/or weight based adjustment of the mA/kV was utilized to reduce the radiation dose to as low as reasonably achievable. COMPARISON: Chest CT 11/14/2022 HISTORY: ORDERING SYSTEM PROVIDED HISTORY: Short of breath, chest pain, dyspnea/cp Decision Support Exception - unselect if not a suspected or confirmed emergency medical condition->Emergency Medical Condition (MA) Reason for Exam: dyspnea/cp Additional signs and symptoms: no Relevant Medical/Surgical History: none FINDINGS: Mediastinum: Cardiac structures and great vessels appear unremarkable with exception of calcific atherosclerotic disease. No pericardial effusion. Posterior mediastinal structures appear unremarkable. No mediastinal or hilar adenopathy. Sequela from TAVR. Lungs/pleura: Small volume bilateral pleural effusion with associated relaxation atelectasis. Diffuse interlobular septal thickening and patchy alveolar densities bilateral lungs. No inspissated secretions or endobronchial lesion evident. No pneumothorax. Upper Abdomen: Unremarkable appearance. Soft Tissues/Bones: No acute superficial soft tissue or osseous structure abnormality evident. 1.  Congestive heart failure is most likely given the findings; pneumonia is also a consideration in areas of consolidation with pleural effusion. 2.  Vascular calcifications are noted reflecting calcific atherosclerosis. 3. Sequela from TAVR. XR CHEST PORTABLE    Result Date: 2/3/2023  EXAMINATION: ONE XRAY VIEW OF THE CHEST 2/3/2023 4:23 pm COMPARISON: 12/01/2022 HISTORY: ORDERING SYSTEM PROVIDED HISTORY: dyspnea TECHNOLOGIST PROVIDED HISTORY: Reason for exam:->dyspnea Reason for Exam: dyspnea Additional signs and symptoms: unknown Relevant Medical/Surgical History: CAD, pna FINDINGS: Cardiomediastinal silhouette stable. Bilateral patchy airspace opacities. No pneumothorax. No pleural effusion.      Bilateral patchy airspace opacities, increased compared to the previous exam, suggesting multifocal pneumonia Electronically signed by Glenetta Holstein, MD on 2/3/2023 at 5:20 PM

## 2023-02-03 NOTE — CONSULTS
[id number]  Recent cath at [facility] 1/23-non obstructive CAD  S/p TAVR-1/23-normal EF--HFpEF--keep on plavix  Hx of renal insuffiencey  Hx of PAFIB now sinus -on AC  Hx of HTN  Hx of anemia    Check labs   Check ct chest  Check echo  For now  Consult Henrry Diaz for renal insuffiencey     Will follow  She needs on thickened liquids   Recent swallow study at [facility] signed by Casey Larkin MD on 2/3/23 at 3:54 PM EST

## 2023-02-03 NOTE — CARE COORDINATION
[facility] criteria for Resp. Failure  reviewed at this time, criteria supports Inpatient Admission.  EDILIA,RN/CM

## 2023-02-03 NOTE — ED PROVIDER NOTES
[address] [facility]      TRIAGE CHIEF COMPLAINT:   Chest Pain (Given 4 ASA, 1 nitro)      [place]:  Luis Christine is a 80 y.o. female that presents by EMS complaint of chest pain shortness of breath. Patient recently had a aortic valve replacement she has aortic stenosis. She apparently is hypoxic complaint of chest pain shortness of breath she is given nitro, aspirin. Patient arrival denies any fevers nausea vomiting Alexy pain she has had chest pain shortness of breath for 2 days. Her cardiologist was consulted prior to patient's arrival saw her in the ER with me. No other questions. Brad Yip REVIEW OF SYSTEMS:  At least 10 systems reviewed and otherwise acutely negative except as in the [address] Review of Systems   Constitutional:  Positive for activity change and fatigue. HENT: Negative. Eyes: Negative. Respiratory:  Positive for shortness of breath. Cardiovascular:  Positive for chest pain. Gastrointestinal: Negative. Endocrine: Negative. Genitourinary: Negative. Musculoskeletal: Negative. Skin: Negative. Allergic/Immunologic: Negative. Neurological: Negative. Hematological: Negative. Psychiatric/Behavioral: Negative. All other systems reviewed and are negative.     Past Medical History:   Diagnosis Date    Aortic stenosis     ON ECHO 11/22- DR HILARY FIGUEREDO/TAVR 1/24/23- DR Carmella Enriquez    CAD (coronary artery disease)     Dr Daja Figueredo/Jose Figueredo    Chronic renal insufficiency     Dr Blanton Sickle    Depression     Dry eye syndrome     Dr Ha Rasmussen    Eczema     Elevated LFTs 05/11/2012    Generalized osteoarthritis     GERD (gastroesophageal reflux disease)     Esophagitis    Gout     Hyperlipidemia     Hypertension     Hyperuricemia     Hypothyroid     Hypothyroidism    Low back pain     Osteoporosis     Generalized    Pneumonia due to infectious organism 11/16/2022    Sinus congestion     CHRONIC W POST NASAL DRIP    UTI (urinary tract infection)     Vertigo      Marko Rivas     Past Surgical History:   Procedure Laterality Date    AORTIC VALVE REPLACEMENT  01/24/2023    Dr Katia Pham at [facility] 95 Aug. 2006 and L March 2007 Dr. Ariadne Mauro  2004    right foot realignment [address] Right 08/19/2018    rt hip Zheng    HYSTERECTOMY (CERVIX STATUS UNKNOWN)      TONSILLECTOMY       Family History   Problem Relation Age of Onset    Other Mother         CAD    Other Father         CAD     Social History     Socioeconomic History    Marital status:       Spouse name: Not on file    Number of children: Not on file    Years of education: Not on file    Highest education level: Not on file   Occupational History    Occupation: Retired from [employer]: as noted   Tobacco Use    Smoking status: Former    Smokeless tobacco: Never   Vaping Use    Vaping Use: Never used   Substance and Sexual Activity    Alcohol use: No    Drug use: No    Sexual activity: Not on file   Other Topics Concern    Not on file   Social History Narrative    Marital Status:   she lives in an [facility] and  has been in ECF since 2004        Diet:  Low salt        Seat Belts:  Always     Social Determinants of Health     Financial Resource Strain: Low Risk     Difficulty of Paying Living Expenses: Not hard at all   Food Insecurity: No Food Insecurity    Worried About Running Out of Food in the Last Year: Never true    [address] in the Last Year: Never true   Transportation Needs: Not on file   Physical Activity: Sufficiently Active    Days of Exercise per Week: 5 days    Minutes of Exercise per Session: 30 min   Stress: Not on file   Social Connections: Not on file   Intimate Partner Violence: Not on file   Housing Stability: Not on file     Current Facility-Administered Medications   Medication Dose Route Frequency Provider Last Rate Last Admin    morphine sulfate (PF) injection 4 mg  4 mg IntraVENous Q30 Min PRN Angelique Binet, DO   4 mg at 02/03/23 1640    ondansetron (ZOFRAN) injection 4 mg  4 mg IntraVENous Q30 Min PRN Angelique Binet, DO   4 mg at 02/03/23 1639    furosemide (LASIX) injection 20 mg  20 mg IntraVENous Once Angelique Binet, DO         Current Outpatient Medications   Medication Sig Dispense Refill    cycloSPORINE (RESTASIS) 0.05 % ophthalmic emulsion Place 1 drop into both eyes 2 times daily      apixaban (ELIQUIS) 5 MG TABS tablet Take 2.5 mg by mouth 2 times daily      hydrALAZINE (APRESOLINE) 10 MG tablet Take 10 mg by mouth 2 times daily as needed Hold if SBP <170      gabapentin (NEURONTIN) 300 MG capsule Take 300 mg by mouth daily as needed. ondansetron (ZOFRAN) 4 MG tablet Take 4 mg by mouth every 8 hours as needed for Nausea or Vomiting      cetirizine (ZYRTEC) 10 MG tablet TAKE ONE (1) TABLET BY MOUTH ONCE DAILY 30 tablet 5    bumetanide (BUMEX) 2 MG tablet 1 tablet on Tuesday, Thursday and Saturday (Patient taking differently: Take 2 mg by mouth three times a week 1 tablet on Mon/Wed/Fri) 30 tablet 0    OXYGEN Inhale 2 L into the lungs continuous      ipratropium-albuterol (DUONEB) 0.5-2.5 (3) MG/3ML SOLN nebulizer solution Inhale 3 mLs into the lungs in the morning, at noon, and at bedtime 360 mL 0    diphenhydrAMINE-zinc acetate 2-0.1 % cream Apply topically 3 times daily as needed to itchy areas on skin 28 g 0    clopidogrel (PLAVIX) 75 MG tablet Take 1 tablet by mouth daily 30 tablet 0    sodium bicarbonate 325 MG tablet Take 1 tablet by mouth in the morning and 1 tablet in the evening.       XIIDRA 5 % SOLN Place 1 drop into both eyes 2 times daily      amLODIPine (NORVASC) 10 MG tablet Take 1 tablet by mouth nightly 30 tablet 4    atenolol (TENORMIN) 25 MG tablet Take 1 tablet by mouth daily (Patient taking differently: Take 25 mg by mouth Daily with supper) 180 tablet 1    dexlansoprazole (DEXILANT) 60 MG CPDR delayed release capsule Take 1 capsule by mouth daily 90 capsule 1    docusate sodium (COLACE) 100 MG capsule Take 1 capsule by mouth daily 90 capsule 1    senna (NATURAL SENNA LAXATIVE) 8.6 MG tablet Take 1 tablet by mouth 2 times daily 180 tablet 1    sertraline (ZOLOFT) 50 MG tablet Take 1 tablet by mouth daily 90 tablet 1    isosorbide mononitrate (IMDUR) 60 MG extended release tablet Take 1 tablet by mouth daily 90 tablet 1    polyethylene glycol (GAVILAX) 17 GM/SCOOP powder MIX 17 GRAMS (1 HEAPING TABLESPOONFUL) OF POWDER IN 8 OUNCES OF LIQUID AND DRINK DAILY (Patient taking differently: Take 17 g by mouth daily MIX 17 GRAMS (1 HEAPING TABLESPOONFUL) OF POWDER IN 8 OUNCES OF LIQUID AND DRINK DAILY) 850 g 3    nystatin (MYCOSTATIN) 989476 UNIT/GM powder Apply 3 times daily.  (Patient taking differently: Apply topically 3 times daily as needed Apply 3 times daily.) 60 g 1    Cholecalciferol (VITAMIN D3) 50 MCG (2000 UT) TABS Take 1 tablet by mouth daily 90 tablet 1    nitroGLYCERIN (NITROSTAT) 0.4 MG SL tablet NITRO 25 tablet 1    allopurinol (ZYLOPRIM) 100 MG tablet TAKE ONE (1) TABLET BY MOUTH DAILY (Patient taking differently: Take 100 mg by mouth daily) 90 tablet 1    ranolazine (RANEXA) 500 MG extended release tablet TAKE ONE (1) TABLET BY MOUTH TWO (2) TIMES DAILY (Patient taking differently: Take 500 mg by mouth 2 times daily) 180 tablet 1    alendronate (FOSAMAX) 70 MG tablet FOSA (Patient taking differently: Take 70 mg by mouth every 7 days Takes on Wednesday) 12 tablet 1    levothyroxine (SYNTHROID) 88 MCG tablet TAKE ONE (1) TABLET BY MOUTH DAILY (Patient taking differently: Take 88 mcg by mouth Daily) 90 tablet 1    simvastatin (ZOCOR) 20 MG tablet TAKE 1 TABLET BY MOUTH EVERY NIGHT (Patient taking differently: Take 20 mg by mouth nightly) 90 tablet 1    azelastine (ASTELIN) 0.1 % nasal spray 1 spray by Nasal route 2 times daily Use in each nostril as directed 30 mL 5    triamcinolone (KENALOG) 0.1 % ointment Apply topically 2 times daily 30 g 3 acetaminophen (AMINOFEN) 325 MG tablet Take 2 tablets by mouth every 8 hours as needed for Pain 120 tablet 3    B Complex-C-Zn-Folic Acid (DIALYVITE 369-TYKA 15) 0.8 MG TABS Take 1 tablet by mouth daily 30 tablet 11    mineral oil-hydrophilic petrolatum (AQUAPHOR) ointment Apply topically as needed for Dry Skin Apply topically as needed. Allergies   Allergen Reactions    Bactrim [Sulfamethoxazole-Trimethoprim] Nausea And Vomiting    Cephalexin Rash    Tape Cathleen Like Tape] Itching     Current Facility-Administered Medications   Medication Dose Route Frequency Provider Last Rate Last Admin    morphine sulfate (PF) injection 4 mg  4 mg IntraVENous Q30 Min PRN Gwendalyn Jerry, DO   4 mg at 02/03/23 1640    ondansetron (ZOFRAN) injection 4 mg  4 mg IntraVENous Q30 Min PRN Gwendalyn Jerry, DO   4 mg at 02/03/23 1639    furosemide (LASIX) injection 20 mg  20 mg IntraVENous Once Gwendalyn Jerry, DO         Current Outpatient Medications   Medication Sig Dispense Refill    cycloSPORINE (RESTASIS) 0.05 % ophthalmic emulsion Place 1 drop into both eyes 2 times daily      apixaban (ELIQUIS) 5 MG TABS tablet Take 2.5 mg by mouth 2 times daily      hydrALAZINE (APRESOLINE) 10 MG tablet Take 10 mg by mouth 2 times daily as needed Hold if SBP <170      gabapentin (NEURONTIN) 300 MG capsule Take 300 mg by mouth daily as needed.       ondansetron (ZOFRAN) 4 MG tablet Take 4 mg by mouth every 8 hours as needed for Nausea or Vomiting      cetirizine (ZYRTEC) 10 MG tablet TAKE ONE (1) TABLET BY MOUTH ONCE DAILY 30 tablet 5    bumetanide (BUMEX) 2 MG tablet 1 tablet on Tuesday, Thursday and Saturday (Patient taking differently: Take 2 mg by mouth three times a week 1 tablet on Mon/Wed/Fri) 30 tablet 0    OXYGEN Inhale 2 L into the lungs continuous      ipratropium-albuterol (DUONEB) 0.5-2.5 (3) MG/3ML SOLN nebulizer solution Inhale 3 mLs into the lungs in the morning, at noon, and at bedtime 360 mL 0    diphenhydrAMINE-zinc acetate 2-0.1 % cream Apply topically 3 times daily as needed to itchy areas on skin 28 g 0    clopidogrel (PLAVIX) 75 MG tablet Take 1 tablet by mouth daily 30 tablet 0    sodium bicarbonate 325 MG tablet Take 1 tablet by mouth in the morning and 1 tablet in the evening. XIIDRA 5 % SOLN Place 1 drop into both eyes 2 times daily      amLODIPine (NORVASC) 10 MG tablet Take 1 tablet by mouth nightly 30 tablet 4    atenolol (TENORMIN) 25 MG tablet Take 1 tablet by mouth daily (Patient taking differently: Take 25 mg by mouth Daily with supper) 180 tablet 1    dexlansoprazole (DEXILANT) 60 MG CPDR delayed release capsule Take 1 capsule by mouth daily 90 capsule 1    docusate sodium (COLACE) 100 MG capsule Take 1 capsule by mouth daily 90 capsule 1    senna (NATURAL SENNA LAXATIVE) 8.6 MG tablet Take 1 tablet by mouth 2 times daily 180 tablet 1    sertraline (ZOLOFT) 50 MG tablet Take 1 tablet by mouth daily 90 tablet 1    isosorbide mononitrate (IMDUR) 60 MG extended release tablet Take 1 tablet by mouth daily 90 tablet 1    polyethylene glycol (GAVILAX) 17 GM/SCOOP powder MIX 17 GRAMS (1 HEAPING TABLESPOONFUL) OF POWDER IN 8 OUNCES OF LIQUID AND DRINK DAILY (Patient taking differently: Take 17 g by mouth daily MIX 17 GRAMS (1 HEAPING TABLESPOONFUL) OF POWDER IN 8 OUNCES OF LIQUID AND DRINK DAILY) 850 g 3    nystatin (MYCOSTATIN) 245341 UNIT/GM powder Apply 3 times daily.  (Patient taking differently: Apply topically 3 times daily as needed Apply 3 times daily.) 60 g 1    Cholecalciferol (VITAMIN D3) 50 MCG (2000 UT) TABS Take 1 tablet by mouth daily 90 tablet 1    nitroGLYCERIN (NITROSTAT) 0.4 MG SL tablet NITRO 25 tablet 1    allopurinol (ZYLOPRIM) 100 MG tablet TAKE ONE (1) TABLET BY MOUTH DAILY (Patient taking differently: Take 100 mg by mouth daily) 90 tablet 1    ranolazine (RANEXA) 500 MG extended release tablet TAKE ONE (1) TABLET BY MOUTH TWO (2) TIMES DAILY (Patient taking differently: Take 500 mg by mouth 2 times daily) 180 tablet 1    alendronate (FOSAMAX) 70 MG tablet FOSA (Patient taking differently: Take 70 mg by mouth every 7 days Takes on Wednesday) 12 tablet 1    levothyroxine (SYNTHROID) 88 MCG tablet TAKE ONE (1) TABLET BY MOUTH DAILY (Patient taking differently: Take 88 mcg by mouth Daily) 90 tablet 1    simvastatin (ZOCOR) 20 MG tablet TAKE 1 TABLET BY MOUTH EVERY NIGHT (Patient taking differently: Take 20 mg by mouth nightly) 90 tablet 1    azelastine (ASTELIN) 0.1 % nasal spray 1 spray by Nasal route 2 times daily Use in each nostril as directed 30 mL 5    triamcinolone (KENALOG) 0.1 % ointment Apply topically 2 times daily 30 g 3    acetaminophen (AMINOFEN) 325 MG tablet Take 2 tablets by mouth every 8 hours as needed for Pain 120 tablet 3    B Complex-C-Zn-Folic Acid (DIALYVITE 543-NJMZ 15) 0.8 MG TABS Take 1 tablet by mouth daily 30 tablet 11    mineral oil-hydrophilic petrolatum (AQUAPHOR) ointment Apply topically as needed for Dry Skin Apply topically as needed. Nursing Notes Reviewed    VITAL SIGNS:  ED Triage Vitals [02/03/23 1533]   Enc Vitals Group      BP       Heart Rate 78      Resp 17      Temp       Temp src       SpO2 97 %      Weight       Height       Head Circumference       Peak Flow       Pain Score       Pain Loc       Pain Edu? Excl. in [address]? PHYSICAL EXAM:  Physical Exam  Vitals and nursing note reviewed. Constitutional:       General: She is not in acute distress. Appearance: Normal appearance. She is well-developed and well-groomed. She is not ill-appearing, toxic-appearing or diaphoretic. Interventions: Nasal cannula in place. HENT:      Head: Normocephalic and atraumatic. Right Ear: External ear normal.      Left Ear: External ear normal.   Eyes:      General: No scleral icterus. Right eye: No discharge. Left eye: No discharge. Extraocular Movements: Extraocular movements intact.       Conjunctiva/sclera: Conjunctivae normal. Neck:      Vascular: No JVD. Trachea: Phonation normal.   Cardiovascular:      Rate and Rhythm: Normal rate and regular rhythm. Pulses: Normal pulses. Heart sounds: Normal heart sounds. Pulmonary:      Effort: Pulmonary effort is normal. No respiratory distress. Breath sounds: Normal breath sounds. No stridor. No wheezing, rhonchi or rales. Abdominal:      General: Bowel sounds are normal. There is no distension. Palpations: Abdomen is soft. There is no mass. Tenderness: There is no abdominal tenderness. There is no guarding or rebound. Negative signs include Espinal's sign, Rovsing's sign and McBurney's sign. Hernia: No hernia is present. Musculoskeletal:         General: Swelling and tenderness present. No deformity or signs of injury. Normal range of motion. Cervical back: Full passive range of motion without pain and normal range of motion. No rigidity. Right lower leg: Edema present. Left lower leg: Edema present. Skin:     General: Skin is warm. Coloration: Skin is not jaundiced or pale. Findings: No bruising, erythema, lesion or rash. Neurological:      General: No focal deficit present. Mental Status: She is alert and oriented to person, place, and time. GCS: GCS eye subscore is 4. GCS verbal subscore is 5. GCS motor subscore is 6. Cranial Nerves: Cranial nerves 2-12 are intact. No cranial nerve deficit or dysarthria. Sensory: No sensory deficit. Motor: Motor function is intact. No weakness. Coordination: Coordination normal.   Psychiatric:         Mood and Affect: Mood normal.         Behavior: Behavior normal. Behavior is cooperative. Thought Content:  Thought content normal.         Judgment: Judgment normal.         I have reviewed andinterpreted all of the currently available lab results from this visit (if applicable):    Results for orders placed or performed during the hospital encounter of 02/03/23   Rapid Flu Swab    Specimen: Nasopharyngeal   Result Value Ref Range    Rapid Influenza A Ag NEGATIVE NEGATIVE    Rapid Influenza B Ag NEGATIVE NEGATIVE   COVID-19, Rapid    Specimen: Nasopharyngeal   Result Value Ref Range    Source UNKNOWN     SARS-CoV-2, NAAT NOT DETECTED NOT DETECTED   CBC with Auto Differential   Result Value Ref Range    WBC 10.8 (H) 4.0 - 10.5 K/CU MM    RBC 2.52 (L) 4.2 - 5.4 M/CU MM    Hemoglobin 7.9 (L) 12.5 - 16.0 GM/DL    Hematocrit 24.6 (L) 37 - 47 %    MCV 97.6 78 - 100 FL    MCH 31.3 (H) 27 - 31 PG    MCHC 32.1 32.0 - 36.0 %    RDW 14.8 11.7 - 14.9 %    Platelets 865 876 - 870 K/CU MM    MPV 9.1 7.5 - 11.1 FL    Differential Type AUTOMATED DIFFERENTIAL     Segs Relative 61.0 36 - 66 %    Lymphocytes % 27.2 24 - 44 %    Monocytes % 8.8 (H) 0 - 4 %    Eosinophils % 2.1 0 - 3 %    Basophils % 0.5 0 - 1 %    Segs Absolute 6.6 K/CU MM    Lymphocytes Absolute 2.9 K/CU MM    Monocytes Absolute 1.0 K/CU MM    Eosinophils Absolute 0.2 K/CU MM    Basophils Absolute 0.1 K/CU MM    Nucleated RBC % 0.0 %    Total Nucleated RBC 0.0 K/CU MM    Total Immature Neutrophil 0.04 K/CU MM    Immature Neutrophil % 0.4 0 - 0.43 %   Comprehensive Metabolic Panel   Result Value Ref Range    Sodium 138 135 - 145 MMOL/L    Potassium 3.7 3.5 - 5.1 MMOL/L    Chloride 105 99 - 110 mMol/L    CO2 20 (L) 21 - 32 MMOL/L    BUN 39 (H) 6 - 23 MG/DL    Creatinine 1.9 (H) 0.6 - 1.1 MG/DL    Est, Glom Filt Rate 24 (L) >60 mL/min/1.73m2    Glucose 135 (H) 70 - 99 MG/DL    Calcium 9.3 8.3 - 10.6 MG/DL    Albumin 4.0 3.4 - 5.0 GM/DL    Total Protein 7.5 6.4 - 8.2 GM/DL    Total Bilirubin 0.4 0.0 - 1.0 MG/DL    ALT 14 10 - 40 U/L    AST 25 15 - 37 IU/L    Alkaline Phosphatase 72 40 - 129 IU/L    Anion Gap 13 4 - 16   Troponin   Result Value Ref Range    Troponin T 0.059 (H) <0.01 NG/ML   Brain Natriuretic Peptide   Result Value Ref Range    Pro-BNP 5,978 (H) <300 PG/ML   Magnesium   Result Value Ref Range    Magnesium 2.1 1.8 - 2.4 mg/dl   Protime/INR & PTT   Result Value Ref Range    Protime 14.4 11.7 - 14.5 SECONDS    INR 1.12 INDEX    aPTT 33.6 25.1 - 37.1 SECONDS   Blood Gas, Venous   Result Value Ref Range    pH, Denis 7.42 7.32 - 7.42    pCO2, Denis 36 (L) 38 - 52 mmHG    pO2, Denis 140 (H) 28 - 48 mmHG    Base Excess 1 0 - 2.4    HCO3, Venous 23.4 19 - 25 MMOL/L    O2 Sat, Denis 94.0 (H) 50 - 70 %    Comment VBG    EKG 12 Lead   Result Value Ref Range    Ventricular Rate 79 BPM    Atrial Rate 79 BPM    P-R Interval 182 ms    QRS Duration 146 ms    Q-T Interval 426 ms    QTc Calculation (Bazett) 488 ms    P Axis 6 degrees    R Axis -69 degrees    T Axis 96 degrees    Diagnosis       Normal sinus rhythm  Left axis deviation  Left bundle branch block  Abnormal ECG  When compared with ECG of 14-NOV-2022 14:36,  No significant change was found          Radiographs (if obtained):  [] The following radiograph was interpreted by myself in the absence of a radiologist:  [x] Radiologist's Report Reviewed:    CXR, CT chest    EKG (if obtained): (All EKG's are interpreted by myself in the absence of a cardiologist)    12 lead EKG per my interpretation:  Normal Sinus Rhythm 79 LBBB  [place] is   Left axis deviation  QTc is   488  There is no specific T wave changes appreciated. There is specific ST wave changes appreciated. Slight ST elevation in V2, 2 3, aVF consistent with left bundle branch block    Prior EKG to compare with was not available       MDM:    Patient presents by EMS complaint chest pain shortness of breath possible hypoxia. Patient again has a history of CAD, aortic stenosis she had aortic valve replacement at [facility] here recently. Had a heart cath as well. She has had constant chest pain shortness of breath for 2 days. EMS states her saturations were in the 70s she has been put on O2. Denies any fevers nausea vomiting abdominal pain and swelling that is new.   No other questions or concerns I did call her cardiologist prior to patient's arrival her cardiologist saw her in the ER with me. EKG has left bundle block which is known. Her cardiologist think she may have some heart failure recommends labs imaging and admission. Otherwise she stable she received nitro, aspirin prior to arrival.  We will give her pain nausea medicine here. Otherwise she is in no distress she is wanting to eat and drink. Patient recheck doing well. Negative BNP is elevated troponins elevated. X-ray shows multifocal pneumonia versus pulmonary edema. She has no cough no fever no elevated white count. Symptoms likely due to CHF I did give her Lasix cardiology okay with plan holding off antibiotics at this time will defer to hospital medicine. Contacted nephrology as well. Will follow. Patient otherwise stable admitted. CLINICAL IMPRESSION:  Final diagnoses:   Chest pain, unspecified type   Dyspnea and respiratory abnormalities   Hypoxia   Elevated brain natriuretic peptide (BNP) level   Troponin level elevated       (Please note that portions of this note may have been completed with a voice recognition program. Efforts were made to edit the dictations but occasionally words aremis-transcribed.)    DISPOSITION REFERRAL (if applicable):  No follow-up provider specified.     DISPOSITION MEDICATIONS (if applicable):  New Prescriptions    No medications on file          Juan Alberto Quigley, [address] [facility], DO  02/03/23 7087

## 2023-02-04 NOTE — CONSULTS
[address] [place] [address] [address] [place] [address]  Phone: [phone]  Office Hours: 8:30AM - 4:30PM  Monday - Friday     Nephrology Service Consultation    Patient:   Giovanni Gomez  MRN: [id number]  Consulting physician:  Brandi Wilkins MD  Reason for Consult: PAUL on CKD    History Obtained From:  patient, electronic medical record  PCP: Abraham Rivera MD    HISTORY OF PRESENT ILLNESS:   The patient is a 80 y.o. female who presents with increasing SOA- she recently had TaVR  Has significant pulm HTN and CKD- baseline creat less than 2-1.8-1.9- had PAUL following cath and TAVR  Also feels sick to her stomach  Feels better since coming to hospital.  Not sure how much urine she is making      Past Medical History:        Diagnosis Date    Aortic stenosis     ON ECHO 11/22- DR HILARY FIGUEREDO/TAVR 1/24/23- DR Ted Muñoz    CAD (coronary artery disease)     Dr Eleanor Figueredo/Jose Figueredo    Chronic renal insufficiency     Dr Epi Hernandez    Depression     Dry eye syndrome     Dr Angela Santana     Elevated LFTs 05/11/2012    Generalized osteoarthritis     GERD (gastroesophageal reflux disease)     Esophagitis    Gout     Hyperlipidemia     Hypertension     Hyperuricemia     Hypothyroid     Hypothyroidism    Low back pain     Osteoporosis     Generalized    Pneumonia due to infectious organism 11/16/2022    Sinus congestion     CHRONIC W POST NASAL DRIP    UTI (urinary tract infection)     Vertigo     Dr Rincon [facility]       Past Surgical History:        Procedure Laterality Date    AORTIC VALVE REPLACEMENT  01/24/2023    Dr Neetu Santiago at [facility] Aug. 2006 and L March 2007 Dr. Gus Porter  2004    [facility] 08/19/2018    rt hip Zheng    HYSTERECTOMY (CERVIX STATUS UNKNOWN)      TONSILLECTOMY         Medications:   Scheduled Meds:   ferrous sulfate  325 mg Oral Daily with breakfast    cefTRIAXone (ROCEPHIN) IV  1,000 mg IntraVENous Q24H    allopurinol  100 mg Oral Daily    apixaban  2.5 mg Oral BID    [Held by provider] amLODIPine  10 mg Oral Nightly    atenolol  25 mg Oral Dinner    azelastine  1 spray Each Nostril BID    therapeutic multivitamin-minerals  1 tablet Oral Daily    cetirizine  5 mg Oral Daily    Vitamin D  2,000 Units Oral Daily    clopidogrel  75 mg Oral Daily    pantoprazole  40 mg Oral QAM AC    docusate sodium  100 mg Oral Daily    ipratropium-albuterol  3 mL Inhalation TID    isosorbide mononitrate  60 mg Oral Daily    levothyroxine  88 mcg Oral Daily    ranolazine  500 mg Oral BID    sertraline  50 mg Oral Daily    atorvastatin  10 mg Oral Daily    sodium bicarbonate  325 mg Oral BID    Lifitegrast  1 drop Both Eyes BID    sodium chloride flush  5-40 mL IntraVENous 2 times per day    polyethylene glycol  17 g Oral BID    bisacodyl  10 mg Rectal Once     Continuous Infusions:   sodium chloride       PRN Meds:.albuterol sulfate HFA, morphine, diphenhydrAMINE-zinc acetate, gabapentin, hydrALAZINE, mineral oil-hydrophilic petrolatum, sodium chloride flush, sodium chloride, ondansetron **OR** ondansetron, acetaminophen **OR** acetaminophen, polyvinyl alcohol, melatonin    Allergies:  Bactrim [sulfamethoxazole-trimethoprim], Cephalexin, and Tape [adhesive tape]    Social History:   TOBACCO:   reports that she has quit smoking. She has never used smokeless tobacco.  ETOH:   reports no history of alcohol use. OCCUPATION:      Family History:       Problem Relation Age of Onset    Other Mother         CAD    Other Father         CAD       REVIEW OF SYSTEMS:  Negative except for SOA.     Physical Exam:    Vitals: BP (!) 159/64   Pulse 72   Temp 99.4 °F (37.4 °C) (Oral)   Resp 18   Ht 5' (1.524 m)   Wt 125 lb (56.7 kg)   LMP  (LMP Unknown)   SpO2 93%   BMI 24.41 kg/m²   General appearance: alert, appears stated age and cooperative  Skin: Skin color, texture, turgor normal. No rashes or lesions  HEENT: Head: Normocephalic, no lesions, without obvious abnormality. Neck: no adenopathy, no carotid bruit, no JVD, supple, symmetrical, trachea midline, and thyroid not enlarged, symmetric, no tenderness/mass/nodules  Lungs: diminished breath sounds bibasilar  Heart: S1, S2 normal  Abdomen:  distended abd BS plus non tender  Extremities: extremities normal, atraumatic, no cyanosis or edema  Neurologic: Mental status: alertness: alert    CBC:   Recent Labs     02/03/23  1537   WBC 10.8*   HGB 7.9*        BMP:    Recent Labs     02/03/23  1537 02/04/23  0113    138   K 3.7 4.5    102   CO2 20* 23   BUN 39* 40*   CREATININE 1.9* 1.9*   GLUCOSE 135* 148*     Troponin: No results for input(s): TROPONINI in the last 72 hours. BNP: No results for input(s): BNP in the last 72 hours. Lipids:   Recent Labs     02/04/23  0113   CHOL 126   HDL 48     ABGs:   Lab Results   Component Value Date/Time    PO2ART 83 11/14/2022 11:30 PM    MTU2BAK 29.0 11/14/2022 11:30 PM     -----------------------------------------------------------------      Assessment and Recommendations     Patient Active Problem List   Diagnosis Code    Hypertension I10    GERD (gastroesophageal reflux disease) K21.9    Depression F32. A    Hyperlipidemia E78.5    Generalized osteoarthritis M15.9    Osteoporosis M81.0    Hypothyroid E03.9    Coronary artery disease involving native heart without angina pectoris I25.10    Eczema L30.9    Low back pain M54.50    Hyperuricemia E79.0    Gout M10.9    Pruritic condition L29.9    PAUL (acute kidney injury) ([facility] [address] N17.9    Hyponatremia E87.1    Cystitis N30.90    Physical deconditioning R53.81    Bradycardia R00.1    LBBB (left bundle branch block) I44.7    Constipation, slow transit K59.01    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 S72.001K    Chronic renal impairment, stage 3 (moderate) ([facility]) N18.30    Dizziness R42    Labyrinthitis H83.09    Vertigo R42    Primary osteoarthritis of left foot M19.072 Closed nondisplaced fracture of fourth metatarsal bone of left foot S92.345A    Closed nondisplaced fracture of third metatarsal bone of left foot S92.335A    Closed nondisplaced fracture of fifth left metatarsal bone S92.355A    Vaginal prolapse N81.10    Sinus congestion R09.81    Localized edema R60.0    Sepsis ([facility]) A41.9    Dyspnea and respiratory abnormalities R06.00, R06.89    Pneumonia due to infectious organism J18.9    Pyelonephritis N12    Chronic kidney disease N18.9    Aortic stenosis I35.0    Hypoxia R09.02    Heart failure ([facility]) I50.9   MDM  1- PAUL vaibhavley CRS type 2 and DYE- cautious diuresis-   2- 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

## 2023-02-04 NOTE — PROGRESS NOTES
4 Eyes Skin Assessment     NAME:  Clayton Sanchez  YOB: 1927  MEDICAL RECORD NUMBER:  [id number]    The patient is being assessed for  Admission    I agree that One RN have performed a thorough Head to Toe Skin Assessment on the patient. ALL assessment sites listed below have been assessed. Areas assessed by both nurses:    Head, Face, Ears, Shoulders, Back, Chest, Arms, Elbows, Hands, Sacrum. Buttock, Coccyx, Ischium, and Legs. Feet and Heels        Does the Patient have a Wound?  No noted wound(s)       Deon Prevention initiated by RN: Yes   Wound Care Orders initiated by RN: NA    Pressure Injury (Stage 3,4, Unstageable, DTI, NWPT, and Complex wounds) if present place referral order by RN under : NA    New and Established Ostomies, if present place, referral order under : NA      Nurse 1 eSignature: Electronically signed by Neda Montero RN on 2/3/23 at 10:08 PM EST    **SHARE this note so that the co-signing nurse is able to place an eSignature**    Nurse 2 eSignature: Electronically signed by Sukh Monet RN on 2/4/23 at 0710 AM EST

## 2023-02-04 NOTE — PLAN OF CARE
Problem: Discharge Planning  Goal: Discharge to home or other facility with appropriate resources  2/4/2023 1620 by Eugenia Winter RN  Outcome: Progressing  2/4/2023 0436 by Orlin Hayden RN  Outcome: Progressing     Problem: Safety - Adult  Goal: Free from fall injury  2/4/2023 1620 by Eugenia Winter RN  Outcome: Progressing  2/4/2023 0436 by Orlin Hayden RN  Outcome: Progressing     Problem: ABCDS Injury Assessment  Goal: Absence of physical injury  2/4/2023 1620 by Eugenia Winter RN  Outcome: Progressing  2/4/2023 0436 by Orlin Hayden RN  Outcome: Progressing     Problem: Pain  Goal: Verbalizes/displays adequate comfort level or baseline comfort level  2/4/2023 1620 by Eugenia Winter RN  Outcome: Progressing  2/4/2023 0436 by Orlin Hayden RN  Outcome: Progressing

## 2023-02-04 NOTE — PROGRESS NOTES
V2.0  [facility] [place] Note      Name:  Ho Escobar /Age/Sex: 1927  (80 y.o. female)   MRN & CSN:  [id number] & [id number] Encounter Date/Time: 2023 8:19 AM EST    Location:  [address] [place] [address]- PCP: Ashley Estrada MD       Hospital Day: 2    Assessment and Plan:    Ho Escobar is a 80 y.o. female with a pmh of pulmonary hypertension, HFpEF, CKD, chronic LBBB, severe AS, hypertension, hyperlipidemia, GERD who presents with <principal problem not specified>     #Acute on chronic HFpEF  #HTN  #Afib  -presents with SOB and chest pressure  -Recent TAVR  -Mild troponin elevation to 0.059  -EKG stable from previous without any acute ischemic changes  -pro-BNP elevated   -Previous echo (2022): EF 55-60%, moderately dilated LA, mod-severe AS, mild-mod MR, mild to mod TR with RVSP of 44  -Negative respiratory PCR  -High TSH and normal fT4  -low procal, bacterial pneumonia unlikely     Plan:  IV lasix  Cardiology on consult, appreciate recs  Strict I's and 's  Echo ordered  Daily weights  Low salt diet  Continue eliquis, atenolol, atorvastatin, plavix, hydralazine, imdur  Holding amlodipine  No RAASi per nephro  Appreciate recs from nephro about antihypertensives  Follow up on Blood cultures and UA     #CKD  -Cr at baseline at 1.9     Plan:  Nephro consulted, appreciate recs   Continue dialyvyte, sodium bicarb     #Weakness  -came here from rehab center     Plan:  PT/OT consult     #Dysphagia  -on nectar thick liquids      Plan:  SLP consult, appreciate recs  GI consult, appreciate recs  EGD tomorrow     #Constipation  -does not remember when her last BM was     Plan:  Miralax BID  Bisacodyl suppository    #Iron deficiency anemia  -low iron level and transferrin saturation    Plan:  Ferrous sulphate daily    #UTI  -UA positive for nitrates    Plan:  Start on ceftriaxone  Follow up on urine cultures     Chronic medical problems:   #COPD  -wheezing  Duonebs ordered     #GERD  -On lansoprazole at home  Continue on pantoprazole inpatient        Diet ADULT DIET; Regular; No Added Salt (3-4 gm); 1800 ml   DVT Prophylaxis [x] Lovenox, []  Heparin, [] SCDs, [] Ambulation,  [] Eliquis, [] Xarelto  [] Coumadin   Code Status Limited   Disposition From: rehab  Expected Disposition: rehab  Estimated Date of Discharge: 2-3 days  Patient requires continued admission due to acute hypoxic resp failure 2/2 CHF exacerbation, dysphagia   Surrogate Decision Maker/ POA granddaughter     Subjective:     Chief Complaint: Chest Pain (Given 4 ASA, 1 nitro)       Patient states that she feels well this morning. Denies chest pressure and SOB. Review of Systems:    As above    Objective:      Intake/Output Summary (Last 24 hours) at 2/4/2023 7113  Last data filed at 2/3/2023 2348  Gross per 24 hour   Intake 150 ml   Output --   Net 150 ml        Vitals:   Vitals:    02/04/23 0645   BP: (!) 153/53   Pulse: 72   Resp: 18   Temp: 99.4 °F (37.4 °C)   SpO2: 93%       Physical Exam:     General: NAD, AAO x3-4  Eyes: EOMI  HENT: Atraumatic  CVS: Normal S1 and S2, no murmurs, RRR  Respiratory: Normal breath sounds, clear to auscultation bilaterally, no respiratory distress  GI: Normal bowel sounds, soft, nondistended, nontender  MSK: no lower extremity edema  Skin: Intact, dry, warm, no rashes  Neuro: CN II to XII grossly intact  Psych: Normal mood    Medications:   Medications:    allopurinol  100 mg Oral Daily    apixaban  2.5 mg Oral BID    [Held by provider] amLODIPine  10 mg Oral Nightly    atenolol  25 mg Oral Dinner    azelastine  1 spray Each Nostril BID    therapeutic multivitamin-minerals  1 tablet Oral Daily    cetirizine  5 mg Oral Daily    Vitamin D  2,000 Units Oral Daily    clopidogrel  75 mg Oral Daily    pantoprazole  40 mg Oral QAM AC    docusate sodium  100 mg Oral Daily    ipratropium-albuterol  3 mL Inhalation TID    isosorbide mononitrate  60 mg Oral Daily    levothyroxine  88 mcg Oral Daily    ranolazine  500 mg Oral BID    sertraline  50 mg Oral Daily    atorvastatin  10 mg Oral Daily    sodium bicarbonate  325 mg Oral BID    Lifitegrast  1 drop Both Eyes BID    sodium chloride flush  5-40 mL IntraVENous 2 times per day    polyethylene glycol  17 g Oral BID    bisacodyl  10 mg Rectal Once      Infusions:    sodium chloride       PRN Meds: albuterol sulfate HFA, 2 puff, Q6H PRN  morphine, 4 mg, Q30 Min PRN  diphenhydrAMINE-zinc acetate, , TID PRN  gabapentin, 300 mg, Daily PRN  hydrALAZINE, 10 mg, BID PRN  mineral oil-hydrophilic petrolatum, , PRN  sodium chloride flush, 5-40 mL, PRN  sodium chloride, , PRN  ondansetron, 4 mg, Q8H PRN   Or  ondansetron, 4 mg, Q6H PRN  acetaminophen, 650 mg, Q6H PRN   Or  acetaminophen, 650 mg, Q6H PRN  polyvinyl alcohol, 1 drop, PRN  melatonin, 6 mg, Nightly PRN        Labs      Recent Results (from the past 24 hour(s))   CBC with Auto Differential    Collection Time: 02/03/23  3:37 PM   Result Value Ref Range    WBC 10.8 (H) 4.0 - 10.5 K/CU MM    RBC 2.52 (L) 4.2 - 5.4 M/CU MM    Hemoglobin 7.9 (L) 12.5 - 16.0 GM/DL    Hematocrit 24.6 (L) 37 - 47 %    MCV 97.6 78 - 100 FL    MCH 31.3 (H) 27 - 31 PG    MCHC 32.1 32.0 - 36.0 %    RDW 14.8 11.7 - 14.9 %    Platelets 065 515 - 302 K/CU MM    MPV 9.1 7.5 - 11.1 FL    Differential Type AUTOMATED DIFFERENTIAL     Segs Relative 61.0 36 - 66 %    Lymphocytes % 27.2 24 - 44 %    Monocytes % 8.8 (H) 0 - 4 %    Eosinophils % 2.1 0 - 3 %    Basophils % 0.5 0 - 1 %    Segs Absolute 6.6 K/CU MM    Lymphocytes Absolute 2.9 K/CU MM    Monocytes Absolute 1.0 K/CU MM    Eosinophils Absolute 0.2 K/CU MM    Basophils Absolute 0.1 K/CU MM    Nucleated RBC % 0.0 %    Total Nucleated RBC 0.0 K/CU MM    Total Immature Neutrophil 0.04 K/CU MM    Immature Neutrophil % 0.4 0 - 0.43 %   Comprehensive Metabolic Panel    Collection Time: 02/03/23  3:37 PM   Result Value Ref Range    Sodium 138 135 - 145 MMOL/L    Potassium 3.7 3.5 - 5.1 MMOL/L    Chloride 105 99 - 110 mMol/L    CO2 20 (L) 21 - 32 MMOL/L    BUN 39 (H) 6 - 23 MG/DL    Creatinine 1.9 (H) 0.6 - 1.1 MG/DL    Est, Glom Filt Rate 24 (L) >60 mL/min/1.73m2    Glucose 135 (H) 70 - 99 MG/DL    Calcium 9.3 8.3 - 10.6 MG/DL    Albumin 4.0 3.4 - 5.0 GM/DL    Total Protein 7.5 6.4 - 8.2 GM/DL    Total Bilirubin 0.4 0.0 - 1.0 MG/DL    ALT 14 10 - 40 U/L    AST 25 15 - 37 IU/L    Alkaline Phosphatase 72 40 - 129 IU/L    Anion Gap 13 4 - 16   Troponin    Collection Time: 02/03/23  3:37 PM   Result Value Ref Range    Troponin T 0.059 (H) <0.01 NG/ML   Brain Natriuretic Peptide    Collection Time: 02/03/23  3:37 PM   Result Value Ref Range    Pro-BNP 5,978 (H) <300 PG/ML   Magnesium    Collection Time: 02/03/23  3:37 PM   Result Value Ref Range    Magnesium 2.1 1.8 - 2.4 mg/dl   Protime/INR & PTT    Collection Time: 02/03/23  3:37 PM   Result Value Ref Range    Protime 14.4 11.7 - 14.5 SECONDS    INR 1.12 INDEX    aPTT 33.6 25.1 - 37.1 SECONDS   EKG 12 Lead    Collection Time: 02/03/23  3:43 PM   Result Value Ref Range    Ventricular Rate 79 BPM    Atrial Rate 79 BPM    P-R Interval 182 ms    QRS Duration 146 ms    Q-T Interval 426 ms    QTc Calculation (Bazett) 488 ms    P Axis 6 degrees    R Axis -69 degrees    T Axis 96 degrees    Diagnosis       Normal sinus rhythm  Left axis deviation  Left bundle branch block  Abnormal ECG  When compared with ECG of 14-NOV-2022 14:36,  No significant change was found  Confirmed by Amelia Cueva MD, Dawit Fontaine ([id number]) on 2/3/2023 7:12:32 PM     Blood Gas, Venous    Collection Time: 02/03/23  3:45 PM   Result Value Ref Range    pH, Denis 7.42 7.32 - 7.42    pCO2, Denis 36 (L) 38 - 52 mmHG    pO2, Denis 140 (H) 28 - 48 mmHG    Base Excess 1 0 - 2.4    HCO3, Venous 23.4 19 - 25 MMOL/L    O2 Sat, Denis 94.0 (H) 50 - 70 %    Comment VBG    Rapid Flu Swab    Collection Time: 02/03/23  3:55 PM    Specimen: Nasopharyngeal   Result Value Ref Range    Rapid Influenza A Ag NEGATIVE NEGATIVE    Rapid Influenza B Ag NEGATIVE NEGATIVE   COVID-19, Rapid    Collection Time: 02/03/23  3:55 PM    Specimen: Nasopharyngeal   Result Value Ref Range    Source UNKNOWN     SARS-CoV-2, NAAT NOT DETECTED NOT DETECTED   Respiratory Panel, Molecular, with COVID-19 (Restricted: peds pts or suitable admitted adults)    Collection Time: 02/03/23  6:58 PM    Specimen: Nasopharyngeal   Result Value Ref Range    Adenovirus Detection by PCR NOT DETECTED NOT DETECTED    Coronavirus 229E PCR NOT DETECTED NOT DETECTED    Coronavirus HKU1 PCR NOT DETECTED NOT DETECTED    Coronavirus NL63 PCR NOT DETECTED NOT DETECTED    Coronavirus OC43 PCR NOT DETECTED NOT DETECTED    SARS-CoV-2 NOT DETECTED NOT DETECTED    Human Metapneumovirus PCR NOT DETECTED NOT DETECTED    Rhinovirus Enterovirus PCR NOT DETECTED NOT DETECTED    Influenza A by PCR NOT DETECTED NOT DETECTED    Influenza A H1 Pandemic PCR NOT DETECTED NOT DETECTED    Influenza A H1 (2009) PCR NOT DETECTED NOT DETECTED    Influenza A H3 PCR NOT DETECTED NOT DETECTED    Influenza B by PCR NOT DETECTED NOT DETECTED    Parainfluenza 1 PCR NOT DETECTED NOT DETECTED    Parainfluenza 2 PCR NOT DETECTED NOT DETECTED    Parainfluenza 3 PCR NOT DETECTED NOT DETECTED    Parainfluenza 4 PCR NOT DETECTED NOT DETECTED    RSV PCR NOT DETECTED NOT DETECTED    Bordetella parapertussis by PCR NOT DETECTED NOT DETECTED    B Pertussis by PCR NOT DETECTED NOT DETECTED    Chlamydophila Pneumonia PCR NOT DETECTED NOT DETECTED    Mycoplasma pneumo by PCR NOT DETECTED NOT DETECTED   Iron and TIBC    Collection Time: 02/03/23  7:45 PM   Result Value Ref Range    Iron 18 (L) 37 - 145 ug/dL    UIBC 198 110 - 370 ug/dL    TIBC 216 (L) 250 - 450 ug/dL    Transferrin % 8 (L) 10 - 44 %   Troponin    Collection Time: 02/04/23  1:13 AM   Result Value Ref Range    Troponin T 0.062 (H) <0.01 NG/ML   Basic Metabolic Panel    Collection Time: 02/04/23  1:13 AM   Result Value Ref Range    Sodium 138 135 - 145 MMOL/L    Potassium 4.5 3.5 - 5. 1 MMOL/L    Chloride 102 99 - 110 mMol/L    CO2 23 21 - 32 MMOL/L    Anion Gap 13 4 - 16    BUN 40 (H) 6 - 23 MG/DL    Creatinine 1.9 (H) 0.6 - 1.1 MG/DL    Est, Glom Filt Rate 24 (L) >60 mL/min/1.73m2    Glucose 148 (H) 70 - 99 MG/DL    Calcium 9.1 8.3 - 10.6 MG/DL   Lipid Panel    Collection Time: 02/04/23  1:13 AM   Result Value Ref Range    Triglycerides 127 <150 MG/DL    Cholesterol 126 <200 MG/DL    HDL 48 >40 MG/DL    LDL Calculated 53 <100 MG/DL   Magnesium    Collection Time: 02/04/23  1:13 AM   Result Value Ref Range    Magnesium 2.0 1.8 - 2.4 mg/dl   T4, free    Collection Time: 02/04/23  1:13 AM   Result Value Ref Range    T4 Free 1.08 0.9 - 1.8 NG/DL   TSH without Reflex    Collection Time: 02/04/23  1:13 AM   Result Value Ref Range    TSH, High Sensitivity 8.080 (H) 0.270 - 4.20 uIu/ml   Urinalysis    Collection Time: 02/04/23  1:30 AM   Result Value Ref Range    Color, UA YELLOW YELLOW    Clarity, UA SLIGHTLY CLOUDY (A) CLEAR    Glucose, Urine NEGATIVE NEGATIVE MG/DL    Bilirubin Urine NEGATIVE NEGATIVE MG/DL    Ketones, Urine NEGATIVE NEGATIVE MG/DL    Specific Gravity, UA 1.020 1.001 - 1.035    Blood, Urine TRACE (A) NEGATIVE    pH, Urine 6.0 5.0 - 8.0    Protein,  (A) NEGATIVE MG/DL    Urobilinogen, Urine 0.2 0.2 - 1.0 MG/DL    Nitrite Urine, Quantitative POSITIVE (A) NEGATIVE    Leukocyte Esterase, Urine NEGATIVE NEGATIVE   Microscopic Urinalysis    Collection Time: 02/04/23  1:30 AM   Result Value Ref Range    RBC, UA 2 0 - 6 /HPF    WBC, UA 1 0 - 5 /HPF    Bacteria, UA RARE (A) NEGATIVE /HPF    Squam Epithel, UA 2 /HPF    Trichomonas, UA NONE SEEN NONE SEEN /HPF   Troponin    Collection Time: 02/04/23  3:47 AM   Result Value Ref Range    Troponin T 0.066 (H) <0.01 NG/ML        Imaging/Diagnostics Last 24 Hours   CT CHEST WO CONTRAST    Result Date: 2/3/2023  EXAMINATION: CT OF THE CHEST WITHOUT CONTRAST 2/3/2023 4:19 pm TECHNIQUE: CT of the chest was performed without the administration of intravenous contrast. Multiplanar reformatted images are provided for review. Automated exposure control, iterative reconstruction, and/or weight based adjustment of the mA/kV was utilized to reduce the radiation dose to as low as reasonably achievable. COMPARISON: Chest CT 11/14/2022 HISTORY: ORDERING SYSTEM PROVIDED HISTORY: Short of breath, chest pain, dyspnea/cp Decision Support Exception - unselect if not a suspected or confirmed emergency medical condition->Emergency Medical Condition (MA) Reason for Exam: dyspnea/cp Additional signs and symptoms: no Relevant Medical/Surgical History: none FINDINGS: Mediastinum: Cardiac structures and great vessels appear unremarkable with exception of calcific atherosclerotic disease. No pericardial effusion. Posterior mediastinal structures appear unremarkable. No mediastinal or hilar adenopathy. Sequela from TAVR. Lungs/pleura: Small volume bilateral pleural effusion with associated relaxation atelectasis. Diffuse interlobular septal thickening and patchy alveolar densities bilateral lungs. No inspissated secretions or endobronchial lesion evident. No pneumothorax. Upper Abdomen: Unremarkable appearance. Soft Tissues/Bones: No acute superficial soft tissue or osseous structure abnormality evident. 1.  Congestive heart failure is most likely given the findings; pneumonia is also a consideration in areas of consolidation with pleural effusion. 2.  Vascular calcifications are noted reflecting calcific atherosclerosis. 3. Sequela from TAVR. XR CHEST PORTABLE    Result Date: 2/3/2023  EXAMINATION: ONE XRAY VIEW OF THE CHEST 2/3/2023 4:23 pm COMPARISON: 12/01/2022 HISTORY: ORDERING SYSTEM PROVIDED HISTORY: dyspnea TECHNOLOGIST PROVIDED HISTORY: Reason for exam:->dyspnea Reason for Exam: dyspnea Additional signs and symptoms: unknown Relevant Medical/Surgical History: CAD, pna FINDINGS: Cardiomediastinal silhouette stable. Bilateral patchy airspace opacities.  No pneumothorax. No pleural effusion.      Bilateral patchy airspace opacities, increased compared to the previous exam, suggesting multifocal pneumonia       Electronically signed by Riki Roe MD on 2/4/2023 at 8:19 AM

## 2023-02-04 NOTE — PROGRESS NOTES
Nutrition Note    Consult on admit for heart failure diet guidelines. During stay has been on no added salt diet with 1800 ml fluid restiction. [place] eval today with diet consistency change to minced and moist with mildly thick liquids. Plan for EGD tomorrow. Patient not appropriate for diet ed at this time. Family more concerned on visit with patient receiving correct diet consistency and thickened liquids. Will continue to follow up during stay.     Electronically signed by Sarika Rivera RD, LD on 2/4/23 at 1:27 PM EST    Contact: [phone]

## 2023-02-04 NOTE — PROGRESS NOTES
Spoke with Dr. Seferino Hamilton who states plan for EGD tomorrow r/t dysphagia. He requested clearance from cardio given BNP and trop. PS to Lauren Marcial NP with cardio to alert. 4:33 PM  Received call from pt granddaughter/Lynn who requested she would like a call from Dr. Seferino Hamilton to discuss the EGD. PS sent to alert Dr. Seferino Hamilton and included Lynn's contact information. 5:29 PM  PS to Dr. Pascual Uriarte to update: Pt wiggled down to the end of bed and had her O2 off. She recovered quickly on 6L, she was on 3L earlier today. She is having labored breathing, sounds very wet and is febrile. Her MEWS is a 5. She is also nauseated and appears anxious. I am unsure if she needs blood gases or cxray. Are you able to come and lay eyes on her. Respiratory was just here and gave her a breathing tx recently and administered rescue inhaler now. Dr. Pascual Uriarte responded and states hospitalist will come and assess. Dr. Amor Dueñas to room and he declined need for lasix IV and ordered CXray and ABG. Called RT to alert and then order for ABG changed to VBG given pt satting well and sats recovered with O2 reapplication. See chart for vitals. Also updated Dr. Seferino Hamilton who states he will cancel EGD including NPO and full code order. Granddaughter at bedside and updated about above.

## 2023-02-04 NOTE — PROGRESS NOTES
Cardiology Progress Note           Navin Chavez is a 80 y.o. female   1/23/1927     SUBJECTIVE:   Patient seen and examined nephrology evaluation noted no chest pain shortness of breath is better currently on 2 L of oxygen  OBJECTIVE:    Review of Systems:  General appearance: alert, appears stated age and cooperative  Skin: Skin color, texture, normal. No rashes or lesions  HEENT: No nose bleed, headache, vision problems  CV: C/O chest pain, tightness, pressure,   Respiratory: C/o no SOB, RENDON, Orthopnea, PND  GI: No abdominal pain, black stool, bloating  Limbs: No c/o edema, pain, swelling, intermittent claudication, joint pains  Neuro: No dizziness, lightheadedness, syncope, gait problems, memory problems  Psych: grossly normal. No SI/depression. Vitals:   Blood pressure (!) 159/64, pulse 79, temperature 98 °F (36.7 °C), temperature source Oral, resp. rate 16, height 5' (1.524 m), weight 125 lb (56.7 kg), SpO2 94 %, not currently breastfeeding.     HEENT: AT, NC, PERRLA  Neck: No JVD  Heart: S1 S2 audible, no murmur   Lungs: CTA   Abdomen: Nontender   Limbs: No edema   CNS: no focal deficit      Past Medical History:   Diagnosis Date    Aortic stenosis     ON ECHO 11/22- DR HILARY FIGUEREDO/TAVR 1/24/23- DR Jamal Smallwood    CAD (coronary artery disease)     Dr Kyung Figueredo/Jose Figueredo    Chronic renal insufficiency     Dr Fanny Castaneda    Depression     Dry eye syndrome     Dr Amaro Guard    Eczema     Elevated LFTs 05/11/2012    Generalized osteoarthritis     GERD (gastroesophageal reflux disease)     Esophagitis    Gout     Hyperlipidemia     Hypertension     Hyperuricemia     Hypothyroid     Hypothyroidism    Low back pain     Osteoporosis     Generalized    Pneumonia due to infectious organism 11/16/2022    Sinus congestion     CHRONIC W POST NASAL DRIP    UTI (urinary tract infection)     Vertigo     Dr Kristina Connors        Patient Active Problem List   Diagnosis    Hypertension    GERD (gastroesophageal reflux disease) Depression    Hyperlipidemia    Generalized osteoarthritis    Osteoporosis    Hypothyroid    Coronary artery disease involving native heart without angina pectoris    Eczema    Low back pain    Hyperuricemia    Gout    Pruritic condition    PAUL (acute kidney injury) ([facility]    Hyponatremia    Cystitis    Physical deconditioning    Bradycardia    LBBB (left bundle branch block)    Constipation, slow transit    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    Chronic renal impairment, stage 3 (moderate) ([facility])    Dizziness    Labyrinthitis    Vertigo    Primary osteoarthritis of left foot    Closed nondisplaced fracture of fourth metatarsal bone of left foot    Closed nondisplaced fracture of third metatarsal bone of left foot    Closed nondisplaced fracture of fifth left metatarsal bone    Vaginal prolapse    Sinus congestion    Localized edema    Sepsis (HCC)    Dyspnea and respiratory abnormalities    Pneumonia due to infectious organism    Pyelonephritis    Chronic kidney disease    Aortic stenosis    Hypoxia    Heart failure (HCC)        Allergies   Allergen Reactions    Bactrim [Sulfamethoxazole-Trimethoprim] Nausea And Vomiting    Cephalexin Rash    Tape [Adhesive Tape] Itching        Current Inpatient Medications:    Current Facility-Administered Medications   Medication Dose Route Frequency Provider Last Rate Last Admin    albuterol sulfate HFA (PROVENTIL;VENTOLIN;PROAIR) 108 (90 Base) MCG/ACT inhaler 2 puff  2 puff Inhalation Q6H PRN Violet Blake APRN - CNP        cefTRIAXone (ROCEPHIN) 1,000 mg in sodium chloride 0.9 % 50 mL IVPB (mini-bag)  1,000 mg IntraVENous Q24H Javier Joy MD   Stopped at 02/04/23 1027    iron sucrose (VENOFER) 200 mg in sodium chloride 0.9 % 100 mL IVPB  200 mg IntraVENous Q24H Solomon Frye MD        morphine sulfate (PF) injection 4 mg  4 mg IntraVENous Q30 Min PRN Kenzie Edwards DO   4 mg at 02/03/23 1640    allopurinol (ZYLOPRIM) tablet 100 mg  100 mg Oral Daily Mamie MD Dashawn   100 mg at 02/04/23 0949    apixaban (ELIQUIS) tablet 2.5 mg  2.5 mg Oral BID Christiana Pearson MD   2.5 mg at 02/04/23 0950    [Held by provider] amLODIPine (NORVASC) tablet 10 mg  10 mg Oral Nightly Mamie Tamez MD        atenolol (TENORMIN) tablet 25 mg  25 mg Oral Dinner Christiana Pearson MD   25 mg at 02/03/23 2241    azelastine (ASTELIN) 0.1 % nasal spray 1 spray  1 spray Each Nostril BID Christiana Pearson MD   1 spray at 02/04/23 0959    therapeutic multivitamin-minerals 1 tablet  1 tablet Oral Daily Christiana Pearson MD   1 tablet at 02/04/23 0950    cetirizine (ZYRTEC) tablet 5 mg  5 mg Oral Daily Christiana Pearson MD   5 mg at 02/04/23 0949    Vitamin D (CHOLECALCIFEROL) tablet 2,000 Units  2,000 Units Oral Daily Christiana Pearson MD   2,000 Units at 02/04/23 0949    clopidogrel (PLAVIX) tablet 75 mg  75 mg Oral Daily Mamie Tamez MD   75 mg at 02/04/23 0950    pantoprazole (PROTONIX) tablet 40 mg  40 mg Oral QAM AC Christiana Pearson MD   40 mg at 02/04/23 0950    diphenhydrAMINE-zinc acetate cream   Topical TID PRN Christiana Pearson MD        docusate sodium (COLACE) capsule 100 mg  100 mg Oral Daily Mamie Tamez MD   100 mg at 02/04/23 0949    gabapentin (NEURONTIN) capsule 300 mg  300 mg Oral Daily PRN Christiana Pearson MD        hydrALAZINE (APRESOLINE) tablet 10 mg  10 mg Oral BID PRN Christiana Pearson MD        ipratropium-albuterol (DUONEB) nebulizer solution 3 mL  3 mL Inhalation TID Christiana Pearson MD   3 mL at 02/03/23 2010    isosorbide mononitrate (IMDUR) extended release tablet 60 mg  60 mg Oral Daily Mamie Tamez MD   60 mg at 02/04/23 0949    levothyroxine (SYNTHROID) tablet 88 mcg  88 mcg Oral Daily Mamie Tamez MD   88 mcg at 02/04/23 0949    mineral oil-hydrophilic petrolatum (AQUAPHOR) ointment   Topical PRN Christiana Pearson MD        ranolazine (RANEXA) extended release tablet 500 mg  500 mg Oral BID Christiana Pearson MD   500 mg at 02/04/23 0949    sertraline (ZOLOFT) tablet 50 mg 50 mg Oral Daily Radhika Kelly MD   50 mg at 02/04/23 0950    atorvastatin (LIPITOR) tablet 10 mg  10 mg Oral Daily Radhika Kelly MD   10 mg at 02/04/23 0950    sodium bicarbonate tablet 325 mg  325 mg Oral BID Radhika Kelly MD   325 mg at 02/04/23 0950    Lifitegrast 5 % SOLN 1 drop  (Patient Supplied)  1 drop Both Eyes BID Mamie Tamez MD        sodium chloride flush 0.9 % injection 5-40 mL  5-40 mL IntraVENous 2 times per day Radhika Kelly MD   10 mL at 02/04/23 0951    sodium chloride flush 0.9 % injection 5-40 mL  5-40 mL IntraVENous PRN Mamie Tamez MD        0.9 % sodium chloride infusion   IntraVENous PRN Radhika Kelly MD        ondansetron (ZOFRAN-ODT) disintegrating tablet 4 mg  4 mg Oral Q8H PRN Radhika Kelly MD        Or    ondansetron (ZOFRAN) injection 4 mg  4 mg IntraVENous Q6H PRN Mamie Tamez MD   4 mg at 02/04/23 0115    acetaminophen (TYLENOL) tablet 650 mg  650 mg Oral Q6H PRN Radhika Kelly MD   650 mg at 02/04/23 4158    Or    acetaminophen (TYLENOL) suppository 650 mg  650 mg Rectal Q6H PRN Radhika Kelly MD        polyethylene glycol (GLYCOLAX) packet 17 g  17 g Oral BID Radhika Kelly MD   17 g at 02/04/23 0950    bisacodyl (DULCOLAX) suppository 10 mg  10 mg Rectal Once Radhika Kelly MD        polyvinyl alcohol (LIQUIFILM TEARS) 1.4 % ophthalmic solution 1 drop  1 drop Both Eyes PRN Mamie Tamez MD        melatonin tablet 6 mg  6 mg Oral Nightly PRN CLEMENT Corral - CNP   6 mg at 02/03/23 7787           Labs:  CBC with Differential:    Lab Results   Component Value Date/Time    WBC 10.8 02/03/2023 03:37 PM    RBC 2.52 02/03/2023 03:37 PM    HGB 7.9 02/03/2023 03:37 PM    HCT 24.6 02/03/2023 03:37 PM     02/03/2023 03:37 PM    MCV 97.6 02/03/2023 03:37 PM    MCH 31.3 02/03/2023 03:37 PM    MCHC 32.1 02/03/2023 03:37 PM    RDW 14.8 02/03/2023 03:37 PM    SEGSPCT 61.0 02/03/2023 03:37 PM    LYMPHOPCT 27.2 02/03/2023 03:37 PM    MONOPCT 8.8 02/03/2023 03:37 PM    EOSPCT 4 10/06/2021 12:00 AM    BASOPCT 0.5 02/03/2023 03:37 PM    MONOSABS 1.0 02/03/2023 03:37 PM    LYMPHSABS 2.9 02/03/2023 03:37 PM    EOSABS 0.2 02/03/2023 03:37 PM    BASOSABS 0.1 02/03/2023 03:37 PM    DIFFTYPE AUTOMATED DIFFERENTIAL 02/03/2023 03:37 PM     CMP:    Lab Results   Component Value Date/Time     02/04/2023 01:13 AM    K 4.5 02/04/2023 01:13 AM     02/04/2023 01:13 AM    CO2 23 02/04/2023 01:13 AM    BUN 40 02/04/2023 01:13 AM    CREATININE 1.9 02/04/2023 01:13 AM    GFRAA 32 09/26/2022 03:03 PM    GFRAA 37 03/19/2013 02:04 PM    AGRATIO 1.6 08/19/2022 11:20 AM    LABGLOM 24 02/04/2023 01:13 AM    GLUCOSE 148 02/04/2023 01:13 AM    GLUCOSE 126 06/07/2021 12:00 AM    PROT 7.5 02/03/2023 03:37 PM    PROT 7.2 03/19/2013 02:04 PM    LABALBU 4.0 02/03/2023 03:37 PM    CALCIUM 9.1 02/04/2023 01:13 AM    BILITOT 0.4 02/03/2023 03:37 PM    ALKPHOS 72 02/03/2023 03:37 PM    AST 25 02/03/2023 03:37 PM    ALT 14 02/03/2023 03:37 PM     Hepatic Function Panel:    Lab Results   Component Value Date/Time    ALKPHOS 72 02/03/2023 03:37 PM    ALT 14 02/03/2023 03:37 PM    AST 25 02/03/2023 03:37 PM    PROT 7.5 02/03/2023 03:37 PM    PROT 7.2 03/19/2013 02:04 PM    BILITOT 0.4 02/03/2023 03:37 PM    LABALBU 4.0 02/03/2023 03:37 PM     Magnesium:    Lab Results   Component Value Date/Time    MG 2.0 02/04/2023 01:13 AM     PT/INR:    Lab Results   Component Value Date/Time    PROTIME 14.4 02/03/2023 03:37 PM    PROTIME 10.0 05/09/2012 11:30 PM    INR 1.12 02/03/2023 03:37 PM     Last 3 Troponin:    Lab Results   Component Value Date/Time    TROPONINI 0.025 05/10/2012 02:20 PM    TROPONINI 0.023 05/10/2012 05:12 AM    TROPONINI 0.019 05/09/2012 11:30 PM     U/A:    Lab Results   Component Value Date/Time    COLORU YELLOW 02/04/2023 01:30 AM    PHUR 6.0 09/27/2022 03:36 PM    WBCUA 1 02/04/2023 01:30 AM    RBCUA 2 02/04/2023 01:30 AM    MUCUS RARE 12/06/2022 01:54 PM    TRICHOMONAS NONE SEEN 02/04/2023 01:30 AM    BACTERIA RARE 02/04/2023 01:30 AM    CLARITYU SLIGHTLY CLOUDY 02/04/2023 01:30 AM    SPECGRAV 1.020 02/04/2023 01:30 AM    LEUKOCYTESUR NEGATIVE 02/04/2023 01:30 AM    UROBILINOGEN 0.2 02/04/2023 01:30 AM    BILIRUBINUR NEGATIVE 02/04/2023 01:30 AM    BLOODU TRACE 02/04/2023 01:30 AM    GLUCOSEU Negative 09/27/2022 03:36 PM     ABG:    Lab Results   Component Value Date/Time    GHG0YNP 29.0 11/14/2022 11:30 PM    PO2ART 83 11/14/2022 11:30 PM    BNP5PBR 18.4 11/14/2022 11:30 PM     FLP:    Lab Results   Component Value Date/Time    TRIG 127 02/04/2023 01:13 AM    HDL 48 02/04/2023 01:13 AM    HDL 46 02/03/2012 11:09 PM    LDLCALC 53 02/04/2023 01:13 AM    LDLDIRECT 78 10/04/2018 03:44 PM    LABVLDL 35 08/19/2022 11:20 AM     TSH:    Lab Results   Component Value Date/Time    TSH 0.62 08/19/2022 11:20 AM      DATA:   ECG: Sinus Rhythm       ASSESSMENT:   1 acute diastolic heart failure volume overload  Nephrology managing diuresis  2 recent TAVR for severe aortic stenosis  With preserved ejection fraction  No apparent complication  3 history of atrial fibrillation rate is controlled  4 acute kidney injury neurology evaluation noted  PLAN   Stable from cardiac standpoint  Gentle diuresis per nephrology recommendation    Stable for EGD from cardiac stand  Patient was seen and examined I agree with above assessment and plan Electronically signed by Gisella Quesada MD on 2/5/2023 at 1:44 PM

## 2023-02-04 NOTE — PROGRESS NOTES
Speech Language Pathology  Facility/Department: [address] [place] [address]   CLINICAL BEDSIDE SWALLOW EVALUATION    NAME: Kosta Ahumada  : 1927  MRN: [id number]    IMPRESSIONS: Kosta Ahumada was referred for bedside swallow evaluation following admission to [facility] 2/3/2023 d/t SOB and chest pain. Pt's relevant medical hx includes: CAD, CKD, HTN, GERD, PNA. Pt with previous hx of dysphagia as identified via MBSS 2023 which revealed aspiration of thin liquids, recommending minced and moist diet / nectar thick liquids. Pt was evaluated seated upright in bed, alert and cooperative. Oral mechanism examination revealed overall weakness, no asymmetry. Pt was seen with breakfast tray, which consisted of regular solids and thin liquids. Pt accepted PO trials of thin liquids via cup sips/straw sips, nectar thick liquids via cup/straw sips, and puree consistency. Pt was additionally observed participating in pills whole in puree with nsg. Pt refused trials of advanced solid consistencies d/t feeling unwell and not having dentures in at the time. Across consistencies accepted, [facility] with adequate labial seal, AP transit, and oral clearance. Pharyngeal stage appears mildly impaired with coughing immediately following all trials of thin liquids. No s/s of aspiration observed across all other consistencies. Recommend initiate minced and moist diet/nectar thick liquids via straw sips, with strict aspiration precautions. Recommend pills given in puree. Ensure pt is upright for all oral intake, pt benefits from assistance feeding d/t overall weakness. Results/recommendations were d/w pt and RN, Thomas Bashir, who demonstrated understanding and agreement. ADMISSION DATE: 2/3/2023  ADMITTING DIAGNOSIS: has Hypertension; GERD (gastroesophageal reflux disease); Depression; Hyperlipidemia; Generalized osteoarthritis; Osteoporosis;  Hypothyroid; Coronary artery disease involving native heart without angina pectoris; Eczema; Low back pain; Hyperuricemia; Gout; Pruritic condition; PAUL (acute kidney injury) ([address] Hyponatremia; Cystitis; Physical deconditioning; Bradycardia; LBBB (left bundle branch block); Constipation, slow transit; 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; Chronic renal impairment, stage 3 (moderate) ([address] Dizziness; Labyrinthitis; Vertigo; Primary osteoarthritis of left foot; Closed nondisplaced fracture of fourth metatarsal bone of left foot; Closed nondisplaced fracture of third metatarsal bone of left foot; Closed nondisplaced fracture of fifth left metatarsal bone; Vaginal prolapse; Sinus congestion; Localized edema; Sepsis ([address] Dyspnea and respiratory abnormalities; Pneumonia due to infectious organism; Pyelonephritis; Chronic kidney disease; Aortic stenosis; Hypoxia; and Heart failure ([address] on their problem list.  ONSET DATE: this admission    Recent Chest Xray/CT of Chest: 2/3/2023  Bilateral patchy airspace opacities, increased compared to the previous exam,   suggesting multifocal pneumonia       Date of Eval: 2/4/2023  Evaluating Therapist: Micky Taveras MS, CF-SLP    Current Diet level:  Current Diet : Regular      Primary Complaint  Patient Complaint: Pt c/o overall discomfort    Pain:  Pain Assessment  Pain Assessment: 0-10  Pain Level: 2  Patient's Stated Pain Goal: 0 - No pain  Pain Location: Generalized  Pain Descriptors: Sore  Functional Pain Assessment: Activities are not prevented  Non-Pharmaceutical Pain Intervention(s): Emotional support    Reason for Referral  Ho Escobar was referred for a bedside swallow evaluation to assess the efficiency of her swallow function, identify signs and symptoms of aspiration and make recommendations regarding safe dietary consistencies, effective compensatory strategies, and safe eating environment.     Impression  Dysphagia Diagnosis: Mild to moderate oral stage dysphagia;Mild pharyngeal stage dysphagia  Dysphagia Outcome Severity Scale: Level 4: Mild moderate dysphagia- Intermittent supervision/cueing. One - two diet consistencies restricted     Treatment Plan  Requires SLP Intervention: Yes           Recommended Diet and Intervention        Recommended Form of Meds: Whole with puree  Recommendations: Assistance with meals       Compensatory Swallowing Strategies  Compensatory Swallowing Strategies : Small bites/sips;Assist feed;Upright as possible for all oral intake    Treatment/Goals  Short-term Goals  Timeframe for Short-term Goals: Pt will tolerate minced and moist diet / nectar thick liquids without clinical signs of aspiration across all PO trials  Goal 1: Pt will participate in trials of advanced diet consistencies with adequate oral manipulation and no clinical signs of aspiration across all PO trials  Goal 2: Pt and caregivers will demonstrate understanding of recommendations and education    General  Chart Reviewed: Yes  Behavior/Cognition: Alert; Cooperative  Respiratory Status: O2 via nasual cannula  O2 Device: Nasal cannula  Communication Observation: Functional  Follows Directions: Simple  Dentition: Edentulous  Patient Positioning: Upright in bed  Baseline Vocal Quality: Normal  Volitional Cough: Strong  Prior Dysphagia History: Pt completed MBSS recommended minced and moist / NTL  Consistencies Administered: Pureed; Thin - straw; Thin - cup;Mildly Thick - straw;Mildly Thick - cup;Pills           Vision/Hearing  Vision  Vision: Impaired  Hearing  Hearing: Within functional limits    Oral Phase Dysfunction  Oral Phase  Oral Phase: Exceptions     Indicators of Pharyngeal Phase Dysfunction   Pharyngeal Phase   Pharyngeal Phase: mild-moderately impaired    Prognosis  Individuals consulted  Consulted and agree with results and recommendations: Patient;RN  RN Name: Aureliano Guevara  Patient Education: results/recommendations  Patient Education Response: Needs reinforcement  Safety Devices in place: Yes  Type of devices:  All fall risk precautions in place; Left in bed       Therapy Time  SLP Individual Minutes  Time In: 1000  Time Out: [id number] [address]eves  Minutes: [address] [facility], MS, CF-SLP  2/4/2023 10:33 AM Yes

## 2023-02-04 NOTE — PROGRESS NOTES
RENAL DOSE ADJUSTMENT MADE PER P/T PROTOCOL    PREVIOUS ORDER:  Cefepime 2000 mg every 8 hr    Estimated Creatinine Clearance: 14 mL/min (A) (based on SCr of 1.9 mg/dL (H)).   Recent Labs     02/03/23  1537 02/04/23  0113   BUN 39* 40*   CREATININE 1.9* 1.9*     --    INR 1.12  --      NEW RENALLY ADJUSTED ORDER:  Cefepime 1000 mg every 12 hr    MARIANELA Calzada [facility]  2/4/2023 5:49 PM

## 2023-02-04 NOTE — ED NOTES
Hand off report to [facility].  Pt is eating meal per diet order with family at bedside, pt has been placed on pure wick per request      Matthew Sagastume RN  02/03/23 1916

## 2023-02-04 NOTE — PROGRESS NOTES
Occupational Therapy  [facility] ACUTE CARE OCCUPATIONAL THERAPY EVALUATION    Cathy Ayers, 1/23/1927, 3025/3025-A, 2/4/2023    Discharge Recommendation: Fer Jesus      History:  [place]:  The primary encounter diagnosis was Chest pain, unspecified type. Diagnoses of Dyspnea and respiratory abnormalities, Hypoxia, Elevated brain natriuretic peptide (BNP) level, and Troponin level elevated were also pertinent to this visit.   Past Medical History:   Diagnosis Date    Aortic stenosis     ON ECHO 11/22- DR HILARY FIGUEREDO/TAVR 1/24/23- DR Barrington Guzmán    CAD (coronary artery disease)     Dr Stella Figueredo/Jose Figueredo    Chronic renal insufficiency     Dr Matheus Cervantes    Depression     Dry eye syndrome     Dr Alison Ruvalcaba    Eczema     Elevated LFTs 05/11/2012    Generalized osteoarthritis     GERD (gastroesophageal reflux disease)     Esophagitis    Gout     Hyperlipidemia     Hypertension     Hyperuricemia     Hypothyroid     Hypothyroidism    Low back pain     Osteoporosis     Generalized    Pneumonia due to infectious organism 11/16/2022    Sinus congestion     CHRONIC W POST NASAL DRIP    UTI (urinary tract infection)     Vertigo      [facility]       Subjective:  Patient states: \"I'm much weaker than I thought\"  Pain:  denies pain  Communication with other providers: co-eval w/ PT, handoff to RN  Restrictions: General Precautions, Fall Risk    Home Setup/Prior level of function:    Social/Functional History  Lives With: Alone (pt has home health aide daily for 8 hours/day, also supportive granddaughter)  Type of Home: Apartment  Home Layout: One level  Home Access: Level entry  Bathroom Shower/Tub: Walk-in shower  Bathroom Toilet: Handicap height  Bathroom Equipment: Built-in shower seat, Grab bars in shower, Commode  Bathroom Accessibility: [place]: Aziza Godoy, 4 wheeled, Lift chair  ADL Assistance: Independent (aide supervises with bathing but pt able to manage per granddaughter, pt able to dress and toilet independently)  Homemaking Assistance: Needs assistance  Homemaking Responsibilities: No (home health aide manages)  Ambulation Assistance: Independent (mod I with 4ww household distances)  Transfer Assistance: Independent  Active : No  Occupation: Retired    Examination:  Observation: Supine in bed upon arrival, agreeable to therapy eval.  Vision: WFL  Hearing: WFL  Vitals: Stable vitals throughout session on 3L O2      Body Systems and functions:  ROM: WFL   Strength: B UE grossly 4-/5 across all major joints   Sensation: WFL  Tone: Normal  Coordination: WFL  Perception: WNL      Cognitive and Psychosocial Functioning:  Overall cognitive status: alert and oriented, WFL  Affect: Normal       Functional Mobility:  Bed mobility:  supine to sitting EOB w/ SBA  Sitting balance:  SBA    Transfers: STS from EOB w/ min A, stand to sit w/ mod A  Standing balance:  min A progressing to CGA  Functional Mobility: ambulated short functional distance ~4 ft w/ CGA, Vcs for sequencing/safety and rollator mgmt. Limited d/t weakness and poor activity tolerance  Toilet/Shower Transfers: NT        Activities of Daily Living (ADLs):  Feeding: set up A  Grooming: SBA to comb hair while seated EOB  UB bathing: min A  LB bathing: mod A  UB dressing: min A  LB dressing: min A to don/doff slippers and socks  Toileting: SBA hygiene and clothing mgmt     *ADL determined per observation of functional mobility, balance, activity tolerance, cognition, or actual ADL performance. AM-PAC 6 click short form for inpatient daily activity:   How much help from another person does the patient currently need. .. Unable  Dep A Lot  Max A A Lot   Mod A A Little  Min A A Little   CGA  SBA None   Mod I  Indep  Sup   1. Putting on and taking off regular lower body clothing? [] 1    [] 2   [] 2   [x] 3   [] 3   [] 4      2. Bathing (including washing, rinsing, drying)? [] 1   [] 2   [x] 2 [] 3 [] 3 [] 4   3.  Toileting, which includes using toilet, bedpan, or urinal? [] 1    [] 2   [] 2   [x] 3   [] 3   [] 4     4. Putting on and taking off regular upper body clothing? [] 1   [] 2   [] 2   [] 3   [x] 3    [] 4      5. Taking care of personal grooming such as brushing teeth? [] 1   [] 2    [] 2 [] 3    [x] 3   [] 4      6. Eating meals? [] 1   [] 2   [] 2   [] 3   [] 3   [x] 4        Raw Score:  18     [24=0% impaired(CH), 23=1-19%(CI), 20-22=20-39%(CJ), 15-19=40-59%(CK), 10-14=60-79%(CL), 7-9=80-99%(CM), 6=100%(CN)]     Treatment:    Self Care Training:   Cues were given for safety, sequence, UE/LE placement, visual cues, and balance. Activities performed today included UB/LB dressing tasks, grooming    Educated pt on role of OT, therapy POC and functional goals, progression w/ ADLs and transfers, importance of movement and OOB activity, d/c recommendations     Safety Measures: Gait belt used, Left in recliner, Alarm in place  Recommendations for NURSING activity:  Up to chair for all 3 meals and up to [facility] for all toileting needs     Assessment:  Pt is a 80 y o F admitted d/t heart failure. Pt at baseline is IND for ADLs, has assistance for high level IADLs, and mod I for functional transfers/mobility w/ rollator. Pt currently presents w/ deficits in ADL and high level IADL independence, functional ADL transfers, strength, and functional activity tolerance. Continued OT services recommended to increase safety and independence with ADL routine and to address remaining functional deficits. Pt would benefit from continued acute care OT services w/ discharge to SNF. Complexity: Moderate  Prognosis: Good, no significant barriers to participation at this time. Occupational Therapy Plan  Times Per Week: 3+         Goals:  Pt will complete all aspects of bed mobility for EOB/OOB ADLs w/ mod I. Pt will complete UB ADLs w/ mod I. Pt will complete LB ADLs w/ mod I.   Pt will complete all functional transfers to and from bed, chair, toilet, shower chair w/ SBA. Pt will ambulate functional household distance w/ SBA. Pt will complete all aspects of toileting task w/ SBA. Pt will perform therex/theract in order to increase strength and functional activity tolerance necessary for increased independence w/ ADL routine.     Pt goal: go home, get stronger  Time Frame for STGs: discharge    Equipment: Continue to assess at next LOC    Time:   Time in: 1235  Time out: 1257  Total time: 22  Timed treatment minutes: 12        Electronically signed by:      Adama Haji OTR/L  [id number]

## 2023-02-04 NOTE — ANESTHESIA PRE PROCEDURE
Department of Anesthesiology  Preprocedure Note       Name:  Farida Atkinson   Age:  80 y.o.  :  1927                                          MRN:  [id number]         Date:  2023      Surgeon: Chu Soni):  Anuj Hill MD    Procedure: Procedure(s):  EGD ESOPHAGOGASTRODUODENOSCOPY    Medications prior to admission:   Prior to Admission medications    Medication Sig Start Date End Date Taking? Authorizing Provider   cycloSPORINE (RESTASIS) 0.05 % ophthalmic emulsion Place 1 drop into both eyes 2 times daily   Yes Historical Provider, MD   apixaban (ELIQUIS) 5 MG TABS tablet Take 2.5 mg by mouth 2 times daily   Yes Historical Provider, MD   hydrALAZINE (APRESOLINE) 10 MG tablet Take 10 mg by mouth 2 times daily as needed Hold if SBP <170   Yes Historical Provider, MD   gabapentin (NEURONTIN) 300 MG capsule Take 300 mg by mouth daily as needed. Yes Historical Provider, MD   ondansetron (ZOFRAN) 4 MG tablet Take 4 mg by mouth every 8 hours as needed for Nausea or Vomiting   Yes Historical Provider, MD   cetirizine (ZYRTEC) 10 MG tablet TAKE ONE (1) TABLET BY MOUTH ONCE DAILY 23   Chelsy Rodriguez MD   bumetanide Marjo Hoar) 2 MG tablet 1 tablet on Tuesday, Thursday and Saturday  Patient taking differently: Take 2 mg by mouth three times a week 1 tablet on Mon/Wed/Fri 1/3/23   Evon Rodriguez MD   OXYGEN Inhale 2 L into the lungs continuous    Historical Provider, MD   ipratropium-albuterol (DUONEB) 0.5-2.5 (3) MG/3ML SOLN nebulizer solution Inhale 3 mLs into the lungs in the morning, at noon, and at bedtime 22   Vibha Harris MD   diphenhydrAMINE-zinc acetate 2-0.1 % cream Apply topically 3 times daily as needed to itchy areas on skin 22   Rayo Oliva MD   clopidogrel (PLAVIX) 75 MG tablet Take 1 tablet by mouth daily 22   Vibha Harris MD   sodium bicarbonate 325 MG tablet Take 1 tablet by mouth in the morning and 1 tablet in the evening.  10/17/22   Historical Provider, MD Irma Field 5 % SOLN Place 1 drop into both eyes 2 times daily 9/19/22   Historical Provider, MD   amLODIPine (NORVASC) 10 MG tablet Take 1 tablet by mouth nightly 11/7/22 2/3/23  Patrizia Garrett MD   atenolol (TENORMIN) 25 MG tablet Take 1 tablet by mouth daily  Patient taking differently: Take 25 mg by mouth Daily with supper 9/26/22   Renee Landry MD   dexlansoprazole [facility]) 60 MG CPDR delayed release capsule Take 1 capsule by mouth daily 9/26/22   Renee Landry MD   docusate sodium (COLACE) 100 MG capsule Take 1 capsule by mouth daily 9/26/22   Renee Landry MD   senna (NATURAL SENNA LAXATIVE) 8.6 MG tablet Take 1 tablet by mouth 2 times daily 9/26/22   Renee Landry MD   sertraline (ZOLOFT) 50 MG tablet Take 1 tablet by mouth daily 9/26/22   Renee Landry MD   isosorbide mononitrate (IMDUR) 60 MG extended release tablet Take 1 tablet by mouth daily 9/26/22   Renee Landry MD   polyethylene glycol (GAVILAX) 17 GM/SCOOP powder MIX 17 GRAMS (1 HEAPING TABLESPOONFUL) OF POWDER IN 8 OUNCES OF LIQUID AND DRINK DAILY  Patient taking differently: Take 17 g by mouth daily MIX 17 GRAMS (1 HEAPING TABLESPOONFUL) OF POWDER IN 8 OUNCES OF LIQUID AND DRINK DAILY 9/26/22   Renee Landry MD   nystatin (MYCOSTATIN) 177982 UNIT/GM powder Apply 3 times daily. Patient taking differently: Apply topically 3 times daily as needed Apply 3 times daily.  9/26/22   Renee Landry MD   Cholecalciferol (VITAMIN D3) 50 MCG (2000 UT) TABS Take 1 tablet by mouth daily 9/26/22   Renee Landry MD   nitroGLYCERIN (NITROSTAT) 0.4 MG SL tablet NITRO 9/26/22   Renee Landry MD   allopurinol (ZYLOPRIM) 100 MG tablet TAKE ONE (1) TABLET BY MOUTH DAILY  Patient taking differently: Take 100 mg by mouth daily 9/14/22   Renee Landry MD   ranolazine (RANEXA) 500 MG extended release tablet TAKE ONE (1) TABLET BY MOUTH TWO (2) TIMES DAILY  Patient taking differently: Take 500 mg by mouth 2 times daily 9/14/22   Tim Lazo MD   alendronate (FOSAMAX) 70 MG tablet FOSA  Patient taking differently: Take 70 mg by mouth every 7 days Takes on Wednesday 9/14/22   Tim Lazo MD   levothyroxine (SYNTHROID) 88 MCG tablet TAKE ONE (1) TABLET BY MOUTH DAILY  Patient taking differently: Take 88 mcg by mouth Daily 9/14/22   Tim Lazo MD   simvastatin (ZOCOR) 20 MG tablet TAKE 1 TABLET BY MOUTH EVERY NIGHT  Patient taking differently: Take 20 mg by mouth nightly 9/14/22   Tim Lazo MD   azelastine (ASTELIN) 0.1 % nasal spray 1 spray by Nasal route 2 times daily Use in each nostril as directed 8/19/22   Tim Lazo MD   triamcinolone (KENALOG) 0.1 % ointment Apply topically 2 times daily 7/19/22   Rondel Schirmer, MD   acetaminophen (AMINOFEN) 325 MG tablet Take 2 tablets by mouth every 8 hours as needed for Pain 9/25/21   Alexandrea Degroot MD   B Complex-C-Zn-Folic Acid (DIALYVITE 888-GLRF 15) 0.8 MG TABS Take 1 tablet by mouth daily 4/8/21 2/3/23  Cirilo Casarez MD   mineral oil-hydrophilic petrolatum (AQUAPHOR) ointment Apply topically as needed for Dry Skin Apply topically as needed.     Historical Provider, MD       Current medications:    Current Facility-Administered Medications   Medication Dose Route Frequency Provider Last Rate Last Admin    albuterol sulfate HFA (PROVENTIL;VENTOLIN;PROAIR) 108 (90 Base) MCG/ACT inhaler 2 puff  2 puff Inhalation Q6H PRN Violet Blake, APRN - CNP        iron sucrose (VENOFER) 200 mg in sodium chloride 0.9 % 100 mL IVPB  200 mg IntraVENous Q24H Cirilo Casarez MD   Stopped at 02/04/23 1324    cefepime (MAXIPIME) 1,000 mg in sodium chloride 0.9 % 50 mL IVPB (mini-bag)  1,000 mg IntraVENous Q12H Michelle Mcintyre MD        vancomycin (VANCOCIN) 1,250 mg in sodium chloride 0.9 % 250 mL IVPB ([id number])  20 mg/kg IntraVENous Once Michelle Mcintyre MD        vancomycin McIntire Felicity) intermittent dosing (placeholder)   Other RX Pati Crooks MD        morphine sulfate (PF) injection 4 mg  4 mg IntraVENous Q30 Min PRN Anca Ramirez DO   4 mg at 02/03/23 1640    allopurinol (ZYLOPRIM) tablet 100 mg  100 mg Oral Daily Glenetta Holstein, MD   100 mg at 02/04/23 0949    apixaban (ELIQUIS) tablet 2.5 mg  2.5 mg Oral BID Glenetta Holstein, MD   2.5 mg at 02/04/23 0950    [Held by provider] amLODIPine (NORVASC) tablet 10 mg  10 mg Oral Nightly Glenetta Holstein, MD        atenolol (TENORMIN) tablet 25 mg  25 mg Oral Dinner Glenetta Holstein, MD   25 mg at 02/03/23 2241    azelastine (ASTELIN) 0.1 % nasal spray 1 spray  1 spray Each Nostril BID Glenetta Holstein, MD   1 spray at 02/04/23 0959    therapeutic multivitamin-minerals 1 tablet  1 tablet Oral Daily Glenetta Holstein, MD   1 tablet at 02/04/23 0950    cetirizine (ZYRTEC) tablet 5 mg  5 mg Oral Daily Glenetta Holstein, MD   5 mg at 02/04/23 0949    Vitamin D (CHOLECALCIFEROL) tablet 2,000 Units  2,000 Units Oral Daily Glenetta Holstein, MD   2,000 Units at 02/04/23 0949    clopidogrel (PLAVIX) tablet 75 mg  75 mg Oral Daily Mamie Tamez MD   75 mg at 02/04/23 0950    pantoprazole (PROTONIX) tablet 40 mg  40 mg Oral QAM AC Mamie Tamez MD   40 mg at 02/04/23 0950    diphenhydrAMINE-zinc acetate cream   Topical TID PRN Glenetta Holstein, MD        docusate sodium (COLACE) capsule 100 mg  100 mg Oral Daily Mamie Tamez MD   100 mg at 02/04/23 0949    gabapentin (NEURONTIN) capsule 300 mg  300 mg Oral Daily PRN Glenetta Holstein, MD        hydrALAZINE (APRESOLINE) tablet 10 mg  10 mg Oral BID PRN Glenetta Holstein, MD        ipratropium-albuterol (DUONEB) nebulizer solution 3 mL  3 mL Inhalation TID Glenetta Holstein, MD   3 mL at 02/04/23 1626    isosorbide mononitrate (IMDUR) extended release tablet 60 mg  60 mg Oral Daily Glenetta Holstein, MD   60 mg at 02/04/23 0949    levothyroxine (SYNTHROID) tablet 88 mcg  88 mcg Oral Daily Glenetta Holstein, MD   88 mcg at 02/04/23 0949    mineral oil-hydrophilic petrolatum (AQUAPHOR) ointment   Topical PRN Aura Castellon MD        ranolazine (RANEXA) extended release tablet 500 mg  500 mg Oral BID Aura Castellon MD   500 mg at 02/04/23 0949    sertraline (ZOLOFT) tablet 50 mg  50 mg Oral Daily Aura Castellon MD   50 mg at 02/04/23 0950    atorvastatin (LIPITOR) tablet 10 mg  10 mg Oral Daily Aura Castellon MD   10 mg at 02/04/23 0950    sodium bicarbonate tablet 325 mg  325 mg Oral BID Aura Castellon MD   325 mg at 02/04/23 0950    Lifitegrast 5 % SOLN 1 drop  (Patient Supplied)  1 drop Both Eyes BID Aura Castellon MD        sodium chloride flush 0.9 % injection 5-40 mL  5-40 mL IntraVENous 2 times per day Aura Castellon MD   10 mL at 02/04/23 0951    sodium chloride flush 0.9 % injection 5-40 mL  5-40 mL IntraVENous PRN Aura Castellon MD        0.9 % sodium chloride infusion   IntraVENous PRN Aura Castellon MD        ondansetron (ZOFRAN-ODT) disintegrating tablet 4 mg  4 mg Oral Q8H PRN Aura Castellon MD        Or    ondansetron (ZOFRAN) injection 4 mg  4 mg IntraVENous Q6H PRN Aura Castellon MD   4 mg at 02/04/23 1717    acetaminophen (TYLENOL) tablet 650 mg  650 mg Oral Q6H PRN Aura Castellon MD   650 mg at 02/04/23 0958    Or    acetaminophen (TYLENOL) suppository 650 mg  650 mg Rectal Q6H PRN Aura Castellon MD   650 mg at 02/04/23 1733    polyethylene glycol (GLYCOLAX) packet 17 g  17 g Oral BID Aura Castellon MD   17 g at 02/04/23 0950    bisacodyl (DULCOLAX) suppository 10 mg  10 mg Rectal Once Aura Castellon MD        polyvinyl alcohol (LIQUIFILM TEARS) 1.4 % ophthalmic solution 1 drop  1 drop Both Eyes PRN Aura Castellon MD        melatonin tablet 6 mg  6 mg Oral Nightly PRN CLEMENT Ruiz - CNP   6 mg at 02/03/23 2459       Allergies:     Allergies   Allergen Reactions    Bactrim [Sulfamethoxazole-Trimethoprim] Nausea And Vomiting    Cephalexin Rash    Tape Aida Ashraf Tape] Itching       Problem List: Patient Active Problem List   Diagnosis Code    Hypertension I10    GERD (gastroesophageal reflux disease) K21.9    Depression F32. 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    Chronic renal impairment, stage 3 (moderate) ([facility]) N18.30    Dizziness R42    Labyrinthitis H83.09    Vertigo R42    Primary osteoarthritis of left foot M19.072    Closed nondisplaced fracture of fourth metatarsal bone of left foot S92.345A    Closed nondisplaced fracture of third metatarsal bone of left foot S92.335A    Closed nondisplaced fracture of fifth left metatarsal bone S92.355A    Vaginal prolapse N81.10    Sinus congestion R09.81    Localized edema R60.0    Sepsis ([facility]) A41.9    Dyspnea and respiratory abnormalities R06.00, R06.89    Pneumonia due to infectious organism J18.9    Pyelonephritis N12    Chronic kidney disease N18.9    Aortic stenosis I35.0    Hypoxia R09.02    Heart failure ([facility]) I50.9       Past Medical History:        Diagnosis Date    Aortic stenosis     ON ECHO 11/22- DR HILARY FIGUEREDO/TAVR 1/24/23- DR Martin Hubbard    CAD (coronary artery disease)     Dr Claudine Figueredo/Jose Figueredo    Chronic renal insufficiency     Dr Puma Day Depression     Dry eye syndrome     Dr Pope Most Elevated LFTs 05/11/2012    Generalized osteoarthritis     GERD (gastroesophageal reflux disease)     Esophagitis    Gout     Hyperlipidemia     Hypertension     Hyperuricemia     Hypothyroid     Hypothyroidism    Low back pain     Osteoporosis     Generalized    Pneumonia due to infectious organism 11/16/2022    Sinus congestion     CHRONIC W POST NASAL DRIP    UTI (urinary tract infection)     Vertigo     Dr Jose Loyd       Past Surgical History:        Procedure Laterality Date    AORTIC VALVE REPLACEMENT  01/24/2023    Dr Mayur Causey at [address] Aug. 2006 and L March 2007 Dr. Jaswinder Conti  2004    right foot realignment [address] Right 08/19/2018    rt hip Zheng    HYSTERECTOMY (CERVIX STATUS UNKNOWN)      TONSILLECTOMY         Social History:    Social History     Tobacco Use    Smoking status: Former    Smokeless tobacco: Never   Substance Use Topics    Alcohol use: No                                Counseling given: Not Answered      Vital Signs (Current):   Vitals:    02/04/23 1220 02/04/23 1442 02/04/23 1505 02/04/23 1724   BP:  (!) 160/46  (!) 141/71   Pulse:   73 (!) 103   Resp:    22   Temp:    (!) 38.5 °C (101.3 °F)   TempSrc:  Oral  Oral   SpO2: 96%   92%   Weight:       Height:                                                  BP Readings from Last 3 Encounters:   02/04/23 (!) 141/71   12/06/22 133/62   11/07/22 (!) 150/72       NPO Status:                                                                                 BMI:   Wt Readings from Last 3 Encounters:   02/03/23 125 lb (56.7 kg)   12/12/22 119 lb (54 kg)   11/30/22 140 lb 3.4 oz (63.6 kg)     Body mass index is 24.41 kg/m².     CBC:   Lab Results   Component Value Date/Time    WBC 10.8 02/03/2023 03:37 PM    RBC 2.52 02/03/2023 03:37 PM    HGB 7.9 02/03/2023 03:37 PM    HCT 24.6 02/03/2023 03:37 PM    MCV 97.6 02/03/2023 03:37 PM    RDW 14.8 02/03/2023 03:37 PM     02/03/2023 03:37 PM       CMP:   Lab Results   Component Value Date/Time     02/04/2023 01:13 AM    K 4.5 02/04/2023 01:13 AM     02/04/2023 01:13 AM    CO2 23 02/04/2023 01:13 AM    BUN 40 02/04/2023 01:13 AM    CREATININE 1.9 02/04/2023 01:13 AM    GFRAA 32 09/26/2022 03:03 PM    GFRAA 37 03/19/2013 02:04 PM    AGRATIO 1.6 08/19/2022 11:20 AM    LABGLOM 24 02/04/2023 01:13 AM    GLUCOSE 148 02/04/2023 01:13 AM    GLUCOSE 126 06/07/2021 12:00 AM    PROT 7.5 02/03/2023 03:37 PM    PROT 7.2 03/19/2013 02:04 PM    CALCIUM 9.1 02/04/2023 01:13 AM    BILITOT 0.4 02/03/2023 03:37 PM    ALKPHOS 72 02/03/2023 03:37 PM    AST 25 02/03/2023 03:37 PM    ALT 14 02/03/2023 03:37 PM       POC Tests: No results for input(s): POCGLU, POCNA, POCK, POCCL, POCBUN, POCHEMO, POCHCT in the last 72 hours.     Coags:   Lab Results   Component Value Date/Time    PROTIME 14.4 02/03/2023 03:37 PM    PROTIME 10.0 05/09/2012 11:30 PM    INR 1.12 02/03/2023 03:37 PM    APTT 33.6 02/03/2023 03:37 PM       HCG (If Applicable): No results found for: PREGTESTUR, PREGSERUM, HCG, HCGQUANT     ABGs:   Lab Results   Component Value Date/Time    PO2ART 83 11/14/2022 11:30 PM    SRW7TOE 29.0 11/14/2022 11:30 PM    DGZ6UMJ 18.4 11/14/2022 11:30 PM        Type & Screen (If Applicable):  No results found for: LABABO, LABRH    Drug/Infectious Status (If Applicable):  No results found for: HIV, HEPCAB    COVID-19 Screening (If Applicable):   Lab Results   Component Value Date/Time    COVID19 NOT DETECTED 02/03/2023 06:58 PM    COVID19 NOT DETECTED 02/03/2023 03:55 PM           Anesthesia Evaluation  Patient summary reviewed  Airway:           Dental:          Pulmonary:   (+) pneumonia:                            ROS comment: CXR 2/3/2023:  Impression  Bilateral patchy airspace opacities, increased compared to the previous exam,  suggesting multifocal pneumonia    Smoking Status: Former    On 2L NC   Cardiovascular:    (+) hypertension:, past MI:, CAD:, hyperlipidemia               ROS comment: Normal sinus rhythm   Left axis deviation   Left bundle branch block   Abnormal ECG   When compared with ECG of 14-NOV-2022 14:36,   No significant change was found   Confirmed by Leticia Fisher MD, [facility] ([id number]) on 2/3/2023 7:12:32 PM     S/p TAVR-1/24/23-normal EF     Neuro/Psych:               GI/Hepatic/Renal:   (+) GERD:, renal disease: CRI,          ROS comment: Dysphagia. Endo/Other:    (+) hypothyroidism, blood dyscrasia: anticoagulation therapy and anemia, arthritis: OA., .                 Abdominal:             Vascular: negative vascular ROS. Other Findings:           Anesthesia Plan      MAC     ASA 4       Induction: intravenous. CLEMENT Rivas - CRNA   2/4/2023     Pre Anesthesia Assessment complete.  Chart reviewed on 2/4/2023

## 2023-02-04 NOTE — CONSULTS
[address], [address] [facility]                                  CONSULTATION    PATIENT NAME: Sumit Miller                    :        1927  MED REC NO:   [id number]                          ROOM:       ED20  ACCOUNT NO:   [de-identified]                           ADMIT DATE: 2023  PROVIDER:     Triny Bryan MD    CONSULT DATE:  2023    INDICATIONS:  Shortness of breath. HISTORY OF PRESENT ILLNESS:  This is a 29-year-old female patient, who  was just seen at [facility].  The patient underwent a TAVR and  she had severe aortic stenosis. She underwent a TAVR procedure done. She had done relatively well. She went into briefly AFIB post  procedure. Then, she was back in sinus rhythm. She has chronic left  bundle branch block present. The patient was discharged home from there  on Norvasc 10 mg once a day, Tenormin 25 mg a day, Dexilant 60 mg a day,  B complex, isosorbide 60 mg once a day, nitroglycerin, Ranexa 500 mg  b.i.d., cyclosporine eye drops, Zoloft, sodium bicarb one tablet b.i.d.,  Fosamax, Neurontin 300 mg once a day, Synthroid, Plavix, Allegra,  allopurinol, Bumex 2 mg on Monday, Wednesday, and Friday, hydralazine 10  mg, inhalers, and Eliquis 2.5 mg b.i.d. The patient had a history of heart fib with preserved ejection fraction,  status post TAVR done for aortic stenosis. She had a TAVR done on  2023. She had one episode of choking and modified barium swallow  was performed. Retrograde flow with thin liquids from the upper  esophagus noted. I recommended ground mechanical diet and natural  liquids. She had a right arm and left groin access for her TAVR  procedure done. She has chronic renal insufficiency also present and  the patient has chronic left bundle branch block also present and she  was started on anticoagulation.   Her hemoglobin was 8 at that time and  platelet count was 315,000 on discharge. Her creatinine was in the 2  range. The patient underwent a 26 mm TAVR. Aortic valve mild-to-moderate  paravalvular aortic regurgitation was noted. Left ventricular ejection  fraction was stable at 63% and no pericardial effusion was noted. The patient did have a heart catheterization done prior to her procedure  and I think she was found to have nonobstructive coronary arteries  present. The patient was started on Plavix back again. I believe she had a heart catheterization done in 01/2023. At that  time, her left main was normal.  LAD was normal.  Ramus was normal.   Circ was normal.  Right coronary artery was also normal.  Heart  catheterization was done on 01/10/2023. LV function was preserved. The patient had a severe stenosis, status post TAVR done. She has a  preserved LV present. She had a heart catheterization done in 01/2023. Nonobstructive  coronary arteries present. PAST MEDICAL HISTORY:  The patient has a history of hypertension and  hyperlipidemia present. Diastolic dysfunction. Renal insufficiency  present. Follows with Dr. Irineo Brar and Dr. Becky Kenyon. She also has a history  of anemia present. Arthritis and hyperlipidemia present. PAST SURGICAL HISTORY:  History of having hip surgery done and recent  heart catheterization done in 01/2023. Nonobstructive coronary artery  present. She has had cataract surgery, hysterectomy done, and TAVR done  in 01/2023. SOCIAL HISTORY:  She does not smoke. She does not drink. She came from  assisted living. MEDICATIONS AT HOME:  Please see the list.    ALLERGIES:  Her allergies are to three medications. She is allergic to  BACTRIM, CEPHALEXIN, and TAPE. PHYSICAL EXAMINATION:  GENERAL:  The patient is awake, alert, and answering questions, not in  acute distress. VITAL SIGNS:  Temperature is afebrile. Pulse is 60. Blood pressure is  179/93. HEENT:  Head is atraumatic.   Pupils are equal and reactive. CHEST:  Equal expansion. LUNGS:  Clear to auscultation. No wheezing or rhonchi present. HEART:  Regular rhythm. ABDOMEN:  Soft and nontender. Bowel sounds are present. No  hepatosplenomegaly or guarding appreciated. EXTREMITIES:  No cyanosis noted. Left groin has bruising, but stable. NEUROLOGIC:  Cranial nerves II through XII are grossly intact. IMPRESSION AND PLAN:  This is a 70-year-old female patient with a  nonobstructive coronary artery present. 1.  She had a heart catheterization done recently, which was normal and  nonobstructive coronary artery present. 2.  She is status post TAVR done for aortic stenosis and stable and  recent echo postprocedure was stable. 3.  She recently had an AFib with RVR at [facility] post  procedure. She is on beta-blocker. She is on anticoagulation. At this time, we will continue medical treatment. She was seen by EP  there, and they suggested that she does not need a pacer. At this time,  I will continue medical treatment and we will make further  recommendations based on hospital course.         Kristie Stout MD    D: 02/03/2023 15:52:48       T: 02/03/2023 16:21:58     [email or web address]  Job#: [id number]     Doc#: [id number]    CC:

## 2023-02-04 NOTE — ED NOTES
ED TO INPATIENT SBAR HANDOFF    Patient Name: Sobeida Batres   :  1927  80 y.o. MRN:  [id number]  Preferred Name  Karly Laguerre  ED Room #:  ED20/ED-20  Family/Caregiver Present yes   Restraints no   Sitter no   Sepsis Risk Score Sepsis Risk Score: 2.08    Situation  Code Status: Limited No additional code details. Allergies: Bactrim [sulfamethoxazole-trimethoprim], Cephalexin, and Tape [adhesive tape]  Weight:   Patient Vitals for the past 96 hrs (Last 3 readings):   Weight   23 1942 119 lb (54 kg)     Arrived from: nursing home  Chief Complaint:   Chief Complaint   Patient presents with    Chest Pain     Given 4 ASA, 1 nitro     Hospital Problem/Diagnosis:  Principal Problem:    Heart failure ([address]  Resolved Problems:    * No resolved hospital problems. *    Imaging:   XR CHEST PORTABLE   Final Result   Bilateral patchy airspace opacities, increased compared to the previous exam,   suggesting multifocal pneumonia         CT CHEST WO CONTRAST   Final Result   1. Congestive heart failure is most likely given the findings; pneumonia is   also a consideration in areas of consolidation with pleural effusion. 2.  Vascular calcifications are noted reflecting calcific atherosclerosis. 3. Sequela from TAVR.            Abnormal labs:   Abnormal Labs Reviewed   CBC WITH AUTO DIFFERENTIAL - Abnormal; Notable for the following components:       Result Value    WBC 10.8 (*)     RBC 2.52 (*)     Hemoglobin 7.9 (*)     Hematocrit 24.6 (*)     MCH 31.3 (*)     Monocytes % 8.8 (*)     All other components within normal limits   COMPREHENSIVE METABOLIC PANEL - Abnormal; Notable for the following components:    CO2 20 (*)     BUN 39 (*)     Creatinine 1.9 (*)     Est, Glom Filt Rate 24 (*)     Glucose 135 (*)     All other components within normal limits   TROPONIN - Abnormal; Notable for the following components:    Troponin T 0.059 (*)     All other components within normal limits   BRAIN NATRIURETIC PEPTIDE - Abnormal; Notable for the following components:    Pro-BNP 5,978 (*)     All other components within normal limits   BLOOD GAS, VENOUS - Abnormal; Notable for the following components:    pCO2, Denis 36 (*)     pO2, Denis 140 (*)     O2 Sat, Denis 94.0 (*)     All other components within normal limits     Critical values: yes     Hemoglobin 7.9, troponin 0.059    Abnormal Assessment Findings: abdomen is distend, bruising on inside of left thigh from cardiac procedure recently     Background  History:   Past Medical History:   Diagnosis Date    Aortic stenosis     ON ECHO 11/22- DR HILARY FIGUEREDO/TAVR 1/24/23- DR Yaima Franklin    CAD (coronary artery disease)     Dr Jorge Figueredo/Jose Figueredo    Chronic renal insufficiency     Dr Munoz Check Depression     Dry eye syndrome     Dr Vonda Erazo Elevated LFTs 05/11/2012    Generalized osteoarthritis     GERD (gastroesophageal reflux disease)     Esophagitis    Gout     Hyperlipidemia     Hypertension     Hyperuricemia     Hypothyroid     Hypothyroidism    Low back pain     Osteoporosis     Generalized    Pneumonia due to infectious organism 11/16/2022    Sinus congestion     CHRONIC W POST NASAL DRIP    UTI (urinary tract infection)     Vertigo      [facility]       Assessment    Vitals/MEWS: MEWS Score: 1  Level of Consciousness: Alert (0)   Vitals:    02/03/23 1730 02/03/23 1800 02/03/23 1942 02/03/23 1946   BP: 133/62 (!) 141/51 (!) 161/53 (!) 161/53   Pulse: 72 69 70 69   Resp: 22 23 21 19   Temp:    98.4 °F (36.9 °C)   TempSrc:    Oral   SpO2: 95%  95% 94%   Weight:   119 lb (54 kg)    Height:   5' (1.524 m)      FiO2 (%): nasal cannula for respiratory distress  O2 Flow Rate: O2 Device: Nasal cannula O2 Flow Rate (L/min): 3 L/min  Cardiac Rhythm: sinus   Pain Assessment: 7 [] Verbal [] Aggie Maul Scale  Pain Scale: Pain Assessment  Pain Assessment: None - Denies Pain  Pain Level: 0  Pain Location: Chest  Last documented pain score (0-10 scale) 3  Last documented pain medication administered: 1640  Mental Status: oriented, alert, coherent and logical  NIH Score: NIH     C-SSRS: Risk of Suicide: No Risk  Bedside swallow:    Dubberly Coma Scale (GCS): Dubberly Coma Scale  Eye Opening: Spontaneous  Best Verbal Response: Oriented  Best Motor Response: Obeys commands  Dubberly Coma Scale Score: 15  Active LDA's:   Peripheral IV 23 Left Antecubital (Active)     PO Status: Dysphagia, Level 2-Mechanically Altered  Pertinent or High Risk Medications/Drips: no   o If Yes, please provide details:   Pending Blood Product Administration: no     You may also review the ED PT Care Timeline found under the Summary Nursing Index tab. Recommendation    Pending orders   Plan for Discharge (if known): Additional Comments: pt is from nursing facility, has had continued chest pressure. Pt was recommended by  to be seen. Pt is now on 4L NC. Pt's family is at bedside and very supportive.  Pt is able to ambulate with one nurse assist. Pt's abdomen is distended, denies pain but family is concerned that she has had  bowel movements    If any further questions, please call Sending RN at [phone]       Electronically signed by: Electronically signed by John Sullivan RN on 2/3/2023 at 7:49 PM       Anurag Tian RN  [phone]       John Sullivan RN  23 [phone]

## 2023-02-05 NOTE — PROGRESS NOTES
Patient seen at bedside this morning. Maggie CHIRINOS, at bedside as well. Patient sleeping and on BiPAP in no acute distress. Transfer order placed for step down. Had lengthy discussion with Maggie about patient's current clinical status and that the patient has been in and out of the hospital a lot. Maggie stated that the patient would not want the BiPAP and wants to be comfortable and that it's hard for them to let go. I asked Maggie whether the family has thought about hospice, which will be in line with patient's wishes if she wants to be comfortable. Maggie said that they have spoken about it in the past. I asked her to have a family meeting to discuss hospice and in the mean time we will continue to treat the patient's infection.

## 2023-02-05 NOTE — CARE COORDINATION
LEAH reviewed chart. Per chart review Dr. Sachi Valdivia and Dr. Corinna Xiong have discussed hospice w/ pt's CANDIS Serrano. At this time Mgagie would like to see if pt improves over the next 48 hours. LEAH will follow      1300 Reviewed chart. Dr. Corinna Xiong spoke w/ CANDIS Serrano, she would like to arrange hospice. LEAH spoke w/ Maggie, she has chosen [facility].   CM called in referral.

## 2023-02-05 NOTE — CONSULTS
Consult completed. Nexiva 20g 1.75 inch peripheral IV initiated into left lower forearm x 1 attempt with ultrasound guidance. Brisk blood return, flushes without resistance. Patient tolerated well. Primary nurse [facility] notified. Please consult IV/PICC team if patient's needs change, questions, or concerns.

## 2023-02-05 NOTE — PROGRESS NOTES
Daily Progress Note  Subjective:  Patient drowsy, resting comfortably   Off BiPAP on assessment, now on high flow nc   Son at bedside   SR on tele, HR and BP stable   Family would like to see if pt improves over the next 48 hrs before considering hospice       Attending Note:      Impression and Plan:   Acute hypoxic respiratory failure   - pt with acute respiratory distress overnight, O2 sats in the 80s  - O2 requirement up to 15 L via nc   - received steroids, lasix  - Bipap started   - CXR showed progression of airspace disease     HFpEF   - Recent TAVR for severe aortic stenosis   - Echo post procedure was stable   - Troponin 0.066  - BNP 5,978  - Continue bumex   - nephrology following and assisting with diuresis     Afib  - Rate controlled   - Continue atenolol   - continue eliquis     HTN   - Continue norvasc    Hx CAD   - Continue lipitor, plavix, imdur, ranexa     PAUL   - nephrology following   - Cr 2.3 today     Will continue to follow       Most Recent Echo  11/15/2022   Summary   Left ventricular systolic function is normal.   Ejection fraction is visually estimated at 55-60%. Moderately dilated left atrium. Moderate to severe aortic stenosis is present; Mean PG 40mmHg, YANIRA 0.99 cm2   . Mitral annular calcification is present. Mild to moderate mitral regurgitation. Mild to moderate tricuspid regurgitation; RVSP: 44 mmHg. No evidence of any pericardial effusion. Most Recent Stress test  1/8/2018 - negative     Radiology  CXR 1/5/2023  Impression   Though mild improved aeration noted in the right upper lobe, progression of   airspace disease with increased confluent consolidative changes seen in the   left lung. PAST MEDICAL HISTORY:  The patient has a history of hypertension and  hyperlipidemia present. Diastolic dysfunction. Renal insufficiency  present. Follows with Dr. Ken York and Dr. Flakita Esteves. She also has a history  of anemia present. Arthritis and hyperlipidemia present. PAST SURGICAL HISTORY:  History of having hip surgery done and recent  heart catheterization done in 01/2023. Nonobstructive coronary artery  present. She has had cataract surgery, hysterectomy done, and TAVR done  in 01/2023. SOCIAL HISTORY:  She does not smoke. She does not drink. She came from  assisted living. MEDICATIONS AT HOME:  Please see the list.     ALLERGIES:  Her allergies are to three medications. She is allergic to  BACTRIM, CEPHALEXIN, and TAPE.     Objective:   BP (!) 174/62   Pulse 72   Temp 98.4 °F (36.9 °C) (Axillary)   Resp 24   Ht 5' (1.524 m)   Wt 125 lb (56.7 kg)   LMP  (LMP Unknown)   SpO2 97%   BMI 24.41 kg/m²     Intake/Output Summary (Last 24 hours) at 2/5/2023 1009  Last data filed at 2/4/2023 2133  Gross per 24 hour   Intake 120 ml   Output 600 ml   Net -480 ml       Medications:   Scheduled Meds:   metroNIDAZOLE  500 mg IntraVENous Q8H    bisacodyl  10 mg Rectal Daily    amLODIPine  5 mg Oral Daily    bumetanide  1 mg IntraVENous BID    iron sucrose  200 mg IntraVENous Q24H    cefepime  1,000 mg IntraVENous Q12H    vancomycin (VANCOCIN) intermittent dosing (placeholder)   Other RX Placeholder    senna  1 tablet Oral BID    allopurinol  100 mg Oral Daily    apixaban  2.5 mg Oral BID    atenolol  25 mg Oral Dinner    azelastine  1 spray Each Nostril BID    therapeutic multivitamin-minerals  1 tablet Oral Daily    cetirizine  5 mg Oral Daily    Vitamin D  2,000 Units Oral Daily    clopidogrel  75 mg Oral Daily    pantoprazole  40 mg Oral QAM AC    docusate sodium  100 mg Oral Daily    ipratropium-albuterol  3 mL Inhalation TID    isosorbide mononitrate  60 mg Oral Daily    levothyroxine  88 mcg Oral Daily    ranolazine  500 mg Oral BID    sertraline  50 mg Oral Daily    atorvastatin  10 mg Oral Daily    sodium bicarbonate  325 mg Oral BID    Lifitegrast  1 drop Both Eyes BID    sodium chloride flush  5-40 mL IntraVENous 2 times per day    polyethylene glycol  17 g Oral BID    bisacodyl  10 mg Rectal Once      Infusions:   sodium chloride        PRN Meds:  albuterol sulfate HFA, morphine, diphenhydrAMINE-zinc acetate, gabapentin, hydrALAZINE, mineral oil-hydrophilic petrolatum, sodium chloride flush, sodium chloride, ondansetron **OR** ondansetron, acetaminophen **OR** acetaminophen, polyvinyl alcohol, melatonin     Physical Exam:  Vitals:    02/05/23 0936   BP:    Pulse:    Resp:    Temp:    SpO2: 97%        General: AAO, NAD  Chest: Nontender  Cardiac: First and Second Heart Sounds are Normal, No Murmurs or Gallops noted  Lungs:Diminished breath sounds bilaterally   Abdomen: Soft, NT, ND, +BS  Extremities: No clubbing, no edema  Vascular:  Equal 2+ peripheral pulses. Lab Data:  CBC:   Recent Labs     02/03/23  1537 02/05/23  0234   WBC 10.8* 11.4*   HGB 7.9* 7.2*   HCT 24.6* 23.1*   MCV 97.6 98.7    272     BMP:   Recent Labs     02/03/23 1537 02/04/23  0113 02/05/23  0234    138 139   K 3.7 4.5 4.7    102 105   CO2 20* 23 24   BUN 39* 40* 40*   CREATININE 1.9* 1.9* 2.3*     LIVER PROFILE:   Recent Labs     02/03/23  1537 02/05/23  0234   AST 25 21   ALT 14 11   LIPASE  --  28   BILITOT 0.4 0.3   ALKPHOS 72 49     PT/INR:   Recent Labs     02/03/23  1537 02/05/23  0234   PROTIME 14.4 18.0*   INR 1.12 1.39     APTT:   Recent Labs     02/03/23  1537 02/05/23  0234   APTT 33.6 41.1*     BNP:  No results for input(s): BNP in the last 72 hours.       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 08/18/2018    Bradycardia     LBBB (left bundle branch block)     PAUL (acute kidney injury) ([facility] 12/26/2017    Heart failure ([facility] 02/03/2023    Hypoxia 12/20/2022    Aortic stenosis 12/12/2022    Pneumonia due to infectious organism 11/16/2022    Pyelonephritis 11/16/2022    Chronic kidney disease 11/16/2022    Dyspnea and respiratory abnormalities 11/15/2022    Sepsis ([facility] 11/14/2022    Localized edema 07/11/2022    GERD (gastroesophageal reflux disease)     Hypertension     Hypothyroid     Sinus congestion 03/23/2022    Vaginal prolapse 09/07/2021    Primary osteoarthritis of left foot 08/30/2021    Closed nondisplaced fracture of fourth metatarsal bone of left foot 08/30/2021    Closed nondisplaced fracture of third metatarsal bone of left foot 08/30/2021    Closed nondisplaced fracture of fifth left metatarsal bone 08/30/2021    Vertigo     Dizziness     Labyrinthitis     Chronic renal impairment, stage 3 (moderate) (HCC)     Constipation, slow transit 06/19/2018    Cystitis     Physical deconditioning     Hyponatremia 01/01/2018    Pruritic condition 01/22/2016    Gout     Hyperuricemia     Low back pain     Eczema     Coronary artery disease involving native heart without angina pectoris     Depression     Hyperlipidemia     Generalized osteoarthritis     Osteoporosis        Electronically signed by CLEMENT Parrish CNP on 2/5/2023 at 10:10 AM   Patient seen and examined and discussed with family at the bedside  Agree with above assessment and plan Electronically signed by Claudia Ospina MD on 2/5/2023 at 1:40 PM

## 2023-02-05 NOTE — PROGRESS NOTES
[address] [facility]  consulted by Dr. Raudel Degroot for monitoring and adjustment. Indication for treatment: Sepsis  Goal trough: Trough Goal: 15-20 mcg/mL  AUC/ALESSIO: 400-600    Risk Factors for MRSA Identified:   Received IV antibiotics within the past 90 days    Pertinent Laboratory Values:   Temp Readings from Last 3 Encounters:   02/05/23 97.7 °F (36.5 °C) (Axillary)   12/12/22 97.4 °F (36.3 °C)   12/06/22 97.7 °F (36.5 °C)     Recent Labs     02/03/23  1537 02/05/23  0234   WBC 10.8* 11.4*       Recent Labs     02/03/23  1537 02/04/23  0113 02/05/23  0234   BUN 39* 40* 40*   CREATININE 1.9* 1.9* 2.3*       Estimated Creatinine Clearance: 11 mL/min (A) (based on SCr of 2.3 mg/dL (H)). Intake/Output Summary (Last 24 hours) at 2/5/2023 1248  Last data filed at 2/4/2023 2133  Gross per 24 hour   Intake 120 ml   Output 600 ml   Net -480 ml         Pertinent Cultures:   Date    Source    Results  2/3   Blood    NGTD  2/3   Urine                   Ecoli  2/4   MRSA nares   Pending  2/3 Resp PCR, Covid-19, Influenza A/B  Negative      Vancomycin level:   TROUGH:  No results for input(s): VANCOTROUGH in the last 72 hours. RANDOM:    Recent Labs     02/05/23  0234   VANCORANDOM 21.2       Assessment:  HPI: Presents with shortness of breath and chest pressure on admission. Temp to 101.3 with HR of 103, Cefepime and Vancomycin started.    SCr, BUN, and urine output:  PAUL, SCr with worsening trends, SCr @ 2.3; BUN 40  Day(s) of therapy: 2  Vancomycin concentration:  2/05: 21.2, collected 4.5h post-dose, hold vancomycin    Plan:  Intermittent vancomycin dosing with PAUL  Based on level and worsening renal trends, hold vancomycin  Repeat level tomorrow AM  Pharmacy will continue to monitor patient and adjust therapy as indicated    VANCOMYCIN CONCENTRATION SCHEDULED FOR 02/06 @ 0600    Thank you for the consult,  Gerardo Aldana, [address] [place] [address], PharmD  2/5/2023 12:48 PM

## 2023-02-05 NOTE — PROGRESS NOTES
[address] [facility]  consulted by Dr. Sindhu Edward for monitoring and adjustment. Indication for treatment: Sepsis  Goal trough: Trough Goal: 15-20 mcg/mL  AUC/ALESSIO: 400-600    Risk Factors for MRSA Identified:   Received IV antibiotics within the past 90 days    Pertinent Laboratory Values:   Temp Readings from Last 3 Encounters:   02/04/23 99.9 °F (37.7 °C) (Oral)   12/12/22 97.4 °F (36.3 °C)   12/06/22 97.7 °F (36.5 °C)     Recent Labs     02/03/23  1537   WBC 10.8*     Recent Labs     02/03/23  1537 02/04/23  0113   BUN 39* 40*   CREATININE 1.9* 1.9*     Estimated Creatinine Clearance: 14 mL/min (A) (based on SCr of 1.9 mg/dL (H)). Intake/Output Summary (Last 24 hours) at 2/4/2023 1922  Last data filed at 2/4/2023 1505  Gross per 24 hour   Intake 150 ml   Output 600 ml   Net -450 ml       Pertinent Cultures:   Date    Source    Results  2/3   Blood    Pending  2/3   Urine                   Pending      Vancomycin level:   TROUGH:  No results for input(s): VANCOTROUGH in the last 72 hours. RANDOM:  No results for input(s): VANCORANDOM in the last 72 hours. Assessment:  HPI: Presents with shortness of breath and chest pressure on admission. Temp to 101.3 today with HR of 103, Cefepime and Vancomycin started. No notes from ordering provider as yet. SCr, BUN, and urine output: Cr 1.9, BUN 40  Day(s) of therapy: Day 1  Vancomycin concentration: to be collected. Plan:  Vancomycin 1250mg IVPB x 1, will collect a level to determine further dosing. Pharmacy will continue to monitor patient and adjust therapy as indicated    Padmaja 3 02/04/2023 @0600    Thank you for the consult.   MARIANELA Duron [facility]  2/4/2023 7:22 PM

## 2023-02-05 NOTE — PROGRESS NOTES
Patient examined at bedside for respiratory distress, O2sats in [de-identified], anxiety and increased O2 requirements 7L to 15L via NC. She is oriented. Restless. And unable to complete full sentences. Nursing reports no urine output this shift and bladder scan recorded 72mls. Audible wheezing. No tachycardia or hypotension. Orders: solumedural, lasix, ativan, abgs, cxr and stat bipap. Limited code which includes no intubation, defib/cardioversion, cpr or resuscitative meds. Code status was re-discussed with the patient and she confirmed it.

## 2023-02-05 NOTE — PLAN OF CARE
Problem: Discharge Planning  Goal: Discharge to home or other facility with appropriate resources  Outcome: Progressing  Flowsheets (Taken 2/5/2023 0821)  Discharge to home or other facility with appropriate resources:   Identify barriers to discharge with patient and caregiver   Arrange for needed discharge resources and transportation as appropriate   Identify discharge learning needs (meds, wound care, etc)   Refer to discharge planning if patient needs post-hospital services based on physician order or complex needs related to functional status, cognitive ability or social support system     Problem: Safety - Adult  Goal: Free from fall injury  Outcome: Progressing     Problem: ABCDS Injury Assessment  Goal: Absence of physical injury  Outcome: Progressing     Problem: Pain  Goal: Verbalizes/displays adequate comfort level or baseline comfort level  Outcome: Progressing     Problem: Chronic Conditions and Co-morbidities  Goal: Patient's chronic conditions and co-morbidity symptoms are monitored and maintained or improved  Outcome: Progressing  Flowsheets (Taken 2/5/2023 0821)  Care Plan - Patient's Chronic Conditions and Co-Morbidity Symptoms are Monitored and Maintained or Improved:   Monitor and assess patient's chronic conditions and comorbid symptoms for stability, deterioration, or improvement   Collaborate with multidisciplinary team to address chronic and comorbid conditions and prevent exacerbation or deterioration   Update acute care plan with appropriate goals if chronic or comorbid symptoms are exacerbated and prevent overall improvement and discharge

## 2023-02-05 NOTE — PROGRESS NOTES
Physical Therapy  Facility/Department: [facility] 3E  Physical Therapy Initial Assessment    Name: Earnest Montejo  : 1927  MRN: [id number]  Date of Service: 2023    Discharge Recommendations:  Subacute/Skilled Nursing Facility              Assessment   Assessment: Pt is a 80 y.o. female with medical history, surgical history, co-morbidities, and personal factors including Aortic stenosis, CAD (coronary artery disease), Chronic renal insufficiency, Depression, Dry eye syndrome, Eczema, Elevated LFTs, Generalized osteoarthritis, GERD (gastroesophageal reflux disease), Gout, Hyperlipidemia, Hypertension, Hyperuricemia, Hypothyroid, Low back pain, Osteoporosis, Pneumonia due to infectious organism, Sinus congestion, UTI (urinary tract infection), Vertigo, Foot surgery (); Cataract removal; Tonsillectomy; Colonoscopy; Hysterectomy; eye surgery; Hip fracture surgery (Right, 2018); and Aortic valve replacement (2023) with admission for Chest pain, Dyspnea and respiratory abnormalities, Hypoxia, Elevated brain natriuretic peptide (BNP) level, and Troponin level elevated. Prior to admission, pt was modified independent with functional mobility and ADLs. Examination of body systems reveals decreased strength, decreased balance, decreased aerobic capacity, and decreased independence with functional mobility.          Therapy Prognosis: Good  Decision Making: High Complexity  Clinical Presentation: unpredictable characteristics  Requires PT Follow-Up: Yes  Activity Tolerance  Activity Tolerance: Patient tolerated evaluation without incident     Plan   Physcial Therapy Plan  General Plan: 3-5 times per week  Current Treatment Recommendations: Strengthening, ROM, Balance training, Functional mobility training, Transfer training, ADL/Self-care training, IADL training, Cognitive/Perceptual training, Endurance training, Equipment evaluation, education, & procurement, Gait training, Positioning, Stair training, Neuromuscular re-education, Safety education & training, Therapeutic activities, Patient/Caregiver education & training, Home exercise program, Pain management, Cognitive reorientation  Safety Devices  Type of Devices:  All fall risk precautions in place, Patient at risk for falls, Call light within reach, Left in chair, Chair alarm in place, Gait belt, Nurse notified     Restrictions  Restrictions/Precautions  Restrictions/Precautions: General Precautions, Fall Risk     Subjective   General  Chart Reviewed: Yes  Patient assessed for rehabilitation services?: Yes  Family / Caregiver Present: No  Follows Commands: Impaired  Subjective  Subjective: pain: denies; 0/10         Social/Functional History  Social/Functional History  Lives With: Alone  Type of Home: Apartment  Home Layout: One level  Home Access: Level entry  Bathroom Shower/Tub: Walk-in shower  Bathroom Toilet: Handicap height  Bathroom Equipment: Built-in shower seat, Grab bars in shower  ADL Assistance: Independent  Homemaking Assistance: Needs assistance  Ambulation Assistance: Independent (mod I with 4ww)  Transfer Assistance: Independent  Active : No  Patient's  Info: family    Vision/Hearing  Vision  Vision: Within Functional Limits  Hearing  Hearing: Exceptions to [facility]  Hearing Exceptions: Hard of hearing/hearing concerns      Cognition   Orientation  Overall Orientation Status: Impaired  Orientation Level: Disoriented to time;Oriented to person  Cognition  Overall Cognitive Status: Exceptions  Arousal/Alertness: Appropriate responses to stimuli  Following Commands: Inconsistently follows commands  Attention Span: Appears intact  Safety Judgement: Decreased awareness of need for safety;Decreased awareness of need for assistance  Problem Solving: Decreased awareness of errors;Assistance required to correct errors made  Insights: Decreased awareness of deficits  Initiation: Requires cues for some  Sequencing: Requires cues for some Objective           Gross Assessment  Sensation: Intact (BLEs)        Strength RLE  Comment: knee extension: at least 3+/5 observed functionally  Strength LLE  Comment: knee extension: at least 3+/5 observed functionally           Bed mobility  Supine to Sit: Minimal assistance  Transfers  Sit to Stand: Minimal Assistance; Moderate Assistance (with verbal cues to push through bed and avoid pulling on walker)  Stand to Sit: Moderate Assistance;2 Person Assistance (with verbal cues to feel bed/chair against back of legs, reach back, and sit slowly)  Ambulation  Surface: Level tile  Device: Rollator  Assistance: Minimal assistance; Moderate assistance;2 Person assistance  Quality of Gait: decreased gait speed, decreased step length bilaterally, unsteady gait  Distance: 3 feet + 5 feet  Comments: with verbal and tactile cues for BLE placement, walker placement, and sequence throughout ambulation; with verbal and tactile cues to maintain upright posture in order to avoid COM shifting outside of ANGEL     Balance  Posture: Fair  Sitting - Static: Good  Sitting - Dynamic: Fair  Standing - Static: Poor;+  Standing - Dynamic: Poor         Gait Training:  Cues were given for safety, sequence, device management, balance, posture, awareness, path. Therapeutic Activity Training:   Therapeutic activity training was instructed today. Cues were given for safety, sequence, UE/LE placement, awareness, and balance. Activities performed today included bed mobility training, sup-sit, sit-stand, SPT. AM-PAC Score  AM-[facility] Inpatient Mobility Raw Score : 13 (02/04/23 1937)  AM-PAC Inpatient T-Scale Score : 36.74 (02/04/23 1937)  Mobility Inpatient CMS 0-100% Score: 64.91 (02/04/23 1937)  Mobility Inpatient CMS G-Code Modifier : CL (02/04/23 1937)              Goals  Long Term Goals  Time Frame for Long Term Goals :  In one week:  Long Term Goal 1: Pt will complete all bed mobility with SBAx1  Long Term Goal 2: Pt will complete sit <> stand transfers with CGAx1  Long Term Goal 3: Pt will ambulate 75 feet with minAx1 with LRAD  Long Term Goal 4: Pt will independently complete 3 sets of 10 reps of BLE AROM exercises in available and allowed ROM       Education  Patient Education  Education Given To: Patient  Education Provided: Role of Therapy; Energy Conservation; Fall Prevention Strategies; Plan of Care;IADL Safety;Equipment;ADL Adaptive Strategies;Transfer Training  Education Method: Demonstration;Verbal  Education Outcome: Verbalized understanding;Demonstrated understanding    Time In: 2661  Time Out: 1303  Total Treatment Time: 34  Timed Code Treatment Minutes: [address] Danny Hernández, PT, DPT  License #: [id number]

## 2023-02-05 NOTE — PROGRESS NOTES
PT HAD RESP DISTRESS LAST PM  TRANSFERRED TO STEP DOWN THIS AM PT SLEEPY ABLE TO EAT BY MOUTH NO VOMITING OR GROSS GIT BLEEDING PTS CODE STATUS LIMITED HAD TAVR 01/24/23  VITALS STABLE AT PRESENT  LABS NOTED  WILL CPM NO NEED FOR EGD BECAUSE OF HER POOR GLOBAL CONDITION  D/W GRAND-DAUGHTER JE YESTERDAY IN DETAIL AND WITH FAMILY TODAY AS WELL OVERALL PROGNOSIS GUARDED

## 2023-02-05 NOTE — PROGRESS NOTES
V2.0  [facility] Hospitalist Progress Note      Name:  Lyly Rivera /Age/Sex: 1927  (80 y.o. female)   MRN & CSN:  [id number] & [id number] Encounter Date/Time: 2023 8:19 AM EST    Location:  [address] [place] [address]-A PCP: Claudio Cardozo MD       Hospital Day: 3    Assessment and Plan:    Lyly Rivera is a 80 y.o. female with a pmh of pulmonary hypertension, HFpEF, CKD, chronic LBBB, severe AS, hypertension, hyperlipidemia, GERD who presents with <principal problem not specified>     #Acute hypoxic respiratory failure 2/2 possible aspiration pneumonia and CHF  -Patient received thin liquids at the NH  -no signs of infection on arrival  -had febrile episode to 101.3  evening    Plan:  Continue cefepime and vancomycin  MRSA screen ordered  Started on metronidazole to cover anaerobes  Duonebs q4h    #Acute on chronic HFpEF  #HTN  #Afib  -presents with SOB and chest pressure  -Recent TAVR  -Mild troponin elevation to 0.059  -EKG stable from previous without any acute ischemic changes  -pro-BNP elevated   -Previous echo (2022): EF 55-60%, moderately dilated LA, mod-severe AS, mild-mod MR, mild to mod TR with RVSP of 44  -Negative respiratory PCR  -High TSH and normal fT4  -low procal, bacterial pneumonia unlikely     Plan:  IV lasix  Cardiology on consult, appreciate recs  Strict I's and 's  Echo ordered  Daily weights  Low salt diet  Continue eliquis, atenolol, atorvastatin, plavix, hydralazine, imdur  Holding amlodipine  No RAASi per nephro  Appreciate recs from nephro about antihypertensives  Follow up on Blood cultures and UA     #PAUL on CKD  -Cr at baseline at 1.9--> 2.3  -no urine output overnight and minimal urine found on bladder scan     Plan:  Nephro consulted, appreciate recs   Continue dialyvyte, sodium bicarb  Started IV bumex 1 mg daily     #Weakness  -came here from rehab center     Plan:  PT/OT consult     #Dysphagia  -on nectar thick liquids      Plan:  SLP consult, appreciate recs  GI consult, appreciate recs  EGD tomorrow     #Constipation  -does not remember when her last BM was     Plan:  Miralax BID  Bisacodyl suppository    #Iron deficiency anemia  -low iron level and transferrin saturation    Plan:  Ferrous sulphate daily    #UTI  -UA positive for nitrates    Plan:  Start on ceftriaxone  Follow up on urine cultures     Chronic medical problems:   #COPD  -wheezing  Duonebs ordered     #GERD  -On lansoprazole at home  Continue on pantoprazole inpatient    Dipso:   Family would like a 48h trial on current therapy to see if patient improves prior to considering hospice      Diet ADULT DIET; Dysphagia - Minced and Moist; No Added Salt (3-4 gm); Mildly Thick (Nectar); 1800 ml   DVT Prophylaxis [x] Lovenox, []  Heparin, [] SCDs, [] Ambulation,  [] Eliquis, [] Xarelto  [] Coumadin   Code Status Limited   Disposition From: rehab  Expected Disposition: rehab  Estimated Date of Discharge: 2-3 days  Patient requires continued admission due to acute hypoxic resp failure 2/2 CHF exacerbation, dysphagia, pneumonia   Surrogate Decision Maker/ POA granddaughter     Subjective:     Chief Complaint: Chest Pain (Given 4 ASA, 1 nitro)     Patient asleep and on bipap when I examined her this morning. Refer to previous progress note for my conversation with Maggie. Review of Systems:    Unable to obtain    Objective:      Intake/Output Summary (Last 24 hours) at 2/5/2023 0757  Last data filed at 2/4/2023 2133  Gross per 24 hour   Intake 120 ml   Output 600 ml   Net -480 ml        Vitals:   Vitals:    02/05/23 0427   BP:    Pulse:    Resp: 27   Temp:    SpO2:        Physical Exam:     General: NAD, asleep on BiPAP  Eyes: EOMI  HENT: Atraumatic  Respiratory: no respiratory distress  GI: nondistended  MSK: no lower extremity edema  Skin: Intact, dry, warm, no rashes  Neuro: CN II to XII grossly intact  Psych: Normal mood    Medications:   Medications:    metroNIDAZOLE  500 mg IntraVENous Q8H    iron sucrose  200 mg IntraVENous Q24H    cefepime  1,000 mg IntraVENous Q12H    vancomycin (VANCOCIN) intermittent dosing (placeholder)   Other RX Placeholder    senna  1 tablet Oral BID    allopurinol  100 mg Oral Daily    apixaban  2.5 mg Oral BID    [Held by provider] amLODIPine  10 mg Oral Nightly    atenolol  25 mg Oral Dinner    azelastine  1 spray Each Nostril BID    therapeutic multivitamin-minerals  1 tablet Oral Daily    cetirizine  5 mg Oral Daily    Vitamin D  2,000 Units Oral Daily    clopidogrel  75 mg Oral Daily    pantoprazole  40 mg Oral QAM AC    docusate sodium  100 mg Oral Daily    ipratropium-albuterol  3 mL Inhalation TID    isosorbide mononitrate  60 mg Oral Daily    levothyroxine  88 mcg Oral Daily    ranolazine  500 mg Oral BID    sertraline  50 mg Oral Daily    atorvastatin  10 mg Oral Daily    sodium bicarbonate  325 mg Oral BID    Lifitegrast  1 drop Both Eyes BID    sodium chloride flush  5-40 mL IntraVENous 2 times per day    polyethylene glycol  17 g Oral BID    bisacodyl  10 mg Rectal Once      Infusions:    sodium chloride       PRN Meds: albuterol sulfate HFA, 2 puff, Q4H PRN  morphine, 4 mg, Q30 Min PRN  diphenhydrAMINE-zinc acetate, , TID PRN  gabapentin, 300 mg, Daily PRN  hydrALAZINE, 10 mg, BID PRN  mineral oil-hydrophilic petrolatum, , PRN  sodium chloride flush, 5-40 mL, PRN  sodium chloride, , PRN  ondansetron, 4 mg, Q8H PRN   Or  ondansetron, 4 mg, Q6H PRN  acetaminophen, 650 mg, Q6H PRN   Or  acetaminophen, 650 mg, Q6H PRN  polyvinyl alcohol, 1 drop, PRN  melatonin, 6 mg, Nightly PRN      Labs      Recent Results (from the past 24 hour(s))   Blood Gas, Venous    Collection Time: 02/04/23  6:12 PM   Result Value Ref Range    pH, Denis 7.39 7.32 - 7.42    pCO2, Denis 36 (L) 38 - 52 mmHG    pO2, Denis 202 (H) 28 - 48 mmHG    Base Excess 3 (H) 0 - 2.4    HCO3, Venous 21.8 19 - 25 MMOL/L    O2 Sat, Denis 94.8 (H) 50 - 70 %   Vancomycin Level, Random    Collection Time: 02/05/23  2:34 AM   Result Value Ref Range    Vancomycin Rm 21.2 UG/ML    DOSE AMOUNT DOSE AMT. GIVEN - .      DOSE TIME DOSE TIME GIVEN - .    Magnesium    Collection Time: 02/05/23  2:34 AM   Result Value Ref Range    Magnesium 2.2 1.8 - 2.4 mg/dl   Amylase    Collection Time: 02/05/23  2:34 AM   Result Value Ref Range    Amylase 56 25 - 115 U/L   CBC with Auto Differential    Collection Time: 02/05/23  2:34 AM   Result Value Ref Range    WBC 11.4 (H) 4.0 - 10.5 K/CU MM    RBC 2.34 (L) 4.2 - 5.4 M/CU MM    Hemoglobin 7.2 (L) 12.5 - 16.0 GM/DL    Hematocrit 23.1 (L) 37 - 47 %    MCV 98.7 78 - 100 FL    MCH 30.8 27 - 31 PG    MCHC 31.2 (L) 32.0 - 36.0 %    RDW 14.6 11.7 - 14.9 %    Platelets 857 640 - 891 K/CU MM    MPV 8.7 7.5 - 11.1 FL    Differential Type AUTOMATED DIFFERENTIAL     Segs Relative 79.3 (H) 36 - 66 %    Lymphocytes % 13.8 (L) 24 - 44 %    Monocytes % 6.4 (H) 0 - 4 %    Eosinophils % 0.0 0 - 3 %    Basophils % 0.2 0 - 1 %    Segs Absolute 9.1 K/CU MM    Lymphocytes Absolute 1.6 K/CU MM    Monocytes Absolute 0.7 K/CU MM    Eosinophils Absolute 0.0 K/CU MM    Basophils Absolute 0.0 K/CU MM    Nucleated RBC % 0.0 %    Total Nucleated RBC 0.0 K/CU MM    Total Immature Neutrophil 0.03 K/CU MM    Immature Neutrophil % 0.3 0 - 0.43 %   Comprehensive Metabolic Panel    Collection Time: 02/05/23  2:34 AM   Result Value Ref Range    Sodium 139 135 - 145 MMOL/L    Potassium 4.7 3.5 - 5.1 MMOL/L    Chloride 105 99 - 110 mMol/L    CO2 24 21 - 32 MMOL/L    BUN 40 (H) 6 - 23 MG/DL    Creatinine 2.3 (H) 0.6 - 1.1 MG/DL    Est, Glom Filt Rate 19 (L) >60 mL/min/1.73m2    Glucose 113 (H) 70 - 99 MG/DL    Calcium 8.7 8.3 - 10.6 MG/DL    Albumin 3.6 3.4 - 5.0 GM/DL    Total Protein 6.4 6.4 - 8.2 GM/DL    Total Bilirubin 0.3 0.0 - 1.0 MG/DL    ALT 11 10 - 40 U/L    AST 21 15 - 37 IU/L    Alkaline Phosphatase 49 40 - 128 IU/L    Anion Gap 10 4 - 16   Lipase    Collection Time: 02/05/23  2:34 AM   Result Value Ref Range    Lipase 28 13 - 60 IU/L   Protime/INR & PTT    Collection Time: 02/05/23  2:34 AM   Result Value Ref Range    Protime 18.0 (H) 11.7 - 14.5 SECONDS    INR 1.39 INDEX    aPTT 41.1 (H) 25.1 - 37.1 SECONDS        Imaging/Diagnostics Last 24 Hours   CT CHEST WO CONTRAST    Result Date: 2/3/2023  EXAMINATION: CT OF THE CHEST WITHOUT CONTRAST 2/3/2023 4:19 pm TECHNIQUE: CT of the chest was performed without the administration of intravenous contrast. Multiplanar reformatted images are provided for review. Automated exposure control, iterative reconstruction, and/or weight based adjustment of the mA/kV was utilized to reduce the radiation dose to as low as reasonably achievable. COMPARISON: Chest CT 11/14/2022 HISTORY: ORDERING SYSTEM PROVIDED HISTORY: Short of breath, chest pain, dyspnea/cp Decision Support Exception - unselect if not a suspected or confirmed emergency medical condition->Emergency Medical Condition (MA) Reason for Exam: dyspnea/cp Additional signs and symptoms: no Relevant Medical/Surgical History: none FINDINGS: Mediastinum: Cardiac structures and great vessels appear unremarkable with exception of calcific atherosclerotic disease. No pericardial effusion. Posterior mediastinal structures appear unremarkable. No mediastinal or hilar adenopathy. Sequela from TAVR. Lungs/pleura: Small volume bilateral pleural effusion with associated relaxation atelectasis. Diffuse interlobular septal thickening and patchy alveolar densities bilateral lungs. No inspissated secretions or endobronchial lesion evident. No pneumothorax. Upper Abdomen: Unremarkable appearance. Soft Tissues/Bones: No acute superficial soft tissue or osseous structure abnormality evident. 1.  Congestive heart failure is most likely given the findings; pneumonia is also a consideration in areas of consolidation with pleural effusion. 2.  Vascular calcifications are noted reflecting calcific atherosclerosis. 3. Sequela from TAVR.      XR CHEST PORTABLE    Result Date: 2/3/2023  EXAMINATION: ONE XRAY VIEW OF THE CHEST 2/3/2023 4:23 pm COMPARISON: 12/01/2022 HISTORY: ORDERING SYSTEM PROVIDED HISTORY: dyspnea TECHNOLOGIST PROVIDED HISTORY: Reason for exam:->dyspnea Reason for Exam: dyspnea Additional signs and symptoms: unknown Relevant Medical/Surgical History: CAD, pna FINDINGS: Cardiomediastinal silhouette stable. Bilateral patchy airspace opacities. No pneumothorax. No pleural effusion.      Bilateral patchy airspace opacities, increased compared to the previous exam, suggesting multifocal pneumonia       Electronically signed by Fazal Leon MD on 2/5/2023 at 7:57 AM

## 2023-02-05 NOTE — PROGRESS NOTES
Spoke to patient's family at bedside and Maggie on the phone. Family has decided that they do not want to wait and they want the patient to be comfortable and would like to transition to hospice at this time. Hospice consult placed. CM contacted.

## 2023-02-05 NOTE — PROGRESS NOTES
Pt transferring to 2E room 2029. Called report to [facility] and updated [place] RT of transfer and he states he will come to assist soon. 8:20 AM  Pt transferred to 2029 with this nurse, Fanny Lopez RN charge and [place] RT. Hand off given to [facility] charge nurse on 2E. Pt aware of transfer and nodded her head yes in agreement. Granddaughter, Maggie at bedside since I arrived this morning and is aware of new orders and transfer.

## 2023-02-05 NOTE — PROGRESS NOTES
[facility] [address], [address] [address]  Phone: [phone]  Office Hours: 8:30AM - 4:30PM  Monday - Friday     Nephrology Progress Note  2/5/2023 9:07 AM  Subjective:   Admit Date: 2/3/2023  PCP: Renee Landry MD  Interval History: resp distress this am  Moved to ICU step down  Maggie at bedside    Diet: ADULT DIET; Dysphagia - Minced and Moist; No Added Salt (3-4 gm); Mildly Thick ([place]); 1800 ml      Data:   Scheduled Meds:   metroNIDAZOLE  500 mg IntraVENous Q8H    bisacodyl  10 mg Rectal Daily    amLODIPine  5 mg Oral Daily    bumetanide  1 mg IntraVENous BID    iron sucrose  200 mg IntraVENous Q24H    cefepime  1,000 mg IntraVENous Q12H    vancomycin (VANCOCIN) intermittent dosing (placeholder)   Other RX Placeholder    senna  1 tablet Oral BID    allopurinol  100 mg Oral Daily    apixaban  2.5 mg Oral BID    atenolol  25 mg Oral Dinner    azelastine  1 spray Each Nostril BID    therapeutic multivitamin-minerals  1 tablet Oral Daily    cetirizine  5 mg Oral Daily    Vitamin D  2,000 Units Oral Daily    clopidogrel  75 mg Oral Daily    pantoprazole  40 mg Oral QAM AC    docusate sodium  100 mg Oral Daily    ipratropium-albuterol  3 mL Inhalation TID    isosorbide mononitrate  60 mg Oral Daily    levothyroxine  88 mcg Oral Daily    ranolazine  500 mg Oral BID    sertraline  50 mg Oral Daily    atorvastatin  10 mg Oral Daily    sodium bicarbonate  325 mg Oral BID    Lifitegrast  1 drop Both Eyes BID    sodium chloride flush  5-40 mL IntraVENous 2 times per day    polyethylene glycol  17 g Oral BID    bisacodyl  10 mg Rectal Once     Continuous Infusions:   sodium chloride       PRN Meds:albuterol sulfate HFA, morphine, diphenhydrAMINE-zinc acetate, gabapentin, hydrALAZINE, mineral oil-hydrophilic petrolatum, sodium chloride flush, sodium chloride, ondansetron **OR** ondansetron, acetaminophen **OR** acetaminophen, polyvinyl alcohol, melatonin  I/O last 3 completed shifts:   In: 270 [P.O.:270]  Out: 600 [Urine:600]  No intake/output data recorded. Intake/Output Summary (Last 24 hours) at 2/5/2023 0907  Last data filed at 2/4/2023 2133  Gross per 24 hour   Intake 120 ml   Output 600 ml   Net -480 ml     CBC:   Recent Labs     02/03/23  1537 02/05/23  0234   WBC 10.8* 11.4*   HGB 7.9* 7.2*    272     BMP:    Recent Labs     02/03/23  1537 02/04/23  0113 02/05/23  0234    138 139   K 3.7 4.5 4.7    102 105   CO2 20* 23 24   BUN 39* 40* 40*   CREATININE 1.9* 1.9* 2.3*   GLUCOSE 135* 148* 113*     Troponin: No results for input(s): TROPONINI in the last 72 hours. BNP: No results for input(s): BNP in the last 72 hours. Lipids:   Recent Labs     02/04/23  0113   CHOL 126   HDL 48     ABGs:   Lab Results   Component Value Date/Time    PO2ART 83 11/14/2022 11:30 PM    YNO0EHR 29.0 11/14/2022 11:30 PM       Objective:   Vitals: BP (!) 174/62   Pulse 72   Temp 98.4 °F (36.9 °C) (Axillary)   Resp 24   Ht 5' (1.524 m)   Wt 125 lb (56.7 kg)   LMP  (LMP Unknown)   SpO2 100%   BMI 24.41 kg/m²   General appearance: alert and cooperative with exam  HEENT: Head: Normocephalic, no lesions, without obvious abnormality.   Neck: no adenopathy, no carotid bruit, no JVD, supple, symmetrical, trachea midline, and thyroid not enlarged, symmetric, no tenderness/mass/nodules  Lungs: diminished breath sounds bilaterally and rhonchi bilaterally  Heart: S1, S2 normal  Abdomen: soft, non-tender; bowel sounds normal; no masses,  no organomegaly  Extremities: extremities normal, atraumatic, no cyanosis or edema  Neurologic: Mental status: alertness: alert    Assessment and Plan:     Patient Active Problem List:     Hypertension     GERD (gastroesophageal reflux disease)     Depression     Hyperlipidemia     Generalized osteoarthritis     Osteoporosis     Hypothyroid     Coronary artery disease involving native heart without angina pectoris     Eczema     Low back pain     Hyperuricemia     Gout Pruritic condition     PAUL (acute kidney injury) (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)     Dizziness     Labyrinthitis     Vertigo     Primary osteoarthritis of left foot     Closed nondisplaced fracture of fourth metatarsal bone of left foot     Closed nondisplaced fracture of third metatarsal bone of left foot     Closed nondisplaced fracture of fifth left metatarsal bone     Vaginal prolapse     Sinus congestion     Localized edema     Sepsis ([facility])     Dyspnea and respiratory abnormalities     Pneumonia due to infectious organism     Pyelonephritis     Chronic kidney disease     Aortic stenosis     Hypoxia     Heart failure ([facility] [address]      Bryson Bernstein has developed acute CHF with resp distress and malignant  HTN  Received lasix  Little better  Discussed with JE  Will restart amlodipine  Start bumex 1 mg IV  Prognosis guarded  She is awake alert  Family considering non aggressive measures if no improvement in 48 hrs      Regla Darden MD, MD

## 2023-02-05 NOTE — CONSULTS
[address], [address] [facility]                                  CONSULTATION    PATIENT NAME: Triston King                    :        1927  MED REC NO:   [id number]                          ROOM:       1500  ACCOUNT NO:   [de-identified]                           ADMIT DATE: 2023  PROVIDER:     Jorge Alberto Crane MD    CONSULT DATE:  2023    PRIMARY PHYSICIAN:  Dr. Merry Hanson:  History of dysphagia; rule out esophageal obstruction. HISTORY OF PRESENT ILLNESS:  The patient is a 79-year-old white female  with past medical history significant for hypertension, coronary artery  disease, aortic stenosis, atrial fibrillation, chronic kidney disease,  depression, gout, gastroesophageal reflux disease, hypothyroidism,  osteoporosis, COPD, and dysphagia; who was admitted to the hospital on  2023 with shortness of breath and chest discomfort. Upon questioning, the patient also gave us a history of longstanding  dysphagia for solids and occasionally with liquids as well and there is  also suggestion of aspiration. There is no history of vomiting,  hematemesis, melena, or hematochezia. The patient has never had an EGD  done; but did have a colonoscopy done in approximately five or six years  ago, the OP report is not available. The patient is hemodynamically  stable. The patient has had a speech and language pathology evaluation  also and the swallowing eval was abnormal.    REVIEW OF SYSTEMS:  CENTRAL NERVOUS SYSTEM EXAMINATION:  The patient denies headache or  focal sensorimotor symptoms. CARDIOVASCULAR SYSTEM:  The patient complained of chest pain and  shortness of breath upon admission, but denies leg swelling. GENITOURINARY SYSTEM:  No history of dysuria, pyuria, or hematuria. MUSCULOSKELETAL SYSTEM:  The patient complains of generalized weakness.   RESPIRATORY SYSTEM:  No history of cough, hemoptysis, fever, or chills. PAST MEDICAL HISTORY:  Significant for history of hypertension, coronary  artery disease, atrial fibrillation, CHF, COPD, chronic kidney disease,  gastroesophageal reflux disease, osteoporosis, gout, hyperlipidemia, and  aortic stenosis. FAMILY HISTORY:  Noncontributory. MEDICATIONS:  Please refer to chart (the patient is on Eliquis and  Plavix). SOCIOECONOMIC HISTORY:  No history of EtOH abuse. The patient is a  former smoker. PAST SURGICAL HISTORY:  The patient has had aortic valve replaced on  01/24/2023 and also has had cataract surgery, foot surgery, hip surgery,  hysterectomy, tonsillectomy, and adenoidectomy. ALLERGIES:  The patient is allergic to BACTRIM, CEPHALEXIN, and TAPE. PHYSICAL EXAMINATION:  GENERAL:  A 70-year-old white female of thin build and average  nutritional status for her age, who is lying flat in bed, in no acute  distress. She is awake, alert, and oriented and pleasant to talk with. VITAL SIGNS:  Stable. HEENT:  Examination shows skull to be atraumatic. NECK:  Supple. CHEST:  Shows diminished breath sounds. HEART:  S1, S2 is normal.  ABDOMEN:  Soft, nontender, nondistended. Liver and spleen are not  palpable. Bowel sounds are present. RECTAL:  Exam is deferred. CNS:  Exam shows the patient to be awake, alert, and oriented. There  are no focal sensorimotor sign. MUSCULOSKELETAL SYSTEM:  Exam shows evidence of degenerative joint  disease changes. LABORATORY DATA:  The labs drawn during the present hospitalization  comprised of chem profile today which is remarkable for BUN of 40,  creatinine 1.9; LFTs are within normal limits; and the CBC shows a WBC  count of 10.8, hemoglobin 7.9, platelet count of 186,327; and the  patient's INR is 1.12.     IMPRESSION:  A 70-year-old white female with multiple comorbidities  admitted with chest pain and shortness of breath, but also complains of  longstanding history of dysphagia with possible aspiration, rule out  esophageal obstruction. RECOMMENDATIONS:  1.  I agree with present management. 2.  We will proceed with EGD in a.m. to rule out esophageal obstruction. 3.  Further recommendations after performing the EGD. 4.  The case and plan has been discussed in detail with the patient and  all her questions have been answered. 5.  The case and plan was also discussed with the patient's bedside RN,  Zeina Rollins.         Davy Morse MD    D: 02/04/2023 16:12:14       T: 02/04/2023 16:14:52     [email or web address]  Job#: [id number]     Doc#: [id number]    CC:  Dr. Tram Colon

## 2023-02-06 PROBLEM — J96.91 RESPIRATORY FAILURE WITH HYPOXIA (HCC): Status: ACTIVE | Noted: 2023-01-01

## 2023-02-06 PROBLEM — Z95.2 S/P TAVR (TRANSCATHETER AORTIC VALVE REPLACEMENT): Status: ACTIVE | Noted: 2023-01-01

## 2023-02-06 PROBLEM — S92.335A CLOSED NONDISPLACED FRACTURE OF THIRD METATARSAL BONE OF LEFT FOOT: Status: RESOLVED | Noted: 2021-08-30 | Resolved: 2023-01-01

## 2023-02-06 PROBLEM — M19.072 PRIMARY OSTEOARTHRITIS OF LEFT FOOT: Status: RESOLVED | Noted: 2021-08-30 | Resolved: 2023-01-01

## 2023-02-06 PROBLEM — S92.345A CLOSED NONDISPLACED FRACTURE OF FOURTH METATARSAL BONE OF LEFT FOOT: Status: RESOLVED | Noted: 2021-08-30 | Resolved: 2023-01-01

## 2023-02-06 PROBLEM — S92.355A CLOSED NONDISPLACED FRACTURE OF FIFTH LEFT METATARSAL BONE: Status: RESOLVED | Noted: 2021-08-30 | Resolved: 2023-01-01

## 2023-02-06 PROBLEM — S72.001K CLOSED FRACTURE OF RIGHT HIP WITH NONUNION: Status: RESOLVED | Noted: 2018-08-18 | Resolved: 2023-01-01

## 2023-02-06 PROBLEM — N12 PYELONEPHRITIS: Status: RESOLVED | Noted: 2022-01-01 | Resolved: 2023-01-01

## 2023-02-06 PROBLEM — R09.81 SINUS CONGESTION: Status: RESOLVED | Noted: 2022-01-01 | Resolved: 2023-01-01

## 2023-02-06 PROBLEM — Z51.5 HOSPICE CARE: Status: ACTIVE | Noted: 2023-01-01

## 2023-02-06 PROBLEM — R60.0 LOCALIZED EDEMA: Status: RESOLVED | Noted: 2022-01-01 | Resolved: 2023-01-01

## 2023-02-06 PROBLEM — R09.02 HYPOXIA: Status: RESOLVED | Noted: 2022-01-01 | Resolved: 2023-01-01

## 2023-02-06 NOTE — PROGRESS NOTES
[address] [facility]  consulted by Dr. Janel Bolivar for monitoring and adjustment. Indication for treatment: Sepsis  Goal trough: Trough Goal: 15-20 mcg/mL  AUC/ALESSIO: 400-600    Risk Factors for MRSA Identified:   Received IV antibiotics within the past 90 days    Pertinent Laboratory Values:   Temp Readings from Last 3 Encounters:   02/05/23 97.5 °F (36.4 °C) (Oral)   12/12/22 97.4 °F (36.3 °C)   12/06/22 97.7 °F (36.5 °C)     Recent Labs     02/03/23  1537 02/05/23  0234   WBC 10.8* 11.4*       Recent Labs     02/04/23  0113 02/05/23  0234 02/06/23  0503   BUN 40* 40* 51*   CREATININE 1.9* 2.3* 2.5*       Estimated Creatinine Clearance: 10 mL/min (A) (based on SCr of 2.5 mg/dL (H)). Intake/Output Summary (Last 24 hours) at 2/6/2023 0754  Last data filed at 2/6/2023 2220  Gross per 24 hour   Intake 200 ml   Output 400 ml   Net -200 ml         Pertinent Cultures:   Date    Source    Results  2/3   Blood    NGTD  2/3   Urine                   Ecoli  2/4   MRSA nares   Pending  2/3 Resp PCR, Covid-19, Influenza A/B  Negative      Vancomycin level:   TROUGH:  No results for input(s): VANCOTROUGH in the last 72 hours. RANDOM:    Recent Labs     02/05/23  0234 02/06/23  0503   VANCORANDOM 21.2 11.4         Assessment:  HPI: Presents with shortness of breath and chest pressure on admission. Temp to 101.3 with HR of 103, Cefepime and Vancomycin started. Hospice consult initiated. SCr, BUN, and urine output:  PAUL, SCr with worsening trends, SCr up to 2.5 today  Output may be low  Day(s) of therapy: 3  Vancomycin concentration:  2/05: 21.2, collected 4.5h post-dose, hold vancomycin  2/6 - 11.4, 31 hour level post initial 1250mg dose. Plan:  Renal trends continue to worsen, SCR trending up to 2.5 today. Continue intermittent vancomycin dosing due to PAUL  Vanco level down to 11.4 today, ok to re-dose.   Give vancomycin 1000mg ivpb x1 dose today and recheck the vanco level in 2 days.  Pharmacy will continue to monitor patient and adjust therapy as indicated    Padmaja 3 2/8 @ 06:00    Thank you for the consult,  Yajaira Jane.  MARIANELA Bejarano [facility]  2/6/2023 7:54 AM

## 2023-02-06 NOTE — CONSULTS
[address] [place] [address] Consult Note    Date: 2/6/2023  Name: Rfai Francois  MRN: [id number]  YOB: 1927   Patient's PCP: Simona Harding MD  Consultants during acute care: Cardiology, Gastroenterology, Nephrology,   Referring Physician: Dr. Constantino Chisholm and Dr Neelam Ritchie care admission date: 2/3/2023 and 1/24 to 1/31/23 at [facility]     Informant: Chart reviewed, discussed with Dr Ronny Castanon, Dr. Constantino Chisholm, the patient's nurse. I examined the patient who is provides limited information due to her clinical status. I met with the patient's family (POA granddaughter, Buxton Neighbor, son and daughter in law, Flaquita Wilkins and Joselo Nye) at the bedside. CC: shortness of breath     [place]: This is a 80 y.o. female with a history of valvular heart disease with severe aortic stenosis and aortic valva area of 0.99 cm2 who had TAVR at [facility] on 1/24/23, with comorbidities of heart failure with preserved ejection fraction with LVEF 55-60%, pulmonary hypertension, left bundle branch block, coronary artery disease, CKD, hypertension, hyperlipidemia. Post TAVR, the patient had episodes of afib and ultimately was discharged to local Nursing Home. The patient was admitted on 2/3/2023 with chest pain and shortness of breath. The patient has been followed by hospital medicine, Cardiology and Nephrology. The patient has been treated with IV Lasix and Bumex, and on 2/6/23 was started on Lasix infusion. The patient has developed respiratory distress with hypoxic respiratory failure and is on BiPap at 100% FiO2. When the BiPap is removed, she becomes dyspneic with air hunger and oximetry drops into the 70's. Pro-BNP is 10,005, chest X-ray shows bilateral infiltrates. WBC 13,700 and hemoglobin 6.9 gm/dl. The patient is drowsy but arousable. She seems aware when I tell her that I am a hospice doctor and we talked about comfort and she said she was ready.  The patient was given a dose of Morphine shortly before I examined her, and she does calm down and appears to be resting. Prior to the TAVR, the patient was able to live alone with a hired care giver and family support. She had significant exertional dyspnea which was what caused her to pursue the TAVR. The patient had a fever earlier in the admission and there is concern for possible aspiration pneumonitis. The patient has an E. coli UTI and has been on broad spectrum antibiotics without improvement. On 2/6/23, I met with the patient's granddaughter (Linda Chavez) and son, Shawna Villanueva and his spouse, Jian Norton. Hospice philosophy was discussed regarding care and comfort at the end of life. Questions were answered, and emotional support was provided. We discussed that [address] is for management of symptoms, and that if the patient stabilizes, alternate arrangements for care will be needed They are aware that Hospice does not provide for the patients 24 hour care giving needs. The patient is limited code status without CPR, intubation, defibrillation or resuscitative medications. The family request comfort care. There is a meeting with the hospice nurse liaison at noon 2/6/23.     Past Medical History:   Diagnosis Date    Aortic stenosis     ON ECHO 11/22- DR HILARY FIGUEREDO/TAVR 1/24/23- DR Jamal Smallwood    CAD (coronary artery disease)     Dr Kyung Figueredo/Jose Figueredo    Chronic renal insufficiency     Dr Fanny Castaneda    Depression     Dry eye syndrome     Dr Amaro Guard    Eczema     Elevated LFTs 05/11/2012    Generalized osteoarthritis     GERD (gastroesophageal reflux disease)     Esophagitis    Gout     Hyperlipidemia     Hypertension     Hyperuricemia     Hypothyroid     Hypothyroidism    Low back pain     Osteoporosis     Generalized    Pneumonia due to infectious organism 11/16/2022    Sinus congestion     CHRONIC W POST NASAL DRIP    UTI (urinary tract infection)     Vertigo     Dr Kristina Connors       Past Surgical History: Procedure Laterality Date    AORTIC VALVE REPLACEMENT  01/24/2023    Dr Beni Gore at [facility] 95 Aug. 2006 and L March 2007 Dr. Lenny Romero  2004    right foot realignment [address] Right 08/19/2018    rt hip Zheng    HYSTERECTOMY (CERVIX STATUS UNKNOWN)      TONSILLECTOMY         Social History     Socioeconomic History    Marital status:       Spouse name: Not on file    Number of children: Not on file    Years of education: Not on file    Highest education level: Not on file   Occupational History    Occupation: Retired from [employer]: as noted   Tobacco Use    Smoking status: Former    Smokeless tobacco: Never   Vaping Use    Vaping Use: Never used   Substance and Sexual Activity    Alcohol use: No    Drug use: No    Sexual activity: Not on file   Other Topics Concern    Not on file   Social History Narrative    Marital Status:   she lives in an [facility] [place], operated by [facility] and  has been in ECF since 2004        Diet:  Low salt        Seat Belts:  Always     Social Determinants of Health     Financial Resource Strain: Low Risk     Difficulty of Paying Living Expenses: Not hard at all   Food Insecurity: No Food Insecurity    Worried About Running Out of Food in the Last Year: Never true    [address] in the Last Year: Never true   Transportation Needs: Not on file   Physical Activity: Sufficiently Active    Days of Exercise per Week: 5 days    Minutes of Exercise per Session: 30 min   Stress: Not on file   Social Connections: Not on file   Intimate Partner Violence: Not on file   Housing Stability: Not on file       Family History   Problem Relation Age of Onset    Other Mother         CAD    Other Father         CAD       Allergies   Allergen Reactions    Bactrim [Sulfamethoxazole-Trimethoprim] Nausea And Vomiting    Cephalexin Rash    Tape [Adhesive Tape] Itching       Medication list reviewed  Prior to Admission medications    Medication Sig Start Date End Date Taking? Authorizing Provider   cycloSPORINE (RESTASIS) 0.05 % ophthalmic emulsion Place 1 drop into both eyes 2 times daily   Yes Historical Provider, MD   apixaban (ELIQUIS) 5 MG TABS tablet Take 2.5 mg by mouth 2 times daily   Yes Historical Provider, MD   hydrALAZINE (APRESOLINE) 10 MG tablet Take 10 mg by mouth 2 times daily as needed Hold if SBP <170   Yes Historical Provider, MD   gabapentin (NEURONTIN) 300 MG capsule Take 300 mg by mouth daily as needed. Yes Historical Provider, MD   ondansetron (ZOFRAN) 4 MG tablet Take 4 mg by mouth every 8 hours as needed for Nausea or Vomiting   Yes Historical Provider, MD   cetirizine (ZYRTEC) 10 MG tablet TAKE ONE (1) TABLET BY MOUTH ONCE DAILY 1/25/23   Karen Unger MD   bumetanide Urvashi Victor Hugo) 2 MG tablet 1 tablet on Tuesday, Thursday and Saturday  Patient taking differently: Take 2 mg by mouth three times a week 1 tablet on Mon/Wed/Fri 1/3/23   Anuj Robledo MD   OXYGEN Inhale 2 L into the lungs continuous    Historical Provider, MD   ipratropium-albuterol (DUONEB) 0.5-2.5 (3) MG/3ML SOLN nebulizer solution Inhale 3 mLs into the lungs in the morning, at noon, and at bedtime 12/6/22   Aditya Leon MD   diphenhydrAMINE-zinc acetate 2-0.1 % cream Apply topically 3 times daily as needed to itchy areas on skin 12/6/22   Rayo Cottrell MD   clopidogrel (PLAVIX) 75 MG tablet Take 1 tablet by mouth daily 12/7/22   Aditya Leon MD   sodium bicarbonate 325 MG tablet Take 1 tablet by mouth in the morning and 1 tablet in the evening.  10/17/22   Historical Provider, MD   XIIDRA 5 % SOLN Place 1 drop into both eyes 2 times daily 9/19/22   Historical Provider, MD   amLODIPine (NORVASC) 10 MG tablet Take 1 tablet by mouth nightly 11/7/22 2/3/23  Anuj Robledo MD   atenolol (TENORMIN) 25 MG tablet Take 1 tablet by mouth daily  Patient taking differently: Take 25 mg by mouth Daily with supper 9/26/22   Clarene Merriman MD Faith   dexlansoprazole (DEXILANT) 60 MG CPDR delayed release capsule Take 1 capsule by mouth daily 9/26/22   Gregorio Werner MD   docusate sodium (COLACE) 100 MG capsule Take 1 capsule by mouth daily 9/26/22   Gregorio Werner MD   senna (NATURAL SENNA LAXATIVE) 8.6 MG tablet Take 1 tablet by mouth 2 times daily 9/26/22   Gregorio Werner MD   sertraline (ZOLOFT) 50 MG tablet Take 1 tablet by mouth daily 9/26/22   Gregorio Werner MD   isosorbide mononitrate (IMDUR) 60 MG extended release tablet Take 1 tablet by mouth daily 9/26/22   Gregorio Werner MD   polyethylene glycol (GAVILAX) 17 GM/SCOOP powder MIX 17 GRAMS (1 HEAPING TABLESPOONFUL) OF POWDER IN 8 OUNCES OF LIQUID AND DRINK DAILY  Patient taking differently: Take 17 g by mouth daily MIX 17 GRAMS (1 HEAPING TABLESPOONFUL) OF POWDER IN 8 OUNCES OF LIQUID AND DRINK DAILY 9/26/22   Gregorio Werner MD   nystatin (MYCOSTATIN) 304029 UNIT/GM powder Apply 3 times daily. Patient taking differently: Apply topically 3 times daily as needed Apply 3 times daily.  9/26/22   Gregorio Werner MD   Cholecalciferol (VITAMIN D3) 50 MCG (2000 UT) TABS Take 1 tablet by mouth daily 9/26/22   Gregorio Werner MD   nitroGLYCERIN (NITROSTAT) 0.4 MG SL tablet NITRO 9/26/22   Gregorio Werner MD   allopurinol (ZYLOPRIM) 100 MG tablet TAKE ONE (1) TABLET BY MOUTH DAILY  Patient taking differently: Take 100 mg by mouth daily 9/14/22   Gregorio Werner MD   ranolazine (RANEXA) 500 MG extended release tablet TAKE ONE (1) TABLET BY MOUTH TWO (2) TIMES DAILY  Patient taking differently: Take 500 mg by mouth 2 times daily 9/14/22   Gregorio Werner MD   alendronate (FOSAMAX) 70 MG tablet [address]  Patient taking differently: Take 70 mg by mouth every 7 days Takes on Wednesday 9/14/22   Gregorio Werner MD   levothyroxine (SYNTHROID) 88 MCG tablet TAKE ONE (1) TABLET BY MOUTH DAILY  Patient taking differently: Take 88 mcg by mouth Daily 9/14/22 Abraham Rivera MD   simvastatin (ZOCOR) 20 MG tablet TAKE 1 TABLET BY MOUTH EVERY NIGHT  Patient taking differently: Take 20 mg by mouth nightly 9/14/22   Abraham Rivera MD   azelastine (ASTELIN) 0.1 % nasal spray 1 spray by Nasal route 2 times daily Use in each nostril as directed 8/19/22   Abraham Rivera MD   triamcinolone (KENALOG) 0.1 % ointment Apply topically 2 times daily 7/19/22   Rebecca Felix MD   acetaminophen (AMINOFEN) 325 MG tablet Take 2 tablets by mouth every 8 hours as needed for Pain 9/25/21   Loney Schwab, MD   B Complex-C-Zn-Folic Acid (DIALYVITE 042-NGUD 15) 0.8 MG TABS Take 1 tablet by mouth daily 4/8/21 2/3/23  Regla Darden MD   mineral oil-hydrophilic petrolatum (AQUAPHOR) ointment Apply topically as needed for Dry Skin Apply topically as needed. Historical Provider, MD       ROS: As noted in [address], all other systems are limited due to the patient's clinical condition    Weight:    Wt Readings from Last 5 Encounters:   02/03/23 125 lb (56.7 kg)   12/12/22 119 lb (54 kg)   11/30/22 140 lb 3.4 oz (63.6 kg)   11/07/22 119 lb 9.6 oz (54.3 kg)   10/27/22 120 lb (54.4 kg)       Data reviewed 2/6/2023:  Transthoracic Echocardiogram 11/15/2022   Left ventricular systolic function is normal.   Ejection fraction is visually estimated at 55-60%. Moderately dilated left atrium. Moderate to severe aortic stenosis is present; Mean PG 40mmHg, YANIRA 0.99 cm2 . Mitral annular calcification is present. Mild to moderate mitral regurgitation. Mild to moderate tricuspid regurgitation; RVSP: 44 mmHg. No evidence of any pericardial effusion. Chest X-ray 2/6/23  No significant portable radiographic change compared to 02/05/2023 with   diffuse bilateral consolidating infiltrates throughout both lung fields worse   on the left again seen and most consistent with pneumonia accentuated by   underlying chronic lung disease. Underlying neoplasm cannot be excluded.       Urine culture: E. coli      Pro-BNP 2/6/23: 10,005    Hepatic Function Panel:    Lab Results   Component Value Date/Time    ALKPHOS 49 02/05/2023 02:34 AM    ALT 11 02/05/2023 02:34 AM    AST 21 02/05/2023 02:34 AM    PROT 6.4 02/05/2023 02:34 AM    PROT 7.2 03/19/2013 02:04 PM    BILITOT 0.3 02/05/2023 02:34 AM    LABALBU 3.6 02/05/2023 02:34 AM     CBC:   Recent Labs     02/03/23  1537 02/05/23  0234 02/06/23  0503   WBC 10.8* 11.4* 13.7*   HGB 7.9* 7.2* 6.9*   HCT 24.6* 23.1* 21.9*   MCV 97.6 98.7 100.0    272 299     BMP:   Recent Labs     02/04/23  0113 02/05/23  0234 02/06/23  0503    139 138   K 4.5 4.7 4.4    105 103   CO2 23 24 23   BUN 40* 40* 51*   CREATININE 1.9* 2.3* 2.5*   GLUCOSE 148* 113* 137*       Physical Exam:   BP (!) 157/73   Pulse 69   Temp 97.5 °F (36.4 °C) (Oral)   Resp 20   Ht 5' (1.524 m)   Wt 125 lb (56.7 kg)   LMP  (LMP Unknown)   SpO2 96%   BMI 24.41 kg/m²   General: elderly, frail, on BiPap, briefly opening eyes with exam, tachypneic with air hunger that improves after a dose of Morphine  Skin: sallow with scattered bruising   HEENT: BiPap in place, sclerae are clear, conjunctivae are pale    Neck: JVP distended to angle of the jaw, carotid upstrokes are diminished without bruit    Heart: distant tones, IRRR, S1S2, soft II/VI YULI at left sternal border, no murmurs  Lungs:  coarse breath sounds bilaterally, diminished at the bases, with bibasilar rales, scattered rhonchi   Abdomen: soft, bowel sounds quietly present, no apparent tenderness, nondistended  Extremities:  No mottling, no edema or cords  Neurologic: generally weak     Assessment/Plan:  Decompensated heart failure with hypoxic respiratory failure, on BiPap, in the setting of recent TAVR for severe aortic stenosis. Discussed with the patient's family who request comfort care.  We discussed using an opioid (would choose Hydromorphone due to elevated creatinine) and low dose Lorazepam for dyspnea and air hunger. We discussed having family and  visit, and can later transition off BiPap and manage dyspnea and air hunger with medications. PPS 10%, prognosis is grim and life expectancy is likely hours to days. Valvular heart disease with recent TAVR 1/24/23 for severe aortic stenosis and aortic valva area of 0.99 cm2   Acute kidney injury of CKD 3  Paroxysmal atrial fibrillation  Sepsis, E. coli UTI, possible aspiration pneumonitis, not improving with broad spectrum antibiotics     Patient Active Problem List   Diagnosis Code    Hypertension I10    GERD (gastroesophageal reflux disease) K21.9    Depression F32. A    Hyperlipidemia E78.5    Generalized osteoarthritis M15.9    Osteoporosis M81.0    Hypothyroid E03.9    Coronary artery disease involving native heart without angina pectoris I25.10    Eczema L30.9    Low back pain M54.50    Hyperuricemia E79.0    Gout M10.9    Pruritic condition L29.9    PAUL (acute kidney injury) ([facility] [address] N17.9    Hyponatremia E87.1    Cystitis N30.90    Physical deconditioning R53.81    Bradycardia R00.1    LBBB (left bundle branch block) I44.7    Constipation, slow transit K59.01    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 S72.001K    Chronic renal impairment, stage 3 (moderate) ([facility]) N18.30    Dizziness R42    Labyrinthitis H83.09    Vertigo R42    Primary osteoarthritis of left foot M19.072    Closed nondisplaced fracture of fourth metatarsal bone of left foot S92.345A    Closed nondisplaced fracture of third metatarsal bone of left foot S92.335A    Closed nondisplaced fracture of fifth left metatarsal bone S92.355A    Vaginal prolapse N81.10    Sinus congestion R09.81    Localized edema R60.0    Sepsis ([facility]) A41.9    Dyspnea and respiratory abnormalities R06.00, R06.89    Pneumonia due to infectious organism J18.9    Pyelonephritis N12    Chronic kidney disease N18.9    Aortic stenosis I35.0    Hypoxia R09.02    Heart failure ([facility]) Q55.2       Certification of Terminal Illness: I certify that this patient is eligible for Hospice services for a terminal diagnosis of decompensated heart failure and valvular heart disease with a life expectancy predicted to be less than 6 months if this illness follows its expected course.     Adriane Brand MD

## 2023-02-06 NOTE — PROGRESS NOTES
Daily Progress Note  Subjective:    Patient awake but very short of breath this am  Placing back on BiPAP this morning as she is sating in the 80's on no-rebreather  SR on tele, HR and BP stable     Attending Note:    Patient awake alert   Respiratory distress this am and last night  Placed on BIPAP she was hypoxic last night  Heart rate is stable  Family at bedside -requesting robin as she is wet in bed  Check xray  Pulmonary consult per family request  Hospice consult also  Will place on lasix drip for now , start steroids and BIPAP  Recent TAVR check echo today  Possible flash pulmonary edema -from HTN  Supportive and conservative treatment   Suggest transfusion if H/H less than 8.0  Overall prognosis is guarded     Impression and Plan:     Acute hypoxic respiratory failure   - pt with acute respiratory distress overnight, O2 sats in the 80s  - on BiPAP now this am  - received steroids, lasix-switching to lasix drip this am as BNP is worsening and she is in respiratory distress  - Bipap started   - CXR showed progression of airspace disease-repeat today and check labs     Acute on Chronic HFpEF   - Recent TAVR for severe aortic stenosis 1/2023  - Echo post procedure was stable   - Troponin 0.066  - BNP up to 10,000 now  - lasix drip  - nephrology following as well     PAfib  - Rate controlled   - Continue atenolol   - continue eliquis      HTN   - Continue norvasc     Hx CAD   - Continue lipitor, plavix, imdur, ranexa      PAUL   - nephrology following   - Cr 2.5 today     Per family-hospice is going to meet with them later today  Echo for today ordered  Will continue to follow         Most Recent Echo  11/15/2022   Summary   Left ventricular systolic function is normal.   Ejection fraction is visually estimated at 55-60%. Moderately dilated left atrium. Moderate to severe aortic stenosis is present; Mean PG 40mmHg, YANIRA 0.99 cm2   . Mitral annular calcification is present.    Mild to moderate mitral regurgitation. Mild to moderate tricuspid regurgitation; RVSP: 44 mmHg. No evidence of any pericardial effusion. Most Recent Stress test  1/8/2018 - negative     Most Recent Heart Cath  [facility] 1/23-non obstructive CAD    [id number] Score for Atrial Fibrillation Stroke Risk   Risk   Factors  Component Value   C CHF No 0   H HTN Yes 1   A2 Age >= 76 Yes,  (80 y.o.) 2   D DM No 0   S2 Prior Stroke/TIA No 0   V Vascular Disease No 0   A Age 74-69 No,  (80 y.o.) 0   Sc Sex female 1    [id number]  Score  4   Score last updated 2/6/23 8:95 AM EST    Click here for a link to the UpToDate guideline \"Atrial Fibrillation: Anticoagulation therapy to prevent embolization    Disclaimer: Risk Score calculation is dependent on accuracy of patient problem list and past encounter diagnosis. Radiology  CXR 1/5/2023  Impression   Though mild improved aeration noted in the right upper lobe, progression of   airspace disease with increased confluent consolidative changes seen in the   left lung. PAST MEDICAL HISTORY:  The patient has a history of hypertension and  hyperlipidemia present. Diastolic dysfunction. Renal insufficiency  present. Follows with Dr. Adela Perkins and Dr. Tomas Jacobo. She also has a history  of anemia present. Arthritis and hyperlipidemia present. PAST SURGICAL HISTORY:  History of having hip surgery done and recent  heart catheterization done in 01/2023. Nonobstructive coronary artery  present. She has had cataract surgery, hysterectomy done, and TAVR done  in 01/2023. SOCIAL HISTORY:  She does not smoke. She does not drink. She came from  assisted living. MEDICATIONS AT HOME:  Please see the list.     ALLERGIES:  Her allergies are to three medications. She is allergic to  BACTRIM, CEPHALEXIN, and TAPE.     Objective:   BP (!) 157/73   Pulse 69   Temp 97.5 °F (36.4 °C) (Oral)   Resp 20   Ht 5' (1.524 m)   Wt 125 lb (56.7 kg)   LMP  (LMP Unknown)   SpO2 96%   BMI 24.41 kg/m²     Intake/Output Summary (Last 24 hours) at 2/6/2023 4535  Last data filed at 2/6/2023 9939  Gross per 24 hour   Intake 200 ml   Output 400 ml   Net -200 ml       Medications:   Scheduled Meds:   methylPREDNISolone  40 mg IntraVENous Q8H    vancomycin  1,000 mg IntraVENous Once    metroNIDAZOLE  500 mg IntraVENous Q8H    bisacodyl  10 mg Rectal Daily    amLODIPine  5 mg Oral Daily    ipratropium-albuterol  1 ampule Inhalation Q4H WA    acetylcysteine  600 mg Inhalation TID    iron sucrose  200 mg IntraVENous Q24H    cefepime  1,000 mg IntraVENous Q12H    vancomycin (VANCOCIN) intermittent dosing (placeholder)   Other RX Placeholder    senna  1 tablet Oral BID    allopurinol  100 mg Oral Daily    apixaban  2.5 mg Oral BID    atenolol  25 mg Oral Dinner    azelastine  1 spray Each Nostril BID    therapeutic multivitamin-minerals  1 tablet Oral Daily    cetirizine  5 mg Oral Daily    Vitamin D  2,000 Units Oral Daily    clopidogrel  75 mg Oral Daily    pantoprazole  40 mg Oral QAM AC    docusate sodium  100 mg Oral Daily    isosorbide mononitrate  60 mg Oral Daily    levothyroxine  88 mcg Oral Daily    ranolazine  500 mg Oral BID    sertraline  50 mg Oral Daily    atorvastatin  10 mg Oral Daily    sodium bicarbonate  325 mg Oral BID    Lifitegrast  1 drop Both Eyes BID    sodium chloride flush  5-40 mL IntraVENous 2 times per day    polyethylene glycol  17 g Oral BID    bisacodyl  10 mg Rectal Once      Infusions:   furosemide (LASIX) 1mg/mL infusion      sodium chloride 10 mL (02/05/23 1903)      PRN Meds:  LORazepam, albuterol, bisacodyl, morphine, diphenhydrAMINE-zinc acetate, gabapentin, hydrALAZINE, mineral oil-hydrophilic petrolatum, sodium chloride flush, sodium chloride, ondansetron **OR** ondansetron, acetaminophen **OR** acetaminophen, polyvinyl alcohol, melatonin     Physical Exam:  Vitals:    02/06/23 0746   BP:    Pulse:    Resp: 20   Temp:    SpO2:         General: AAO, NAD  Chest: Nontender  Cardiac: First and Second Heart Sounds are Normal, No Murmurs or Gallops noted  Lungs:Clear to auscultation and percussion. Abdomen: Soft, NT, ND, +BS  Extremities: No clubbing, no edema  Vascular:  Equal 2+ peripheral pulses. Lab Data:  CBC:   Recent Labs     02/03/23  1537 02/05/23  0234 02/06/23  0503   WBC 10.8* 11.4* 13.7*   HGB 7.9* 7.2* HGB CALLED TO ELISEO JACKSON [id number] BY AMARIS MLT   HCT 24.6* 23.1* 21.9*   MCV 97.6 98.7 100.0    272 299     BMP:   Recent Labs     02/04/23  0113 02/05/23  0234 02/06/23  0503    139 138   K 4.5 4.7 4.4    105 103   CO2 23 24 23   BUN 40* 40* 51*   CREATININE 1.9* 2.3* 2.5*     LIVER PROFILE:   Recent Labs     02/03/23  1537 02/05/23  0234   AST 25 21   ALT 14 11   LIPASE  --  28   BILITOT 0.4 0.3   ALKPHOS 72 49     PT/INR:   Recent Labs     02/03/23  1537 02/05/23  0234   PROTIME 14.4 18.0*   INR 1.12 1.39     APTT:   Recent Labs     02/03/23  1537 02/05/23  0234   APTT 33.6 41.1*     BNP:  No results for input(s): BNP in the last 72 hours.       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 08/18/2018    Bradycardia     LBBB (left bundle branch block)     PAUL (acute kidney injury) ([address] 12/26/2017    Heart failure ([address] 02/03/2023    Hypoxia 12/20/2022    Aortic stenosis 12/12/2022    Pneumonia due to infectious organism 11/16/2022    Pyelonephritis 11/16/2022    Chronic kidney disease 11/16/2022    Dyspnea and respiratory abnormalities 11/15/2022    Sepsis ([address] 11/14/2022    Localized edema 07/11/2022    GERD (gastroesophageal reflux disease)     Hypertension     Hypothyroid     Sinus congestion 03/23/2022    Vaginal prolapse 09/07/2021    Primary osteoarthritis of left foot 08/30/2021    Closed nondisplaced fracture of fourth metatarsal bone of left foot 08/30/2021    Closed nondisplaced fracture of third metatarsal bone of left foot 08/30/2021    Closed nondisplaced fracture of fifth left metatarsal bone 08/30/2021    Vertigo     Dizziness     Labyrinthitis     Chronic renal impairment, stage 3 (moderate) (HCC)     Constipation, slow transit 06/19/2018    Cystitis     Physical deconditioning     Hyponatremia 01/01/2018    Pruritic condition 01/22/2016    Gout     Hyperuricemia     Low back pain     Eczema     Coronary artery disease involving native heart without angina pectoris     Depression     Hyperlipidemia     Generalized osteoarthritis     Osteoporosis        Electronically signed by Rosa Valladares PA-C on 2/6/2023 at 8:35 AM    Electronically signed by Lexus Merritt MD on 2/6/23 at 8:58 AM EST

## 2023-02-06 NOTE — H&P
[facility] H+P    Date: 2/6/2023  Name: Lilo Miller  MRN: [id number]  YOB: 1927   Patient's PCP: Nabil Cuevas MD  Consultants during acute care: Cardiology, Gastroenterology, Nephrology,   Referring Physician: Dr. WILFRED Tamez and Dr Rivka Figueredo   Acute care admission date: 2/3 to 2/6/2023 and 1/24 to 1/31/23 at [facility]   Admit to General Inpatient Hospice: 2/6/2023     Informant: Chart reviewed, discussed with Dr Rivka Figueredo, Dr. WILFRED Tamez, the patient's nurse, respiratory therapist and the hospice nurse liaison. I examined the patient who provides limited information due to her clinical status. I met with the patient's family (POA granddaughter, Maggie, the patient's son and daughter in law, Rodolfo and Linda) at the bedside.     CC: shortness of breath     [place]: This is a 89 y.o. female with a history of valvular heart disease with severe aortic stenosis and aortic valva area of 0.99 cm2 who had TAVR at [facility] on 1/24/23, with comorbidities of heart failure with preserved ejection fraction with LVEF 55-60%, pulmonary hypertension, left bundle branch block, coronary artery disease, CKD, hypertension, hyperlipidemia. Post TAVR, the patient had episodes of afib and ultimately was discharged to a local Nursing Home. The patient was admitted on 2/3/2023 with chest pain and shortness of breath. The patient has been followed by hospital medicine, Cardiology and Nephrology.       The patient has been treated with IV Lasix and Bumex, and on 2/6/23 was started on Lasix infusion. The patient has developed respiratory distress with hypoxic respiratory failure and is on BiPap at 100% FiO2. When the BiPap is removed, she becomes dyspneic with air hunger and oximetry drops into the 70's. Pro-BNP is 10,005, chest X-ray shows bilateral infiltrates. WBC 13,700 and hemoglobin 6.9 gm/dl.       The patient is drowsy but arousable. She seems aware when I  tell her that I am a hospice doctor and we talked about comfort and she said she was ready. The patient was given a dose of Morphine shortly before I examined her, and she does calm down and appears to be resting. Prior to the TAVR, the patient was able to live alone with a hired care giver and family support. She had significant exertional dyspnea which was what caused her to pursue the TAVR. The patient had a fever earlier in the admission and there is concern for possible aspiration pneumonitis. The patient has an E. coli UTI and has been on broad spectrum antibiotics without improvement. On 2/6/23, I met with the patient's granddaughter (Deangelo Turner) and son, Tonya Kussmaul and his spouse, Concha Leary. Hospice philosophy was discussed regarding care and comfort at the end of life. Questions were answered, and emotional support was provided. We discussed that [address] [place] [address] is for management of symptoms, and that if the patient stabilizes, alternate arrangements for care will be needed They are aware that Hospice does not provide for the patients 24 hour care giving needs. The patient is limited code status without CPR, intubation, defibrillation or resuscitative medications. The family request comfort care. There was a meeting with the hospice nurse liaison at noon 2/6/23 and hospice consents were signed, and arrangements made for admission to [address] [place] [address] for management of dyspnea, pain, air hunger, anxiety and probable end of life care.      Past Medical History:   Diagnosis Date    Aortic stenosis     ON ECHO 11/22- DR HILARY FIGUEREDO/TAVR 1/24/23- DR Yaima Franklin    CAD (coronary artery disease)     Dr Jorge Figueredo/Jose Figueredo    Chronic renal insufficiency     Dr Tracy Swann    Depression     Dry eye syndrome     Dr Bong Krishna     Elevated LFTs 05/11/2012    Generalized osteoarthritis     GERD (gastroesophageal reflux disease)     Esophagitis    Gout     Hyperlipidemia     Hypertension Hyperuricemia     Hypothyroid     Hypothyroidism    Low back pain     Osteoporosis     Generalized    Pneumonia due to infectious organism 11/16/2022    S/P TAVR (transcatheter aortic valve replacement) 2/6/2023    Sinus congestion     CHRONIC W POST NASAL DRIP    UTI (urinary tract infection)     Vertigo     Dr Kay Rebolledo       Past Surgical History:   Procedure Laterality Date    AORTIC VALVE REPLACEMENT  01/24/2023    Dr Tessie Lockett at [facility] Aug. 2006 and L March 2007 Dr. Nadiya Horton  2004    right foot realignment [address] Right 08/19/2018    rt hip Zheng    HYSTERECTOMY (CERVIX STATUS UNKNOWN)      TONSILLECTOMY         Social History     Socioeconomic History    Marital status:       Spouse name: Not on file    Number of children: Not on file    Years of education: Not on file    Highest education level: Not on file   Occupational History    Occupation: Retired from [employer]: as noted   Tobacco Use    Smoking status: Former    Smokeless tobacco: Never   Vaping Use    Vaping Use: Never used   Substance and Sexual Activity    Alcohol use: No    Drug use: No    Sexual activity: Not on file   Other Topics Concern    Not on file   Social History Narrative    Marital Status:   she lives in an [facility] and  has been in ECF since 2004        Diet:  Low salt        Seat Belts:  Always     Social Determinants of Health     Financial Resource Strain: Low Risk     Difficulty of Paying Living Expenses: Not hard at all   Food Insecurity: No Food Insecurity    Worried About Running Out of Food in the Last Year: Never true    [address] in the Last Year: Never true   Transportation Needs: Not on file   Physical Activity: Sufficiently Active    Days of Exercise per Week: 5 days    Minutes of Exercise per Session: 30 min   Stress: Not on file   Social Connections: Not on file   Intimate Partner Violence: Not on file   Housing Stability: Not on file       Family History   Problem Relation Age of Onset    Other Mother         CAD    Other Father         CAD       Allergies   Allergen Reactions    Bactrim [Sulfamethoxazole-Trimethoprim] Nausea And Vomiting    Cephalexin Rash    Tape [place] Valeriy Tape] Itching       Medication list reviewed  Prior to Admission medications    Medication Sig Start Date End Date Taking? Authorizing Provider   cycloSPORINE (RESTASIS) 0.05 % ophthalmic emulsion Place 1 drop into both eyes 2 times daily    Historical Provider, MD   apixaban (ELIQUIS) 5 MG TABS tablet Take 2.5 mg by mouth 2 times daily    Historical Provider, MD   hydrALAZINE (APRESOLINE) 10 MG tablet Take 10 mg by mouth 2 times daily as needed Hold if SBP <170    Historical Provider, MD   gabapentin (NEURONTIN) 300 MG capsule Take 300 mg by mouth daily as needed. Historical Provider, MD   ondansetron (ZOFRAN) 4 MG tablet Take 4 mg by mouth every 8 hours as needed for Nausea or Vomiting    Historical Provider, MD   cetirizine (ZYRTEC) 10 MG tablet TAKE ONE (1) TABLET BY MOUTH ONCE DAILY 1/25/23   MD jean paul Cherryanide Lorre Folds) 2 MG tablet 1 tablet on Tuesday, Thursday and Saturday  Patient taking differently: Take 2 mg by mouth three times a week 1 tablet on Mon/Wed/Fri 1/3/23   Morales Quinteros MD   OXYGEN Inhale 2 L into the lungs continuous    Historical Provider, MD   ipratropium-albuterol (DUONEB) 0.5-2.5 (3) MG/3ML SOLN nebulizer solution Inhale 3 mLs into the lungs in the morning, at noon, and at bedtime 12/6/22   Blanca Amos MD   diphenhydrAMINE-zinc acetate 2-0.1 % cream Apply topically 3 times daily as needed to itchy areas on skin 12/6/22   Rayo Nice MD   clopidogrel (PLAVIX) 75 MG tablet Take 1 tablet by mouth daily 12/7/22   Blanca Amos MD   sodium bicarbonate 325 MG tablet Take 1 tablet by mouth in the morning and 1 tablet in the evening.  10/17/22   Historical Provider, MD XIIDRA 5 % SOLN Place 1 drop into both eyes 2 times daily 9/19/22   Tere Evangelista MD   amLODIPine (NORVASC) 10 MG tablet Take 1 tablet by mouth nightly 11/7/22 2/3/23  Inocencio Whyte MD   atenolol (TENORMIN) 25 MG tablet Take 1 tablet by mouth daily  Patient taking differently: Take 25 mg by mouth Daily with supper 9/26/22   Coby Neal MD   dexlansoprazole [facility]) 60 MG CPDR delayed release capsule Take 1 capsule by mouth daily 9/26/22   Coby Neal MD   docusate sodium (COLACE) 100 MG capsule Take 1 capsule by mouth daily 9/26/22   Coby Neal MD   senna (NATURAL SENNA LAXATIVE) 8.6 MG tablet Take 1 tablet by mouth 2 times daily 9/26/22   Coby Neal MD   sertraline (ZOLOFT) 50 MG tablet Take 1 tablet by mouth daily 9/26/22   Coby Neal MD   isosorbide mononitrate (IMDUR) 60 MG extended release tablet Take 1 tablet by mouth daily 9/26/22   Coby Neal MD   polyethylene glycol (GAVILAX) 17 GM/SCOOP powder MIX 17 GRAMS (1 HEAPING TABLESPOONFUL) OF POWDER IN 8 OUNCES OF LIQUID AND DRINK DAILY  Patient taking differently: Take 17 g by mouth daily MIX 17 GRAMS (1 HEAPING TABLESPOONFUL) OF POWDER IN 8 OUNCES OF LIQUID AND DRINK DAILY 9/26/22   Coby Neal MD   nystatin (MYCOSTATIN) 022415 UNIT/GM powder Apply 3 times daily. Patient taking differently: Apply topically 3 times daily as needed Apply 3 times daily.  9/26/22   Coby Neal MD   Cholecalciferol (VITAMIN D3) 50 MCG (2000 UT) TABS Take 1 tablet by mouth daily 9/26/22   Coby Neal MD   nitroGLYCERIN (NITROSTAT) 0.4 MG SL tablet NITRO 9/26/22   Coby Neal MD   allopurinol (ZYLOPRIM) 100 MG tablet TAKE ONE (1) TABLET BY MOUTH DAILY  Patient taking differently: Take 100 mg by mouth daily 9/14/22   Coby Neal MD   ranolazine (RANEXA) 500 MG extended release tablet TAKE ONE (1) TABLET BY MOUTH TWO (2) TIMES DAILY  Patient taking differently: Take 500 mg by mouth 2 times daily 9/14/22   Tim Lazo MD   alendronate (FOSAMAX) 70 MG tablet FOSA  Patient taking differently: Take 70 mg by mouth every 7 days Takes on Wednesday 9/14/22   Tim Lazo MD   levothyroxine (SYNTHROID) 88 MCG tablet TAKE ONE (1) TABLET BY MOUTH DAILY  Patient taking differently: Take 88 mcg by mouth Daily 9/14/22   Tim Lazo MD   simvastatin (ZOCOR) 20 MG tablet TAKE 1 TABLET BY MOUTH EVERY NIGHT  Patient taking differently: Take 20 mg by mouth nightly 9/14/22   Tim Lazo MD   azelastine (ASTELIN) 0.1 % nasal spray 1 spray by Nasal route 2 times daily Use in each nostril as directed 8/19/22   Tim Lzao MD   triamcinolone (KENALOG) 0.1 % ointment Apply topically 2 times daily 7/19/22   Rondel Schirmer, MD   acetaminophen (AMINOFEN) 325 MG tablet Take 2 tablets by mouth every 8 hours as needed for Pain 9/25/21   Alexandrea Degroot MD   B Complex-C-Zn-Folic Acid (DIALYVITE 719-VPZQ 15) 0.8 MG TABS Take 1 tablet by mouth daily 4/8/21 2/3/23  Cirilo Casarez MD   mineral oil-hydrophilic petrolatum (AQUAPHOR) ointment Apply topically as needed for Dry Skin Apply topically as needed. Historical Provider, MD       ROS: As noted in [address], all other systems are limited due to the patient's clinical condition    Weight:    Wt Readings from Last 5 Encounters:   02/03/23 125 lb (56.7 kg)   12/12/22 119 lb (54 kg)   11/30/22 140 lb 3.4 oz (63.6 kg)   11/07/22 119 lb 9.6 oz (54.3 kg)   10/27/22 120 lb (54.4 kg)       Data reviewed 2/6/2023:  Transthoracic Echocardiogram 11/15/2022   Left ventricular systolic function is normal.   Ejection fraction is visually estimated at 55-60%. Moderately dilated left atrium. Moderate to severe aortic stenosis is present; Mean PG 40mmHg, YANIRA 0.99 cm2 . Mitral annular calcification is present. Mild to moderate mitral regurgitation. Mild to moderate tricuspid regurgitation; RVSP: 44 mmHg.    No evidence of any pericardial effusion. Chest X-ray 2/6/23  No significant portable radiographic change compared to 02/05/2023 with   diffuse bilateral consolidating infiltrates throughout both lung fields worse   on the left again seen and most consistent with pneumonia accentuated by   underlying chronic lung disease. Underlying neoplasm cannot be excluded.       Urine culture: E. coli      Pro-BNP 2/6/23: 10,005    Hepatic Function Panel:    Lab Results   Component Value Date/Time    ALKPHOS 49 02/05/2023 02:34 AM    ALT 11 02/05/2023 02:34 AM    AST 21 02/05/2023 02:34 AM    PROT 6.4 02/05/2023 02:34 AM    PROT 7.2 03/19/2013 02:04 PM    BILITOT 0.3 02/05/2023 02:34 AM    LABALBU 3.6 02/05/2023 02:34 AM     CBC:   Recent Labs     02/03/23  1537 02/05/23  0234 02/06/23  0503   WBC 10.8* 11.4* 13.7*   HGB 7.9* 7.2* 6.9*   HCT 24.6* 23.1* 21.9*   MCV 97.6 98.7 100.0    272 299       BMP:   Recent Labs     02/04/23  0113 02/05/23  0234 02/06/23  0503    139 138   K 4.5 4.7 4.4    105 103   CO2 23 24 23   BUN 40* 40* 51*   CREATININE 1.9* 2.3* 2.5*   GLUCOSE 148* 113* 137*         Physical Exam:   BP (!) 157/73   Pulse 69   Temp 97.5 °F (36.4 °C) (Oral)   Resp 20   Ht 5' (1.524 m)   Wt 125 lb (56.7 kg)   LMP  (LMP Unknown)   SpO2 96%   BMI 24.41 kg/m²   General: elderly, frail, on BiPap, briefly opening eyes with exam, tachypneic with air hunger that improves after a dose of Morphine  Skin: sallow with scattered bruising   HEENT: BiPap in place, sclerae are clear, conjunctivae are pale    Neck: JVP distended to angle of the jaw, carotid upstrokes are diminished without bruit    Heart: distant tones, IRRR, S1S2, soft II/VI YULI at left sternal border, no murmurs  Lungs:  coarse breath sounds bilaterally, diminished at the bases, with bibasilar rales, scattered rhonchi   Abdomen: soft, bowel sounds quietly present, no apparent tenderness, nondistended  Extremities:  No mottling, no edema or cords  Neurologic: generally weak     Assessment/Plan:  Decompensated heart failure with hypoxic respiratory failure, on BiPap, in the setting of recent TAVR for severe aortic stenosis. Discussed with the patient's family who request comfort care. We discussed using an opioid (would choose Hydromorphone due to elevated creatinine) and low dose Lorazepam for dyspnea and air hunger. We discussed having family and  visit, and can later transition off BiPap and manage dyspnea and air hunger with medications. PPS 10%, prognosis is grim and life expectancy is likely hours to days. Admit to [address] [place] [address] for management of dyspnea, pain, air hunger, anxiety and probable end of life care. Valvular heart disease with recent TAVR 1/24/23 for severe aortic stenosis and aortic valva area of 0.99 cm2   Acute kidney injury of CKD 3  Paroxysmal atrial fibrillation  Sepsis, E. coli UTI, possible aspiration pneumonitis, not improving with broad spectrum antibiotics     Patient Active Problem List   Diagnosis Code    Hypertension I10    GERD (gastroesophageal reflux disease) K21.9    Depression F32. A    Hyperlipidemia E78.5    Generalized osteoarthritis M15.9    Osteoporosis M81.0    Hypothyroid E03.9    Coronary artery disease involving native heart without angina pectoris I25.10    Hyperuricemia E79.0    Gout M10.9    PAUL (acute kidney injury) ([address] N17.9    Hyponatremia E87.1    Physical deconditioning R53.81    LBBB (left bundle branch block) I44.7    Constipation, slow transit K59.01    Chronic renal impairment, stage 3 (moderate) ([facility]) N18.30    Vaginal prolapse N81.10    Sepsis ([facility]) A41.9    Dyspnea and respiratory abnormalities R06.00, R06.89    Pneumonia due to infectious organism J18.9    Chronic kidney disease N18.9    Aortic stenosis I35.0    Heart failure ([facility]) I50.9    S/P TAVR (transcatheter aortic valve replacement) Z95.2    Respiratory failure with hypoxia ([address] J96.91    Hospice care V80.5       Certification of Terminal Illness: I certify that this patient is eligible for Hospice services for a terminal diagnosis of decompensated heart failure and valvular heart disease with a life expectancy predicted to be less than 6 months if this illness follows its expected course.     Justin Yee MD

## 2023-02-06 NOTE — PROGRESS NOTES
Nephrology Progress Note        [address], [address] [address]  Phone: [phone]  Office Hours: 8:30AM - 4:30PM  Monday - Friday 2/6/2023 7:36 AM  Subjective:   Admit Date: 2/3/2023  PCP: Abraham Rivera MD  Interval History: On bipap  Making urine  On abx    Diet: ADULT DIET;  Dysphagia - Minced and Moist; No Added Salt (3-4 gm); Mildly Thick ([place]); 1800 ml      Data:   Scheduled Meds:   metroNIDAZOLE  500 mg IntraVENous Q8H    bisacodyl  10 mg Rectal Daily    amLODIPine  5 mg Oral Daily    bumetanide  1 mg IntraVENous BID    ipratropium-albuterol  1 ampule Inhalation Q4H WA    acetylcysteine  600 mg Inhalation TID    iron sucrose  200 mg IntraVENous Q24H    cefepime  1,000 mg IntraVENous Q12H    vancomycin (VANCOCIN) intermittent dosing (placeholder)   Other RX Placeholder    senna  1 tablet Oral BID    allopurinol  100 mg Oral Daily    apixaban  2.5 mg Oral BID    atenolol  25 mg Oral Dinner    azelastine  1 spray Each Nostril BID    therapeutic multivitamin-minerals  1 tablet Oral Daily    cetirizine  5 mg Oral Daily    Vitamin D  2,000 Units Oral Daily    clopidogrel  75 mg Oral Daily    pantoprazole  40 mg Oral QAM AC    docusate sodium  100 mg Oral Daily    isosorbide mononitrate  60 mg Oral Daily    levothyroxine  88 mcg Oral Daily    ranolazine  500 mg Oral BID    sertraline  50 mg Oral Daily    atorvastatin  10 mg Oral Daily    sodium bicarbonate  325 mg Oral BID    Lifitegrast  1 drop Both Eyes BID    sodium chloride flush  5-40 mL IntraVENous 2 times per day    polyethylene glycol  17 g Oral BID    bisacodyl  10 mg Rectal Once     Continuous Infusions:   sodium chloride 10 mL (02/05/23 1903)     PRN Meds:albuterol, bisacodyl, morphine, diphenhydrAMINE-zinc acetate, gabapentin, hydrALAZINE, mineral oil-hydrophilic petrolatum, sodium chloride flush, sodium chloride, ondansetron **OR** ondansetron, acetaminophen **OR** acetaminophen, polyvinyl alcohol, melatonin  I/O last 3 completed shifts: In: 320 [P.O.:320]  Out: 400 [Urine:400]  No intake/output data recorded. Intake/Output Summary (Last 24 hours) at 2/6/2023 0736  Last data filed at 2/6/2023 9576  Gross per 24 hour   Intake 200 ml   Output 400 ml   Net -200 ml       CBC:   Recent Labs     02/03/23  1537 02/05/23  0234   WBC 10.8* 11.4*   HGB 7.9* 7.2*    272       BMP:    Recent Labs     02/04/23  0113 02/05/23  0234 02/06/23  0503    139 138   K 4.5 4.7 4.4    105 103   CO2 23 24 23   BUN 40* 40* 51*   CREATININE 1.9* 2.3* 2.5*   GLUCOSE 148* 113* 137*     Hepatic:   Recent Labs     02/03/23  1537 02/05/23  0234   AST 25 21   ALT 14 11   BILITOT 0.4 0.3   ALKPHOS 72 49     Troponin: No results for input(s): TROPONINI in the last 72 hours. BNP: No results for input(s): BNP in the last 72 hours.   Lipids:   Recent Labs     02/04/23  0113   CHOL 126   HDL 48     ABGs:   Lab Results   Component Value Date/Time    PO2ART 83 11/14/2022 11:30 PM    CCU1QCT 29.0 11/14/2022 11:30 PM     INR:   Recent Labs     02/03/23  1537 02/05/23  0234   INR 1.12 1.39       Objective:   Vitals: BP (!) 157/73   Pulse 69   Temp 97.5 °F (36.4 °C) (Oral)   Resp 24   Ht 5' (1.524 m)   Wt 125 lb (56.7 kg)   LMP  (LMP Unknown)   SpO2 96%   BMI 24.41 kg/m²   General appearance: alert and cooperative with exam, in no acute distress  HEENT: normocephalic, atraumatic,   Neck: supple, trachea midline  Lungs:breathing comfortably on bipap  Heart[de-identified] regular rate and rhythm,   Extremities: extremities atraumatic, no cyanosis or edema  Neurologic: alert,     MEDICAL DECISION MAKING     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 08/18/2018    Bradycardia     LBBB (left bundle branch block)     PAUL (acute kidney injury) ([facility] 12/26/2017    Heart failure ([facility] 02/03/2023    Hypoxia 12/20/2022    Aortic stenosis 12/12/2022    Pneumonia due to infectious organism 11/16/2022    Pyelonephritis 11/16/2022    Chronic kidney disease 11/16/2022    Dyspnea and respiratory abnormalities 11/15/2022    Sepsis ([address] 11/14/2022    Localized edema 07/11/2022    GERD (gastroesophageal reflux disease)     Hypertension     Hypothyroid     Sinus congestion 03/23/2022    Vaginal prolapse 09/07/2021    Primary osteoarthritis of left foot 08/30/2021    Closed nondisplaced fracture of fourth metatarsal bone of left foot 08/30/2021    Closed nondisplaced fracture of third metatarsal bone of left foot 08/30/2021    Closed nondisplaced fracture of fifth left metatarsal bone 08/30/2021    Vertigo     Dizziness     Labyrinthitis     Chronic renal impairment, stage 3 (moderate) ([facility])     Constipation, slow transit 06/19/2018    Cystitis     Physical deconditioning     Hyponatremia 01/01/2018    Pruritic condition 01/22/2016    Gout     Hyperuricemia     Low back pain     Eczema     Coronary artery disease involving native heart without angina pectoris     Depression     Hyperlipidemia     Generalized osteoarthritis     Osteoporosis      BP is better  On iv bumex, monitor UOP  Obtain BMP daily  Will continue to follow                  Electronically signed by Erika Lopez DO on 2/6/2023 at 7:36 AM    [address] [place] ,  [facility] [address]  PHONE: [phone]  FAX: [phone]

## 2023-02-06 NOTE — PROGRESS NOTES
Physician Progress Note      PATIENT:               Yeimy Nazario  CSN #:                  [id number]  :                       1927  ADMIT DATE:       2/3/2023 3:29 PM  [address] DATE:        2023 1:31 PM  RESPONDING  PROVIDER #:        Trinity Lorenzo MD          QUERY TEXT:    Pt admitted with CHF. Pt noted to also have HTN,CAD, Valvular heart disease. If possible, please document in progress notes and discharge summary the   etiology of CHF, if able to be determined. The medical record reflects the following:  Risk Factors: CHF,HTN,CAD, Aortic valve replacement  Clinical Indicators: /53, 189/58,167/61, cath on  for non-   obstructive CAD, TAVR  Normal EF  Treatment: IV Lasix, labs, Cardiology consult    Thank you,  Guille Michael [phone]  Options provided:  -- CHF due to Hypertensive Heart Disease  -- CHF due to CAD  -- CHF due to Valvular Heart Disease  -- CHF due to HTN, CAD, and valvular disease  -- Other - I will add my own diagnosis  -- Disagree - Not applicable / Not valid  -- Disagree - Clinically unable to determine / Unknown  -- Refer to Clinical Documentation Reviewer    PROVIDER RESPONSE TEXT:    This patient has CHF due to HTN, CAD, and valvular heart disease.     Query created by: Emiliano Nogueira on 2023 2:23 PM      Electronically signed by:  Trinity Lorenzo MD 2023 2:56 PM

## 2023-02-06 NOTE — CONSULTS
Pulmonary Consult Note      Reason for Consult: COPD   Requesting Physician: Jody Berger MD    Subjective:   CHIEF COMPLAINT :SOB    Patient Active Problem List    Diagnosis Date Noted    S/P TAVR (transcatheter aortic valve replacement) 02/06/2023     Priority: High    Respiratory failure with hypoxia ([address] 02/06/2023     Priority: High    Hospice care 02/06/2023     Priority: High    LBBB (left bundle branch block)      Priority: High    PAUL (acute kidney injury) ([address] 12/26/2017     Priority: High    Heart failure ([address] 02/03/2023     Priority: Medium    Aortic stenosis 12/12/2022     Priority: Medium     Overview Note:     ON ECHO 11/22- DR HILARY GONZALEZ      Pneumonia due to infectious organism 11/16/2022     Priority: Medium    Chronic kidney disease 11/16/2022     Priority: Medium    Dyspnea and respiratory abnormalities 11/15/2022     Priority: Medium    Sepsis ([address] 11/14/2022     Priority: Medium    GERD (gastroesophageal reflux disease)      Priority: Medium     Overview Note:     Esophagitis      Hypertension      Priority: Low    Hypothyroid      Priority: Low     Overview Note:     Hypothyroidism      Vaginal prolapse 09/07/2021    Chronic renal impairment, stage 3 (moderate) (HCC)     Constipation, slow transit 06/19/2018    Physical deconditioning     Hyponatremia 01/01/2018    Gout     Hyperuricemia     Coronary artery disease involving native heart without angina pectoris      Overview Note:     Dr Jorge Gonzalez/Jose Gonzalez      Depression     Hyperlipidemia     Generalized osteoarthritis     Osteoporosis      Overview Note:     Generalized          HPI:                The patient is a 80 y.o. female with significant past medical history of pulmonary hypertension, HFpEF, CKD, chronic LBBB, severe AS, hypertension, hyperlipidemia, GERD   presents with complaints of SOB x 3 days. She has cough with phlegm-clear, no fever, no headaches, no n/v, no abd pain, no diarrhea, no dysuria. She has severe AS had TAVR. She had a CT chest which showed Pulmonary congestion with small bilateral Pleural effusions and suspected pneumonia. She is on Abx, Diuretics, Inhalers. She is being considered for Hospice. At this time she is lying in the bed. She is in mild resp distress. She has 1 + pedal edema.     Past Medical History:      Diagnosis Date    Aortic stenosis     ON ECHO 11/22- DR HILARY FIGUEREDO/TAVR 1/24/23- DR Juan Alberto Pierce    CAD (coronary artery disease)     Dr Pratik Figueredo/Jose Figueredo    Chronic renal insufficiency     Dr Conner Marcelo    Depression     Dry eye syndrome     Dr Moo Villavicencio     Elevated LFTs 05/11/2012    Generalized osteoarthritis     GERD (gastroesophageal reflux disease)     Esophagitis    Gout     Hyperlipidemia     Hypertension     Hyperuricemia     Hypothyroid     Hypothyroidism    Low back pain     Osteoporosis     Generalized    Pneumonia due to infectious organism 11/16/2022    S/P TAVR (transcatheter aortic valve replacement) 2/6/2023    Sinus congestion     CHRONIC W POST NASAL DRIP    UTI (urinary tract infection)     Vertigo     Dr Leonora Perry      Past Surgical History:        Procedure Laterality Date    AORTIC VALVE REPLACEMENT  01/24/2023    Dr Juanjo Rose at [facility] ([place], [place]) 95 Aug. 2006 and L March 2007 Dr. Jessica Comer  2004    right foot realignment [place]    HIP FRACTURE SURGERY Right 08/19/2018    rt hip [facility]    HYSTERECTOMY (CERVIX STATUS UNKNOWN)      TONSILLECTOMY       Current Medications:     methylPREDNISolone  40 mg IntraVENous Q8H    vancomycin  1,000 mg IntraVENous Once    metroNIDAZOLE  500 mg IntraVENous Q8H    bisacodyl  10 mg Rectal Daily    amLODIPine  5 mg Oral Daily    ipratropium-albuterol  1 ampule Inhalation Q4H WA    acetylcysteine  600 mg Inhalation TID    iron sucrose  200 mg IntraVENous Q24H    cefepime  1,000 mg IntraVENous Q12H    vancomycin (VANCOCIN) intermittent dosing (placeholder)   Other RX Placeholder    senna  1 tablet Oral BID    allopurinol  100 mg Oral Daily    apixaban  2.5 mg Oral BID    atenolol  25 mg Oral Dinner    azelastine  1 spray Each Nostril BID    therapeutic multivitamin-minerals  1 tablet Oral Daily    cetirizine  5 mg Oral Daily    Vitamin D  2,000 Units Oral Daily    clopidogrel  75 mg Oral Daily    pantoprazole  40 mg Oral QAM AC    docusate sodium  100 mg Oral Daily    isosorbide mononitrate  60 mg Oral Daily    levothyroxine  88 mcg Oral Daily    ranolazine  500 mg Oral BID    sertraline  50 mg Oral Daily    atorvastatin  10 mg Oral Daily    sodium bicarbonate  325 mg Oral BID    Lifitegrast  1 drop Both Eyes BID    sodium chloride flush  5-40 mL IntraVENous 2 times per day    polyethylene glycol  17 g Oral BID    bisacodyl  10 mg Rectal Once     Allergies:    Social History:    TOBACCO:   reports that she has quit smoking. She has never used smokeless tobacco.  ETOH:   reports no history of alcohol use. Patient currently lives independently    Family History:       Problem Relation Age of Onset    Other Mother         CAD    Other Father         CAD       REVIEW OF SYSTEMS:    CONSTITUTIONAL:  negative for fevers, chills, diaphoresis, activity change, appetite change, fatigue, night sweats and unexpected weight change.    EYES:  negative for blurred vision, eye discharge, visual disturbance and icterus  HEENT:  negative for hearing loss, tinnitus, ear drainage, sinus pressure, nasal congestion, epistaxis and snoring  RESPIRATORY:  See HPI  CARDIOVASCULAR:  negative for chest pain, palpitations, exertional chest pressure/discomfort, edema, syncope  GASTROINTESTINAL:  negative for nausea, vomiting, diarrhea, constipation, blood in stool and abdominal pain  GENITOURINARY:  negative for frequency, dysuria, urinary incontinence, decreased urine volume, and hematuria  HEMATOLOGIC/LYMPHATIC:  negative for easy bruising, bleeding and lymphadenopathy  ALLERGIC/IMMUNOLOGIC:  negative for recurrent infections, angioedema, anaphylaxis and drug reactions  ENDOCRINE:  negative for weight changes and diabetic symptoms including polyuria, polydipsia and polyphagia  MUSCULOSKELETAL:  negative for  pain, joint swelling, decreased range of motion and muscle weakness  NEUROLOGICAL:  negative for headaches, slurred speech, unilateral weakness  PSYCHIATRIC/BEHAVIORAL: negative for hallucinations, behavioral problems, confusion and agitation. Objective:   PHYSICAL EXAM:      VITALS:  BP (!) 157/73   Pulse 69   Temp 97.5 °F (36.4 °C) (Oral)   Resp 22   Ht 5' (1.524 m)   Wt 125 lb (56.7 kg)   LMP  (LMP Unknown)   SpO2 96%   BMI 24.41 kg/m²   24HR INTAKE/OUTPUT:    Intake/Output Summary (Last 24 hours) at 2023 1230  Last data filed at 2023 0609  Gross per 24 hour   Intake 200 ml   Output 400 ml   Net -200 ml     CURRENT PULSE OXIMETRY:  SpO2: 96 %  24HR PULSE OXIMETRY RANGE:  SpO2  Av.2 %  Min: 88 %  Max: 96 %    CONSTITUTIONAL:  awake, alert, cooperative, no apparent distress, and appears stated age  NECK:  Supple, symmetrical, trachea midline, no adenopathy, thyroid symmetric, not enlarged and no tenderness, skin normal  LUNGS:  Bilateral basal crackles  CARDIOVASCULAR:  normal S1 and S2, no edema and no JVD  ABDOMEN:  normal bowel sounds, non-distended and no masses palpated, and no tenderness to palpation. No hepatospleenomegaly  LYMPHADENOPATHY:  no axillary or supraclavicular adenopathy. No cervical adnenopathy  PSYCHIATRIC: Oriented to person place and time. No obvious depression or anxiety. MUSCULOSKELETAL: No obvious misalignment or effusion of the joints. No clubbing, cyanosis of the digits. SKIN:  normal skin color, texture, turgor and no redness, warmth, or swelling.  No palpable nodules    DATA:    Old records have been reviewed  CBC with Differential:    Lab Results   Component Value Date/Time    WBC 13.7 2023 05:03 AM    RBC 2.19 2023 05:03 AM    HGB 6.9 2023 05:03 AM    HCT 21.9 02/06/2023 05:03 AM     02/06/2023 05:03 AM    .0 02/06/2023 05:03 AM    MCH 31.5 02/06/2023 05:03 AM    MCHC 31.5 02/06/2023 05:03 AM    RDW 14.6 02/06/2023 05:03 AM    SEGSPCT 86.9 02/06/2023 05:03 AM    LYMPHOPCT 7.2 02/06/2023 05:03 AM    MONOPCT 5.3 02/06/2023 05:03 AM    EOSPCT 4 10/06/2021 12:00 AM    BASOPCT 0.1 02/06/2023 05:03 AM    MONOSABS 0.7 02/06/2023 05:03 AM    LYMPHSABS 1.0 02/06/2023 05:03 AM    EOSABS 0.0 02/06/2023 05:03 AM    BASOSABS 0.0 02/06/2023 05:03 AM    DIFFTYPE AUTOMATED DIFFERENTIAL 02/06/2023 05:03 AM     BMP:    Lab Results   Component Value Date/Time     02/06/2023 05:03 AM    K 4.4 02/06/2023 05:03 AM     02/06/2023 05:03 AM    CO2 23 02/06/2023 05:03 AM    BUN 51 02/06/2023 05:03 AM    CREATININE 2.5 02/06/2023 05:03 AM    CALCIUM 8.4 02/06/2023 05:03 AM    GFRAA 32 09/26/2022 03:03 PM    GFRAA 37 03/19/2013 02:04 PM    LABGLOM 17 02/06/2023 05:03 AM    GLUCOSE 137 02/06/2023 05:03 AM    GLUCOSE 126 06/07/2021 12:00 AM     Hepatic Function Panel:    Lab Results   Component Value Date/Time    ALKPHOS 49 02/05/2023 02:34 AM    ALT 11 02/05/2023 02:34 AM    AST 21 02/05/2023 02:34 AM    PROT 6.4 02/05/2023 02:34 AM    PROT 7.2 03/19/2013 02:04 PM    BILITOT 0.3 02/05/2023 02:34 AM     ABG:    Lab Results   Component Value Date/Time    ZSO9BYG 18.4 11/14/2022 11:30 PM    XLO5CYL 29.0 11/14/2022 11:30 PM    PO2ART 83 11/14/2022 11:30 PM       Cultures:   Pending      Radiology Review:    No significant portable radiographic change compared to 02/05/2023 with   diffuse bilateral consolidating infiltrates throughout both lung fields worse   on the left again seen and most consistent with pneumonia accentuated by   underlying chronic lung disease. Underlying neoplasm cannot be excluded. 1.  Congestive heart failure is most likely given the findings; pneumonia is   also a consideration in areas of consolidation with pleural effusion.    2.  Vascular calcifications are noted reflecting calcific atherosclerosis.   3. Sequela from TAVR.         Assessment/Plan       Patient Active Problem List    Diagnosis Date Noted    S/P TAVR (transcatheter aortic valve replacement) 02/06/2023     Priority: High    Respiratory failure with hypoxia ([facility]) 02/06/2023     Priority: High    Hospice care 02/06/2023     Priority: High    LBBB (left bundle branch block)      Priority: High    PAUL (acute kidney injury) ([facility]) 12/26/2017     Priority: High    Heart failure ([facility]) 02/03/2023     Priority: Medium    Aortic stenosis 12/12/2022     Priority: Medium     Overview Note:     ON ECHO 11/22- DR HILARY GONZALEZ      Pneumonia due to infectious organism 11/16/2022     Priority: Medium    Chronic kidney disease 11/16/2022     Priority: Medium    Dyspnea and respiratory abnormalities 11/15/2022     Priority: Medium    Sepsis ([facility]) 11/14/2022     Priority: Medium    GERD (gastroesophageal reflux disease)      Priority: Medium     Overview Note:     Esophagitis      Hypertension      Priority: Low    Hypothyroid      Priority: Low     Overview Note:     Hypothyroidism      Vaginal prolapse 09/07/2021    Chronic renal impairment, stage 3 (moderate) ([facility])     Constipation, slow transit 06/19/2018    Physical deconditioning     Hyponatremia 01/01/2018    Gout     Hyperuricemia     Coronary artery disease involving native heart without angina pectoris      Overview Note:     Dr Nj Gonzalez/Jose Gonzalez      Depression     Hyperlipidemia     Generalized osteoarthritis     Osteoporosis      Overview Note:     Generalized       Acute hypoxic resp failure  Acute on chronic HFpEF  HTN  Afib  Pulmonary congestion  Small bilateral Pleural effusion  Valvular heart disease  PAUL on CKD  Suspected bilateral Pneumonia  Anemia  Suspected UTI  Severe AS s/p TAVR  Mild Pulmonary HTN  ?COPD  Ex smoker      PLAN  Empiric Abx  F/u C&S  Diuresis  CHF optimization  I.O chart  Keep sats > 92%  CBC, BMP in am  F/u H&H Q 6  hr  Inhalers  BIPAP qhs and prn for comfort  Await Hospice eval  Prognosis guarded  Decrease Solumedrol to Q day  C/w present management  ? Consideration of Plavix and Elqiuis in the setting of possible ?  UGI bleed        Electronically signed by Aleks Verduzco MD on 2/6/2023 at 12:30 PM

## 2023-02-06 NOTE — PROGRESS NOTES
Patient Desat to 75% on 7L on oxygen, RT notified, patient placed on Bipap, O2 sat increased to 96%.

## 2023-02-06 NOTE — PROGRESS NOTES
02/06/23 0731   Encounter Summary   Encounter Overview/Reason  Initial Encounter   Service Provided For: Patient   Referral/Consult From: [address] Family members   Last Encounter  02/06/23  (Patient on a vent and unable to comunicate staff in the room.  Offered bedside prayer and comfort care.)   Complexity of Encounter Low   Begin Time 0710   End Time  0730   Total Time Calculated 20 min   Encounter    Type Initial Screen/Assessment   Spiritual/Emotional needs   Type Spiritual Support   Assessment/Intervention/Outcome   Assessment Peaceful   Intervention Prayer (assurance of)/[place];Sustaining Presence/Ministry of presence   Outcome Comfort   Plan and Referrals   Plan/Referrals Continue Support (comment)  (hospice)

## 2023-02-06 NOTE — PROGRESS NOTES
02/06/23 2866   Encounter Summary   Encounter Overview/Reason  Palliative Care   Service Provided For: Patient and family together   Referral/Consult From: Family   Support System Children;Family members   Last Encounter  02/06/23  (Grandchild needed comfort care and assurance on decissions making.  offered spiritual and end-of-life care)   Complexity of Encounter Low   Begin Time 0940   End Time  0953   Total Time Calculated 13 min   Encounter    Type Follow up   Crisis   Type Family Care   Spiritual/Emotional needs   Type Spiritual Support   Grief, Loss, and Adjustments   Type End of Life   Assessment/Intervention/Outcome   Assessment Peaceful; Other (Comment)  (family care)   Intervention Active listening;Discussed belief system/Jehovah's witness practices/kd;End of Life Care;Sustaining Presence/Ministry of presence;Nurtured Hope   Outcome Comfort   Plan and Referrals   Plan/Referrals Continue Support (comment)  (as needed)

## 2023-02-06 NOTE — DISCHARGE SUMMARY
V2.0  Discharge Summary    Name:  Clayton Sanchez /Age/Sex: 1927 (80 y.o. female)   Admit Date: 2/3/2023  Discharge Date: 23    MRN & CSN:  [id number] & [id number] Encounter Date and Time 23 4:08 PM EST    Attending:  No att. providers found Discharging Provider: Guillermina Downey MD       Hospital Course:     Brief HPI: Clayton Sanchez is a 80 y.o. female who presented with chest pressure and SOB. Patient found to have CHF exacerbation. Cardiology was consulted who recommended IV lasix and echo as well as a nephro consult. Nephrology monitored renal function and diuresis. Patient spiked fever to 101.3 on  and was started on broad spectrum antibiotics. She was found to have pneumonia. She stopped producing urine and so her diuretics were titrated accordingly. Ultimately after discussing with POA and family, it was decided that the patient wouldn't want to be on BiPAP indefinitely or on and off and she wanted to be comfortable as her current lifestyle has left her being in and out of the hospital on many occasions. Patient was seen by hospice physician and qualified for inpatient hospice. Patient transitioned to hospice today.      Brief Problem Based Course:     #Acute hypoxic respiratory failure 2/2 possible aspiration pneumonia and CHF  -Patient received thin liquids at the NH  -no signs of infection on arrival  -had febrile episode to 101.3 2/ evening     Plan:  Continue cefepime and vancomycin  MRSA screen ordered  Started on metronidazole to cover anaerobes  Duonebs q4h     #Acute on chronic HFpEF  #HTN  #Afib  -presents with SOB and chest pressure  -Recent TAVR  -Mild troponin elevation to 0.059  -EKG stable from previous without any acute ischemic changes  -pro-BNP elevated   -Previous echo (2022): EF 55-60%, moderately dilated LA, mod-severe AS, mild-mod MR, mild to mod TR with RVSP of 44  -Negative respiratory PCR  -High TSH and normal fT4  -low procal, bacterial pneumonia unlikely  IV Bumex  Cardiology on consult  Strict I's and 's  Echo ordered  Daily weights  Low salt diet  Continue eliquis, atenolol, atorvastatin, plavix, hydralazine, imdur  Holding amlodipine  No RAASi per nephro    Patient today was unable to come off BiPAP  Patient discharged to inpatient hospice unit     #PAUL on CKD-worsening  -Cr at baseline at 1.9--> 2.3--> 2.5, BUN trending up as well  -no urine output overnight and minimal urine found on bladder scan  Nephro consulted, appreciate recs   Continue dialyvyte, sodium bicarb  Received IV lasix drip today  Transitioned to hospice     #UTI  -Urine growing e coli  -already on antibiotics     Plan:  Discharged to hospice     #Weakness  -came here from rehab center     Plan:  Discharged to hospice     #Dysphagia  -on nectar thick liquids   -GI was consulted, they were going to do an EGD however patient became unstable and it was decided they would not go froward with it     Plan:  Discharged to hospice     #Constipation  -does not remember when her last BM was     Plan:  Miralax BID  Bisacodyl suppository     #Iron deficiency anemia  -low iron level and transferrin saturation     Plan:  Ferrous sulphate daily     #UTI  -UA positive for nitrates     Plan:  Start on ceftriaxone  Follow up on urine cultures     Chronic medical problems:   #COPD  -wheezing  Duonebs ordered     #GERD  -On lansoprazole at home  Continue on pantoprazole inpatient         The patient expressed appropriate understanding of, and agreement with the discharge recommendations, medications, and plan.      Consults this admission:  IP CONSULT TO CARDIOLOGY  IP CONSULT TO NEPHROLOGY  IP CONSULT TO GI  IP CONSULT TO HEART FAILURE NURSE/COORDINATOR  IP CONSULT TO DIETITIAN  PHARMACY TO DOSE VANCOMYCIN  IP CONSULT TO IV TEAM  IP CONSULT TO HOSPICE  IP CONSULT TO PULMONOLOGY    Discharge Diagnosis:   Heart failure ([facility] [address]    Pneumonia  UTI  Dysphagia  Weakness  PUAL  CKD      Discharge Instruction: Follow up appointments: NA --> discharged to hospice  Primary care physician: Tim Lazo MD within 2 weeks  Diet: regular diet   Activity: activity as tolerated  Disposition: Discharged to:   []Home, []HHC, []SNF, []Acute Rehab, [x]Hospice   Condition on discharge: unstable (dc to hospice)  Labs and Tests to be Followed up as an outpatient by PCP or Specialist: none    Discharge Medications:        Medication List        ASK your doctor about these medications      acetaminophen 325 MG tablet  Commonly known as: Aminofen  Take 2 tablets by mouth every 8 hours as needed for Pain     alendronate 70 MG tablet  Commonly known as: FOSAMAX  FOSA     allopurinol 100 MG tablet  Commonly known as: ZYLOPRIM  TAKE ONE (1) TABLET BY MOUTH DAILY     amLODIPine 10 MG tablet  Commonly known as: NORVASC  Take 1 tablet by mouth nightly     atenolol 25 MG tablet  Commonly known as: TENORMIN  Take 1 tablet by mouth daily     azelastine 0.1 % nasal spray  Commonly known as: ASTELIN  1 spray by Nasal route 2 times daily Use in each nostril as directed     bumetanide 2 MG tablet  Commonly known as: BUMEX  1 tablet on Tuesday, Thursday and Saturday     cetirizine 10 MG tablet  Commonly known as: ZYRTEC  TAKE ONE (1) TABLET BY MOUTH ONCE DAILY     clopidogrel 75 MG tablet  Commonly known as: PLAVIX  Take 1 tablet by mouth daily     cycloSPORINE 0.05 % ophthalmic emulsion  Commonly known as: RESTASIS     dexlansoprazole 60 MG Cpdr delayed release capsule  Commonly known as: Dexilant  Take 1 capsule by mouth daily     Dialyvite 800-Zinc 15 0.8 MG Tabs  Take 1 tablet by mouth daily     diphenhydrAMINE-zinc acetate 2-0.1 % cream  Apply topically 3 times daily as needed to itchy areas on skin     docusate sodium 100 MG capsule  Commonly known as: COLACE  Take 1 capsule by mouth daily     Eliquis 5 MG Tabs tablet  Generic drug: apixaban     gabapentin 300 MG capsule  Commonly known as: NEURONTIN     hydrALAZINE 10 MG tablet  Commonly known as: APRESOLINE     ipratropium-albuterol 0.5-2.5 (3) MG/3ML Soln nebulizer solution  Commonly known as: DUONEB  Inhale 3 mLs into the lungs in the morning, at noon, and at bedtime     isosorbide mononitrate 60 MG extended release tablet  Commonly known as: IMDUR  Take 1 tablet by mouth daily     levothyroxine 88 MCG tablet  Commonly known as: SYNTHROID  TAKE ONE (1) TABLET BY MOUTH DAILY     mineral oil-hydrophilic petrolatum ointment     nitroGLYCERIN 0.4 MG SL tablet  Commonly known as: NITROSTAT  NITRO     nystatin 521390 UNIT/GM powder  Commonly known as: MYCOSTATIN  Apply 3 times daily.      ondansetron 4 MG tablet  Commonly known as: ZOFRAN     OXYGEN     polyethylene glycol 17 GM/SCOOP powder  Commonly known as: GaviLAX  MIX 17 GRAMS (1 HEAPING TABLESPOONFUL) OF POWDER IN 8 OUNCES OF LIQUID AND DRINK DAILY     ranolazine 500 MG extended release tablet  Commonly known as: RANEXA  TAKE ONE (1) TABLET BY MOUTH TWO (2) TIMES DAILY     senna 8.6 MG tablet  Commonly known as: Natural Senna Laxative  Take 1 tablet by mouth 2 times daily     sertraline 50 MG tablet  Commonly known as: ZOLOFT  Take 1 tablet by mouth daily     simvastatin 20 MG tablet  Commonly known as: ZOCOR  TAKE 1 TABLET BY MOUTH EVERY NIGHT     sodium bicarbonate 325 MG tablet     triamcinolone 0.1 % ointment  Commonly known as: KENALOG  Apply topically 2 times daily     Vitamin D3 50 MCG (2000 UT) Tabs  Take 1 tablet by mouth daily     Xiidra 5 % Soln  Generic drug: Lifitegrast             Objective Findings at Discharge:   BP (!) 157/73   Pulse 69   Temp 97.5 °F (36.4 °C) (Oral)   Resp 22   Ht 5' (1.524 m)   Wt 125 lb (56.7 kg)   LMP  (LMP Unknown)   SpO2 96%   BMI 24.41 kg/m²       Physical Exam:   General: NAD, alert, awake and oriented on BiPAP  Eyes: EOMI  HENT: Atraumatic  Respiratory: no respiratory distress  GI: nondistended  MSK: no lower extremity edema  Skin: Intact, dry, warm, no rashes  Neuro: CN II to XII grossly intact  Psych: Normal mood  Labs and Imaging   CT CHEST WO CONTRAST    Result Date: 2/3/2023  EXAMINATION: CT OF THE CHEST WITHOUT CONTRAST 2/3/2023 4:19 pm TECHNIQUE: CT of the chest was performed without the administration of intravenous contrast. Multiplanar reformatted images are provided for review. Automated exposure control, iterative reconstruction, and/or weight based adjustment of the mA/kV was utilized to reduce the radiation dose to as low as reasonably achievable. COMPARISON: Chest CT 11/14/2022 HISTORY: ORDERING SYSTEM PROVIDED HISTORY: Short of breath, chest pain, dyspnea/cp Decision Support Exception - unselect if not a suspected or confirmed emergency medical condition->Emergency Medical Condition (MA) Reason for Exam: dyspnea/cp Additional signs and symptoms: no Relevant Medical/Surgical History: none FINDINGS: Mediastinum: Cardiac structures and great vessels appear unremarkable with exception of calcific atherosclerotic disease. No pericardial effusion. Posterior mediastinal structures appear unremarkable. No mediastinal or hilar adenopathy. Sequela from TAVR. Lungs/pleura: Small volume bilateral pleural effusion with associated relaxation atelectasis. Diffuse interlobular septal thickening and patchy alveolar densities bilateral lungs. No inspissated secretions or endobronchial lesion evident. No pneumothorax. Upper Abdomen: Unremarkable appearance. Soft Tissues/Bones: No acute superficial soft tissue or osseous structure abnormality evident. 1.  Congestive heart failure is most likely given the findings; pneumonia is also a consideration in areas of consolidation with pleural effusion. 2.  Vascular calcifications are noted reflecting calcific atherosclerosis. 3. Sequela from TAVR.      XR CHEST PORTABLE    Result Date: 2/6/2023  EXAMINATION: ONE XRAY VIEW OF THE CHEST 2/6/2023 7:53 am COMPARISON: 02/05/2023 at 0432 hours HISTORY: ORDERING SYSTEM PROVIDED HISTORY: SOB TECHNOLOGIST PROVIDED HISTORY: Reason for exam:->SOB Reason for Exam: SOB FINDINGS: No significant portable radiographic change. Persistent diffuse bilateral consolidating infiltrates throughout both lung fields generally worse on the left. No detectable pleural effusion, pneumothorax, mediastinal widening. Heart size is stable and projects the upper limit of normal.  No definite pulmonary vascular congestion. Stable radiographic appearance of prosthetic heart valve in the AP view. No significant portable radiographic change compared to 02/05/2023 with diffuse bilateral consolidating infiltrates throughout both lung fields worse on the left again seen and most consistent with pneumonia accentuated by underlying chronic lung disease. Underlying neoplasm cannot be excluded. XR CHEST PORTABLE    Result Date: 2/5/2023  EXAMINATION: ONE XRAY VIEW OF THE CHEST 2/5/2023 5:15 am COMPARISON: 02/04/2023 HISTORY: ORDERING SYSTEM PROVIDED HISTORY: increased O2 requirement TECHNOLOGIST PROVIDED HISTORY: Reason for exam:->increased O2 requirement Reason for Exam: Heart failure Additional signs and symptoms: increased O2 requirement FINDINGS: Aortic atherosclerosis. Postprocedural change from prior TAVR. Increased confluent consolidative airspace disease within the left lung. Mild improved aeration seen in the right upper lobe, with increased right perihilar and medial basilar airspace disease. Osseous structures appear similar. Though mild improved aeration noted in the right upper lobe, progression of airspace disease with increased confluent consolidative changes seen in the left lung.      XR CHEST PORTABLE    Result Date: 2/4/2023  EXAMINATION: ONE XRAY VIEW OF THE CHEST 2/4/2023 6:02 pm COMPARISON: 1 day prior HISTORY: ORDERING SYSTEM PROVIDED HISTORY: respiratory distress TECHNOLOGIST PROVIDED HISTORY: Reason for exam:->respiratory distress Reason for Exam: respiratory distress FINDINGS: Increased in a diffuse left greater than right pulmonary ground-glass opacity. No pneumothorax. Suspected small layering pleural effusions. Cardiac and mediastinal contours stable. Aortic root stent redemonstrated. Electronic device projects over the right hilum. No acute osseous abnormality. 1. Increased bilateral pulmonary ground-glass opacities most consistent with worsening pulmonary edema. Superimposed pneumonia is conceivable if the patient has fever or leukocytosis. 2. Stable small layering bilateral pleural effusions. XR CHEST PORTABLE    Result Date: 2/3/2023  EXAMINATION: ONE XRAY VIEW OF THE CHEST 2/3/2023 4:23 pm COMPARISON: 12/01/2022 HISTORY: ORDERING SYSTEM PROVIDED HISTORY: dyspnea TECHNOLOGIST PROVIDED HISTORY: Reason for exam:->dyspnea Reason for Exam: dyspnea Additional signs and symptoms: unknown Relevant Medical/Surgical History: CAD, pna FINDINGS: Cardiomediastinal silhouette stable. Bilateral patchy airspace opacities. No pneumothorax. No pleural effusion.      Bilateral patchy airspace opacities, increased compared to the previous exam, suggesting multifocal pneumonia       CBC:   Recent Labs     02/05/23  0234 02/06/23  0503   WBC 11.4* 13.7*   HGB 7.2* 6.9*    299     BMP:    Recent Labs     02/04/23  0113 02/05/23  0234 02/06/23  0503    139 138   K 4.5 4.7 4.4    105 103   CO2 23 24 23   BUN 40* 40* 51*   CREATININE 1.9* 2.3* 2.5*   GLUCOSE 148* 113* 137*     Hepatic:   Recent Labs     02/05/23  0234   AST 21   ALT 11   BILITOT 0.3   ALKPHOS 49     Lipids:   Lab Results   Component Value Date/Time    CHOL 126 02/04/2023 01:13 AM    CHOL 185 08/19/2022 11:20 AM    HDL 48 02/04/2023 01:13 AM    HDL 46 02/03/2012 11:09 PM    TRIG 127 02/04/2023 01:13 AM     Hemoglobin A1C: No results found for: LABA1C  TSH:   Lab Results   Component Value Date/Time    TSH 0.62 08/19/2022 11:20 AM     Troponin:   Lab Results   Component Value Date/Time    TROPONINT 0.066 02/04/2023 03:47 AM    TROPONINT 0.062 02/04/2023 01:13 AM    TROPONINT 0.059 02/03/2023 03:37 PM     Lactic Acid: No results for input(s): LACTA in the last 72 hours. BNP:   Recent Labs     02/06/23  0503   PROBNP 10,005*     UA:  Lab Results   Component Value Date/Time    NITRU POSITIVE 02/04/2023 01:30 AM    NITRU Negative 09/27/2022 03:36 PM    COLORU YELLOW 02/04/2023 01:30 AM    PHUR 6.0 09/27/2022 03:36 PM    WBCUA 1 02/04/2023 01:30 AM    RBCUA 2 02/04/2023 01:30 AM    MUCUS RARE 12/06/2022 01:54 PM    TRICHOMONAS NONE SEEN 02/04/2023 01:30 AM    BACTERIA RARE 02/04/2023 01:30 AM    CLARITYU SLIGHTLY CLOUDY 02/04/2023 01:30 AM    SPECGRAV 1.020 02/04/2023 01:30 AM    LEUKOCYTESUR NEGATIVE 02/04/2023 01:30 AM    UROBILINOGEN 0.2 02/04/2023 01:30 AM    BILIRUBINUR NEGATIVE 02/04/2023 01:30 AM    BLOODU TRACE 02/04/2023 01:30 AM    GLUCOSEU Negative 09/27/2022 03:36 PM    KETUA NEGATIVE 02/04/2023 01:30 AM     Urine Cultures:   Lab Results   Component Value Date/Time    LABURIN  09/27/2022 03:36 PM     <10,000 CFU/ml mixed skin/urogenital mela.  No further workup     Blood Cultures: No results found for: BC  No results found for: BLOODCULT2  Organism:   Lab Results   Component Value Date/Time    ORG ENC 04/04/2018 09:00 AM       Time Spent Discharging patient 32 minutes    Electronically signed by Prosper Bhardwaj MD on 2/6/2023 at 4:08 PM

## 2023-02-06 NOTE — PROGRESS NOTES
Patient anxious and restless. Responded to low dose ativan last nigt. Will try another dose tonight. Also reporting insomnia.

## 2023-02-06 NOTE — PROGRESS NOTES
[address] [place] [address]       Chart reviewed, discussed with Dr Alex Alamo, the patient's nurse, respiratory therapist, and I met with the patient's granddaughter (Anders Beebe) and son, Nataliia Milan and his spouse, Tashia Estrada. The patient is on BiPap, awakens briefly and provides minimal information. The patient has had respiratory distress and is on BiPap. She has not responded to Lasix or Bumex, and is now started on a Lasix infusion. She just received a dose of Morphine which ahas been beneficial.       Hospice philosophy was discussed regarding care and comfort at the end of life. Questions were answered, and emotional support was provided. We discussed that [address] is for management of symptoms, and that if the patient stabilizes, alternate arrangements for care will be needed. The family is agreeable to comfort focused care and General Inpatient Hospice. I will place discharge readmit orders to transition to [address] [place] [address] Road when consents are obtained for management of dyspnea, air hunger, anxiety and probable end of life care. Prognosis is grim and life expectancy is likely hours to days. PPS 10%.     Riley Sinclair MD

## 2023-02-07 NOTE — DISCHARGE SUMMARY
[facility]    Death Summary    Date: 2/7/2023  Name: Cathy Ayers  MRN: [id number]  YOB: 1927     Patient's PCP: Karyna Rodriguez MD  Consultants during acute care: Cardiology, Gastroenterology, Nephrology,   Acute care admission date: 2/3 to 2/6/2023 and 1/24 to 1/31/23 at [address] [place] [address] to [address] [place] [address] Road: 2/6/2023   Admit Date: 2/6/2023 to [address] [place] [address]  Date of Death: 2/7/2023  Time: 0621  Admitting Physician: Mele Jurado MD to [facility]   Discharge Physician: Yue Brumfield MD  Consultation: none during General Inpatient Hospice stay    Invasive procedures: none during General Inpatient Hospice stay    Discharge Diagnoses:  Decompensated heart failure with hypoxic respiratory failure   Valvular heart disease with recent TAVR 1/24/23 for severe aortic stenosis and aortic valva area of 0.99 cm2   Acute kidney injury of CKD 3  Paroxysmal atrial fibrillation  Sepsis, E. coli UTI, possible aspiration pneumonitis, not improving with broad spectrum antibiotics       Patient Active Problem List   Diagnosis Code    Hypertension I10    GERD (gastroesophageal reflux disease) K21.9    Depression F32. A    Hyperlipidemia E78.5    Generalized osteoarthritis M15.9    Osteoporosis M81.0    Hypothyroid E03.9    Coronary artery disease involving native heart without angina pectoris I25.10    Hyperuricemia E79.0    Gout M10.9    PAUL (acute kidney injury) ([facility] [address] N17.9    Hyponatremia E87.1    Physical deconditioning R53.81    LBBB (left bundle branch block) I44.7    Constipation, slow transit K59.01    Chronic renal impairment, stage 3 (moderate) ([facility]) N18.30    Vaginal prolapse N81.10    Sepsis ([facility]) A41.9    Dyspnea and respiratory abnormalities R06.00, R06.89    Pneumonia due to infectious organism J18.9    Chronic kidney disease N18.9    Aortic stenosis I35.0    Heart failure ([facility]) I50.9    S/P TAVR (transcatheter aortic valve replacement) Z95.2    Respiratory failure with hypoxia ([facility]) J96.91    Hospice care Z51.5       Brief History, reason for admission: Please see the acute care H+P, discharge summary, consultants notes, and my notes. The patient was admitted to [facility]. This is a 80 y.o. female with a history of valvular heart disease with severe aortic stenosis and aortic valva area of 0.99 cm2 who had T[facility]R at [facility] on 1/24/23, with comorbidities of heart failure with preserved ejection fraction with LVEF 55-60%, pulmonary hypertension, left bundle branch block, coronary artery disease, CKD, hypertension, hyperlipidemia. Post TAVR, the patient had episodes of afib and ultimately was discharged to a local Nursing Home. The patient was admitted on 2/3/2023 with chest pain and shortness of breath. The patient has been followed by hospital medicine, Cardiology and Nephrology. The patient has been treated with IV Lasix and Bumex, and on 2/6/23 was started on Lasix infusion. The patient has developed respiratory distress with hypoxic respiratory failure and is on BiPap at 100% FiO2. When the BiPap is removed, she becomes dyspneic with air hunger and oximetry drops into the 70's. Pro-BNP is 10,005, chest X-ray shows bilateral infiltrates. WBC 13,700 and hemoglobin 6.9 gm/dl. The patient is drowsy but arousable. She seems aware when I tell her that I am a hospice doctor and we talked about comfort and she said she was ready. The patient was given a dose of Morphine shortly before I examined her, and she does calm down and appears to be resting. Prior to the TAVR, the patient was able to live alone with a hired care giver and family support. She had significant exertional dyspnea which was what caused her to pursue the TAVR. The patient had a fever earlier in the admission and there is concern for possible aspiration pneumonitis.  The patient has an E. coli UTI and has been on broad spectrum antibiotics without improvement. On 23, I met with the patient's granddaughter (Ilda Bee) and son, Jane Naylor and his spouse, Doreen Berger. Hospice philosophy was discussed regarding care and comfort at the end of life. Questions were answered, and emotional support was provided. We discussed that [address] [place] [address] is for management of symptoms, and that if the patient stabilizes, alternate arrangements for care will be needed They are aware that Hospice does not provide for the patients 24 hour care giving needs. The patient is limited code status without CPR, intubation, defibrillation or resuscitative medications. The family request comfort care. There was a meeting with the hospice nurse liaison at noon 23 and hospice consents were signed, and arrangements made for admission to [address] [place] [address] for management of dyspnea, pain, air hunger, anxiety and probable end of life care. Hospital Course: The patient was admitted to [facility] with the above, for complete details, please see the acute care History and Physical, progress notes, consultant notes and discharge summary. The patient was treated with comfort medications, and symptoms were managed. Emotional and spiritual support was provided to the patient and family. The patient  as noted above.     Cause of death: decompensated heart failure in the setting of severe valvular heart disease     Significant Diagnostic Studies:  See computerized record in Spike Luis MD  2023

## 2023-02-08 LAB
CULTURE: NORMAL
CULTURE: NORMAL
Lab: NORMAL
Lab: NORMAL
SPECIMEN: NORMAL
SPECIMEN: NORMAL

## 2023-02-08 NOTE — PROGRESS NOTES
Physician Progress Note      PATIENT:               Anson Rocha  CSN #:                  [id number]  :                       1927  ADMIT DATE:       2/3/2023 3:29 PM  [address] [place] DATE:        2023 1:31 PM  RESPONDING  PROVIDER #:        Brandi Wilkins MD          QUERY TEXT:    Pt admitted with CHF. Pt noted to have Sepsis documented by Dr Emma Fletcher in   consultant note on 2023 . If possible, please document in the progress   notes and discharge summary if you are evaluating and /or treating any of the   following: The medical record reflects the following:  Risk Factors: Pneumonia, UTI  Clinical Indicators: WBC 10.8 POA up to 13.7 on 23, Urine culture + for E   coli, Temp 101.3 on 23, HR > 90 up to 103 on 23, Resp > 20 up to 25 on   23, consultant note by Dr Emma Fletcher on  states \"Sepsis, E. coli UTI \"  Treatment: Vancomycin, Cefepime, Rocephin, labs    Thank you,  Bienvenido Baltazar [phone]  Options provided:  -- Sepsis, present on admission  -- Sepsis, developed following admission  -- Pneumonia and UTI without Sepsis  -- Sepsis was ruled out  -- Other - I will add my own diagnosis  -- Disagree - Not applicable / Not valid  -- Disagree - Clinically unable to determine / Unknown  -- Refer to Clinical Documentation Reviewer    PROVIDER RESPONSE TEXT:    This patient has sepsis that developed following admission.     Query created by: Yen Reynolds on 2023 8:34 AM      Electronically signed by:  Brandi Wilkins MD 2023 4:39 PM

## 2023-05-15 NOTE — PROGRESS NOTES
Krunal Espinoza  2022  5:54 PM          Krunal Espinoza is a 80 y.o. female       The patient was seen. She has no chief complaints today. She has been fitted for a # #2; cube type pessary. She states all of her symptoms that she had prior to the pessary have been relieved with its use. She admits to slight vaginal bleeding. She last had her pessary cleaned 16 weeks ago. She reports slight discharge without odor. Her bowels are regular and her voiding pattern is normal.     Blood pressure (!) 179/77, height 5' (1.524 m), weight 122 lb (55.3 kg), not currently breastfeeding. Chaperone for Intimate Exam   Chaperone was offered and accepted as part of the rooming process.  Chaperone: Genie        Abdomen: Soft and non-tender; good bowel sounds; no guarding, rebound or rigidity; no CVA tenderness bilaterally. Extremities: No calf tenderness bilaterally. DTR 2/4 bilaterally. No edema. Perineum/Speculum: There is not any signs of infection; The vaginal vault is without any signs of erythema or erosion. There is slight vaginal discharge. The pessary was cleansed and replaced without any problems and the patient tolerated the procedure well. T.O.S. Ointment was placed with the pessary to decrease discharge/odor. Assessment:   Diagnosis Orders   1. Vaginal prolapse     2. Atrophic vaginitis  estradiol (ESTRACE VAGINAL) 0.1 MG/GM vaginal cream     Chief Complaint   Patient presents with    Uterine Prolapse     pt here for pessary check, # 2 cube type pessary,  last cleaned 4 months ago,  c/o vaginal discharge and spotting.       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 2018     Priority: High    Bradycardia      Priority: High    LBBB (left bundle branch block)      Priority: High    GERD (gastroesophageal reflux disease)      Priority: Medium     Esophagitis      Hypertension      Priority: Low    Hypothyroid Priority: Low     Hypothyroidism      Vaginal prolapse 09/07/2021    Primary osteoarthritis of left foot 08/30/2021    Closed nondisplaced fracture of fourth metatarsal bone of left foot 08/30/2021    Closed nondisplaced fracture of third metatarsal bone of left foot 08/30/2021    Closed nondisplaced fracture of fifth left metatarsal bone 08/30/2021    Vertigo      Dr Lucius Vazquez Dizziness     Labyrinthitis     Chronic renal impairment, stage 3 (moderate) (HCC)     Constipation, slow transit 06/19/2018    Cystitis     Physical deconditioning     Pruritic condition 01/22/2016    Gout     Hyperuricemia     Low back pain     Eczema     Coronary artery disease involving native heart without angina pectoris      Dr Yolande Figueredo/Jose Figueredo      Depression     Hyperlipidemia     Generalized osteoarthritis     Osteoporosis      Generalized           Plan:  1. Return to the office 10-12 weeks  2. Report any vaginal bleeding or discharge  3. Abstinence  4. Annual Follow-up reviewed with patient. They will schedule appointment  5. Risk of estrogen discussed  6. Follow up sooner if anything other than light spotting     Diagnosis Orders   1. Vaginal prolapse     2. Atrophic vaginitis  estradiol (ESTRACE VAGINAL) 0.1 MG/GM vaginal cream        The patient had her preventative health maintenance recommendations and follow-up reviewed with her at the completion of her visit. Elidel Counseling: Patient may experience a mild burning sensation during topical application. Elidel is not approved in children less than 2 years of age. There have been case reports of hematologic and skin malignancies in patients using topical calcineurin inhibitors although causality is questionable.

## 2024-04-18 NOTE — PROGRESS NOTES
Daily Progress Note  Subjective:    Pt awake-no complaints  No SOB or CP  BP and HR stable  No tele    Attending Note:    She is off telemetry  She is stable   No leg edema  ARU declined  Waiting on placement   Keep on current med  HTN and renal insuffiencey  AS-for TAVR once stable if she qualify  due to her age and renal function     Impression and Plan:     Sepsis    CAP, noted on CT chest    Respiratory panel negative    WBC count elevated    ABX per primary    Using 1L NC now-weaning slowly    CXR showing improvement 11/30/2022     Acute on Chronic HFpEF    Last echo showed EF 55-60%    Severe Aortic stenosis    Diuretics per renal at this time    Accurate I's and O's    1500 Fluid restriction     Plan to have her f/u with [facility] OP for potential valvuloplasty-will arrange this OP     CKD    Renal following; Avoid nephrotoxic medications     Stable from cardiac standpoint  Med. Tx. and increase activity  Awaiting placement      Most Recent Echo  11/15/2022   Left ventricular systolic function is normal.   Ejection fraction is visually estimated at 55-60%. Moderately dilated left atrium. Moderate to severe aortic stenosis is present; Mean PG 40mmHg, YANIRA 0.99 cm2. Mitral annular calcification is present. Mild to moderate mitral regurgitation. Mild to moderate tricuspid regurgitation; RVSP: 44 mmHg. No evidence of any pericardial effusion. Radiology  CT chest 11/14/2022  Impression   Bilateral pulmonary infiltrates and bilateral pleural effusions with probable   reactive mediastinal adenopathy       Probable granuloma in the right middle lobe. No follow-up imaging   recommended. PAST MEDICAL HISTORY:  The patient has a history of hypertension, renal  insufficiency present, and she has been treated medically. She is known  to have coronary artery disease also present. She has a chronic left  ventricular block present.   She had a heart catheterization done in the  past and LAD had 70% Emergency Visit Note      Patient : Sari Salmeron Age: 4 year old Sex: female   MRN: [id number] Encounter Date: 4/17/2024    Time of patient being seen: 11:21 PM  History source:parents   Arrival mode: private car    History     Chief Complaint   Patient presents with    Fever     3y/o with no significant past medical history who presents with fever and emesis. Pt with 2 day history of fevers. Tmax 102. Pt with vomiting starting tonight. Pt with 7 episodes of vomiting.  Emesis is NBNB NO D.  No abdominal pain. No urinary sx NO prior history of UTIs.  No URI sx. No known sick contacts. Immunizations UTD.     Father reports that pt has had episodes of vomiting in the past felt to possibly be related to abdominal migraines. Father states that this seems different to them than prior presentations of cyclic vomiting like illness. Pt is also febrile with this illness.       Fever   Associated symptoms include a fever, nausea and vomiting.       No Known Allergies    Medications:       PMH: Reviewed   Past Medical History:   Diagnosis Date    Fever, recurrent     Periodic fever syndrome (CMD)          PSH:  Reviewed   History reviewed. No pertinent surgical history.    FMH: Reviewed   non contributory    Social hx:  Lives at home with     Vaccines: UTD       Review of Systems   Constitutional:  Positive for fever.   HENT: Negative.     Eyes: Negative.    Respiratory: Negative.     Cardiovascular: Negative.    Gastrointestinal:  Positive for nausea and vomiting.   Endocrine: Negative.    Genitourinary: Negative.    Musculoskeletal: Negative.    Skin: Negative.    Allergic/Immunologic: Negative.    Neurological: Negative.    Hematological: Negative.    Psychiatric/Behavioral: Negative.         Physical Exam     ED Triage Vitals   ED Triage Vitals Group      Temp 04/17/24 2313 99 °F (37.2 °C)      Heart Rate 04/17/24 2313 133      Resp 04/17/24 2313 32      BP 04/17/24 2313 (!) 133/73      SpO2 04/17/24 2313 95 %      EtCO2  mmHg --       Height --       Weight 04/17/24 2314 28 lb 7 oz (12.9 kg)      Weight Scale Used 04/17/24 2314 Standing scale      BMI (Calculated) --       IBW/kg (Calculated) --        Physical Exam  HENT:      Head: Normocephalic.      Right Ear: Tympanic membrane normal. Tympanic membrane is not erythematous or bulging.      Left Ear: Tympanic membrane normal. Tympanic membrane is not erythematous or bulging.      Nose: Nose normal.      Mouth/Throat:      Pharynx: No oropharyngeal exudate or posterior oropharyngeal erythema.      Comments: Erythema to posterior pharynx, no exudate, tonsils 1+ symmetrical, uvula midline     Neck: Normal range of motion and neck supple.   Eyes:      Extraocular Movements: Extraocular movements intact.      Pupils: Pupils are equal, round, and reactive to light.   Cardiovascular:      Rate and Rhythm: Normal rate and regular rhythm.      Pulses: Normal pulses.      Heart sounds: Normal heart sounds.   Pulmonary:      Effort: Pulmonary effort is normal. No respiratory distress.      Breath sounds: Normal breath sounds.   Abdominal:      General: Abdomen is flat. There is no distension.      Palpations: Abdomen is soft.      Tenderness: There is no abdominal tenderness.   Genitourinary:     Comments: Normal female genitalia tc stage 1  Musculoskeletal:         General: Normal range of motion.   Lymphadenopathy:      Cervical: No cervical adenopathy.   Skin:     General: Skin is warm.      Capillary Refill: Capillary refill takes less than 2 seconds.   Neurological:      General: No focal deficit present.      Mental Status: She is alert and oriented for age.         ED Course       Procedures    Medical Decision Making  5y/o with no significant past medical history who presents with fever and vomiting. Pt well appearing, well hydrated. Benign abd exam. Pt tolerated po following zofran and blood sugar improved with oral intake of water, pedialyte, popsicle and cracker. Blood sugar  stenosis present and she was treated medically at  that time. Anemia, hyperlipidemia, and arthritis present. PAST SURGICAL HISTORY:  She has a history of having hip surgery done. Moderate coronary artery disease, heart catheterization done in 2018,  LAD with 70% stenosis distally. She was treated medically for that. She had cataract surgery and hysterectomy done.       Objective:   BP (!) 173/74   Pulse 69   Temp 98.1 °F (36.7 °C) (Oral)   Resp 18   Ht 5' (1.524 m)   Wt 140 lb 3.4 oz (63.6 kg)   LMP  (LMP Unknown)   SpO2 98%   BMI 27.38 kg/m²   No intake or output data in the 24 hours ending 12/05/22 1111    Medications:   Scheduled Meds:   ipratropium-albuterol  1 ampule Inhalation TID    diphenhydrAMINE-zinc acetate   Topical 4x daily    sennosides-docusate sodium  1 tablet Oral BID    polyethylene glycol  17 g Oral Daily    docusate sodium  100 mg Oral Daily    lactobacillus  1 capsule Oral Daily with breakfast    amLODIPine  10 mg Oral Nightly    atenolol  25 mg Oral Daily    sodium bicarbonate  325 mg Oral BID    clopidogrel  75 mg Oral Daily    sodium chloride flush  5-40 mL IntraVENous 2 times per day    sodium chloride flush  5-40 mL IntraVENous 2 times per day    heparin (porcine)  5,000 Units SubCUTAneous 3 times per day    Vitamin D  2,000 Units Oral Daily    pantoprazole  40 mg Oral QAM AC    fluorometholone  1 drop Both Eyes BID    isosorbide mononitrate  60 mg Oral Daily    levothyroxine  88 mcg Oral Daily    ranolazine  500 mg Oral BID    sertraline  50 mg Oral Daily    [Held by provider] losartan  12.5 mg Oral Daily    atorvastatin  20 mg Oral Nightly      Infusions:   sodium chloride 75 mL/hr at 11/24/22 1739    sodium chloride 20 mL/hr at 11/27/22 1640      PRN Meds:  hydrOXYzine pamoate, melatonin, sodium chloride flush, sodium chloride, ondansetron **OR** ondansetron, polyethylene glycol, acetaminophen **OR** acetaminophen, sodium chloride flush, sodium chloride     Physical Exam:  Vitals:    12/05/22 0915   BP: (!) 173/74   Pulse: 69   Resp: 18   Temp:    SpO2: 98%        General: AAO, NAD  Chest: Nontender  Cardiac: First and Second Heart Sounds are Normal, No Murmurs or Gallops noted  Lungs:Clear to auscultation and percussion. Abdomen: Soft, NT, ND, +BS  Extremities: No clubbing, no edema  Vascular:  Equal 2+ peripheral pulses. Lab Data:  CBC: No results for input(s): WBC, HGB, HCT, MCV, PLT in the last 72 hours. BMP:   Recent Labs     12/03/22  0627 12/04/22  0011 12/05/22  0513    135 137   K 4.8 4.6 4.5    102 103   CO2 23 23 22   BUN 54* 49* 45*   CREATININE 2.0* 2.0* 2.0*     LIVER PROFILE: No results for input(s): AST, ALT, LIPASE, BILIDIR, BILITOT, ALKPHOS in the last 72 hours. Invalid input(s): AMYLASE,  ALB  PT/INR: No results for input(s): PROTIME, INR in the last 72 hours. APTT: No results for input(s): APTT in the last 72 hours. BNP:  No results for input(s): BNP in the last 72 hours.       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 08/18/2018    Bradycardia     LBBB (left bundle branch block)     PAUL (acute kidney injury) ([facility] [address] 12/26/2017    Pneumonia due to infectious organism 11/16/2022    Pyelonephritis 11/16/2022    Chronic kidney disease 11/16/2022    Dyspnea and respiratory abnormalities 11/15/2022    Sepsis ([address] 11/14/2022    Localized edema 07/11/2022    GERD (gastroesophageal reflux disease)     Hypertension     Hypothyroid     Sinus congestion 03/23/2022    Vaginal prolapse 09/07/2021    Primary osteoarthritis of left foot 08/30/2021    Closed nondisplaced fracture of fourth metatarsal bone of left foot 08/30/2021    Closed nondisplaced fracture of third metatarsal bone of left foot 08/30/2021    Closed nondisplaced fracture of fifth left metatarsal bone 08/30/2021    Vertigo     Dizziness     Labyrinthitis     Chronic renal impairment, stage 3 (moderate) ([facility] [address] improved from initial glucose of 59 to a blood sugar of 97. Suspected viral illness. Discussed supportive care including good hydration, bland diet, encourage fluids. Discussed return criteria including lethargy, MS changes, severe abd pain, RLQ abd pain, appears dehydrated, not messi oral liquids, no UO in 8-10 hours, or other concerns.           Differential diagnoses include the following but are not limited to :  Viral illness, cyclic vomiting, viral AGE, food borne illness            If any diagnostic tests were ordered, interpretation of data will be below in ED course/workflow     I have reviewed Sari Salmeron's previous  None .    Vitals:    04/17/24 2313   BP: (!) 133/73   Pulse: 133   Resp: 32   Temp: 99 °F (37.2 °C)   SpO2: 95%       Initial plan:        Orders Placed This Encounter    COVID/Flu/RSV panel    Streptococcus group A PCR    GLUCOSE, BEDSIDE - POINT OF CARE    POCT Metered blood glucose    GLUCOSE, BEDSIDE - POINT OF CARE    ondansetron (ZOFRAN ODT) disintegrating tablet 2 mg    ONDANSETRON 4 MG PO TBDP Pyxis Override    ondansetron (ZOFRAN ODT) 4 MG disintegrating tablet        ED Course/Workflow in ER:          This patient was discussed with No att. providers found who has also evaluated patient and agrees with above plan and management    Lab Results     Results for orders placed or performed during the hospital encounter of 04/17/24   COVID/Flu/RSV panel   Result Value Ref Range    Rapid SARS-COV-2 by PCR Not Detected Not Detected / Detected / Presumptive Positive / Inhibitors present    Influenza A by PCR Not Detected Not Detected    Influenza B by PCR Not Detected Not Detected    RSV BY PCR Not Detected Not Detected    Isolation Guidelines      Procedural Comment     Streptococcus group A PCR    Specimen: Throat; Swab   Result Value Ref Range    STREPTOCOCCUS GROUP A PCR Not Detected Not Detected   GLUCOSE, BEDSIDE - POINT OF CARE   Result Value Ref Range    GLUCOSE, BEDSIDE - POINT  Constipation, slow transit 06/19/2018    Cystitis     Physical deconditioning     Hyponatremia 01/01/2018    Pruritic condition 01/22/2016    Gout     Hyperuricemia     Low back pain     Eczema     Coronary artery disease involving native heart without angina pectoris     Depression     Hyperlipidemia     Generalized osteoarthritis     Osteoporosis        Electronically signed by Carlton Garcia PA-C on 12/5/2022 at 11:11 AM      Electronically signed by Abida Randall MD on 12/5/22 at 3:34 PM EST OF CARE 59 (L) 70 - 99 mg/dL   GLUCOSE, BEDSIDE - POINT OF CARE   Result Value Ref Range    GLUCOSE, BEDSIDE - POINT OF CARE 97 70 - 99 mg/dL       Radiology Results     Imaging Results    None           Clinical Impression     ED Diagnosis   1. Gastroenteritis              Disposition      Discharge home with parent/guardian.  Child appears well, non toxic and without acute distress. Tolerating PO.  Discussed with family regarding patient's diagnosis, all results, and course progression    Plan:  Discharge instructions as follows: Encourage fluids, Please return if Sari appears dehydrated, refusing oral liquids, severe abdominal pain, right lower abdominal pain, lethargy, fever > 4 days, or other concerns.     Discharge 4/18/2024  1:52 AM  Sari Salmeron discharge to home/self care.            JENNIFER Avelar  4/20/2024 11:21 PM     During this entire encounter this provider was wearing appropriate PPE including surgical mask and gloves                     Ghazal Harper APNP  04/20/24 0019

## 2024-06-11 NOTE — PROGRESS NOTES
V2.0  [facility] Hospitalist Progress Note      Name:  Js Lovell /Age/Sex: 1927  (80 y.o. female)   MRN & CSN:  [id number] & [id number] Encounter Date/Time: 2022 6:37 PM EST    Location:  [facility]- PCP: Mart Rodriguez MD       Hospital Day: 6    Assessment and Plan: Js Lovell is a 80 y.o. female with pmh of CAd, GERD, UTI, Hypothyroidism, HTN, Hyperlipedmia, CKD  who presents with Sepsis ([facility] [address]      Plan:    Acute hypoxic respiratory failure from CAP and Pulmonary edema: Sepsis resolving. . No o2 at home. Blood cx negative. Pro-Cody 0.2. CXR Shows -Cardiomegaly with pulmonary edema and concern for pneumonia. Echo: EF 55-60. RVSP 44. Respiratory panel neg. CT Chest showed bilateral infiltrate. Pulmonology following. Repeat chest x-ray stable. Continue Meropenem. Duo-neb. Diuresis on hold. PAUL with Hx of Renal insuffiencey:Likely in the setting of diuresis. Base line around 2. Plan to hold diuresis and monitor. Dr. Debbie Terry following. Cr better today BMP daily. Strict I/O. Family denied HD  Hyperkalemia - Started on MATIAS. Nephrology following. BMP daily  Pyelonephritis - H/O Recurrent UTI: Treated with several rounds of ATB x 1 month (Including Cipro/ doxy/ Macrobid ). Dirty UA. Urine culture growing Enterococcus fecalis - sensitive to ampicillin. ID consulted (Off note - patient is not allergic penicillin). CT abdomen pelvis negative for stones. Meropenem for total of 14 days  Acute on chronic systolic CHF:   For now IV Lasix intermittently. Strict I's/O. Daily weight  CAD - Most recent heart cath . most recent echo -2018; EF 50%. PBNP 6,417. EKG shows LBBB. Elevated troponin likely from CKD. Repeat troponin showed downtrend. On atenolol, norvasc, olmesartan, Imdur, statin, ASA. Cardiology following. Severe AS: follow up at [facility] for TAVR-balloon valvuloplasty   Hypertension -- Continue home Imdur,atenolol, Norvasc, olmesartan  Anemia - Hb Stable.  Needs OP work up for anemia. GERD -- continue PPI    PT/OT recommended ARU      Diet ADULT DIET; Dysphagia - Soft and Bite Sized; 1500 ml   DVT Prophylaxis [] Lovenox, []  Heparin, [] SCDs, [] Ambulation,  [] Eliquis, [] Xarelto  [] Coumadin   Code Status Limited   Disposition From: Home  Expected Disposition: Home  Estimated Date of Discharge: 2 Days  Patient requires continued admission due to CAP   Surrogate Decision Maker/ POA      Subjective:     Chief Complaint: Shortness of Breath       Lexus Montejo is a 80 y.o. female who presents with SOB with productive cough      Some improvement in SOB. Oxygen demand down to 5 L today. Denies any fever, Chest pain, headache, dizziness. Bowel and bladder habits normal. Currently denies any dysuria. Was treated with multiple Abx in the past for recurrent UTI         Review of Systems:    Review of Systems    All other systems reviewed and are negative. Objective:   No intake or output data in the 24 hours ending 11/19/22 0855       Vitals:   Vitals:    11/19/22 0845   BP: (!) 131/54   Pulse: 70   Resp: 22   Temp: (!) 96.5 °F (35.8 °C)   SpO2: 98%       Physical Exam:   Looks comfortable but on 6 L supplement oxygen  General: NAD  Eyes: EOMI  ENT: neck supple  Cardiovascular: Regular rate. Respiratory: Coarse crackles  Gastrointestinal: Soft, non tender  Genitourinary: no suprapubic tenderness  Musculoskeletal: No edema  Skin: warm, dry  Neuro: Alert. Psych: Mood appropriate.      Medications:   Medications:    sodium zirconium cyclosilicate  10 g Oral TID    guaiFENesin  600 mg Oral BID    polyethylene glycol  17 g Oral Daily    senna  1 tablet Oral BID    docusate sodium  100 mg Oral Daily    ipratropium-albuterol  1 ampule Inhalation BID    lactobacillus  1 capsule Oral Daily with breakfast    magnesium oxide  400 mg Oral BID    meropenem  500 mg IntraVENous Q12H    amLODIPine  10 mg Oral Nightly    atenolol  25 mg Oral Daily    sodium bicarbonate  325 mg Oral BID    clopidogrel 75 mg Oral Daily    sodium chloride flush  5-40 mL IntraVENous 2 times per day    sodium chloride flush  5-40 mL IntraVENous 2 times per day    heparin (porcine)  5,000 Units SubCUTAneous 3 times per day    Vitamin D  2,000 Units Oral Daily    pantoprazole  40 mg Oral QAM AC    fluorometholone  1 drop Both Eyes BID    isosorbide mononitrate  60 mg Oral Daily    levothyroxine  88 mcg Oral Daily    ranolazine  500 mg Oral BID    sertraline  50 mg Oral Daily    [Held by provider] losartan  12.5 mg Oral Daily    atorvastatin  20 mg Oral Nightly      Infusions:    sodium chloride      sodium chloride       PRN Meds: ipratropium-albuterol, 1 ampule, Q4H PRN  sodium chloride flush, 5-40 mL, PRN  sodium chloride, , PRN  ondansetron, 4 mg, Q8H PRN   Or  ondansetron, 4 mg, Q6H PRN  polyethylene glycol, 17 g, Daily PRN  acetaminophen, 650 mg, Q6H PRN   Or  acetaminophen, 650 mg, Q6H PRN  sodium chloride flush, 5-40 mL, PRN  sodium chloride, , PRN      Labs      Recent Results (from the past 24 hour(s))   Basic Metabolic Panel    Collection Time: 11/18/22  9:28 AM   Result Value Ref Range    Sodium 133 (L) 135 - 145 MMOL/L    Potassium 4.9 3.5 - 5.1 MMOL/L    Chloride 101 99 - 110 mMol/L    CO2 24 21 - 32 MMOL/L    Anion Gap 8 4 - 16    BUN 57 (H) 6 - 23 MG/DL    Creatinine 2.5 (H) 0.6 - 1.1 MG/DL    Est, Glom Filt Rate 17 (L) >60 mL/min/1.73m2    Glucose 92 70 - 99 MG/DL    Calcium 8.9 8.3 - 10.6 MG/DL   CBC with Auto Differential    Collection Time: 11/18/22  9:28 AM   Result Value Ref Range    WBC 11.5 (H) 4.0 - 10.5 K/CU MM    RBC 2.82 (L) 4.2 - 5.4 M/CU MM    Hemoglobin 8.9 (L) 12.5 - 16.0 GM/DL    Hematocrit 26.7 (L) 37 - 47 %    MCV 94.7 78 - 100 FL    MCH 31.6 (H) 27 - 31 PG    MCHC 33.3 32.0 - 36.0 %    RDW 13.3 11.7 - 14.9 %    Platelets 668 406 - 035 K/CU MM    MPV 8.9 7.5 - 11.1 FL    Differential Type AUTOMATED DIFFERENTIAL     Segs Relative 70.5 (H) 36 - 66 %    Lymphocytes % 16.5 (L) 24 - 44 %    Monocytes % 6.9 (H) 0 - 4 %    Eosinophils % 4.8 (H) 0 - 3 %    Basophils % 0.5 0 - 1 %    Segs Absolute 8.1 K/CU MM    Lymphocytes Absolute 1.9 K/CU MM    Monocytes Absolute 0.8 K/CU MM    Eosinophils Absolute 0.6 K/CU MM    Basophils Absolute 0.1 K/CU MM    Nucleated RBC % 0.0 %    Total Nucleated RBC 0.0 K/CU MM    Total Immature Neutrophil 0.09 K/CU MM    Immature Neutrophil % 0.8 (H) 0 - 0.43 %   C-Reactive Protein    Collection Time: 11/18/22  9:28 AM   Result Value Ref Range    CRP High Sensitivity 122.9 (H) <5.0 mg/L   Basic Metabolic Panel    Collection Time: 11/19/22  6:55 AM   Result Value Ref Range    Sodium 134 (L) 135 - 145 MMOL/L    Potassium 5.4 (H) 3.5 - 5.1 MMOL/L    Chloride 100 99 - 110 mMol/L    CO2 25 21 - 32 MMOL/L    Anion Gap 9 4 - 16    BUN 55 (H) 6 - 23 MG/DL    Creatinine 2.2 (H) 0.6 - 1.1 MG/DL    Est, Glom Filt Rate 20 (L) >60 mL/min/1.73m2    Glucose 91 70 - 99 MG/DL    Calcium 9.2 8.3 - 10.6 MG/DL   C-Reactive Protein    Collection Time: 11/19/22  6:55 AM   Result Value Ref Range    CRP High Sensitivity 85.1 (H) <5.0 mg/L        Imaging/Diagnostics Last 24 Hours   CT CHEST WO CONTRAST    Result Date: 11/14/2022  EXAMINATION: CT OF THE CHEST WITHOUT CONTRAST 11/14/2022 7:33 pm TECHNIQUE: CT of the chest was performed without the administration of intravenous contrast. Multiplanar reformatted images are provided for review. Automated exposure control, iterative reconstruction, and/or weight based adjustment of the mA/kV was utilized to reduce the radiation dose to as low as reasonably achievable. COMPARISON: 01/06/2018 HISTORY: ORDERING SYSTEM PROVIDED HISTORY: sob TECHNOLOGIST PROVIDED HISTORY: Reason for exam:->sob Decision Support Exception - unselect if not a suspected or confirmed emergency medical condition->Emergency Medical Condition (MA) Reason for Exam: sob FINDINGS: Mediastinum: Calcific atherosclerotic disease in the aorta. The heart size is within normal limits.   The esophagus is grossly unremarkable without hiatal hernia. There are mediastinal lymph nodes measuring up to 1.6 cm in short axis diameter likely reactive in nature. There are bilateral pleural effusions layering up to 3 cm on the right and 1 cm on the left. There is bilateral patchy pulmonary infiltrates. The previously seen pulmonary nodule in the right middle lobe appears slightly larger, however there is a central calcification and this most likely represents a granuloma. Lungs/pleura: As above Upper Abdomen: There is a cyst in the upper pole the left kidney and the remainder of the visualized upper abdominal organs are unremarkable Soft Tissues/Bones: Degenerative change     Bilateral pulmonary infiltrates and bilateral pleural effusions with probable reactive mediastinal adenopathy Probable granuloma in the right middle lobe. No follow-up imaging recommended. XR CHEST PORTABLE    Result Date: 11/14/2022  EXAMINATION: ONE XRAY VIEW OF THE CHEST 11/14/2022 3:17 pm COMPARISON: 08/18/2018 HISTORY: ORDERING SYSTEM PROVIDED HISTORY: sob TECHNOLOGIST PROVIDED HISTORY: Reason for exam:->sob Reason for Exam: sob FINDINGS: Cardiomegaly. Ill-defined pulmonary opacities. Bibasilar hypoaeration. No pneumothorax.      Cardiomegaly Bilateral ill-defined pulmonary opacities could represent multifocal pneumonia or edema       Electronically signed by Maulik Ware MD on 11/19/2022 at 8:55 AM 180.3

## 2025-05-23 NOTE — PROGRESS NOTES
Chief Complaint   Patient presents with    Chest Pain     Chest pain with shortness of breath since 5 pm    Past medical history of chronic systolic heart failure LEF 20%, nonischemic cardiomyopathy, continued methamphetamine use, pulmonary hypertension     Shortness of Breath    Leg Swelling     From few days, progressively worsening     Pain:  9/10  Ambulatory:  on wheelchair  Alert and Oriented: x 4  Oxygen Treatment: No    Pt came in to triage for the above complaints.     Pt is speaking in full sentences, follows commands and responds appropriately to questions.     Respirations are even and unlabored.    Pt placed in lobby. Pt educated on triage process.     Pt encouraged to inform staff for any changes in condition or if needs help while waiting to be room in.        Vitals:    05/22/25 2001   BP: (!) 136/92   Pulse: (!) 111   Resp: (!) 22   Temp: 35.9 °C (96.6 °F)   SpO2: 100%        Outpatient Pharmacy Progress Note for Meds-to-Beds    Total number of Prescriptions Filled: 4  The following medications were dispensed to the patient during the discharge process:  Duoneb solution  Miconazole powder  Bumetanide  Clopidogrel     Additional Documentation:  Patient's family member picked-up the medication(s) in the OP Pharmacy (grand-daughter)      Thank you for letting us serve your patients.   [facility]    [address] [facility]    Phone: [phone]    Fax: [phone]

## (undated) DEVICE — Z DISCONTINUED (USE MFG CAT MVABO)  TUBING GAS SAMPLING STD 6.5 FT FEMALE CONN SMRT CAPNOLINE